# Patient Record
Sex: FEMALE | Race: WHITE | NOT HISPANIC OR LATINO | Employment: OTHER | URBAN - METROPOLITAN AREA
[De-identification: names, ages, dates, MRNs, and addresses within clinical notes are randomized per-mention and may not be internally consistent; named-entity substitution may affect disease eponyms.]

---

## 2017-06-16 ENCOUNTER — GENERIC CONVERSION - ENCOUNTER (OUTPATIENT)
Dept: OTHER | Facility: OTHER | Age: 57
End: 2017-06-16

## 2017-09-28 DIAGNOSIS — Z12.31 ENCOUNTER FOR SCREENING MAMMOGRAM FOR MALIGNANT NEOPLASM OF BREAST: ICD-10-CM

## 2017-10-19 ENCOUNTER — TRANSCRIBE ORDERS (OUTPATIENT)
Dept: ADMINISTRATIVE | Facility: HOSPITAL | Age: 57
End: 2017-10-19

## 2017-10-19 DIAGNOSIS — Z12.39 SCREENING BREAST EXAMINATION: Primary | ICD-10-CM

## 2017-10-25 ENCOUNTER — DOCTOR'S OFFICE (OUTPATIENT)
Dept: URBAN - METROPOLITAN AREA CLINIC 136 | Facility: CLINIC | Age: 57
Setting detail: OPHTHALMOLOGY
End: 2017-10-25
Payer: COMMERCIAL

## 2017-10-25 DIAGNOSIS — H40.003: ICD-10-CM

## 2017-10-25 DIAGNOSIS — H43.813: ICD-10-CM

## 2017-10-25 DIAGNOSIS — H53.2: ICD-10-CM

## 2017-10-25 DIAGNOSIS — H25.13: ICD-10-CM

## 2017-10-25 DIAGNOSIS — H53.40: ICD-10-CM

## 2017-10-25 PROCEDURE — 92004 COMPRE OPH EXAM NEW PT 1/>: CPT | Performed by: OPHTHALMOLOGY

## 2017-10-25 ASSESSMENT — REFRACTION_CURRENTRX
OD_OVR_VA: 20/
OD_OVR_VA: 20/
OS_OVR_VA: 20/
OS_OVR_VA: 20/
OD_OVR_VA: 20/
OS_OVR_VA: 20/

## 2017-10-25 ASSESSMENT — REFRACTION_MANIFEST
OD_VA1: 20/
OS_VA3: 20/
OS_VA3: 20/
OS_VA1: 20/
OD_VA3: 20/
OD_VA2: 20/
OU_VA: 20/
OD_VA2: 20/
OD_VA3: 20/
OS_VA2: 20/
OU_VA: 20/
OD_VA1: 20/
OD_VA3: 20/
OD_VA2: 20/
OS_VA2: 20/
OS_VA3: 20/
OS_VA2: 20/
OU_VA: 20/
OS_VA1: 20/
OS_VA1: 20/
OD_VA1: 20/

## 2017-10-25 ASSESSMENT — VISUAL ACUITY
OS_BCVA: 20/30
OD_BCVA: 20/60

## 2017-10-25 ASSESSMENT — CONFRONTATIONAL VISUAL FIELD TEST (CVF): OS_FINDINGS: FULL

## 2017-11-01 ENCOUNTER — DOCTOR'S OFFICE (OUTPATIENT)
Dept: URBAN - METROPOLITAN AREA CLINIC 136 | Facility: CLINIC | Age: 57
Setting detail: OPHTHALMOLOGY
End: 2017-11-01
Payer: COMMERCIAL

## 2017-11-01 ENCOUNTER — RX ONLY (RX ONLY)
Age: 57
End: 2017-11-01

## 2017-11-01 DIAGNOSIS — H53.40: ICD-10-CM

## 2017-11-01 DIAGNOSIS — H10.13: ICD-10-CM

## 2017-11-01 DIAGNOSIS — H01.002: ICD-10-CM

## 2017-11-01 DIAGNOSIS — H01.004: ICD-10-CM

## 2017-11-01 DIAGNOSIS — H01.005: ICD-10-CM

## 2017-11-01 DIAGNOSIS — H01.001: ICD-10-CM

## 2017-11-01 PROCEDURE — 99211 OFF/OP EST MAY X REQ PHY/QHP: CPT | Performed by: OPHTHALMOLOGY

## 2017-11-01 ASSESSMENT — LID EXAM ASSESSMENTS
OS_BLEPHARITIS: 1+
OD_COMMENTS: MGD
OD_BLEPHARITIS: 1+
OS_COMMENTS: MGD

## 2017-11-01 ASSESSMENT — VISUAL ACUITY
OS_BCVA: 20/30-1
OD_BCVA: 20/60

## 2017-11-01 ASSESSMENT — REFRACTION_MANIFEST
OD_VA1: 20/
OS_VA3: 20/
OS_VA2: 20/
OD_VA2: 20/
OD_VA2: 20/
OS_VA3: 20/
OU_VA: 20/
OD_VA2: 20/
OU_VA: 20/
OD_VA3: 20/
OD_VA1: 20/
OS_VA3: 20/
OD_VA3: 20/
OD_VA3: 20/
OS_VA1: 20/
OD_VA1: 20/
OS_VA2: 20/
OS_VA2: 20/
OS_VA1: 20/
OS_VA1: 20/
OU_VA: 20/

## 2017-11-01 ASSESSMENT — REFRACTION_CURRENTRX
OD_OVR_VA: 20/
OS_OVR_VA: 20/
OS_OVR_VA: 20/
OD_OVR_VA: 20/
OD_OVR_VA: 20/
OS_OVR_VA: 20/

## 2017-11-01 ASSESSMENT — CONFRONTATIONAL VISUAL FIELD TEST (CVF): OS_FINDINGS: FULL

## 2017-11-13 ENCOUNTER — TRANSCRIBE ORDERS (OUTPATIENT)
Dept: ADMINISTRATIVE | Facility: HOSPITAL | Age: 57
End: 2017-11-13

## 2017-11-13 DIAGNOSIS — Z13.820 SCREENING FOR OSTEOPOROSIS: Primary | ICD-10-CM

## 2017-11-17 ENCOUNTER — HOSPITAL ENCOUNTER (OUTPATIENT)
Dept: BONE DENSITY | Facility: MEDICAL CENTER | Age: 57
Discharge: HOME | End: 2017-11-17
Payer: COMMERCIAL

## 2017-11-17 ENCOUNTER — HOSPITAL ENCOUNTER (OUTPATIENT)
Dept: MAMMOGRAPHY | Facility: MEDICAL CENTER | Age: 57
Discharge: HOME | End: 2017-11-17
Payer: COMMERCIAL

## 2017-11-17 DIAGNOSIS — Z12.31 ENCOUNTER FOR SCREENING MAMMOGRAM FOR MALIGNANT NEOPLASM OF BREAST: ICD-10-CM

## 2017-11-17 DIAGNOSIS — Z13.820 SCREENING FOR OSTEOPOROSIS: ICD-10-CM

## 2017-11-17 LAB — HCV AB SER-ACNC: NEGATIVE

## 2017-11-17 PROCEDURE — 77080 DXA BONE DENSITY AXIAL: CPT

## 2017-11-17 PROCEDURE — 77063 BREAST TOMOSYNTHESIS BI: CPT

## 2017-11-17 PROCEDURE — G0202 SCR MAMMO BI INCL CAD: HCPCS

## 2017-12-04 ENCOUNTER — HOSPITAL ENCOUNTER (OUTPATIENT)
Dept: ULTRASOUND IMAGING | Facility: CLINIC | Age: 57
Discharge: HOME | End: 2017-12-04
Payer: COMMERCIAL

## 2017-12-04 ENCOUNTER — GENERIC CONVERSION - ENCOUNTER (OUTPATIENT)
Dept: OTHER | Facility: OTHER | Age: 57
End: 2017-12-04

## 2017-12-04 DIAGNOSIS — R92.8 ABNORMAL MAMMOGRAM: ICD-10-CM

## 2017-12-04 PROCEDURE — 76642 ULTRASOUND BREAST LIMITED: CPT

## 2017-12-04 RX ORDER — BACLOFEN 20 MG/1
20 TABLET ORAL 2 TIMES DAILY
COMMUNITY
End: 2022-05-10

## 2017-12-04 RX ORDER — CELECOXIB 200 MG/1
200 CAPSULE ORAL 2 TIMES DAILY
COMMUNITY
End: 2018-10-30 | Stop reason: SDUPTHER

## 2017-12-04 RX ORDER — LEVOTHYROXINE SODIUM 0.15 MG/1
175 TABLET ORAL DAILY
COMMUNITY
End: 2018-04-05 | Stop reason: DRUGHIGH

## 2017-12-04 NOTE — PROGRESS NOTES
Met with patient and Dr Emiliano Zamora  regarding recommendation for;      _____ RIGHT ___X___LEFT      __X___Ultrasound guided  ______Stereotactic  Breast biopsy  ___X__Verbalized understanding        Blood thinners:  _____yes __X___no    Date stopped: ___N/A________    Biopsy teaching sheet given:  ___X____yes ______no

## 2017-12-13 ENCOUNTER — HOSPITAL ENCOUNTER (OUTPATIENT)
Dept: ULTRASOUND IMAGING | Facility: CLINIC | Age: 57
Discharge: HOME | End: 2017-12-13
Payer: COMMERCIAL

## 2017-12-13 ENCOUNTER — HOSPITAL ENCOUNTER (OUTPATIENT)
Dept: MAMMOGRAPHY | Facility: CLINIC | Age: 57
Discharge: HOME | End: 2017-12-13

## 2017-12-13 ENCOUNTER — GENERIC CONVERSION - ENCOUNTER (OUTPATIENT)
Dept: OBGYN CLINIC | Facility: CLINIC | Age: 57
End: 2017-12-13

## 2017-12-13 ENCOUNTER — GENERIC CONVERSION - ENCOUNTER (OUTPATIENT)
Dept: OTHER | Facility: OTHER | Age: 57
End: 2017-12-13

## 2017-12-13 ENCOUNTER — HOSPITAL ENCOUNTER (OUTPATIENT)
Dept: ULTRASOUND IMAGING | Facility: CLINIC | Age: 57
Discharge: HOME | End: 2017-12-13
Admitting: RADIOLOGY
Payer: COMMERCIAL

## 2017-12-13 VITALS — SYSTOLIC BLOOD PRESSURE: 120 MMHG | HEART RATE: 80 BPM | DIASTOLIC BLOOD PRESSURE: 76 MMHG

## 2017-12-13 DIAGNOSIS — R92.8 ABNORMAL FINDINGS ON DIAGNOSTIC IMAGING OF BREAST: ICD-10-CM

## 2017-12-13 DIAGNOSIS — R92.8 ABNORMAL ULTRASOUND OF BREAST: ICD-10-CM

## 2017-12-13 DIAGNOSIS — R92.8 ABNORMAL MAMMOGRAM: ICD-10-CM

## 2017-12-13 PROCEDURE — 19083 BX BREAST 1ST LESION US IMAG: CPT

## 2017-12-13 PROCEDURE — 88305 TISSUE EXAM BY PATHOLOGIST: CPT | Performed by: OBSTETRICS & GYNECOLOGY

## 2017-12-13 RX ORDER — LIDOCAINE HYDROCHLORIDE 10 MG/ML
4 INJECTION, SOLUTION INFILTRATION; PERINEURAL ONCE
Status: COMPLETED | OUTPATIENT
Start: 2017-12-13 | End: 2017-12-13

## 2017-12-13 RX ORDER — SODIUM BICARBONATE 42 MG/ML
1 INJECTION, SOLUTION INTRAVENOUS ONCE
Status: COMPLETED | OUTPATIENT
Start: 2017-12-13 | End: 2017-12-13

## 2017-12-13 RX ADMIN — SODIUM BICARBONATE 0.5 MEQ: 42 SOLUTION INTRAVENOUS at 12:03

## 2017-12-13 RX ADMIN — LIDOCAINE HYDROCHLORIDE 4 ML: 10 INJECTION, SOLUTION INFILTRATION; PERINEURAL at 12:03

## 2017-12-13 NOTE — DISCHARGE INSTR - OTHER ORDERS
POST LARGE CORE BREAST BIOPSY PATIENT INFORMATION      1  Place an ice pack inside your bra over the top of the dressing every hour for 20 minutes (20 minutes on, 60 minutes off) Do this until bedtime  2  Do not shower or bathe until the following morning       3  You may bathe your breast carefully with the steri-strips in place  Be careful    Not to loosen them  The steri-strips will fall off in 3-5 days  4  You may have mild discomfort, and you may have some bruising where the   Needle entered the skin  This should clear within 5-7 days  5  If you need medicine for discomfort, take acetaminophen products such as   Tylenol  You may also take Advil or Motrin products  6  Do not participate in strenuous activities such as-tennis, aerobics, skiing,  Weight lifting, etc  for 24 hours  Refrain from swimming for 72 hours  7  Wearing a bra for sleeping may be more comfortable for the first 24-48 hours  8  Watch for continued bleeding, pain or fever over 101     For procedures done at the [address] [place] [address] call:  Cordell Dent RN at [phone] or  Senia Patel RN at [phone]  After 4 PM call the Interventional Radiology Department at [phone] and ask to speak with the nurse on call  For procedures performed at [address] [place] [address] call: The Radiology Nurse at [phone]    After 4 PM call your physician, or go to the Emergency Department  9          The final results of your biopsy are usually available within one week
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
## 2017-12-13 NOTE — PROGRESS NOTES
Procedure type:    __X___ultrasound guided _____stereotactic    Breast:    __X___Left _____Right    Location: 7:00 4CM FROM NIPPLE    Needle: 12G SHELBY    # of passes: 3    Clip: ULTRACLIP/WING    Performed by: Dr Crystal Li held for 5 minutes by:  John Chavez    Steri Strips:    __X___yes _____no    Band aid:    __X___yes_____no    Tape and guaze:    _____yes __X___no    Tolerated procedure:    __X___yes _____no

## 2017-12-14 NOTE — PROGRESS NOTES
Post procedure call completed    Bleeding: _____yes ___x__no    Pain: _____yes ____x__no    Redness/Swelling: ______yes ___x___no    Band aid removed: __x___yes _____no    Steri-Strips intact: ____x__yes _____no

## 2017-12-26 ENCOUNTER — HOSPITAL ENCOUNTER (EMERGENCY)
Facility: HOSPITAL | Age: 57
Discharge: HOME | End: 2017-12-26
Admitting: EMERGENCY MEDICINE
Payer: COMMERCIAL

## 2017-12-26 VITALS
TEMPERATURE: 98.6 F | HEART RATE: 99 BPM | OXYGEN SATURATION: 98 % | SYSTOLIC BLOOD PRESSURE: 175 MMHG | RESPIRATION RATE: 20 BRPM | DIASTOLIC BLOOD PRESSURE: 98 MMHG

## 2017-12-26 DIAGNOSIS — K08.89 PAIN, DENTAL: Primary | ICD-10-CM

## 2017-12-26 PROCEDURE — 99282 EMERGENCY DEPT VISIT SF MDM: CPT

## 2017-12-26 RX ORDER — MORPHINE SULFATE 15 MG/1
30 TABLET ORAL EVERY 4 HOURS PRN
Status: DISCONTINUED | OUTPATIENT
Start: 2017-12-26 | End: 2017-12-26 | Stop reason: HOSPADM

## 2017-12-26 RX ORDER — CLINDAMYCIN HYDROCHLORIDE 300 MG/1
300 CAPSULE ORAL 3 TIMES DAILY
Qty: 21 CAPSULE | Refills: 0 | Status: SHIPPED | OUTPATIENT
Start: 2017-12-26 | End: 2018-01-02

## 2017-12-26 RX ORDER — ONDANSETRON 4 MG/1
TABLET, FILM COATED ORAL
Qty: 10 TABLET | Refills: 0 | Status: SHIPPED | OUTPATIENT
Start: 2017-12-26 | End: 2018-04-05 | Stop reason: SDUPTHER

## 2017-12-26 RX ORDER — MORPHINE SULFATE 30 MG/1
TABLET ORAL
Qty: 5 TABLET | Refills: 0 | Status: SHIPPED | OUTPATIENT
Start: 2017-12-26 | End: 2018-09-13

## 2017-12-26 RX ORDER — CLINDAMYCIN HYDROCHLORIDE 150 MG/1
300 CAPSULE ORAL ONCE
Status: COMPLETED | OUTPATIENT
Start: 2017-12-26 | End: 2017-12-26

## 2017-12-26 RX ORDER — ONDANSETRON 4 MG/1
8 TABLET, ORALLY DISINTEGRATING ORAL ONCE
Status: COMPLETED | OUTPATIENT
Start: 2017-12-26 | End: 2017-12-26

## 2017-12-26 RX ADMIN — CLINDAMYCIN HYDROCHLORIDE 300 MG: 150 CAPSULE ORAL at 13:35

## 2017-12-26 RX ADMIN — ONDANSETRON 8 MG: 4 TABLET, ORALLY DISINTEGRATING ORAL at 12:24

## 2017-12-26 RX ADMIN — MORPHINE SULFATE 30 MG: 15 TABLET ORAL at 13:35

## 2017-12-26 NOTE — ED PROVIDER NOTES
History  Chief Complaint   Patient presents with    Dental Swelling     Pt presents to the ED for evaluation of right facial swelling, reports tooth on that side "needs to come out" has dentist apt for next week but swelling worsened within the last 12 hours  Patient is a 57-year-old female who reports starting with right lower molar pain in the middle of the night that quickly worsened to include right facial swelling  She denies fevers, chills, trouble opening her mouth, or trouble swallowing  This morning she has had some nausea and vomiting  She further denies chest pain, shortness of breath, abdominal pain  She has a history of issues with same tooth and has an upcoming appointment with dentist in a few weeks  Prior to Admission Medications   Prescriptions Last Dose Informant Patient Reported? Taking?   b complex vitamins tablet   Yes Yes   Sig: Take 1 tablet by mouth daily   baclofen 10 mg tablet   Yes Yes   Sig: Take 10 mg by mouth 3 (three) times a day   celecoxib (CeleBREX) 200 mg capsule   Yes Yes   Sig: Take 200 mg by mouth 2 (two) times a day   levothyroxine 150 mcg tablet   Yes Yes   Sig: Take 150 mcg by mouth daily   magnesium oxide (MAG-OX) 400 mg   Yes Yes   Sig: Take 400 mg by mouth 2 (two) times a day      Facility-Administered Medications: None       History reviewed  No pertinent past medical history  History reviewed  No pertinent surgical history  History reviewed  No pertinent family history  I have reviewed and agree with the history as documented  Social History   Substance Use Topics    Smoking status: Never Smoker    Smokeless tobacco: Not on file    Alcohol use No        Review of Systems   Constitutional: Negative for chills and fever  HENT: Positive for dental problem and facial swelling  Negative for congestion, drooling, rhinorrhea, sinus pain, sore throat and trouble swallowing      Respiratory: Negative for cough, shortness of breath and wheezing  Cardiovascular: Negative for chest pain  Gastrointestinal: Negative for abdominal pain, diarrhea, nausea and vomiting  Neurological: Negative for dizziness and headaches  All other systems reviewed and are negative  Physical Exam  ED Triage Vitals [12/26/17 1123]   Temperature Pulse Respirations Blood Pressure SpO2   98 6 °F (37 °C) 99 20 (!) 175/98 98 %      Temp Source Heart Rate Source Patient Position - Orthostatic VS BP Location FiO2 (%)   Oral Monitor Sitting Left arm --      Pain Score       Worst Possible Pain           Orthostatic Vital Signs  Vitals:    12/26/17 1123   BP: (!) 175/98   Pulse: 99   Patient Position - Orthostatic VS: Sitting       Physical Exam   Constitutional: She is oriented to person, place, and time  She appears well-developed and well-nourished  No distress  HENT:   Head: Normocephalic and atraumatic  Right Ear: Hearing, tympanic membrane, external ear and ear canal normal    Left Ear: Hearing, tympanic membrane, external ear and ear canal normal    Nose: Nose normal  No mucosal edema or rhinorrhea  Right sinus exhibits no maxillary sinus tenderness  Left sinus exhibits no maxillary sinus tenderness  Mouth/Throat: Uvula is midline, oropharynx is clear and moist and mucous membranes are normal  No trismus in the jaw  No uvula swelling  No oropharyngeal exudate  Eyes: Conjunctivae, EOM and lids are normal  Pupils are equal, round, and reactive to light  Neck: Normal range of motion  Neck supple  No neck rigidity  Edema (mild edema and TTP ~angle of mandible/R AC LN's) present  No erythema and normal range of motion present  No cervical mid-line TTP, no paraspinal muscle TTP   Cardiovascular: Normal rate, regular rhythm and normal heart sounds  Pulmonary/Chest: Effort normal and breath sounds normal    Abdominal: Soft  Normal appearance and bowel sounds are normal  There is no tenderness  Musculoskeletal:   Normal gait and station  Non-focal with FROM upper, lower extremities, neck, chest and back   Lymphadenopathy:     She has no cervical adenopathy  Neurological: She is alert and oriented to person, place, and time  Appropriate speech and behavior  Non-focal         ED Medications  Medications   morphine (MSIR) IR tablet 30 mg (30 mg Oral Given 12/26/17 1335)   ondansetron (ZOFRAN-ODT) dispersible tablet 8 mg (8 mg Oral Given 12/26/17 1224)   clindamycin (CLEOCIN) capsule 300 mg (300 mg Oral Given 12/26/17 1335)       Diagnostic Studies  Results Reviewed     None                 No orders to display              Procedures  Procedures       Phone Contacts  ED Phone Contact    ED Course  ED Course as of Dec 26 1441   Tue Dec 26, 2017   1332  Patient's nausea improved after Zofran 8 mg ODT  She is tolerating p o  Liquids at this time  Requesting something for pain  MSIR 30 mg ordered as well as 1 dose of Cleocin 300 mg     1431   Upon reassessment patient was able to tolerate both the pain medication and antibiotic without nausea or vomiting  She is stable for discharge  She states that her insurance told her to obtain and ambulatory referral to seek further treatment at [address] Floor surgery  [facility]  [facility] Time    Disposition  Final diagnoses:   Pain, dental     Time reflects when diagnosis was documented in both MDM as applicable and the Disposition within this note     Time User Action Codes Description Comment    12/26/2017  2:33 PM Elsie Whitten Add [K08 89] Pain, dental       ED Disposition     ED Disposition Condition Comment    Discharge  [address] discharge to home/self care      Condition at discharge: Good        Follow-up Information     Follow up With Specialties Details Why [address] [facility]"" for Oral and Maxillofacial Surgery [place]  Call make appt asap for further evaluation [address] [facility] [address]    [address] [place] for Oral and [address]    [address], DO    [address]  [address] [place] [address],  [address] [place] [address] [phone]          Patient's Medications   Discharge Prescriptions    CLINDAMYCIN (CLEOCIN) 300 MG CAPSULE    Take 1 capsule by mouth 3 (three) times a day for 7 days       Start Date: 12/26/2017End Date: 1/2/2018       Order Dose: 300 mg       Quantity: 21 capsule    Refills: 0    MORPHINE (MSIR) 30 MG TABLET    Take 1 tablet every 4-6 hours as needed for breakthrough pain  Start Date: 12/26/2017End Date: --       Order Dose: --       Quantity: 5 tablet    Refills: 0    ONDANSETRON (ZOFRAN) 4 MG TABLET    Take 1-2 tabs every 8 hours as needed for nausea/vomiting       Start Date: 12/26/2017End Date: --       Order Dose: --       Quantity: 10 tablet    Refills: 0       Outpatient Discharge Orders  Ambulatory Referral   Standing Status: Future  Standing Exp   Date: 06/26/18         ED Provider  Electronically Signed by           Deepak Moeller PA-C  12/26/17 3069

## 2017-12-26 NOTE — DISCHARGE INSTRUCTIONS
Toothache   WHAT YOU NEED TO KNOW:   A toothache is pain that is caused by irritation of the nerves in the center of your tooth  The irritation may be caused by several problems, such as a cavity, an infection, a cracked tooth, or gum disease  It is very important to follow up with your dentist so the cause of your toothache can be diagnosed and treated  This can help prevent more serious problems  DISCHARGE INSTRUCTIONS:   Medicines: You may  need any of the following:  · NSAIDs  decrease swelling and pain  This medicine can be bought with or without a doctor's order  This medicine can cause stomach bleeding or kidney problems in certain people  If you take blood thinner medicine, always ask your healthcare provider if NSAIDs are safe for you  Always read the medicine label and follow the directions on it before using this medicine  · Acetaminophen  decreases pain  It is available without a doctor's order  Ask how much to take and how often to take it  Follow directions  Acetaminophen can cause liver damage if not taken correctly  · Pain medicine  may be given as a pill or as medicine that you put directly on your tooth or gums  Do not wait until the pain is severe before you take this medicine  · Antibiotics  help fight or prevent an infection caused by bacteria  Take them as directed  · Take your medicine as directed  Contact your healthcare provider if you think your medicine is not helping or if you have side effects  Tell him of her if you are allergic to any medicine  Keep a list of the medicines, vitamins, and herbs you take  Include the amounts, and when and why you take them  Bring the list or the pill bottles to follow-up visits  Carry your medicine list with you in case of an emergency  Follow up with your dentist as directed: You may be referred to a dental surgeon  Write down your questions so you remember to ask them during your visits     Self-care:   · Rinse your mouth with warm salt water 4 times a day or as directed  · You may need to eat soft foods to help relieve pain caused by chewing  Contact your dentist if:   · You have questions or concerns about your condition or care  Return to the emergency department if:   · You have trouble breathing  · You have swelling in your face or neck  · You have a fever and chills  · You have trouble speaking or swallowing  · You have trouble opening or closing your mouth  © 2017 [facility] Information is for End User's use only and may not be sold, redistributed or otherwise used for commercial purposes  All illustrations and images included in CareNotes® are the copyrighted property of A D A M , Inc  or Andrea Mendes  The above information is an  only  It is not intended as medical advice for individual conditions or treatments  Talk to your doctor, nurse or pharmacist before following any medical regimen to see if it is safe and effective for you
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
## 2018-01-16 LAB — OCCULT BLD, FECAL IMMUNOLOGICAL (HISTORICAL): NEGATIVE

## 2018-01-17 NOTE — MISCELLANEOUS
Message  Return to work or school:   Yadira Martinez is under my professional care   She was seen in my office on 01/18/2016   She is able to return to work on  01/22/2016       Dakota Kessler DO/am       Signatures   Electronically signed by : Candie Hernandez, ; Jan 18 2016  2:36PM EST                       (Author)    Electronically signed by : Candie Hernandez, ; Jan 18 2016  2:44PM EST                       (Author)

## 2018-02-21 ENCOUNTER — OFFICE VISIT (OUTPATIENT)
Dept: PHYSICAL THERAPY | Age: 58
End: 2018-02-21
Payer: COMMERCIAL

## 2018-02-21 PROCEDURE — 97750 PHYSICAL PERFORMANCE TEST: CPT | Performed by: PHYSICAL THERAPIST

## 2018-03-08 ENCOUNTER — TELEPHONE (OUTPATIENT)
Dept: FAMILY MEDICINE CLINIC | Facility: CLINIC | Age: 58
End: 2018-03-08

## 2018-03-08 ENCOUNTER — HOSPITAL ENCOUNTER (OUTPATIENT)
Dept: RADIOLOGY | Facility: HOSPITAL | Age: 58
Discharge: HOME | End: 2018-03-08
Payer: COMMERCIAL

## 2018-03-08 ENCOUNTER — OFFICE VISIT (OUTPATIENT)
Dept: FAMILY MEDICINE CLINIC | Facility: CLINIC | Age: 58
End: 2018-03-08
Payer: COMMERCIAL

## 2018-03-08 VITALS
WEIGHT: 215.4 LBS | SYSTOLIC BLOOD PRESSURE: 124 MMHG | DIASTOLIC BLOOD PRESSURE: 80 MMHG | HEIGHT: 67 IN | BODY MASS INDEX: 33.81 KG/M2

## 2018-03-08 DIAGNOSIS — S06.9X9S MILD NEUROCOGNITIVE DISORDER DUE TO TRAUMATIC BRAIN INJURY, SEQUELA (HCC): ICD-10-CM

## 2018-03-08 DIAGNOSIS — G89.29 CHRONIC PAIN OF RIGHT ANKLE: ICD-10-CM

## 2018-03-08 DIAGNOSIS — M79.10 MYALGIA: ICD-10-CM

## 2018-03-08 DIAGNOSIS — M17.12 PRIMARY OSTEOARTHRITIS OF LEFT KNEE: ICD-10-CM

## 2018-03-08 DIAGNOSIS — I67.9 SMALL VESSEL DISEASE, CEREBROVASCULAR: ICD-10-CM

## 2018-03-08 DIAGNOSIS — R26.2 AMBULATORY DYSFUNCTION: ICD-10-CM

## 2018-03-08 DIAGNOSIS — E03.9 ACQUIRED HYPOTHYROIDISM: ICD-10-CM

## 2018-03-08 DIAGNOSIS — R11.0 NAUSEA: Primary | ICD-10-CM

## 2018-03-08 DIAGNOSIS — M25.571 CHRONIC PAIN OF RIGHT ANKLE: ICD-10-CM

## 2018-03-08 DIAGNOSIS — G70.9 NEUROMUSCULAR DISORDER (HCC): ICD-10-CM

## 2018-03-08 DIAGNOSIS — Q04.8 CEREBELLAR TONSILLAR ECTOPIA (HCC): ICD-10-CM

## 2018-03-08 DIAGNOSIS — G89.29 CHRONIC FOOT PAIN, RIGHT: ICD-10-CM

## 2018-03-08 DIAGNOSIS — R73.03 PREDIABETES: ICD-10-CM

## 2018-03-08 DIAGNOSIS — M62.838 MUSCLE SPASM: ICD-10-CM

## 2018-03-08 DIAGNOSIS — G31.84 MILD NEUROCOGNITIVE DISORDER DUE TO TRAUMATIC BRAIN INJURY, SEQUELA (HCC): ICD-10-CM

## 2018-03-08 DIAGNOSIS — R29.6 RECURRENT FALLS: ICD-10-CM

## 2018-03-08 DIAGNOSIS — M79.671 CHRONIC FOOT PAIN, RIGHT: ICD-10-CM

## 2018-03-08 DIAGNOSIS — R79.82 ELEVATED C-REACTIVE PROTEIN (CRP): ICD-10-CM

## 2018-03-08 PROCEDURE — 73610 X-RAY EXAM OF ANKLE: CPT

## 2018-03-08 PROCEDURE — 99215 OFFICE O/P EST HI 40 MIN: CPT | Performed by: FAMILY MEDICINE

## 2018-03-08 PROCEDURE — 3725F SCREEN DEPRESSION PERFORMED: CPT | Performed by: FAMILY MEDICINE

## 2018-03-08 PROCEDURE — 73630 X-RAY EXAM OF FOOT: CPT

## 2018-03-08 RX ORDER — ONDANSETRON 4 MG/1
4 TABLET, FILM COATED ORAL EVERY 8 HOURS PRN
Qty: 30 TABLET | Refills: 3 | Status: SHIPPED | OUTPATIENT
Start: 2018-03-08 | End: 2018-06-12

## 2018-03-08 NOTE — TELEPHONE ENCOUNTER
Tell her we refer to [facility] where they will do an intake with the therapist and then eventually get her into Psychiatry as warranted  Give her the # to call

## 2018-03-09 DIAGNOSIS — M77.30 CALCANEAL SPUR, UNSPECIFIED LATERALITY: Primary | ICD-10-CM

## 2018-03-09 NOTE — ASSESSMENT & PLAN NOTE
I reviewed her extensive records from [facility] and neuro psych  Pt states [facility] declined eval her  She will c/w [facility] re "undiag" disorder

## 2018-03-09 NOTE — PROGRESS NOTES
Assessment/Plan:    Acquired hypothyroidism  Will update labs 6 mo  CPM      Nausea  Refill Zofran    Prediabetes  RFM and hesham A1c 6 m    Chronic foot pain, right  CK XR R foot and ankle    Ambulatory dysfunction  Continue to use wheeled walker  Myalgia  I reviewed her extensive records from [facility] and neuro psych  Pt states [facility] declined eval her  She will c/w [facility] re "undiag" disorder  Diagnoses and all orders for this visit:    Nausea  -     ondansetron (ZOFRAN) 4 mg tablet; Take 1 tablet (4 mg total) by mouth every 8 (eight) hours as needed for nausea or vomiting    Prediabetes  -     Comprehensive metabolic panel; Future  -     Lipid panel; Future  -     HEMOGLOBIN A1C W/ EAG ESTIMATION; Future    Muscle spasm  -     Creatine Kinase, Total; Future  -     C-reactive protein; Future    Acquired hypothyroidism  -     TSH, 3rd generation; Future  -     T4, free; Future    Chronic foot pain, right  -     XR foot 3+ vw right; Future    Chronic pain of right ankle  -     XR ankle 3+ vw right; Future    Myalgia    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HCC)    Ambulatory dysfunction    Small vessel disease, cerebrovascular    Elevated C-reactive protein (CRP)    Primary osteoarthritis of left knee    Neuromuscular disorder (HCC)    Recurrent falls    Mild neurocognitive disorder due to traumatic brain injury, sequela ([facility] )    Other orders  -     Discontinue: aspirin 81 MG tablet; Take by mouth        There are no Patient Instructions on file for this visit  Subjective:        Patient ID: Sabine Almanza is a 59 y o  female  Chief Complaint   Patient presents with   [address]    Multiple Falls       NO pt here to reestab  Has had extensive w/u thru [facility] and listed as "undiag d/o" Has serious issues w recurrent falls, muscle spasms, amb dis(f)  Brought records from [facility] and neuropsych tarah  Has applied for disability           The following portions of the patient's history were reviewed and updated as appropriate: past medical history, past surgical history and problem list       Review of Systems   Constitutional: Positive for activity change and fatigue  Negative for diaphoresis and unexpected weight change  HENT: Negative  Eyes: Negative  Cardiovascular: Negative  Gastrointestinal: Negative  Endocrine: Negative for cold intolerance and heat intolerance  Genitourinary: Negative for difficulty urinating  Musculoskeletal: Positive for gait problem, myalgias and neck stiffness  Skin: Negative  Neurological: Negative for dizziness, tremors and headaches  Psychiatric/Behavioral: The patient is not nervous/anxious  Objective:  /80 (BP Location: Right arm, Patient Position: Sitting, Cuff Size: Standard)   Ht 5' 6 5" (1 689 m)   Wt 97 7 kg (215 lb 6 4 oz)   BMI 34 25 kg/m²        Physical Exam   Constitutional: She is oriented to person, place, and time  She appears well-nourished  HENT:   Head: Normocephalic and atraumatic  Right Ear: External ear normal    Left Ear: External ear normal    Nose: Nose normal    Mouth/Throat: Oropharynx is clear and moist    Eyes: Conjunctivae are normal  Pupils are equal, round, and reactive to light  Neck: Neck supple  No thyromegaly present  Cardiovascular: Normal rate, regular rhythm and normal heart sounds  No murmur heard  Pulmonary/Chest: Effort normal and breath sounds normal    Abdominal: Soft  Musculoskeletal: She exhibits no edema  Lymphadenopathy:     She has no cervical adenopathy  Neurological: She is alert and oriented to person, place, and time  Skin: Skin is warm  She is not diaphoretic  Psychiatric: She has a normal mood and affect   Her behavior is normal  Judgment and thought content normal

## 2018-03-13 ENCOUNTER — TRANSCRIBE ORDERS (OUTPATIENT)
Dept: FAMILY MEDICINE CLINIC | Facility: CLINIC | Age: 58
End: 2018-03-13

## 2018-03-13 ENCOUNTER — TELEPHONE (OUTPATIENT)
Dept: FAMILY MEDICINE CLINIC | Facility: CLINIC | Age: 58
End: 2018-03-13

## 2018-03-13 NOTE — TELEPHONE ENCOUNTER
[address] is no longer accepting new patients with her [employer] insurance  She did call her insurance and can see [facility] at [address] [facility], Dr Riccardo Grant, Dr Sheree Sullivan, and Will in [facility]  She is looking for your recommendation out of these options    Thanks

## 2018-04-05 ENCOUNTER — OFFICE VISIT (OUTPATIENT)
Dept: FAMILY MEDICINE CLINIC | Facility: CLINIC | Age: 58
End: 2018-04-05
Payer: COMMERCIAL

## 2018-04-05 VITALS
HEIGHT: 67 IN | BODY MASS INDEX: 33.18 KG/M2 | DIASTOLIC BLOOD PRESSURE: 98 MMHG | WEIGHT: 211.4 LBS | SYSTOLIC BLOOD PRESSURE: 144 MMHG

## 2018-04-05 DIAGNOSIS — M62.830 MUSCLE SPASM OF BACK: ICD-10-CM

## 2018-04-05 DIAGNOSIS — S49.92XA INJURY OF LEFT SHOULDER, INITIAL ENCOUNTER: ICD-10-CM

## 2018-04-05 DIAGNOSIS — Z74.09 IMPAIRED FUNCTIONAL MOBILITY, BALANCE, AND ENDURANCE: ICD-10-CM

## 2018-04-05 DIAGNOSIS — S89.92XA INJURY OF LEFT KNEE, INITIAL ENCOUNTER: ICD-10-CM
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
## 2018-04-05 DIAGNOSIS — M54.2 CERVICAL SPINE PAIN: ICD-10-CM

## 2018-04-05 DIAGNOSIS — Z12.11 ENCOUNTER FOR SCREENING COLONOSCOPY: ICD-10-CM

## 2018-04-05 PROBLEM — R29.6 FREQUENT FALLS: Status: ACTIVE | Noted: 2017-09-15

## 2018-04-05 PROBLEM — R03.0 ELEVATED BLOOD PRESSURE READING: Status: ACTIVE | Noted: 2017-09-15

## 2018-04-05 PROBLEM — M54.81 BILATERAL OCCIPITAL NEURALGIA: Status: ACTIVE | Noted: 2017-03-13

## 2018-04-05 PROBLEM — R20.0 BILATERAL HAND NUMBNESS: Status: ACTIVE | Noted: 2017-03-13

## 2018-04-05 PROBLEM — E55.9 VITAMIN D DEFICIENCY: Status: ACTIVE | Noted: 2017-01-12

## 2018-04-05 PROBLEM — F41.8 DEPRESSION WITH ANXIETY: Status: ACTIVE | Noted: 2017-08-16

## 2018-04-05 PROCEDURE — 99214 OFFICE O/P EST MOD 30 MIN: CPT | Performed by: NURSE PRACTITIONER

## 2018-04-05 NOTE — PROGRESS NOTES
Assessment/Plan:    Multiple joint pains secondary to fall:   Other than tenderness and some decreased ROM no alarming signs on exam  Patient was instructed she can wait another day or two to see if symptoms resolve, if not we will x ray left shoulder, left knee, and cervical spine to ensure to acute injury  She was instructed she can increase baclofen to four times a day to help with muscle spasms and to continue with rest and ibuprofen, ice and heat as it helps  If no acute injury, she will see physical therapy  Impaired mobility and balance with multiple falls:  Referral to PT given to patient to help with her balance and mobility to hopefully decrease amount of falls she is having  She uses four wheel seated walker  She has underlying undiagnosed neuromuscular disorder and is being followed by neurology  She was instructed to continue to follow with them  PT locations provided  Francis Dunaway is also due for colonoscopy, she was given referral for GI today  She is to call if any worsening symptoms or no improvement  We will follow up with x ray results  Patient verbalizes understand and agrees with treatment plan  Diagnoses and all orders for this visit:    Fall (on) (from) other stairs and steps, initial encounter    Impaired functional mobility, balance, and endurance  -     Ambulatory referral to Physical Therapy; Future    Injury of left shoulder, initial encounter  -     XR shoulder 2+ vw left; Future    Injury of left knee, initial encounter  -     XR knee 4+ vw left injury; Future    Cervical spine pain  -     XR spine cervical 2 or 3 vw injury; Future    Muscle spasm of back    Encounter for screening colonoscopy  -     Ambulatory referral to Gastroenterology; Future          Subjective:        Patient ID: Isabella Bundy is a 59 y o  female  Chief Complaint   Patient presents with    Fall     neck, back pain after a fall on Monday  Everything feels like it is in full spasms   She has had 11 falls since May 1, 2017  Patient fell Monday while walking down the stairs, she was one stair above landing  She put down right foot and felt stable and switched feet but fell forward  She put out left foot to try and stop herself but this turned her around so she went down backwards  She tried to grab with her right arm  Patient denies losing consciousness and hitting head  New pain: upper neck, left shoulder  Back of left leg (hamstring) is tight and pulling  Right leg sensitive to even touch  Let knee hurts  Ambulates using 4 wheel seated walker  Gets relief from laying on left side and leaning back with padding on back with legs above  Follows with neurology every 6 months  Frequent falls, had 11 Falls since last May 2017  Has had thorough work up for neuromuscular disorders and was never given and exact diagnosis  Fall   The accident occurred 3 to 5 days ago  Fall occurred: stairs  She landed on hard floor  There was no blood loss  The point of impact was the left knee and buttocks  The pain is present in the neck, back, buttocks, left shoulder, right shoulder, left knee and right upper leg  The symptoms are aggravated by ambulation and sitting  Associated symptoms include nausea (chronic issues), numbness (top of hand, had this before) and tingling (top of hands, had this before)  Pertinent negatives include no abdominal pain, fever, headaches, hearing loss, hematuria, loss of consciousness, visual change (has chronic blurry vision at times, had work up with) or vomiting  She has tried NSAID, elevation, heat, ice and rest for the symptoms  The treatment provided mild relief  The following portions of the patient's history were reviewed and updated as appropriate: allergies, current medications, past family history, past social history and problem list     Review of Systems   Constitutional: Negative for chills and fever  HENT: Negative for congestion      Eyes: Negative for visual disturbance  Respiratory: Negative for chest tightness and shortness of breath  Cardiovascular: Negative for chest pain  Gastrointestinal: Positive for nausea (chronic issues)  Negative for abdominal pain and vomiting  Genitourinary: Negative for difficulty urinating, dysuria and hematuria  Musculoskeletal: Positive for arthralgias, back pain, gait problem (uses 4 wheel seated walker), myalgias and neck pain  Negative for joint swelling  Muscle spasms  Chronic areas of pain  New pain left shoulder and knee, worsened neck pain   Skin: Negative for rash and wound  Small bruise right forearm from fall   Neurological: Positive for tingling (top of hands, had this before) and numbness (top of hand, had this before)  Negative for dizziness, loss of consciousness and headaches  Psychiatric/Behavioral: Negative for agitation and decreased concentration  The patient is not nervous/anxious  Objective:  /98   Ht 5' 6 5" (1 689 m)   Wt 95 9 kg (211 lb 6 4 oz)   BMI 33 61 kg/m²      Physical Exam   Constitutional: She is oriented to person, place, and time  She appears well-developed  No distress  obese   HENT:   Head: Normocephalic and atraumatic  Right Ear: External ear normal    Left Ear: External ear normal    Nose: Nose normal    Mouth/Throat: Oropharynx is clear and moist  No oropharyngeal exudate  Eyes: Conjunctivae and lids are normal  Pupils are equal, round, and reactive to light  Right eye exhibits no discharge  Left eye exhibits no discharge  Neck: Neck supple  No tracheal deviation present  Cardiovascular: Normal rate and regular rhythm  No murmur heard  Pulmonary/Chest: Effort normal and breath sounds normal  No respiratory distress  She has no wheezes  Abdominal: Soft  Bowel sounds are normal  She exhibits no distension  There is no tenderness  There is no guarding  Musculoskeletal: She exhibits no edema or deformity          Right shoulder: She exhibits decreased range of motion  She exhibits no tenderness, no swelling, no effusion, no crepitus and no pain  Left shoulder: She exhibits decreased range of motion, tenderness and pain  She exhibits no swelling, no effusion, no crepitus, no spasm and normal strength  Right knee: She exhibits normal range of motion, no swelling, no effusion, no ecchymosis, no deformity and no erythema  No tenderness found  Left knee: She exhibits normal range of motion, no swelling, no effusion, no ecchymosis, no deformity and no erythema  Tenderness found  Cervical back: She exhibits tenderness and pain  She exhibits no swelling, no edema, no deformity, no laceration and no spasm  Thoracic back: She exhibits tenderness and pain  She exhibits no bony tenderness, no swelling, no edema, no laceration and no spasm  Bilateral Upper Extremity +5 strength  Left lower extremity +5 strength   Right lower extremity +4 strength  Per patient her right leg is always weaker than left  Right shoulder decreased ROM chronic  Lymphadenopathy:     She has no cervical adenopathy  Neurological: She is alert and oriented to person, place, and time  No cranial nerve deficit  Coordination abnormal    Skin: Skin is warm and dry  No rash noted  She is not diaphoretic  No erythema  Psychiatric: She has a normal mood and affect  Her speech is normal and behavior is normal  Judgment and thought content normal  Cognition and memory are normal    Nursing note and vitals reviewed

## 2018-04-05 NOTE — PATIENT INSTRUCTIONS
Continue with current medications  Can increase Baclofen to four times a day  Complete X rays  If these are normal, you can proceed with Physical Therapy which will help with balance and coordination  Continue to follow up with neurology  Call if no improvement or any worsening symptoms

## 2018-04-06 ENCOUNTER — HOSPITAL ENCOUNTER (OUTPATIENT)
Dept: RADIOLOGY | Facility: HOSPITAL | Age: 58
Discharge: HOME | End: 2018-04-06
Payer: COMMERCIAL

## 2018-04-06 DIAGNOSIS — M54.2 CERVICAL SPINE PAIN: ICD-10-CM

## 2018-04-06 DIAGNOSIS — S89.92XA INJURY OF LEFT KNEE, INITIAL ENCOUNTER: ICD-10-CM

## 2018-04-06 DIAGNOSIS — S49.92XA INJURY OF LEFT SHOULDER, INITIAL ENCOUNTER: ICD-10-CM

## 2018-04-06 PROCEDURE — 73564 X-RAY EXAM KNEE 4 OR MORE: CPT

## 2018-04-06 PROCEDURE — 72040 X-RAY EXAM NECK SPINE 2-3 VW: CPT

## 2018-04-06 PROCEDURE — 73030 X-RAY EXAM OF SHOULDER: CPT

## 2018-04-12 ENCOUNTER — TELEPHONE (OUTPATIENT)
Dept: FAMILY MEDICINE CLINIC | Facility: CLINIC | Age: 58
End: 2018-04-12

## 2018-04-12 NOTE — TELEPHONE ENCOUNTER
Dr Zoey Alonso asked if you could please call him in regards to this patient  He was aware that you were in a meeting this morning and will return his call afterwards      Cb# [phone]

## 2018-04-16 ENCOUNTER — EVALUATION (OUTPATIENT)
Dept: PHYSICAL THERAPY | Facility: REHABILITATION | Age: 58
End: 2018-04-16
Payer: COMMERCIAL

## 2018-04-16 DIAGNOSIS — Z74.09 IMPAIRED MOBILITY: Primary | ICD-10-CM

## 2018-04-16 DIAGNOSIS — R26.2 AMBULATORY DYSFUNCTION: ICD-10-CM

## 2018-04-16 PROCEDURE — G8979 MOBILITY GOAL STATUS: HCPCS

## 2018-04-16 PROCEDURE — 97163 PT EVAL HIGH COMPLEX 45 MIN: CPT

## 2018-04-16 PROCEDURE — G8978 MOBILITY CURRENT STATUS: HCPCS

## 2018-04-16 NOTE — PROGRESS NOTES
PT Evaluation     Today's date: 2018  Patient name: Alireza Salinas  : 1960  MRN: [id number]  Referring provider: Neli Cornejo DO  Dx: No diagnosis found  Assessment  Impairments: abnormal coordination, abnormal gait, abnormal muscle tone, abnormal movement, activity intolerance, impaired balance, impaired physical strength, pain with function and safety issue    Assessment details: Presents with significant problem list including generalized pain with movement, weakness, deconditioning, multiple gait deviation and high fall risk on standardized balance testing limiting community function  Recommend skilled PT to address goals and improve function  Recommend having multiple rolling walkers at home (on each level) for use- patient reports she is looking for a single floor dwelling  Recommend f/u with neurology/ rheumatology for medical follow up to cause of overall symptoms  Barriers to therapy: Pain  Understanding of Dx/Px/POC: excellent   Prognosis: good    Goals  STG 1  Independent w/ HEP           2  Tolerate 5 minutes activity w/o increase in pain    LTG  1  Strength >= 4/5 t/o to improve ADL's           2  Tolerate 10 minutes continuous activity without increase in pain to improve community function           3  > [address] to reduce fall risk           4  Gait speed >  5 m/second to facilitate improved community function and reduce fall risk           5   Pain <= 5/10 w/ ADL's    Plan  Planned modality interventions: thermotherapy: hydrocollator packs  Planned therapy interventions: activity modification, balance, functional ROM exercises, gait training, therapeutic exercise, stretching, strengthening, postural training, patient education, neuromuscular re-education, muscle pump exercises, motor coordination training and manual therapy  Frequency: 2x week  Duration in weeks: 8        Subjective Evaluation    History of Present Illness  Mechanism of injury: Patient presents with several year history of multiple falls  She sustained a recent fall several weeks ago while she was coming down steps (4) in which her right knee possibly buckled  She was able to return to standing on own  She recently underwent multiple x-rays due to pain in right shoulder and knee which are unremarkable  She reports some numbness/ tingling in both hands  She reports intermittent LE weakness R>L  She uses a rolling walker with mobility  Recurrent probem    Quality of life: good    Pain  Current pain ratin  At best pain ratin  At worst pain rating: 10  Location: right ankle, right gastroc and lateral thigh, left knee and shoulder  Quality: knife-like and sharp    Social Support  Steps to enter house: yes  Stairs in house: yes   Lives in: multiple-level home  Lives with: alone    Hand dominance: right          Objective   Vision- Full [employer], saccadic smooth pursuit  Motor- No clonus, no drift, increased resistance to movement RLE- unable to asses true tone due to muscle guarding  Note increased tenderness and tone B/L paraspinals, upper traps, increased TTP right anterior tib, right ITB  B/L SLR 0-30 degrees  Transfers- Independent supine to sit to stand  Strength- UE 4/5 except right shoulder flex 3+/5, R hip 4-/5, knee 4-/5, ankle 4/5, LLE 4/5 t/o  Note right knee -15 degrees knee extension  Coordination- Intact finger to nose, difficulty with Wei B/L UE, difficulty w/ heel to shin testing " just cant do it",   Intact kinesthesia B/L great toe- normal ROM great toe  Balance- Unable to perform Feet together eyes open without loss of balance  Smith Balance Scale- 24/56  Vestibular- (-) Head Impulse  Gait- Ambulates w/ rolling walker, slow/ guarded gait  Note poor pelvic disassociation, reduced knee flexion B/L during swing phase, foot flat w/ initial contact with slight foot slap, slight right ankle internal rotation during terminal swing phase     Gait speed=  34 m/second w/ rolling walker                Precautions: Fall Risk    Daily Treatment Diary     Manual              Manual tx prn to low back/ shoulders/ Le's                                                                     Exercise Diary              LTR             UTR             Bridging             1/4 squats             Heel raises             LE Bike             Static FAEC             FAEOHT             Sidestep w UE                                                                                                                                                                Modalities              prn

## 2018-04-24 ENCOUNTER — OFFICE VISIT (OUTPATIENT)
Dept: PHYSICAL THERAPY | Facility: REHABILITATION | Age: 58
End: 2018-04-24
Payer: COMMERCIAL

## 2018-04-24 DIAGNOSIS — Z74.09 IMPAIRED MOBILITY: Primary | ICD-10-CM

## 2018-04-24 DIAGNOSIS — R26.2 AMBULATORY DYSFUNCTION: ICD-10-CM

## 2018-04-24 PROCEDURE — 97110 THERAPEUTIC EXERCISES: CPT | Performed by: PHYSICAL THERAPIST

## 2018-04-24 NOTE — PROGRESS NOTES
Today's date: 2018  Patient name: Ling Arora  : 1960  MRN: [id number]  Referring provider: Jose M Almeida DO  Dx: Impaired mobility    SUBJECTIVE  Patient reports she gets a lot of right and left calf and hamstring region tightness  She uses elliptical 10 minutes at home, previously used it 30 minutes  OBJECTIVE  Precautions: Fall Risk    Daily Treatment Diary     Manual  18      Manual tx prn to low back/ shoulders/ Le's Not Today                                       Exercise Diary  18      LTR 20-30 sec x 2 each      UTR 20 - 30 sec x 2 each      Bridging       1/4 squats 10      Heel raises 10   10 each      LE Bike Back and forth, 3 min  Forwards 2-3 min no resistance      Step ups 2 railings, 10 on L x 2 sets 4" step 10 x 2 sets on R      Static FTEC 1 min      FTEOHT 1 min      Sidestep w UE 30 sec x 2      stretches Standing calf stretch 30 sec x 2-3 throughout  Seated hamstring stretch 30 sec x 2 each  hooklying hip ER stretch 30 sec each                                                                                ASSESSMENT & PLAN:   Patient instructed in initial written HEP, good return demonstration  Progress gradually

## 2018-04-25 DIAGNOSIS — E03.9 HYPOTHYROIDISM, UNSPECIFIED TYPE: Primary | ICD-10-CM

## 2018-04-25 RX ORDER — LEVOTHYROXINE SODIUM 175 UG/1
175 TABLET ORAL DAILY
Qty: 90 TABLET | Refills: 3 | Status: SHIPPED | OUTPATIENT
Start: 2018-04-25 | End: 2019-04-14 | Stop reason: SDUPTHER

## 2018-04-26 ENCOUNTER — OFFICE VISIT (OUTPATIENT)
Dept: PHYSICAL THERAPY | Facility: REHABILITATION | Age: 58
End: 2018-04-26
Payer: COMMERCIAL

## 2018-04-26 DIAGNOSIS — Z74.09 IMPAIRED MOBILITY: ICD-10-CM

## 2018-04-26 DIAGNOSIS — R26.2 AMBULATORY DYSFUNCTION: Primary | ICD-10-CM

## 2018-04-26 PROCEDURE — 97110 THERAPEUTIC EXERCISES: CPT | Performed by: PHYSICAL THERAPIST

## 2018-04-26 NOTE — PROGRESS NOTES
Today's date: 18  Patient name: Dejan Francis  : 1960  MRN: [id number]  Referring provider: Deepthi Singh DO  Dx: Impaired mobility    SUBJECTIVE  Patient had increased R anterior ankle pain and stiffness following last treatment, possibly from heel raises  She also had L lower thoracic region pain and continued shoulder pain  OBJECTIVE  Precautions: Fall Risk    Daily Treatment Diary     Manual  18     Manual tx prn to low back/ shoulders/ Le's Not Today  Supine R ankle AP mobs, 5 min with stretching                                     Exercise Diary  18     LTR 20-30 sec x 2 each 30 sec x 2 each     UTR 20 - 30 sec x 2 each 30 sec x 2 each     Bridging  10-15     1/4 squats 10 12     Heel raises 10   10 each --not today--     LE Bike Back and forth, 3 min  Forwards 2-3 min no resistance ---     Step ups 2 railings, 10 on L x 2 sets 4" step 10 x 2 sets on R 2 railings, 10 on L x 2 sets 4" step 10 x 2 sets on R     Static FTEC 1 min 1 min     FTEOHT 1 min 1 min     Sidestep w UE 30 sec x 2 1 min     stretches Standing calf stretch 30 sec x 2-3 throughout  Seated hamstring stretch 30 sec x 2 each  hooklying hip ER stretch 30 sec each Standing calf stretch, 30 sec x 2 each  Knee flexion stretch, on stairs, 30 sec x 2 each  Seated R knee flexion stretch, 2 min    hooklying hip ER stretch 30 sec each  Hamstring stretch, standing R 1 min       Shoulder exercises  Scapular row, pink band, 15 x 2 sets                                                                        ASSESSMENT & PLAN:   Patient responded well to R ankle AP mobilizations, with abatement of anterior ankle pain with ambulation  This reflects likely joint pathology, which seems to be somewhat systemic, continue manual therapy as needed for this issue

## 2018-04-30 ENCOUNTER — OFFICE VISIT (OUTPATIENT)
Dept: PHYSICAL THERAPY | Facility: REHABILITATION | Age: 58
End: 2018-04-30
Payer: COMMERCIAL

## 2018-04-30 DIAGNOSIS — R26.2 AMBULATORY DYSFUNCTION: Primary | ICD-10-CM

## 2018-04-30 PROCEDURE — 97110 THERAPEUTIC EXERCISES: CPT | Performed by: PHYSICAL THERAPIST

## 2018-04-30 PROCEDURE — 97112 NEUROMUSCULAR REEDUCATION: CPT | Performed by: PHYSICAL THERAPIST

## 2018-04-30 NOTE — PROGRESS NOTES
Today's date: 18  Patient name: Sandra Francois  : 1960  MRN: [id number]  Referring provider: Ellen Yeh DO  Dx: Impaired mobility    SUBJECTIVE  Patient reports significant relief from R ankle pain following manual therapy last week  She got up and had general LE pain and fell backwards to the bed, denies injury        OBJECTIVE  Precautions: Fall Risk    Daily Treatment Diary     Manual  18    Manual tx prn to low back/ shoulders/ Le's Not Today  Supine R ankle AP mobs, 5 min with stretching Supine R ankle AP mobs, grades 3-4, 5 min with stretching                                    Exercise Diary  18    walking   2 min    LTR 20-30 sec x 2 each 30 sec x 2 each 30 sec x 2 each    UTR 20 - 30 sec x 2 each 30 sec x 2 each 30 sec x 2 each    Bridging  10-15     1/4 squats 10 12 10 x 2 sets towards 21" target    Heel raises 10   10 each --not today-- ----    LE Bike Back and forth, 3 min  Forwards 2-3 min no resistance --- ---    Step ups 2 railings, 10 on L x 2 sets 4" step 10 x 2 sets on R 2 railings, 10 on L x 2 sets 4" step 10 x 2 sets on R 2 railings, 12 x 2 sets on L, 6" step    2 railings, 8 x 2     Static FAEC 1 min 1 min 1 min    FTEOHT 1 min 1 min 1 min    Sidestep w UE 30 sec x 2 1 min     stretches Standing calf stretch 30 sec x 2-3 throughout  Seated hamstring stretch 30 sec x 2 each  hooklying hip ER stretch 30 sec each Standing calf stretch, 30 sec x 2 each  Knee flexion stretch, on stairs, 30 sec x 2 each  Seated R knee flexion stretch, 2 min    hooklying hip ER stretch 30 sec each  Hamstring stretch, standing R 1 min   Standing calf stretch 30 sec x 2 each    Seated R knee flexion stretch, 2 min    hooklying hip ER stretch 30 sec each  Hamstring stretch, standing R 1 min    Shoulder exercises  Scapular row, pink band, 15 x 2 sets Scapular row, pink band, 15 x 2 sets    Shoulder extension, pink band, 15    Shoulder  ER, light TB, 15 each ASSESSMENT & PLAN:   Continue manual therapy for the right ankle  Progress LE strength and endurance as tolerated

## 2018-05-02 ENCOUNTER — OFFICE VISIT (OUTPATIENT)
Dept: PHYSICAL THERAPY | Facility: REHABILITATION | Age: 58
End: 2018-05-02
Payer: COMMERCIAL

## 2018-05-02 DIAGNOSIS — R26.2 AMBULATORY DYSFUNCTION: Primary | ICD-10-CM

## 2018-05-02 DIAGNOSIS — Z74.09 IMPAIRED MOBILITY: ICD-10-CM

## 2018-05-02 PROCEDURE — 97112 NEUROMUSCULAR REEDUCATION: CPT

## 2018-05-02 PROCEDURE — 97140 MANUAL THERAPY 1/> REGIONS: CPT

## 2018-05-02 NOTE — PROGRESS NOTES
Daily Note     Today's date: 2018  Patient name: Perry Bliss  : 1960  MRN: [id number]  Referring provider: Bennie Fuentes DO  Dx:   Encounter Diagnosis     ICD-10-CM    1  Ambulatory dysfunction R26 2    2  Impaired mobility Z74 09        Subjective: Patient reports 8/10 pain throughout her body upon arrival  Especially notes pain in right ankle      Objective: See treatment diary below    Precautions: Fall Risk    Daily Treatment Diary     Manual  18 5/2   Manual tx prn to low back/ shoulders/ Le's Not Today  Supine R ankle AP mobs, 5 min with stretching Supine R ankle AP mobs, grades 3-4, 5 min with stretching Supine R ankle AP mobs, grades 3-4, 10 min with stretching, per by JARET MATHIS DPT                                   Exercise Diary  18 5/2   walking   2 min 2 min   LTR 20-30 sec x 2 each 30 sec x 2 each 30 sec x 2 each    UTR 20 - 30 sec x 2 each 30 sec x 2 each 30 sec x 2 each 30''x2   Bridging  10-15     1/4 squats 10 12 10 x 2 sets towards 21" target 2x10   Heel raises 10   10 each --not today-- ---- Unable, pain   LE Bike Back and forth, 3 min  Forwards 2-3 min no resistance --- --- Patient deferred today   Step ups 2 railings, 10 on L x 2 sets 4" step 10 x 2 sets on R 2 railings, 10 on L x 2 sets 4" step 10 x 2 sets on R 2 railings, 12 x 2 sets on L, 6" step    2 railings, 8 x 2  6'', R-10x, L-2x10, 1 UE support   Static FAEC 1 min 1 min 1 min 1 min   FTEOHT 1 min 1 min 1 min HT/HN, 1 min ea   Sidestep w UE 30 sec x 2 1 min     stretches Standing calf stretch 30 sec x 2-3 throughout  Seated hamstring stretch 30 sec x 2 each  hooklying hip ER stretch 30 sec each Standing calf stretch, 30 sec x 2 each  Knee flexion stretch, on stairs, 30 sec x 2 each  Seated R knee flexion stretch, 2 min    hooklying hip ER stretch 30 sec each  Hamstring stretch, standing R 1 min   Standing calf stretch 30 sec x 2 each    Seated R knee flexion stretch, 2 min    hooklying hip ER stretch 30 sec each  Hamstring stretch, standing R 1 min Seated calf stretch, 30''x2    Seated R knee flexion stretch, 2 min    Hamstring stretch, standing R 1 min   Shoulder exercises  Scapular row, pink band, 15 x 2 sets Scapular row, pink band, 15 x 2 sets    Shoulder extension, pink band, 15    Shoulder  ER, light TB, 15 each Scapular row, pink band, 15 x 2 sets    Shoulder extension, pink band, 15x2 sets    Shoulder  IR, orange TB, 15x 2 sets each      Tandem stance    30''x2                                                               Assessment: Patient demonstrates significantly impaired balance with EC activities  Only able to maintain balance for a few seconds  Still challenged with EO and NBOS  Limited tolerance with weight bearing exercises due to complaints of pain, especially in right foot  Plan: Progress treatment as tolerated

## 2018-05-07 ENCOUNTER — OFFICE VISIT (OUTPATIENT)
Dept: PHYSICAL THERAPY | Facility: REHABILITATION | Age: 58
End: 2018-05-07
Payer: COMMERCIAL

## 2018-05-07 DIAGNOSIS — R26.2 AMBULATORY DYSFUNCTION: Primary | ICD-10-CM

## 2018-05-07 PROCEDURE — 97110 THERAPEUTIC EXERCISES: CPT | Performed by: PHYSICAL THERAPIST

## 2018-05-07 PROCEDURE — 97140 MANUAL THERAPY 1/> REGIONS: CPT | Performed by: PHYSICAL THERAPIST

## 2018-05-07 NOTE — PROGRESS NOTES
Daily Note     Today's date: 2018  Patient name: Adina Negrete  : 1960  MRN: [id number]  Referring provider: Arjun Hamilton DO  Dx:   Encounter Diagnosis     ICD-10-CM    1  Ambulatory dysfunction R26 2        Subjective: Patient reports continued but decreased ankle pain  She reports difficulty getting groceries up stairs, as she does a larger grocery shop once per month        Objective: See treatment diary below    Precautions: Fall Risk    Daily Treatment Diary     Manual  18   Manual tx prn to low back/ shoulders/ Le's Supine R ankle AP mobs, grades 3-4, 5 min with stretching Supine R ankle AP mobs, grades 3-4, 10 min with stretching, per by JARET MATHIS DPT longsitting R ankle AP mobs, grade 4, 5 min, traction, dorsiflexion stretching 5 min                               Exercise Diary  18   walking 2 min 2 min 2 min x 2   LTR 30 sec x 2 each     UTR 30 sec x 2 each 30''x2    Bridging      1/4 squats 10 x 2 sets towards 21" target 2x10 10 x 2 sets towards 21" target   Heel raises ---- Unable, pain    LE Bike --- Patient deferred today    Step ups 2 railings, 12 x 2 sets on L, 6" step    2 railings, 8 x 2  6'', R-10x, L-2x10, 1 UE support 1 UE support, 6" step, 10 x 2 sets   Static FAEC 1 min 1 min AP weight shift, 1 min x 2   FTEOHT 1 min HT/HN, 1 min ea    Sidestep w UE      stretches Standing calf stretch 30 sec x 2 each    Seated R knee flexion stretch, 2 min    hooklying hip ER stretch 30 sec each  Hamstring stretch, standing R 1 min Seated calf stretch, 30''x2    Seated R knee flexion stretch, 2 min    Hamstring stretch, standing R 1 min Seated calf stretch, 30''x2    Seated R knee flexion stretch, 2 min    Hamstring stretch, standing R 1 min   Shoulder exercises Scapular row, pink band, 15 x 2 sets    Shoulder extension, pink band, 15    Shoulder  ER, light TB, 15 each Scapular row, pink band, 15 x 2 sets    Shoulder extension, pink band, 15x2 sets    Shoulder IR, orange TB, 15x 2 sets each    Scapular row, green band, 15 x 2 sets    Shoulder extension, pink band, 15x2 sets    Shoulder  ER, orange TB, 15x 2 sets each   Tandem stance  30''x2                                                        Assessment: Continued impairment in static balance, continue to practice  Continue to increase volume of walking  Plan: Progress treatment as tolerated

## 2018-05-09 ENCOUNTER — OFFICE VISIT (OUTPATIENT)
Dept: PHYSICAL THERAPY | Facility: REHABILITATION | Age: 58
End: 2018-05-09
Payer: COMMERCIAL

## 2018-05-09 DIAGNOSIS — R26.2 AMBULATORY DYSFUNCTION: Primary | ICD-10-CM

## 2018-05-09 DIAGNOSIS — Z74.09 IMPAIRED MOBILITY: ICD-10-CM

## 2018-05-09 PROCEDURE — 97110 THERAPEUTIC EXERCISES: CPT

## 2018-05-09 NOTE — PROGRESS NOTES
Daily Note     Today's date: 2018  Patient name: Nasreen Drake  : 1960  MRN: [id number]  Referring provider: Elizabeth Murray DO  Dx:   Encounter Diagnosis     ICD-10-CM    1  Ambulatory dysfunction R26 2    2  Impaired mobility Z74 09        Subjective: Patient reports she is having a lot of pain in the upper right leg today and pointed to the ITB  Objective: See treatment diary below    Precautions: Fall Risk    Daily Treatment Diary     Palpation: severe TTP to ITB group home up the right leg       (-) R Alicia's test    Manual  18   Manual tx prn to low back/ shoulders/ Le's Supine R ankle AP mobs, grades 3-4, 10 min with stretching, per by JAERT MATHIS , DPT longsitting R ankle AP mobs, grade 4, 5 min, traction, dorsiflexion stretching 5 min longsitting R ankle AP mobs, grade 4, 5 min, traction, dorsiflexion stretching 5 min                               Exercise Diary  18   walking 2 min 2 min x 2 2 min x 2   LTR      UTR 30''x2     Bridging      1/4 squats 2x10 10 x 2 sets towards 21" target    Heel raises Unable, pain     LE Bike Patient deferred today     Step ups 6'', R-10x, L-2x10, 1 UE support 1 UE support, 6" step, 10 x 2 sets 1 UE support 6" step 15x B   Static FAEC 1 min AP weight shift, 1 min x 2 30 sec x 2 with two fingers on rail   FTEOHT HT/HN, 1 min ea  1 min    Sidestep w UE      stretches Seated calf stretch, 30''x2    Seated R knee flexion stretch, 2 min    Hamstring stretch, standing R 1 min Seated calf stretch, 30''x2    Seated R knee flexion stretch, 2 min    Hamstring stretch, standing R 1 min calf stretch, 1 min B    R ITB stretch 1 min     Hamstring stretch, Supine B 1 min    SKTC 1 min B    Standing quad stretch 30 sec B   Shoulder exercises Scapular row, pink band, 15 x 2 sets    Shoulder extension, pink band, 15x2 sets    Shoulder  IR, orange TB, 15x 2 sets each    Scapular row, green band, 15 x 2 sets    Shoulder extension, pink band, 15x2 sets    Shoulder  ER, orange TB, 15x 2 sets each Scapular row, green band, 15 x 2 sets    Shoulder extension, green band, 15x2 sets    Shoulder  ER, orange TB, 15x 2 sets each    Doorway stretch    Tandem stance 30''x2                                                         Assessment: Patient reports cramping in R calf with ambulation, no relieved with stretching  Patient fatigued with today's session  Plan: Progress treatment as tolerated

## 2018-05-14 ENCOUNTER — OFFICE VISIT (OUTPATIENT)
Dept: PHYSICAL THERAPY | Facility: REHABILITATION | Age: 58
End: 2018-05-14
Payer: COMMERCIAL

## 2018-05-14 DIAGNOSIS — Z74.09 IMPAIRED MOBILITY: Primary | ICD-10-CM

## 2018-05-14 PROCEDURE — 97110 THERAPEUTIC EXERCISES: CPT | Performed by: PHYSICAL THERAPIST

## 2018-05-14 PROCEDURE — 97112 NEUROMUSCULAR REEDUCATION: CPT | Performed by: PHYSICAL THERAPIST

## 2018-05-14 NOTE — PROGRESS NOTES
Re-evaluation / Daily Note     Today's date: 2018  Patient name: Madai Lorenz  : 1960  MRN: [id number]  Referring provider: Sharona Falcon DO  Dx:   Encounter Diagnosis     ICD-10-CM    1  Impaired mobility Z74 09        Subjective: Patient reports swelling about the right dorsal foot after attending wedding over the weekend, on her feet and walking more frequently than usually    Patient up to 15 minutes on elliptical       Objective: See treatment diary below    Precautions: Fall Risk    Daily Treatment Diary     Palpation: severe TTP to ITB jail up the right leg  18  (-) R Alicia's test 18    5 Times Sit To Stand: 29 2 seconds from 19" surface  23 2 sec 10 meter with rollator walker (0 43 meters/second)  2 minute walk test (60 foot course): 170 feet, patient reports fatigued about the upper brachium    Manual  18   Manual tx prn to low back/ shoulders/ Le's longsitting R ankle AP mobs, grade 4, 5 min, traction, dorsiflexion stretching 5 min longsitting R ankle AP mobs, grade 4, 5 min, traction, dorsiflexion stretching 5 min longsitting R ankle grade 4 AP mobilization, 5 min  Traction and dorsiflexion stretching                               Exercise Diary  18   walking 2 min x 2 2 min x 2 2 5 min, 3 min   LTR      UTR      Bridging      1/4 squats 10 x 2 sets towards 21" target  20" 10 x 2 sets   Heel raises      LE Bike      Step ups 1 UE support, 6" step, 10 x 2 sets 1 UE support 6" step 15x B 1 UE support 6" step, 12 each x 2 sets   Static FAEC AP weight shift, 1 min x 2 30 sec x 2 with two fingers on rail 30 sec x 2 sets   FTEOHT  1 min     Sidestep w UE      stretches Seated calf stretch, 30''x2    Seated R knee flexion stretch, 2 min    Hamstring stretch, standing R 1 min calf stretch, 1 min B    R ITB stretch 1 min     Hamstring stretch, Supine B 1 min    SKTC 1 min B    Standing quad stretch 30 sec B Seated knee flexion stretch 40 sec x 2 each    Standing ITB stretch, 30 sec x 3 each   Shoulder exercises Scapular row, green band, 15 x 2 sets    Shoulder extension, pink band, 15x2 sets    Shoulder  ER, orange TB, 15x 2 sets each Scapular row, green band, 15 x 2 sets    Shoulder extension, green band, 15x2 sets    Shoulder  ER, orange TB, 15x 2 sets each    Doorway stretch     Tandem stance                                                      ASSESSMENT  Patient is moving better, as shown by ascending from lower surfaces and marked improvement in walking speed  Anticipate further improvement with physical therapy  Progression limited by generalized joint pain, we are addressing joints that affect mobility/walking and encouraging gradual increase in physical activity at home  Goals  STG 1  Independent w/ HEP  Met with some progressions  2  Tolerate 5 minutes activity w/o increase in pain  Progressing, not met, continues  3  Patient stands from 17" chair without use of hands in 1 month  4  Patient stands from floor using chair in 1 month  LTG  1  Strength >= 4/5 t/o to improve ADL's           2  Tolerate 10 minutes continuous activity without increase in pain to improve community function           3  > 36 [address] to reduce fall risk           4  Gait speed >  5 m/second to facilitate improved community function and reduce fall risk  Progressing             5  Pain <= 5/10 w/ ADL's    Plan  Planned modality interventions: thermotherapy: hydrocollator packs as needed  Planned therapy interventions: activity modification, balance, functional ROM exercises, gait training, therapeutic exercise, stretching, strengthening, postural training, patient education, neuromuscular re-education, muscle pump exercises, motor coordination training and manual therapy  Frequency: 2x week  Duration in weeks: 4-6

## 2018-05-17 ENCOUNTER — OFFICE VISIT (OUTPATIENT)
Dept: PHYSICAL THERAPY | Facility: REHABILITATION | Age: 58
End: 2018-05-17
Payer: COMMERCIAL

## 2018-05-17 DIAGNOSIS — Z74.09 IMPAIRED MOBILITY: Primary | ICD-10-CM

## 2018-05-17 PROCEDURE — 97112 NEUROMUSCULAR REEDUCATION: CPT | Performed by: PHYSICAL THERAPIST

## 2018-05-17 PROCEDURE — 97110 THERAPEUTIC EXERCISES: CPT | Performed by: PHYSICAL THERAPIST

## 2018-05-17 NOTE — PROGRESS NOTES
Daily Note     Today's date: 2018  Patient name: Mellisa Butcher  : 1960  MRN: [id number]  Referring provider: Savanna Osullivan DO  Dx:   Encounter Diagnosis     ICD-10-CM    1  Impaired mobility Z74 09        Subjective: Patient reports increased stiffness in the knees today  Objective: See treatment diary below    Precautions: Fall Risk    Daily Treatment Diary   2 minute walk test (60 foot course): 120 feet 18    Manual  18   Manual tx prn to low back/ shoulders/ Le's longsitting R ankle AP mobs, grade 4, 5 min, traction, dorsiflexion stretching 5 min longsitting R ankle grade 4 AP mobilization, 5 min  Traction and dorsiflexion stretching Longsitting R ankle grade 4 AP mobilization  Seated R knee flexion stretch  Supine AP proximal tibial mobilization on fixed femur                               Exercise Diary  18   walking 2 min x 2 2 5 min, 3 min 2 min   LTR      UTR      Bridging       squats  20" 10 x 2 sets 21" surface, 10   Heel raises      LE Bike      Step ups 1 UE support 6" step 15x B 1 UE support 6" step, 12 each x 2 sets 1 UE support, 4" step, 12 each   Static FAEC 30 sec x 2 with two fingers on rail 30 sec x 2 sets    FTEOHT 1 min      Sidestep w UE      stretches calf stretch, 1 min B    R ITB stretch 1 min     Hamstring stretch, Supine B 1 min    SKTC 1 min B    Standing quad stretch 30 sec B Seated knee flexion stretch 40 sec x 2 each    Standing ITB stretch, 30 sec x 3 each Standing knee flexion stretch on stairs 1 5 min B    Standing ITB stretch 1 min B       Shoulder exercises Scapular row, green band, 15 x 2 sets    Shoulder extension, green band, 15x2 sets    Shoulder  ER, orange TB, 15x 2 sets each    Doorway stretch      Dynamic balance   Forward and back stepping, 2 min  High step walking, railing, 2 min   ASSESSMENT  Increased stiffness about the right knee, but improves with active movement and passive stretching  Continue to encourage increased physical activity at home        Plan  Planned modality interventions: thermotherapy: hydrocollator packs as needed  Planned therapy interventions: activity modification, balance, functional ROM exercises, gait training, therapeutic exercise, stretching, strengthening, postural training, patient education, neuromuscular re-education, muscle pump exercises, motor coordination training and manual therapy  Frequency: 2x week  Duration in weeks: 4-6

## 2018-05-21 ENCOUNTER — OFFICE VISIT (OUTPATIENT)
Dept: PHYSICAL THERAPY | Facility: REHABILITATION | Age: 58
End: 2018-05-21
Payer: COMMERCIAL

## 2018-05-21 DIAGNOSIS — R26.2 AMBULATORY DYSFUNCTION: Primary | ICD-10-CM

## 2018-05-21 DIAGNOSIS — Z74.09 IMPAIRED MOBILITY: ICD-10-CM

## 2018-05-21 PROCEDURE — 97112 NEUROMUSCULAR REEDUCATION: CPT

## 2018-05-21 PROCEDURE — 97110 THERAPEUTIC EXERCISES: CPT

## 2018-05-21 NOTE — PROGRESS NOTES
Daily Note     Today's date: 2018  Patient name: Trena Ho  : 1960  MRN: [id number]  Referring provider: Austen Hightower DO  Dx:   No diagnosis found  Subjective: Patient reports increased stiffness t/o shoulders, knees, and ankles  Current pain 9/10    Objective: See treatment diary below    Precautions: Fall Risk    Daily Treatment Diary   2 minute walk test (60 foot course): 120 feet 18    Manual  18   Manual tx prn to low back/ shoulders/ Le's longsitting R ankle AP mobs, grade 4, 5 min, traction, dorsiflexion stretching 5 min longsitting R ankle grade 4 AP mobilization, 5 min  Traction and dorsiflexion stretching Longsitting R ankle grade 4 AP mobilization  Seated R knee flexion stretch  Supine AP proximal tibial mobilization on fixed femur Supine ankle/ knee mobilization 20" x 5 distraction B/L                                   Exercise Diary  18   walking 2 min x 2 2 5 min, 3 min 2 min w/ rolling walker 4 5 minutes   LTR    10x   UTR    10x  Pelvic tilts 10x   Bridging       1/4 squats  20" 10 x 2 sets 21" surface, 10 15x 2 UE   Heel raises    15x 2 UE   LE Bike       Step ups 1 UE support 6" step 15x B 1 UE support 6" step, 12 each x 2 sets 1 UE support, 4" step, 12 each    Static FAEC 30 sec x 2 with two fingers on rail 30 sec x 2 sets     FTEOHT 1 min       Sidestep w UE       stretches calf stretch, 1 min B    R ITB stretch 1 min     Hamstring stretch, Supine B 1 min    SKTC 1 min B    Standing quad stretch 30 sec B Seated knee flexion stretch 40 sec x 2 each    Standing ITB stretch, 30 sec x 3 each Standing knee flexion stretch on stairs 1 5 min B    Standing ITB stretch 1 min B     90 sec x 2 B/:   Shoulder exercises Scapular row, green band, 15 x 2 sets    Shoulder extension, green band, 15x2 sets    Shoulder  ER, orange TB, 15x 2 sets each    Doorway stretch    Blue Rowing 15x, Shoulder ext   15x    Chest press 15x pink   Dynamic balance   Forward and back stepping, 2 min  High step walking, railing, 2 min    ASSESSMENT  Increased stiffness about the right knee, but improves with active movement and passive stretching  Right knee stiffness limited progression w/ PT today  Pain 8/10 after session        Plan  Continue PT per plan of care  Frequency: 2x week  Duration in weeks: 4-6

## 2018-05-24 ENCOUNTER — OFFICE VISIT (OUTPATIENT)
Dept: PHYSICAL THERAPY | Facility: REHABILITATION | Age: 58
End: 2018-05-24
Payer: COMMERCIAL

## 2018-05-24 ENCOUNTER — APPOINTMENT (OUTPATIENT)
Dept: PHYSICAL THERAPY | Facility: REHABILITATION | Age: 58
End: 2018-05-24
Payer: COMMERCIAL

## 2018-05-24 DIAGNOSIS — Z74.09 IMPAIRED MOBILITY: ICD-10-CM

## 2018-05-24 DIAGNOSIS — R26.2 AMBULATORY DYSFUNCTION: Primary | ICD-10-CM

## 2018-05-24 DIAGNOSIS — R52 PAIN: Primary | ICD-10-CM

## 2018-05-24 PROCEDURE — 97110 THERAPEUTIC EXERCISES: CPT

## 2018-05-24 PROCEDURE — 97112 NEUROMUSCULAR REEDUCATION: CPT

## 2018-05-24 NOTE — PROGRESS NOTES
Daily Note     Today's date: 2018  Patient name: Cheli Thorne  : 1960  MRN: [id number]  Referring provider: Sergio Gloria DO  Dx:   Encounter Diagnosis     ICD-10-CM    1  Ambulatory dysfunction R26 2    2  Impaired mobility Z74 09        Subjective: Patient reports "my whole lower body hurts as well as my shoulder      Objective: See treatment diary below    Precautions: Fall Risk    Daily Treatment Diary   2 minute walk test (60 foot course): 120 feet 18    Manual  18   Manual tx prn to low back/ shoulders/ Le's longsitting R ankle AP mobs, grade 4, 5 min, traction, dorsiflexion stretching 5 min longsitting R ankle grade 4 AP mobilization, 5 min  Traction and dorsiflexion stretching Longsitting R ankle grade 4 AP mobilization  Seated R knee flexion stretch  Supine AP proximal tibial mobilization on fixed femur Supine ankle/ knee mobilization 20" x 5 distraction B/L     sec                                    Exercise Diary  18   walking 2 min x 2 2 5 min, 3 min 2 min w/ rolling walker 4 5 minutes 1   LTR    10x 10x   UTR    10x  Pelvic tilts 10x 10x pelvic tilts 10x   Bridging    10 x 5" 10x 5"   1/4 squats  20" 10 x 2 sets 21" surface, 10 15x 2 UE 15 x 5' x 2 UE   Heel raises    15x 2 UE 15 x 1UE   LE Bike        Step ups 1 UE support 6" step 15x B 1 UE support 6" step, 12 each x 2 sets 1 UE support, 4" step, 12 each  1 UE support 4" step x 20'   Static FAEC 30 sec x 2 with two fingers on rail 30 sec x 2 sets      FTEOHT 1 min        Sidestep w UE        stretches calf stretch, 1 min B    R ITB stretch 1 min     Hamstring stretch, Supine B 1 min    SKTC 1 min B    Standing quad stretch 30 sec B Seated knee flexion stretch 40 sec x 2 each    Standing ITB stretch, 30 sec x 3 each Standing knee flexion stretch on stairs 1 5 min B    Standing ITB stretch 1 min B     90 sec x 2 B/: 90 sec x 2 B   Shoulder exercises Scapular row, green band, 15 x 2 sets    Shoulder extension, green band, 15x2 sets    Shoulder  ER, orange TB, 15x 2 sets each    Doorway stretch    Blue Rowing 15x, Shoulder ext  15x    Chest press 15x pink Blue Rowing 15 x  Shoulder ext 15x    Chest press 15 x pink   Dynamic balance   Forward and back stepping, 2 min  High step walking, railing, 2 min     ASSESSMENT  Client reports incrased stiffness and discomfort today  Finds relief with PT intervention        Plan  Continue PT per plan of care  Frequency: 2x week  Duration in weeks: 4-6

## 2018-05-24 NOTE — TELEPHONE ENCOUNTER
Pt called in stating that she is doing physical therapy and after she is done she is in pain, she would like to know if you can refill her ibuprofen 600 mg to [facility]   CB# [phone]

## 2018-05-25 RX ORDER — IBUPROFEN 600 MG/1
600 TABLET ORAL EVERY 6 HOURS PRN
Qty: 30 TABLET | Refills: 0 | Status: SHIPPED | OUTPATIENT
Start: 2018-05-25 | End: 2019-04-16

## 2018-05-29 ENCOUNTER — OFFICE VISIT (OUTPATIENT)
Dept: PHYSICAL THERAPY | Facility: REHABILITATION | Age: 58
End: 2018-05-29
Payer: COMMERCIAL

## 2018-05-29 DIAGNOSIS — R26.2 AMBULATORY DYSFUNCTION: Primary | ICD-10-CM

## 2018-05-29 PROCEDURE — 97140 MANUAL THERAPY 1/> REGIONS: CPT | Performed by: PHYSICAL THERAPIST

## 2018-05-29 PROCEDURE — 97112 NEUROMUSCULAR REEDUCATION: CPT | Performed by: PHYSICAL THERAPIST

## 2018-05-29 PROCEDURE — 97110 THERAPEUTIC EXERCISES: CPT | Performed by: PHYSICAL THERAPIST

## 2018-05-29 NOTE — PROGRESS NOTES
Daily Note     Today's date: 2018  Patient name: Louanne Kussmaul  : 1960  MRN: [id number]  Referring provider: Leatha Adrian DO  Dx:   Encounter Diagnosis     ICD-10-CM    1  Ambulatory dysfunction R26 2        Subjective: Patient reports pain about the right dorsal foot with pressure of a sneaker, did some walking at home without shoes and it felt better, loosens her shoe as much as possible  Patient did grocery shopping Friday, reports LE fatigue  Objective: See treatment diary below    Precautions: Fall Risk    Daily Treatment Diary   2 minute walk test (60 foot course): 120 feet 18  2 minute walk test (60 foot course) 160 feet 18    Manual  18   Manual tx prn to low back/ shoulders/ Le's Longsitting R ankle grade 4 AP mobilization  Seated R knee flexion stretch  Supine AP proximal tibial mobilization on fixed femur Supine ankle/ knee mobilization 20" x 5 distraction B/L  Longsitting R ankle grade 4 AP mobilization    Supine L knee extension stretch and mobilization    Patellar mobilization to improve extension                                   Exercise Diary  18   walking 2 min w/ rolling walker 4 5 minutes 1 2 min walk test 160 feet   LTR  10x 10x    UTR  10x  Pelvic tilts 10x 10x pelvic tilts 10x    Bridging  10 x 5" 10x 5" 15 each increased one sided weight bearing   1/4 squats 21" surface, 10 15x 2 UE 15 x 5' x 2 UE Sit to stand 21" surface 15 total   Heel raises  15x 2 UE 15 x 1UE 15 x 2 UE   Step ups 1 UE support, 4" step, 12 each  1 UE support 4" step x 20' 1 UE support 4" step x 20   stretches Standing knee flexion stretch on stairs 1 5 min B    Standing ITB stretch 1 min B     90 sec x 2 B/: 90 sec x 2 B hooklying L knee flexion stretching knee to chest stretch with weight about ankle 1 min x 2    Seated knee flexion stretch, R 1 min x 2     Shoulder exercises  Blue Rowing 15x, Shoulder ext   15x    Chest press 15x pink Blue Rowing 15 x  Shoulder ext 15x    Chest press 15 x pink    Dynamic balance Forward and back stepping, 2 min  High step walking, railing, 2 min   Forward and back stepping, 2 min  High step walking, railing, 2 min  AP weight shift, 1 min x 2         Standing R knee flexion 15    ASSESSMENT  Patient improves with R knee ROM with stretching and able to carry over into walking after stretching, but still with very limited endurance, although slightly improving from 2 weeks ago          Plan  Continue PT per plan of care  Frequency: 2x week  Duration in weeks: 4-6

## 2018-05-31 ENCOUNTER — APPOINTMENT (OUTPATIENT)
Dept: PHYSICAL THERAPY | Facility: REHABILITATION | Age: 58
End: 2018-05-31
Payer: COMMERCIAL

## 2018-05-31 ENCOUNTER — EVALUATION (OUTPATIENT)
Dept: PHYSICAL THERAPY | Facility: REHABILITATION | Age: 58
End: 2018-05-31
Payer: COMMERCIAL

## 2018-05-31 DIAGNOSIS — Z74.09 IMPAIRED MOBILITY: Primary | ICD-10-CM

## 2018-05-31 PROCEDURE — 97110 THERAPEUTIC EXERCISES: CPT | Performed by: PHYSICAL THERAPIST

## 2018-05-31 PROCEDURE — 97112 NEUROMUSCULAR REEDUCATION: CPT | Performed by: PHYSICAL THERAPIST

## 2018-05-31 NOTE — LETTER
2018    Gregorio Wagner MD  [place] [id number] [address]    Patient: Venice Chan   YOB: 1960   Date of Visit: 2018     Encounter Diagnosis     ICD-10-CM    1  Impaired mobility Z74 09        Dear Dr Efraín Mcnair:    Please review the attached Plan of Care from [address] recent visit  Please verify that you agree therapy should continue by signing the attached document and sending it back to our office  If you have any questions or concerns, please don't hesitate to call  Sincerely,    Geovanna Johnson PT      Referring Provider:      I certify that I have read the below Plan of Care and certify the need for these services furnished under this plan of treatment while under my care  Gregorio Wagner MD  [address] [address]  VIA Facsimile: [phone]          Daily Note     Today's date: 2018  Patient name: Venice Chan  : 1960  MRN: [id number]  Referring provider: Mike Jordan DO  Dx:   Encounter Diagnosis     ICD-10-CM    1  Impaired mobility Z74 09        Subjective:   18  Patient reports pain about the right dorsal foot with pressure of a sneaker, did some walking at home without shoes and it felt better, loosens her shoe as much as possible  Patient did grocery shopping Friday, reports LE fatigue  Objective: See treatment diary below    2 minute walk test (60 foot course): 120 feet 18  2 minute walk test (60 foot course) 160 feet 18  2 minute walk test (60 foot course)  140 feet 18    36 7 sec 10 meter walk 0 27 m/sec    72 degrees R knee passive flexion, seated, limited by pain  Patient notes right knee frequently tightens up      Smith Balance Scale 32/56 total (items in order out of 4 each---3-2-4-3-3-1-4-6-2-4-2-4-0-1)    Precautions: Fall Risk    Daily Treatment Diary Long-axis traction    Manual  5/24//18 5/29/18 5/31/18   Manual tx prn to low back/ shoulders/ Le's  Longsitting R ankle grade 4 AP mobilization    Supine L knee extension stretch and mobilization    Patellar mobilization to improve extension                                Exercise Diary  5/24/18 5/29/18 5/31/18   walking 1 2 min walk test 160 feet 2 min walk test   LTR 10x     UTR 10x pelvic tilts 10x     Bridging 10x 5" 15 each increased one sided weight bearing    1/4 squats 15 x 5' x 2 UE Sit to stand 21" surface 15 total Sit to stand 19 5" surface 10 x 2 sets   Heel raises 15 x 1UE 15 x 2 UE 10-15 each single leg   Step ups 1 UE support 4" step x 20' 1 UE support 4" step x 20    stretches 90 sec x 2 B hooklying L knee flexion stretching knee to chest stretch with weight about ankle 1 min x 2    Seated knee flexion stretch, R 1 min x 2   Standing R knee flexion stretch 1 min x 2    Seated R knee flexion stretch 1 min x 2   Shoulder exercises Blue Rowing 15 x  Shoulder ext 15x    Chest press 15 x pink     Dynamic balance  Forward and back stepping, 2 min  High step walking, railing, 2 min  AP weight shift, 1 min x 2   Forward and back stepping, 2 min  High step walking, railing, 2 min  AP weight shift, 1 min x 2     Standing R knee flexion 15  Standing R knee flexion 15   Assessment  Impairments: abnormal coordination, abnormal gait, abnormal muscle tone, abnormal movement, activity intolerance, impaired balance, impaired physical strength, pain with function and safety issue    Patient continues with impairment in mobility  She frequently is limited by joint pain which tends to occur mainly about the ankles and knees  Marked limitation in knee flexion range of motion limits sit to stand  Patient would likely benefit from evaluation of generalized joint pain, possibly from rheumatology  Continue physical therapy, focus on management of symptoms and encourage gradual increase in physical activity  Goals  STG 1  Independent w/ HEP  Met  2  Tolerate 5 minutes activity w/o increase in pain  Not met  LTG  1  Strength >= 4/5 t/o to improve ADL's  Not met  2  Tolerate 10 minutes continuous activity without increase in pain to improve community function  Not met            3  > 36 Smith to reduce fall risk  Progressed, continued  4  Gait speed >  5 m/second to facilitate improved community function and reduce fall risk  Not met  5  Pain <= 5/10 w/ ADL's   Not met        Plan  Planned modality interventions: thermotherapy  Planned therapy interventions: activity modification, balance, functional ROM exercises, gait training, therapeutic exercise, stretching, strengthening, postural training, patient education, neuromuscular re-education, muscle pump exercises, motor coordination training and manual therapy  Frequency: 2x week  Duration in weeks: 6

## 2018-05-31 NOTE — PROGRESS NOTES
Daily Note     Today's date: 2018  Patient name: Delia Little  : 1960  MRN: [id number]  Referring provider: Jensen Javed DO  Dx:   Encounter Diagnosis     ICD-10-CM    1  Impaired mobility Z74 09        Subjective:   18  Patient reports pain about the right dorsal foot with pressure of a sneaker, did some walking at home without shoes and it felt better, loosens her shoe as much as possible  Patient did grocery shopping Friday, reports LE fatigue  Objective: See treatment diary below    2 minute walk test (60 foot course): 120 feet 18  2 minute walk test (60 foot course) 160 feet 18  2 minute walk test (60 foot course)  140 feet 18    36 7 sec 10 meter walk 0 27 m/sec    72 degrees R knee passive flexion, seated, limited by pain  Patient notes right knee frequently tightens up      Smith Balance Scale 32/56 total (items in order out of 4 each---3-2-4-3-3-1-6-7-6-4-2-4-0-1)    Precautions: Fall Risk    Daily Treatment Diary     Long-axis traction    Manual  18   Manual tx prn to low back/ shoulders/ Le's  Longsitting R ankle grade 4 AP mobilization    Supine L knee extension stretch and mobilization    Patellar mobilization to improve extension                                Exercise Diary  18   walking 1 2 min walk test 160 feet 2 min walk test   LTR 10x     UTR 10x pelvic tilts 10x     Bridging 10x 5" 15 each increased one sided weight bearing    1/4 squats 15 x 5' x 2 UE Sit to stand 21" surface 15 total Sit to stand 19 5" surface 10 x 2 sets   Heel raises 15 x 1UE 15 x 2 UE 10-15 each single leg   Step ups 1 UE support 4" step x 20' 1 UE support 4" step x 20    stretches 90 sec x 2 B hooklying L knee flexion stretching knee to chest stretch with weight about ankle 1 min x 2    Seated knee flexion stretch, R 1 min x 2   Standing R knee flexion stretch 1 min x 2    Seated R knee flexion stretch 1 min x 2   Shoulder exercises Blue Rowing 15 x  Shoulder ext 15x    Chest press 15 x pink     Dynamic balance  Forward and back stepping, 2 min  High step walking, railing, 2 min  AP weight shift, 1 min x 2   Forward and back stepping, 2 min  High step walking, railing, 2 min  AP weight shift, 1 min x 2     Standing R knee flexion 15  Standing R knee flexion 15   Assessment  Impairments: abnormal coordination, abnormal gait, abnormal muscle tone, abnormal movement, activity intolerance, impaired balance, impaired physical strength, pain with function and safety issue    Patient continues with impairment in mobility  She frequently is limited by joint pain which tends to occur mainly about the ankles and knees  Marked limitation in knee flexion range of motion limits sit to stand  Patient would likely benefit from evaluation of generalized joint pain, possibly from rheumatology  Continue physical therapy, focus on management of symptoms and encourage gradual increase in physical activity  Goals  STG 1  Independent w/ HEP  Met  2  Tolerate 5 minutes activity w/o increase in pain  Not met  LTG  1  Strength >= 4/5 t/o to improve ADL's  Not met  2  Tolerate 10 minutes continuous activity without increase in pain to improve community function  Not met            3  > 36 Smith to reduce fall risk  Progressed, continued  4  Gait speed >  5 m/second to facilitate improved community function and reduce fall risk  Not met  5  Pain <= 5/10 w/ ADL's   Not met        Plan  Planned modality interventions: thermotherapy  Planned therapy interventions: activity modification, balance, functional ROM exercises, gait training, therapeutic exercise, stretching, strengthening, postural training, patient education, neuromuscular re-education, muscle pump exercises, motor coordination training and manual therapy  Frequency: 2x week  Duration in weeks: 6

## 2018-06-05 ENCOUNTER — OFFICE VISIT (OUTPATIENT)
Dept: PHYSICAL THERAPY | Facility: REHABILITATION | Age: 58
End: 2018-06-05
Payer: COMMERCIAL

## 2018-06-05 DIAGNOSIS — R26.2 AMBULATORY DYSFUNCTION: ICD-10-CM

## 2018-06-05 DIAGNOSIS — Z74.09 IMPAIRED MOBILITY: Primary | ICD-10-CM

## 2018-06-05 PROCEDURE — 97110 THERAPEUTIC EXERCISES: CPT | Performed by: PHYSICAL THERAPIST

## 2018-06-05 PROCEDURE — 97112 NEUROMUSCULAR REEDUCATION: CPT | Performed by: PHYSICAL THERAPIST

## 2018-06-07 ENCOUNTER — OFFICE VISIT (OUTPATIENT)
Dept: PHYSICAL THERAPY | Facility: REHABILITATION | Age: 58
End: 2018-06-07
Payer: COMMERCIAL

## 2018-06-07 DIAGNOSIS — Z74.09 IMPAIRED MOBILITY: Primary | ICD-10-CM

## 2018-06-07 DIAGNOSIS — R26.2 AMBULATORY DYSFUNCTION: ICD-10-CM

## 2018-06-07 PROCEDURE — 97110 THERAPEUTIC EXERCISES: CPT

## 2018-06-07 PROCEDURE — 97140 MANUAL THERAPY 1/> REGIONS: CPT

## 2018-06-07 PROCEDURE — 97112 NEUROMUSCULAR REEDUCATION: CPT

## 2018-06-07 NOTE — PROGRESS NOTES
Daily Note     Today's date: 2018  Patient name: Charis Maravilla  : 1960  MRN: [id number]  Referring provider: Lizette Corley DO  Dx:   Encounter Diagnosis     ICD-10-CM    1  Impaired mobility Z74 09    2  Ambulatory dysfunction R26 2        Start Time: 1030  Stop Time: 1125  Total time in clinic (min): 55 minutes    Subjective: Reports baclofen was increased  to 20 mg yesterday  Objective: See treatment diary below    Precautions: Fall Risk     Daily Treatment Diary      Long-axis traction     Manual  18   Manual tx prn to low back/ shoulders/ Le's   Longsitting R ankle grade 4 AP mobilization     Supine L knee extension stretch and mobilization     Patellar mobilization to improve extension   Supine L ankle traction, hip traction, knee extension stretching     Medial patellar glides Supine L ankle/hip traction, hamstring  stretching    IASTM         Quads, ITB, Hamstring good results for decreased tone and desenzitation                                                Exercise Diary  18    walking 1 2 min walk test 160 feet 2 min walk test      LTR 10x       10  X 10sec   UTR 10x pelvic tilts 10x          Bridging 10x 5" 15 each increased one sided weight bearing   15 each increased one-sided weight bearing 15 x ea    1/4 squats 15 x 5' x 2 UE Sit to stand 21" surface 15 total Sit to stand 19 5" surface 10 x 2 sets Sit to stand 19 5" surface 10 x 2 sets     Heel raises 15 x 1UE 15 x 2 UE 10-15 each single leg 10-15 each single leg     Step ups 1 UE support 4" step x 20' 1 UE support 4" step x 20   6" step 10-15 each  Lateral 4" step 20 each  at stairs  6" step  10 each   One step , then step to    stretches 90 sec x 2 B hooklying L knee flexion stretching knee to chest stretch with weight about ankle 1 min x 2     Seated knee flexion stretch, R 1 min x 2    Standing R knee flexion stretch 1 min x 2     Seated R knee flexion stretch 1 min x 2 Standing R knee flexion stretch 1 min x 2     Seated R knee flexion stretch 1 min x 2  Standing Knee stretch 30 sec x 3 each    SKC  20 sets x 2    Shoulder exercises Blue Rowing 15 x  Shoulder ext 15x     Chest press 15 x pink       Reviewed HEP using orange and green theraband  For improving posture   Dynamic balance   Forward and back stepping, 2 min  High step walking, railing, 2 min  AP weight shift, 1 min x 2    Forward and back stepping, 2 min  High step walking, railing, 2 min  AP weight shift, 1 min x 2 Forward and back stepping, 2 min     AP weight shift, 1 min x 2 Forwards and back stepping, high marching  5 min    Weight shifting laterally        Standing R knee flexion 15  Standing R knee flexion 15 Standing R knee flexion 15 Standing R knee flex on step    SK     Right for Lumbar muscle elongation    Hamstring stretch     30" x 2         Assessment: Tolerated treatment well  Patient demonstrated fatigue post treatment and would benefit from continued PT      Plan: Progress treatment as tolerated

## 2018-06-12 ENCOUNTER — OFFICE VISIT (OUTPATIENT)
Dept: FAMILY MEDICINE CLINIC | Facility: CLINIC | Age: 58
End: 2018-06-12
Payer: COMMERCIAL

## 2018-06-12 ENCOUNTER — HOSPITAL ENCOUNTER (OUTPATIENT)
Dept: RADIOLOGY | Facility: HOSPITAL | Age: 58
Discharge: HOME | End: 2018-06-12
Payer: COMMERCIAL

## 2018-06-12 ENCOUNTER — TELEPHONE (OUTPATIENT)
Dept: FAMILY MEDICINE CLINIC | Facility: CLINIC | Age: 58
End: 2018-06-12

## 2018-06-12 ENCOUNTER — OFFICE VISIT (OUTPATIENT)
Dept: PHYSICAL THERAPY | Facility: REHABILITATION | Age: 58
End: 2018-06-12
Payer: COMMERCIAL

## 2018-06-12 VITALS — DIASTOLIC BLOOD PRESSURE: 76 MMHG | SYSTOLIC BLOOD PRESSURE: 130 MMHG

## 2018-06-12 DIAGNOSIS — J30.89 SEASONAL ALLERGIC RHINITIS DUE TO OTHER ALLERGIC TRIGGER: Primary | ICD-10-CM

## 2018-06-12 DIAGNOSIS — S19.9XXA NECK INJURY, INITIAL ENCOUNTER: ICD-10-CM

## 2018-06-12 DIAGNOSIS — R26.2 AMBULATORY DYSFUNCTION: Primary | ICD-10-CM

## 2018-06-12 DIAGNOSIS — S29.9XXA THORACIC INJURY, INITIAL ENCOUNTER: ICD-10-CM

## 2018-06-12 DIAGNOSIS — M54.2 NECK PAIN: Primary | ICD-10-CM

## 2018-06-12 DIAGNOSIS — S39.92XD LOWER BACK INJURY, SUBSEQUENT ENCOUNTER: ICD-10-CM

## 2018-06-12 DIAGNOSIS — R11.0 NAUSEA: ICD-10-CM

## 2018-06-12 DIAGNOSIS — S29.9XXD THORACIC INJURY, SUBSEQUENT ENCOUNTER: ICD-10-CM
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
## 2018-06-12 DIAGNOSIS — S34.21XA: ICD-10-CM

## 2018-06-12 PROBLEM — D68.9 BLOOD COAGULATION DISORDER (HCC): Status: ACTIVE | Noted: 2018-06-12

## 2018-06-12 PROBLEM — D69.6 THROMBOCYTOPENIC DISORDER (HCC): Status: ACTIVE | Noted: 2018-06-12

## 2018-06-12 PROBLEM — D35.2 PITUITARY MICROADENOMA (HCC): Status: ACTIVE | Noted: 2017-02-21

## 2018-06-12 PROBLEM — F32.1 MODERATE SINGLE CURRENT EPISODE OF MAJOR DEPRESSIVE DISORDER (HCC): Status: ACTIVE | Noted: 2017-02-28

## 2018-06-12 PROBLEM — Z86.711 HISTORY OF PULMONARY EMBOLISM: Status: ACTIVE | Noted: 2018-06-12

## 2018-06-12 PROBLEM — I82.409 DEEP VENOUS THROMBOSIS OF LOWER EXTREMITY (HCC): Status: ACTIVE | Noted: 2018-06-12

## 2018-06-12 PROBLEM — N63.20 MASS OF LEFT BREAST: Status: ACTIVE | Noted: 2017-12-18

## 2018-06-12 PROBLEM — M62.9 DISORDER OF MUSCLE: Status: ACTIVE | Noted: 2017-11-08

## 2018-06-12 PROCEDURE — 72100 X-RAY EXAM L-S SPINE 2/3 VWS: CPT

## 2018-06-12 PROCEDURE — 97110 THERAPEUTIC EXERCISES: CPT | Performed by: PHYSICAL THERAPIST

## 2018-06-12 PROCEDURE — 72072 X-RAY EXAM THORAC SPINE 3VWS: CPT

## 2018-06-12 PROCEDURE — 97010 HOT OR COLD PACKS THERAPY: CPT | Performed by: PHYSICAL THERAPIST

## 2018-06-12 PROCEDURE — 99213 OFFICE O/P EST LOW 20 MIN: CPT | Performed by: FAMILY MEDICINE

## 2018-06-12 PROCEDURE — 72040 X-RAY EXAM NECK SPINE 2-3 VW: CPT

## 2018-06-12 RX ORDER — ONDANSETRON 4 MG/1
4 TABLET, ORALLY DISINTEGRATING ORAL EVERY 6 HOURS PRN
Qty: 20 TABLET | Refills: 5 | Status: SHIPPED | OUTPATIENT
Start: 2018-06-12 | End: 2019-10-23

## 2018-06-12 RX ORDER — CETIRIZINE HYDROCHLORIDE 10 MG/1
10 TABLET ORAL DAILY
Qty: 30 TABLET | Refills: 5 | Status: SHIPPED | OUTPATIENT
Start: 2018-06-12 | End: 2019-03-19 | Stop reason: SDUPTHER

## 2018-06-12 NOTE — TELEPHONE ENCOUNTER
She was seen for an appt but her medication was not called into her pharm   States it was for a nausea med and generic zyrtec

## 2018-06-12 NOTE — PROGRESS NOTES
Daily Note     Today's date: 2018  Patient name: Laney Cobian  : 1960  MRN: [id number]  Referring provider: Brianna Roberts DO  Dx:   Encounter Diagnosis     ICD-10-CM    1  Ambulatory dysfunction R26 2      Subjective: Patient had fall about 3 days ago, was sneezing repeatedly and fell backwards  She reports she fell onto her back and might have struck a toy with her back  She reports lumbar region pain, intermittent left leg pain when upright, and some left leg weakness        Objective: See treatment diary below    Point tenderness midline lumbar spine about L3     4+/5 R hip flexion, 4-/5 L  4/5 R knee extension, 4-/5 L  5-/5 knee flexion B/L  4+/5 dorsiflexion B/L  At least 3/5 plantarflexion B/L    Precautions: Fall Risk     Daily Treatment Diary       Manual  18   Manual tx prn to low back/ shoulders/ Le's   Longsitting R ankle grade 4 AP mobilization     Supine L knee extension stretch and mobilization     Patellar mobilization to improve extension   Supine L ankle traction, hip traction, knee extension stretching     Medial patellar glides Supine L ankle/hip traction, hamstring  stretching    IASTM         Quads, ITB, Hamstring good results for decreased tone and desenzitation                                                Exercise Diary  18    walking 1 2 min walk test 160 feet 2 min walk test      LTR 10x       10  X 10sec   UTR 10x pelvic tilts 10x          Bridging 10x 5" 15 each increased one sided weight bearing   15 each increased one-sided weight bearing 15 x ea    1/4 squats 15 x 5' x 2 UE Sit to stand 21" surface 15 total Sit to stand 19 5" surface 10 x 2 sets Sit to stand 19 5" surface 10 x 2 sets     Heel raises 15 x 1UE 15 x 2 UE 10-15 each single leg 10-15 each single leg     Step ups 1 UE support 4" step x 20' 1 UE support 4" step x 20   6" step 10-15 each  Lateral 4" step 20 each  at stairs  6" step 10 each  One step , then step to    stretches 90 sec x 2 B hooklying L knee flexion stretching knee to chest stretch with weight about ankle 1 min x 2     Seated knee flexion stretch, R 1 min x 2    Standing R knee flexion stretch 1 min x 2     Seated R knee flexion stretch 1 min x 2 Standing R knee flexion stretch 1 min x 2     Seated R knee flexion stretch 1 min x 2  Standing Knee stretch 30 sec x 3 each    SKC  20 sets x 2    Shoulder exercises Blue Rowing 15 x  Shoulder ext 15x     Chest press 15 x pink       Reviewed HEP using orange and green theraband  For improving posture   Dynamic balance   Forward and back stepping, 2 min  High step walking, railing, 2 min  AP weight shift, 1 min x 2    Forward and back stepping, 2 min  High step walking, railing, 2 min  AP weight shift, 1 min x 2 Forward and back stepping, 2 min     AP weight shift, 1 min x 2 Forwards and back stepping, high marching  5 min    Weight shifting laterally        Standing R knee flexion 15  Standing R knee flexion 15 Standing R knee flexion 15 Standing R knee flex on step    SKC     Right for Lumbar muscle elongation    Hamstring stretch     30" x 2     HP seated to lumbar spine    Assessment: Patient with point tenderness to mid lumbar region following fall onto this area  She had associated new focal left lower extremity weakness  Therapist contacted PCPs office, who offered appointment today, patient unable to attend until tomorrow due to transportation  Patient reports she can get to an x-ray today and will call PCPs office to let them know  No active treatment rendered, hold on home exercise program   Once cleared by physician, re-evaluated and continue per plan of care  Plan: Progress treatment as tolerated

## 2018-06-12 NOTE — PROGRESS NOTES
Assessment/Plan:    Although I think there will not be a fracture will order x-rays of the neck thoracic and lumbar spine to rule out and kind of compression fracture  Assuming they would be negative then patient can slowly resume physical therapy  Await results  Diagnoses and all orders for this visit:    Neck injury, initial encounter  -     XR spine cervical 2 or 3 vw injury; Future    Injury to lumbar nerve root, initial encounter  -     XR spine lumbar 2 or 3 views injury; Future    Thoracic injury, initial encounter  -     XR spine thoracic 3 vw; Future    Fall, initial encounter        There are no Patient Instructions on file for this visit  Subjective:        Patient ID: Ebonie Ovalles is a 59 y o  female  Chief Complaint   Patient presents with    Back Pain     pt fell tenderness in the lumbar spine- pt fell sunday, pt claims that when pushed on L3 there is pain  Pt claims that her L leg is going weak   discuss medication     discuss Zofran, and scrpit for crc       Patient states she fell yesterday in her home after sneezing  States she fell back and landed flat on her back but did not hit her head  Having pains from the base of her neck down to her lumbar spine  Patient did not lose consciousness  Patient had been to her therapist today and he recommended no therapy and have me evaluate her  The following portions of the patient's history were reviewed and updated as appropriate: past medical history, past surgical history and problem list       Review of Systems   Respiratory: Negative for shortness of breath  Cardiovascular: Negative for chest pain  Musculoskeletal: Positive for back pain, gait problem and neck pain  Neurological: Negative for dizziness and headaches  Objective:  /76 (BP Location: Left arm, Patient Position: Sitting, Cuff Size: Standard)        Physical Exam   Constitutional: She is oriented to person, place, and time   She appears well-nourished  HENT:   Head: Normocephalic and atraumatic  Eyes: EOM are normal  Pupils are equal, round, and reactive to light  Neck: Neck supple  Range of motion in the neck to the left is approximately 20-25°  To the right it is about 50°  Cardiovascular: Normal heart sounds  Pulmonary/Chest: Breath sounds normal    Musculoskeletal:   Patient has tender spots approximately around C7, mid thoracic and lower lumbar area  No ecchymosis  Neurological: She is alert and oriented to person, place, and time  Psychiatric: She has a normal mood and affect

## 2018-06-12 NOTE — TELEPHONE ENCOUNTER
Tell patient I sent into the pharmacy she has got a given a little bit of time to get her all the prescriptions we go through today

## 2018-06-13 ENCOUNTER — TELEPHONE (OUTPATIENT)
Dept: FAMILY MEDICINE CLINIC | Facility: CLINIC | Age: 58
End: 2018-06-13

## 2018-06-13 NOTE — TELEPHONE ENCOUNTER
Patient aware however she has a therapy appointment tomorrow morning at 10:00am  Wants to know if she should skip it until you have the finalized results

## 2018-06-13 NOTE — TELEPHONE ENCOUNTER
Patient called and stated she had her xrays done yesterday and wanted to know if you think she should resume physical therapy

## 2018-06-14 ENCOUNTER — OFFICE VISIT (OUTPATIENT)
Dept: PHYSICAL THERAPY | Facility: REHABILITATION | Age: 58
End: 2018-06-14
Payer: COMMERCIAL

## 2018-06-14 DIAGNOSIS — R26.2 AMBULATORY DYSFUNCTION: Primary | ICD-10-CM

## 2018-06-14 PROCEDURE — 97110 THERAPEUTIC EXERCISES: CPT | Performed by: PHYSICAL THERAPIST

## 2018-06-14 PROCEDURE — 97140 MANUAL THERAPY 1/> REGIONS: CPT | Performed by: PHYSICAL THERAPIST

## 2018-06-14 NOTE — PROGRESS NOTES
Daily Note     Today's date: 2018  Patient name: Ananya Isbell  : 1960  MRN: [id number]  Referring provider: Ronan Marcano DO  Dx:   Encounter Diagnosis     ICD-10-CM    1  Ambulatory dysfunction R26 2      Subjective: Patient seen by her primary care physician and had radiographs of cervical, thoracic and lumbar spines  Images available, results finalized after this treatment showing no acute fractures  Patient denies further falls  Objective: See treatment diary below    Precautions: Fall Risk     Daily Treatment Diary       Manual  18    Manual tx prn to low back/ shoulders/ Le's Supine L ankle traction, hip traction, knee extension stretching     Medial patellar glides Supine L ankle/hip traction, hamstring  stretching Supine L knee traction, AP L knee mobilization    Followed by L quad sets x 10 and L SLR      IASTM   Quads, ITB, Hamstring good results for decreased tone and desenzitation                                      Exercise Diary  18    walking    3 min with rolling walker    LTR   10  X 10sec     UTR        Bridging 15 each increased one-sided weight bearing 15 x ea     1/4 squats Sit to stand 19 5" surface 10 x 2 sets      Heel raises 10-15 each single leg  10 each    Step ups 6" step 10-15 each  Lateral 4" step 20 each  at stairs  6" step  10 each   One step , then step to     stretches Standing R knee flexion stretch 1 min x 2     Seated R knee flexion stretch 1 min x 2  Standing Knee stretch 30 sec x 3 each    SKC  20 sets x 2     Shoulder exercises   Reviewed HEP using orange and green theraband  For improving posture     Dynamic balance Forward and back stepping, 2 min     AP weight shift, 1 min x 2 Forwards and back stepping, high marching  5 min    Weight shifting laterally On biodex, lateral weight shifting to targets 1 min x 4    Sidestepping near railing    Standing AP weight shifts    Near assistive device, reaching and turning      Standing R knee flexion 15 Standing R knee flex on step     SKC  Right for Lumbar muscle elongation     Hamstring stretch  30" x 2      HP seated to lumbar spine    Assessment & Plan: Patient tolerated gradual return towards prior exercises  Patient had about 15 degrees loss of left passive knee extension, but improved to at least -5 degrees following manual therapy and gentle quadriceps activation exercises

## 2018-06-15 ENCOUNTER — TELEPHONE (OUTPATIENT)
Dept: FAMILY MEDICINE CLINIC | Facility: CLINIC | Age: 58
End: 2018-06-15

## 2018-06-15 NOTE — TELEPHONE ENCOUNTER
No fractures or acute issues were found  She has some minimal arthritis at 1 level in her neck   can continue physical therapy

## 2018-06-18 ENCOUNTER — OFFICE VISIT (OUTPATIENT)
Dept: PHYSICAL THERAPY | Facility: REHABILITATION | Age: 58
End: 2018-06-18
Payer: COMMERCIAL

## 2018-06-18 DIAGNOSIS — R26.2 AMBULATORY DYSFUNCTION: Primary | ICD-10-CM

## 2018-06-18 DIAGNOSIS — Z74.09 IMPAIRED MOBILITY: ICD-10-CM

## 2018-06-18 PROCEDURE — 97110 THERAPEUTIC EXERCISES: CPT

## 2018-06-18 PROCEDURE — 97112 NEUROMUSCULAR REEDUCATION: CPT

## 2018-06-18 NOTE — PROGRESS NOTES
Daily Note     Today's date: 2018  Patient name: Cindy Sharpe  : 1960  MRN: [id number]  Referring provider: Tristian Quintana DO  Dx:   Encounter Diagnosis     ICD-10-CM    1  Ambulatory dysfunction R26 2    2  Impaired mobility Z74 09                   Subjective: Reports new medial right foot pain and left lateral calf pain  Objective: See treatment diary below        Manual  18   Manual tx prn to low back/ shoulders/ Le's Supine L ankle traction, hip traction, knee extension stretching     Medial patellar glides Supine L ankle/hip traction, hamstring  stretching Supine L knee traction, AP L knee mobilization     Followed by L quad sets x 10 and L SLR   Supine Left knee traction  Ap Left knee mobilization    Followed by left quad sets x 10 and L SLR AA for hamstring elongation          IASTM   Quads, ITB, Hamstring good results for decreased tone and desenzitation                                                 Exercise Diary  18   walking     3 min with rolling walker  3 min walk with rolling walker   LTR   10  X 10sec       UTR           Bridging 15 each increased one-sided weight bearing 15 x ea       1/4 squats Sit to stand 19 5" surface 10 x 2 sets         Heel raises 10-15 each single leg   10 each  15 x each   Step ups 6" step 10-15 each  Lateral 4" step 20 each  at stairs  6" step  10 each   One step , then step to    6" steps 14 each,   stretches Standing R knee flexion stretch 1 min x 2     Seated R knee flexion stretch 1 min x 2  Standing Knee stretch 30 sec x 3 each     SKC  20 sets x 2       Shoulder exercises   Reviewed HEP using orange and green theraband  For improving posture       Dynamic balance Forward and back stepping, 2 min     AP weight shift, 1 min x 2 Forwards and back stepping, high marching  5 min     Weight shifting laterally On biodex, lateral weight shifting to targets 1 min x 4     Sidestepping near railing     Standing AP weight shifts     Near assistive device, reaching and turning  Biodex    Lateral weight shift 1 x 4 min    Limits of stability  X 3    Side stepping 4 laps  High marching  4 laps    Backward steps x 4     Standing R knee flexion 15 Standing R knee flex on step         SKC   Right for Lumbar muscle elongation       Hamstring stretch   30" x 2            Assessment: Tolerated treatment well  Patient exhibited good technique with therapeutic exercises  She will continue to benefit from skilled PT intervention  Plan: Potential discharge next visit

## 2018-06-20 ENCOUNTER — OFFICE VISIT (OUTPATIENT)
Dept: PHYSICAL THERAPY | Facility: REHABILITATION | Age: 58
End: 2018-06-20
Payer: COMMERCIAL

## 2018-06-20 DIAGNOSIS — R26.2 AMBULATORY DYSFUNCTION: Primary | ICD-10-CM

## 2018-06-20 PROCEDURE — 97112 NEUROMUSCULAR REEDUCATION: CPT | Performed by: PHYSICAL THERAPIST

## 2018-06-20 PROCEDURE — 97140 MANUAL THERAPY 1/> REGIONS: CPT | Performed by: PHYSICAL THERAPIST

## 2018-06-20 PROCEDURE — 97110 THERAPEUTIC EXERCISES: CPT | Performed by: PHYSICAL THERAPIST

## 2018-06-20 NOTE — PROGRESS NOTES
Daily Note     Today's date: 2018  Patient name: Charissa Junior  : 1960  MRN: [id number]  Referring provider: Natalia Chawla DO  Dx:   Encounter Diagnosis     ICD-10-CM    1  Ambulatory dysfunction R26 2      Subjective: Patient reports the right leg continues to tighten up  Objective: See treatment diary below        Manual  18   Manual tx prn to low back/ shoulders/ Le's Supine L ankle traction, hip traction, knee extension stretching     Medial patellar glides Supine L ankle/hip traction, hamstring  stretching Supine L knee traction, AP L knee mobilization     Followed by L quad sets x 10 and L SLR   Supine Left knee traction  Ap Left knee mobilization    Followed by left quad sets x 10 and L SLR AA for hamstring elongation       Supine Left knee traction  Ap Left knee mobilization    Followed by left quad sets x 10 and L SLR AA for hamstring elongation    IASTM   Quads, ITB, Hamstring good results for decreased tone and desenzitation                                                     Exercise Diary  18   walking     3 min with rolling walker  3 min walk with rolling walker 6 minute walk test   LTR   10  X 10sec        UTR            Bridging 15 each increased one-sided weight bearing 15 x ea        1/4 squats Sit to stand 19 5" surface 10 x 2 sets          Heel raises 10-15 each single leg   10 each  15 x each 15 each   Step ups 6" step 10-15 each  Lateral 4" step 20 each  at stairs  6" step  10 each   One step , then step to    6" steps 14 each, 6" step, 10 each   stretches Standing R knee flexion stretch 1 min x 2     Seated R knee flexion stretch 1 min x 2  Standing Knee stretch 30 sec x 3 each     SKC  20 sets x 2     Supine R knee flexion stretch, with strap proximal to ankle to avoid ankle pain, 2 min    Strain-counterstrain to R ankle evertors, followed by stretching, 4 min   Shoulder exercises   Reviewed HEP using orange and green theraband  For improving posture        Dynamic balance Forward and back stepping, 2 min     AP weight shift, 1 min x 2 Forwards and back stepping, high marching  5 min     Weight shifting laterally On biodex, lateral weight shifting to targets 1 min x 4     Sidestepping near railing     Standing AP weight shifts     Near assistive device, reaching and turning  Biodex    Lateral weight shift 1 x 4 min    Limits of stability  X 3    Side stepping 4 laps  High marching  4 laps    Backward steps x 4 High step walking, 2 min    Sidestepping 2 min     Standing R knee flexion 15 Standing R knee flex on step          SKC   Right for Lumbar muscle elongation        Hamstring stretch   30" x 2             Assessment: Patient with improved range of motion following stretching, but continues to stiffen up with rest   Patient should continue to stretch regularly at home  Patient should walk regularly to tolerance at home         Plan  Planned modality interventions: thermotherapy  Planned therapy interventions: activity modification, balance, functional ROM exercises, gait training, therapeutic exercise, stretching, strengthening, postural training, patient education, neuromuscular re-education, muscle pump exercises, motor coordination training and manual therapy  Frequency: 2x week

## 2018-06-25 ENCOUNTER — OFFICE VISIT (OUTPATIENT)
Dept: PHYSICAL THERAPY | Facility: REHABILITATION | Age: 58
End: 2018-06-25
Payer: COMMERCIAL

## 2018-06-25 DIAGNOSIS — R26.2 AMBULATORY DYSFUNCTION: Primary | ICD-10-CM

## 2018-06-25 PROCEDURE — 97112 NEUROMUSCULAR REEDUCATION: CPT | Performed by: PHYSICAL THERAPIST

## 2018-06-25 PROCEDURE — 97110 THERAPEUTIC EXERCISES: CPT | Performed by: PHYSICAL THERAPIST

## 2018-06-25 PROCEDURE — 97010 HOT OR COLD PACKS THERAPY: CPT

## 2018-06-25 PROCEDURE — 97140 MANUAL THERAPY 1/> REGIONS: CPT | Performed by: PHYSICAL THERAPIST

## 2018-06-25 NOTE — PROGRESS NOTES
Daily Note     Today's date: 2018  Patient name: Reggie Patel  : 1960  MRN: [id number]  Referring provider: Bill Roldan DO  Dx:   Encounter Diagnosis     ICD-10-CM    1  Ambulatory dysfunction R26 2      Subjective: Patient reports right leg tightness and right swollen ankle prior to treatment today  Patient reports no falls and has "no idea" why her ankle is swollen  Objective: See treatment diary below        Manual  18   Manual tx prn to low back/ shoulders/ Le's Supine L ankle/hip traction, hamstring  stretching Supine L knee traction, AP L knee mobilization     Followed by L quad sets x 10 and L SLR   Supine Left knee traction  Ap Left knee mobilization    Followed by left quad sets x 10 and L SLR AA for hamstring elongation       Supine Left knee traction  Ap Left knee mobilization    Followed by left quad sets x 10 and L SLR AA for hamstring elongation Supine Left knee traction AP Left knee mobilization     Followed by left quad sets x 10 and L SLR AA for hamstring elongation     IASTM Quads, ITB, Hamstring good results for decreased tone and desenzitation                                                   Exercise Diary  18   walking   3 min with rolling walker  3 min walk with rolling walker 6 minute walk test 6 minute walk test   LTR 10  X 10sec         UTR           Bridging 15 x ea         1/4 squats           Heel raises   10 each  15 x each 15 each 15 each   Step ups  at stairs  6" step  10 each   One step , then step to    6" steps 14 each, 6" step, 10 each 6" step, 10 each     Lateral step ups 6" step, 10 each   stretches  Standing Knee stretch 30 sec x 3 each     SKC  20 sets x 2     Supine R knee flexion stretch, with strap proximal to ankle to avoid ankle pain, 2 min    Strain-counterstrain to R ankle evertors, followed by stretching, 4 min    Shoulder exercises Reviewed HEP using orange and green olga  For improving posture         Dynamic balance Forwards and back stepping, high marching  5 min     Weight shifting laterally On biodex, lateral weight shifting to targets 1 min x 4     Sidestepping near railing     Standing AP weight shifts     Near assistive device, reaching and turning  Biodex    Lateral weight shift 1 x 4 min    Limits of stability  X 3    Side stepping 4 laps  High marching  4 laps    Backward steps x 4 High step walking, 2 min    Sidestepping 2 min High Step Walking, 2 min    Sidestepping, 2 min     Standing R knee flex on step           SKC Right for Lumbar muscle elongation         Hamstring stretch 30" x 2              Assessment: Patient showed improved range of motion following stretching, but continues to stiffen up with rest and when they become fatigue  Patient has been performing stretching and aerobic exercise at home supplementing her treatment sessions in therapy  Plan: Next session, introduce stepping over obstacles and performing an obstacle course as interventions to challenge dynamic balance  Monitor R ankle to ensure no increase in edema occurs during therapy session

## 2018-06-27 ENCOUNTER — TELEPHONE (OUTPATIENT)
Dept: FAMILY MEDICINE CLINIC | Facility: CLINIC | Age: 58
End: 2018-06-27

## 2018-06-27 NOTE — TELEPHONE ENCOUNTER
Patient called and stated she was at PT today and she had swelling on the side of her leg above her ankle  States her therapist made note of it in her OV note   States you can read the note through [facility]

## 2018-06-28 ENCOUNTER — OFFICE VISIT (OUTPATIENT)
Dept: PHYSICAL THERAPY | Facility: REHABILITATION | Age: 58
End: 2018-06-28
Payer: COMMERCIAL

## 2018-06-28 DIAGNOSIS — R26.2 AMBULATORY DYSFUNCTION: Primary | ICD-10-CM

## 2018-06-28 PROCEDURE — 97140 MANUAL THERAPY 1/> REGIONS: CPT | Performed by: PHYSICAL THERAPIST

## 2018-06-28 PROCEDURE — 97112 NEUROMUSCULAR REEDUCATION: CPT | Performed by: PHYSICAL THERAPIST

## 2018-06-28 PROCEDURE — 97110 THERAPEUTIC EXERCISES: CPT | Performed by: PHYSICAL THERAPIST

## 2018-06-28 NOTE — PROGRESS NOTES
Daily Note     Today's date: 2018  Patient name: Mellisa Butcher  : 1960  MRN: [id number]  Referring provider: Savanna Osullivan DO  Dx:   Encounter Diagnosis     ICD-10-CM    1  Ambulatory dysfunction R26 2      Subjective: Patient reports right leg tightness and right swollen ankle prior to treatment today  Patient reports no falls and has "no idea" why her ankle is swollen  Objective: See treatment diary below        Manual  18   Manual tx prn to low back/ shoulders/ Le's Supine L ankle/hip traction, hamstring  stretching Supine L knee traction, AP L knee mobilization     Followed by L quad sets x 10 and L SLR   Supine Left knee traction  Ap Left knee mobilization    Followed by left quad sets x 10 and L SLR AA for hamstring elongation       Supine Left knee traction  Ap Left knee mobilization    Followed by left quad sets x 10 and L SLR AA for hamstring elongation Supine Left knee traction AP Left knee mobilization     Followed by left quad sets x 10 and L SLR AA for hamstring elongation  Supine Left knee traction AP Left knee mobilization     Followed by left quad sets x 10    IASTM Quads, ITB, Hamstring good results for decreased tone and desenzitation                                                       Exercise Diary  18   walking   3 min with rolling walker  3 min walk with rolling walker 6 minute walk test 6 minute walk test 6 minute walk test 460 feet   LTR 10  X 10sec          UTR            Bridging 15 x ea          1/4 squats            Heel raises   10 each  15 x each 15 each 15 each 15 each   Step ups  at stairs  6" step  10 each   One step , then step to    6" steps 14 each, 6" step, 10 each 6" step, 10 each     Lateral step ups 6" step, 10 each 6" step, 10 each     Lateral step ups 6" step, 10 each   stretches  Standing Knee stretch 30 sec x 3 each     SKC  20 sets x 2     Supine R knee flexion stretch, with strap proximal to ankle to avoid ankle pain, 2 min    Strain-counterstrain to R ankle evertors, followed by stretching, 4 min  Long-sitting gastroc stretch 1 min  Attempted standing gastroc stretch, pain about R dorsal foot   Shoulder exercises Reviewed HEP using orange and green theraband  For improving posture          Dynamic balance Forwards and back stepping, high marching  5 min     Weight shifting laterally On biodex, lateral weight shifting to targets 1 min x 4     Sidestepping near railing     Standing AP weight shifts     Near assistive device, reaching and turning  Biodex    Lateral weight shift 1 x 4 min    Limits of stability  X 3    Side stepping 4 laps  High marching  4 laps    Backward steps x 4 High step walking, 2 min    Sidestepping 2 min High Step Walking, 2 min    Sidestepping, 2 min High step walking 1 min    Sidestepping 1 min    Sit to stand 19" surface, 10    Biodex lateral weight shift 1 min x 3     Standing R knee flex on step         Ankle dorsiflexion and eversion, light band, 20 each on R only   [facility] Right for Lumbar muscle elongation          Hamstring stretch 30" x 2             Assessment & Plan: Good continued gradual improvement in walking endurance, increasing distance with 6 minute walk test   We would have liked to do an obstacle course but deferred due to right ankle pain  Complete re-evaluation next session

## 2018-07-02 ENCOUNTER — OFFICE VISIT (OUTPATIENT)
Dept: PHYSICAL THERAPY | Facility: REHABILITATION | Age: 58
End: 2018-07-02
Payer: COMMERCIAL

## 2018-07-02 DIAGNOSIS — R26.2 AMBULATORY DYSFUNCTION: Primary | ICD-10-CM

## 2018-07-02 PROCEDURE — 97112 NEUROMUSCULAR REEDUCATION: CPT | Performed by: PHYSICAL THERAPIST

## 2018-07-02 PROCEDURE — 97110 THERAPEUTIC EXERCISES: CPT | Performed by: PHYSICAL THERAPIST

## 2018-07-02 PROCEDURE — G8979 MOBILITY GOAL STATUS: HCPCS | Performed by: PHYSICAL THERAPIST

## 2018-07-02 PROCEDURE — G8978 MOBILITY CURRENT STATUS: HCPCS | Performed by: PHYSICAL THERAPIST

## 2018-07-02 NOTE — LETTER
2018    Haydee Coats MD  [facility] [address]    Patient: Trena Ho   YOB: 1960   Date of Visit: 2018     Encounter Diagnosis     ICD-10-CM    1  Ambulatory dysfunction R26 2 PT plan of care cert/re-cert       Dear Dr Guillermo Chavez:    Please review the attached Plan of Care from [facility] recent visit  Please verify that you agree therapy should continue by signing the attached document and sending it back to our office  If you have any questions or concerns, please don't hesitate to call  Sincerely,    Cheikh French, PT      Referring Provider:      I certify that I have read the below Plan of Care and certify the need for these services furnished under this plan of treatment while under my care  Haydee Coats MD  [address]: [phone]          Re-evaluation / Daily Note     Today's date: 2018  Patient name: Trena Ho  : 1960  MRN: [id number]  Referring provider: Austen Hightower DO  Dx:   Encounter Diagnosis     ICD-10-CM    1  Ambulatory dysfunction R26 2      Subjective: Please see scanned neurologist report from Dr Guillermo Chavez [facility])  Patient wearing ankle brace that provides compression and helps with mobility  Objective: See treatment diary below  2 minute walk test (60 foot course): 120 feet 18  2 minute walk test (60 foot course) 160 feet 18  2 minute walk test (60 foot course)  140 feet 18  2 minute walk test (60 foot course)  190 feet 18  6 minute walk test (60 foot course)  460 feet 18    22 2 sec 10 meter walk 0 45 m/sec    80 degrees R knee passive flexion, seated, limited by pain  Patient notes right knee frequently tightens up    The left knee frequently has about a -10 degree lack of extension, improves to neutral with manual therapy, stretching and active assisted range of motion        Smith Balance Scale 32/56 total (items in order out of 4 each---3-2-7-3-3-2-3 (23)-----3-4-3-2-1-0-4 (17)=  40/56    Precautions: Fall Risk    Daily Treatment Diary       Exercise Diary  6/20/18 6/25/18 6/28/18 7/02/18   Manual tx prn to low back/ shoulders/ Le's Supine Left knee traction  Ap Left knee mobilization    Followed by left quad sets x 10 and L SLR AA for hamstring elongation Supine Left knee traction AP Left knee mobilization     Followed by left quad sets x 10 and L SLR AA for hamstring elongation  Supine Left knee traction AP Left knee mobilization     Followed by left quad sets x 10 Supine Left knee traction AP Left knee mobilization     Followed by left quad sets x 10                        walking 6 minute walk test 6 minute walk test 6 minute walk test 460 feet 2 minute walk test 190 feet   LTR       UTR       Bridging       1/4 squats    Sit to stand 19" surface, 6-8 x 2 sets   Heel raises 15 each 15 each 15 each    Step ups 6" step, 10 each 6" step, 10 each     Lateral step ups 6" step, 10 each 6" step, 10 each     Lateral step ups 6" step, 10 each 6" step, 1 railing, 15 each   stretches Supine R knee flexion stretch, with strap proximal to ankle to avoid ankle pain, 2 min    Strain-counterstrain to R ankle evertors, followed by stretching, 4 min  Long-sitting gastroc stretch 1 min  Attempted standing gastroc stretch, pain about R dorsal foot    Shoulder exercises       Dynamic balance High step walking, 2 min    Sidestepping 2 min High Step Walking, 2 min    Sidestepping, 2 min High step walking 1 min    Sidestepping 1 min    Sit to stand 19" surface, 10    Biodex lateral weight shift 1 min x 3 Biodex lateral weight shift 1 min x 3       Ankle dorsiflexion and eversion, light band, 20 each on R only Ankle dorsiflexion and eversion, light band, 20 each on R only   [facility]       Hamstring stretch         Assessment  Patient with continued improvement in walking endurance, walking speed, static and dynamic balance as measured with Smith  She continues with generalized muscle tightness, mostly about the distal legs, with physical activity, but has been able to increase her volume of physical activity over the past 1 5 months  Continue physical therapy  Goals  STG 1  Independent w/ HEP  Met  2  Tolerate 5 minutes activity w/o increase in pain  Met for activity, continues for pain with activity  LTG  1  Strength >= 4/5 t/o to improve ADL's  Progressed  2  Tolerate 10 minutes continuous activity without increase in pain to improve community function  Progressed  3  > 36 Smith to reduce fall risk  Met            4  Gait speed >  5 m/second to facilitate improved community function and reduce fall risk   Progressed  5  Pain <= 5/10 w/ ADL's   Not met        Plan  Planned therapy interventions: activity modification, balance, functional ROM exercises, gait training, therapeutic exercise, stretching, strengthening, postural training, patient education, neuromuscular re-education, muscle pump exercises, motor coordination training and manual therapy  Frequency: 2x week  Duration in weeks: 4-6

## 2018-07-02 NOTE — PROGRESS NOTES
Re-evaluation / Daily Note     Today's date: 2018  Patient name: Jordan Gibson  : 1960  MRN: [id number]  Referring provider: Rey Sharif DO  Dx:   Encounter Diagnosis     ICD-10-CM    1  Ambulatory dysfunction R26 2      Subjective: Please see scanned neurologist report from Dr [facility])  Patient wearing ankle brace that provides compression and helps with mobility  Objective: See treatment diary below  2 minute walk test (60 foot course): 120 feet 18  2 minute walk test (60 foot course) 160 feet 18  2 minute walk test (60 foot course)  140 feet 18  2 minute walk test (60 foot course)  190 feet 18  6 minute walk test (60 foot course)  460 feet 18    22 2 sec 10 meter walk 0 45 m/sec    80 degrees R knee passive flexion, seated, limited by pain  Patient notes right knee frequently tightens up  The left knee frequently has about a -10 degree lack of extension, improves to neutral with manual therapy, stretching and active assisted range of motion        Smith Balance Scale 32/56 total (items in order out of 4 each---7-2-5-3-3-2-3 (23)-----3-4-3-2-1-0-4 (17)=  40/56    Precautions: Fall Risk    Daily Treatment Diary       Exercise Diary  18   Manual tx prn to low back/ shoulders/ Le's Supine Left knee traction  Ap Left knee mobilization    Followed by left quad sets x 10 and L SLR AA for hamstring elongation Supine Left knee traction AP Left knee mobilization     Followed by left quad sets x 10 and L SLR AA for hamstring elongation  Supine Left knee traction AP Left knee mobilization     Followed by left quad sets x 10 Supine Left knee traction AP Left knee mobilization     Followed by left quad sets x 10                        walking 6 minute walk test 6 minute walk test 6 minute walk test 460 feet 2 minute walk test 190 feet   LTR       UTR       Bridging       1/4 squats    Sit to stand 19" surface, 6-8 x 2 sets   Heel raises 15 each 15 each 15 each    Step ups 6" step, 10 each 6" step, 10 each     Lateral step ups 6" step, 10 each 6" step, 10 each     Lateral step ups 6" step, 10 each 6" step, 1 railing, 15 each   stretches Supine R knee flexion stretch, with strap proximal to ankle to avoid ankle pain, 2 min    Strain-counterstrain to R ankle evertors, followed by stretching, 4 min  Long-sitting gastroc stretch 1 min  Attempted standing gastroc stretch, pain about R dorsal foot    Shoulder exercises       Dynamic balance High step walking, 2 min    Sidestepping 2 min High Step Walking, 2 min    Sidestepping, 2 min High step walking 1 min    Sidestepping 1 min    Sit to stand 19" surface, 10    Biodex lateral weight shift 1 min x 3 Biodex lateral weight shift 1 min x 3       Ankle dorsiflexion and eversion, light band, 20 each on R only Ankle dorsiflexion and eversion, light band, 20 each on R only   SKC       Hamstring stretch         Assessment  Patient with continued improvement in walking endurance, walking speed, static and dynamic balance as measured with Eloina Fitzpatrick  She continues with generalized muscle tightness, mostly about the distal legs, with physical activity, but has been able to increase her volume of physical activity over the past 1 5 months  Continue physical therapy  Goals  STG 1  Independent w/ HEP  Met  2  Tolerate 5 minutes activity w/o increase in pain  Met for activity, continues for pain with activity  LTG  1  Strength >= 4/5 t/o to improve ADL's  Progressed  2  Tolerate 10 minutes continuous activity without increase in pain to improve community function  Progressed  3  > 36 Smith to reduce fall risk  Met            4  Gait speed >  5 m/second to facilitate improved community function and reduce fall risk   Progressed  5  Pain <= 5/10 w/ ADL's   Not met        Plan  Planned therapy interventions: activity modification, balance, functional ROM exercises, gait training, therapeutic exercise, stretching, strengthening, postural training, patient education, neuromuscular re-education, muscle pump exercises, motor coordination training and manual therapy  Frequency: 2x week  Duration in weeks: 4-6

## 2018-07-05 ENCOUNTER — OFFICE VISIT (OUTPATIENT)
Dept: PHYSICAL THERAPY | Facility: REHABILITATION | Age: 58
End: 2018-07-05
Payer: COMMERCIAL

## 2018-07-05 DIAGNOSIS — R26.2 AMBULATORY DYSFUNCTION: Primary | ICD-10-CM

## 2018-07-05 DIAGNOSIS — Z74.09 IMPAIRED MOBILITY: ICD-10-CM

## 2018-07-05 PROCEDURE — 97140 MANUAL THERAPY 1/> REGIONS: CPT

## 2018-07-05 PROCEDURE — 97110 THERAPEUTIC EXERCISES: CPT

## 2018-07-05 NOTE — PROGRESS NOTES
Daily Note     Today's date: 2018  Patient name: Mellisa Butcher  : 1960  MRN: [id number]  Referring provider: Savanna Osullivan DO  Dx:   Encounter Diagnosis     ICD-10-CM    1  Ambulatory dysfunction R26 2    2  Impaired mobility Z74 [id number]      Subjective: Please reports she has been taking medication for nausea as she has been getting sick secondary to increases in pain especially along the R aspect of her cervical spine  Objective: See treatment diary below  2 minute walk test (60 foot course): 120 feet 18  2 minute walk test (60 foot course) 160 feet 18  2 minute walk test (60 foot course)  140 feet 18  2 minute walk test (60 foot course)  190 feet 18  6 minute walk test (60 foot course)  460 feet 18    22 2 sec 10 meter walk 0 45 m/sec 18    80 degrees R knee passive flexion, seated, limited by pain  Patient notes right knee frequently tightens up  The left knee frequently has about a -10 degree lack of extension, improves to neutral with manual therapy, stretching and active assisted range of motion    18    Smith Balance Scale 32/56 total (items in order out of 4 each---2-6-5-3-3-2-3 ()-----3-4-3-2-1-0-4 (17)=  40/56 18    Precautions: Fall Risk    Daily Treatment Diary       Exercise Diary  18   Manual tx prn to low back/ shoulders/ Le's Supine Left knee traction AP Left knee mobilization     Followed by left quad sets x 10 and L SLR AA for hamstring elongation  Supine Left knee traction AP Left knee mobilization     Followed by left quad sets x 10 Supine Left knee traction AP Left knee mobilization     Followed by left quad sets x 10 Supine Left knee traction AP Left knee mobilization   Medial glides of L knee    Followed by left quad sets x 10    IASTM R TIb ant                        walking 6 minute walk test 6 minute walk test 460 feet 2 minute walk test 190 feet 4 min 45 sec    LTR       UTR       Bridging        squats   Sit to stand 19" surface, 6-8 x 2 sets Sit to stand 19" surface, 6-8 x 2 sets   Heel raises 15 each 15 each     Step ups 6" step, 10 each     Lateral step ups 6" step, 10 each 6" step, 10 each     Lateral step ups 6" step, 10 each 6" step, 1 railing, 15 each 6" step, 1 railing, 15 each    Lateral step ups 6" step, 10 each   stretches  Long-sitting gastroc stretch 1 min  Attempted standing gastroc stretch, pain about R dorsal foot  Seated HS stretch 1 min B    Long-sitting gastroc stretch 1 min   Shoulder exercises       Dynamic balance High Step Walking, 2 min    Sidestepping, 2 min High step walking 1 min    Sidestepping 1 min    Sit to stand 19" surface, 10    Biodex lateral weight shift 1 min x 3 Biodex lateral weight shift 1 min x 3       Ankle dorsiflexion and eversion, light band, 20 each on R only Ankle dorsiflexion and eversion, light band, 20 each on R only Ankle dorsiflexion and eversion, light band, 20 each on R only   SKC       Hamstring stretch         Assessment  Patient with continued improvement in walking endurance as she is tolerating increased times of ambulation before requiring rest breaks  Patient reported increases in muscle tightness at end of session  Plan  Continue with plan of care

## 2018-07-10 ENCOUNTER — OFFICE VISIT (OUTPATIENT)
Dept: PHYSICAL THERAPY | Facility: REHABILITATION | Age: 58
End: 2018-07-10
Payer: COMMERCIAL

## 2018-07-10 DIAGNOSIS — R26.2 AMBULATORY DYSFUNCTION: Primary | ICD-10-CM

## 2018-07-10 PROCEDURE — 97140 MANUAL THERAPY 1/> REGIONS: CPT | Performed by: PHYSICAL THERAPIST

## 2018-07-10 PROCEDURE — 97112 NEUROMUSCULAR REEDUCATION: CPT | Performed by: PHYSICAL THERAPIST

## 2018-07-10 PROCEDURE — 97110 THERAPEUTIC EXERCISES: CPT | Performed by: PHYSICAL THERAPIST

## 2018-07-10 NOTE — PROGRESS NOTES
Daily Note     Today's date: 7/10/2018  Patient name: Mellisa Butcher  : 1960  MRN: [id number]  Referring provider: Savanna Osullivan DO  Dx:   Encounter Diagnosis     ICD-10-CM    1  Ambulatory dysfunction R26 2      Subjective: Continued pain about the dorsal foot, and swelling  Objective: See treatment diary below  2 minute walk test (60 foot course): 120 feet 18  2 minute walk test (60 foot course) 160 feet 18  2 minute walk test (60 foot course)  140 feet 18  2 minute walk test (60 foot course)  190 feet 18  6 minute walk test (60 foot course)  460 feet 18    22 2 sec 10 meter walk 0 45 m/sec 18    80 degrees R knee passive flexion, seated, limited by pain  Patient notes right knee frequently tightens up  The left knee frequently has about a -10 degree lack of extension, improves to neutral with manual therapy, stretching and active assisted range of motion    18    Smith Balance Scale 32/56 total (items in order out of 4 each---1-9-4-3-3-2-3 (23)-----3-4-3-2-1-0-4 (17)=  40/56 18    Precautions: Fall Risk    Daily Treatment Diary       Exercise Diary  6/25/18 6/28/18 7/02/18 07/05/18 7/10/18   Manual tx prn to low back/ shoulders/ Le's Supine Left knee traction AP Left knee mobilization     Followed by left quad sets x 10 and L SLR AA for hamstring elongation  Supine Left knee traction AP Left knee mobilization     Followed by left quad sets x 10 Supine Left knee traction AP Left knee mobilization     Followed by left quad sets x 10 Supine Left knee traction AP Left knee mobilization   Medial glides of L knee    Followed by left quad sets x 10    IASTM R TIb ant Supine L knee traction, AP L knee mobilization    L quad sets x 10    R ankle AP glides   walking 6 minute walk test 6 minute walk test 460 feet 2 minute walk test 190 feet 4 min 45 sec  4 min   1/4 squats   Sit to stand 19" surface, 6-8 x 2 sets Sit to stand 19" surface, 6-8 x 2 sets Sit to stand 19" surface 6-8 x 2 sets   Heel raises 15 each 15 each   15 each   Step ups 6" step, 10 each     Lateral step ups 6" step, 10 each 6" step, 10 each     Lateral step ups 6" step, 10 each 6" step, 1 railing, 15 each 6" step, 1 railing, 15 each    Lateral step ups 6" step, 10 each 6" step, 1 railing, 15 each    Lateral step ups 6" step, 10 each   stretches  Long-sitting gastroc stretch 1 min  Attempted standing gastroc stretch, pain about R dorsal foot  Seated HS stretch 1 min B    Long-sitting gastroc stretch 1 min Standing gastroc stretch 1 min B   Dynamic balance High Step Walking, 2 min    Sidestepping, 2 min High step walking 1 min    Sidestepping 1 min    Sit to stand 19" surface, 10    Biodex lateral weight shift 1 min x 3 Biodex lateral weight shift 1 min x 3  [place] 1  Tree Pose      Ankle dorsiflexion and eversion, light band, 20 each on R only Ankle dorsiflexion and eversion, light band, 20 each on R only Ankle dorsiflexion and eversion, light band, 20 each on R only Ankle dorsiflexion and eversion, light band, 20 each on R only     Assessment  Patient with continued tightness and stiffness with activity, relieved by stretching  Plan  Continue with plan of care

## 2018-07-12 ENCOUNTER — OFFICE VISIT (OUTPATIENT)
Dept: PHYSICAL THERAPY | Facility: REHABILITATION | Age: 58
End: 2018-07-12
Payer: COMMERCIAL

## 2018-07-12 DIAGNOSIS — R26.2 AMBULATORY DYSFUNCTION: Primary | ICD-10-CM

## 2018-07-12 DIAGNOSIS — Z74.09 IMPAIRED MOBILITY: ICD-10-CM

## 2018-07-12 PROCEDURE — 97110 THERAPEUTIC EXERCISES: CPT | Performed by: PHYSICAL THERAPIST

## 2018-07-12 PROCEDURE — 97140 MANUAL THERAPY 1/> REGIONS: CPT | Performed by: PHYSICAL THERAPIST

## 2018-07-17 ENCOUNTER — OFFICE VISIT (OUTPATIENT)
Dept: PHYSICAL THERAPY | Facility: REHABILITATION | Age: 58
End: 2018-07-17
Payer: COMMERCIAL

## 2018-07-17 DIAGNOSIS — R26.2 AMBULATORY DYSFUNCTION: Primary | ICD-10-CM

## 2018-07-17 DIAGNOSIS — Z74.09 IMPAIRED MOBILITY: ICD-10-CM

## 2018-07-17 PROCEDURE — 97110 THERAPEUTIC EXERCISES: CPT

## 2018-07-17 PROCEDURE — 97140 MANUAL THERAPY 1/> REGIONS: CPT

## 2018-07-17 PROCEDURE — 97112 NEUROMUSCULAR REEDUCATION: CPT

## 2018-07-17 NOTE — PROGRESS NOTES
Daily Note     Today's date: 2018  Patient name: Reggie Patel  : 1960  MRN: [id number]  Referring provider: Bill Roldan DO  Dx: No diagnosis found  Subjective:   Complains of soreness and stiffness in her low back, her legs and ankles  Pt verbally expresses how frustrated she is that no one has an answer as to why she feels the way she does with pain, tightness, issues with balance and walking  Continues to use rolling walker         Objective: See treatment diary below    Daily Treatment Diary       Exercise Diary  18   Manual tx prn to low back/ shoulders/ Le's Supine Left knee traction AP Left knee mobilization      Followed by left quad sets x 10 and L SLR AA for hamstring elongation  Supine Left knee traction AP Left knee mobilization      Followed by left quad sets x 10 Supine Left knee traction AP Left knee mobilization      Followed by left quad sets x 10 Supine Left knee traction AP Left knee mobilization   Medial glides of L knee     Followed by left quad sets x 10     IASTM R TIb ant Supine Left knee traction AP Left knee mobilization   Medial glides of L knee     Followed by left quad sets x 10     IASTM B TIb ant np                     np       IASTM R tib ant, ITB foam roll                                  p ball roll outs         3 x30 30   walking 6 minute walk test 6 minute walk test 460 feet 2 minute walk test 190 feet 4 min 45 sec  2 min 3 min   LTR              UTR              Bridging              1/4 squats     Sit to stand 19" surface, 6-8 x 2 sets Sit to stand 19" surface, 6-8 x 2 sets Sit to stand 19" surface, 6-8 x 2 sets Sit to stand 19" surface, 6-8 x 2 sets   Heel raises 15 each 15 each          Step ups 6" step, 10 each      Lateral step ups 6" step, 10 each 6" step, 10 each      Lateral step ups 6" step, 10 each 6" step, 1 railing, 15 each 6" step, 1 railing, 15 each     Lateral step ups 6" step, 10 each 6" step, 1 railing, 15 each     Lateral step ups 6" step, 10 each 6'' step, 1 railing, 15 each    Lateral step ups 6'' step 10 each    stretches   Long-sitting gastroc stretch 1 min  Attempted standing gastroc stretch, pain about R dorsal foot   Seated HS stretch 1 min B     Long-sitting gastroc stretch 1 min Seated HS stretch 1 min B     Long-sitting gastroc stretch 1 min Seated HS stretch 1 min B     Long-sitting gastroc stretch 1 min   Shoulder exercises              Dynamic balance High Step Walking, 2 min     Sidestepping, 2 min High step walking 1 min     Sidestepping 1 min     Sit to stand 19" surface, 10     Biodex lateral weight shift 1 min x 3 Biodex lateral weight shift 1 min x 3            Ankle dorsiflexion and eversion, light band, 20 each on R only Ankle dorsiflexion and eversion, light band, 20 each on R only Ankle dorsiflexion and eversion, light band, 20 each on R only Ankle dorsiflexion and eversion, light band, 20 each on R only Ankle dorsiflexion and eversion, light band, 20 each on R only   SKC              Hamstring stretch                       Assessment: Tolerated treatment well but needed cueing to stay on task  Resume mobs nv  Patient would benefit from continued PT for improved strength, flexibility and unresolved pain  Plan: Continue per plan of care

## 2018-07-19 ENCOUNTER — OFFICE VISIT (OUTPATIENT)
Dept: PHYSICAL THERAPY | Facility: REHABILITATION | Age: 58
End: 2018-07-19
Payer: COMMERCIAL

## 2018-07-19 ENCOUNTER — TELEPHONE (OUTPATIENT)
Dept: FAMILY MEDICINE CLINIC | Facility: CLINIC | Age: 58
End: 2018-07-19

## 2018-07-19 ENCOUNTER — TELEPHONE (OUTPATIENT)
Dept: GASTROENTEROLOGY | Facility: AMBULARY SURGERY CENTER | Age: 58
End: 2018-07-19

## 2018-07-19 DIAGNOSIS — Z74.09 IMPAIRED MOBILITY: ICD-10-CM

## 2018-07-19 DIAGNOSIS — R26.2 AMBULATORY DYSFUNCTION: Primary | ICD-10-CM

## 2018-07-19 PROCEDURE — 97110 THERAPEUTIC EXERCISES: CPT

## 2018-07-19 PROCEDURE — 97112 NEUROMUSCULAR REEDUCATION: CPT

## 2018-07-19 NOTE — TELEPHONE ENCOUNTER
Was seen at [facility] rheumatology without any diagnosis or treatment plan, her good sahu neurologist is recommending she seek another opinion with a [facility]'s rheumatologist, who do you recommend?

## 2018-07-19 NOTE — PROGRESS NOTES
Daily Note     Today's date: 2018  Patient name: Zion Tamayo  : 1960  MRN: [id number]  Referring provider: Jennifer Johnson DO  Dx:   Encounter Diagnosis     ICD-10-CM    1  Ambulatory dysfunction R26 2    2  Impaired mobility Z74 09                   Subjective: Pt complains of R sided low back pain today and stiffness  Continues to have R ankle pain, and noted B ankle swelling  Also has complaints of cervical pain  Notes that she tripped over her cat and hit her head on the edge of the wall  States it's just sore (pointing to her temple) but is agreeable to PT  Denies serious injury         Objective: See treatment diary below    Daily Treatment Diary       Exercise Diary  18   Manual tx prn to low back/ shoulders/ Le's Supine Left knee traction AP Left knee mobilization      Followed by left quad sets x 10 and L SLR AA for hamstring elongation  Supine Left knee traction AP Left knee mobilization      Followed by left quad sets x 10 Supine Left knee traction AP Left knee mobilization      Followed by left quad sets x 10 Supine Left knee traction AP Left knee mobilization   Medial glides of L knee     Followed by left quad sets x 10     IASTM R TIb ant Supine Left knee traction AP Left knee mobilization   Medial glides of L knee     Followed by left quad sets x 10     IASTM B TIb ant np                     np       IASTM R tib ant, ITB foam roll np                    np      IASTM R tib ant                                       p ball roll outs         3 x30 30 30   walking 6 minute walk test 6 minute walk test 460 feet 2 minute walk test 190 feet 4 min 45 sec  2 min 3 min 3 min   LTR                UTR                Bridging                1/4 squats     Sit to stand 19" surface, 6-8 x 2 sets Sit to stand 19" surface, 6-8 x 2 sets Sit to stand 19" surface, 6-8 x 2 sets Sit to stand 19" surface, 6-8 x 2 sets Sit to stand 19" surface, 6-8 x 2 sets Heel raises 15 each 15 each            Step ups 6" step, 10 each      Lateral step ups 6" step, 10 each 6" step, 10 each      Lateral step ups 6" step, 10 each 6" step, 1 railing, 15 each 6" step, 1 railing, 15 each     Lateral step ups 6" step, 10 each 6" step, 1 railing, 15 each     Lateral step ups 6" step, 10 each 6'' step, 1 railing, 15 each    Lateral step ups 6'' step 10 each  6'' step, 1 railing, 15 each    Lateral step ups 6'' step 10 each    stretches   Long-sitting gastroc stretch 1 min  Attempted standing gastroc stretch, pain about R dorsal foot   Seated HS stretch 1 min B     Long-sitting gastroc stretch 1 min Seated HS stretch 1 min B     Long-sitting gastroc stretch 1 min Seated HS stretch 1 min B     Long-sitting gastroc stretch 1 min Seated HS stretch 1 min B     Long-sitting gastroc stretch 1 min   Shoulder exercises                Dynamic balance High Step Walking, 2 min     Sidestepping, 2 min High step walking 1 min     Sidestepping 1 min     Sit to stand 19" surface, 10     Biodex lateral weight shift 1 min x 3 Biodex lateral weight shift 1 min x 3              Ankle dorsiflexion and eversion, light band, 20 each on R only Ankle dorsiflexion and eversion, light band, 20 each on R only Ankle dorsiflexion and eversion, light band, 20 each on R only Ankle dorsiflexion and eversion, light band, 20 each on R only Ankle dorsiflexion and eversion, light band, 20 each on R only Ankle dorsiflexion and eversion, light band, 20 each on R only   SKC                Hamstring stretch                             Assessment: Tolerated treatment well  Pt tends to get distracted and needs cueing to stay on task  Encouraged safety awareness using her RW at home to avoid falls  Patient would benefit from continued PT for improved strength, flexibility, gait training and overall function  Resume full program nv  Plan: Continue per plan of care

## 2018-07-20 NOTE — TELEPHONE ENCOUNTER
I do not see any notes from [facility] regarding [place] [facility]    Can you find out when she was seen and by home

## 2018-07-23 ENCOUNTER — OFFICE VISIT (OUTPATIENT)
Dept: PHYSICAL THERAPY | Facility: REHABILITATION | Age: 58
End: 2018-07-23
Payer: COMMERCIAL

## 2018-07-23 DIAGNOSIS — R26.2 AMBULATORY DYSFUNCTION: Primary | ICD-10-CM

## 2018-07-23 PROCEDURE — 97112 NEUROMUSCULAR REEDUCATION: CPT | Performed by: PHYSICAL THERAPIST

## 2018-07-23 PROCEDURE — 97110 THERAPEUTIC EXERCISES: CPT | Performed by: PHYSICAL THERAPIST

## 2018-07-23 NOTE — PROGRESS NOTES
Daily Note     Today's date: 2018  Patient name: Yee Mckeon  : 1960  MRN: [id number]  Referring provider: Gracy Harmon DO  Dx:   Encounter Diagnosis     ICD-10-CM    1  Ambulatory dysfunction R26 2        Subjective:  Patient reports headache about the right front side of the head, about the bump to the front of the head  No new falls  Patient reports she always can only see for certain period of time before needing to lay down and rest   She reports she needs rest breaks for this  Objective: See treatment diary below  Undershooting with horizontal saccades, one each movement  R eye exophoria with convergence  Reports diplopia at 7 cm, 8 cm, 9 cm       Daily Treatment Diary       Exercise Diary  18    Manual tx prn to low back/ shoulders/ Le's Supine Left knee traction AP Left knee mobilization   Medial glides of L knee     Followed by left quad sets x 10     IASTM R TIb ant Supine Left knee traction AP Left knee mobilization   Medial glides of L knee     Followed by left quad sets x 10     IASTM B TIb ant np                     np       IASTM R tib ant, ITB foam roll np                    np      IASTM R tib ant  Quad set L    L anterior glide of proximal tibia on femur     p ball roll outs   3 x30 30 30     walking 4 min 45 sec  2 min 3 min 3 min     1/4 squats Sit to stand 19" surface, 6-8 x 2 sets Sit to stand 19" surface, 6-8 x 2 sets Sit to stand 19" surface, 6-8 x 2 sets Sit to stand 19" surface, 6-8 x 2 sets        Step ups 6" step, 1 railing, 15 each     Lateral step ups 6" step, 10 each 6" step, 1 railing, 15 each     Lateral step ups 6" step, 10 each 6'' step, 1 railing, 15 each    Lateral step ups 6'' step 10 each  6'' step, 1 railing, 15 each    Lateral step ups 6'' step 10 each  6" step 1 railing, 12 each x 2 sets on R    8" step 12 on L    stretches Seated HS stretch 1 min B     Long-sitting gastroc stretch 1 min Seated HS stretch 1 min B     Long-sitting gastroc stretch 1 min Seated HS stretch 1 min B     Long-sitting gastroc stretch 1 min Seated HS stretch 1 min B     Long-sitting gastroc stretch 1 min       Ankle dorsiflexion and eversion, light band, 20 each on R only Ankle dorsiflexion and eversion, light band, 20 each on R only Ankle dorsiflexion and eversion, light band, 20 each on R only Ankle dorsiflexion and eversion, light band, 20 each on R only Heel raises, 15     Standing hip abduction, light band, 12-15 each    Sit to stand 19" surface, 12    SLS tap to targets 1 min each       Assessment & Plan: Patient with continued frequent tightness in the lower extremities  Continue lower extremity strengthening and endurance exercises  Continue to monitor for reading tolerance and convergence following bumping her head

## 2018-07-25 ENCOUNTER — OFFICE VISIT (OUTPATIENT)
Dept: FAMILY MEDICINE CLINIC | Facility: CLINIC | Age: 58
End: 2018-07-25
Payer: COMMERCIAL

## 2018-07-25 VITALS
SYSTOLIC BLOOD PRESSURE: 118 MMHG | BODY MASS INDEX: 33.9 KG/M2 | HEIGHT: 67 IN | DIASTOLIC BLOOD PRESSURE: 76 MMHG | WEIGHT: 216 LBS

## 2018-07-25 DIAGNOSIS — Z23 NEED FOR SHINGLES VACCINE: ICD-10-CM

## 2018-07-25 DIAGNOSIS — R05.9 COUGH: ICD-10-CM

## 2018-07-25 DIAGNOSIS — J98.01 BRONCHOSPASM: ICD-10-CM

## 2018-07-25 DIAGNOSIS — S09.90XA INJURY OF HEAD, INITIAL ENCOUNTER: Primary | ICD-10-CM

## 2018-07-25 DIAGNOSIS — R76.8 POSITIVE ANA (ANTINUCLEAR ANTIBODY): ICD-10-CM

## 2018-07-25 DIAGNOSIS — F41.8 DEPRESSION WITH ANXIETY: ICD-10-CM

## 2018-07-25 PROCEDURE — 99214 OFFICE O/P EST MOD 30 MIN: CPT | Performed by: NURSE PRACTITIONER

## 2018-07-25 RX ORDER — BENZONATATE 100 MG/1
100 CAPSULE ORAL 3 TIMES DAILY PRN
Qty: 20 CAPSULE | Refills: 0 | Status: SHIPPED | OUTPATIENT
Start: 2018-07-25 | End: 2018-08-03 | Stop reason: SDUPTHER

## 2018-07-25 RX ORDER — ALBUTEROL SULFATE 90 UG/1
2 AEROSOL, METERED RESPIRATORY (INHALATION) EVERY 6 HOURS PRN
Qty: 1 INHALER | Refills: 0 | Status: SHIPPED | OUTPATIENT
Start: 2018-07-25 | End: 2018-08-03 | Stop reason: SDUPTHER

## 2018-07-25 NOTE — PROGRESS NOTES
Assessment/Plan:    Head Injury  Patient states that she feels fine but PT suggested her eyes weren't tracking as they should  Neuro exam WNL  Will get CT scan to ensure no brain trauma  Patient overall doing well and isn't having much difference in symptoms  She did not fall or lose consciousness when she hit her head  She is unable to go for CT today but states she will go tomorrow  Positive MT  Saw neuro recently who said they couldn't do anything for her  Suggested seeing rheumatology  Referred to rheumatology, she will make appointment  Cough/Bronchospasm  Will try albuterol every 6 hours PRN and tessalon every 8 hours PRN  Sounds more post nasal drip related or allergy related  Will monitor symptoms  Call if any worsening symptoms or no improvement  Depression with anxiety  Is seeing counseling  Not interested in medications but wishes to see psychologist or psychiatrist  Keke Bruner given for her to call and schedule  Denies SI  She is interested in CBT  Need for Shingles Vaccine  Script given for singrex to get at pharmacy  Diagnoses and all orders for this visit:    Injury of head, initial encounter  -     CT head wo contrast; Future    Cough  -     benzonatate (TESSALON PERLES) 100 mg capsule; Take 1 capsule (100 mg total) by mouth 3 (three) times a day as needed for cough    Bronchospasm  -     albuterol (VENTOLIN HFA) 90 mcg/act inhaler; Inhale 2 puffs every 6 (six) hours as needed for wheezing or shortness of breath    Positive MT (antinuclear antibody)  -     Ambulatory referral to Rheumatology; Future    Depression with anxiety    Need for shingles vaccine  -     Zoster Vac Recomb Adjuvanted ([address]) 50 MCG SUSR; Inject 0 5 mL into a muscle once for 1 dose    Other orders  -     Cancel: Zoster Vaccine Recombinant IM      Patient Instructions   Complete CT scan of head as soon as possible  We will follow up with results     You may take prescription for Shingrix vaccine to pharmacy to have it completed  Use Ventolin inhaler as needed for bronchospasm every 6 hours as needed  Use tessalon perles every 8 hours as needed for cough  Make appointment for therapists or psychologist        Patient verbalizes understand and agrees with treatment plan  Subjective:        Patient ID: Arnaldo Pinto is a 59 y o  female  Chief Complaint   Patient presents with   Danny Mckeon     last week, pt stepped over her cat and lost her balance and hit her head on the wall  her forehead is in a little bit of pain  pt went to Physical Therapy and they stated her eyes were not "tracking "        Patient states last week she lost her balance while stepping over her cat and her head hit the wall  She didn't fall or lose consciousness  Did have right side forehead pain where she hit  This was last week  She also had cough start last week that is cry and annyong and goes into coughing fits  Has been trying allergy medications  She is doing physical therapy and they recommended her to follow up with family provider because her eye exam on neuro check was abnormal  Patient states overall she feels fine and no real big changes  Her biggest complaint is cough  The following portions of the patient's history were reviewed and updated as appropriate: allergies, current medications, past family history, past social history and problem list     Review of Systems   Constitutional: Negative for chills and fever  HENT: Negative for congestion  Eyes: Negative for pain, discharge, redness, itching and visual disturbance  Respiratory: Negative for chest tightness and shortness of breath  Cardiovascular: Negative for chest pain  Gastrointestinal: Negative for abdominal pain, diarrhea and vomiting  Genitourinary: Negative for difficulty urinating and dysuria  Musculoskeletal: Positive for arthralgias and myalgias  Skin: Negative for rash  Neurological: Positive for weakness   Negative for dizziness, syncope, speech difficulty, light-headedness and headaches  Hematological: Negative for adenopathy  Psychiatric/Behavioral: Negative for agitation  The patient is nervous/anxious  Objective:  /76 (BP Location: Left arm, Patient Position: Sitting, Cuff Size: Large)   Ht 5' 6 5" (1 689 m)   Wt 98 kg (216 lb)   BMI 34 34 kg/m²      Physical Exam   Constitutional: She is oriented to person, place, and time  She appears well-developed  No distress  Obese     HENT:   Head: Normocephalic and atraumatic  Right Ear: External ear normal    Left Ear: External ear normal    Nose: Nose normal    Mouth/Throat: Oropharynx is clear and moist  No oropharyngeal exudate  Eyes: Conjunctivae, EOM and lids are normal  Pupils are equal, round, and reactive to light  Right eye exhibits no discharge  Left eye exhibits no discharge  Neck: Normal range of motion  Neck supple  No tracheal deviation present  Cardiovascular: Normal rate and regular rhythm  No murmur heard  Pulmonary/Chest: Effort normal and breath sounds normal  No respiratory distress  She has no wheezes  Abdominal: Soft  Bowel sounds are normal  She exhibits no distension  There is no tenderness  There is no guarding  Musculoskeletal: She exhibits tenderness  She exhibits no edema or deformity  Lymphadenopathy:     She has no cervical adenopathy  Neurological: She is alert and oriented to person, place, and time  No cranial nerve deficit  Coordination (uses seated 4 wheel walker for ambulation, chronic) abnormal    Skin: Skin is warm and dry  No rash noted  She is not diaphoretic  No erythema  Psychiatric: She has a normal mood and affect  Her speech is normal and behavior is normal  Judgment and thought content normal  Cognition and memory are normal    Nursing note and vitals reviewed

## 2018-07-25 NOTE — ASSESSMENT & PLAN NOTE
Is seeing counseling  Not interested in medications but wishes to see psychologist or psychiatrist  Shaan Cleveland given for her to call and schedule  Denies SI  She is interested in CBT

## 2018-07-25 NOTE — PATIENT INSTRUCTIONS
Complete CT scan of head as soon as possible  We will follow up with results  You may take prescription for Shingrix vaccine to pharmacy to have it completed  Use Ventolin inhaler as needed for bronchospasm every 6 hours as needed  Use tessalon perles every 8 hours as needed for cough     Make appointment for therapists or psychologist

## 2018-07-26 ENCOUNTER — OFFICE VISIT (OUTPATIENT)
Dept: PHYSICAL THERAPY | Facility: REHABILITATION | Age: 58
End: 2018-07-26
Payer: COMMERCIAL

## 2018-07-26 DIAGNOSIS — Z74.09 IMPAIRED MOBILITY: ICD-10-CM

## 2018-07-26 DIAGNOSIS — R26.2 AMBULATORY DYSFUNCTION: Primary | ICD-10-CM

## 2018-07-26 PROCEDURE — 97110 THERAPEUTIC EXERCISES: CPT

## 2018-07-26 NOTE — PROGRESS NOTES
Daily Note     Today's date: 2018  Patient name: Dejan Francis  : 1960  MRN: [id number]  Referring provider: Deepthi Singh DO  Dx:   Encounter Diagnosis     ICD-10-CM    1  Ambulatory dysfunction R26 2    2  Impaired mobility Z74 09        Subjective:  Pt reports she developed a cough after hitting her head last week  She visited her Dr  Pamela Adams, concerning the cough and the Dr  ordered a CT scan for next week secondary to problems with tracking with the pt's eyes      Objective: See treatment diary below     Daily Treatment Diary       Exercise Diary  18   Manual tx prn to low back/ shoulders/ Le's Supine Left knee traction AP Left knee mobilization   Medial glides of L knee     Followed by left quad sets x 10     IASTM R TIb ant Supine Left knee traction AP Left knee mobilization   Medial glides of L knee     Followed by left quad sets x 10     IASTM B TIb ant np                     np       IASTM R tib ant, ITB foam roll np                    np      IASTM R tib ant  Quad set L    L anterior glide of proximal tibia on femur     p ball roll outs   3 x30 30 30     walking 4 min 45 sec  2 min 3 min 3 min  4 min   1/4 squats Sit to stand 19" surface, 6-8 x 2 sets Sit to stand 19" surface, 6-8 x 2 sets Sit to stand 19" surface, 6-8 x 2 sets Sit to stand 19" surface, 6-8 x 2 sets     Sit to stand 19" surface, 8 x 2 sets   Step ups 6" step, 1 railing, 15 each     Lateral step ups 6" step, 10 each 6" step, 1 railing, 15 each     Lateral step ups 6" step, 10 each 6'' step, 1 railing, 15 each    Lateral step ups 6'' step 10 each  6'' step, 1 railing, 15 each    Lateral step ups 6'' step 10 each  6" step 1 railing, 12 each x 2 sets on R    8" step 12 on L 6" step 1 railing, 12x2 BLE    Lateral step ups 6", 12x2   stretches Seated HS stretch 1 min B     Long-sitting gastroc stretch 1 min Seated HS stretch 1 min B     Long-sitting gastroc stretch 1 min Seated HS stretch 1 min B     Long-sitting gastroc stretch 1 min Seated HS stretch 1 min B     Long-sitting gastroc stretch 1 min  Seated HS stretch 2 min B     Long-sitting gastroc stretch 2 ea      Ankle dorsiflexion and eversion, light band, 20 each on R only Ankle dorsiflexion and eversion, light band, 20 each on R only Ankle dorsiflexion and eversion, light band, 20 each on R only Ankle dorsiflexion and eversion, light band, 20 each on R only Heel raises, 15     Standing hip abduction, light band, 12-15 each    Sit to stand 19" surface, 12    SLS tap to targets 1 min each Heel raises, 2x10    Standing hip abduction, light band, 12-15 each    R Hip ER/IR w/ orange Tband 2x10    SLS tap to targets 2 min      Assessment & Plan: Patient continues with limitations, however pt able to increase ambulation time this visit  Pt is pleasant and willing to participate with treatment throughout session  Pt requires seated rests throughout session  Continue to progress as able

## 2018-07-30 ENCOUNTER — OFFICE VISIT (OUTPATIENT)
Dept: PHYSICAL THERAPY | Facility: REHABILITATION | Age: 58
End: 2018-07-30
Payer: COMMERCIAL

## 2018-07-30 DIAGNOSIS — R26.2 AMBULATORY DYSFUNCTION: Primary | ICD-10-CM

## 2018-07-30 PROCEDURE — 97112 NEUROMUSCULAR REEDUCATION: CPT | Performed by: PHYSICAL THERAPIST

## 2018-07-30 PROCEDURE — 97110 THERAPEUTIC EXERCISES: CPT | Performed by: PHYSICAL THERAPIST

## 2018-07-31 NOTE — PROGRESS NOTES
Daily Note     Today's date: 2018  Patient name: Charis Maravilla  : 1960  MRN: [id number]  Referring provider: Lizette Corley DO  Dx:   Encounter Diagnosis     ICD-10-CM    1  Ambulatory dysfunction R26 2        Subjective:  Patient notes continued cough associated with neck tightness  She reports using elliptical for shorter duration than 10 minutes, continues with sensitivity to touch about the right toes       Objective: See treatment diary below     Daily Treatment Diary       Exercise Diary  18   Manual tx prn to low back/ shoulders/ Le's Supine Left knee traction AP Left knee mobilization   Medial glides of L knee     Followed by left quad sets x 10     IASTM R TIb ant Supine Left knee traction AP Left knee mobilization   Medial glides of L knee     Followed by left quad sets x 10     IASTM B TIb ant np                     np       IASTM R tib ant, ITB foam roll np                    np      IASTM R tib ant  Quad set L    L anterior glide of proximal tibia on femur  L knee AP glide, grade 4    L quad sets, 10    R ankle AP glide, grade 4    Chin tucks, supine, 5 sec, 10    p ball roll outs   3 x30 30 30      walking 4 min 45 sec  2 min 3 min 3 min  4 min 6 min with rolling walker 330 feet   1/4 squats Sit to stand 19" surface, 6-8 x 2 sets Sit to stand 19" surface, 6-8 x 2 sets Sit to stand 19" surface, 6-8 x 2 sets Sit to stand 19" surface, 6-8 x 2 sets     Sit to stand 19" surface, 8 x 2 sets Sit to stand 19" surface, 10 x 2 sets    R knee flexion stretch, seated, 3 min   Step ups 6" step, 1 railing, 15 each     Lateral step ups 6" step, 10 each 6" step, 1 railing, 15 each     Lateral step ups 6" step, 10 each 6'' step, 1 railing, 15 each    Lateral step ups 6'' step 10 each  6'' step, 1 railing, 15 each    Lateral step ups 6'' step 10 each  6" step 1 railing, 12 each x 2 sets on R    8" step 12 on L 6" step 1 railing, 12x2 BLE    Lateral step ups 6", 12x2 6" step ipsilateral railing 15 each   stretches Seated HS stretch 1 min B     Long-sitting gastroc stretch 1 min Seated HS stretch 1 min B     Long-sitting gastroc stretch 1 min Seated HS stretch 1 min B     Long-sitting gastroc stretch 1 min Seated HS stretch 1 min B     Long-sitting gastroc stretch 1 min  Seated HS stretch 2 min B     Long-sitting gastroc stretch 2 ea       Ankle dorsiflexion and eversion, light band, 20 each on R only Ankle dorsiflexion and eversion, light band, 20 each on R only Ankle dorsiflexion and eversion, light band, 20 each on R only Ankle dorsiflexion and eversion, light band, 20 each on R only Heel raises, 15     Standing hip abduction, light band, 12-15 each    Sit to stand 19" surface, 12    SLS tap to targets 1 min each Heel raises, 2x10    Standing hip abduction, light band, 12-15 each    R Hip ER/IR w/ orange Tband 2x10    SLS tap to targets 2 min Heel raise, 15 x 2 sets    Single leg stance tap to target 1 min x 2 each      Assessment & Plan: Decrease in neck pain with supine chin tucks, patient to complete at home  Patient should continue regular aerobic exercise as tolerated  Good improvement in knee flexion ROM with passive seated stretching, allowing easier sit to stand transfer

## 2018-08-01 ENCOUNTER — HOSPITAL ENCOUNTER (OUTPATIENT)
Dept: CT IMAGING | Facility: HOSPITAL | Age: 58
Discharge: HOME | End: 2018-08-01
Payer: COMMERCIAL

## 2018-08-01 DIAGNOSIS — S09.90XA INJURY OF HEAD, INITIAL ENCOUNTER: ICD-10-CM

## 2018-08-01 PROCEDURE — 70450 CT HEAD/BRAIN W/O DYE: CPT

## 2018-08-02 ENCOUNTER — OFFICE VISIT (OUTPATIENT)
Dept: PHYSICAL THERAPY | Facility: REHABILITATION | Age: 58
End: 2018-08-02
Payer: COMMERCIAL

## 2018-08-02 DIAGNOSIS — Z74.09 IMPAIRED MOBILITY: ICD-10-CM

## 2018-08-02 DIAGNOSIS — R26.2 AMBULATORY DYSFUNCTION: Primary | ICD-10-CM

## 2018-08-02 PROCEDURE — 97140 MANUAL THERAPY 1/> REGIONS: CPT

## 2018-08-02 PROCEDURE — 97110 THERAPEUTIC EXERCISES: CPT

## 2018-08-02 NOTE — PROGRESS NOTES
Daily Note     Today's date: 2018  Patient name: Mellisa Butcher  : 1960  MRN: [id number]  Referring provider: Savanna Osullivan DO  Dx:   Encounter Diagnosis     ICD-10-CM    1  Ambulatory dysfunction R26 2    2  Impaired mobility Z74 09        Subjective:  Patient she had her CT scan last night she reports she was at the hospital for a long time and became extra stiff secondary to sitting for an extended period of time   She reports that she also had blood work this morning  Objective: See treatment diary below     Daily Treatment Diary       Exercise Diary  18   Manual tx prn to low back/ shoulders/ Le's np                    np      IASTM R tib ant  Quad set L    L anterior glide of proximal tibia on femur  L knee AP glide, grade 4    L quad sets, 10    R ankle AP glide, grade 4    Chin tucks, supine, 5 sec, 10 L knee AP glide, grade 4    R ankle AP glide, grade 4    p ball roll outs 30       walking 3 min  4 min 6 min with rolling walker 330 feet 6 min with  ft    1/4 squats Sit to stand 19" surface, 6-8 x 2 sets     Sit to stand 19" surface, 8 x 2 sets Sit to stand 19" surface, 10 x 2 sets    R knee flexion stretch, seated, 3 min Sit to stand 19" surface, 10 x 2 sets   Step ups 6'' step, 1 railing, 15 each    Lateral step ups 6'' step 10 each  6" step 1 railing, 12 each x 2 sets on R    8" step 12 on L 6" step 1 railing, 12x2 BLE    Lateral step ups 6", 12x2 6" step ipsilateral railing 15 each 6" step ipsilateral railing 15 each   stretches Seated HS stretch 1 min B     Long-sitting gastroc stretch 1 min  Seated HS stretch 2 min B     Long-sitting gastroc stretch 2 ea   Seated HS stretch 2 min B    Long-sitting gastroc stretch 2 min ea     Ankle dorsiflexion and eversion, light band, 20 each on R only Heel raises, 15     Standing hip abduction, light band, 12-15 each    Sit to stand 19" surface, 12    SLS tap to targets 1 min each Heel raises, 2x10    Standing hip abduction, light band, 12-15 each    R Hip ER/IR w/ orange Tband 2x10    SLS tap to targets 2 min Heel raise, 15 x 2 sets    Single leg stance tap to target 1 min x 2 each 6" step ipsilateral railing 15 eac      Assessment & Plan: Patient performed well today as she was able to complete 6 minutes of walking before she required a rest break, however, gait speed was slow  Continue to challenge patient with further walking distances  Continue with plan of care

## 2018-08-03 DIAGNOSIS — J98.01 BRONCHOSPASM: ICD-10-CM

## 2018-08-03 DIAGNOSIS — R05.9 COUGH: ICD-10-CM

## 2018-08-03 DIAGNOSIS — R05.3 CHRONIC COUGH: Primary | ICD-10-CM

## 2018-08-03 RX ORDER — BENZONATATE 100 MG/1
100 CAPSULE ORAL 3 TIMES DAILY PRN
Qty: 42 CAPSULE | Refills: 0 | Status: SHIPPED | OUTPATIENT
Start: 2018-08-03 | End: 2018-08-17

## 2018-08-03 RX ORDER — ALBUTEROL SULFATE 90 UG/1
2 AEROSOL, METERED RESPIRATORY (INHALATION) EVERY 6 HOURS PRN
Qty: 1 INHALER | Refills: 1 | Status: SHIPPED | OUTPATIENT
Start: 2018-08-03 | End: 2019-04-16

## 2018-08-06 ENCOUNTER — OFFICE VISIT (OUTPATIENT)
Dept: PHYSICAL THERAPY | Facility: REHABILITATION | Age: 58
End: 2018-08-06
Payer: COMMERCIAL

## 2018-08-06 DIAGNOSIS — Z74.09 IMPAIRED MOBILITY: ICD-10-CM

## 2018-08-06 DIAGNOSIS — R26.2 AMBULATORY DYSFUNCTION: Primary | ICD-10-CM

## 2018-08-06 PROCEDURE — 97110 THERAPEUTIC EXERCISES: CPT

## 2018-08-06 NOTE — PROGRESS NOTES
Daily Note     Today's date: 2018  Patient name: Perry Bliss  : 1960  MRN: [id number]  Referring provider: Bennie Fuentes DO  Dx:   Encounter Diagnosis     ICD-10-CM    1  Ambulatory dysfunction R26 2    2  Impaired mobility Z74 09        Subjective: Pt reports she continues to feel pain in forehead over area where she hit her head about "2 to 3 weeks ago"  She reported this fall to her doctor and had received a CT scan, but she continues to feel pressure pushing downward on her eye  The pain is localized over the area in which she hit her head, and this pain is reported as a 7/10  Objective: See treatment diary below  Due to pt subjective report of pain, recommended pt report this to her physician if she has concerns      Daily Treatment Diary       Exercise Diary  18   Manual tx prn to low back/ shoulders/ Le's np                    np      IASTM R tib ant  Quad set L    L anterior glide of proximal tibia on femur  L knee AP glide, grade 4    L quad sets, 10    R ankle AP glide, grade 4    Chin tucks, supine, 5 sec, 10 L knee AP glide, grade 4    R ankle AP glide, grade 4     p ball roll outs 30        walking 3 min  4 min 6 min with rolling walker 330 feet 6 min with  ft     1/4 squats Sit to stand 19" surface, 6-8 x 2 sets     Sit to stand 19" surface, 8 x 2 sets Sit to stand 19" surface, 10 x 2 sets    R knee flexion stretch, seated, 3 min Sit to stand 19" surface, 10 x 2 sets 1/4 squats x10  1/4 lunge x10 ea  Standing hip ext x10 ea  STS x10 19" chair w/ blue foam   Step ups 6'' step, 1 railing, 15 each    Lateral step ups 6'' step 10 each  6" step 1 railing, 12 each x 2 sets on R    8" step 12 on L 6" step 1 railing, 12x2 BLE    Lateral step ups 6", 12x2 6" step ipsilateral railing 15 each 6" step ipsilateral railing 15 each Fwd x10 ea LE leading  Lateral x10 ea LE leading   stretches Seated HS stretch 1 min B     Long-sitting gastroc stretch 1 min Seated HS stretch 2 min B     Long-sitting gastroc stretch 2 ea   Seated HS stretch 2 min B    Long-sitting gastroc stretch 2 min ea Seated HS stretch 2 min B    Long-sitting gastroc stretch 2 min ea    Manual gastroc stretch x1 min ea     Ankle dorsiflexion and eversion, light band, 20 each on R only Heel raises, 15     Standing hip abduction, light band, 12-15 each    Sit to stand 19" surface, 12    SLS tap to targets 1 min each Heel raises, 2x10    Standing hip abduction, light band, 12-15 each    R Hip ER/IR w/ orange Tband 2x10    SLS tap to targets 2 min Heel raise, 15 x 2 sets    Single leg stance tap to target 1 min x 2 each 6" step ipsilateral railing 15 eac    Mat TE      SLR supine and sidelyine x10 ea  Quad set 2 min ea      Assessment & Plan: Pt tolerated treatment well  Patient performance limited during STS due to decreased dorsiflexion of RLE  Noted posterior LOB into chair during STS  Pt would benefit from continued skilled PT, continue POC

## 2018-08-09 ENCOUNTER — APPOINTMENT (OUTPATIENT)
Dept: PHYSICAL THERAPY | Facility: REHABILITATION | Age: 58
End: 2018-08-09
Payer: COMMERCIAL

## 2018-08-09 ENCOUNTER — HOSPITAL ENCOUNTER (OUTPATIENT)
Dept: RADIOLOGY | Facility: HOSPITAL | Age: 58
Discharge: HOME | End: 2018-08-09
Payer: COMMERCIAL

## 2018-08-09 DIAGNOSIS — R05.3 CHRONIC COUGH: ICD-10-CM

## 2018-08-09 PROCEDURE — 71046 X-RAY EXAM CHEST 2 VIEWS: CPT

## 2018-08-13 ENCOUNTER — OFFICE VISIT (OUTPATIENT)
Dept: PHYSICAL THERAPY | Facility: REHABILITATION | Age: 58
End: 2018-08-13
Payer: COMMERCIAL

## 2018-08-13 ENCOUNTER — TELEPHONE (OUTPATIENT)
Dept: FAMILY MEDICINE CLINIC | Facility: CLINIC | Age: 58
End: 2018-08-13

## 2018-08-13 DIAGNOSIS — R26.2 AMBULATORY DYSFUNCTION: Primary | ICD-10-CM

## 2018-08-13 DIAGNOSIS — Z74.09 IMPAIRED MOBILITY: ICD-10-CM

## 2018-08-13 DIAGNOSIS — J45.20 MILD INTERMITTENT ASTHMA WITHOUT COMPLICATION: Primary | ICD-10-CM

## 2018-08-13 PROCEDURE — 97110 THERAPEUTIC EXERCISES: CPT

## 2018-08-13 NOTE — TELEPHONE ENCOUNTER
Please let her know the chest x ray was normal     Would she like to try a daily maintenance inhaler for the cough?

## 2018-08-13 NOTE — TELEPHONE ENCOUNTER
I spoke to the patient, she states she is using the Ventolin as needed which is pretty much all the time  She would be willing to try a maintenance inhaler, actually willing to trying anything  [facility] [address]   She was unable to stop coughing the entire time we were speaking on the phone, states she is unable to sleep at night due to the cough  Also states her neck is swollen in the front

## 2018-08-13 NOTE — TELEPHONE ENCOUNTER
Patient called for her xray results  She also is still coughing a lot and has nausea      XE#[phone]

## 2018-08-14 ENCOUNTER — TELEPHONE (OUTPATIENT)
Dept: FAMILY MEDICINE CLINIC | Facility: CLINIC | Age: 58
End: 2018-08-14

## 2018-08-14 DIAGNOSIS — M79.7 FIBROMYOSITIS: Primary | ICD-10-CM

## 2018-08-14 DIAGNOSIS — R26.2 AMBULATORY DYSFUNCTION: ICD-10-CM

## 2018-08-14 RX ORDER — FLUTICASONE PROPIONATE 44 UG/1
2 AEROSOL, METERED RESPIRATORY (INHALATION) 2 TIMES DAILY
Qty: 1 INHALER | Refills: 1 | Status: SHIPPED | OUTPATIENT
Start: 2018-08-14 | End: 2019-03-10

## 2018-08-14 NOTE — TELEPHONE ENCOUNTER
[facility] called in today stating that you referred Ailyn Kid to see Dr Kirsty Doherty , they do not accept her insurance

## 2018-08-15 ENCOUNTER — TELEPHONE (OUTPATIENT)
Dept: FAMILY MEDICINE CLINIC | Facility: CLINIC | Age: 58
End: 2018-08-15

## 2018-08-15 NOTE — TELEPHONE ENCOUNTER
Patient states that the rheumatologist that she was referred to cannot see her for a year  She called her insurance and Dr Mike Dumont and Dr Nadine Griggs are covered at [facility]    Please advise    #[phone]

## 2018-08-16 ENCOUNTER — OFFICE VISIT (OUTPATIENT)
Dept: PHYSICAL THERAPY | Facility: REHABILITATION | Age: 58
End: 2018-08-16
Payer: COMMERCIAL

## 2018-08-16 DIAGNOSIS — Z74.09 IMPAIRED MOBILITY: ICD-10-CM

## 2018-08-16 DIAGNOSIS — R26.2 AMBULATORY DYSFUNCTION: Primary | ICD-10-CM

## 2018-08-16 PROCEDURE — 97110 THERAPEUTIC EXERCISES: CPT

## 2018-08-16 PROCEDURE — 97150 GROUP THERAPEUTIC PROCEDURES: CPT

## 2018-08-16 NOTE — TELEPHONE ENCOUNTER
Pt states she saw   Dr Laura Horta "awhile back" and he said it is from muscle cramping  Pt started Flovent the day you prescribed it  She is getting no sleep, muscle cramping is so bad  Pt is very overwhelmed  She is not going to see a rheumo anytime soon because there is no openings  She was told she could see a CRNP at [facility] but does not want to see a CRNP

## 2018-08-16 NOTE — TELEPHONE ENCOUNTER
I would strongly advise she see the CRNP when she can because they are at least able to start a treatment plan for her and then she can see the doctor at the next visit  It is important she sees them asap

## 2018-08-16 NOTE — TELEPHONE ENCOUNTER
Patient aware  States her cough is not getting any better, only getting about an hours sleep at a time  States she it seems to be "situational" as when she moves her neck she gets the cough

## 2018-08-16 NOTE — TELEPHONE ENCOUNTER
Did she start the flovent yet?  I recently just sent it over (like a few days) If so give it a little more time on flovent and then if no improvement can refer to pulm

## 2018-08-16 NOTE — TELEPHONE ENCOUNTER
Please let her know the referral I already put in can be used for any rheumatologist  So she can just call their office to make appointment and use that referral

## 2018-08-16 NOTE — PROGRESS NOTES
Daily Note     Today's date: 2018  Patient name: Norm Lee  : 1960  MRN: [id number]  Referring provider: Roma Sim DO  Dx:   Encounter Diagnosis     ICD-10-CM    1  Ambulatory dysfunction R26 2    2  Impaired mobility Z74 09      5 minutes non-direct no bill  [phone] Group      Subjective: Pt reports she is not doing well today, the top of her foot hurts and with her cough she is unable to sleep more than an hour each night  Objective: See treatment diary below  Started session late due to changes in today's schedule  Pt unable to stay beyond 0945 due to transportation      Daily Treatment Diary       Exercise Diary  18   Manual tx prn to low back/ shoulders/ Le's  L knee AP glide, grade 4    L quad sets, 10    R ankle AP glide, grade 4    Chin tucks, supine, 5 sec, 10 L knee AP glide, grade 4    R ankle AP glide, grade 4       p ball roll outs         walking 4 min 6 min with rolling walker 330 feet 6 min with  ft   6 min with  ft     1/4 squats Sit to stand 19" surface, 8 x 2 sets Sit to stand 19" surface, 10 x 2 sets    R knee flexion stretch, seated, 3 min Sit to stand 19" surface, 10 x 2 sets 1/4 squats x10  1/4 lunge x10 ea  Standing hip ext x10 ea  STS x10 19" chair w/ blue foam 1/4 lunge x10 ea  Standing hip ext x15 ea  STS x10 19" chair w/ blue foam  Wall squats x10    Step ups 6" step 1 railing, 12x2 BLE    Lateral step ups 6", 12x2 6" step ipsilateral railing 15 each 6" step ipsilateral railing 15 each Fwd x10 ea LE leading  Lateral x10 ea LE leading Bilateral x15 ea    stretches Seated HS stretch 2 min B     Long-sitting gastroc stretch 2 ea   Seated HS stretch 2 min B    Long-sitting gastroc stretch 2 min ea Seated HS stretch 2 min B    Long-sitting gastroc stretch 2 min ea    Manual gastroc stretch x1 min ea Seated HS stretch 2 min B    Long-sitting gastroc stretch 2 min ea     Seated quad stretch x2 min    R knee flexion on 6" step 30 sec x4 (49 degree R knee flexion)   Seated HS stretch 2 min B    Long-sitting gastroc stretch 2 min ea        Heel raises, 2x10    Standing hip abduction, light band, 12-15 each    R Hip ER/IR w/ orange Tband 2x10    SLS tap to targets 2 min Heel raise, 15 x 2 sets    Single leg stance tap to target 1 min x 2 each 6" step ipsilateral railing 15 eac  SLR in extension x10    Heel raise 15x2    Mat TE    SLR supine and sidelyine x10 ea  Quad set 2 min ea Wedge w/ 3 pillows:   SLR supine and sidelyine x10 ea  Quad set 2 min ea    Seated LE strengthening      Seated: HR, marches, adduction w/ ball, abd w/ orange tband 10x3 ea      Assessment & Plan: Pt tolerated session well today, was able to self initiate exercises with minimal verbal cues to continue to the next exercise  Next session continue to progress LE exercises and ambulation  Continue with POC

## 2018-08-17 ENCOUNTER — TELEPHONE (OUTPATIENT)
Dept: FAMILY MEDICINE CLINIC | Facility: CLINIC | Age: 58
End: 2018-08-17

## 2018-08-17 ENCOUNTER — OFFICE VISIT (OUTPATIENT)
Dept: FAMILY MEDICINE CLINIC | Facility: CLINIC | Age: 58
End: 2018-08-17
Payer: COMMERCIAL

## 2018-08-17 VITALS
TEMPERATURE: 98.1 F | WEIGHT: 215 LBS | OXYGEN SATURATION: 95 % | DIASTOLIC BLOOD PRESSURE: 80 MMHG | HEIGHT: 67 IN | BODY MASS INDEX: 33.74 KG/M2 | SYSTOLIC BLOOD PRESSURE: 138 MMHG

## 2018-08-17 DIAGNOSIS — M79.10 MYALGIA: ICD-10-CM

## 2018-08-17 DIAGNOSIS — J98.01 BRONCHOSPASM: ICD-10-CM

## 2018-08-17 DIAGNOSIS — R05.3 CHRONIC COUGH: Primary | ICD-10-CM

## 2018-08-17 DIAGNOSIS — F32.0 CURRENT MILD EPISODE OF MAJOR DEPRESSIVE DISORDER WITHOUT PRIOR EPISODE (HCC): ICD-10-CM

## 2018-08-17 PROCEDURE — 3008F BODY MASS INDEX DOCD: CPT | Performed by: NURSE PRACTITIONER

## 2018-08-17 PROCEDURE — 99214 OFFICE O/P EST MOD 30 MIN: CPT | Performed by: NURSE PRACTITIONER

## 2018-08-17 RX ORDER — BENZONATATE 200 MG/1
200 CAPSULE ORAL 3 TIMES DAILY PRN
Qty: 42 CAPSULE | Refills: 0 | Status: SHIPPED | OUTPATIENT
Start: 2018-08-17 | End: 2019-03-10

## 2018-08-17 RX ORDER — ALBUTEROL SULFATE 2.5 MG/3ML
2.5 SOLUTION RESPIRATORY (INHALATION) ONCE
Status: COMPLETED | OUTPATIENT
Start: 2018-08-17 | End: 2018-08-17

## 2018-08-17 RX ORDER — AZITHROMYCIN 250 MG/1
TABLET, FILM COATED ORAL
Qty: 6 TABLET | Refills: 0 | Status: SHIPPED | OUTPATIENT
Start: 2018-08-17 | End: 2018-08-21

## 2018-08-17 RX ORDER — PREDNISONE 10 MG/1
TABLET ORAL
Qty: 21 TABLET | Refills: 0 | Status: SHIPPED | OUTPATIENT
Start: 2018-08-17 | End: 2018-11-06 | Stop reason: ALTCHOICE

## 2018-08-17 RX ORDER — ALBUTEROL SULFATE 2.5 MG/3ML
2.5 SOLUTION RESPIRATORY (INHALATION) EVERY 6 HOURS PRN
Qty: 50 VIAL | Refills: 0 | Status: SHIPPED | OUTPATIENT
Start: 2018-08-17

## 2018-08-17 RX ADMIN — ALBUTEROL SULFATE 2.5 MG: 2.5 SOLUTION RESPIRATORY (INHALATION) at 08:30

## 2018-08-17 NOTE — TELEPHONE ENCOUNTER
Next available is in June 2019, they will be calling patient on Monday  Unless you would like for her to go elsewhere?

## 2018-08-17 NOTE — ASSESSMENT & PLAN NOTE
Sees psychology but wishes to see psychiatrist  Will refer patient and try to get her in as soon as possible  Depression secondary to chronic conditions and illnesses

## 2018-08-17 NOTE — TELEPHONE ENCOUNTER
Monday we will research other psychiatrists  May need her to call back of her insurance card to see where they recommend

## 2018-08-17 NOTE — TELEPHONE ENCOUNTER
----- Message from [address] sent at 8/17/2018  1:15 PM EDT -----  Please call [facility] to see when they could get her in  Referral placed  Doesn't matter who she sees       Thanks

## 2018-08-17 NOTE — PROGRESS NOTES
Assessment/Plan:    Chronic Cough  Lung CTA  Albuterol nebulizer treatment given in office which did help patient's cough and symptoms  Will send albuterol solution and nebulizer and supplies to pharmacy  Antibiotic was not yet been tried  X ray WNL  Will start azithromycin as well as prednisone taper  Patient advised to drink lots of fluids, use humidified air  If no improvement will refer to pulmonary  Call us if you experience any worsening symptoms or no improvement  Myalgia  Will make appointment with rheumatology  Current mild episode of major depressive disorder without prior episode [facility])  Sees psychology but wishes to see psychiatrist  Will refer patient and try to get her in as soon as possible  Depression secondary to chronic conditions and illnesses  Diagnoses and all orders for this visit:    Chronic cough  -     albuterol inhalation solution 2 5 mg; Take 3 mL (2 5 mg total) by nebulization once   -     predniSONE 10 mg tablet; Day 1: 6 tabs  Day 2: 5 tabs Day 3: 4 tabs  Day 4: 3 tabs  Day 5: 2 tabs  Day 6: 1 tab  -     azithromycin (ZITHROMAX) 250 mg tablet; Take 2 tablets today then 1 tablet daily x 4 days  -     Nebulizer  -     Nebulizer Supplies  -     albuterol (2 5 mg/3 mL) 0 083 % nebulizer solution; Take 1 vial (2 5 mg total) by nebulization every 6 (six) hours as needed for wheezing or shortness of breath  -     benzonatate (TESSALON) 200 MG capsule; Take 1 capsule (200 mg total) by mouth 3 (three) times a day as needed for cough    Bronchospasm  -     predniSONE 10 mg tablet; Day 1: 6 tabs  Day 2: 5 tabs Day 3: 4 tabs  Day 4: 3 tabs  Day 5: 2 tabs  Day 6: 1 tab  -     azithromycin (ZITHROMAX) 250 mg tablet; Take 2 tablets today then 1 tablet daily x 4 days  -     Nebulizer  -     Nebulizer Supplies  -     albuterol (2 5 mg/3 mL) 0 083 % nebulizer solution;  Take 1 vial (2 5 mg total) by nebulization every 6 (six) hours as needed for wheezing or shortness of breath  - benzonatate (TESSALON) 200 MG capsule; Take 1 capsule (200 mg total) by mouth 3 (three) times a day as needed for cough    Myalgia    Current mild episode of major depressive disorder without prior episode [facility])  -     Ambulatory referral to Psychiatry; Future      Patient verbalizes understand and agrees with treatment plan  Subjective:        Patient ID: Ling Arora is a 59 y o  female  Chief Complaint   Patient presents with    Follow-up    Cough     getting worse  Not able to sleep and causing incontinence  Has a loss of appetite and and nauseated at time  Without sleep is having a hard time dealing with things  Pain and cramping of the legs is getting worse  Very little help at all with inhalers  Feels it comes right back out with a cough  Patient presents to office today for chronic cough  Patient  Was 1st seen for this at the end of January  She was initially tried on albuterol inhaler as needed with [facility] [place] as needed  She states that she has had no improvement  In the meantime x-ray was completed and patient started on Flovent inhaler for maintenance  Patient states that she is having hard time due inhaler because she does cough the whole time  Patient states that her cough is keeping her awake  X-ray was within normal limits  Cough is nonproductive  Patient denies shortness of breath or wheezing  Patient states that it feels more like an irritation in her throat  Patient states it is keeping her up at night  The following portions of the patient's history were reviewed and updated as appropriate: allergies, current medications, past family history, past social history and problem list     Review of Systems   Constitutional: Negative for chills and fever  HENT: Negative for congestion  Eyes: Negative for pain and visual disturbance  Respiratory: Positive for cough  Negative for shortness of breath      Cardiovascular: Negative for chest pain, palpitations and leg swelling  Gastrointestinal: Negative for abdominal pain, diarrhea, nausea and vomiting  Genitourinary: Negative for difficulty urinating and dysuria  Musculoskeletal: Positive for myalgias (chronic for patient)  Negative for arthralgias  Skin: Negative for color change and rash  Neurological: Negative for dizziness, syncope, numbness and headaches  Hematological: Negative for adenopathy  Psychiatric/Behavioral: Negative for agitation and behavioral problems  The patient is not nervous/anxious  Anxious/ mild depression from comorbid conditions         Objective:  /80   Temp 98 1 °F (36 7 °C)   Ht 5' 6 5" (1 689 m)   Wt 97 5 kg (215 lb)   SpO2 95%   BMI 34 18 kg/m²      Physical Exam   Constitutional: She is oriented to person, place, and time  She appears well-developed  No distress  HENT:   Head: Normocephalic and atraumatic  Right Ear: External ear normal    Left Ear: External ear normal    Nose: Nose normal    Mouth/Throat: Oropharynx is clear and moist  No oropharyngeal exudate  Eyes: Conjunctivae and lids are normal  Right eye exhibits no discharge  Left eye exhibits no discharge  Neck: Neck supple  No tracheal deviation present  Cardiovascular: Normal rate and regular rhythm  No murmur heard  Pulmonary/Chest: Effort normal and breath sounds normal  No respiratory distress  She has no wheezes  Frequent dry cough   Abdominal: Soft  Bowel sounds are normal  She exhibits no distension  There is no tenderness  There is no guarding  Musculoskeletal: She exhibits no edema, tenderness or deformity  Lymphadenopathy:     She has no cervical adenopathy  Neurological: She is alert and oriented to person, place, and time  Coordination (chronic) abnormal    Skin: Skin is warm and dry  No rash noted  She is not diaphoretic  No erythema  Psychiatric: She has a normal mood and affect   Her speech is normal and behavior is normal  Judgment and thought content normal  Cognition and memory are normal    Nursing note and vitals reviewed

## 2018-08-17 NOTE — PATIENT INSTRUCTIONS
Start prednisone, this is the steroid  It will be 6 pills today and decrease by 1 pill each day for the following 6 days  So it will be 6-5-4-3-2-1  Take this with food as it may upset your stomach  It's best to take it earlier in the day otherwise it may keep you up at night  Start azithromycin, this is the antibiotic, This is 2 pills on day one and then 1 pill for the following 4 days  Use tessalon perles every 8 hours as needed for cough  Use nebulizer solution every 6 hours as needed for cough/wheezing  This is the same medication as the ventolin  Do not take these together  They must be alternated if you are taking both  Make appointment with rheumatology  We will call to try to get you in with [facility]  Call us if you experience any worsening symptoms or no improvement

## 2018-08-20 ENCOUNTER — OFFICE VISIT (OUTPATIENT)
Dept: PHYSICAL THERAPY | Facility: REHABILITATION | Age: 58
End: 2018-08-20
Payer: COMMERCIAL

## 2018-08-20 ENCOUNTER — TELEPHONE (OUTPATIENT)
Dept: PSYCHIATRY | Facility: CLINIC | Age: 58
End: 2018-08-20

## 2018-08-20 DIAGNOSIS — R26.2 AMBULATORY DYSFUNCTION: Primary | ICD-10-CM

## 2018-08-20 PROCEDURE — 97110 THERAPEUTIC EXERCISES: CPT | Performed by: PHYSICAL THERAPIST

## 2018-08-20 NOTE — PROGRESS NOTES
Daily Note     Today's date: 2018  Patient name: Trena Ho  : 1960  MRN: [id number]  Referring provider: Austen Hightower DO  Dx:   Encounter Diagnosis     ICD-10-CM    1  Ambulatory dysfunction R26 2      Subjective: Patient will see rheumatology next week  She previously saw rheumatology at [facility] and reports was told she doesn't have lupus or 3 other rheumatic conditions so could not be treated  Patient reports pain about the knees, lateral knees, and continues with cough treated by inhaler, medication, limits use of elliptical at home as she coughs with exertion  Objective: See treatment diary below  Started session late due to changes in today's schedule  Pt unable to stay beyond 0945 due to transportation      Daily Treatment Diary       Exercise Diary  18   walking  6 min with  ft   2 min with RW    High step (focus on flexed knee) walking with railing, 3 min   1/4 squats 1/4 squats x10  1/4 lunge x10 ea  Standing hip ext x10 ea  STS x10 19" chair w/ blue foam 1/4 lunge x10 ea  Standing hip ext x15 ea  STS x10 19" chair w/ blue foam  Wall squats x10  Sit to stand, about 20", 10 x 2 sets     Step ups Fwd x10 ea LE leading  Lateral x10 ea LE leading Bilateral x15 ea  4" step, 15 each x 2 sets   stretches Seated HS stretch 2 min B    Long-sitting gastroc stretch 2 min ea    Manual gastroc stretch x1 min ea Seated HS stretch 2 min B    Long-sitting gastroc stretch 2 min ea     Seated quad stretch x2 min    R knee flexion on 6" step 30 sec x4 (49 degree R knee flexion)   Seated HS stretch 2 min B    Long-sitting gastroc stretch 2 min ea    Seated R knee flexion stretch, 3 min    Supine R knee extension stretch 3 min      SLR in extension x10    Heel raise 15x2  Heel raise, 15 x 2 sets    Scapular rows, blue theraband, 15 x 2 sets    Seated trunk flexion stretch with theraball, 3 min   Mat TE SLR supine and sidelyine x10 ea  Quad set 2 min ea Wedge w/ 3 pillows:   SLR supine and sidelyine x10 ea  Quad set 2 min ea  SLR, R, 10 x 2 sets    Bridge, R WB, 10 x 2 sets   Seated LE strengthening   Seated: HR, marches, adduction w/ ball, abd w/ orange tband 10x3 ea       Assessment & Plan:  Patient frequently has pain in multiple joints, patient to discuss with rheumatology  Continue to encourage regular physical activity to tolerance

## 2018-08-20 NOTE — TELEPHONE ENCOUNTER
Behavorial Health Outpatient Intake Questions    [facility]    Check with provider before scheduling    Are there any developmental disabilities? No    Does the patient have hearing impairment? No    Does the patient have ICM or CTT? No    Taking injectable psychiatric medications? NoIf yes, patient can not be seen here  Has the patient ever seen or currently see a psychiatrist? No If yes who/when? Has the patient ever seen or currently see a therapist? Yes If yes who/when? Cyndi Durham [address]    How many visits did the pt have for previous psychiatric treatment?  History    Has the patient served in the [place] [address]? No    If yes, have you had combat services? No    Was the patient activated into federal active duty as a member of the national guard or reserve? No    Minor Child    Who has custody of the child? Is there a custody agreement? If there is a custody agreement remind parent that they must bring a copy to the first appt or they will not be seen  BehavGeneral acute hospital Health Outpatient Intake History     Presenting Problem (in patient's words) DEPRESSION, HAVING TROUBLE CONCENTRATING, FINANCIAL ISSUES, TROUBLE FALLING, HAS HAD 24 FALLS IN THE LAST YR  Substance Abuse:No concerns of substance abuse are reported  Has the patient been seen here previously, either inpatient or outpatient? NO outpatient    If seen as outpatient, what provider(s) did the patient see? A member of the patient's family has been in therapy here with     ACCEPTED as a patient Yes Appointment Date: DR Lola Roberts 6/21/19 @ 10:00    Referred Elsewhere?  No    Primary Care Physician: Fletcher Gayle DO    PCP telephone number: [phone] [facility]  ----------------------------------------------------------------------------------------------------------------------    Insurance subscriber:   JONATAN JOSÉ LUIS;    Address:                                                        Phone:                                   SSN:    Employer:                                                      Address:  ----------------------------------------------------------------------------------------------------------------------    Primary Insurance:   [place]                                                              Phone:    ID number:  [id number]                                                 Group number:  ----------------------------------------------------------------------------------------------------------------------    Secondary Insurance:                                                               Phone:    ID number:                                                   Group number:  ----------------------------------------------------------------------------------------------------------------------    Other insurance information:             _______________________________________

## 2018-08-21 DIAGNOSIS — G89.4 CHRONIC PAIN SYNDROME: Primary | ICD-10-CM

## 2018-08-21 RX ORDER — OXYCODONE HYDROCHLORIDE AND ACETAMINOPHEN 5; 325 MG/1; MG/1
1 TABLET ORAL EVERY 8 HOURS PRN
Qty: 15 TABLET | Refills: 0 | Status: SHIPPED | OUTPATIENT
Start: 2018-08-21 | End: 2019-08-14 | Stop reason: SDUPTHER

## 2018-08-21 NOTE — TELEPHONE ENCOUNTER
Patient called and states that she has 3 Oxycodone left    Can a new script be called to [address]

## 2018-08-21 NOTE — TELEPHONE ENCOUNTER
Refill sent to [facility]  Use sparingly    [place] prescription drug monitoring program was checked and verified for refill

## 2018-08-22 ENCOUNTER — TELEPHONE (OUTPATIENT)
Dept: FAMILY MEDICINE CLINIC | Facility: CLINIC | Age: 58
End: 2018-08-22

## 2018-08-22 NOTE — TELEPHONE ENCOUNTER
Patient called to let you know that she had her disability hearing yesterday and they approved her from October 1,2016  She should be eligible for Medicare next year

## 2018-08-23 ENCOUNTER — OFFICE VISIT (OUTPATIENT)
Dept: PHYSICAL THERAPY | Facility: REHABILITATION | Age: 58
End: 2018-08-23
Payer: COMMERCIAL

## 2018-08-23 DIAGNOSIS — Z74.09 IMPAIRED MOBILITY: ICD-10-CM

## 2018-08-23 DIAGNOSIS — R26.2 AMBULATORY DYSFUNCTION: Primary | ICD-10-CM

## 2018-08-23 PROCEDURE — G8979 MOBILITY GOAL STATUS: HCPCS

## 2018-08-23 PROCEDURE — G8978 MOBILITY CURRENT STATUS: HCPCS

## 2018-08-23 PROCEDURE — 97110 THERAPEUTIC EXERCISES: CPT

## 2018-08-23 NOTE — PROGRESS NOTES
PT Re-evaluation / Daily Note    Today's date: 2018  Patient name: Charissa Junior  : 1960  MRN: [id number]  Referring provider: Natalia Chawla DO  Dx:   Encounter Diagnosis     ICD-10-CM    1  Ambulatory dysfunction R26 2    2  Impaired mobility Z74 09      Subjective: Patient will see rheumatology next week  She previously saw rheumatology at [facility] and reports was told she doesn't have lupus or 3 other rheumatic conditions so could not be treated  Patient reports pain about the knees, lateral knees, and continues with cough treated by inhaler, medication, limits use of elliptical at home as she coughs with exertion  Objective: See treatment diary below  Started session late due to changes in today's schedule  Pt unable to stay beyond 0945 due to transportation  Daily Treatment Diary       Exercise Diary  18    walking  6 min with  ft   2 min with RW    High step (focus on flexed knee) walking with railing, 3 min 12 min with rolling walker    Cues as for hip/knee flex,  Ankle at neutral    1/4 squats 1/4 squats x10  1/4 lunge x10 ea  Standing hip ext x10 ea  STS x10 19" chair w/ blue foam 1/4 lunge x10 ea  Standing hip ext x15 ea  STS x10 19" chair w/ blue foam  Wall squats x10  Sit to stand, about 20", 10 x 2 sets   Sit to stand 10 x 1    Step ups Fwd x10 ea LE leading  Lateral x10 ea LE leading Bilateral x15 ea  4" step, 15 each x 2 sets 4" x 15 each x 2    stretches Seated HS stretch 2 min B    Long-sitting gastroc stretch 2 min ea    Manual gastroc stretch x1 min ea Seated HS stretch 2 min B    Long-sitting gastroc stretch 2 min ea     Seated quad stretch x2 min    R knee flexion on 6" step 30 sec x4 (49 degree R knee flexion)   Seated HS stretch 2 min B    Long-sitting gastroc stretch 2 min ea    Seated R knee flexion stretch, 3 min    Supine R knee extension stretch 3 min Tensor  Facia: trial 30 sec   Client defered       SLR in extension x10    Heel raise 15x2  Heel raise, 15 x 2 sets    Scapular rows, blue theraband, 15 x 2 sets    Seated trunk flexion stretch with theraball, 3 min     Mat TE SLR supine and sidelyine x10 ea  Quad set 2 min ea Wedge w/ 3 pillows:   SLR supine and sidelyine x10 ea  Quad set 2 min ea  SLR, R, 10 x 2 sets    Bridge, R WB, 10 x 2 sets     Seated LE strengthening   Seated: HR, marches, adduction w/ ball, abd w/ orange tband 10x3 ea         Outcome Measures    Abbreviated ABC 17% confidence   2 Minute Walk Test (ft): 180 feet with rollator   10MWT 18 79 sec with rollator 0 53 m/s   5x Sit To Stand (s):  NT   TU sec with Rollator  30 sec without AD     Assessment  Since starting physical therapy, the patient has shown improvements in walking endurance, walking speed, and balance  The patient continues to have a slow gait speed compared to her age matched norms  She demonstrates a continued decrease in dynamic balance indicated by her time to complete the TUG without and without an AD  She also has reports of ongoing tightness in the legs especially with mobility  Patient will continue to benefit from skilled outpatient physical therapy in order to address the above impairments to improve or safety and independence with all mobility  Goals  STG 1  Independent w/ HEP  Met  2  Tolerate 5 minutes activity w/o increase in pain  Met for activity, continues for pain with activity  LTG  1  Strength >= 4/5 t/o to improve ADL's  Progressed  2  Tolerate 10 minutes continuous activity without increase in pain to improve community function  Progressed  3  > 36 Smith to reduce fall risk  Met            4  Gait speed >  5 m/second to facilitate improved community function and reduce fall risk   Progressed  5  Pain <= 5/10 w/ ADL's   Not met        Plan  Planned therapy interventions: activity modification, balance, functional ROM exercises, gait training, therapeutic exercise, stretching, strengthening, postural training, patient education, neuromuscular re-education, muscle pump exercises, motor coordination training and manual therapy  Frequency: 2x week  Duration in weeks: 4-6

## 2018-08-29 ENCOUNTER — OFFICE VISIT (OUTPATIENT)
Dept: PHYSICAL THERAPY | Facility: REHABILITATION | Age: 58
End: 2018-08-29
Payer: COMMERCIAL

## 2018-08-29 DIAGNOSIS — Z74.09 IMPAIRED MOBILITY: ICD-10-CM

## 2018-08-29 DIAGNOSIS — R26.2 AMBULATORY DYSFUNCTION: Primary | ICD-10-CM

## 2018-08-29 PROCEDURE — 97110 THERAPEUTIC EXERCISES: CPT

## 2018-08-29 NOTE — PROGRESS NOTES
Daily Note     Today's date: 2018  Patient name: Ananya Isbell  : 1960  MRN: [id number]  Referring provider: Ronan Marcano DO  Dx:   Encounter Diagnosis     ICD-10-CM    1  Ambulatory dysfunction R26 2    2  Impaired mobility Z74 09        Start Time: 918  Stop Time: 956  Total time in clinic (min): 38 minutes    Subjective: Pt reports she is still "the same as everyday"  Objective: See treatment diary below    Exercise Diary  18   walking  6 min with  ft   2 min with RW    High step (focus on flexed knee) walking with railing, 3 min 12 min with rolling walker    Cues as for hip/knee flex,  Ankle at neutral 9 min with rolling walker    Cues as for hip/knee flex,  Ankle at neutral   1/4 squats 1/4 squats x10  1/4 lunge x10 ea  Standing hip ext x10 ea  STS x10 19" chair w/ blue foam 1/4 lunge x10 ea  Standing hip ext x15 ea  STS x10 19" chair w/ blue foam  Wall squats x10  Sit to stand, about 20", 10 x 2 sets   Sit to stand 10 x 1 STS from 19" surface 10x2   Step ups Fwd x10 ea LE leading  Lateral x10 ea LE leading Bilateral x15 ea  4" step, 15 each x 2 sets 4" x 15 each x 2 4" 15x2 BLE   stretches Seated HS stretch 2 min B    Long-sitting gastroc stretch 2 min ea    Manual gastroc stretch x1 min ea Seated HS stretch 2 min B    Long-sitting gastroc stretch 2 min ea     Seated quad stretch x2 min    R knee flexion on 6" step 30 sec x4 (49 degree R knee flexion)   Seated HS stretch 2 min B    Long-sitting gastroc stretch 2 min ea    Seated R knee flexion stretch, 3 min    Supine R knee extension stretch 3 min Tensor  Facia: trial 30 sec   Client defered       SLR in extension x10    Heel raise 15x2  Heel raise, 15 x 2 sets    Scapular rows, blue theraband, 15 x 2 sets    Seated trunk flexion stretch with theraball, 3 min  Heel raise 20x2   Mat TE SLR supine and sidelyine x10 ea  Quad set 2 min ea Wedge w/ 3 pillows:   SLR supine and sidelyine x10 ea  Quad set 2 min ea  SLR, R, 10 x 2 sets    Bridge, R WB, 10 x 2 sets     Seated LE strengthening   Seated: HR, marches, adduction w/ ball, abd w/ orange tband 10x3 ea   3way SLR w/ orange band 10x2 each         Assessment: Tolerated treatment fair  After 9 minutes of ambulation pt ankle pain too great to continue walking, requiring seated rest  Pt is able to decrease R ankle pain w/ rest and active ROM in plantar and dorsiflexion  Patient exhibited good technique with therapeutic exercises      Plan: Continue per plan of care

## 2018-08-30 ENCOUNTER — APPOINTMENT (OUTPATIENT)
Dept: LAB | Facility: HOSPITAL | Age: 58
End: 2018-08-30
Payer: COMMERCIAL

## 2018-08-30 ENCOUNTER — TRANSCRIBE ORDERS (OUTPATIENT)
Dept: ADMINISTRATIVE | Facility: HOSPITAL | Age: 58
End: 2018-08-30

## 2018-08-30 DIAGNOSIS — R76.8 FALSE POSITIVE SEROLOGICAL TEST FOR SYPHILIS: Primary | ICD-10-CM

## 2018-08-30 DIAGNOSIS — M79.10 MYALGIA: ICD-10-CM

## 2018-08-30 DIAGNOSIS — R76.8 FALSE POSITIVE SEROLOGICAL TEST FOR SYPHILIS: ICD-10-CM

## 2018-08-30 LAB
ALBUMIN SERPL BCP-MCNC: 3.5 G/DL (ref 3.5–5)
ALP SERPL-CCNC: 105 U/L (ref 46–116)
ALT SERPL W P-5'-P-CCNC: 37 U/L (ref 12–78)
ANION GAP SERPL CALCULATED.3IONS-SCNC: 7 MMOL/L (ref 4–13)
AST SERPL W P-5'-P-CCNC: 23 U/L (ref 5–45)
BACTERIA UR QL AUTO: ABNORMAL /HPF
BASOPHILS # BLD AUTO: 0.03 THOUSANDS/ΜL (ref 0–0.1)
BASOPHILS NFR BLD AUTO: 0 % (ref 0–1)
BILIRUB SERPL-MCNC: 0.35 MG/DL (ref 0.2–1)
BILIRUB UR QL STRIP: NEGATIVE
BUN SERPL-MCNC: 24 MG/DL (ref 5–25)
C3 SERPL-MCNC: 157 MG/DL (ref 90–180)
C4 SERPL-MCNC: 34 MG/DL (ref 10–40)
CALCIUM SERPL-MCNC: 8.8 MG/DL (ref 8.3–10.1)
CHLORIDE SERPL-SCNC: 105 MMOL/L (ref 100–108)
CK SERPL-CCNC: 70 U/L (ref 26–192)
CLARITY UR: CLEAR
CO2 SERPL-SCNC: 28 MMOL/L (ref 21–32)
COLOR UR: YELLOW
CREAT SERPL-MCNC: 0.83 MG/DL (ref 0.6–1.3)
CRP SERPL QL: 5.6 MG/L
EOSINOPHIL # BLD AUTO: 0.17 THOUSAND/ΜL (ref 0–0.61)
EOSINOPHIL NFR BLD AUTO: 2 % (ref 0–6)
ERYTHROCYTE [DISTWIDTH] IN BLOOD BY AUTOMATED COUNT: 12.4 % (ref 11.6–15.1)
ERYTHROCYTE [SEDIMENTATION RATE] IN BLOOD: 20 MM/HOUR (ref 0–20)
GFR SERPL CREATININE-BSD FRML MDRD: 77 ML/MIN/1.73SQ M
GLUCOSE P FAST SERPL-MCNC: 112 MG/DL (ref 65–99)
GLUCOSE UR STRIP-MCNC: NEGATIVE MG/DL
HCT VFR BLD AUTO: 44.7 % (ref 34.8–46.1)
HGB BLD-MCNC: 14.4 G/DL (ref 11.5–15.4)
HGB UR QL STRIP.AUTO: ABNORMAL
IMM GRANULOCYTES # BLD AUTO: 0.03 THOUSAND/UL (ref 0–0.2)
IMM GRANULOCYTES NFR BLD AUTO: 0 % (ref 0–2)
KETONES UR STRIP-MCNC: NEGATIVE MG/DL
LEUKOCYTE ESTERASE UR QL STRIP: NEGATIVE
LYMPHOCYTES # BLD AUTO: 2.1 THOUSANDS/ΜL (ref 0.6–4.47)
LYMPHOCYTES NFR BLD AUTO: 28 % (ref 14–44)
MCH RBC QN AUTO: 29.8 PG (ref 26.8–34.3)
MCHC RBC AUTO-ENTMCNC: 32.2 G/DL (ref 31.4–37.4)
MCV RBC AUTO: 93 FL (ref 82–98)
MONOCYTES # BLD AUTO: 0.5 THOUSAND/ΜL (ref 0.17–1.22)
MONOCYTES NFR BLD AUTO: 7 % (ref 4–12)
MUCOUS THREADS UR QL AUTO: ABNORMAL
NEUTROPHILS # BLD AUTO: 4.61 THOUSANDS/ΜL (ref 1.85–7.62)
NEUTS SEG NFR BLD AUTO: 63 % (ref 43–75)
NITRITE UR QL STRIP: NEGATIVE
NON-SQ EPI CELLS URNS QL MICRO: ABNORMAL /HPF
NRBC BLD AUTO-RTO: 0 /100 WBCS
PH UR STRIP.AUTO: 5 [PH] (ref 4.5–8)
PLATELET # BLD AUTO: 243 THOUSANDS/UL (ref 149–390)
PMV BLD AUTO: 9.3 FL (ref 8.9–12.7)
POTASSIUM SERPL-SCNC: 3.8 MMOL/L (ref 3.5–5.3)
PROT SERPL-MCNC: 7.7 G/DL (ref 6.4–8.2)
PROT UR STRIP-MCNC: NEGATIVE MG/DL
RBC # BLD AUTO: 4.83 MILLION/UL (ref 3.81–5.12)
RBC #/AREA URNS AUTO: ABNORMAL /HPF
SODIUM SERPL-SCNC: 140 MMOL/L (ref 136–145)
SP GR UR STRIP.AUTO: >=1.03 (ref 1–1.03)
T4 FREE SERPL-MCNC: 1.32 NG/DL (ref 0.76–1.46)
UROBILINOGEN UR QL STRIP.AUTO: 0.2 E.U./DL
WBC # BLD AUTO: 7.44 THOUSAND/UL (ref 4.31–10.16)
WBC #/AREA URNS AUTO: ABNORMAL /HPF

## 2018-08-30 PROCEDURE — 86146 BETA-2 GLYCOPROTEIN ANTIBODY: CPT

## 2018-08-30 PROCEDURE — 86140 C-REACTIVE PROTEIN: CPT

## 2018-08-30 PROCEDURE — 86147 CARDIOLIPIN ANTIBODY EA IG: CPT

## 2018-08-30 PROCEDURE — 86800 THYROGLOBULIN ANTIBODY: CPT

## 2018-08-30 PROCEDURE — 85652 RBC SED RATE AUTOMATED: CPT

## 2018-08-30 PROCEDURE — 36415 COLL VENOUS BLD VENIPUNCTURE: CPT

## 2018-08-30 PROCEDURE — 85670 THROMBIN TIME PLASMA: CPT

## 2018-08-30 PROCEDURE — 80053 COMPREHEN METABOLIC PANEL: CPT

## 2018-08-30 PROCEDURE — 86376 MICROSOMAL ANTIBODY EACH: CPT

## 2018-08-30 PROCEDURE — 86160 COMPLEMENT ANTIGEN: CPT

## 2018-08-30 PROCEDURE — 85732 THROMBOPLASTIN TIME PARTIAL: CPT

## 2018-08-30 PROCEDURE — 81001 URINALYSIS AUTO W/SCOPE: CPT | Performed by: INTERNAL MEDICINE

## 2018-08-30 PROCEDURE — 84439 ASSAY OF FREE THYROXINE: CPT

## 2018-08-30 PROCEDURE — 85705 THROMBOPLASTIN INHIBITION: CPT

## 2018-08-30 PROCEDURE — 85025 COMPLETE CBC W/AUTO DIFF WBC: CPT

## 2018-08-30 PROCEDURE — 82085 ASSAY OF ALDOLASE: CPT

## 2018-08-30 PROCEDURE — 85613 RUSSELL VIPER VENOM DILUTED: CPT

## 2018-08-30 PROCEDURE — 82550 ASSAY OF CK (CPK): CPT

## 2018-08-30 PROCEDURE — 86235 NUCLEAR ANTIGEN ANTIBODY: CPT

## 2018-08-31 LAB
ALDOLASE SERPL-CCNC: 17.4 U/L (ref 3.3–10.3)
CARDIOLIPIN IGA SER IA-ACNC: <9 APL U/ML (ref 0–11)
CARDIOLIPIN IGG SER IA-ACNC: <9 GPL U/ML (ref 0–14)
CARDIOLIPIN IGM SER IA-ACNC: 10 MPL U/ML (ref 0–12)
THYROGLOB AB SERPL-ACNC: 79.7 IU/ML (ref 0–0.9)
THYROPEROXIDASE AB SERPL-ACNC: 119 IU/ML (ref 0–34)

## 2018-09-01 LAB
APTT SCREEN TO CONFIRM RATIO: 0.98 RATIO (ref 0–1.4)
B2 GLYCOPROT1 IGA SER-ACNC: <9 GPI IGA UNITS (ref 0–25)
B2 GLYCOPROT1 IGG SER-ACNC: <9 GPI IGG UNITS (ref 0–20)
B2 GLYCOPROT1 IGM SER-ACNC: <9 GPI IGM UNITS (ref 0–32)
CONFIRM APTT/NORMAL: 46.5 SEC (ref 0–55)
LA PPP-IMP: NORMAL
SCREEN APTT: 31.8 SEC (ref 0–51.9)
SCREEN DRVVT: 34.5 SEC (ref 0–47)
THROMBIN TIME: 16.4 SEC (ref 0–23)

## 2018-09-05 ENCOUNTER — APPOINTMENT (OUTPATIENT)
Dept: PHYSICAL THERAPY | Facility: REHABILITATION | Age: 58
End: 2018-09-05
Payer: COMMERCIAL

## 2018-09-06 LAB
EJ: NEGATIVE
JO-1 (WB): NEGATIVE
KU: NEGATIVE
MI-2 ANTIBODIES: NEGATIVE
OJ: NEGATIVE
PL-12: NEGATIVE
PL-7: NEGATIVE
SRP AB SERPL QL: NEGATIVE

## 2018-09-11 ENCOUNTER — OFFICE VISIT (OUTPATIENT)
Dept: PHYSICAL THERAPY | Facility: REHABILITATION | Age: 58
End: 2018-09-11
Payer: COMMERCIAL

## 2018-09-11 DIAGNOSIS — R26.2 AMBULATORY DYSFUNCTION: Primary | ICD-10-CM

## 2018-09-11 DIAGNOSIS — Z74.09 IMPAIRED MOBILITY: ICD-10-CM

## 2018-09-11 PROCEDURE — 97110 THERAPEUTIC EXERCISES: CPT

## 2018-09-11 NOTE — PROGRESS NOTES
Daily Note     Today's date: 2018  Patient name: Perry Bliss  : 1960  MRN: [id number]  Referring provider: Bennie Fuentes DO  Dx:   Encounter Diagnosis     ICD-10-CM    1  Ambulatory dysfunction R26 2    2  Impaired mobility Z74 09        Start Time: 927  Stop Time:   Total time in clinic (min): 40 minutes    Subjective: Pt reports her LE joints are in pain due to the rain, additionally she reports her she is feeling some tingling in her R hand and some swelling  Objective: See treatment diary below    Exercise Diary  18   walking 2 min with RW    High step (focus on flexed knee) walking with railing, 3 min 12 min with rolling walker    Cues as for hip/knee flex,  Ankle at neutral 9 min with rolling walker    Cues as for hip/knee flex,  Ankle at neutral 10 min with rolling walker    Cues as for hip/knee flex,  Ankle at neutral    Lateral walking w/ rail x 2 min  Backward walking w/ w/ bilateral rail x2 min   1/4 squats Sit to stand, about 20", 10 x 2 sets   Sit to stand 10 x 1 STS from 19" surface 10x2 STS from 19" surface 10x2 and x5   Step ups 4" step, 15 each x 2 sets 4" x 15 each x 2 4" 15x2 BLE 6" 15x2 BLE Forward and lateral   stretches Seated R knee flexion stretch, 3 min    Supine R knee extension stretch 3 min Tensor  Facia: trial 30 sec  Client defered  Standing bilateral gastroc and HS stretch at stairs x1 min each    Seated HS and gastroc stretch x1 min     Heel raise, 15 x 2 sets    Scapular rows, blue theraband, 15 x 2 sets    Seated trunk flexion stretch with theraball, 3 min  Heel raise 20x2    Mat TE SLR, R, 10 x 2 sets    Bridge, R WB, 10 x 2 sets      Seated LE strengthening   3way SLR w/ orange band 10x2 each          Assessment: Tolerated treatment well  Pt increased repetitions with STS this visit and completed with lateral and backward walking with minimal breaks secondary to L gastroc and hamstring tightness   Patient exhibited good technique with therapeutic exercises      Plan: Continue per plan of care

## 2018-09-13 ENCOUNTER — OFFICE VISIT (OUTPATIENT)
Dept: PHYSICAL THERAPY | Facility: REHABILITATION | Age: 58
End: 2018-09-13
Payer: COMMERCIAL

## 2018-09-13 ENCOUNTER — OFFICE VISIT (OUTPATIENT)
Dept: FAMILY MEDICINE CLINIC | Facility: CLINIC | Age: 58
End: 2018-09-13
Payer: COMMERCIAL

## 2018-09-13 VITALS
SYSTOLIC BLOOD PRESSURE: 116 MMHG | HEIGHT: 67 IN | BODY MASS INDEX: 33.65 KG/M2 | WEIGHT: 214.4 LBS | DIASTOLIC BLOOD PRESSURE: 80 MMHG

## 2018-09-13 DIAGNOSIS — F41.8 DEPRESSION WITH ANXIETY: ICD-10-CM

## 2018-09-13 DIAGNOSIS — E55.9 VITAMIN D DEFICIENCY: ICD-10-CM

## 2018-09-13 DIAGNOSIS — E03.8 HYPOTHYROIDISM DUE TO HASHIMOTO'S THYROIDITIS: Primary | ICD-10-CM

## 2018-09-13 DIAGNOSIS — R26.2 AMBULATORY DYSFUNCTION: ICD-10-CM

## 2018-09-13 DIAGNOSIS — R73.03 PREDIABETES: ICD-10-CM

## 2018-09-13 DIAGNOSIS — J45.20 MILD INTERMITTENT ASTHMA WITHOUT COMPLICATION: ICD-10-CM

## 2018-09-13 DIAGNOSIS — Z74.09 IMPAIRED MOBILITY: ICD-10-CM

## 2018-09-13 DIAGNOSIS — R26.2 AMBULATORY DYSFUNCTION: Primary | ICD-10-CM

## 2018-09-13 DIAGNOSIS — E06.3 HYPOTHYROIDISM DUE TO HASHIMOTO'S THYROIDITIS: Primary | ICD-10-CM

## 2018-09-13 DIAGNOSIS — Z23 NEED FOR INFLUENZA VACCINATION: ICD-10-CM

## 2018-09-13 PROBLEM — R03.0 ELEVATED BLOOD PRESSURE READING: Status: RESOLVED | Noted: 2017-09-15 | Resolved: 2018-09-13

## 2018-09-13 PROBLEM — R25.2 SPASM: Status: ACTIVE | Noted: 2018-03-08

## 2018-09-13 PROCEDURE — 90682 RIV4 VACC RECOMBINANT DNA IM: CPT

## 2018-09-13 PROCEDURE — 97110 THERAPEUTIC EXERCISES: CPT

## 2018-09-13 PROCEDURE — 99214 OFFICE O/P EST MOD 30 MIN: CPT | Performed by: FAMILY MEDICINE

## 2018-09-13 NOTE — PROGRESS NOTES
Daily Note     Today's date: 2018  Patient name: Regige Patel  : 1960  MRN: [id number]  Referring provider: Bill Roldan DO  Dx:   Encounter Diagnosis     ICD-10-CM    1  Ambulatory dysfunction R26 2    2  Impaired mobility Z74 09        Start Time: 1430  Stop Time: 1510  Total time in clinic (min): 40 minutes    Subjective: Pt reports she is doing well  She reports she has an appointment with her rheumatologist tomorrow  Objective: See treatment diary below    Exercise Diary  18   walking 12 min with rolling walker    Cues as for hip/knee flex,  Ankle at neutral 9 min with rolling walker    Cues as for hip/knee flex,  Ankle at neutral 10 min with rolling walker    Cues as for hip/knee flex,  Ankle at neutral    Lateral walking w/ rail x 2 min  Backward walking w/ w/ bilateral rail x2 min 10 min with rolling walker    Cues as for hip/knee flex,  Ankle at neutral    Lateral stepping 20ft x4    Standing posterior SLR 10x2   1/4 squats Sit to stand 10 x 1 STS from 19" surface 10x2 STS from 19" surface 10x2 and x5 STS from 19" surface 10x3   Step ups 4" x 15 each x 2 4" 15x2 BLE 6" 15x2 BLE Forward and lateral 6" 15x2 BLE Forward and lateral   stretches Tensor  Facia: trial 30 sec  Client defered  Standing bilateral gastroc and HS stretch at stairs x1 min each    Seated HS and gastroc stretch x1 min Standing bilateral gastroc and HS stretch at stairs x1 min each      Heel raise 20x2     Mat TE       Seated LE strengthening  3way SLR w/ orange band 10x2 each           Assessment: Tolerated treatment well  Pt performance limited by pain in RLE, requiring frequent breaks during exercises to stretch LEs  Patient exhibited good technique with therapeutic exercises      Plan: Continue per plan of care

## 2018-09-13 NOTE — PROGRESS NOTES
Assessment/Plan:    Patient's thyroid is stable  Antibodies were positive recently  Likely Hashimoto's  Recommend repeat TSH and T4 along with A1c and lipid panel in 6 months  Patient overdue for vitamin-D level  Patient's asthma is stable at this point  Continue present medications  Patient still with undiagnosed neuromuscular disease  Recent lab work including myositis panel was negative  She continues to follow-up with neuromuscular disorders at [facility] as well as Rheumatology with [facility]  Flu shot given today  Narcotic contract signed for intermittent use of her Percocet  Diagnoses and all orders for this visit:    Hypothyroidism due to Hashimoto's thyroiditis  -     TSH, 3rd generation; Future  -     T4, free; Future    Prediabetes  -     Hemoglobin A1C; Future  -     Lipid panel; Future    Mild intermittent asthma without complication  -     Comprehensive metabolic panel; Future    Vitamin D deficiency  -     Vitamin D 25 hydroxy; Future    Depression with anxiety    Ambulatory dysfunction    Need for influenza vaccination  -     influenza vaccine, 4020-3460, quadrivalent, recombinant, PF, 0 5 mL, for patients 18 yr+ (FLUBLOK)        There are no Patient Instructions on file for this visit  Subjective:        Patient ID: Madai Lorenz is a 59 y o  female  Chief Complaint   Patient presents with    Follow-up     6 months, Sx a month ago starte coughing has been very good, it is acting up today, discuss Flu vacc       Patient here for 6 month follow-up regarding multiple medical issues  Last saw [facility] around June  Is due to see her rheumatologist tomorrow  She did obtain full disability  Need for flu shot today  The following portions of the patient's history were reviewed and updated as appropriate: past medical history, past surgical history and problem list       Review of Systems   Constitutional: Positive for fatigue   Negative for appetite change, fever and unexpected weight change  HENT: Negative for congestion, ear pain, postnasal drip, rhinorrhea, sinus pain, sinus pressure and sore throat  Eyes: Negative for redness and visual disturbance  Respiratory: Positive for cough  Negative for chest tightness and shortness of breath  Cardiovascular: Negative for chest pain, palpitations and leg swelling  Gastrointestinal: Negative for abdominal distention, abdominal pain, diarrhea and nausea  Endocrine: Negative for cold intolerance and heat intolerance  Genitourinary: Negative for dysuria and hematuria  Musculoskeletal: Positive for gait problem and myalgias  Negative for arthralgias  Skin: Negative for pallor and rash  Neurological: Positive for weakness  Negative for dizziness and headaches  Psychiatric/Behavioral: Negative for behavioral problems  The patient is nervous/anxious  Objective:  /80 (BP Location: Left arm, Patient Position: Sitting, Cuff Size: Standard)   Ht 5' 7" (1 702 m)   Wt 97 3 kg (214 lb 6 4 oz)   BMI 33 58 kg/m²        Physical Exam   Constitutional: She is oriented to person, place, and time  No distress  Obese   HENT:   Head: Normocephalic and atraumatic  Mouth/Throat: Oropharynx is clear and moist    Eyes: Conjunctivae and EOM are normal  Pupils are equal, round, and reactive to light  No scleral icterus  Neck: Neck supple  No thyromegaly present  Cardiovascular: Normal rate, regular rhythm and intact distal pulses  No murmur heard  Pulmonary/Chest: Effort normal and breath sounds normal  She has no wheezes  Abdominal: Soft  She exhibits no distension  There is no tenderness  Musculoskeletal: She exhibits no edema  Needs walker   Lymphadenopathy:     She has no cervical adenopathy  Neurological: She is alert and oriented to person, place, and time  Coordination abnormal    Skin: Skin is warm  No pallor  Psychiatric: She has a normal mood and affect   Her behavior is normal  Thought content normal    Vitals reviewed

## 2018-09-17 ENCOUNTER — APPOINTMENT (OUTPATIENT)
Dept: PHYSICAL THERAPY | Facility: REHABILITATION | Age: 58
End: 2018-09-17
Payer: COMMERCIAL

## 2018-09-19 ENCOUNTER — EVALUATION (OUTPATIENT)
Dept: PHYSICAL THERAPY | Facility: REHABILITATION | Age: 58
End: 2018-09-19
Payer: COMMERCIAL

## 2018-09-19 DIAGNOSIS — R26.2 AMBULATORY DYSFUNCTION: Primary | ICD-10-CM

## 2018-09-19 PROCEDURE — G8979 MOBILITY GOAL STATUS: HCPCS | Performed by: PHYSICAL THERAPIST

## 2018-09-19 PROCEDURE — G8978 MOBILITY CURRENT STATUS: HCPCS | Performed by: PHYSICAL THERAPIST

## 2018-09-19 PROCEDURE — 97110 THERAPEUTIC EXERCISES: CPT | Performed by: PHYSICAL THERAPIST

## 2018-09-19 NOTE — PROGRESS NOTES
RE-EVALUATION / Daily Note     Today's date: 2018  Patient name: Venice Chan  : 1960  MRN: [id number]  Referring provider: Mike Jordan DO  Dx:   Encounter Diagnosis     ICD-10-CM    1  Ambulatory dysfunction R26 2         Subjective: Patient reports new onset L lower thoracic region pain, about the transverse process and like a percussion stabbing forwards to the anterior lower rib  Patient had been seen by rheumatologist, reports amylase is high  They are in discussion with neurology about treatment  Objective: See treatment diary below  Unable- 5 Times Sit to Stand (17 inch chair, arms across chest), 30 seconds from 19" surface  22 9 seconds 3 Meter Timed Up and Go (using rollator walker)  8 4 seconds 5 Meter Walk Test (using rollator walker) 0 60 meters/second    R knee seated passive flexion to 75 degrees  L knee passive extension to -5 degrees    Exercise Diary  18   walking 12 min with rolling walker    Cues as for hip/knee flex,  Ankle at neutral 9 min with rolling walker    Cues as for hip/knee flex,  Ankle at neutral 10 min with rolling walker    Cues as for hip/knee flex,  Ankle at neutral    Lateral walking w/ rail x 2 min  Backward walking w/ w/ bilateral rail x2 min 10 min with rolling walker    Cues as for hip/knee flex,  Ankle at neutral    Lateral stepping 20ft x4    Standing posterior SLR 10x2 10 min with rolling walker   1/4 squats Sit to stand 10 x 1 STS from 19" surface 10x2 STS from 19" surface 10x2 and x5 STS from 19" surface 10x3 STS from 19" surface 10x3   Step ups 4" x 15 each x 2 4" 15x2 BLE 6" 15x2 BLE Forward and lateral 6" 15x2 BLE Forward and lateral 6" 15x2 BLE Forward and lateral   stretches Tensor  Facia: trial 30 sec   Client defered  Standing bilateral gastroc and HS stretch at stairs x1 min each    Seated HS and gastroc stretch x1 min Standing bilateral gastroc and HS stretch at stairs x1 min each       Heel raise 20x2      Mat TE        Seated LE strengthening  3way SLR w/ orange band 10x2 each          Assessment  Patient seen about once per week over the past month  She was diagnosed with rheumatic condition, which squares with report of multi-joint pain limiting physical activity  She improved slightly in speed of walking but no significant difference in other areas  Suggest continued physical therapy with transition to home exercise program with goal of managing multi-joint pain  Goals  STG 1  Independent w/ HEP  Met  2  Tolerate 5 minutes activity w/o increase in pain  Met for activity, continues for pain with activity  3  Patient reports walking 10 minutes consecutively with community ambulation in 1 month  4  Patient is independent with managing lumbar and thoracic region pain with stretching and self-mobilization  LTG  1  Strength >= 4/5 t/o to improve ADL's  Progressed  2  Tolerate 10 minutes continuous activity without increase in pain to improve community function  Met for activity, continued for pain               3  > 36 Smith to reduce fall risk  Met            4  Gait speed >  5 m/second to facilitate improved community function and reduce fall risk   Met              5  Pain <= 5/10 w/ ADL's   Not met        Plan  Planned therapy interventions: activity modification, balance, functional ROM exercises, gait training, therapeutic exercise, stretching, strengthening, postural training, patient education, neuromuscular re-education, muscle pump exercises, motor coordination training and manual therapy  Frequency: 2x week  Duration in weeks: 1 month

## 2018-09-21 ENCOUNTER — OFFICE VISIT (OUTPATIENT)
Dept: PHYSICAL THERAPY | Facility: REHABILITATION | Age: 58
End: 2018-09-21
Payer: COMMERCIAL

## 2018-09-21 DIAGNOSIS — Z74.09 IMPAIRED MOBILITY: ICD-10-CM

## 2018-09-21 DIAGNOSIS — R26.2 AMBULATORY DYSFUNCTION: Primary | ICD-10-CM

## 2018-09-21 PROCEDURE — 97110 THERAPEUTIC EXERCISES: CPT

## 2018-09-21 PROCEDURE — 97140 MANUAL THERAPY 1/> REGIONS: CPT

## 2018-09-21 NOTE — PROGRESS NOTES
Daily Note     Today's date: 2018  Patient name: Nasreen Drake  : 1960  MRN: [id number]  Referring provider: Elizabeth Murray DO  Dx:   Encounter Diagnosis     ICD-10-CM    1  Ambulatory dysfunction R26 2    2  Impaired mobility Z74 09        Start Time: 818  Stop Time: 0900  Total time in clinic (min): 42 minutes    Subjective: Pt reports her back spasms are continuing  Worse along the side of the L of the lumbar spine    Objective: See treatment diary below    Exercise Diary  18   walking 9 min with rolling walker    Cues as for hip/knee flex,  Ankle at neutral 10 min with rolling walker    Cues as for hip/knee flex,  Ankle at neutral    Lateral walking w/ rail x 2 min  Backward walking w/ w/ bilateral rail x2 min 10 min with rolling walker    Cues as for hip/knee flex,  Ankle at neutral    Lateral stepping 20ft x4    Standing posterior SLR 10x2 10 min with RW     Cues as for hip/knee flex,  Ankle at neutral    Lateral stepping 20ft x4 with 3 lb cuff weight    1/4 squats STS from 19" surface 10x2 STS from 19" surface 10x2 and x5 STS from 19" surface 10x3 STS from 19" surface 10x3   Step ups 4" 15x2 BLE 6" 15x2 BLE Forward and lateral 6" 15x2 BLE Forward and lateral    stretches  Standing bilateral gastroc and HS stretch at stairs x1 min each    Seated HS and gastroc stretch x1 min Standing bilateral gastroc and HS stretch at stairs x1 min each Standing bilateral gastroc and HS stretch at stairs x1 min each    MT    10 min AP mobs to lumbar spine, 5 min AP mobs to R ankle   Mat TE       Seated LE strengthening 3way SLR w/ orange band 10x2 each            Assessment: Tolerated treatment well  Added 3 lb cuff weights as patient reported otherwise exercise was too easy  Patient exhibited good technique with therapeutic exercises      Plan: Continue per plan of care

## 2018-09-26 ENCOUNTER — OFFICE VISIT (OUTPATIENT)
Dept: PHYSICAL THERAPY | Facility: REHABILITATION | Age: 58
End: 2018-09-26
Payer: COMMERCIAL

## 2018-09-26 DIAGNOSIS — Z74.09 IMPAIRED MOBILITY: ICD-10-CM

## 2018-09-26 DIAGNOSIS — R26.2 AMBULATORY DYSFUNCTION: Primary | ICD-10-CM

## 2018-09-26 PROCEDURE — 97110 THERAPEUTIC EXERCISES: CPT

## 2018-09-26 NOTE — PROGRESS NOTES
Daily Note     Today's date: 2018  Patient name: Alireza Salinas  : 1960  MRN: [id number]  Referring provider: Neli Cornejo DO  Dx:   Encounter Diagnosis     ICD-10-CM    1  Ambulatory dysfunction R26 2    2  Impaired mobility Z74 09        Start Time: 911  Stop Time: 952  Total time in clinic (min): 41 minutes    Subjective: Pt reports she is doing well today but was bothered by the smell of cigarette smoke on the  in the [employer] bus this morning  She reports the front of her left leg began to feel tight and painful last night    Objective: See treatment diary below    Exercise Diary  18   walking 10 min with rolling walker    Cues as for hip/knee flex,  Ankle at neutral    Lateral stepping 20ft x4    Standing posterior SLR 10x2 10 min with RW     Cues as for hip/knee flex,  Ankle at neutral    Lateral stepping 20ft x4 with 3 lb cuff weight  10 min with RW     Cues as for hip/knee flex,  Ankle at neutral    Lateral stepping 20ft x4 with 3 lb cuff weight     Standing SLR x10 BLEs   1/4 squats STS from 19" surface 10x3 STS from 19" surface 10x3 STS from 19" surface 10x3   Step ups 6" 15x2 BLE Forward and lateral  At rail: Forwardstep ups 4" 15x2 each  Lateral step ups x10 each  Bilateral toe taps x15     stretches Standing bilateral gastroc and HS stretch at stairs x1 min each Standing bilateral gastroc and HS stretch at stairs x1 min each Standing quadriceps and gastroc stretch between exercises       MT  10 min AP mobs to lumbar spine, 5 min AP mobs to R ankle    Mat TE      Seated LE strengthening            Assessment: Tolerated treatment well  Pt self initiates quadriceps and gastroc stretches between exercise sets  Pt required longer time than usual to complete step ups today, frequently requiring a seated rest and stretching LEs secondary to LLE pain and tightness  Patient exhibited good technique with therapeutic exercises      Plan: Continue per plan of care   Next visit alternate between sitting, standing, and supine TE to allow active rest of muscles for improved tolerance to exercise

## 2018-09-28 ENCOUNTER — OFFICE VISIT (OUTPATIENT)
Dept: PHYSICAL THERAPY | Facility: REHABILITATION | Age: 58
End: 2018-09-28
Payer: COMMERCIAL

## 2018-09-28 DIAGNOSIS — R26.2 AMBULATORY DYSFUNCTION: Primary | ICD-10-CM

## 2018-09-28 DIAGNOSIS — Z74.09 IMPAIRED MOBILITY: ICD-10-CM

## 2018-09-28 PROCEDURE — 97140 MANUAL THERAPY 1/> REGIONS: CPT

## 2018-09-28 PROCEDURE — 97110 THERAPEUTIC EXERCISES: CPT

## 2018-09-28 PROCEDURE — 97112 NEUROMUSCULAR REEDUCATION: CPT

## 2018-09-28 NOTE — PROGRESS NOTES
Daily Note     Today's date: 2018  Patient name: Adina Negrete  : 1960  MRN: [id number]  Referring provider: Arjun Hamilton DO  Dx:   Encounter Diagnosis     ICD-10-CM    1  Ambulatory dysfunction R26 2    2  Impaired mobility Z74 09        Start Time: 905  Stop Time: 945  Total time in clinic (min): 40 minutes    Subjective: Pt reports her back pain along the R side continues  Objective: See treatment diary below    Exercise Diary  18   walking 10 min with RW     Cues as for hip/knee flex,  Ankle at neutral    Lateral stepping 20ft x4 with 3 lb cuff weight  10 min with RW     Cues as for hip/knee flex,  Ankle at neutral    Lateral stepping 20ft x4 with 3 lb cuff weight     Standing SLR x10 BLEs 10 min with RW     Cues as for hip/knee flex,  Ankle at neutral     1/4 squats STS from 19" surface 10x3 STS from 19" surface 10x3    Step ups  At rail: Forwardstep ups 4" 15x2 each  Lateral step ups x10 each  Bilateral toe taps x15      stretches Standing bilateral gastroc and HS stretch at stairs x1 min each Standing quadriceps and gastroc stretch between exercises        MT 10 min AP mobs to lumbar spine, 5 min AP mobs to R ankle  10 min STM to R lumbar paraspinasl and quadratus, 5 min AP mobs to L ankle   Static Balance    Standing on foam, narrow ERICK 10 min  Seated rest break required after 5 min    Seated LE strengthening            Assessment: Tolerated treatment well  Pt with good balance on foam pad with wide ERICK, however, had multiple LOB with narrow ERICK  Required break after standing on foam secondary to cramping in her ankles and calves  Patient exhibited good technique with therapeutic exercises      Plan: Continue per plan of care

## 2018-10-01 ENCOUNTER — OFFICE VISIT (OUTPATIENT)
Dept: PHYSICAL THERAPY | Facility: REHABILITATION | Age: 58
End: 2018-10-01
Payer: COMMERCIAL

## 2018-10-01 DIAGNOSIS — R26.2 AMBULATORY DYSFUNCTION: Primary | ICD-10-CM

## 2018-10-01 PROCEDURE — 97110 THERAPEUTIC EXERCISES: CPT | Performed by: PHYSICAL THERAPIST

## 2018-10-01 NOTE — PROGRESS NOTES
Daily Note     Today's date: 10/1/2018  Patient name: Bree Narayan  : 1960  MRN: [id number]  Referring provider: Aurelia Rosas DO  Dx:   Encounter Diagnosis     ICD-10-CM    1  Ambulatory dysfunction R26 2      Subjective: Patient reports pain about both SI joints with attempting to use elliptical   Pain continues today  Objective: See treatment diary below    Exercise Diary  9/26/18 9/28/18 10/01/18   walking 10 min with RW     Cues as for hip/knee flex,  Ankle at neutral    Lateral stepping 20ft x4 with 3 lb cuff weight     Standing SLR x10 BLEs 10 min with RW     Cues as for hip/knee flex,  Ankle at neutral   2 min with RW  3 min with RW   1/4 squats STS from 19" surface 10x3     Step ups At rail: Forwardstep ups 4" 15x2 each  Lateral step ups x10 each  Bilateral toe taps x15       stretches Standing quadriceps and gastroc stretch between exercises  Standing heelcord and hamstring stretches, 3 min total    MT  10 min STM to R lumbar paraspinasl and quadratus, 5 min AP mobs to L ankle 4 min long axis L hip traction     Static Balance   Standing on foam, narrow ERICK 10 min  Seated rest break required after 5 min  Standing on foam, feet apart:     Horizontal head movements, 1 min  AP weight shift, 1 5 min  Lateral weight shift, 1 5 min  Hip strategy, 1 5 min  EC, 1 5 min     Trunk/LE strengthening   hooklying bridging, 12 x 2 sets  hooklying B/L bent knee raise, 10 x 2 sets  Standing hip abduction, orange band, 12 each x 2 sets    Squat to reach 10" from floor, than 8" from floor, focus on bending knees, 12 x 2 sets    Lateral step ups 6" step, 10 each x 2 sets    Sit to stand, 19" surface, 10    Heel raise, 10 each             Assessment: Continue posterior chain strengthening and lumbar/SI stabilization, as mobility continues to be limited by SI region pain  Patient to continue with overground walking in place of elliptical as needed, in order to maintain mobility        Plan: Continue per plan of care

## 2018-10-03 ENCOUNTER — OFFICE VISIT (OUTPATIENT)
Dept: PHYSICAL THERAPY | Facility: REHABILITATION | Age: 58
End: 2018-10-03
Payer: COMMERCIAL

## 2018-10-03 DIAGNOSIS — Z74.09 IMPAIRED MOBILITY: ICD-10-CM

## 2018-10-03 DIAGNOSIS — R26.2 AMBULATORY DYSFUNCTION: Primary | ICD-10-CM

## 2018-10-03 PROCEDURE — 97110 THERAPEUTIC EXERCISES: CPT

## 2018-10-03 PROCEDURE — 97140 MANUAL THERAPY 1/> REGIONS: CPT

## 2018-10-03 NOTE — PROGRESS NOTES
Daily Note     Today's date: 10/3/2018  Patient name: Petty Hawkins  : 1960  MRN: [id number]  Referring provider: Rosemary Poole DO  Dx:   Encounter Diagnosis     ICD-10-CM    1  Ambulatory dysfunction R26 2    2  Impaired mobility Z74 09         Subjective: Patient reports she feels the "same as usual," she also presents w/ c/o neck pain today  Objective: See treatment diary below    Exercise Diary  9/28/18 10/01/18 10/3/18   walking 10 min with RW     Cues as for hip/knee flex,  Ankle at neutral   2 min with RW  3 min with RW 5 min w/ RW   1/4 squats      Step ups      stretches  Standing heelcord and hamstring stretches, 3 min total Hamstring stretch x2 min each on stairs  Seated quadriceps stretch x2 min    MT 10 min STM to R lumbar paraspinasl and quadratus, 5 min AP mobs to L ankle 4 min long axis L hip traction   Suboccipital release x1 min  STM to upper and lower cervical area x5 min  4 min long axis L hip traction  4 min long axis R hip traction   Static Balance  Standing on foam, narrow ERICK 10 min   Seated rest break required after 5 min  Standing on foam, feet apart:     Horizontal head movements, 1 min  AP weight shift, 1 5 min  Lateral weight shift, 1 5 min  Hip strategy, 1 5 min  EC, 1 5 min      Trunk/LE strengthening  hooklying bridging, 12 x 2 sets  hooklying B/L bent knee raise, 10 x 2 sets  Standing hip abduction, orange band, 12 each x 2 sets    Squat to reach 10" from floor, than 8" from floor, focus on bending knees, 12 x 2 sets    Lateral step ups 6" step, 10 each x 2 sets    Sit to stand, 19" surface, 10    Heel raise, 10 each         Standing hip abduction, orange band, 12 each x 2 sets    Squat to reach 10" from floor, w/ focus on bending knees, 12 x 2 sets    Forward and lateral step ups 6" step, 10 each x 2 sets    Sit to stand, 19" surface, x10, x5    Heel raise, 10 each       Assessment: Pt completed ambulation today w/out need to stop and stretch, however continues to require standing and sitting breaks to stretch hamstrings and quadriceps  LE traction and cervical STM effective in reducing pt pain  Plan: Continue per plan of care

## 2018-10-09 ENCOUNTER — OFFICE VISIT (OUTPATIENT)
Dept: PHYSICAL THERAPY | Facility: REHABILITATION | Age: 58
End: 2018-10-09
Payer: COMMERCIAL

## 2018-10-09 DIAGNOSIS — R26.2 AMBULATORY DYSFUNCTION: Primary | ICD-10-CM

## 2018-10-09 DIAGNOSIS — Z74.09 IMPAIRED MOBILITY: ICD-10-CM

## 2018-10-09 PROCEDURE — 97112 NEUROMUSCULAR REEDUCATION: CPT

## 2018-10-09 PROCEDURE — 97110 THERAPEUTIC EXERCISES: CPT

## 2018-10-09 NOTE — PROGRESS NOTES
Daily Note     Today's date: 10/9/2018  Patient name: Cherelle Kilpatrick  : 1960  MRN: [id number]  Referring provider: Pam Dia DO  Dx:   Encounter Diagnosis     ICD-10-CM    1  Ambulatory dysfunction R26 2    2  Impaired mobility Z74 09         Subjective: Patient reports she feels pain in the anterior aspect of her R shoulder, 9/10 pain      Objective: See treatment diary below    Exercise Diary  10/01/18 10/3/18 10/9/18   walking 2 min with RW  3 min with RW 5 min w/ RW 5 min w/ RW   1/4 squats      Step ups      stretches Standing heelcord and hamstring stretches, 3 min total Hamstring stretch x2 min each on stairs  Seated quadriceps stretch x2 min     MT 4 min long axis L hip traction   Suboccipital release x1 min  STM to upper and lower cervical area x5 min  4 min long axis L hip traction  4 min long axis R hip traction STM to anterior and superior aspect of R shoulder x5 min   Static Balance  Standing on foam, feet apart:     Horizontal head movements, 1 min  AP weight shift, 1 5 min  Lateral weight shift, 1 5 min  Hip strategy, 1 5 min  EC, 1 5 min    Foam:  FA 30 sec x3  AP weight shift 30sec x3 (w/ light touch on rail)    Firm:   FT vertical HTs 30 sec x3  Semitandem 30 sec 3 B   Trunk/LE strengthening hooklying bridging, 12 x 2 sets  hooklying B/L bent knee raise, 10 x 2 sets  Standing hip abduction, orange band, 12 each x 2 sets    Squat to reach 10" from floor, than 8" from floor, focus on bending knees, 12 x 2 sets    Lateral step ups 6" step, 10 each x 2 sets    Sit to stand, 19" surface, 10    Heel raise, 10 each         Standing hip abduction, orange band, 12 each x 2 sets    Squat to reach 10" from floor, w/ focus on bending knees, 12 x 2 sets    Forward and lateral step ups 6" step, 10 each x 2 sets    Sit to stand, 19" surface, x10, x5    Heel raise, 10 each   Standing hip abduction, orange band, 12 each x 2 sets    Forward and lateral step ups 6" step, 10 each x 2 sets    Sit to stand, 19" surface, 10x2    Heel raise, 20 each     Assessment: Pt tolerated treatment well  STM ineffective this visit in reducing R shoulder pain, pt deferred CP to R shoulder in clinic but affirmed she would use it at home  During balance exercises pt experiences moderate instances of posterior LOB, unable to tolerate EC as she continuously experiences LOB  Plan: Continue per plan of care  Progress LE strengthening, incorporating functional activities as able  Follow up w/ pt concerning R shoulder pain

## 2018-10-11 ENCOUNTER — OFFICE VISIT (OUTPATIENT)
Dept: PHYSICAL THERAPY | Facility: REHABILITATION | Age: 58
End: 2018-10-11
Payer: COMMERCIAL

## 2018-10-11 DIAGNOSIS — R26.2 AMBULATORY DYSFUNCTION: Primary | ICD-10-CM

## 2018-10-11 DIAGNOSIS — Z74.09 IMPAIRED MOBILITY: ICD-10-CM

## 2018-10-11 PROCEDURE — 97110 THERAPEUTIC EXERCISES: CPT

## 2018-10-11 PROCEDURE — 97112 NEUROMUSCULAR REEDUCATION: CPT

## 2018-10-11 PROCEDURE — 97140 MANUAL THERAPY 1/> REGIONS: CPT

## 2018-10-11 NOTE — PROGRESS NOTES
Daily Note     Today's date: 10/11/2018  Patient name: Sabine Almanza  : 1960  MRN: [id number]  Referring provider: Brooke Marcial DO  Dx:   Encounter Diagnosis     ICD-10-CM    1  Ambulatory dysfunction R26 2    2  Impaired mobility Z74 09         Subjective: Patient reports the pain in her back as returned  She says she is now able to tolerate her shoe laces being tied on her R foot  Objective: See treatment diary below    Exercise Diary  10/3/18 10/9/18 10/11/18   walking 5 min w/ RW 5 min w/ RW 10 min w/ RW   1/4 squats      Step ups      stretches Hamstring stretch x2 min each on stairs  Seated quadriceps stretch x2 min  2 min HS stretch B, seated     MT Suboccipital release x1 min  STM to upper and lower cervical area x5 min  4 min long axis L hip traction  4 min long axis R hip traction STM to anterior and superior aspect of R shoulder x5 min LA distraction to R ankle, A/P grade 3 mobs R ankle, STM to L lumbar paraspinals and quadratus     15 min total   Static Balance   Foam:  FA 30 sec x3  AP weight shift 30sec x3 (w/ light touch on rail)    Firm:   FT vertical HTs 30 sec x3  Semitandem 30 sec 3 B Foam:   Batting balloon: 3 min  FTEO: 1 min    Firm:  FTEC: 1 min  A/P shifts: 1 min   Trunk/LE strengthening Standing hip abduction, orange band, 12 each x 2 sets    Squat to reach 10" from floor, w/ focus on bending knees, 12 x 2 sets    Forward and lateral step ups 6" step, 10 each x 2 sets    Sit to stand, 19" surface, x10, x5    Heel raise, 10 each   Standing hip abduction, orange band, 12 each x 2 sets    Forward and lateral step ups 6" step, 10 each x 2 sets    Sit to stand, 19" surface, 10x2    Heel raise, 20 each      Assessment: Pt tolerated treatment well  No longer experiencing shoulder pain, therefore MT last session was helpful  Patient will continue to benefit from skilled outpatient physical therapy  Plan: Continue per plan of care  Re-evaluation next session

## 2018-10-15 ENCOUNTER — EVALUATION (OUTPATIENT)
Dept: PHYSICAL THERAPY | Facility: REHABILITATION | Age: 58
End: 2018-10-15
Payer: COMMERCIAL

## 2018-10-15 DIAGNOSIS — R26.2 AMBULATORY DYSFUNCTION: Primary | ICD-10-CM

## 2018-10-15 PROCEDURE — 97112 NEUROMUSCULAR REEDUCATION: CPT

## 2018-10-15 PROCEDURE — 97110 THERAPEUTIC EXERCISES: CPT

## 2018-10-15 PROCEDURE — G8978 MOBILITY CURRENT STATUS: HCPCS

## 2018-10-15 PROCEDURE — G8979 MOBILITY GOAL STATUS: HCPCS

## 2018-10-15 NOTE — PROGRESS NOTES
Re-Evaluation    Today's date: 10/15/2018  Patient name: Gabbi Marshall  : 1960  MRN: [id number]  Referring provider: Court Smart DO  Dx:   Encounter Diagnosis     ICD-10-CM    1  Ambulatory dysfunction R26 2         Subjective: Patient seen in PT  Reports pain B/L ankle/ tibial area 9/10 pain scale  Current mid thoracic back pain 4-8/ 10 (left vs right side)  No falls reported  Objective: See treatment diary below  Gait speed=  51 m/second  Smith= 43/56  Strength= >=4/5 t/o  Exercise Diary  10/3/18 10/9/18 10/11/18 10/15/18   walking 5 min w/ RW 5 min w/ RW 10 min w/ RW 10 min w/ RW   1/4 squats       Step ups       stretches Hamstring stretch x2 min each on stairs  Seated quadriceps stretch x2 min  2 min HS stretch B, seated  2x30 B/L sitting    MT Suboccipital release x1 min  STM to upper and lower cervical area x5 min  4 min long axis L hip traction  4 min long axis R hip traction STM to anterior and superior aspect of R shoulder x5 min LA distraction to R ankle, A/P grade 3 mobs R ankle, STM to L lumbar paraspinals and quadratus     15 min total    Static Balance   Foam:  FA 30 sec x3  AP weight shift 30sec x3 (w/ light touch on rail)    Firm:   FT vertical HTs 30 sec x3  Semitandem 30 sec 3 B Foam:   Batting balloon: 3 min  FTEO: 1 min    Firm:  FTEC: 1 min  A/P shifts: 1 min Foam FA HT 10x horz/ vert   Trunk/LE strengthening Standing hip abduction, orange band, 12 each x 2 sets    Squat to reach 10" from floor, w/ focus on bending knees, 12 x 2 sets    Forward and lateral step ups 6" step, 10 each x 2 sets    Sit to stand, 19" surface, x10, x5    Heel raise, 10 each   Standing hip abduction, orange band, 12 each x 2 sets    Forward and lateral step ups 6" step, 10 each x 2 sets    Sit to stand, 19" surface, 10x2    Heel raise, 20 each  Gait w/ rollator walker x 10 min   No change in pain after session  Goals  STG 1  Independent w/ HEP- met           2   Tolerate 5 minutes activity w/o increase in pain- met    LTG  1  Strength >= 4/5 t/o to improve ADL's- met           2  Tolerate 10 minutes continuous activity without increase in pain to improve- met community function           3  > 36 Smith to reduce fall risk- met           4  Gait speed >  5 m/second to facilitate improved community function and reduce fall risk- met           5  Pain <= 5/10 w/ ADL's- not met           6  New Goal- able to reach to shelf across her body with pain reduced by 25% without loss of balance to improve ADL's  Assessment: Pt tolerated treatment well  Reasessed- met all goals of PT except for pain  Significant improvement in balance and mobility since starting PT  She expressed difficulty with reaching across her body with ADL's for both pain and stability- will extend PT for 2 weeks to address new goal  Also over next 2 weeks will transition patient to home program and/ or aquatic PT  I recommend aquatic PT to patient for pain relief and overall properties of water would significantly reduced symptoms  The patient reports she had bad experience with aquatics in past  She is agreeable to transition to home program or possibly aquatics at another location  She is agreeable with current plan  She expressed understanding of current home program in "circuit" fashion  Plan: Continue per plan of care  Continue PT x 2 weeks, address new LTG   Limit manual therapy and attempt to identify aquatic therapy in area patient can transition to and/ or transition to home exercise program

## 2018-10-18 ENCOUNTER — OFFICE VISIT (OUTPATIENT)
Dept: PHYSICAL THERAPY | Facility: REHABILITATION | Age: 58
End: 2018-10-18
Payer: COMMERCIAL

## 2018-10-18 DIAGNOSIS — R26.2 AMBULATORY DYSFUNCTION: Primary | ICD-10-CM

## 2018-10-18 DIAGNOSIS — Z74.09 IMPAIRED MOBILITY: ICD-10-CM

## 2018-10-18 PROCEDURE — 97112 NEUROMUSCULAR REEDUCATION: CPT | Performed by: PHYSICAL THERAPY ASSISTANT

## 2018-10-18 PROCEDURE — 97110 THERAPEUTIC EXERCISES: CPT | Performed by: PHYSICAL THERAPY ASSISTANT

## 2018-10-18 NOTE — PROGRESS NOTES
Daily Note     Today's date: 10/18/2018  Patient name: Lawyer Gaucher  : 1960  MRN: [id number]  Referring provider: Richard Lance DO  Dx:   Encounter Diagnosis     ICD-10-CM    1  Ambulatory dysfunction R26 2    2  Impaired mobility Z74 09                   Subjective: Pt reports she is not feeling too well this morning  States  She has a HA and was a little dizzy this morning when she woke up  Pt  Reports dizziness has nw resolved      Objective: See treatment diary below  Exercise Diary  10/9/18 10/11/18 10/15/18 10/18/18   walking 5 min w/ RW 10 min w/ RW 10 min w/ RW 10" with RW   1/4 squats       Step ups       stretches  2 min HS stretch B, seated  2x30 B/L sitting 3 x 30" b/l sitting    MT STM to anterior and superior aspect of R shoulder x5 min LA distraction to R ankle, A/P grade 3 mobs R ankle, STM to L lumbar paraspinals and quadratus     15 min total     Static Balance  Foam:  FA 30 sec x3  AP weight shift 30sec x3 (w/ light touch on rail)    Firm:   FT vertical HTs 30 sec x3  Semitandem 30 sec 3 B Foam:   Batting balloon: 3 min  FTEO: 1 min    Firm:  FTEC: 1 min  A/P shifts: 1 min Foam FA HT 10x horz/ vert    Trunk/LE strengthening Standing hip abduction, orange band, 12 each x 2 sets    Forward and lateral step ups 6" step, 10 each x 2 sets    Sit to stand, 19" surface, 10x2    Heel raise, 20 each  Gait w/ rollator walker x 10 min Standing ABD no resistance 2 x 10 each  Heel raises 3 x 10  Mini squats 2 x 10  Step ups fwd/lat 6" 2 x 10 each  sit to stands x 10       Assessment: Tolerated treatment well  Patient demonstrated fatigue post treatment, exhibited good technique with therapeutic exercises and would benefit from continued PT      Plan: Continue per plan of care  Progress treatment as tolerated

## 2018-10-22 ENCOUNTER — OFFICE VISIT (OUTPATIENT)
Dept: PHYSICAL THERAPY | Facility: REHABILITATION | Age: 58
End: 2018-10-22
Payer: COMMERCIAL

## 2018-10-22 DIAGNOSIS — R26.2 AMBULATORY DYSFUNCTION: Primary | ICD-10-CM

## 2018-10-22 PROCEDURE — 97110 THERAPEUTIC EXERCISES: CPT | Performed by: PHYSICAL THERAPIST

## 2018-10-22 PROCEDURE — 97112 NEUROMUSCULAR REEDUCATION: CPT | Performed by: PHYSICAL THERAPIST

## 2018-10-22 NOTE — PROGRESS NOTES
Daily Note     Today's date: 10/22/2018  Patient name: Eula Phillips  : 1960  MRN: [id number]  Referring provider: John Parikh DO  Dx:   Encounter Diagnosis     ICD-10-CM    1  Ambulatory dysfunction R26 2           Subjective: Patient reports no dizziness, says she has been using the elliptical for about 10 minute bouts, usually twice per day, has to stop with onset of thoracic pain  To relieve the thoracic pain, she says that it helps "to collapse to that side "     Objective: See treatment diary below  Exercise Diary  10/15/18 10/18/18 10/22/18   walking 10 min w/ RW 10" with RW 10 minutes with rolling walker   1/4 squats      Step ups      stretches 2x30 B/L sitting 3 x 30" b/l sitting Standing gastroc stretch, 30 sec each  Seated trunk rotation stretches, 20 sec x 2 each      MT      Static Balance  Foam FA HT 10x horz/ vert  On foam:  EC 30 sec x 2  Horizontal head movements 30 sec   Vertical head movements, 30 sec     Trunk/LE strengthening Gait w/ rollator walker x 10 min Standing ABD no resistance 2 x 10 each  Heel raises 3 x 10  Mini squats 2 x 10  Step ups fwd/lat 6" 2 x 10 each  sit to stands x 10 Sit to stand, 19" surface, 10 x 2 sets    Single leg heel raise, 10 x 2 setseach  Step ups 6" step 12 each  Walking with high knees, 1 min         Assessment: Intermittent stretching for hamstrings, into knee flexion, or for gastrocs throughout treatment  Discussed aquatic exercise opportunities, patient would like more individual or solo than small group participation  It seems the most local pools are from [address] [place] [address] or [facility]  Patient should continue regular aerobic exercise at home  Plan: Continue per plan of care  Progress treatment as tolerated

## 2018-10-23 ENCOUNTER — OFFICE VISIT (OUTPATIENT)
Dept: GASTROENTEROLOGY | Facility: MEDICAL CENTER | Age: 58
End: 2018-10-23
Payer: COMMERCIAL

## 2018-10-23 VITALS
HEART RATE: 80 BPM | BODY MASS INDEX: 34.21 KG/M2 | WEIGHT: 218 LBS | TEMPERATURE: 98.7 F | SYSTOLIC BLOOD PRESSURE: 138 MMHG | HEIGHT: 67 IN | DIASTOLIC BLOOD PRESSURE: 80 MMHG

## 2018-10-23 DIAGNOSIS — K59.03 DRUG-INDUCED CONSTIPATION: ICD-10-CM

## 2018-10-23 DIAGNOSIS — K31.84 GASTROPARESIS: ICD-10-CM

## 2018-10-23 DIAGNOSIS — Z12.11 SCREENING FOR COLON CANCER: Primary | ICD-10-CM

## 2018-10-23 PROCEDURE — 99244 OFF/OP CNSLTJ NEW/EST MOD 40: CPT | Performed by: INTERNAL MEDICINE

## 2018-10-23 NOTE — LETTER
October 23, 1253     Angel Nance DO  [address]    Patient: Brennen Lema   YOB: 1960   Date of Visit: 10/23/2018       Dear Dr Jeannie Hope: Thank you for referring Alis Morelos to me for evaluation  Below are my notes for this consultation  If you have questions, please do not hesitate to call me  I look forward to following your patient along with you           Sincerely,        Shawna Hammond MD        CC: No Recipients
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
## 2018-10-25 ENCOUNTER — OFFICE VISIT (OUTPATIENT)
Dept: PHYSICAL THERAPY | Facility: REHABILITATION | Age: 58
End: 2018-10-25
Payer: COMMERCIAL

## 2018-10-25 DIAGNOSIS — Z74.09 IMPAIRED MOBILITY: ICD-10-CM

## 2018-10-25 DIAGNOSIS — R26.2 AMBULATORY DYSFUNCTION: Primary | ICD-10-CM

## 2018-10-25 DIAGNOSIS — F41.9 ANXIETY: Primary | ICD-10-CM

## 2018-10-25 PROCEDURE — 97110 THERAPEUTIC EXERCISES: CPT | Performed by: PHYSICAL THERAPY ASSISTANT

## 2018-10-25 PROCEDURE — 97112 NEUROMUSCULAR REEDUCATION: CPT | Performed by: PHYSICAL THERAPY ASSISTANT

## 2018-10-25 NOTE — PROGRESS NOTES
Daily Note     Today's date: 10/25/2018  Patient name: Ivana Pitts  : 1960  MRN: [id number]  Referring provider: Dale Goodman DO  Dx:   Encounter Diagnosis     ICD-10-CM    1  Ambulatory dysfunction R26 2    2  Impaired mobility Z74 09           Subjective: Pt reporting some lateral left knee pain and general BLE stiffness at the start of therapy today  Pt states she is usually more stiff with colder weather  Objective: See treatment diary below  Exercise Diary  10/15/18 10/18/18 10/22/18 10/25/18   walking 10 min w/ RW 10" with RW 10 minutes with rolling walker 13 min walking with RW   1/4 squats       Step ups       stretches 2x30 B/L sitting 3 x 30" b/l sitting Standing gastroc stretch, 30 sec each  Seated trunk rotation stretches, 20 sec x 2 each   Standing gastroc stretch   30" x 3  Seated trunk rotation stretch  20" x 2    MT       Static Balance  Foam FA HT 10x horz/ vert  On foam:  EC 30 sec x 2  Horizontal head movements 30 sec   Vertical head movements, 30 sec   On foam  EC 30" x 2 head turns x 2    EC 30" x 2  Head nods x 2   Trunk/LE strengthening Gait w/ rollator walker x 10 min Standing ABD no resistance 2 x 10 each  Heel raises 3 x 10  Mini squats 2 x 10  Step ups fwd/lat 6" 2 x 10 each  sit to stands x 10 Sit to stand, 19" surface, 10 x 2 sets    Single leg heel raise, 10 x 2 setseach  Step ups 6" step 12 each  Walking with high knees, 1 min     Sit to stand 19" surface 10 x 2    Single leg HR 10 x 2 each    6" step ups lat x 15 each    Walking high knees 20' x 4 laps    Hip ABD 2 x 10 b/l         Assessment:Pt requires intermittent LE self stretching t/o treatment session for c/o LE tightness with increased activity  Good tolerance to TE as noted  Continue to progress balance, gait and strengthening activities  Plan: Continue per plan of care  Progress treatment as tolerated

## 2018-10-25 NOTE — TELEPHONE ENCOUNTER
Patient no longer sees the doctor that has prescribed her Celebrex and Cymbalta  She is asking if you will take them over and prescribe them for her?     [facility]#[phone] please call patient if Dr Shellie Collet agrees

## 2018-10-29 ENCOUNTER — OFFICE VISIT (OUTPATIENT)
Dept: PHYSICAL THERAPY | Facility: REHABILITATION | Age: 58
End: 2018-10-29
Payer: COMMERCIAL

## 2018-10-29 DIAGNOSIS — Z74.09 IMPAIRED MOBILITY: ICD-10-CM

## 2018-10-29 DIAGNOSIS — R26.2 AMBULATORY DYSFUNCTION: Primary | ICD-10-CM

## 2018-10-29 PROCEDURE — 97112 NEUROMUSCULAR REEDUCATION: CPT

## 2018-10-29 PROCEDURE — 97110 THERAPEUTIC EXERCISES: CPT

## 2018-10-29 NOTE — PROGRESS NOTES
Daily Note     Today's date: 10/29/2018  Patient name: Sabine Almanza  : 1960  MRN: [id number]  Referring provider: Brooke Marcial DO  Dx:   Encounter Diagnosis     ICD-10-CM    1  Ambulatory dysfunction R26 2    2  Impaired mobility Z74 09        Start Time: 1000   Subjective: Pt reports she has sharp pain in her SI joint when she is laying on that side  It is not sharp when she is walking, but she reports 6/10 pain currently  Objective: See treatment diary below  Exercise Diary  10/22/18 10/25/18 10/29/18   walking 10 minutes with rolling walker 13 min walking with RW 10 min w/ RW   1/4 squats      Step ups      stretches Standing gastroc stretch, 30 sec each  Seated trunk rotation stretches, 20 sec x 2 each   Standing gastroc stretch   30" x 3  Seated trunk rotation stretch  20" x 2 Standing HS stretch 30' x3  Standing gastroc stretch 30 sec x3  Standing quad stretch 30" x2    MT      Static Balance  On foam:  EC 30 sec x 2  Horizontal head movements 30 sec   Vertical head movements, 30 sec   On foam  EC 30" x 2 head turns x 2    EC 30" x 2  Head nods x 2 Cross body reach w/ beanbag toss x 6 min  Firm:  FTEO 30 sec x3  FTEC 30 sec x3  Foam:  Narrow stance EC 30 sec x3   Trunk/LE strengthening Sit to stand, 19" surface, 10 x 2 sets    Single leg heel raise, 10 x 2 setseach  Step ups 6" step 12 each  Walking with high knees, 1 min     Sit to stand 19" surface 10 x 2    Single leg HR 10 x 2 each    6" step ups lat x 15 each    Walking high knees 20' x 4 laps    Hip ABD 2 x 10 b/l     Sit to stand 19" surface 10 x 2    Hip ABD 2 x 10 b/l w/ OTB    Single leg HR 10 x 2 each    6" step ups lat x 15 each     Assessment:Pt continues to require intermittent LE self stretching t/o treatment session for c/o LE tightness  Pt experiences repeated posterior LOB with static balance activities  Plan: Continue per plan of care  Progress treatment as tolerated    Continue w/ balance exercises to address pt tendency for posterior LOB

## 2018-10-29 NOTE — TELEPHONE ENCOUNTER
Can you check with the patient regarding taking his Celebrex twice daily  The standard doses typically once daily  Did 1 of her specialists have her increase that to twice daily?

## 2018-10-30 DIAGNOSIS — M50.30 DEGENERATION OF INTERVERTEBRAL DISC OF CERVICAL REGION: ICD-10-CM

## 2018-10-30 DIAGNOSIS — G70.9 NEUROMUSCULAR DISORDER (HCC): Primary | ICD-10-CM

## 2018-10-30 DIAGNOSIS — M79.18 MYOFASCIAL PAIN: ICD-10-CM

## 2018-10-30 RX ORDER — CELECOXIB 200 MG/1
200 CAPSULE ORAL DAILY
Qty: 30 CAPSULE | Refills: 5 | Status: SHIPPED | OUTPATIENT
Start: 2018-10-30 | End: 2019-01-28

## 2018-10-30 RX ORDER — DULOXETIN HYDROCHLORIDE 60 MG/1
60 CAPSULE, DELAYED RELEASE ORAL DAILY
Qty: 30 CAPSULE | Refills: 5 | Status: SHIPPED | OUTPATIENT
Start: 2018-10-30 | End: 2019-06-10 | Stop reason: SDUPTHER

## 2018-10-30 RX ORDER — CELECOXIB 200 MG/1
200 CAPSULE ORAL 2 TIMES DAILY
Qty: 60 CAPSULE | Refills: 3 | OUTPATIENT
Start: 2018-10-30

## 2018-10-30 RX ORDER — DULOXETIN HYDROCHLORIDE 60 MG/1
60 CAPSULE, DELAYED RELEASE ORAL DAILY
Qty: 30 CAPSULE | Refills: 3 | OUTPATIENT
Start: 2018-10-30

## 2018-10-31 ENCOUNTER — OFFICE VISIT (OUTPATIENT)
Dept: PHYSICAL THERAPY | Facility: REHABILITATION | Age: 58
End: 2018-10-31
Payer: COMMERCIAL

## 2018-10-31 DIAGNOSIS — Z74.09 IMPAIRED MOBILITY: ICD-10-CM

## 2018-10-31 DIAGNOSIS — R26.2 AMBULATORY DYSFUNCTION: Primary | ICD-10-CM

## 2018-10-31 PROCEDURE — 97112 NEUROMUSCULAR REEDUCATION: CPT

## 2018-10-31 PROCEDURE — 97110 THERAPEUTIC EXERCISES: CPT

## 2018-10-31 PROCEDURE — 97140 MANUAL THERAPY 1/> REGIONS: CPT

## 2018-10-31 PROCEDURE — G8980 MOBILITY D/C STATUS: HCPCS

## 2018-10-31 PROCEDURE — G8979 MOBILITY GOAL STATUS: HCPCS

## 2018-10-31 NOTE — PROGRESS NOTES
Daily Note / Discharge Summary    Today's date: 10/31/2018  Patient name: Zaira Vega  : 1960  MRN: [id number]  Referring provider: Anastasiia Lozano DO  Dx:   Encounter Diagnosis     ICD-10-CM    1  Ambulatory dysfunction R26 2    2  Impaired mobility Z74 09            Subjective: Pt reports pain in SI joint pain continues  Objective: See treatment diary below  Exercise Diary  10/25/18 10/29/18 10/31/18   walking 13 min walking with RW 10 min w/ RW 12 min w/ RW   1/4 squats      Step ups      stretches Standing gastroc stretch   30" x 3  Seated trunk rotation stretch  20" x 2 Standing HS stretch 30' x3  Standing gastroc stretch 30 sec x3  Standing quad stretch 30" x2 Standing HS stretch 30' x3  Standing gastroc stretch 30 sec x3  Standing quad stretch 30" x2    MT      Static Balance  On foam  EC 30" x 2 head turns x 2    EC 30" x 2  Head nods x 2 Cross body reach w/ beanbag toss x 6 min  Firm:  FTEO 30 sec x3  FTEC 30 sec x3  Foam:  Narrow stance EC 30 sec x3 Cross body reach w/ beanbag toss x 6 min    Biodex:  Lateral shifts, static: 1 min  Lateral shifts, 12: 1 min B UE  AP shifts, static: 1 min  AP shifts, 12: 1 min B UE       Trunk/LE strengthening Sit to stand 19" surface 10 x 2    Single leg HR 10 x 2 each    6" step ups lat x 15 each    Walking high knees 20' x 4 laps    Hip ABD 2 x 10 b/l     Sit to stand 19" surface 10 x 2    Hip ABD 2 x 10 b/l w/ OTB    Single leg HR 10 x 2 each    6" step ups lat x 15 each    MT   AP mobs of R ankle 15 min     Assessment:Pt reports she would like to go on a hiatus from therapy secondary to changes in insurance  Goals not met  See Re-evaluation 10/15/18 for review of goals  Plan: D/C from outpatient physical therapy   Patient to return when insurance is updated in March

## 2018-11-06 ENCOUNTER — OFFICE VISIT (OUTPATIENT)
Dept: FAMILY MEDICINE CLINIC | Facility: CLINIC | Age: 58
End: 2018-11-06
Payer: COMMERCIAL

## 2018-11-06 VITALS
SYSTOLIC BLOOD PRESSURE: 152 MMHG | DIASTOLIC BLOOD PRESSURE: 82 MMHG | BODY MASS INDEX: 34.21 KG/M2 | WEIGHT: 218 LBS | HEIGHT: 67 IN

## 2018-11-06 DIAGNOSIS — H57.12 PAIN OF LEFT EYE: Primary | ICD-10-CM

## 2018-11-06 PROCEDURE — 3008F BODY MASS INDEX DOCD: CPT | Performed by: FAMILY MEDICINE

## 2018-11-06 PROCEDURE — 99213 OFFICE O/P EST LOW 20 MIN: CPT | Performed by: FAMILY MEDICINE

## 2018-11-06 RX ORDER — TOBRAMYCIN 3 MG/ML
1 SOLUTION/ DROPS OPHTHALMIC
Qty: 1 BOTTLE | Refills: 0 | Status: SHIPPED | OUTPATIENT
Start: 2018-11-06 | End: 2020-01-23

## 2018-11-07 NOTE — PROGRESS NOTES
Assessment/Plan:  Treat this as possible corneal abrasion  Tobrex sent to pharmacy  If no better does need to be referred to Ophthalmology  She declines an evaluation today  She will update us in a week as to how she is doing  Otherwise resume normal recheck  Diagnoses and all orders for this visit:    Pain of left eye  -     tobramycin (TOBREX) 0 3 % SOLN; Administer 1 drop into the left eye every 4 (four) hours while awake        1  Pain of left eye  tobramycin (TOBREX) 0 3 % SOLN       Subjective:        Patient ID: Ebony Soni is a 59 y o  female  Chief Complaint   Patient presents with    Fall     pt states on saturday, she lost her balance, she grabbed her bookcase and it fell down with her  she has c/o OS pain, "watery" and sensative to light, not sure if this is from the fall or not  also states aftrer the fall her abdomen was "swollen" but that has subsided    Spasms     L side of neck; started before the fall       Patient states that the case in her house fell on top of her over the weekend  She is mentioning some left eye pain  No blurred vision  Is not sure if  she got hit in the eye  or not  The following portions of the patient's history were reviewed and updated as appropriate: past medical history, past surgical history and problem list       Review of Systems   Eyes: Positive for pain  Negative for discharge, itching and visual disturbance  Respiratory: Negative for shortness of breath  Cardiovascular: Negative for chest pain  Neurological: Negative for dizziness and headaches  Objective:  /82 (BP Location: Left arm, Patient Position: Sitting, Cuff Size: Large)   Ht 5' 7" (1 702 m)   Wt 98 9 kg (218 lb)   BMI 34 14 kg/m²        Physical Exam   Constitutional: She is oriented to person, place, and time  No distress  HENT:   Head: Normocephalic and atraumatic     Nose: Nose normal    Mouth/Throat: Oropharynx is clear and moist    Eyes: Pupils are equal, round, and reactive to light  Conjunctivae and EOM are normal    Neck: Normal range of motion  Cardiovascular: Normal rate and regular rhythm  Pulmonary/Chest: Breath sounds normal    Neurological: She is oriented to person, place, and time  Psychiatric: She has a normal mood and affect  Nursing note and vitals reviewed

## 2018-11-08 ENCOUNTER — RX ONLY (RX ONLY)
Age: 58
End: 2018-11-08

## 2018-11-08 ENCOUNTER — DOCTOR'S OFFICE (OUTPATIENT)
Dept: URBAN - METROPOLITAN AREA CLINIC 136 | Facility: CLINIC | Age: 58
Setting detail: OPHTHALMOLOGY
End: 2018-11-08
Payer: COMMERCIAL

## 2018-11-08 DIAGNOSIS — H16.002: ICD-10-CM

## 2018-11-08 PROBLEM — H01.005 BLEPHARITIS; RIGHT UPPER LID, RIGHT LOWER LID, LEFT UPPER LID, LEFT LOWER LID: Status: ACTIVE | Noted: 2017-11-01

## 2018-11-08 PROBLEM — H43.813 POSTERIOR VITREOUS DETACHMENT; BOTH EYES: Status: ACTIVE | Noted: 2017-10-25

## 2018-11-08 PROBLEM — H53.40 VISUAL FIELD DEFECT: Status: ACTIVE | Noted: 2017-10-25

## 2018-11-08 PROBLEM — H53.2 DIPLOPIA ; LEFT EYE: Status: ACTIVE | Noted: 2017-10-25

## 2018-11-08 PROBLEM — H01.002 BLEPHARITIS; RIGHT UPPER LID, RIGHT LOWER LID, LEFT UPPER LID, LEFT LOWER LID: Status: ACTIVE | Noted: 2017-11-01

## 2018-11-08 PROBLEM — H40.003 GLAUCOMA SUSPECT; BOTH EYES: Status: ACTIVE | Noted: 2017-10-25

## 2018-11-08 PROBLEM — H10.13 ALLERGIC CONJUNCTIVITIS; BOTH EYES: Status: ACTIVE | Noted: 2017-11-01

## 2018-11-08 PROBLEM — H25.13 CATARACT NUCLEAR SCLEROSIS, EARLY; BOTH EYES: Status: ACTIVE | Noted: 2017-10-25

## 2018-11-08 PROBLEM — H01.004 BLEPHARITIS; RIGHT UPPER LID, RIGHT LOWER LID, LEFT UPPER LID, LEFT LOWER LID: Status: ACTIVE | Noted: 2017-11-01

## 2018-11-08 PROBLEM — H01.001 BLEPHARITIS; RIGHT UPPER LID, RIGHT LOWER LID, LEFT UPPER LID, LEFT LOWER LID: Status: ACTIVE | Noted: 2017-11-01

## 2018-11-08 PROCEDURE — 99212 OFFICE O/P EST SF 10 MIN: CPT | Performed by: OPTOMETRIST

## 2018-11-08 ASSESSMENT — REFRACTION_CURRENTRX
OS_OVR_VA: 20/
OD_OVR_VA: 20/
OD_OVR_VA: 20/
OS_OVR_VA: 20/
OS_OVR_VA: 20/
OD_OVR_VA: 20/

## 2018-11-08 ASSESSMENT — REFRACTION_MANIFEST
OU_VA: 20/
OS_VA3: 20/
OS_VA3: 20/
OS_VA1: 20/
OS_VA2: 20/
OD_VA2: 20/
OS_VA1: 20/
OS_VA2: 20/
OD_VA3: 20/
OD_VA1: 20/
OD_VA3: 20/
OD_VA2: 20/
OD_VA1: 20/
OU_VA: 20/

## 2018-11-08 ASSESSMENT — CONFRONTATIONAL VISUAL FIELD TEST (CVF)
OD_FINDINGS: FULL
OS_FINDINGS: FULL

## 2018-11-08 ASSESSMENT — VISUAL ACUITY
OD_BCVA: 20/200
OS_BCVA: 20/30-1

## 2018-11-15 PROCEDURE — G0145 SCR C/V CYTO,THINLAYER,RESCR: HCPCS | Performed by: OBSTETRICS & GYNECOLOGY

## 2018-11-15 PROCEDURE — 87624 HPV HI-RISK TYP POOLED RSLT: CPT | Performed by: OBSTETRICS & GYNECOLOGY

## 2018-11-16 ENCOUNTER — LAB REQUISITION (OUTPATIENT)
Dept: LAB | Facility: HOSPITAL | Age: 58
End: 2018-11-16
Payer: COMMERCIAL

## 2018-11-16 DIAGNOSIS — Z01.419 ENCOUNTER FOR GYNECOLOGICAL EXAMINATION WITHOUT ABNORMAL FINDING: ICD-10-CM

## 2018-11-21 LAB
HPV HR 12 DNA CVX QL NAA+PROBE: NEGATIVE
HPV16 DNA CVX QL NAA+PROBE: NEGATIVE
HPV18 DNA CVX QL NAA+PROBE: NEGATIVE

## 2018-11-26 LAB
LAB AP GYN PRIMARY INTERPRETATION: NORMAL
Lab: NORMAL

## 2019-01-09 ENCOUNTER — TELEPHONE (OUTPATIENT)
Dept: GASTROENTEROLOGY | Facility: CLINIC | Age: 59
End: 2019-01-09

## 2019-01-09 ENCOUNTER — TRANSCRIBE ORDERS (OUTPATIENT)
Dept: ADMINISTRATIVE | Facility: HOSPITAL | Age: 59
End: 2019-01-09

## 2019-01-09 DIAGNOSIS — Z12.31 VISIT FOR SCREENING MAMMOGRAM: Primary | ICD-10-CM

## 2019-01-09 NOTE — TELEPHONE ENCOUNTER
Ananya patient      She would like a call back to schedule her colon---also wants to know if she can take Dedra Worthy    [phone]

## 2019-01-09 NOTE — TELEPHONE ENCOUNTER
Pt would like to wait until her medicare is active and she knows which supplement plan she will be on    Pt will call us back when she is ready to schedule

## 2019-01-10 ENCOUNTER — HOSPITAL ENCOUNTER (OUTPATIENT)
Dept: MAMMOGRAPHY | Facility: MEDICAL CENTER | Age: 59
Discharge: HOME | End: 2019-01-10
Payer: COMMERCIAL

## 2019-01-10 VITALS — HEIGHT: 66 IN | BODY MASS INDEX: 35.03 KG/M2 | WEIGHT: 218 LBS

## 2019-01-10 DIAGNOSIS — Z12.31 VISIT FOR SCREENING MAMMOGRAM: ICD-10-CM

## 2019-01-10 PROCEDURE — 77067 SCR MAMMO BI INCL CAD: CPT

## 2019-01-10 PROCEDURE — 77063 BREAST TOMOSYNTHESIS BI: CPT

## 2019-01-25 ENCOUNTER — TELEPHONE (OUTPATIENT)
Dept: FAMILY MEDICINE CLINIC | Facility: CLINIC | Age: 59
End: 2019-01-25

## 2019-01-25 NOTE — TELEPHONE ENCOUNTER
Patient called and stated that the [employer] is denying the generic Celebrex  The alternatives that they will cover are Naproxen or Diclofenac    She states that if one of these two will work the same as generic celebrex, then can a script be sent to [facility] on [address]   If not, she will need a prior auth through [id number] [phone]

## 2019-01-28 DIAGNOSIS — M79.7 FIBROMYALGIA: Primary | ICD-10-CM

## 2019-01-28 RX ORDER — NAPROXEN 500 MG/1
250 TABLET ORAL 2 TIMES DAILY WITH MEALS
Qty: 60 TABLET | Refills: 0 | Status: SHIPPED | OUTPATIENT
Start: 2019-01-28 | End: 2019-04-16 | Stop reason: SDUPTHER

## 2019-01-28 RX ORDER — NAPROXEN 500 MG/1
250 TABLET ORAL 2 TIMES DAILY WITH MEALS
Qty: 60 TABLET | Refills: 2 | Status: CANCELLED | OUTPATIENT
Start: 2019-01-28

## 2019-01-28 NOTE — TELEPHONE ENCOUNTER
Please pend generic Naprosyn 500 mg twice daily as needed for pain number 60 with 2 refills  Patellar if this does not work as well then the next step would be switching to the Celebrex

## 2019-01-29 NOTE — TELEPHONE ENCOUNTER
Naproxen sent in instead of Celebrex    Patient should update us in 1 month if it is working if it is not then we can try to do a prior authorization on Celebrex

## 2019-03-07 ENCOUNTER — OFFICE VISIT (OUTPATIENT)
Dept: FAMILY MEDICINE CLINIC | Facility: CLINIC | Age: 59
End: 2019-03-07
Payer: MEDICARE

## 2019-03-07 VITALS
BODY MASS INDEX: 35.68 KG/M2 | SYSTOLIC BLOOD PRESSURE: 130 MMHG | WEIGHT: 222 LBS | HEIGHT: 66 IN | DIASTOLIC BLOOD PRESSURE: 90 MMHG | TEMPERATURE: 98 F

## 2019-03-07 DIAGNOSIS — J31.0 RHINITIS, UNSPECIFIED TYPE: Primary | ICD-10-CM

## 2019-03-07 DIAGNOSIS — L60.8 NAIL DEFORMITY: ICD-10-CM

## 2019-03-07 PROCEDURE — 99213 OFFICE O/P EST LOW 20 MIN: CPT | Performed by: FAMILY MEDICINE

## 2019-03-07 RX ORDER — METHYLPREDNISOLONE 4 MG/1
TABLET ORAL
Qty: 21 EACH | Refills: 0 | Status: SHIPPED | OUTPATIENT
Start: 2019-03-07 | End: 2019-04-25

## 2019-03-10 NOTE — PROGRESS NOTES
Assessment/Plan:    Treat rhinitis with Medrol Dosepak  Refer to Podiatry regarding nail deformity  Continue routine evaluations as scheduled  Can use antihistamine as needed  Diagnoses and all orders for this visit:    Rhinitis, unspecified type  -     methylPREDNISolone 4 MG tablet therapy pack; Use as directed on package    Nail deformity  -     Ambulatory referral to Podiatry; Future        1  Rhinitis, unspecified type  methylPREDNISolone 4 MG tablet therapy pack   2  Nail deformity  Ambulatory referral to Podiatry       Subjective:        Patient ID: Oni Mcdonough is a 59 y o  female  Chief Complaint   Patient presents with    Headache    Cold Like Symptoms     poor apetite, eyeys get watery     Cough    Shoulder Pain     R shoulder hurts    Nail Problem     R toe nail hurts, pt wants you to take a look at it   Diarrhea     of and on          Patient with rhinitis type symptoms over the last number of days  Also with  Toenail deformity  The following portions of the patient's history were reviewed and updated as appropriate: past medical history, past surgical history and problem list       Review of Systems   Constitutional: Negative for fever  HENT: Positive for congestion, postnasal drip and rhinorrhea  Negative for ear pain and sore throat  Respiratory: Negative for cough, shortness of breath and wheezing  Cardiovascular: Negative for chest pain  Objective:  /90 (BP Location: Left arm, Patient Position: Sitting, Cuff Size: Standard)   Temp 98 °F (36 7 °C)   Ht 5' 6" (1 676 m)   Wt 101 kg (222 lb)   BMI 35 83 kg/m²        Physical Exam   HENT:   Head: Normocephalic  Right Ear: External ear normal    Left Ear: External ear normal    Nose: Rhinorrhea present  Mouth/Throat: Oropharynx is clear and moist    Eyes: Conjunctivae are normal    Cardiovascular: Normal heart sounds     Pulmonary/Chest: Breath sounds normal    Lymphadenopathy:     She has no cervical adenopathy  Neurological: She is alert  Nursing note and vitals reviewed  BMI Counseling: Body mass index is 35 83 kg/m²  Discussed the patient's BMI with her  The BMI is above average  BMI counseling and education was provided to the patient  Nutrition recommendations include decreasing overall calorie intake  Exercise recommendations include exercising 3-5 times per week

## 2019-03-10 NOTE — PATIENT INSTRUCTIONS
Low Fat Diet   AMBULATORY CARE:   A low-fat diet  is an eating plan that is low in total fat, unhealthy fat, and cholesterol  You may need to follow a low-fat diet if you have trouble digesting or absorbing fat  You may also need to follow this diet if you have high cholesterol  You can also lower your cholesterol by increasing the amount of fiber in your diet  Soluble fiber is a type of fiber that helps to decrease cholesterol levels  Different types of fat in food:   · Limit unhealthy fats  A diet that is high in cholesterol, saturated fat, and trans fat may cause unhealthy cholesterol levels  Unhealthy cholesterol levels increase your risk of heart disease  ¨ Cholesterol:  Limit intake of cholesterol to less than 200 mg per day  Cholesterol is found in meat, eggs, and dairy  ¨ Saturated fat:  Limit saturated fat to less than 7% of your total daily calories  Ask your dietitian how many calories you need each day  Saturated fat is found in butter, cheese, ice cream, whole milk, and palm oil  Saturated fat is also found in meat, such as beef, pork, chicken skin, and processed meats  Processed meats include sausage, hot dogs, and bologna  ¨ Trans fat:  Avoid trans fat as much as possible  Trans fat is used in fried and baked foods  Foods that say trans fat free on the label may still have up to 0 5 grams of trans fat per serving  · Include healthy fats  Replace foods that are high in saturated and trans fat with foods high in healthy fats  This may help to decrease high cholesterol levels  ¨ Monounsaturated fats: These are found in avocados, nuts, and vegetable oils, such as olive, canola, and sunflower oil  ¨ Polyunsaturated fats: These can be found in vegetable oils, such as soybean or corn oil  Omega-3 fats can help to decrease the risk of heart disease  Omega-3 fats are found in fish, such as salmon, herring, trout, and tuna   Omega-3 fats can also be found in plant foods, such as walnuts, flaxseed, soybeans, and canola oil    Foods to limit or avoid:   · Grains:      ¨ Snacks that are made with partially hydrogenated oils, such as chips, regular crackers, and butter-flavored popcorn    ¨ High-fat baked goods, such as biscuits, croissants, doughnuts, pies, cookies, and pastries    · Dairy:      ¨ Whole milk, 2% milk, and yogurt and ice cream made with whole milk    ¨ Half and half creamer, heavy cream, and whipping cream    ¨ Cheese, cream cheese, and sour cream    · Meats and proteins:      ¨ High-fat cuts of meat (T-bone steak, regular hamburger, and ribs)    ¨ Fried meat, poultry (turkey and chicken), and fish    ¨ Poultry (chicken and turkey) with skin    ¨ Cold cuts (salami or bologna), hot dogs, yepez, and sausage    ¨ Whole eggs and egg yolks    · Vegetables and fruits with added fat:      ¨ Fried vegetables or vegetables in butter or high-fat sauces, such as cream or cheese sauces    ¨ Fried fruit or fruit served with butter or cream    · Fats:      ¨ Butter, stick margarine, and shortening    ¨ Coconut, palm oil, and palm kernel oil  Foods to include:   · Grains:      ¨ Whole-grain breads, cereals, pasta, and brown rice    ¨ Low-fat crackers and pretzels    · Vegetables and fruits:      ¨ Fresh, frozen, or canned vegetables (no salt or low-sodium)    ¨ Fresh, frozen, dried, or canned fruit (canned in light syrup or fruit juice)    ¨ Avocado    · Low-fat dairy products:      ¨ Nonfat (skim) or 1% milk    ¨ Nonfat or low-fat cheese, yogurt, and cottage cheese    · Meats and proteins:      ¨ Chicken or turkey with no skin    ¨ Baked or broiled fish    ¨ Lean beef and pork (loin, round, extra lean hamburger)    ¨ Beans and peas, unsalted nuts, soy products    ¨ Egg whites and substitutes    ¨ Seeds and nuts    · Fats:      ¨ Unsaturated oil, such as canola, olive, peanut, soybean, or sunflower oil    ¨ Soft or liquid margarine and vegetable oil spread    ¨ Low-fat salad dressing  Other ways to decrease fat:   · Read food labels before you buy foods  Choose foods that have less than 30% of calories from fat  Choose low-fat or fat-free dairy products  Remember that fat free does not mean calorie free  These foods still contain calories, and too many calories can lead to weight gain  · Trim fat from meat and avoid fried food  Trim all visible fat from meat before you cook it  Remove the skin from poultry  Do not anne meat, fish, or poultry  Bake, roast, boil, or broil these foods instead  Avoid fried foods  Eat a baked potato instead of [employer] fries  Steam vegetables instead of sautéing them in butter  · Add less fat to foods  Use imitation yepez bits on salads and baked potatoes instead of regular yepez bits  Use fat-free or low-fat salad dressings instead of regular dressings  Use low-fat or nonfat butter-flavored topping instead of regular butter or margarine on popcorn and other foods  Ways to decrease fat in recipes:  Replace high-fat ingredients with low-fat or nonfat ones  This may cause baked goods to be drier than usual  You may need to use nonfat cooking spray on pans to prevent food from sticking  You also may need to change the amount of other ingredients, such as water, in the recipe  Try the following:  · Use low-fat or light margarine instead of regular margarine or shortening  · Use lean ground turkey breast or chicken, or lean ground beef (less than 5% fat) instead of hamburger  · Add 1 teaspoon of canola oil to 8 ounces of skim milk instead of using cream or half and half  · Use grated zucchini, carrots, or apples in breads instead of coconut  · Use blenderized, low-fat cottage cheese, plain tofu, or low-fat ricotta cheese instead of cream cheese  · Use 1 egg white and 1 teaspoon of canola oil, or use ¼ cup (2 ounces) of fat-free egg substitute instead of a whole egg       · Replace half of the oil that is called for in a recipe with applesauce when you bake  Use 3 tablespoons of cocoa powder and 1 tablespoon of canola oil instead of a square of baking chocolate  How to increase fiber:  Eat enough high-fiber foods to get 20 to 30 grams of fiber every day  Slowly increase your fiber intake to avoid stomach cramps, gas, and other problems  · Eat 3 ounces of whole-grain foods each day  An ounce is about 1 slice of bread  Eat whole-grain breads, such as whole-wheat bread  Whole wheat, whole-wheat flour, or other whole grains should be listed as the first ingredient on the food label  Replace white flour with whole-grain flour or use half of each in recipes  Whole-grain flour is heavier than white flour, so you may have to add more yeast or baking powder  · Eat a high-fiber cereal for breakfast   Oatmeal is a good source of soluble fiber  Look for cereals that have bran or fiber in the name  Choose whole-grain products, such as brown rice, barley, and whole-wheat pasta  · Eat more beans, peas, and lentils  For example, add beans to soups or salads  Eat at least 5 cups of fruits and vegetables each day  Eat fruits and vegetables with the peel because the peel is high in fiber  © 2017 [employer] [employer] Information is for End User's use only and may not be sold, redistributed or otherwise used for commercial purposes  All illustrations and images included in CareNotes® are the copyrighted property of [employer]  or Andrea Mendes  The above information is an  only  It is not intended as medical advice for individual conditions or treatments  Talk to your doctor, nurse or pharmacist before following any medical regimen to see if it is safe and effective for you  Heart Healthy Diet   AMBULATORY CARE:   A heart healthy diet  is an eating plan low in total fat, unhealthy fats, and sodium (salt)  A heart healthy diet helps decrease your risk for heart disease and stroke   Limit the amount of fat you eat to 25% to 35% of your total daily calories  Limit sodium to less than 2,300 mg each day  Healthy fats:  Healthy fats can help improve cholesterol levels  The risk for heart disease is decreased when cholesterol levels are normal  Choose healthy fats, such as the following:  · Unsaturated fat  is found in foods such as soybean, canola, olive, corn, and safflower oils  It is also found in soft tub margarine that is made with liquid vegetable oil  · Omega-3 fat  is found in certain fish, such as salmon, tuna, and trout, and in walnuts and flaxseed  Unhealthy fats:  Unhealthy fats can cause unhealthy cholesterol levels in your blood and increase your risk of heart disease  Limit unhealthy fats, such as the following:  · Cholesterol  is found in animal foods, such as eggs and lobster, and in dairy products made from whole milk  Limit cholesterol to less than 300 milligrams (mg) each day  You may need to limit cholesterol to 200 mg each day if you have heart disease  · Saturated fat  is found in meats, such as yepez and hamburger  It is also found in chicken or turkey skin, whole milk, and butter  Limit saturated fat to less than 7% of your total daily calories  Limit saturated fat to less than 6% if you have heart disease or are at increased risk for it  · Trans fat  is found in packaged foods, such as potato chips and cookies  It is also in hard margarine, some fried foods, and shortening  Avoid trans fats as much as possible    Heart healthy foods and drinks to include:  Ask your dietitian or healthcare provider how many servings to have from each of the following food groups:  · Grains:      ¨ Whole-wheat breads, cereals, and pastas, and brown rice    ¨ Low-fat, low-sodium crackers and chips    · Vegetables:      ¨ Broccoli, green beans, green peas, and spinach    ¨ Collards, kale, and lima beans    ¨ Carrots, sweet potatoes, tomatoes, and peppers    ¨ Canned vegetables with no salt added    · Fruits:      ¨ Bananas, peaches, pears, and pineapple    ¨ Grapes, raisins, and dates    ¨ Oranges, tangerines, grapefruit, orange juice, and grapefruit juice    ¨ Apricots, mangoes, melons, and papaya    ¨ Raspberries and strawberries    ¨ Canned fruit with no added sugar    · Low-fat dairy products:      ¨ Nonfat (skim) milk, 1% milk, and low-fat almond, cashew, or soy milks fortified with calcium    ¨ Low-fat cheese, regular or frozen yogurt, and cottage cheese    · Meats and proteins , such as lean cuts of beef and pork (loin, leg, round), skinless chicken and turkey, legumes, soy products, egg whites, and nuts  Foods and drinks to limit or avoid:  Ask your dietitian or healthcare provider about these and other foods that are high in unhealthy fat, sodium, and sugar:  · Snack or packaged foods , such as frozen dinners, cookies, macaroni and cheese, and cereals with more than 300 mg of sodium per serving    · Canned or dry mixes  for cakes, soups, sauces, or gravies    · Vegetables with added sodium , such as instant potatoes, vegetables with added sauces, or regular canned vegetables    · Other foods high in sodium , such as ketchup, barbecue sauce, salad dressing, pickles, olives, soy sauce, and miso    · High-fat dairy foods  such as whole or 2% milk, cream cheese, or sour cream, and cheeses     · High-fat protein foods  such as high-fat cuts of beef (T-bone steaks, ribs), chicken or turkey with skin, and organ meats, such as liver    · Cured or smoked meats , such as hot dogs, yepez, and sausage    · Unhealthy fats and oils , such as butter, stick margarine, shortening, and cooking oils such as coconut or palm oil    · Food and drinks high in sugar , such as soft drinks (soda), sports drinks, sweetened tea, candy, cake, cookies, pies, and doughnuts  Other diet guidelines to follow:   · Eat more foods containing omega-3 fats  Eat fish high in omega-3 fats at least 2 times a week  · Limit alcohol    Too much alcohol can damage your heart and raise your blood pressure  Women should limit alcohol to 1 drink a day  Men should limit alcohol to 2 drinks a day  A drink of alcohol is 12 ounces of beer, 5 ounces of wine, or 1½ ounces of liquor  · Choose low-sodium foods  High-sodium foods can lead to high blood pressure  Add little or no salt to food you prepare  Use herbs and spices in place of salt  · Eat more fiber  to help lower cholesterol levels  Eat at least 5 servings of fruits and vegetables each day  Eat 3 ounces of whole-grain foods each day  Legumes (beans) are also a good source of fiber  Lifestyle guidelines:   · Do not smoke  Nicotine and other chemicals in cigarettes and cigars can cause lung and heart damage  Ask your healthcare provider for information if you currently smoke and need help to quit  E-cigarettes or smokeless tobacco still contain nicotine  Talk to your healthcare provider before you use these products  · Exercise regularly  to help you maintain a healthy weight and improve your blood pressure and cholesterol levels  Ask your healthcare provider about the best exercise plan for you  Do not start an exercise program without asking your healthcare provider  Follow up with your healthcare provider as directed:  Write down your questions so you remember to ask them during your visits  © 2017 [address] [facility] Information is for End User's use only and may not be sold, redistributed or otherwise used for commercial purposes  All illustrations and images included in CareNotes® are the copyrighted property of [employer]  or Andrea Mendes  The above information is an  only  It is not intended as medical advice for individual conditions or treatments  Talk to your doctor, nurse or pharmacist before following any medical regimen to see if it is safe and effective for you  Calorie Counting Diet   WHAT YOU NEED TO KNOW:   What is a calorie counting diet?   It is a meal plan based on counting calories each day to reach a healthy body weight  You will need to eat fewer calories if you are trying to lose weight  Weight loss may decrease your risk for certain health problems or improve your health if you have health problems  Some of these health problems include heart disease, high blood pressure, and diabetes  What foods should I avoid? Your dietitian will tell you if you need to avoid certain foods based on your body weight and health condition  You may need to avoid high-fat foods if you are at risk for or have heart disease  You may need to eat fewer foods from the breads and starches food group if you have diabetes  How many calories are in foods? The following is a list of foods and drinks with the approximate number of calories in each  Check the food label to find the exact number of calories  A dietitian can tell you how many calories you should have from each food group each day    · Carbohydrate:      ¨ ½ of a 3-inch bagel, 1 slice of bread, or ½ of a hamburger bun or hot dog bun (80)    ¨ 1 (8-inch) flour tortilla or ½ cup of cooked rice (100)    ¨ 1 (6-inch) corn tortilla (80)    ¨ 1 (6-inch) pancake or 1 cup of bran flakes cereal (110)    ¨ ½ cup of cooked cereal (80)    ¨ ½ cup of cooked pasta (85)    ¨ 1 ounce of pretzels (100)    ¨ 3 cups of air-popped popcorn without butter or oil (80)    · Dairy:      ¨ 1 cup of skim or 1% milk (90)    ¨ 1 cup of 2% milk (120)    ¨ 1 cup of whole milk (160)    ¨ 1 cup of 2% chocolate milk (220)    ¨ 1 ounce of low-fat cheese with 3 grams of fat per ounce (70)    ¨ 1 ounce of cheddar cheese (114)    ¨ ½ cup of 1% fat cottage cheese (80)    ¨ 1 cup of plain or sugar-free, fat-free yogurt (90)    · Protein foods:      ¨ 3 ounces of fish (not breaded or fried) (95)    ¨ 3 ounces of breaded, fried fish (195)    ¨ ¾ cup of tuna canned in water (105)    ¨ 3 ounces of chicken breast without skin (105)    ¨ 1 fried chicken breast with skin (350)    ¨ ¼ cup of fat free egg substitute (40)    ¨ 1 large egg (75)    ¨ 3 ounces of lean beef or pork (165)    ¨ 3 ounces of fried pork chop or ham (185)    ¨ ½ cup of cooked dried beans, such as kidney, stewart, lentils, or navy (115)    ¨ 3 ounces of bologna or lunch meat (225)    ¨ 2 links of breakfast sausage (140)    · Vegetables:      ¨ ½ cup of sliced mushrooms (10)    ¨ 1 cup of salad greens, such as lettuce, spinach, or hardy (15)    ¨ ½ cup of steamed asparagus (20)    ¨ ½ cup of cooked summer squash, zucchini squash, or green or wax beans (25)    ¨ 1 cup of broccoli or cauliflower florets, or 1 medium tomato (25)    ¨ 1 large raw carrot or ½ cup of cooked carrots (40)    ¨ ? of a medium cucumber or 1 stalk of celery (5)    ¨ 1 small baked potato (160)    ¨ 1 cup of breaded, fried vegetables (230)    · Fruit:      ¨ 1 (6-inch) banana (55)     ¨ ½ of a 4-inch grapefruit (55)    ¨ 15 grapes (60)    ¨ 1 medium orange or apple (70)    ¨ 1 large peach (65)    ¨ 1 cup of fresh pineapple chunks (75)    ¨ 1 cup of melon cubes (50)    ¨ 1¼ cups of whole strawberries (45)    ¨ ½ cup of fruit canned in juice (55)    ¨ ½ cup of fruit canned in heavy syrup (110)    ¨ ?  cup of raisins (130)    ¨ ½ cup of unsweetened fruit juice (60)    ¨ ½ cup of grape, cranberry, or prune juice (90)    · Fat:      ¨ 10 peanuts or 2 teaspoons of peanut butter (55)    ¨ 2 tablespoons of avocado or 1 tablespoon of regular salad dressing (45)    ¨ 2 slices of yepez (90)    ¨ 1 teaspoon of oil, such as safflower, canola, corn, or olive oil (45)    ¨ 2 teaspoons of low-fat margarine, or 1 tablespoon of low-fat mayonnaise (50)    ¨ 1 teaspoon of regular margarine (40)    ¨ 1 tablespoon of regular mayonnaise (135)    ¨ 1 tablespoon of cream cheese or 2 tablespoons of low-fat cream cheese (45)    ¨ 2 tablespoons of vegetable shortening (215)    · Dessert and sweets:      ¨ 8 animal crackers or 5 vanilla wafers (80)    ¨ 1 frozen fruit juice bar (80)    ¨ ½ cup of ice milk or low-fat frozen yogurt (90)    ¨ ½ cup of sherbet or sorbet (125)    ¨ ½ cup of sugar-free pudding or custard (60)    ¨ ½ cup of ice cream (140)    ¨ ½ cup of pudding or custard (175)    ¨ 1 (2-inch) square chocolate brownie (185)    · Combination foods:      ¨ Bean burrito made with an 8-inch tortilla, without cheese (275)    ¨ Chicken breast sandwich with lettuce and tomato (325)    ¨ 1 cup of chicken noodle soup (60)    ¨ 1 beef taco (175)    ¨ Regular hamburger with lettuce and tomato (310)    ¨ Regular cheeseburger with lettuce and tomato (410)     ¨ ¼ of a 12-inch cheese pizza (280)    ¨ Fried fish sandwich with lettuce and tomato (425)    ¨ Hot dog and bun (275)    ¨ 1½ cups of macaroni and cheese (310)    ¨ Taco salad with a fried tortilla shell (870)    · Low-calorie foods:      ¨ 1 tablespoon of ketchup or 1 tablespoon of fat free sour cream (15)    ¨ 1 teaspoon of mustard (5)    ¨ ¼ cup of salsa (20)    ¨ 1 large dill pickle (15)    ¨ 1 tablespoon of fat free salad dressing (10)    ¨ 2 teaspoons of low-sugar, light jam or jelly, or 1 tablespoon of sugar-free syrup (15)    ¨ 1 sugar-free popsicle (15)    ¨ 1 cup of club soda, seltzer water, or diet soda (0)  CARE AGREEMENT:   You have the right to help plan your care  Discuss treatment options with your caregivers to decide what care you want to receive  You always have the right to refuse treatment  The above information is an  only  It is not intended as medical advice for individual conditions or treatments  Talk to your doctor, nurse or pharmacist before following any medical regimen to see if it is safe and effective for you  © 2017 [address] [employer] Information is for End User's use only and may not be sold, redistributed or otherwise used for commercial purposes   All illustrations and images included in CareNotes® are the copyrighted property of [employer]  or [facility] [employer]

## 2019-03-19 DIAGNOSIS — J30.89 SEASONAL ALLERGIC RHINITIS DUE TO OTHER ALLERGIC TRIGGER: ICD-10-CM

## 2019-03-19 RX ORDER — CETIRIZINE HYDROCHLORIDE 10 MG/1
10 TABLET ORAL DAILY
Qty: 30 TABLET | Refills: 0 | Status: SHIPPED | OUTPATIENT
Start: 2019-03-19 | End: 2019-04-16 | Stop reason: SDUPTHER

## 2019-04-14 DIAGNOSIS — E03.9 HYPOTHYROIDISM, UNSPECIFIED TYPE: ICD-10-CM

## 2019-04-14 RX ORDER — LEVOTHYROXINE SODIUM 175 UG/1
175 TABLET ORAL DAILY
Qty: 90 TABLET | Refills: 3 | Status: SHIPPED | OUTPATIENT
Start: 2019-04-14 | End: 2019-07-18

## 2019-04-16 DIAGNOSIS — J30.89 SEASONAL ALLERGIC RHINITIS DUE TO OTHER ALLERGIC TRIGGER: ICD-10-CM

## 2019-04-16 DIAGNOSIS — M79.7 FIBROMYALGIA: ICD-10-CM

## 2019-04-16 RX ORDER — CETIRIZINE HYDROCHLORIDE 10 MG/1
10 TABLET ORAL DAILY
Qty: 30 TABLET | Refills: 3 | Status: SHIPPED | OUTPATIENT
Start: 2019-04-16 | End: 2019-05-10 | Stop reason: SDUPTHER

## 2019-04-16 RX ORDER — NAPROXEN 500 MG/1
TABLET ORAL
Qty: 60 TABLET | Refills: 3 | Status: SHIPPED | OUTPATIENT
Start: 2019-04-16 | End: 2019-07-18

## 2019-04-23 ENCOUNTER — OFFICE VISIT (OUTPATIENT)
Dept: PSYCHIATRY | Facility: CLINIC | Age: 59
End: 2019-04-23
Payer: MEDICARE

## 2019-04-23 DIAGNOSIS — G47.19 EXCESSIVE DAYTIME SLEEPINESS: ICD-10-CM

## 2019-04-23 DIAGNOSIS — F43.23 ADJUSTMENT DISORDER WITH MIXED ANXIETY AND DEPRESSED MOOD: ICD-10-CM

## 2019-04-23 DIAGNOSIS — R06.83 SNORING: Primary | ICD-10-CM

## 2019-04-23 PROCEDURE — 90792 PSYCH DIAG EVAL W/MED SRVCS: CPT | Performed by: PSYCHIATRY & NEUROLOGY

## 2019-04-23 RX ORDER — DULOXETIN HYDROCHLORIDE 30 MG/1
30 CAPSULE, DELAYED RELEASE ORAL DAILY
Qty: 30 CAPSULE | Refills: 2 | Status: SHIPPED | OUTPATIENT
Start: 2019-04-23 | End: 2019-08-03 | Stop reason: SDUPTHER

## 2019-04-25 ENCOUNTER — TELEPHONE (OUTPATIENT)
Dept: NEUROLOGY | Facility: CLINIC | Age: 59
End: 2019-04-25

## 2019-04-25 ENCOUNTER — OFFICE VISIT (OUTPATIENT)
Dept: FAMILY MEDICINE CLINIC | Facility: CLINIC | Age: 59
End: 2019-04-25
Payer: MEDICARE

## 2019-04-25 VITALS
DIASTOLIC BLOOD PRESSURE: 72 MMHG | BODY MASS INDEX: 35.16 KG/M2 | SYSTOLIC BLOOD PRESSURE: 140 MMHG | HEIGHT: 67 IN | WEIGHT: 224 LBS

## 2019-04-25 DIAGNOSIS — E03.8 HYPOTHYROIDISM DUE TO HASHIMOTO'S THYROIDITIS: Primary | ICD-10-CM

## 2019-04-25 DIAGNOSIS — G25.9 MOVEMENT DISORDER: ICD-10-CM

## 2019-04-25 DIAGNOSIS — E06.3 HYPOTHYROIDISM DUE TO HASHIMOTO'S THYROIDITIS: Primary | ICD-10-CM

## 2019-04-25 DIAGNOSIS — G70.9 NEUROMUSCULAR DISORDER (HCC): ICD-10-CM

## 2019-04-25 DIAGNOSIS — F32.0 CURRENT MILD EPISODE OF MAJOR DEPRESSIVE DISORDER WITHOUT PRIOR EPISODE (HCC): ICD-10-CM

## 2019-04-25 DIAGNOSIS — G54.0 THORACIC OUTLET SYNDROME: ICD-10-CM

## 2019-04-25 DIAGNOSIS — G47.19 EXCESSIVE DAYTIME SLEEPINESS: ICD-10-CM

## 2019-04-25 PROBLEM — Z86.718 HISTORY OF DVT (DEEP VEIN THROMBOSIS): Status: ACTIVE | Noted: 2018-06-12

## 2019-04-25 PROCEDURE — 99214 OFFICE O/P EST MOD 30 MIN: CPT | Performed by: FAMILY MEDICINE

## 2019-04-26 ENCOUNTER — TELEPHONE (OUTPATIENT)
Dept: NEUROLOGY | Facility: CLINIC | Age: 59
End: 2019-04-26

## 2019-05-02 ENCOUNTER — CONSULT (OUTPATIENT)
Dept: NEUROLOGY | Facility: CLINIC | Age: 59
End: 2019-05-02
Payer: MEDICARE

## 2019-05-02 VITALS
WEIGHT: 222.1 LBS | DIASTOLIC BLOOD PRESSURE: 80 MMHG | HEIGHT: 67 IN | HEART RATE: 95 BPM | BODY MASS INDEX: 34.86 KG/M2 | SYSTOLIC BLOOD PRESSURE: 120 MMHG

## 2019-05-02 DIAGNOSIS — G70.9 NEUROMUSCULAR DISORDER (HCC): Primary | ICD-10-CM

## 2019-05-02 DIAGNOSIS — G25.9 MOVEMENT DISORDER: ICD-10-CM

## 2019-05-02 DIAGNOSIS — M79.10 MYALGIA: ICD-10-CM

## 2019-05-02 DIAGNOSIS — G89.29 CHRONIC FOOT PAIN, RIGHT: ICD-10-CM

## 2019-05-02 DIAGNOSIS — M79.671 CHRONIC FOOT PAIN, RIGHT: ICD-10-CM

## 2019-05-02 PROCEDURE — 99205 OFFICE O/P NEW HI 60 MIN: CPT | Performed by: PSYCHIATRY & NEUROLOGY

## 2019-05-06 ENCOUNTER — TRANSCRIBE ORDERS (OUTPATIENT)
Dept: ADMINISTRATIVE | Facility: HOSPITAL | Age: 59
End: 2019-05-06

## 2019-05-06 DIAGNOSIS — G89.29 CHRONIC RIGHT SHOULDER PAIN: Primary | ICD-10-CM

## 2019-05-06 DIAGNOSIS — M25.511 CHRONIC RIGHT SHOULDER PAIN: Primary | ICD-10-CM

## 2019-05-07 ENCOUNTER — HOSPITAL ENCOUNTER (OUTPATIENT)
Dept: MRI IMAGING | Facility: HOSPITAL | Age: 59
Discharge: HOME | End: 2019-05-07
Payer: MEDICARE

## 2019-05-07 DIAGNOSIS — M25.511 CHRONIC RIGHT SHOULDER PAIN: ICD-10-CM

## 2019-05-07 DIAGNOSIS — G89.29 CHRONIC RIGHT SHOULDER PAIN: ICD-10-CM

## 2019-05-07 PROCEDURE — 73221 MRI JOINT UPR EXTREM W/O DYE: CPT

## 2019-05-10 ENCOUNTER — TELEPHONE (OUTPATIENT)
Dept: FAMILY MEDICINE CLINIC | Facility: CLINIC | Age: 59
End: 2019-05-10

## 2019-05-10 ENCOUNTER — APPOINTMENT (OUTPATIENT)
Dept: LAB | Facility: CLINIC | Age: 59
End: 2019-05-10
Payer: MEDICARE

## 2019-05-10 DIAGNOSIS — J45.20 MILD INTERMITTENT ASTHMA WITHOUT COMPLICATION: ICD-10-CM

## 2019-05-10 DIAGNOSIS — M79.10 MYALGIA: ICD-10-CM

## 2019-05-10 DIAGNOSIS — N18.9 CHRONIC KIDNEY DISEASE, UNSPECIFIED CKD STAGE: ICD-10-CM

## 2019-05-10 DIAGNOSIS — R73.03 PREDIABETES: ICD-10-CM

## 2019-05-10 DIAGNOSIS — E55.9 VITAMIN D DEFICIENCY: ICD-10-CM

## 2019-05-10 DIAGNOSIS — R29.898 DEFICIENCIES OF LIMBS: ICD-10-CM

## 2019-05-10 DIAGNOSIS — J30.89 SEASONAL ALLERGIC RHINITIS DUE TO OTHER ALLERGIC TRIGGER: ICD-10-CM

## 2019-05-10 DIAGNOSIS — R53.1 ASTHENIA: ICD-10-CM

## 2019-05-10 DIAGNOSIS — E06.3 HYPOTHYROIDISM DUE TO HASHIMOTO'S THYROIDITIS: ICD-10-CM

## 2019-05-10 DIAGNOSIS — E03.8 HYPOTHYROIDISM DUE TO HASHIMOTO'S THYROIDITIS: ICD-10-CM

## 2019-05-10 DIAGNOSIS — D64.9 ANEMIA, UNSPECIFIED TYPE: Primary | ICD-10-CM

## 2019-05-10 DIAGNOSIS — J30.89 SEASONAL ALLERGIC RHINITIS DUE TO OTHER ALLERGIC TRIGGER: Primary | ICD-10-CM

## 2019-05-10 LAB
25(OH)D3 SERPL-MCNC: 18.3 NG/ML (ref 30–100)
ALBUMIN SERPL BCP-MCNC: 4 G/DL (ref 3.5–5)
ALP SERPL-CCNC: 122 U/L (ref 46–116)
ALT SERPL W P-5'-P-CCNC: 38 U/L (ref 12–78)
ANION GAP SERPL CALCULATED.3IONS-SCNC: 6 MMOL/L (ref 4–13)
AST SERPL W P-5'-P-CCNC: 26 U/L (ref 5–45)
BILIRUB SERPL-MCNC: 0.51 MG/DL (ref 0.2–1)
BUN SERPL-MCNC: 20 MG/DL (ref 5–25)
CALCIUM SERPL-MCNC: 8.4 MG/DL (ref 8.3–10.1)
CHLORIDE SERPL-SCNC: 108 MMOL/L (ref 100–108)
CHOLEST SERPL-MCNC: 226 MG/DL (ref 50–200)
CK SERPL-CCNC: 111 U/L (ref 26–192)
CO2 SERPL-SCNC: 27 MMOL/L (ref 21–32)
CREAT SERPL-MCNC: 0.84 MG/DL (ref 0.6–1.3)
CRP SERPL QL: 8.3 MG/L
ERYTHROCYTE [DISTWIDTH] IN BLOOD BY AUTOMATED COUNT: 12.8 % (ref 11.6–15.1)
ERYTHROCYTE [SEDIMENTATION RATE] IN BLOOD: 21 MM/HOUR (ref 0–20)
EST. AVERAGE GLUCOSE BLD GHB EST-MCNC: 140 MG/DL
GFR SERPL CREATININE-BSD FRML MDRD: 76 ML/MIN/1.73SQ M
GLUCOSE P FAST SERPL-MCNC: 117 MG/DL (ref 65–99)
HBA1C MFR BLD: 6.5 % (ref 4.2–6.3)
HCT VFR BLD AUTO: 43.6 % (ref 34.8–46.1)
HDLC SERPL-MCNC: 51 MG/DL (ref 40–60)
HGB BLD-MCNC: 14.1 G/DL (ref 11.5–15.4)
LDLC SERPL CALC-MCNC: 147 MG/DL (ref 0–100)
MCH RBC QN AUTO: 29.9 PG (ref 26.8–34.3)
MCHC RBC AUTO-ENTMCNC: 32.3 G/DL (ref 31.4–37.4)
MCV RBC AUTO: 92 FL (ref 82–98)
NONHDLC SERPL-MCNC: 175 MG/DL
PLATELET # BLD AUTO: 261 THOUSANDS/UL (ref 149–390)
PMV BLD AUTO: 10 FL (ref 8.9–12.7)
POTASSIUM SERPL-SCNC: 3.9 MMOL/L (ref 3.5–5.3)
PROLACTIN SERPL-MCNC: 6.9 NG/ML
PROT SERPL-MCNC: 7.7 G/DL (ref 6.4–8.2)
RBC # BLD AUTO: 4.72 MILLION/UL (ref 3.81–5.12)
SODIUM SERPL-SCNC: 141 MMOL/L (ref 136–145)
T4 FREE SERPL-MCNC: 1.12 NG/DL (ref 0.76–1.46)
TRIGL SERPL-MCNC: 139 MG/DL
TSH SERPL DL<=0.05 MIU/L-ACNC: 10.2 UIU/ML (ref 0.36–3.74)
WBC # BLD AUTO: 7.3 THOUSAND/UL (ref 4.31–10.16)

## 2019-05-10 PROCEDURE — 85027 COMPLETE CBC AUTOMATED: CPT

## 2019-05-10 PROCEDURE — 85652 RBC SED RATE AUTOMATED: CPT

## 2019-05-10 PROCEDURE — 36415 COLL VENOUS BLD VENIPUNCTURE: CPT

## 2019-05-10 PROCEDURE — 86140 C-REACTIVE PROTEIN: CPT

## 2019-05-10 PROCEDURE — 83036 HEMOGLOBIN GLYCOSYLATED A1C: CPT

## 2019-05-10 PROCEDURE — 82306 VITAMIN D 25 HYDROXY: CPT

## 2019-05-10 PROCEDURE — 84146 ASSAY OF PROLACTIN: CPT

## 2019-05-10 PROCEDURE — 80053 COMPREHEN METABOLIC PANEL: CPT

## 2019-05-10 PROCEDURE — 82550 ASSAY OF CK (CPK): CPT

## 2019-05-10 PROCEDURE — 80061 LIPID PANEL: CPT

## 2019-05-10 PROCEDURE — 84439 ASSAY OF FREE THYROXINE: CPT

## 2019-05-10 PROCEDURE — 84443 ASSAY THYROID STIM HORMONE: CPT

## 2019-05-10 RX ORDER — CETIRIZINE HYDROCHLORIDE 10 MG/1
10 TABLET ORAL DAILY
Qty: 30 TABLET | Refills: 3 | Status: SHIPPED | OUTPATIENT
Start: 2019-05-10 | End: 2019-07-18

## 2019-05-10 RX ORDER — MONTELUKAST SODIUM 10 MG/1
10 TABLET ORAL
Qty: 30 TABLET | Refills: 5 | Status: SHIPPED | OUTPATIENT
Start: 2019-05-10 | End: 2019-11-03 | Stop reason: SDUPTHER

## 2019-05-13 DIAGNOSIS — Z12.11 SCREENING FOR COLON CANCER: Primary | ICD-10-CM

## 2019-06-06 ENCOUNTER — DOCUMENTATION (OUTPATIENT)
Dept: NEUROLOGY | Facility: CLINIC | Age: 59
End: 2019-06-06

## 2019-06-06 ENCOUNTER — PROCEDURE VISIT (OUTPATIENT)
Dept: NEUROLOGY | Facility: CLINIC | Age: 59
End: 2019-06-06
Payer: MEDICARE

## 2019-06-06 VITALS — SYSTOLIC BLOOD PRESSURE: 155 MMHG | DIASTOLIC BLOOD PRESSURE: 82 MMHG | HEART RATE: 110 BPM

## 2019-06-06 DIAGNOSIS — R20.2 PARESTHESIAS: Primary | ICD-10-CM

## 2019-06-06 PROCEDURE — 11104 PUNCH BX SKIN SINGLE LESION: CPT | Performed by: PSYCHIATRY & NEUROLOGY

## 2019-06-06 PROCEDURE — 11105 PUNCH BX SKIN EA SEP/ADDL: CPT | Performed by: PSYCHIATRY & NEUROLOGY

## 2019-06-10 DIAGNOSIS — G70.9 NEUROMUSCULAR DISORDER (HCC): ICD-10-CM

## 2019-06-10 DIAGNOSIS — M79.18 MYOFASCIAL PAIN: ICD-10-CM

## 2019-06-10 RX ORDER — DULOXETIN HYDROCHLORIDE 60 MG/1
60 CAPSULE, DELAYED RELEASE ORAL DAILY
Qty: 30 CAPSULE | Refills: 4 | Status: SHIPPED | OUTPATIENT
Start: 2019-06-10 | End: 2019-07-18

## 2019-06-25 ENCOUNTER — OFFICE VISIT (OUTPATIENT)
Dept: PSYCHIATRY | Facility: CLINIC | Age: 59
End: 2019-06-25
Payer: MEDICARE

## 2019-06-25 ENCOUNTER — TELEPHONE (OUTPATIENT)
Dept: NEUROLOGY | Facility: CLINIC | Age: 59
End: 2019-06-25

## 2019-06-25 DIAGNOSIS — F43.23 ADJUSTMENT DISORDER WITH MIXED ANXIETY AND DEPRESSED MOOD: ICD-10-CM

## 2019-06-25 DIAGNOSIS — G47.00 INSOMNIA, UNSPECIFIED TYPE: Primary | ICD-10-CM

## 2019-06-25 PROCEDURE — 99213 OFFICE O/P EST LOW 20 MIN: CPT | Performed by: PSYCHIATRY & NEUROLOGY

## 2019-07-18 ENCOUNTER — OFFICE VISIT (OUTPATIENT)
Dept: FAMILY MEDICINE CLINIC | Facility: CLINIC | Age: 59
End: 2019-07-18
Payer: MEDICARE

## 2019-07-18 VITALS
OXYGEN SATURATION: 96 % | DIASTOLIC BLOOD PRESSURE: 80 MMHG | TEMPERATURE: 98.4 F | BODY MASS INDEX: 36.81 KG/M2 | SYSTOLIC BLOOD PRESSURE: 148 MMHG | HEART RATE: 100 BPM | WEIGHT: 234.5 LBS | HEIGHT: 67 IN

## 2019-07-18 DIAGNOSIS — D35.2 BENIGN NEOPLASM OF PITUITARY GLAND (HCC): ICD-10-CM

## 2019-07-18 DIAGNOSIS — E06.3 HYPOTHYROIDISM DUE TO HASHIMOTO'S THYROIDITIS: ICD-10-CM

## 2019-07-18 DIAGNOSIS — G31.84 MILD NEUROCOGNITIVE DISORDER DUE TO TRAUMATIC BRAIN INJURY, SEQUELA (HCC): ICD-10-CM

## 2019-07-18 DIAGNOSIS — E78.00 PURE HYPERCHOLESTEROLEMIA: ICD-10-CM

## 2019-07-18 DIAGNOSIS — E55.9 VITAMIN D DEFICIENCY: ICD-10-CM

## 2019-07-18 DIAGNOSIS — Q04.8 CEREBELLAR TONSILLAR ECTOPIA (HCC): ICD-10-CM

## 2019-07-18 DIAGNOSIS — E11.9 TYPE 2 DIABETES MELLITUS WITHOUT COMPLICATION, WITHOUT LONG-TERM CURRENT USE OF INSULIN (HCC): Primary | ICD-10-CM

## 2019-07-18 DIAGNOSIS — S06.9X9S MILD NEUROCOGNITIVE DISORDER DUE TO TRAUMATIC BRAIN INJURY, SEQUELA (HCC): ICD-10-CM

## 2019-07-18 DIAGNOSIS — E03.8 HYPOTHYROIDISM DUE TO HASHIMOTO'S THYROIDITIS: ICD-10-CM

## 2019-07-18 PROBLEM — R20.9 SKIN SENSATION DISTURBANCE: Status: ACTIVE | Noted: 2019-07-18

## 2019-07-18 PROBLEM — S06.9XAS MILD NEUROCOGNITIVE DISORDER DUE TO TRAUMATIC BRAIN INJURY: Status: ACTIVE | Noted: 2019-07-18

## 2019-07-18 PROBLEM — F06.70 MILD NEUROCOGNITIVE DISORDER DUE TO TRAUMATIC BRAIN INJURY: Status: ACTIVE | Noted: 2019-07-18

## 2019-07-18 PROCEDURE — 99214 OFFICE O/P EST MOD 30 MIN: CPT | Performed by: FAMILY MEDICINE

## 2019-07-18 RX ORDER — LEVOTHYROXINE SODIUM 0.15 MG/1
150 TABLET ORAL
COMMUNITY
End: 2019-07-18

## 2019-07-18 RX ORDER — ERGOCALCIFEROL 1.25 MG/1
50000 CAPSULE ORAL WEEKLY
Qty: 4 CAPSULE | Refills: 10 | Status: SHIPPED | OUTPATIENT
Start: 2019-07-18 | End: 2020-05-05

## 2019-07-18 RX ORDER — METHOCARBAMOL 500 MG/1
500 TABLET, FILM COATED ORAL DAILY
COMMUNITY
End: 2019-10-23

## 2019-07-18 RX ORDER — AMITRIPTYLINE HYDROCHLORIDE 25 MG/1
TABLET, FILM COATED ORAL
COMMUNITY
End: 2019-07-18

## 2019-07-18 RX ORDER — LEVOTHYROXINE SODIUM 0.2 MG/1
200 TABLET ORAL
Qty: 30 TABLET | Refills: 5 | Status: SHIPPED | OUTPATIENT
Start: 2019-07-18 | End: 2019-12-04 | Stop reason: SDUPTHER

## 2019-07-22 ENCOUNTER — OFFICE VISIT (OUTPATIENT)
Dept: SLEEP CENTER | Facility: CLINIC | Age: 59
End: 2019-07-22
Payer: MEDICARE

## 2019-07-22 VITALS
WEIGHT: 221.25 LBS | BODY MASS INDEX: 35.56 KG/M2 | DIASTOLIC BLOOD PRESSURE: 70 MMHG | SYSTOLIC BLOOD PRESSURE: 118 MMHG | HEIGHT: 66 IN

## 2019-07-22 DIAGNOSIS — F43.23 ADJUSTMENT DISORDER WITH MIXED ANXIETY AND DEPRESSED MOOD: ICD-10-CM

## 2019-07-22 DIAGNOSIS — G47.14 HYPERSOMNIA DUE TO ANOTHER MEDICAL CONDITION: Primary | ICD-10-CM

## 2019-07-22 DIAGNOSIS — G70.9 NEUROMUSCULAR DISORDER (HCC): ICD-10-CM

## 2019-07-22 DIAGNOSIS — R06.83 SNORING: ICD-10-CM

## 2019-07-22 DIAGNOSIS — E03.8 HYPOTHYROIDISM DUE TO HASHIMOTO'S THYROIDITIS: ICD-10-CM

## 2019-07-22 DIAGNOSIS — E06.3 HYPOTHYROIDISM DUE TO HASHIMOTO'S THYROIDITIS: ICD-10-CM

## 2019-07-22 PROCEDURE — 99214 OFFICE O/P EST MOD 30 MIN: CPT | Performed by: PSYCHIATRY & NEUROLOGY
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
## 2019-07-22 NOTE — PROGRESS NOTES
Review of Systems      Genitourinary none   Cardiology ankle/leg swelling   Gastrointestinal none   Neurology awaken with headache, muscle weakness, numbness/tingling of an extremity, forgetfulness, poor concentration or confusion, , difficulty with memory and balance problems   Constitutional fatigue   Integumentary rash or dry skin and itching   Psychiatry anxiety   Musculoskeletal muscle aches, back pain and leg cramps   Pulmonary frequent cough and snoring   ENT ringing in ears   Endocrine none   Hematological none
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
## 2019-07-22 NOTE — PATIENT INSTRUCTIONS
1  Attempt to schedule all night polysomnogram  2  Follow all night polysomnography with multiple sleep latency testing  3  Hold testing until thyroid function is normal  4  Determine level of nighttime sleep dysfunction  5  Determine level of daytime sleepiness  6  Return to the Sleep [facility] after testing for review of test results  7   Establish treatment plan based on those findings

## 2019-08-03 DIAGNOSIS — F43.23 ADJUSTMENT DISORDER WITH MIXED ANXIETY AND DEPRESSED MOOD: ICD-10-CM

## 2019-08-03 NOTE — PROGRESS NOTES
Assessment/Plan:    Patient's last A1c was 6 5  Total is 226 with an LDL of 145  Secondary to her unusual musculoskeletal to diagnosis and hesitant to start statin as it may worsen her condition  TSH is in range  [facility]  Will need to eventually get her into Neurology through [facility] as she may now be able to continue to go down to [facility] further  Information given regarding local dietitian  Recheck 6 months with repeat labs  Recommend yearly diabetic eye and foot examinations     Diagnoses and all orders for this visit:    Type 2 diabetes mellitus without complication, without long-term current use of insulin ([facility] [address] )  -     Comprehensive metabolic panel; Future  -     Microalbumin / creatinine urine ratio  -     Hemoglobin A1C; Future    Pure hypercholesterolemia  -     Lipid panel; Future    Hypothyroidism due to Hashimoto's thyroiditis  -     levothyroxine 200 mcg tablet; Take 1 tablet (200 mcg total) by mouth daily in the early morning  -     TSH, 3rd generation; Future  -     TSH, 3rd generation; Future  -     T4, free; Future    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HCC)    Benign neoplasm of pituitary gland (HCC)    Mild neurocognitive disorder due to traumatic brain injury, sequela (HCC)    Vitamin D deficiency  -     ergocalciferol (VITAMIN D2) 50,000 units; Take 1 capsule (50,000 Units total) by mouth once a week        1  Type 2 diabetes mellitus without complication, without long-term current use of insulin (HCC)  Comprehensive metabolic panel    Microalbumin / creatinine urine ratio    Hemoglobin A1C   2  Pure hypercholesterolemia  Lipid panel   3  Hypothyroidism due to Hashimoto's thyroiditis  levothyroxine 200 mcg tablet    TSH, 3rd generation    TSH, 3rd generation    T4, free   4  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[address] )     5  Benign neoplasm of pituitary gland (HCC)     6   Mild neurocognitive disorder due to traumatic brain injury, sequela ([facility] )     7  Vitamin D deficiency  ergocalciferol (VITAMIN D2) 50,000 units       Subjective:        Patient ID: Louanne Kussmaul is a 62 y o  female  Chief Complaint   Patient presents with    Follow-up       Patient here for follow-up  Had labs done about 2 months ago  Still struggling with walking and using walker at this time      The following portions of the patient's history were reviewed and updated as appropriate: past medical history, past surgical history and problem list       Review of Systems   Constitutional: Negative for appetite change, fatigue, fever and unexpected weight change  HENT: Negative for congestion, ear pain, postnasal drip, rhinorrhea, sinus pressure, sinus pain and sore throat  Eyes: Negative for redness and visual disturbance  Respiratory: Negative for chest tightness and shortness of breath  Cardiovascular: Negative for chest pain, palpitations and leg swelling  Gastrointestinal: Negative for abdominal distention, abdominal pain, diarrhea and nausea  Endocrine: Negative for cold intolerance and heat intolerance  Genitourinary: Negative for dysuria and hematuria  Musculoskeletal: Positive for gait problem and myalgias  Skin: Negative for pallor and rash  Neurological: Positive for weakness  Negative for dizziness and headaches  Psychiatric/Behavioral: Negative for behavioral problems  The patient is not nervous/anxious  Objective:  /80   Pulse 100   Temp 98 4 °F (36 9 °C)   Ht 5' 7" (1 702 m)   Wt 106 kg (234 lb 8 oz)   SpO2 96%   BMI 36 73 kg/m²        Physical Exam   Constitutional: She is oriented to person, place, and time  She appears well-nourished  No distress  HENT:   Head: Normocephalic and atraumatic  Right Ear: Tympanic membrane normal    Left Ear: Tympanic membrane normal    Nose: Nose normal    Mouth/Throat: Oropharynx is clear and moist    Eyes: Pupils are equal, round, and reactive to light  Conjunctivae and EOM are normal  No scleral icterus  Neck: Normal range of motion  Neck supple  No thyromegaly present  Cardiovascular: Normal rate, regular rhythm and intact distal pulses  No murmur heard  Pulses:       Carotid pulses are 0 on the right side, and 0 on the left side  Pulmonary/Chest: Effort normal and breath sounds normal  She has no wheezes  Abdominal: Soft  She exhibits no distension and no mass  Musculoskeletal: She exhibits no edema  Using walker   Lymphadenopathy:     She has no cervical adenopathy  Neurological: She is alert and oriented to person, place, and time  She has normal reflexes  No cranial nerve deficit  Coordination normal    Skin: Skin is warm  No pallor  Psychiatric: She has a normal mood and affect  Her behavior is normal  Thought content normal    Nursing note and vitals reviewed

## 2019-08-06 RX ORDER — DULOXETIN HYDROCHLORIDE 30 MG/1
90 CAPSULE, DELAYED RELEASE ORAL DAILY
Qty: 90 CAPSULE | Refills: 2 | Status: SHIPPED | OUTPATIENT
Start: 2019-08-06 | End: 2019-10-01 | Stop reason: SDUPTHER

## 2019-08-09 ENCOUNTER — TELEPHONE (OUTPATIENT)
Dept: FAMILY MEDICINE CLINIC | Facility: CLINIC | Age: 59
End: 2019-08-09

## 2019-08-09 NOTE — TELEPHONE ENCOUNTER
Joao Andino called the office she has an appointment on September 19 th to [facility]  They are requesting records pertaining to pt's rash  She requested the records please get sent to [phone]  Dr Gaitan did you refer pt to derm and did  we see her for a rash? I do not see any records  Please advise

## 2019-08-13 ENCOUNTER — TELEPHONE (OUTPATIENT)
Dept: FAMILY MEDICINE CLINIC | Facility: CLINIC | Age: 59
End: 2019-08-13

## 2019-08-13 NOTE — TELEPHONE ENCOUNTER
Pt is aware and states that she feels better than what she did just feels tired  She is getting BW done next week but will give us a call if she is getting any worse

## 2019-08-13 NOTE — TELEPHONE ENCOUNTER
Tell her the adrenal glands are located in the abdomen above the kidney and the thyroid gland is in the neck  They are not close together    I do not think the adrenal issue would cause the symptoms that she had yesterday

## 2019-08-13 NOTE — TELEPHONE ENCOUNTER
Patient called and stated yesterday she was leaving [address] [place] [address] and was walking to her car when she had a sharp pain in her knee and then around her waist  She started to get nauseated and luckily made it to her car in time  She sat in her car for a little bit and then started vomitting  She found a nausea pill in her purse and tried to take it but she threw that up as well  She finally could make it to [facility] to get a few things and she tried to take another nausea med with a soda  She was able to keep it down then  States before this episode she was having increased pain in her knee for several day  She was able to bend it but then had a hard time straighening it  Since this episode her knee pain is not as bad  She went to sleep at 9:00pm last night and just woke up at 1:00pm today  She was watching a thyroid show on TV and it was stating that the adrenal gland is very close to the thyroid  She wanted to know if her adrenal gland could make her thyroid worse? Asking if you have any suggestions for her regarding this episode or do you want to see her in the office

## 2019-08-14 ENCOUNTER — TELEPHONE (OUTPATIENT)
Dept: FAMILY MEDICINE CLINIC | Facility: CLINIC | Age: 59
End: 2019-08-14

## 2019-08-14 DIAGNOSIS — G89.4 CHRONIC PAIN SYNDROME: ICD-10-CM

## 2019-08-14 RX ORDER — OXYCODONE HYDROCHLORIDE AND ACETAMINOPHEN 5; 325 MG/1; MG/1
1 TABLET ORAL EVERY 8 HOURS PRN
Qty: 15 TABLET | Refills: 0 | Status: SHIPPED | OUTPATIENT
Start: 2019-08-14 | End: 2019-10-25

## 2019-08-14 NOTE — TELEPHONE ENCOUNTER
Dominique Joe called the office she had an episode this afternoon she went to the grocery store to get 3 things and she was only out   for 20 minutes  Pt went to get a few things pt had another episode but she did not vomit  Her head got hot ans sweaty and the rest of her body was co cold her heart rate was low pt checked her blood pressure and it was 110/70  Pt was asking is there anything she can do to break this cycle and so this doesn't happen again until gets labs done  Pt stated she knows it is form the pain she was sitting and resting when she called she stated she can feel the tightness in her back and legs  Pt stated she will take ibuprofen and then wait 4 hours and is absolutely necessary she will take an oxycodone  Pt's number is [phone]    Pt is requesting if you could send a limited amount of the Oxycodone 5-325 mg to the [facility] on [address] she has 2 pills left but just in case that this gets worse she would like to make  sure she has them  Pt only takes this unless it is absolutely necessary

## 2019-08-14 NOTE — TELEPHONE ENCOUNTER
A limited amount of oxycodone was sent to her pharmacy  Hard to say what to do about having these events happen as it is related to her pain possibly

## 2019-08-19 ENCOUNTER — APPOINTMENT (OUTPATIENT)
Dept: LAB | Facility: CLINIC | Age: 59
End: 2019-08-19
Payer: MEDICARE

## 2019-08-19 DIAGNOSIS — E06.3 HYPOTHYROIDISM DUE TO HASHIMOTO'S THYROIDITIS: ICD-10-CM

## 2019-08-19 DIAGNOSIS — E03.8 HYPOTHYROIDISM DUE TO HASHIMOTO'S THYROIDITIS: ICD-10-CM

## 2019-08-19 LAB — TSH SERPL DL<=0.05 MIU/L-ACNC: 22.2 UIU/ML (ref 0.36–3.74)

## 2019-08-19 PROCEDURE — 84443 ASSAY THYROID STIM HORMONE: CPT

## 2019-08-19 PROCEDURE — 36415 COLL VENOUS BLD VENIPUNCTURE: CPT

## 2019-08-20 ENCOUNTER — TELEPHONE (OUTPATIENT)
Dept: FAMILY MEDICINE CLINIC | Facility: CLINIC | Age: 59
End: 2019-08-20

## 2019-08-20 DIAGNOSIS — E03.8 HYPOTHYROIDISM DUE TO HASHIMOTO'S THYROIDITIS: Primary | ICD-10-CM

## 2019-08-20 DIAGNOSIS — E06.3 HYPOTHYROIDISM DUE TO HASHIMOTO'S THYROIDITIS: Primary | ICD-10-CM

## 2019-08-20 RX ORDER — LEVOTHYROXINE SODIUM 0.03 MG/1
25 TABLET ORAL DAILY
Qty: 30 TABLET | Refills: 5 | Status: SHIPPED | OUTPATIENT
Start: 2019-08-20 | End: 2019-12-04 | Stop reason: SDUPTHER

## 2019-08-20 NOTE — TELEPHONE ENCOUNTER
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

## 2019-08-20 NOTE — TELEPHONE ENCOUNTER
Patient called for her thyroid blood work results from yesterday    Please call patient after 5    Cb#[phone]

## 2019-08-28 ENCOUNTER — OFFICE VISIT (OUTPATIENT)
Dept: FAMILY MEDICINE CLINIC | Facility: CLINIC | Age: 59
End: 2019-08-28
Payer: MEDICARE

## 2019-08-28 VITALS
TEMPERATURE: 98.3 F | SYSTOLIC BLOOD PRESSURE: 130 MMHG | BODY MASS INDEX: 34.84 KG/M2 | WEIGHT: 222 LBS | OXYGEN SATURATION: 97 % | HEART RATE: 89 BPM | HEIGHT: 67 IN | DIASTOLIC BLOOD PRESSURE: 62 MMHG

## 2019-08-28 DIAGNOSIS — R11.0 NAUSEA: ICD-10-CM

## 2019-08-28 DIAGNOSIS — E03.8 HYPOTHYROIDISM DUE TO HASHIMOTO'S THYROIDITIS: ICD-10-CM

## 2019-08-28 DIAGNOSIS — E06.3 HYPOTHYROIDISM DUE TO HASHIMOTO'S THYROIDITIS: ICD-10-CM

## 2019-08-28 DIAGNOSIS — R22.41 MASS OF RIGHT FOOT: ICD-10-CM

## 2019-08-28 DIAGNOSIS — K21.9 GASTROESOPHAGEAL REFLUX DISEASE, ESOPHAGITIS PRESENCE NOT SPECIFIED: Primary | ICD-10-CM

## 2019-08-28 DIAGNOSIS — R79.89 ABNORMAL TSH: ICD-10-CM

## 2019-08-28 PROCEDURE — 99214 OFFICE O/P EST MOD 30 MIN: CPT | Performed by: FAMILY MEDICINE

## 2019-08-28 RX ORDER — PANTOPRAZOLE SODIUM 40 MG/1
40 TABLET, DELAYED RELEASE ORAL
Qty: 30 TABLET | Refills: 5 | Status: SHIPPED | OUTPATIENT
Start: 2019-08-28 | End: 2019-10-10

## 2019-08-28 RX ORDER — IBUPROFEN 800 MG/1
TABLET ORAL EVERY 6 HOURS PRN
COMMUNITY
End: 2020-01-23 | Stop reason: SDUPTHER

## 2019-08-28 NOTE — PROGRESS NOTES
Assessment/Plan:    Patient is worsening of nausea like issues  Sometimes cannot get Zofran in fast enough  Please note last TSH was 22 which could increase her risk for GI issues  Start pantoprazole for possible reflux issues  Has follow-up in October for repeat labs to include thyroid and lipids  If nausea does not improve with the use of the medication and handout provided then we may need GI for possible EGD  Would wait to see TSH normalized before doing anything aggressive  Patient has a cyst-like growth on the top of her foot  It is subcutaneous  She states it does flare up and down depending on her sneakers  Refer her to Podiatry for further evaluation  Patient got 2 flu shot 2 weeks ago therefore hold pneumonia shot until next office visit  Diagnoses and all orders for this visit:    Gastroesophageal reflux disease, esophagitis presence not specified  -     pantoprazole (PROTONIX) 40 mg tablet; Take 1 tablet (40 mg total) by mouth daily before breakfast    Nausea    Hypothyroidism due to Hashimoto's thyroiditis    Abnormal TSH    Mass of right foot    Other orders  -     ibuprofen (MOTRIN) 800 mg tablet; Take by mouth every 6 (six) hours as needed for mild pain  -     PNEUMOCOCCAL POLYSACCHARIDE VACCINE 23-VALENT =>1YO SQ IM        1  Gastroesophageal reflux disease, esophagitis presence not specified  pantoprazole (PROTONIX) 40 mg tablet   2  Nausea     3  Hypothyroidism due to Hashimoto's thyroiditis     4  Abnormal TSH     5  Mass of right foot         Subjective:        Patient ID: Reggie Patel is a 62 y o  female    Chief Complaint   Patient presents with    Nausea     ongoing nausea; pt states that has been going on since last visit    Cough     pt states that she has an off and on coughing     Mass     bump on top of R foot that has been there about a week, this comes and goes and pt states that it sometimes hurt that she can't put a shoe on        Patient here for worsening nausea  Has Zofran  Her recent TSH has been out of normal fairly high  Her pain is lot worse  Also has some sort of growth on the top of her right foot which comes in go she says  The following portions of the patient's history were reviewed and updated as appropriate: past medical history, past surgical history and problem list       Review of Systems   Constitutional: Positive for fatigue  Negative for appetite change, fever and unexpected weight change  HENT: Negative for congestion, ear pain, postnasal drip, rhinorrhea, sinus pressure, sinus pain and sore throat  Eyes: Negative for redness and visual disturbance  Respiratory: Negative for chest tightness and shortness of breath  Cardiovascular: Negative for chest pain, palpitations and leg swelling  Gastrointestinal: Positive for nausea  Negative for abdominal distention, abdominal pain and diarrhea  Endocrine: Negative for cold intolerance and heat intolerance  Genitourinary: Negative for dysuria and hematuria  Musculoskeletal: Positive for gait problem and myalgias  Negative for arthralgias  Skin: Negative for pallor and rash  Neurological: Negative for dizziness and headaches  Psychiatric/Behavioral: Negative for behavioral problems  The patient is not nervous/anxious  Objective:  /62 (BP Location: Left arm, Patient Position: Sitting, Cuff Size: Standard)   Pulse 89   Temp 98 3 °F (36 8 °C)   Ht 5' 7" (1 702 m)   Wt 101 kg (222 lb)   SpO2 97%   BMI 34 77 kg/m²        Physical Exam   Constitutional: She appears well-nourished  No distress  HENT:   Head: Normocephalic  Eyes: Pupils are equal, round, and reactive to light  Neck: No thyromegaly present  Cardiovascular: Normal heart sounds  Pulmonary/Chest: Breath sounds normal    Abdominal: Soft  She exhibits no distension  Musculoskeletal: She exhibits no edema  Needs walker for ambulation    Top of right foot has small subcutaneous cyst like lump    Lymphadenopathy:     She has no cervical adenopathy  Skin: No rash noted  Psychiatric: She has a normal mood and affect  Her behavior is normal    Nursing note and vitals reviewed

## 2019-09-03 ENCOUNTER — TELEPHONE (OUTPATIENT)
Dept: FAMILY MEDICINE CLINIC | Facility: CLINIC | Age: 59
End: 2019-09-03

## 2019-09-03 DIAGNOSIS — E06.3 HYPOTHYROIDISM DUE TO HASHIMOTO'S THYROIDITIS: Primary | ICD-10-CM

## 2019-09-03 DIAGNOSIS — E03.8 HYPOTHYROIDISM DUE TO HASHIMOTO'S THYROIDITIS: Primary | ICD-10-CM

## 2019-09-03 NOTE — TELEPHONE ENCOUNTER
I need more of a specific reason as to why to refer to endocrinology  Not feeling well is not enough to get her in    See if we get more detail

## 2019-09-03 NOTE — TELEPHONE ENCOUNTER
Patient asking if she should be referred to endocrinology  She is still not feeling any better  She is aware you are not in the office tomorrow but wanted me to only send this message to you

## 2019-09-04 NOTE — TELEPHONE ENCOUNTER
I spoke to the patient, states her thyroid is off and you are adjusting medication and she is not feeling well due to this  Stomach issue does not appear to be related to GERD as the medication had no effect on her  States she tends to have the stomach issue when she has a lot of pain so she thinks it is pain related

## 2019-09-09 DIAGNOSIS — F43.23 ADJUSTMENT DISORDER WITH MIXED ANXIETY AND DEPRESSED MOOD: ICD-10-CM

## 2019-09-10 RX ORDER — DULOXETIN HYDROCHLORIDE 30 MG/1
CAPSULE, DELAYED RELEASE ORAL
Qty: 30 CAPSULE | Refills: 2 | OUTPATIENT
Start: 2019-09-10

## 2019-10-01 ENCOUNTER — TELEPHONE (OUTPATIENT)
Dept: PSYCHIATRY | Facility: CLINIC | Age: 59
End: 2019-10-01

## 2019-10-01 ENCOUNTER — OFFICE VISIT (OUTPATIENT)
Dept: PSYCHIATRY | Facility: CLINIC | Age: 59
End: 2019-10-01
Payer: MEDICARE

## 2019-10-01 ENCOUNTER — TELEPHONE (OUTPATIENT)
Dept: FAMILY MEDICINE CLINIC | Facility: CLINIC | Age: 59
End: 2019-10-01

## 2019-10-01 DIAGNOSIS — E06.3 HYPOTHYROIDISM DUE TO HASHIMOTO'S THYROIDITIS: Primary | ICD-10-CM

## 2019-10-01 DIAGNOSIS — F43.23 ADJUSTMENT DISORDER WITH MIXED ANXIETY AND DEPRESSED MOOD: ICD-10-CM

## 2019-10-01 DIAGNOSIS — E03.8 HYPOTHYROIDISM DUE TO HASHIMOTO'S THYROIDITIS: Primary | ICD-10-CM

## 2019-10-01 DIAGNOSIS — R79.89 ABNORMAL TSH: ICD-10-CM

## 2019-10-01 PROCEDURE — 99214 OFFICE O/P EST MOD 30 MIN: CPT | Performed by: PSYCHIATRY & NEUROLOGY

## 2019-10-01 RX ORDER — DULOXETIN HYDROCHLORIDE 30 MG/1
90 CAPSULE, DELAYED RELEASE ORAL DAILY
Qty: 90 CAPSULE | Refills: 3 | Status: SHIPPED | OUTPATIENT
Start: 2019-10-01 | End: 2019-10-01 | Stop reason: SDUPTHER

## 2019-10-01 RX ORDER — DULOXETIN HYDROCHLORIDE 30 MG/1
30 CAPSULE, DELAYED RELEASE ORAL DAILY
Qty: 90 CAPSULE | Refills: 3 | Status: SHIPPED | OUTPATIENT
Start: 2019-10-01 | End: 2020-03-24 | Stop reason: SDUPTHER

## 2019-10-01 NOTE — TELEPHONE ENCOUNTER
Please put an order in for TSH and have her go in the next couple of days as her last thyroid was out of range

## 2019-10-01 NOTE — BH TREATMENT PLAN
TREATMENT PLAN (Medication Management Only)        [facility]    Name and Date of Birth:  Ravi Alejandro 62 y o  1960  Date of Treatment Plan: October 1, 2019  Diagnosis/Diagnoses:    1  Hypothyroidism due to Hashimoto's thyroiditis    2  Adjustment disorder with mixed anxiety and depressed mood      Strengths/Personal Resources for Self-Care: supportive family, supportive friends, taking medications as prescribed, ability to communicate well  Area/Areas of need (in own words): anxiety symptoms  1  Long Term Goal: "Dealing better with people  getting back to work without severe pain"  Target Date: 2 months - 12/1/2019  Person/Persons responsible for completion of goal: Dr Sharri Arambula  2  Short Term Objective (s) - How will we reach this goal?:   A  Provider new recommended medication/dosage changes and/or continue medication(s): Medication changes: I am having Mary Hall maintain her b complex vitamins, baclofen, magnesium oxide, cholecalciferol, ondansetron, albuterol, tobramycin, montelukast, methocarbamol, levothyroxine, ergocalciferol, oxyCODONE-acetaminophen, levothyroxine, ibuprofen, pantoprazole, and DULoxetine     B  N/A   C  N/A  Target Date: 3 months - 1/1/2020  Person/Persons Responsible for Completion of Goal: Dr Sharri Arambula  Progress Towards Goals: continuing treatment, no progress  Treatment Modality: medication management every 3 months  Review due 90 to 120 days from date of this plan: 3 months - 1/1/2020  Expected length of service: ongoing treatment  My Physician/PA/NP and I have developed this plan together and I agree to work on the goals and objectives  I understand the treatment goals that were developed for my treatment

## 2019-10-01 NOTE — TELEPHONE ENCOUNTER
Pharmacist from [address] [place] [address] called requesting you resend Duloxetine prescription as Duloxetine 30 mg 1x daily and Duloxetine 60 mg 1x daily  He stated patient's insurance will not pay for prescription as written

## 2019-10-01 NOTE — TELEPHONE ENCOUNTER
Noreen Delgadillo called would like to speak with you regarding the medication Cymbalta        Contact# [phone]

## 2019-10-01 NOTE — TELEPHONE ENCOUNTER
Ramon Beltran called the office regarding she feels she is not feeling right and it is something to do with her thyroid  Pt is asking if she can just have her thyroid checked ? Pt uses [facility] labs in our building across the [address]  Pt has headaches and muscle cramps  Pt stated that she has forgotten she has left cabinet doors open and will bump her head and pt is having headaches in the areas where she has bumped her head no skin has been broken and these spots are very sensitive  Pt stated the ringing in her ear has increased and Dr Stark the [facility] stated that could be form the thyroid  Also pt saw derm they did the scratch test and her skin was very sensitive to it so that could be from her thyroid  Pt has the sensation that something is running down the side of her head that is wet and sticky and nothing there  Pt would like to know if you have any recommendations if you want her to come in for an appointment or just test thyroid  Pt stated that she made Dr Stark office aware that she is having trobule with her Duloxetine and getting it  [facility]   Pt's number is [phone]

## 2019-10-01 NOTE — TELEPHONE ENCOUNTER
Matteo Torres was informed of the information that I placed an order for her to have thyroid blwk done within the next couple of days  Pt will go to [place] lab  Pt will go tomorrow

## 2019-10-01 NOTE — PATIENT INSTRUCTIONS
Adjustment disorder with mixed anxiety and depressed mood  -     DULoxetine (CYMBALTA) 30 mg delayed release capsule;  Take 3 capsules (90 mg total) by mouth daily          Follow up in 3 months

## 2019-10-01 NOTE — PSYCH
Subjective:     Patient ID: Aristides Lanier is a 62 y o  female with adjustment disorder with mixed depression and anxiety presenting for a follow up visit  Last visit, her cymbalta was continued at 90mg  HPI ROS Appetite Changes and Sleep: the patient stated that her thyroid levels are still elevated and she is working with her doctors  She has the PSG/MSLT postponed until that level is back to normal  She has not been taking 90mg of Cymbalta due to the pharmacy not having anymore refills  She has been taking 60mg for the last week and found that she has been very irritability  Sleep has been impaired due to pain  She does not go to sleep until she is tired but she is not waking up at the same times, whenever she wants  She is dozing off when watching the TV and idle  If she is active, she is able to fight that off  Energy level is not improved, she is tired easily, it is harder to exercise  Denied anxiety  She denied SI/HI  Pain level goes up and this makes her unable to concentrate and is finding that she is misplacing things  She has hit her head on the cupboard and now has a sensation of "something running down the side of my face "    Review Of Systems:     Mood Depression and irritability   Behavior Normal    Thought Content Normal   General Emotional Problems, Sleep Disturbances and Decreased Functioning   Personality Normal   Other Psych Symptoms Normal   Constitutional As Noted in HPI   ENT Negative   Cardiovascular Negative   Respiratory Cough   Gastrointestinal Abdominal Pain   Genitourinary Negative   Musculoskeletal Joint Stiffness and Limb Pain   Integumentary Skin Rash   Neurological Headache and Numbness   Endocrine Normal    Other Symptoms Normal              Laboratory Results: No results found for this or any previous visit      Substance Abuse History:  Social History     Substance and Sexual Activity   Drug Use No       Family Psychiatric History:   Family History   Problem Relation Age of Onset    Breast cancer Mother 28    Aneurysm Father         CEREBRAL ARTERY ANEURYSM     Alcohol abuse Maternal Aunt        The following portions of the patient's history were reviewed and updated as appropriate: allergies, current medications, past family history, past medical history, past social history, past surgical history and problem list     Social History     Socioeconomic History    Marital status: Single     Spouse name: Not on file    Number of children: 0    Years of education: 12    Highest education level: Not on file   Occupational History    Occupation: disabled   Social Needs    Financial resource strain: Hard    Food insecurity:     Worry: Never true     Inability: Never true    Transportation needs:     Medical: No     Non-medical: No   Tobacco Use    Smoking status: Never Smoker    Smokeless tobacco: Never Used   Substance and Sexual Activity    Alcohol use: Yes     Frequency: Monthly or less     Drinks per session: 1 or 2     Binge frequency: Never     Comment: 1-2 TIMES PER YEAR PER [employer]     Drug use: No    Sexual activity: Not Currently   Lifestyle    Physical activity:     Days per week: Not on file     Minutes per session: Not on file    Stress: Rather much   Relationships    Social connections:     Talks on phone: Once a week     Gets together: Once a week     Attends Gnosticism service: 1 to 4 times per year     Active member of club or organization: No     Attends meetings of clubs or organizations: Never     Relationship status: Never     Intimate partner violence:     Fear of current or ex partner: No     Emotionally abused: No     Physically abused: No     Forced sexual activity: No   Other Topics Concern    Not on file   Social History Narrative    Not on file     Social History     Social History Narrative    Not on file       Objective:       Mental status:  Appearance calm and cooperative , adequate hygiene and grooming and good eye contact Mood irritable   Affect affect was constricted   Speech a normal rate   Thought Processes circumstantial   Hallucinations no hallucinations present    Thought Content no delusions   Abnormal Thoughts no suicidal thoughts  and no homicidal thoughts    Orientation  oriented to person and place and time   Remote Memory short term memory intact and long term memory intact   Attention Span concentration impaired   Intellect Appears to be of Average Intelligence   Insight Limited insight   Judgement judgment was limited   Muscle Strength Abnormal gait and Decreased muscle strength   Language no difficulty naming common objects, no difficulty repeating a phrase  and no difficulty writing a sentence    Fund of Knowledge displays adequate knowledge of current events, adequate fund of knowledge regarding past history and adequate fund of knowledge regarding vocabulary    Pain none   Pain Scale 10 in the stomach and back       Assessment/Plan:       Diagnoses and all orders for this visit:    Hypothyroidism due to Hashimoto's thyroiditis    Adjustment disorder with mixed anxiety and depressed mood  -     DULoxetine (CYMBALTA) 30 mg delayed release capsule; Take 3 capsules (90 mg total) by mouth daily          Follow up in 3 months  Treatment Recommendations- Risks Benefits      Immediate Medical/Psychiatric/Psychotherapy Treatments and Any Precautions: will increase back to 90mg due to irritability  Risks, Benefits And Possible Side Effects Of Medications:  PSYCH RISK, BENEFITS AND POSSIBLE SIDE EFFECTS discussed    Controlled Medication Discussion: Discussed with patient Black Box warning on concurrent use of benzodiazepines and opioid medications including sedation, respiratory depression, coma and death  Patient understands the risk of treatment with benzodiazepines in addition to opioids and wants to continue taking those medications   , Discussed with patient the risks of sedation, respiratory depression, impairment of ability to drive and potential for abuse and addiction related to treatment with benzodiazepine medications  The patient understands risk of treatment with benzodiazepine medications, agrees to not drive if feels impaired and agrees to take medications as prescribed   and The patient has been filling controlled prescriptions on time as prescribed to [facility]

## 2019-10-02 ENCOUNTER — APPOINTMENT (OUTPATIENT)
Dept: LAB | Facility: CLINIC | Age: 59
End: 2019-10-02
Payer: MEDICARE

## 2019-10-02 DIAGNOSIS — R79.89 ABNORMAL TSH: ICD-10-CM

## 2019-10-02 DIAGNOSIS — E03.8 HYPOTHYROIDISM DUE TO HASHIMOTO'S THYROIDITIS: ICD-10-CM

## 2019-10-02 DIAGNOSIS — E06.3 HYPOTHYROIDISM DUE TO HASHIMOTO'S THYROIDITIS: ICD-10-CM

## 2019-10-02 LAB — TSH SERPL DL<=0.05 MIU/L-ACNC: 7.64 UIU/ML (ref 0.36–3.74)

## 2019-10-02 PROCEDURE — 84443 ASSAY THYROID STIM HORMONE: CPT

## 2019-10-02 PROCEDURE — 36415 COLL VENOUS BLD VENIPUNCTURE: CPT

## 2019-10-02 NOTE — TELEPHONE ENCOUNTER
I sent in the duloxetine 30mg yesterday and she said she already had 60mg that she picked up  Does she need another 60mg script?

## 2019-10-03 ENCOUNTER — TELEPHONE (OUTPATIENT)
Dept: FAMILY MEDICINE CLINIC | Facility: CLINIC | Age: 59
End: 2019-10-03

## 2019-10-03 DIAGNOSIS — E03.8 HYPOTHYROIDISM DUE TO HASHIMOTO'S THYROIDITIS: Primary | ICD-10-CM

## 2019-10-03 DIAGNOSIS — E06.3 HYPOTHYROIDISM DUE TO HASHIMOTO'S THYROIDITIS: Primary | ICD-10-CM

## 2019-10-03 NOTE — TELEPHONE ENCOUNTER
Tell the patient to TSH 1 from 20/2 down to 7  Although this is slightly still elevated it is moving in the right direction  Continue the same dose and recheck another TSH in 2-3 weeks    Please send slip

## 2019-10-10 ENCOUNTER — TELEPHONE (OUTPATIENT)
Dept: FAMILY MEDICINE CLINIC | Facility: CLINIC | Age: 59
End: 2019-10-10

## 2019-10-10 NOTE — TELEPHONE ENCOUNTER
Having extreme pain in her entire left leg which started 2 days ago, not sleeping, not getting anything done at home, has some swelling right below the knee itself which she usually has fluid from bursitis but this is not where the pain is at, denies redness, some warmth to the touch  Tried Biofreeze without relief, tried Ibuprofen which didn't help, Oxycodone helps a bit and allows her to get a little sleep  Has tried ice  Looking for recommendations/suggestions

## 2019-10-10 NOTE — TELEPHONE ENCOUNTER
Hard to say without seeing  Not sure she can somehow make it in over the next week  Use the oxycodone limitedly until seen

## 2019-10-14 ENCOUNTER — APPOINTMENT (OUTPATIENT)
Dept: LAB | Facility: CLINIC | Age: 59
End: 2019-10-14
Payer: MEDICARE

## 2019-10-14 ENCOUNTER — TELEPHONE (OUTPATIENT)
Dept: OTHER | Facility: OTHER | Age: 59
End: 2019-10-14

## 2019-10-14 ENCOUNTER — TELEPHONE (OUTPATIENT)
Dept: FAMILY MEDICINE CLINIC | Facility: CLINIC | Age: 59
End: 2019-10-14

## 2019-10-14 ENCOUNTER — OFFICE VISIT (OUTPATIENT)
Dept: FAMILY MEDICINE CLINIC | Facility: CLINIC | Age: 59
End: 2019-10-14
Payer: MEDICARE

## 2019-10-14 ENCOUNTER — TRANSCRIBE ORDERS (OUTPATIENT)
Dept: LAB | Facility: CLINIC | Age: 59
End: 2019-10-14

## 2019-10-14 ENCOUNTER — HOSPITAL ENCOUNTER (OUTPATIENT)
Dept: NON INVASIVE DIAGNOSTICS | Facility: HOSPITAL | Age: 59
Discharge: HOME | End: 2019-10-14
Payer: MEDICARE

## 2019-10-14 ENCOUNTER — TELEPHONE (OUTPATIENT)
Dept: PSYCHIATRY | Facility: CLINIC | Age: 59
End: 2019-10-14

## 2019-10-14 VITALS
SYSTOLIC BLOOD PRESSURE: 148 MMHG | WEIGHT: 221 LBS | HEIGHT: 67 IN | OXYGEN SATURATION: 98 % | DIASTOLIC BLOOD PRESSURE: 62 MMHG | BODY MASS INDEX: 34.69 KG/M2 | TEMPERATURE: 98.1 F | HEART RATE: 90 BPM

## 2019-10-14 DIAGNOSIS — R79.89 ABNORMAL TSH: ICD-10-CM

## 2019-10-14 DIAGNOSIS — M25.562 LEFT KNEE PAIN, UNSPECIFIED CHRONICITY: Primary | ICD-10-CM

## 2019-10-14 DIAGNOSIS — E06.3 HYPOTHYROIDISM DUE TO HASHIMOTO'S THYROIDITIS: Primary | ICD-10-CM

## 2019-10-14 DIAGNOSIS — E03.8 HYPOTHYROIDISM DUE TO HASHIMOTO'S THYROIDITIS: ICD-10-CM

## 2019-10-14 DIAGNOSIS — M54.32 LEFT SIDED SCIATICA: ICD-10-CM

## 2019-10-14 DIAGNOSIS — R53.1 WEAKNESS: ICD-10-CM

## 2019-10-14 DIAGNOSIS — M79.662 PAIN OF LEFT CALF: ICD-10-CM

## 2019-10-14 DIAGNOSIS — M25.562 LEFT KNEE PAIN, UNSPECIFIED CHRONICITY: ICD-10-CM

## 2019-10-14 DIAGNOSIS — R53.1 WEAKNESS: Primary | ICD-10-CM

## 2019-10-14 DIAGNOSIS — E06.3 HYPOTHYROIDISM DUE TO HASHIMOTO'S THYROIDITIS: ICD-10-CM

## 2019-10-14 DIAGNOSIS — E03.8 HYPOTHYROIDISM DUE TO HASHIMOTO'S THYROIDITIS: Primary | ICD-10-CM

## 2019-10-14 DIAGNOSIS — E66.9 OBESITY (BMI 30-39.9): ICD-10-CM

## 2019-10-14 DIAGNOSIS — R03.0 ELEVATED BP WITHOUT DIAGNOSIS OF HYPERTENSION: ICD-10-CM

## 2019-10-14 LAB
CK SERPL-CCNC: 78 U/L (ref 26–192)
TSH SERPL DL<=0.05 MIU/L-ACNC: 5.23 UIU/ML (ref 0.36–3.74)

## 2019-10-14 PROCEDURE — 99214 OFFICE O/P EST MOD 30 MIN: CPT | Performed by: FAMILY MEDICINE

## 2019-10-14 PROCEDURE — 84443 ASSAY THYROID STIM HORMONE: CPT

## 2019-10-14 PROCEDURE — 93971 EXTREMITY STUDY: CPT

## 2019-10-14 PROCEDURE — 36415 COLL VENOUS BLD VENIPUNCTURE: CPT

## 2019-10-14 PROCEDURE — 82085 ASSAY OF ALDOLASE: CPT

## 2019-10-14 PROCEDURE — 93971 EXTREMITY STUDY: CPT | Performed by: SURGERY

## 2019-10-14 PROCEDURE — 82550 ASSAY OF CK (CPK): CPT

## 2019-10-14 RX ORDER — PREDNISONE 10 MG/1
TABLET ORAL
Qty: 21 TABLET | Refills: 0 | Status: SHIPPED | OUTPATIENT
Start: 2019-10-14 | End: 2019-10-23

## 2019-10-14 NOTE — PATIENT INSTRUCTIONS
Here for left leg pain and l;eft knee pain and left calf pain and left sciatica it is better but not resolved  Hx of DVT in RLE as per pt  Rec checking venous duplex and call if worse or OAA consult if venous duplex wnl and not better  Obesity, lose weight as directed to help left leg pain  BP elevated due to pain and this should be monitored

## 2019-10-14 NOTE — PROGRESS NOTES
Assessment/Plan:  Chief Complaint   Patient presents with    Leg Pain     pt c/o left leg pain that started tuesday night  Patient Instructions   Here for left leg pain and l;eft knee pain and left calf pain and left sciatica it is better but not resolved  Hx of DVT in RLE as per pt  Rec checking venous duplex and call if worse or OAA consult if venous duplex wnl and not better  Obesity, lose weight as directed to help left leg pain  BP elevated due to pain and this should be monitored  No problem-specific Assessment & Plan notes found for this encounter  Diagnoses and all orders for this visit:    Left knee pain, unspecified chronicity  -     predniSONE 10 mg tablet; Take 60 mg po day#1, 50 mg po day#2, 40 mg po day#3, 30 mg po day#4, 20 mg po day#5m and 10 mg po day#6    Left sided sciatica  -     predniSONE 10 mg tablet; Take 60 mg po day#1, 50 mg po day#2, 40 mg po day#3, 30 mg po day#4, 20 mg po day#5m and 10 mg po day#6    Obesity (BMI 30-39  9)    Pain of left calf  -     predniSONE 10 mg tablet; Take 60 mg po day#1, 50 mg po day#2, 40 mg po day#3, 30 mg po day#4, 20 mg po day#5m and 10 mg po day#6    Elevated BP without diagnosis of hypertension          Subjective:      Patient ID: Ravi Alejandro is a 62 y o  female  Leg Pain (pt c/o left leg pain that started tuesday night ) It is better today and also has left sciatica and also left knee pain  Tried pain med ibuprofen did not help and took oxycodone which did not help and got nausea  No cp or sob  Hx of DVT in past  This was in her right leg in past as per patient  The following portions of the patient's history were reviewed and updated as appropriate: allergies, current medications, past family history, past medical history, past social history, past surgical history and problem list     Review of Systems   Constitutional:        Obesity   HENT: Negative  Eyes: Negative  Respiratory: Negative      Cardiovascular: Negative  Gastrointestinal: Negative  Endocrine: Negative  Genitourinary: Negative  Musculoskeletal:        Left knee pain and left sciatica pain   Skin: Negative  Allergic/Immunologic: Negative  Neurological: Negative  Hematological: Negative  Psychiatric/Behavioral: Negative  Objective:      /62 (BP Location: Left arm, Patient Position: Sitting, Cuff Size: Adult)   Pulse 90   Temp 98 1 °F (36 7 °C) (Temporal)   Ht 5' 7" (1 702 m)   Wt 100 kg (221 lb)   SpO2 98%   BMI 34 61 kg/m²          Physical Exam   Constitutional: She is oriented to person, place, and time  She appears well-developed and well-nourished  obesity   HENT:   Head: Normocephalic and atraumatic  Right Ear: External ear normal    Left Ear: External ear normal    Nose: Nose normal    Mouth/Throat: Oropharynx is clear and moist    Eyes: Pupils are equal, round, and reactive to light  Conjunctivae and EOM are normal    Neck: Normal range of motion  Neck supple  Cardiovascular: Normal rate, regular rhythm, normal heart sounds and intact distal pulses  Pulmonary/Chest: Effort normal and breath sounds normal    Musculoskeletal: Normal range of motion  Left knee pain and left sciatica pain and tenderness, left calf pain and tenderness   Neurological: She is alert and oriented to person, place, and time  She has normal reflexes  Skin: Skin is warm and dry  Psychiatric: She has a normal mood and affect   Her behavior is normal

## 2019-10-15 LAB — ALDOLASE SERPL-CCNC: 6.1 U/L (ref 3.3–10.3)

## 2019-10-17 ENCOUNTER — TELEPHONE (OUTPATIENT)
Dept: FAMILY MEDICINE CLINIC | Facility: CLINIC | Age: 59
End: 2019-10-17

## 2019-10-17 DIAGNOSIS — E06.3 HYPOTHYROIDISM DUE TO HASHIMOTO'S THYROIDITIS: Primary | ICD-10-CM

## 2019-10-17 DIAGNOSIS — E03.8 HYPOTHYROIDISM DUE TO HASHIMOTO'S THYROIDITIS: Primary | ICD-10-CM

## 2019-10-17 NOTE — TELEPHONE ENCOUNTER
Tell her TSH is now almost back in range  I would have her continue the same dose and recheck another TSH in 3 weeks    Please send slip

## 2019-10-17 NOTE — TELEPHONE ENCOUNTER
Pt aware  She wanted to make you aware that she saw podiatry and the dr is prescribing a medication for her nerve pain   She does have an appt on 10/25 and stating that if you had any questions you guys could discuss it then

## 2019-10-23 ENCOUNTER — CONSULT (OUTPATIENT)
Dept: ENDOCRINOLOGY | Facility: CLINIC | Age: 59
End: 2019-10-23
Payer: MEDICARE

## 2019-10-23 VITALS
BODY MASS INDEX: 35 KG/M2 | HEIGHT: 67 IN | HEART RATE: 86 BPM | SYSTOLIC BLOOD PRESSURE: 140 MMHG | DIASTOLIC BLOOD PRESSURE: 88 MMHG | WEIGHT: 223 LBS

## 2019-10-23 DIAGNOSIS — D35.2 PITUITARY MICROADENOMA (HCC): ICD-10-CM

## 2019-10-23 DIAGNOSIS — E06.3 HASHIMOTO'S THYROIDITIS: ICD-10-CM

## 2019-10-23 DIAGNOSIS — R73.03 PREDIABETES: ICD-10-CM

## 2019-10-23 DIAGNOSIS — R11.0 NAUSEA: ICD-10-CM

## 2019-10-23 DIAGNOSIS — E03.8 HYPOTHYROIDISM DUE TO HASHIMOTO'S THYROIDITIS: Primary | ICD-10-CM

## 2019-10-23 DIAGNOSIS — E06.3 HYPOTHYROIDISM DUE TO HASHIMOTO'S THYROIDITIS: Primary | ICD-10-CM

## 2019-10-23 DIAGNOSIS — E55.9 VITAMIN D DEFICIENCY: ICD-10-CM

## 2019-10-23 DIAGNOSIS — G70.9 NEUROMUSCULAR DISORDER (HCC): ICD-10-CM

## 2019-10-23 PROCEDURE — 99204 OFFICE O/P NEW MOD 45 MIN: CPT | Performed by: INTERNAL MEDICINE

## 2019-10-23 RX ORDER — GABAPENTIN 300 MG/1
300 CAPSULE ORAL DAILY
COMMUNITY
End: 2020-03-17 | Stop reason: SDUPTHER
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
## 2019-10-24 NOTE — ASSESSMENT & PLAN NOTE
Ongoing for many years with no definitive diagnosis    Will check a phosphorus level, continue follow-up with neurology

## 2019-10-24 NOTE — ASSESSMENT & PLAN NOTE
TSH high for the past year and is improving-for now continue levothyroxine at current dose and repeat thyroid function test in the next couple of months

## 2019-10-25 ENCOUNTER — OFFICE VISIT (OUTPATIENT)
Dept: FAMILY MEDICINE CLINIC | Facility: CLINIC | Age: 59
End: 2019-10-25
Payer: MEDICARE

## 2019-10-25 VITALS
HEIGHT: 67 IN | SYSTOLIC BLOOD PRESSURE: 148 MMHG | DIASTOLIC BLOOD PRESSURE: 78 MMHG | WEIGHT: 222 LBS | RESPIRATION RATE: 16 BRPM | BODY MASS INDEX: 34.84 KG/M2 | OXYGEN SATURATION: 99 % | HEART RATE: 85 BPM | TEMPERATURE: 97.7 F

## 2019-10-25 DIAGNOSIS — E06.3 HYPOTHYROIDISM DUE TO HASHIMOTO'S THYROIDITIS: ICD-10-CM

## 2019-10-25 DIAGNOSIS — Z23 ENCOUNTER FOR IMMUNIZATION: ICD-10-CM

## 2019-10-25 DIAGNOSIS — E11.9 TYPE 2 DIABETES MELLITUS WITHOUT COMPLICATION, WITHOUT LONG-TERM CURRENT USE OF INSULIN (HCC): ICD-10-CM

## 2019-10-25 DIAGNOSIS — R26.2 AMBULATORY DYSFUNCTION: ICD-10-CM

## 2019-10-25 DIAGNOSIS — G70.9 NEUROMUSCULAR DISORDER (HCC): ICD-10-CM

## 2019-10-25 DIAGNOSIS — E03.8 HYPOTHYROIDISM DUE TO HASHIMOTO'S THYROIDITIS: ICD-10-CM

## 2019-10-25 DIAGNOSIS — J45.20 MILD INTERMITTENT ASTHMA WITHOUT COMPLICATION: Primary | ICD-10-CM

## 2019-10-25 PROCEDURE — 90732 PPSV23 VACC 2 YRS+ SUBQ/IM: CPT | Performed by: FAMILY MEDICINE

## 2019-10-25 PROCEDURE — G0009 ADMIN PNEUMOCOCCAL VACCINE: HCPCS | Performed by: FAMILY MEDICINE

## 2019-10-25 PROCEDURE — 99214 OFFICE O/P EST MOD 30 MIN: CPT | Performed by: FAMILY MEDICINE

## 2019-10-25 RX ORDER — GABAPENTIN 100 MG/1
100 CAPSULE ORAL 2 TIMES DAILY
Refills: 0 | COMMUNITY
Start: 2019-10-21

## 2019-11-02 PROBLEM — E11.9 TYPE 2 DIABETES MELLITUS WITHOUT COMPLICATION, WITHOUT LONG-TERM CURRENT USE OF INSULIN (HCC): Status: ACTIVE | Noted: 2018-03-08

## 2019-11-02 NOTE — PROGRESS NOTES
Assessment/Plan:    Asthma is fairly stable at this time  Pneumovax 23 given  TSH is now coming into better range  TSH has come down from 22 down to 5 2  Patient does have endocrinology follow-up very soon  Of note patient has been listed as prediabetes but her May 2019 A1c came back at 6 5 that is making her type 2 diabetic  She will need at least q 6 months A1cs  We will discuss this further with her on her next visit  Will need yearly diabetic eye and foot examination  Sure Endocrinology will also review this  Patient continues to follow up with [facility] with regards to her not fully diagnosed neuromuscular disorder  She will eventually need to switch to [facility] to the [facility]  Otherwise recheck 6 months     Diagnoses and all orders for this visit:    Mild intermittent asthma without complication  -     PNEUMOCOCCAL POLYSACCHARIDE VACCINE 23-VALENT =>1YO SQ IM    Hypothyroidism due to Hashimoto's thyroiditis    Type 2 diabetes mellitus without complication, without long-term current use of insulin (HCC)    Neuromuscular disorder (HCC)    Ambulatory dysfunction    Encounter for immunization   -     PNEUMOCOCCAL POLYSACCHARIDE VACCINE 23-VALENT =>1YO SQ IM    Other orders  -     gabapentin (NEURONTIN) 100 mg capsule; Take 100 mg by mouth 3 (three) times a day        1  Mild intermittent asthma without complication  PNEUMOCOCCAL POLYSACCHARIDE VACCINE 23-VALENT =>1YO SQ IM   2  Hypothyroidism due to Hashimoto's thyroiditis     3  Type 2 diabetes mellitus without complication, without long-term current use of insulin ([address] )     4  Neuromuscular disorder ([address] )     5  Ambulatory dysfunction     6  Encounter for immunization   PNEUMOCOCCAL POLYSACCHARIDE VACCINE 23-VALENT =>1YO SQ IM       Subjective:        Patient ID: Agustin Elise is a 62 y o  female    Chief Complaint   Patient presents with    Follow-up     6 month follow up and pneumonia shot HPI    The following portions of the patient's history were reviewed and updated as appropriate: past medical history, past surgical history and problem list       Review of Systems   Constitutional: Negative for appetite change, fatigue, fever and unexpected weight change  HENT: Negative for congestion, ear pain, postnasal drip, rhinorrhea, sinus pressure, sinus pain and sore throat  Eyes: Negative for redness and visual disturbance  Respiratory: Negative for chest tightness and shortness of breath  Cardiovascular: Negative for chest pain, palpitations and leg swelling  Gastrointestinal: Negative for abdominal distention, abdominal pain, diarrhea and nausea  Endocrine: Negative for cold intolerance and heat intolerance  Genitourinary: Negative for dysuria and hematuria  Musculoskeletal: Positive for gait problem and myalgias  Negative for arthralgias  Skin: Negative for pallor and rash  Neurological: Negative for dizziness and headaches  Psychiatric/Behavioral: Negative for behavioral problems  The patient is not nervous/anxious  Objective:  /78   Pulse 85   Temp 97 7 °F (36 5 °C) (Temporal)   Resp 16   Ht 5' 7" (1 702 m)   Wt 101 kg (222 lb)   SpO2 99%   BMI 34 77 kg/m²        Physical Exam   Constitutional: She is oriented to person, place, and time  She appears well-nourished  No distress  Obese   HENT:   Head: Normocephalic and atraumatic  Right Ear: Tympanic membrane normal    Left Ear: Tympanic membrane normal    Mouth/Throat: Oropharynx is clear and moist    Eyes: Pupils are equal, round, and reactive to light  Conjunctivae and EOM are normal  No scleral icterus  Neck: Normal range of motion  Neck supple  No thyromegaly present  Cardiovascular: Normal rate, regular rhythm and normal heart sounds  No murmur heard  Pulses:       Carotid pulses are 0 on the right side, and 0 on the left side    Pulmonary/Chest: Effort normal and breath sounds normal  No respiratory distress  She has no wheezes  Abdominal: Soft  She exhibits no distension  Musculoskeletal: She exhibits no edema  Uses walker for ambulation   Lymphadenopathy:     She has no cervical adenopathy  Neurological: She is alert and oriented to person, place, and time  She has normal reflexes  No cranial nerve deficit  Skin: Skin is warm  No pallor  Psychiatric: She has a normal mood and affect  Her behavior is normal  Judgment and thought content normal    Nursing note and vitals reviewed

## 2019-11-03 DIAGNOSIS — M79.18 MYOFASCIAL PAIN: ICD-10-CM

## 2019-11-03 DIAGNOSIS — G70.9 NEUROMUSCULAR DISORDER (HCC): ICD-10-CM

## 2019-11-03 DIAGNOSIS — J30.89 SEASONAL ALLERGIC RHINITIS DUE TO OTHER ALLERGIC TRIGGER: ICD-10-CM

## 2019-11-04 RX ORDER — MONTELUKAST SODIUM 10 MG/1
TABLET ORAL
Qty: 30 TABLET | Refills: 5 | Status: SHIPPED | OUTPATIENT
Start: 2019-11-04 | End: 2020-04-10

## 2019-11-04 RX ORDER — DULOXETIN HYDROCHLORIDE 60 MG/1
CAPSULE, DELAYED RELEASE ORAL
Qty: 30 CAPSULE | Refills: 4 | Status: SHIPPED | OUTPATIENT
Start: 2019-11-04 | End: 2020-03-09

## 2019-11-11 ENCOUNTER — APPOINTMENT (OUTPATIENT)
Dept: LAB | Facility: CLINIC | Age: 59
End: 2019-11-11
Payer: MEDICARE

## 2019-11-11 DIAGNOSIS — D35.2 PITUITARY MICROADENOMA (HCC): ICD-10-CM

## 2019-11-11 DIAGNOSIS — E78.00 PURE HYPERCHOLESTEROLEMIA: ICD-10-CM

## 2019-11-11 DIAGNOSIS — E06.3 HYPOTHYROIDISM DUE TO HASHIMOTO'S THYROIDITIS: ICD-10-CM

## 2019-11-11 DIAGNOSIS — E03.8 HYPOTHYROIDISM DUE TO HASHIMOTO'S THYROIDITIS: ICD-10-CM

## 2019-11-11 DIAGNOSIS — E55.9 VITAMIN D DEFICIENCY: ICD-10-CM

## 2019-11-11 DIAGNOSIS — E11.9 TYPE 2 DIABETES MELLITUS WITHOUT COMPLICATION, WITHOUT LONG-TERM CURRENT USE OF INSULIN (HCC): ICD-10-CM

## 2019-11-11 DIAGNOSIS — R73.03 PREDIABETES: ICD-10-CM

## 2019-11-11 LAB
25(OH)D3 SERPL-MCNC: 37.1 NG/ML (ref 30–100)
ALBUMIN SERPL BCP-MCNC: 3.9 G/DL (ref 3.5–5)
ALP SERPL-CCNC: 117 U/L (ref 46–116)
ALT SERPL W P-5'-P-CCNC: 41 U/L (ref 12–78)
ANION GAP SERPL CALCULATED.3IONS-SCNC: 10 MMOL/L (ref 4–13)
AST SERPL W P-5'-P-CCNC: 28 U/L (ref 5–45)
BILIRUB SERPL-MCNC: 0.38 MG/DL (ref 0.2–1)
BUN SERPL-MCNC: 23 MG/DL (ref 5–25)
CALCIUM SERPL-MCNC: 8.7 MG/DL (ref 8.3–10.1)
CHLORIDE SERPL-SCNC: 109 MMOL/L (ref 100–108)
CHOLEST SERPL-MCNC: 220 MG/DL (ref 50–200)
CO2 SERPL-SCNC: 23 MMOL/L (ref 21–32)
CREAT SERPL-MCNC: 0.88 MG/DL (ref 0.6–1.3)
EST. AVERAGE GLUCOSE BLD GHB EST-MCNC: 151 MG/DL
FSH SERPL-ACNC: 46.5 MIU/ML
GFR SERPL CREATININE-BSD FRML MDRD: 71 ML/MIN/1.73SQ M
GLUCOSE P FAST SERPL-MCNC: 121 MG/DL (ref 65–99)
HBA1C MFR BLD: 6.9 % (ref 4.2–6.3)
HDLC SERPL-MCNC: 50 MG/DL
LDLC SERPL CALC-MCNC: 132 MG/DL (ref 0–100)
LH SERPL-ACNC: 18.5 MIU/ML
NONHDLC SERPL-MCNC: 170 MG/DL
PHOSPHATE SERPL-MCNC: 3.5 MG/DL (ref 2.3–4.1)
POTASSIUM SERPL-SCNC: 3.7 MMOL/L (ref 3.5–5.3)
PROT SERPL-MCNC: 7.8 G/DL (ref 6.4–8.2)
PTH-INTACT SERPL-MCNC: 49.3 PG/ML (ref 18.4–80.1)
SODIUM SERPL-SCNC: 142 MMOL/L (ref 136–145)
T4 FREE SERPL-MCNC: 1.32 NG/DL (ref 0.76–1.46)
TRIGL SERPL-MCNC: 189 MG/DL
TSH SERPL DL<=0.05 MIU/L-ACNC: 0.4 UIU/ML (ref 0.36–3.74)

## 2019-11-11 PROCEDURE — 83001 ASSAY OF GONADOTROPIN (FSH): CPT

## 2019-11-11 PROCEDURE — 84439 ASSAY OF FREE THYROXINE: CPT

## 2019-11-11 PROCEDURE — 80053 COMPREHEN METABOLIC PANEL: CPT

## 2019-11-11 PROCEDURE — 83970 ASSAY OF PARATHORMONE: CPT

## 2019-11-11 PROCEDURE — 36415 COLL VENOUS BLD VENIPUNCTURE: CPT

## 2019-11-11 PROCEDURE — 80061 LIPID PANEL: CPT

## 2019-11-11 PROCEDURE — 84443 ASSAY THYROID STIM HORMONE: CPT

## 2019-11-11 PROCEDURE — 82024 ASSAY OF ACTH: CPT

## 2019-11-11 PROCEDURE — 86255 FLUORESCENT ANTIBODY SCREEN: CPT

## 2019-11-11 PROCEDURE — 83002 ASSAY OF GONADOTROPIN (LH): CPT

## 2019-11-11 PROCEDURE — 82306 VITAMIN D 25 HYDROXY: CPT

## 2019-11-11 PROCEDURE — 84100 ASSAY OF PHOSPHORUS: CPT

## 2019-11-11 PROCEDURE — 83036 HEMOGLOBIN GLYCOSYLATED A1C: CPT

## 2019-11-11 PROCEDURE — 84305 ASSAY OF SOMATOMEDIN: CPT

## 2019-11-11 PROCEDURE — 83516 IMMUNOASSAY NONANTIBODY: CPT

## 2019-11-11 PROCEDURE — 82784 ASSAY IGA/IGD/IGG/IGM EACH: CPT

## 2019-11-12 LAB
ACTH PLAS-MCNC: 53.4 PG/ML (ref 7.2–63.3)
ENDOMYSIUM IGA SER QL: NEGATIVE
GLIADIN PEPTIDE IGA SER-ACNC: 9 UNITS (ref 0–19)
GLIADIN PEPTIDE IGG SER-ACNC: 3 UNITS (ref 0–19)
IGA SERPL-MCNC: 308 MG/DL (ref 87–352)
TTG IGA SER-ACNC: <2 U/ML (ref 0–3)
TTG IGG SER-ACNC: <2 U/ML (ref 0–5)

## 2019-11-13 LAB — IGF-I SERPL-MCNC: 127 NG/ML (ref 42–169)

## 2019-11-18 ENCOUNTER — APPOINTMENT (OUTPATIENT)
Dept: LAB | Facility: CLINIC | Age: 59
End: 2019-11-18
Payer: MEDICARE

## 2019-11-18 LAB — CORTIS AM PEAK SERPL-MCNC: 16.3 UG/DL (ref 4.2–22.4)

## 2019-11-18 PROCEDURE — 36415 COLL VENOUS BLD VENIPUNCTURE: CPT

## 2019-11-18 PROCEDURE — 82533 TOTAL CORTISOL: CPT

## 2019-11-29 NOTE — PROGRESS NOTES
Chart updated per office request  Discrete reportable data documented  Updated FOBT DOS 1-16-18 and Hep C Screening DOS 11-17-17

## 2019-12-04 ENCOUNTER — OFFICE VISIT (OUTPATIENT)
Dept: ENDOCRINOLOGY | Facility: CLINIC | Age: 59
End: 2019-12-04
Payer: MEDICARE

## 2019-12-04 VITALS
HEIGHT: 67 IN | SYSTOLIC BLOOD PRESSURE: 138 MMHG | HEART RATE: 91 BPM | DIASTOLIC BLOOD PRESSURE: 80 MMHG | WEIGHT: 228.6 LBS | BODY MASS INDEX: 35.88 KG/M2

## 2019-12-04 DIAGNOSIS — D35.2 PITUITARY MICROADENOMA (HCC): ICD-10-CM

## 2019-12-04 DIAGNOSIS — E06.3 HASHIMOTO'S THYROIDITIS: ICD-10-CM

## 2019-12-04 DIAGNOSIS — E11.9 TYPE 2 DIABETES MELLITUS WITHOUT COMPLICATION, WITHOUT LONG-TERM CURRENT USE OF INSULIN (HCC): Primary | ICD-10-CM

## 2019-12-04 DIAGNOSIS — E06.3 HYPOTHYROIDISM DUE TO HASHIMOTO'S THYROIDITIS: ICD-10-CM

## 2019-12-04 DIAGNOSIS — E03.8 HYPOTHYROIDISM DUE TO HASHIMOTO'S THYROIDITIS: ICD-10-CM

## 2019-12-04 DIAGNOSIS — E55.9 VITAMIN D DEFICIENCY: ICD-10-CM

## 2019-12-04 DIAGNOSIS — D35.2 MICROPROLACTINOMA (HCC): ICD-10-CM

## 2019-12-04 PROCEDURE — 99214 OFFICE O/P EST MOD 30 MIN: CPT | Performed by: PHYSICIAN ASSISTANT

## 2019-12-04 RX ORDER — CLINDAMYCIN PHOSPHATE 10 UG/ML
LOTION TOPICAL
Refills: 0 | COMMUNITY
Start: 2019-11-20 | End: 2022-05-10

## 2019-12-04 RX ORDER — OXYCODONE HYDROCHLORIDE AND ACETAMINOPHEN 5; 325 MG/1; MG/1
1 TABLET ORAL EVERY 4 HOURS PRN
COMMUNITY
End: 2020-10-01

## 2019-12-04 RX ORDER — LEVOTHYROXINE SODIUM 0.2 MG/1
200 TABLET ORAL
Qty: 30 TABLET | Refills: 5 | Status: SHIPPED | OUTPATIENT
Start: 2019-12-04 | End: 2020-05-06 | Stop reason: SDUPTHER

## 2019-12-04 RX ORDER — LEVOTHYROXINE SODIUM 0.03 MG/1
25 TABLET ORAL DAILY
Qty: 30 TABLET | Refills: 5 | Status: SHIPPED | OUTPATIENT
Start: 2019-12-04 | End: 2020-06-12 | Stop reason: SDUPTHER

## 2019-12-04 RX ORDER — BLOOD-GLUCOSE METER
EACH MISCELLANEOUS
Qty: 1 KIT | Refills: 1 | Status: SHIPPED | OUTPATIENT
Start: 2019-12-04
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
## 2019-12-04 NOTE — ASSESSMENT & PLAN NOTE
New Dx Type 2 Diabetes based on Last A1C of 6 5 and 6 9  Discussed importance of lifestyle modifications/weight loss for Goal A1C <7, or even better less than 6 5 to reduce risk of Complications from Diabetes  Discussed potential diabetes complicatiosn- she is UTD on eye/Foot Exam but looking for new eye doctor  Referral placed  Prescribed meter/strips and refer to diabetes education and medical nutrition therapy  Exercise limited due to other medical problems  She says coke is the only thing that will help her nausea- advised her to use sparingly in as small of a portion as possible and try to 'water down' with club soda

## 2019-12-05 ENCOUNTER — TELEPHONE (OUTPATIENT)
Dept: ENDOCRINOLOGY | Facility: CLINIC | Age: 59
End: 2019-12-05

## 2019-12-05 NOTE — TELEPHONE ENCOUNTER
Completed [employer] Medicare Part B forms for patient's meter, lancets, test strips and faxed, [phone]

## 2019-12-06 ENCOUNTER — OFFICE VISIT (OUTPATIENT)
Dept: DIABETES SERVICES | Facility: CLINIC | Age: 59
End: 2019-12-06
Payer: MEDICARE

## 2019-12-06 VITALS — HEIGHT: 67 IN | BODY MASS INDEX: 35.16 KG/M2 | WEIGHT: 224 LBS

## 2019-12-06 DIAGNOSIS — E11.9 TYPE 2 DIABETES MELLITUS WITHOUT COMPLICATION, WITHOUT LONG-TERM CURRENT USE OF INSULIN (HCC): Primary | ICD-10-CM

## 2019-12-06 PROCEDURE — 97802 MEDICAL NUTRITION INDIV IN: CPT | Performed by: DIETITIAN, REGISTERED

## 2019-12-06 NOTE — PROGRESS NOTES
Medical Nutrition Therapy        Assessment    Visit Type: Initial visit    Chief complaint Jamal Necessary came in for her appointment recounting dwain medical conditions, explaining how her muscles cramp up to the point of violent pain and nausea and she vomits  Nothing helps her nausea and unsettled stomach but regular Coke  She then started complaining about the cost of Coke from the [address] versus [facility] and the sugar source  Reports intolerance to non-nutritive sweeteners  She cannot exercise because of the neuromuscular disorder  She cooks a lot and feels she must sample everything and otherwise gets busy with cooking and the household and forgets to eat meals    HPI: [place] diet history reveals limited vegetables and fruits, "multigrain" choices but unclear if this includes whole grain, some dairy, processed and high sodium foods  Currently meals range from 0 to possible 100 grams of carbohydrate  Explained basic pathophysiology of diabetes and impact of diet on blood glucose levels  Together we discussed what foods contain CHO, reading a food label, and serving sizes  Explained importance of consistent carbs and quantifying portions of sweetened beverages and including in meal or snack carb count  Recommended she set the timer after taking her thyroid medication for an hour as a reminder to eat breakfast  Used the portion booklet to teach Jamal Necessary more about food groups and basic carbohydrate counting  Created an individualized meal plan for Jamal Necessary with 3 meals and 2-3 snacks providing 45 g carb per meal and 15 g carb per snack  This plan may help promote weight loss  Much depends on how much Coke she drinks from day to day  Reiterated Leisa's suggestion to dilute soda with club soda  Also looked up sources of other anti-nausea, anti-emetic solutions which contain cola syrup or phosphoric acid which is the ingredient that settles the stomach  Put together sample meals for Yuni's reference   Jamal Necessary demonstrated fair understandingCurtis Kitchen will call with questions or for more education  Ht Readings from Last 1 Encounters:   12/04/19 5' 7" (1 702 m)     Wt Readings from Last 2 Encounters:   12/04/19 104 kg (228 lb 9 6 oz)   10/25/19 101 kg (222 lb)     Weight Change: Yes variable    Medical Diagnosis/ICD10: E11 9 (ICD-10-CM) - Type 2 diabetes mellitus without complication, without long-term current use of insulin ([facility] )    Barriers to Learning: no barriers    Do you follow any special diet presently?: No, does have some fresh fruit around, can't always afford it  Who shops: patient  Who cooks: patient    Very limited money from disability only    Food Log: Completed via the method of food recall    Breakfast: pumpkin English muffin OR lite multigrain English muffin OR eggs or omelet and fruit or OJ or 1% milk OR multigrain Cheerios w//milk OR if late morning, has egg, yepez, but not usually bread  Morning Snack:none  Lunch: 1 can of soup, adds only 1/2 to 1/4 can of milk or water & grilled cheese sandwich - made in toaster     Afternoon Snack: not always  Dinner:lightly breaded chicken, turkey, or pork with corn and potatoes, occasional hamburger on multigrain bread  Evening Snack:multigrain pretzels, occasional ice cream, or lime ice pops, may have pie or cookies - chocolate animal crackers if not homemade  Beverages: herbal teas usually without sugar, hot cocoa from mix w/ milk, OJ, milk, low sugar limeade, vanilla Wes, Coke  Eating out/Take out:may grab something at [employer] or [employer]  Exercise does some seated exercises but muscles can cramp up really easily    Calorie needs 1435 kcals/day Carbs: 45 g/meal, 15 g/snack         Nutrition Diagnosis:  Food and nutrition related knowledge deficit  related to Lack of prior exposure to accurate nutrition related information as evidenced by New medical diagnosis or change in existing diagnosis or condition    Intervention: label reading, carbohydrate counting and monitoring portion control     Treatment Goals: Patient understands education and recommendations, Patient will consume 3 meals a day, Patient will monitor portion control and Patient will count carbohydrates    Monitoring and evaluation:    Term code indicator  FH 1 3 2 Food Intake Criteria: Eat 3 meals daily, set timers or alarms to remind about mealtimes  Term code indicator  FH 1 3 1 Fluid/Beverage Intake Criteria: Measure out portions of cola and other sweetened beverages  Term code indicator  FH 1 6 3 Carbohydrate Intake Criteria: Keep to meal plan limits for carbs at meals and snacks    Patients Response to Instruction:  Saul Rolon  Expected Compliancefair    Thank you for coming to the [facility] for education today  Please feel free to call with any questions or concerns      Obie Quezada  [phone] [facility]  [address] [facility] [id number]

## 2019-12-06 NOTE — PATIENT INSTRUCTIONS
1  Follow meal plan with 45 g carb per meal  2  Do seated exercise as often as you can  3  Choose more non-starchy vegetables  4   If you have to have Coke, keep track of quantities

## 2019-12-10 ENCOUNTER — OFFICE VISIT (OUTPATIENT)
Dept: DIABETES SERVICES | Facility: CLINIC | Age: 59
End: 2019-12-10
Payer: MEDICARE

## 2019-12-10 DIAGNOSIS — E11.9 TYPE 2 DIABETES MELLITUS WITHOUT COMPLICATION, WITHOUT LONG-TERM CURRENT USE OF INSULIN (HCC): Primary | ICD-10-CM

## 2019-12-10 PROCEDURE — G0108 DIAB MANAGE TRN  PER INDIV: HCPCS | Performed by: DIETITIAN, REGISTERED

## 2019-12-10 NOTE — PROGRESS NOTES
Type 2 Diabetes Class Assessment    HPI: Met with Esther Swanson for DSME Initial visit  Pal Chahal has Type 2 Diabetes  Pal Chahal is newly diagnosed T2 diabetic  She has a meter, but did not bring the meter with her to the visit  Patient plans on testing once a week per Felicita Vieira PA-C request   Pal Chahal complains a lot about the pain she experiences  She states, "When I am in pain I get very nauseous and I can't eat  The only thing that helps is drinking Coke  I sipped on 4 cans of Coke and ate a couple slices of bread the other day because I was so nauseous"  She plans on taking Living Well with diabetes classes in January at [facility]  Educator will remain available for further dietary questions/concerns  Diabetes Assessment  Visit Type: Initial visit  Present at Session: patient   Medical Diagnosis/ICD 10: E11 9   Special Learning Needs: No  Barriers to Learning: cognitive (memory issues with pain)    How do you learn best? Reads on own and uses You-Tube  What are you most interested in learning about regards to your diabetes? Nothing in particular  How do you feel about making lifestyle changes at this time? Willing to try, "but body may not cooperate" (pain)  How would you rate your current knowledge of diabetes? good  How confident are you that will be able to take better control of your diabetes?: very good    How long have you had diabetes?  New diagnosis  Have you had diabetes education in the past?: No  Do you have any family members with diabetes?: No  Do you monitor your blood sugar? yes  Type of blood sugar monitor: One Touch meter  How old is your meter?: new  How often do you test your blood sugars?:has not started testing BG yet  Do you keep a written record of your blood sugars? n/a   Blood sugar log with patient today and reviewed by educator?: n/a  Blood Sugar ranges:    Fasting: hasn't started testing yet   Before meals: hasn't started testing yet   2 hours after meals: hasn't started testing yet  Any financial concerns pertaining to your diabetes supplies, medication or care?: No  Have you ever experienced hypoglycemia?:  No  Have you ever been hospitalized or gone to the ER due to your blood sugars?: No  How do you treat low blood sugars?: eat an orange or drink soda  How do you treat high blood sugars? Exercise and watch diet  Do you wear a Diabetes I D ?: no  Where do you dispose of your sharps (needles,lancetes)?: hasn't started testing yet, but does not know where to dispose of sharps  Patient was instructed on proper disposal at the visit  Ht Readings from Last 1 Encounters:   12/06/19 5' 7" (1 702 m)     Wt Readings from Last 1 Encounters:   12/06/19 102 kg (224 lb)     Weight Change: No    Diet Assessment    Do you follow any special diet presently?: No  Who cooks at home?:  patient  Who does the grocery shopping?: patient   How frequently do you eat out?: rarely    Activity Assessment    Exercise: putting up Marco A decorations and taking laundry up and down the stairs as well as dance exercises (belly dancing) a little bit every day; does stretching as well  Lifestyle/Social Assessment    Racial/ethnic group:                                       Primary Language: English  Marital Status: Single  Education Level:  Bachelor's Degree   Work status: Disabled  Type of job and hours: n/a  Who lives in your household?: none  Who is you primary support person(s): sibling(s)   Describe your quality of life currently?: fair  Any concerns for your safety?: No  Any Voodoo or cultural practices that may affect your diabetes care: No  Do you have a decrease or loss of hearing?: Yes - gets ringing in her ears sometimes  Do you have a decrease or loss of vision?: Yes - only from pain  When was the last time you had an ophthalmology exam?: will have eye exam in the spring at the endo office--not scheduled yet  When was the last time you had dental exam?: hasn't gone in awhile   Do you check your feet for cracks, sores, debris?: Yes  When was the last time you had podiatry or foot exam?: needs diabetic foot exam; had recently been to the podiatrist   Last flu shot?: fall 2019  Pneumonia shot?: Yes      Lab Results   Component Value Date    HGBA1C 6 9 (H) 11/11/2019     Lab Results   Component Value Date    HDL 50 11/11/2019    LDLCALC 132 (H) 11/11/2019    TRIG 189 (H) 11/11/2019     No results found for: Jerry Stock      The patient's history was reviewed and updated as appropriate: allergies, current medications  Intervention    Diabetes Overview :   Javi Sykes was instructed on basic concepts of diabetes, including identifying role of diabetes self management, basic pathophysiology and types of diabetes, A1c and blood sugar targets  Javi Sykes has good understanding of material covered  Taking Medications: Javi Sykes is not taking medication for her diabetes at this time  Monitoring Blood Sugars  Instructions for Meter Teaching- Patient verbally instructed in the following:  Site selection and skin preparation, Loading strips and lancet device, meter activation, obtaining blood sample, test strip and lancet disposal and recording log book entries  Patient has good understanding of material covered  Comments: Javi Sykes did not bring her meter with her to the visit  Testing frequency: Patient reports being instructed by PAHoracioC to test her BG once a week  Goal Blood Sugars:   Premeal , even better <110  2hr after a meal <180, even better <140  A1C <7%, even better <6 5%  Hypoglycemia: Instructed patient on definition/risk of hypoglycemia, treatment, causes/symptoms, when to notify provider of lows, prevention of hypoglycemia and exercise precautions  Comments: Javi Sykes verbalizes understanding of hypoglycemia concepts      Physical Activity: Discussed benefits of physical activity to optimize blood glucose control, encouraged activity at patient is physically able  Always consult a physician prior to starting an exercise program   Comments: Danyel Sandhu understanding of hypoglycemia concepts        Diabetes Education Record  Dione Whitehead received the following handouts: Class assessment packet  Patient response to instruction    Comprehensiongood  Motivationgood  Expected Compliancegood  Response to Teachback: 100%, demonstrated understanding    Start- Stop: 9:30-10:27  Total Minutes: 49 Minutes  Group or Individual Instruction: DSMT-I  Other: Keaton Tao PA-C    Thank you for referring your patient to [facility], it was a pleasure working with them today  Please feel free to call with any questions or concerns      Lauren Temple  [address] [place] [phone]

## 2019-12-10 NOTE — PATIENT INSTRUCTIONS
Class Assessment AVS    You are scheduled to attend Living Well with Diabetes Classes starting: January at [facility]  Please bring a copy of your blood sugar log and pen with you to class  Testing frequency: Once a week per MD request     Goal Blood Sugars:   Premeal , even better <110  2hr after a meal <180, even better <140  A1C <7%, even better <6 5%  Thank you for coming to the [facility] for education today  Please feel free to call with any questions or concerns      Demarco Oleary  [address] [place] [phone]

## 2020-01-03 ENCOUNTER — OFFICE VISIT (OUTPATIENT)
Dept: FAMILY MEDICINE CLINIC | Facility: CLINIC | Age: 60
End: 2020-01-03
Payer: MEDICARE

## 2020-01-03 VITALS
OXYGEN SATURATION: 98 % | RESPIRATION RATE: 17 BRPM | DIASTOLIC BLOOD PRESSURE: 76 MMHG | WEIGHT: 223.6 LBS | BODY MASS INDEX: 35.09 KG/M2 | HEIGHT: 67 IN | TEMPERATURE: 98.4 F | SYSTOLIC BLOOD PRESSURE: 130 MMHG | HEART RATE: 92 BPM

## 2020-01-03 DIAGNOSIS — R09.81 NASAL CONGESTION: ICD-10-CM

## 2020-01-03 DIAGNOSIS — J32.9 SINUSITIS, UNSPECIFIED CHRONICITY, UNSPECIFIED LOCATION: ICD-10-CM

## 2020-01-03 DIAGNOSIS — E11.9 TYPE 2 DIABETES MELLITUS WITHOUT COMPLICATION, WITHOUT LONG-TERM CURRENT USE OF INSULIN (HCC): Primary | ICD-10-CM

## 2020-01-03 DIAGNOSIS — J02.9 SORE THROAT: ICD-10-CM

## 2020-01-03 DIAGNOSIS — R05.9 COUGH: ICD-10-CM

## 2020-01-03 PROCEDURE — 99214 OFFICE O/P EST MOD 30 MIN: CPT | Performed by: FAMILY MEDICINE

## 2020-01-03 RX ORDER — AZITHROMYCIN 250 MG/1
TABLET, FILM COATED ORAL
Qty: 6 TABLET | Refills: 0 | Status: SHIPPED | OUTPATIENT
Start: 2020-01-03 | End: 2020-01-08

## 2020-01-03 NOTE — PATIENT INSTRUCTIONS
Rest and fluids and start abx and dimetapp DM cold and cough  Low sugar diet and lose weight to help

## 2020-01-03 NOTE — PROGRESS NOTES
Assessment/Plan:  Chief Complaint   Patient presents with    Cold Like Symptoms     Pt c/o nasal congestion with ST and cough x7 days  Pt also has billateral facial pressure but mostly on the R/side  Patient Instructions   Rest and fluids and start abx and dimetapp DM cold and cough  Low sugar diet and lose weight to help  No problem-specific Assessment & Plan notes found for this encounter  Diagnoses and all orders for this visit:    Type 2 diabetes mellitus without complication, without long-term current use of insulin (HCC)    Sinusitis, unspecified chronicity, unspecified location  -     azithromycin (ZITHROMAX) 250 mg tablet; Take 2 tablets today then 1 tablet daily x 4 days    Cough  -     azithromycin (ZITHROMAX) 250 mg tablet; Take 2 tablets today then 1 tablet daily x 4 days    Nasal congestion    Sore throat  -     azithromycin (ZITHROMAX) 250 mg tablet; Take 2 tablets today then 1 tablet daily x 4 days          Subjective:      Patient ID: Virgilio Abad is a 62 y o  female  Cold Like Symptoms (Pt c/o nasal congestion with ST and cough x7 days  Pt also has billateral facial pressure but mostly on the R/side  ) No other complaints  No CP or sob, or ha  Roof of mouth itches  The following portions of the patient's history were reviewed and updated as appropriate: allergies, current medications, past family history, past medical history, past social history, past surgical history and problem list     Review of Systems   Constitutional: Negative  HENT: Positive for congestion, sinus pressure (right worse than left ) and sore throat  Eyes: Negative  Respiratory: Positive for cough  Cardiovascular: Negative  Gastrointestinal: Negative  Endocrine: Negative  Genitourinary: Negative  Musculoskeletal: Negative  Skin: Negative  Allergic/Immunologic: Negative  Neurological: Negative  Hematological: Negative  Psychiatric/Behavioral: Negative  Objective:      /76 (BP Location: Left arm, Patient Position: Sitting, Cuff Size: Large)   Pulse 92   Temp 98 4 °F (36 9 °C) (Temporal)   Resp 17   Ht 5' 7" (1 702 m)   Wt 101 kg (223 lb 9 6 oz)   SpO2 98%   BMI 35 02 kg/m²          Physical Exam   Constitutional: She is oriented to person, place, and time  She appears well-developed and well-nourished  HENT:   Head: Normocephalic and atraumatic  Right Ear: External ear normal    Left Ear: External ear normal    Nasal congestion and sinusitis and sinus tenderness and pnd   Eyes: Pupils are equal, round, and reactive to light  Conjunctivae and EOM are normal    Neck: Normal range of motion  Neck supple  Cardiovascular: Normal rate, regular rhythm, normal heart sounds and intact distal pulses  Pulmonary/Chest: Effort normal and breath sounds normal    cough   Musculoskeletal: Normal range of motion  Neurological: She is alert and oriented to person, place, and time  She has normal reflexes  Skin: Skin is warm and dry  Psychiatric: She has a normal mood and affect   Her behavior is normal

## 2020-01-14 ENCOUNTER — TELEPHONE (OUTPATIENT)
Dept: GASTROENTEROLOGY | Facility: CLINIC | Age: 60
End: 2020-01-14

## 2020-01-14 NOTE — TELEPHONE ENCOUNTER
Patients GI provider:  Dr Maribeth Mclain    Number to return call: [phone]    Reason for call: Pt calling to sched colon procedure    Scheduled procedure/appointment date if applicable: NA

## 2020-01-15 ENCOUNTER — OFFICE VISIT (OUTPATIENT)
Dept: DIABETES SERVICES | Facility: CLINIC | Age: 60
End: 2020-01-15
Payer: MEDICARE

## 2020-01-15 DIAGNOSIS — E11.9 TYPE 2 DIABETES MELLITUS WITHOUT COMPLICATION, WITHOUT LONG-TERM CURRENT USE OF INSULIN (HCC): Primary | ICD-10-CM

## 2020-01-15 PROCEDURE — G0109 DIAB MANAGE TRN IND/GROUP: HCPCS | Performed by: DIETITIAN, REGISTERED

## 2020-01-16 ENCOUNTER — PREP FOR PROCEDURE (OUTPATIENT)
Dept: GASTROENTEROLOGY | Facility: MEDICAL CENTER | Age: 60
End: 2020-01-16

## 2020-01-16 DIAGNOSIS — Z12.11 SCREENING FOR COLON CANCER: Primary | ICD-10-CM

## 2020-01-16 NOTE — TELEPHONE ENCOUNTER
Pt is scheduled at [address] [place] [address] with dr Kathryn Quintana on 3/17/20, I went over golytely with pt and mailed out instructions to pt home  Patient is aware she will need a  to and from   She will get a call the day before with an exact time for arrival   I requested an 11am procedure for this pt for transportation purposes

## 2020-01-16 NOTE — PROGRESS NOTES
Living Well with Diabetes Group Class #2    Yolanda Barron attended the Living Well with Diabetes Group Class #2  During class, Romy Valle was instructed on the following topics: Macronutrients, Carbohydrate sources, What one serving of carbohydrate equals, effects of diet on blood glucose levels, effect of carbohydrates on blood glucose levels, basics of meal planning: balance, portions, meal times, measurements, reading food labels to determine carbohydrates  Romy Valle participated in group activities of reading labels together and completing the meal planning activity  Romy Valle will follow up with class #3  Will call with any questions or concerns prior to next session  The patient's history was reviewed and updated as appropriate: allergies, current medications  Lab Results   Component Value Date    HGBA1C 6 9 (H) 11/11/2019       Diabetes Education Record  Romy Valle was provided Living Well with Diabetes Class #2 book, portions booklet, fiber handout      Patient response to instruction    Comprehensiongood  Motivationgood  Expected Compliancegood    Start- Stop: 1:00-3:00  Total Minutes: 120 Minutes  Group or Individual Instruction: DSMT-G2  Other: Heidy Mendez PA-C    Thank you for referring your patient to [facility], it was a pleasure working with them today  Please feel free to call with any questions or concerns      Glenn [address] [phone]

## 2020-01-22 ENCOUNTER — OFFICE VISIT (OUTPATIENT)
Dept: DIABETES SERVICES | Facility: CLINIC | Age: 60
End: 2020-01-22
Payer: MEDICARE

## 2020-01-22 DIAGNOSIS — E11.9 TYPE 2 DIABETES MELLITUS WITHOUT COMPLICATION, WITHOUT LONG-TERM CURRENT USE OF INSULIN (HCC): Primary | ICD-10-CM

## 2020-01-22 PROCEDURE — G0109 DIAB MANAGE TRN IND/GROUP: HCPCS | Performed by: DIETITIAN, REGISTERED

## 2020-01-22 NOTE — PROGRESS NOTES
Living Well with Diabetes Group Class #3    Airam Silva attended the Living Well with Diabetes Group Class #3  During class, Alyssa Maqrues was instructed on the following topics: Oral and injectable medications, short term complications of diabetes, long term complications of diabetes, prevention of complications, foot care, sick day management, stress management, and traveling with diabetes  Alyssa Marques participated in group activities  Alyssa Marques will follow up with class #4  Will call with any questions or concerns prior to next session  The patient's history was reviewed and updated as appropriate: allergies, current medications  Lab Results   Component Value Date    HGBA1C 6 9 (H) 11/11/2019       Diabetes Education Record  Alyssa Marques was provided Living Well with Diabetes Class #3 book      Patient response to instruction    Comprehensionvery good  Motivationvery good  Expected Compliancevery good    Begin Time: 1pm  End Time: 3pm  Referring Provider: Amanuel Hernandez    Thank you for referring your patient to [facility], it was a pleasure working with them today  Please feel free to call with any questions or concerns      Zakia Monreal  [id number]  [address] [facility] [phone] no previous reaction

## 2020-01-23 ENCOUNTER — TELEPHONE (OUTPATIENT)
Dept: FAMILY MEDICINE CLINIC | Facility: CLINIC | Age: 60
End: 2020-01-23

## 2020-01-23 ENCOUNTER — OFFICE VISIT (OUTPATIENT)
Dept: FAMILY MEDICINE CLINIC | Facility: CLINIC | Age: 60
End: 2020-01-23
Payer: MEDICARE

## 2020-01-23 VITALS
HEIGHT: 67 IN | HEART RATE: 81 BPM | SYSTOLIC BLOOD PRESSURE: 140 MMHG | TEMPERATURE: 97.7 F | DIASTOLIC BLOOD PRESSURE: 90 MMHG | BODY MASS INDEX: 34.31 KG/M2 | WEIGHT: 218.6 LBS | OXYGEN SATURATION: 99 %

## 2020-01-23 DIAGNOSIS — D35.2 MICROPROLACTINOMA (HCC): ICD-10-CM

## 2020-01-23 DIAGNOSIS — E11.9 TYPE 2 DIABETES MELLITUS WITHOUT COMPLICATION, WITHOUT LONG-TERM CURRENT USE OF INSULIN (HCC): ICD-10-CM

## 2020-01-23 DIAGNOSIS — F32.0 CURRENT MILD EPISODE OF MAJOR DEPRESSIVE DISORDER WITHOUT PRIOR EPISODE (HCC): ICD-10-CM

## 2020-01-23 DIAGNOSIS — E03.8 HYPOTHYROIDISM DUE TO HASHIMOTO'S THYROIDITIS: ICD-10-CM

## 2020-01-23 DIAGNOSIS — Z00.00 HEALTHCARE MAINTENANCE: Primary | ICD-10-CM

## 2020-01-23 DIAGNOSIS — G70.9 NEUROMUSCULAR DISORDER (HCC): ICD-10-CM

## 2020-01-23 DIAGNOSIS — G31.84 MILD NEUROCOGNITIVE DISORDER DUE TO TRAUMATIC BRAIN INJURY, SEQUELA (HCC): ICD-10-CM

## 2020-01-23 DIAGNOSIS — Q04.8 CEREBELLAR TONSILLAR ECTOPIA (HCC): ICD-10-CM

## 2020-01-23 DIAGNOSIS — S06.9X9S MILD NEUROCOGNITIVE DISORDER DUE TO TRAUMATIC BRAIN INJURY, SEQUELA (HCC): ICD-10-CM

## 2020-01-23 DIAGNOSIS — M79.18 MYOFASCIAL PAIN: Primary | ICD-10-CM

## 2020-01-23 DIAGNOSIS — D69.6 THROMBOCYTOPENIC DISORDER (HCC): ICD-10-CM

## 2020-01-23 DIAGNOSIS — E06.3 HYPOTHYROIDISM DUE TO HASHIMOTO'S THYROIDITIS: ICD-10-CM

## 2020-01-23 DIAGNOSIS — E78.00 PURE HYPERCHOLESTEROLEMIA: ICD-10-CM

## 2020-01-23 PROBLEM — B35.1 ONYCHOMYCOSIS: Status: ACTIVE | Noted: 2019-04-04

## 2020-01-23 PROBLEM — M65.9 SYNOVITIS: Status: ACTIVE | Noted: 2019-09-29

## 2020-01-23 PROBLEM — M19.079 OSTEOARTHRITIS OF FOOT JOINT: Status: ACTIVE | Noted: 2019-09-29

## 2020-01-23 PROBLEM — M65.90 SYNOVITIS: Status: ACTIVE | Noted: 2019-09-29

## 2020-01-23 PROBLEM — L60.1 ONYCHOLYSIS: Status: ACTIVE | Noted: 2019-04-04

## 2020-01-23 PROCEDURE — 99214 OFFICE O/P EST MOD 30 MIN: CPT | Performed by: FAMILY MEDICINE

## 2020-01-23 PROCEDURE — G0402 INITIAL PREVENTIVE EXAM: HCPCS | Performed by: FAMILY MEDICINE

## 2020-01-23 RX ORDER — ROSUVASTATIN CALCIUM 5 MG/1
5 TABLET, COATED ORAL DAILY
Qty: 30 TABLET | Refills: 5 | Status: SHIPPED | OUTPATIENT
Start: 2020-01-23 | End: 2020-01-24 | Stop reason: SDUPTHER
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
## 2020-01-23 NOTE — TELEPHONE ENCOUNTER
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        Pt's number is [phone]

## 2020-01-23 NOTE — PATIENT INSTRUCTIONS
Medicare Preventive Visit Patient Instructions  Thank you for completing your Welcome to Medicare Visit or Medicare Annual Wellness Visit today  Your next wellness visit will be due in one year (1/23/2021)  The screening/preventive services that you may require over the next 5-10 years are detailed below  Some tests may not apply to you based off risk factors and/or age  Screening tests ordered at today's visit but not completed yet may show as past due  Also, please note that scanned in results may not display below  Preventive Screenings:  Service Recommendations Previous Testing/Comments   Colorectal Cancer Screening  * Colonoscopy    * Fecal Occult Blood Test (FOBT)/Fecal Immunochemical Test (FIT)  * Fecal DNA/Cologuard Test  * Flexible Sigmoidoscopy Age: 54-65 years old   Colonoscopy: every 10 years (may be performed more frequently if at higher risk)  OR  FOBT/FIT: every 1 year  OR  Cologuard: every 3 years  OR  Sigmoidoscopy: every 5 years  Screening may be recommended earlier than age 48 if at higher risk for colorectal cancer  Also, an individualized decision between you and your healthcare provider will decide whether screening between the ages of 74-80 would be appropriate  Colonoscopy: Not on file  FOBT/FIT: 01/16/2018  Cologuard: Not on file  Sigmoidoscopy: Not on file         Breast Cancer Screening Age: 36 years old  Frequency: every 1-2 years  Not required if history of left and right mastectomy Mammogram: 01/10/2019    Screening Current   Cervical Cancer Screening Between the ages of 21-29, pap smear recommended once every 3 years  Between the ages of 33-67, can perform pap smear with HPV co-testing every 5 years     Recommendations may differ for women with a history of total hysterectomy, cervical cancer, or abnormal pap smears in past  Pap Smear: 11/15/2018    Screening Current   Hepatitis C Screening Once for adults born between 1945 and 1965  More frequently in patients at high risk for Hepatitis C Hep C Antibody: 11/17/2017    Screening Current   Diabetes Screening 1-2 times per year if you're at risk for diabetes or have pre-diabetes Fasting glucose: 121 mg/dL   A1C: 6 9 %    Screening Not Indicated  History Diabetes   Cholesterol Screening Once every 5 years if you don't have a lipid disorder  May order more often based on risk factors  Lipid panel: 11/11/2019    Screening Not Indicated  History Lipid Disorder     Other Preventive Screenings Covered by Medicare:  1  Abdominal Aortic Aneurysm (AAA) Screening: covered once if your at risk  You're considered to be at risk if you have a family history of AAA  2  Lung Cancer Screening: covers low dose CT scan once per year if you meet all of the following conditions: (1) Age 50-69; (2) No signs or symptoms of lung cancer; (3) Current smoker or have quit smoking within the last 15 years; (4) You have a tobacco smoking history of at least 30 pack years (packs per day multiplied by number of years you smoked); (5) You get a written order from a healthcare provider  3  Glaucoma Screening: covered annually if you're considered high risk: (1) You have diabetes OR (2) Family history of glaucoma OR (3)  aged 48 and older OR (3)  American aged 72 and older  3  Osteoporosis Screening: covered every 2 years if you meet one of the following conditions: (1) You're estrogen deficient and at risk for osteoporosis based off medical history and other findings; (2) Have a vertebral abnormality; (3) On glucocorticoid therapy for more than 3 months; (4) Have primary hyperparathyroidism; (5) On osteoporosis medications and need to assess response to drug therapy  · Last bone density test (DXA Scan): 11/17/2017  5  HIV Screening: covered annually if you're between the age of 12-76  Also covered annually if you are younger than 13 and older than 72 with risk factors for HIV infection   For pregnant patients, it is covered up to 3 times per pregnancy  Immunizations:  Immunization Recommendations   Influenza Vaccine Annual influenza vaccination during flu season is recommended for all persons aged >= 6 months who do not have contraindications   Pneumococcal Vaccine (Prevnar and Pneumovax)  * Prevnar = PCV13  * Pneumovax = PPSV23   Adults 25-60 years old: 1-3 doses may be recommended based on certain risk factors  Adults 72 years old: Prevnar (PCV13) vaccine recommended followed by Pneumovax (PPSV23) vaccine  If already received PPSV23 since turning 65, then PCV13 recommended at least one year after PPSV23 dose  Hepatitis B Vaccine 3 dose series if at intermediate or high risk (ex: diabetes, end stage renal disease, liver disease)   Tetanus (Td) Vaccine - COST NOT COVERED BY MEDICARE PART B Following completion of primary series, a booster dose should be given every 10 years to maintain immunity against tetanus  Td may also be given as tetanus wound prophylaxis  Tdap Vaccine - COST NOT COVERED BY MEDICARE PART B Recommended at least once for all adults  For pregnant patients, recommended with each pregnancy  Shingles Vaccine (Shingrix) - COST NOT COVERED BY MEDICARE PART B  2 shot series recommended in those aged 48 and above     Health Maintenance Due:      Topic Date Due    CRC Screening: FOBTx3/FIT  01/16/2019    MAMMOGRAM  01/10/2020    Cervical Cancer Screening  11/15/2023    Hepatitis C Screening  Completed     Immunizations Due:      Topic Date Due    Hepatitis B Vaccine (1 of 3 - Risk 3-dose series) 05/[birth year]/1977     Advance Directives   What are advance directives? Advance directives are legal documents that state your wishes and plans for medical care  These plans are made ahead of time in case you lose your ability to make decisions for yourself  Advance directives can apply to any medical decision, such as the treatments you want, and if you want to donate organs  What are the types of advance directives?   There are many types of advance directives, and each state has rules about how to use them  You may choose a combination of any of the following:  · Living will: This is a written record of the treatment you want  You can also choose which treatments you do not want, which to limit, and which to stop at a certain time  This includes surgery, medicine, IV fluid, and tube feedings  · Durable power of  for healthcare [place] [facility]): This is a written record that states who you want to make healthcare choices for you when you are unable to make them for yourself  This person, called a proxy, is usually a family member or a friend  You may choose more than 1 proxy  · Do not resuscitate (DNR) order:  A DNR order is used in case your heart stops beating or you stop breathing  It is a request not to have certain forms of treatment, such as CPR  A DNR order may be included in other types of advance directives  · Medical directive: This covers the care that you want if you are in a coma, near death, or unable to make decisions for yourself  You can list the treatments you want for each condition  Treatment may include pain medicine, surgery, blood transfusions, dialysis, IV or tube feedings, and a ventilator (breathing machine)  · Values history: This document has questions about your views, beliefs, and how you feel and think about life  This information can help others choose the care that you would choose  Why are advance directives important? An advance directive helps you control your care  Although spoken wishes may be used, it is better to have your wishes written down  Spoken wishes can be misunderstood, or not followed  Treatments may be given even if you do not want them  An advance directive may make it easier for your family to make difficult choices about your care     Weight Management   Why it is important to manage your weight:  Being overweight increases your risk of health conditions such as heart disease, high blood pressure, type 2 diabetes, and certain types of cancer  It can also increase your risk for osteoarthritis, sleep apnea, and other respiratory problems  Aim for a slow, steady weight loss  Even a small amount of weight loss can lower your risk of health problems  How to lose weight safely:  A safe and healthy way to lose weight is to eat fewer calories and get regular exercise  You can lose up about 1 pound a week by decreasing the number of calories you eat by 500 calories each day  Healthy meal plan for weight management:  A healthy meal plan includes a variety of foods, contains fewer calories, and helps you stay healthy  A healthy meal plan includes the following:  · Eat whole-grain foods more often  A healthy meal plan should contain fiber  Fiber is the part of grains, fruits, and vegetables that is not broken down by your body  Whole-grain foods are healthy and provide extra fiber in your diet  Some examples of whole-grain foods are whole-wheat breads and pastas, oatmeal, brown rice, and bulgur  · Eat a variety of vegetables every day  Include dark, leafy greens such as spinach, kale, mera greens, and mustard greens  Eat yellow and orange vegetables such as carrots, sweet potatoes, and winter squash  · Eat a variety of fruits every day  Choose fresh or canned fruit (canned in its own juice or light syrup) instead of juice  Fruit juice has very little or no fiber  · Eat low-fat dairy foods  Drink fat-free (skim) milk or 1% milk  Eat fat-free yogurt and low-fat cottage cheese  Try low-fat cheeses such as mozzarella and other reduced-fat cheeses  · Choose meat and other protein foods that are low in fat  Choose beans or other legumes such as split peas or lentils  Choose fish, skinless poultry (chicken or turkey), or lean cuts of red meat (beef or pork)  Before you cook meat or poultry, cut off any visible fat  · Use less fat and oil  Try baking foods instead of frying them   Add less fat, such as margarine, sour cream, regular salad dressing and mayonnaise to foods  Eat fewer high-fat foods  Some examples of high-fat foods include french fries, doughnuts, ice cream, and cakes  · Eat fewer sweets  Limit foods and drinks that are high in sugar  This includes candy, cookies, regular soda, and sweetened drinks  Exercise:  Exercise at least 30 minutes per day on most days of the week  Some examples of exercise include walking, biking, dancing, and swimming  You can also fit in more physical activity by taking the stairs instead of the elevator or parking farther away from stores  Ask your healthcare provider about the best exercise plan for you  © Copyright [employer] 2018 Information is for End User's use only and may not be sold, redistributed or otherwise used for commercial purposes   All illustrations and images included in CareNotes® are the copyrighted property of [employer]  or [address] [place] [address]

## 2020-01-24 DIAGNOSIS — E78.00 PURE HYPERCHOLESTEROLEMIA: ICD-10-CM

## 2020-01-24 RX ORDER — ROSUVASTATIN CALCIUM 5 MG/1
5 TABLET, COATED ORAL DAILY
Qty: 90 TABLET | Refills: 3 | Status: SHIPPED | OUTPATIENT
Start: 2020-01-24 | End: 2021-02-16

## 2020-01-24 RX ORDER — IBUPROFEN 800 MG/1
800 TABLET ORAL EVERY 6 HOURS PRN
Qty: 30 TABLET | Refills: 0 | Status: SHIPPED | OUTPATIENT
Start: 2020-01-24 | End: 2021-07-13 | Stop reason: SDUPTHER

## 2020-01-29 ENCOUNTER — OFFICE VISIT (OUTPATIENT)
Dept: DIABETES SERVICES | Facility: CLINIC | Age: 60
End: 2020-01-29
Payer: MEDICARE

## 2020-01-29 DIAGNOSIS — E11.9 TYPE 2 DIABETES MELLITUS WITHOUT COMPLICATION, WITHOUT LONG-TERM CURRENT USE OF INSULIN (HCC): Primary | ICD-10-CM

## 2020-01-29 PROCEDURE — G0109 DIAB MANAGE TRN IND/GROUP: HCPCS | Performed by: DIETITIAN, REGISTERED

## 2020-01-30 NOTE — PROGRESS NOTES
Living Well with Diabetes Group Class #4    Miguel Lomeli attended the Living Well with Diabetes Group Class #4  During class, Brianna Bahena was instructed on the following topics: Types of cholesterol, dietary sources of cholesterol, types of fat, types of fiber, reading food labels- in depth, healthy choices when dining out  Brianna Bahena participated in group activities of reading labels together and completed the dining out activity  Brianna Bahena will follow up with class #5 or additional DSMT/MNT as desired  Will call with any questions or concerns prior to next session  The patient's history was reviewed and updated as appropriate: allergies, current medications  Lab Results   Component Value Date    HGBA1C 6 9 (H) 11/11/2019       Diabetes Education Record  Brianna Bahena was provided Living Well with Diabetes Class #4 book      Patient response to instruction    Comprehensiongood  Motivationgood  Expected Compliancegood    Start- Stop: 1:00-3:00  Total Minutes: 120 Minutes  Group or Individual Instruction: DSMT-G4  Other: Jenn Holt PA-C    Thank you for referring your patient to [facility], it was a pleasure working with them today  Please feel free to call with any questions or concerns      Lennox Gloss  [address] [phone]

## 2020-01-31 ENCOUNTER — OFFICE VISIT (OUTPATIENT)
Dept: DIABETES SERVICES | Facility: CLINIC | Age: 60
End: 2020-01-31
Payer: MEDICARE

## 2020-01-31 VITALS — HEIGHT: 67 IN | WEIGHT: 217 LBS | BODY MASS INDEX: 34.06 KG/M2

## 2020-01-31 DIAGNOSIS — E11.9 TYPE 2 DIABETES MELLITUS WITHOUT COMPLICATION, WITHOUT LONG-TERM CURRENT USE OF INSULIN (HCC): Primary | ICD-10-CM

## 2020-01-31 PROCEDURE — 97803 MED NUTRITION INDIV SUBSEQ: CPT | Performed by: DIETITIAN, REGISTERED

## 2020-01-31 NOTE — PATIENT INSTRUCTIONS
1  Continue to follow meal plan with 45 g carb per meal  2  Do video or ROKU exercise at least 3-4 times per week  3   Use measuring cups

## 2020-01-31 NOTE — PROGRESS NOTES
Medical Nutrition Therapy      Assessment    Chief complaint My goal right now is to eat 3 meals per day  Brought in colonoscopy prep paperwork to discuss what she can eat during the 5 days before the procedure  Visit Type: Follow-up visit    HPI: Aliza Agee returned for follow-up today  [place] food recall reveals that she is eating 3 meals daily, is not measuring, has limited fruits and vegetables but does choose whole wheat bread and mostly lean protein foods  She is on a limited budget so fresh fruits and vegetables are too expensive  She uses lots of frozen vegetables and complained about many being in sauce  Encouraged her to look at more brands or try a different store as there are many without sauces  Also discussed frozen fruits, recommended using measuring cups to portion out servings so she can determine more precisely what the carb value of the meal is    Overall, [place] meals provide about 45 g carbohydrate and her rare snacks seem to be just 1 piece of bread at about 15 g carb  grams carbohydrate  Based on meal plan review and discussion provided education about measuring, physical activity, food martinez, and grocery store choices  Overall, she seems to be drinking less Coke than previously and sips a small can over 6 hours or so when she needs it  Ht Readings from Last 1 Encounters:   01/23/20 5' 7" (1 702 m)     Wt Readings from Last 2 Encounters:   01/23/20 99 2 kg (218 lb 9 6 oz)   01/03/20 101 kg (223 lb 9 6 oz)     Weight Change: Yes -5#    Medical Diagnosis/reason for visit E11 9    Food Log: Completed via the method of food recall      Breakfast: 10-11, a little bit of oatmeal cooked with water, added more than 1/2 cup 1% milk and a pinch of brown sugar, about 1/2 tsp   Yesterday had 2 ww toast w/ strawberry jam  Morning Snack:occasional bread  Lunch:3-4pm, chicken breast thinly cut on 2 whole wheat bread, small handful of chips, tea or water  Afternoon Snack: occasional piece of bread  PPLLUZ:6-1IC, slice pork roast breaded w/ ww crumbs, mixed veggies (including starchy vegetables) and cinnamon applesauce  Evening Snack:may have a slice of ww bread    Beverages: herbal tea, water, occasional fruit juice, Coke if feeling bad, having less than before  Eating out/Take out:rare    Exercise maintaining house, has videos for dance and TV channels w/ exercise  Just focused on getting house cleaned up now  Calorie needs 1435 kcals/day Carbs: 45 g/meal, 15 g/snack    Nutrition Diagnosis:  Limited adherence to nutrition related recommendations  related to Lack of value for behavior change or competing values as evidenced by Uncertainty as to how to consistently apply food/nutrition information    Intervention: increased plant based foods, meal timing, monitoring portion control and exercise guidelines     Treatment Goals: Patient understands education and recommendations, Patient will monitor portion control and Patient will exercise    Monitoring and evaluation:    Term code indicator  FH 1 6 3 Carbohydrate Intake Criteria: Measure carb foods and determine carb counts of these foods  Term code indicator  CH 1 1 Personal Data Criteria: Increase physical activity by doing seated exercises or participating in a video or TV class    Patients Response to Instruction:  Comprehensionvery good  Motivationgood  Expected Compliancegood    Thank you for coming to the [facility] for education today  Please feel free to call with any questions or concerns      Zakia oMnreal  [id number]  [address] [facility] [phone]

## 2020-02-18 ENCOUNTER — LAB REQUISITION (OUTPATIENT)
Dept: LAB | Facility: HOSPITAL | Age: 60
End: 2020-02-18
Payer: MEDICARE

## 2020-02-18 DIAGNOSIS — N84.1 POLYP OF CERVIX UTERI: ICD-10-CM

## 2020-02-18 DIAGNOSIS — R93.89 ABNORMAL FINDINGS ON DIAGNOSTIC IMAGING OF OTHER SPECIFIED BODY STRUCTURES: ICD-10-CM

## 2020-02-18 PROCEDURE — 88305 TISSUE EXAM BY PATHOLOGIST: CPT | Performed by: PATHOLOGY

## 2020-02-27 ENCOUNTER — TELEPHONE (OUTPATIENT)
Dept: DIABETES SERVICES | Facility: CLINIC | Age: 60
End: 2020-02-27

## 2020-03-03 ENCOUNTER — OFFICE VISIT (OUTPATIENT)
Dept: FAMILY MEDICINE CLINIC | Facility: CLINIC | Age: 60
End: 2020-03-03
Payer: MEDICARE

## 2020-03-03 VITALS
DIASTOLIC BLOOD PRESSURE: 90 MMHG | SYSTOLIC BLOOD PRESSURE: 140 MMHG | TEMPERATURE: 98 F | HEIGHT: 67 IN | OXYGEN SATURATION: 97 % | WEIGHT: 216.2 LBS | BODY MASS INDEX: 33.93 KG/M2 | HEART RATE: 82 BPM

## 2020-03-03 DIAGNOSIS — S00.83XA FACIAL CONTUSION, INITIAL ENCOUNTER: Primary | ICD-10-CM

## 2020-03-03 DIAGNOSIS — H10.13 ALLERGIC CONJUNCTIVITIS OF BOTH EYES: ICD-10-CM

## 2020-03-03 PROCEDURE — 99213 OFFICE O/P EST LOW 20 MIN: CPT | Performed by: FAMILY MEDICINE

## 2020-03-03 PROCEDURE — 1036F TOBACCO NON-USER: CPT | Performed by: FAMILY MEDICINE

## 2020-03-03 RX ORDER — OLOPATADINE HYDROCHLORIDE 1 MG/ML
1 SOLUTION/ DROPS OPHTHALMIC 2 TIMES DAILY
Qty: 5 ML | Refills: 3 | Status: SHIPPED | OUTPATIENT
Start: 2020-03-03 | End: 2020-03-06

## 2020-03-03 RX ORDER — SULFACETAMIDE SODIUM 100 MG/ML
1 LOTION TOPICAL 2 TIMES DAILY
COMMUNITY
Start: 2020-02-21 | End: 2022-05-10

## 2020-03-04 ENCOUNTER — TELEPHONE (OUTPATIENT)
Dept: FAMILY MEDICINE CLINIC | Facility: CLINIC | Age: 60
End: 2020-03-04

## 2020-03-04 NOTE — TELEPHONE ENCOUNTER
Patient called and stated the patanol eye drops are not covered by her insurance  States if that happened she was suppose to call with the eye drops that she used last year   States its called tobramycin- dexamethasone    [facility]

## 2020-03-05 NOTE — PROGRESS NOTES
Assessment/Plan:    Patient contusion seems minor  Recommend nothing more than soap and water and Tylenol as needed  No injury to noted  Not seem like a corneal abrasion  Refill Patanol for allergy issues the season that is coming will likely be quite  Otherwise maintain normal routine visits  Diagnoses and all orders for this visit:    Facial contusion, initial encounter    Allergic conjunctivitis of both eyes  -     olopatadine (PATANOL) 0 1 % ophthalmic solution; Administer 1 drop to both eyes 2 (two) times a day    Other orders  -     Sulfacetamide Sodium, Acne, 10 % LOTN        1  Facial contusion, initial encounter     2  Allergic conjunctivitis of both eyes  olopatadine (PATANOL) 0 1 % ophthalmic solution       Subjective:        Patient ID: Marco Antonio Gore is a 62 y o  female  Chief Complaint   Patient presents with    Eye Injury     Thursday night pt has a pitcure frame fall on her L eye, pt state sthat he eye is more watery, top of the eye happens, pt states that her eye feels more tired, no blurred vision       Patient here for evaluation  Was sleeping when a friend came off the wall and hit just below her eye  Having some tenderness  No visual issues      The following portions of the patient's history were reviewed and updated as appropriate: past medical history, past surgical history and problem list       Review of Systems   Constitutional: Negative for fatigue  Respiratory: Negative for cough and shortness of breath  Cardiovascular: Negative for chest pain, palpitations and leg swelling  Gastrointestinal: Negative for abdominal pain  Neurological: Negative for dizziness and headaches  Psychiatric/Behavioral: The patient is not nervous/anxious            Objective:  /90 (BP Location: Left arm, Patient Position: Sitting, Cuff Size: Standard)   Pulse 82   Temp 98 °F (36 7 °C)   Ht 5' 7" (1 702 m)   Wt 98 1 kg (216 lb 3 2 oz)   SpO2 97%   BMI 33 86 kg/m² Physical Exam   Constitutional: She is oriented to person, place, and time  She appears well-nourished  No distress  HENT:   Head: Normocephalic  Right Ear: Tympanic membrane and external ear normal    Left Ear: Tympanic membrane and external ear normal    Nose: Nose normal    Mouth/Throat: Oropharynx is clear and moist  No oropharyngeal exudate  Eyes: Pupils are equal, round, and reactive to light  Conjunctivae and EOM are normal    Cardiovascular: Normal heart sounds  No murmur heard  Pulmonary/Chest: Breath sounds normal    Musculoskeletal: She exhibits no edema  Lymphadenopathy:     She has no cervical adenopathy  Neurological: She is alert and oriented to person, place, and time  Skin:   Small area under the left orbit with healing ecchymosis  Psychiatric: She has a normal mood and affect  Her behavior is normal  Thought content normal    Nursing note and vitals reviewed

## 2020-03-06 DIAGNOSIS — H10.13 ALLERGIC CONJUNCTIVITIS OF BOTH EYES: Primary | ICD-10-CM

## 2020-03-06 RX ORDER — TOBRAMYCIN AND DEXAMETHASONE 3; 1 MG/ML; MG/ML
1 SUSPENSION/ DROPS OPHTHALMIC
Qty: 5 ML | Refills: 0 | Status: SHIPPED | OUTPATIENT
Start: 2020-03-06 | End: 2020-10-23 | Stop reason: SDUPTHER

## 2020-03-09 DIAGNOSIS — M79.18 MYOFASCIAL PAIN: ICD-10-CM

## 2020-03-09 DIAGNOSIS — G70.9 NEUROMUSCULAR DISORDER (HCC): ICD-10-CM

## 2020-03-09 RX ORDER — DULOXETIN HYDROCHLORIDE 60 MG/1
CAPSULE, DELAYED RELEASE ORAL
Qty: 30 CAPSULE | Refills: 4 | Status: SHIPPED | OUTPATIENT
Start: 2020-03-09 | End: 2020-03-24 | Stop reason: SDUPTHER

## 2020-03-16 ENCOUNTER — ANESTHESIA EVENT (OUTPATIENT)
Dept: GASTROENTEROLOGY | Facility: HOSPITAL | Age: 60
End: 2020-03-16

## 2020-03-16 NOTE — ANESTHESIA PREPROCEDURE EVALUATION
Review of Systems/Medical History  Patient summary reviewed  Chart reviewed  No history of anesthetic complications     Cardiovascular  EKG reviewed, Exercise tolerance (METS): <4,  Hyperlipidemia, DVT  PE (IVC filter in place),  Pulmonary  Asthma , well controlled/ stable Last rescue: < 1 month ago , No shortness of breath, No recent URI , No sleep apnea ,        GI/Hepatic    Bowel prep  Comment: IBS hx and gastroparesis   No aspirtation hx     Negative  ROS        Endo/Other  Diabetes (HbA1c 6 9) poorly controlled type 2 Oral agent, History of thyroid disease (Hashimotos's) , hypothyroidism,   Obesity    GYN  , Prior pregnancy/OB history : 0 Parity: 0,     Comment: postmenopausal     Hematology    Thrombocytopenia,    Musculoskeletal    Comment: Hand parasthesias Arthritis     Neurology    No neuromuscular disease ,    Psychology   Anxiety, Depression , being treated for depression,   Chronic pain,            Physical Exam    Airway    Mallampati score: II         Dental       Cardiovascular  Cardiovascular exam normal    Pulmonary      Other Findings        Anesthesia Plan  ASA Score- 3     Anesthesia Type- IV sedation with anesthesia with ASA Monitors  Additional Monitors:   Airway Plan:         Plan Factors-Patient not instructed to abstain from smoking on day of procedure  Patient did not smoke on day of surgery  Induction- intravenous  Postoperative Plan-     Informed Consent- Anesthetic plan and risks discussed with patient  I personally reviewed this patient with the CRNA  Discussed and agreed on the Anesthesia Plan with the CRNA  Leif Mahoney

## 2020-03-17 ENCOUNTER — HOSPITAL ENCOUNTER (OUTPATIENT)
Dept: GASTROENTEROLOGY | Facility: HOSPITAL | Age: 60
Setting detail: OUTPATIENT SURGERY
Discharge: HOME | End: 2020-03-17
Attending: INTERNAL MEDICINE | Admitting: INTERNAL MEDICINE
Payer: MEDICARE

## 2020-03-17 ENCOUNTER — ANESTHESIA (OUTPATIENT)
Dept: GASTROENTEROLOGY | Facility: HOSPITAL | Age: 60
End: 2020-03-17

## 2020-03-17 VITALS
OXYGEN SATURATION: 96 % | BODY MASS INDEX: 33.9 KG/M2 | HEART RATE: 75 BPM | WEIGHT: 216 LBS | HEIGHT: 67 IN | TEMPERATURE: 98.4 F | SYSTOLIC BLOOD PRESSURE: 132 MMHG | RESPIRATION RATE: 18 BRPM | DIASTOLIC BLOOD PRESSURE: 73 MMHG

## 2020-03-17 DIAGNOSIS — Z12.11 SCREENING FOR COLON CANCER: ICD-10-CM

## 2020-03-17 LAB — GLUCOSE SERPL-MCNC: 110 MG/DL (ref 65–140)

## 2020-03-17 PROCEDURE — 82948 REAGENT STRIP/BLOOD GLUCOSE: CPT

## 2020-03-17 PROCEDURE — G0121 COLON CA SCRN NOT HI RSK IND: HCPCS | Performed by: INTERNAL MEDICINE

## 2020-03-17 RX ORDER — PROPOFOL 10 MG/ML
INJECTION, EMULSION INTRAVENOUS AS NEEDED
Status: DISCONTINUED | OUTPATIENT
Start: 2020-03-17 | End: 2020-03-17 | Stop reason: SURG

## 2020-03-17 RX ORDER — LIDOCAINE HYDROCHLORIDE 10 MG/ML
INJECTION, SOLUTION EPIDURAL; INFILTRATION; INTRACAUDAL; PERINEURAL AS NEEDED
Status: DISCONTINUED | OUTPATIENT
Start: 2020-03-17 | End: 2020-03-17 | Stop reason: SURG

## 2020-03-17 RX ORDER — SODIUM CHLORIDE 9 MG/ML
125 INJECTION, SOLUTION INTRAVENOUS CONTINUOUS
Status: DISCONTINUED | OUTPATIENT
Start: 2020-03-17 | End: 2020-03-21 | Stop reason: HOSPADM

## 2020-03-17 RX ADMIN — PROPOFOL 50 MG: 10 INJECTION, EMULSION INTRAVENOUS at 10:13

## 2020-03-17 RX ADMIN — PROPOFOL 50 MG: 10 INJECTION, EMULSION INTRAVENOUS at 10:21

## 2020-03-17 RX ADMIN — SODIUM CHLORIDE 125 ML/HR: 0.9 INJECTION, SOLUTION INTRAVENOUS at 09:00

## 2020-03-17 RX ADMIN — PROPOFOL 50 MG: 10 INJECTION, EMULSION INTRAVENOUS at 10:15

## 2020-03-17 RX ADMIN — PROPOFOL 100 MG: 10 INJECTION, EMULSION INTRAVENOUS at 10:09

## 2020-03-17 RX ADMIN — PROPOFOL 50 MG: 10 INJECTION, EMULSION INTRAVENOUS at 10:11

## 2020-03-17 RX ADMIN — SODIUM CHLORIDE: 0.9 INJECTION, SOLUTION INTRAVENOUS at 08:53

## 2020-03-17 RX ADMIN — PROPOFOL 50 MG: 10 INJECTION, EMULSION INTRAVENOUS at 10:17

## 2020-03-17 RX ADMIN — LIDOCAINE HYDROCHLORIDE 50 MG: 10 INJECTION, SOLUTION EPIDURAL; INFILTRATION; INTRACAUDAL; PERINEURAL at 10:09

## 2020-03-17 RX ADMIN — PROPOFOL 50 MG: 10 INJECTION, EMULSION INTRAVENOUS at 10:19
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
## 2020-03-20 ENCOUNTER — TELEPHONE (OUTPATIENT)
Dept: FAMILY MEDICINE CLINIC | Facility: CLINIC | Age: 60
End: 2020-03-20

## 2020-03-20 NOTE — TELEPHONE ENCOUNTER
Patient called and stated that she had soreness on the root of mouth and she thinks that it could be from the prep from her colonoscopy  She has no fever or cough, just a scratchy sore throat  Please advise the patient [phone]

## 2020-03-23 ENCOUNTER — APPOINTMENT (OUTPATIENT)
Dept: LAB | Facility: CLINIC | Age: 60
End: 2020-03-23
Payer: MEDICARE

## 2020-03-23 DIAGNOSIS — E11.9 TYPE 2 DIABETES MELLITUS WITHOUT COMPLICATION, WITHOUT LONG-TERM CURRENT USE OF INSULIN (HCC): ICD-10-CM

## 2020-03-23 DIAGNOSIS — E03.8 HYPOTHYROIDISM DUE TO HASHIMOTO'S THYROIDITIS: ICD-10-CM

## 2020-03-23 DIAGNOSIS — E06.3 HYPOTHYROIDISM DUE TO HASHIMOTO'S THYROIDITIS: ICD-10-CM

## 2020-03-23 LAB
ALBUMIN SERPL BCP-MCNC: 3.6 G/DL (ref 3.5–5)
ALP SERPL-CCNC: 107 U/L (ref 46–116)
ALT SERPL W P-5'-P-CCNC: 37 U/L (ref 12–78)
ANION GAP SERPL CALCULATED.3IONS-SCNC: 4 MMOL/L (ref 4–13)
AST SERPL W P-5'-P-CCNC: 19 U/L (ref 5–45)
BILIRUB SERPL-MCNC: 0.3 MG/DL (ref 0.2–1)
BUN SERPL-MCNC: 17 MG/DL (ref 5–25)
CALCIUM SERPL-MCNC: 8.8 MG/DL (ref 8.3–10.1)
CHLORIDE SERPL-SCNC: 107 MMOL/L (ref 100–108)
CHOLEST SERPL-MCNC: 178 MG/DL (ref 50–200)
CO2 SERPL-SCNC: 28 MMOL/L (ref 21–32)
CREAT SERPL-MCNC: 0.82 MG/DL (ref 0.6–1.3)
CREAT UR-MCNC: 200 MG/DL
EST. AVERAGE GLUCOSE BLD GHB EST-MCNC: 137 MG/DL
GFR SERPL CREATININE-BSD FRML MDRD: 77 ML/MIN/1.73SQ M
GLUCOSE P FAST SERPL-MCNC: 100 MG/DL (ref 65–99)
HBA1C MFR BLD: 6.4 %
HDLC SERPL-MCNC: 55 MG/DL
LDLC SERPL CALC-MCNC: 90 MG/DL (ref 0–100)
MICROALBUMIN UR-MCNC: 22.5 MG/L (ref 0–20)
MICROALBUMIN/CREAT 24H UR: 11 MG/G CREATININE (ref 0–30)
POTASSIUM SERPL-SCNC: 3.6 MMOL/L (ref 3.5–5.3)
PROT SERPL-MCNC: 7.6 G/DL (ref 6.4–8.2)
SODIUM SERPL-SCNC: 139 MMOL/L (ref 136–145)
T4 FREE SERPL-MCNC: 1.01 NG/DL (ref 0.76–1.46)
TRIGL SERPL-MCNC: 165 MG/DL
TSH SERPL DL<=0.05 MIU/L-ACNC: 0.72 UIU/ML (ref 0.36–3.74)

## 2020-03-23 PROCEDURE — 80053 COMPREHEN METABOLIC PANEL: CPT

## 2020-03-23 PROCEDURE — 80061 LIPID PANEL: CPT

## 2020-03-23 PROCEDURE — 84443 ASSAY THYROID STIM HORMONE: CPT

## 2020-03-23 PROCEDURE — 83036 HEMOGLOBIN GLYCOSYLATED A1C: CPT

## 2020-03-23 PROCEDURE — 84439 ASSAY OF FREE THYROXINE: CPT

## 2020-03-23 PROCEDURE — 36415 COLL VENOUS BLD VENIPUNCTURE: CPT

## 2020-03-23 PROCEDURE — 82043 UR ALBUMIN QUANTITATIVE: CPT | Performed by: FAMILY MEDICINE

## 2020-03-23 PROCEDURE — 82570 ASSAY OF URINE CREATININE: CPT | Performed by: FAMILY MEDICINE

## 2020-03-24 ENCOUNTER — OFFICE VISIT (OUTPATIENT)
Dept: PSYCHIATRY | Facility: CLINIC | Age: 60
End: 2020-03-24
Payer: MEDICARE

## 2020-03-24 DIAGNOSIS — F43.23 ADJUSTMENT DISORDER WITH MIXED ANXIETY AND DEPRESSED MOOD: Primary | ICD-10-CM

## 2020-03-24 DIAGNOSIS — G70.9 NEUROMUSCULAR DISORDER (HCC): ICD-10-CM

## 2020-03-24 DIAGNOSIS — M79.18 MYOFASCIAL PAIN: ICD-10-CM

## 2020-03-24 DIAGNOSIS — F43.21 GRIEF: ICD-10-CM

## 2020-03-24 PROCEDURE — 3044F HG A1C LEVEL LT 7.0%: CPT | Performed by: PSYCHIATRY & NEUROLOGY

## 2020-03-24 PROCEDURE — 90833 PSYTX W PT W E/M 30 MIN: CPT | Performed by: PSYCHIATRY & NEUROLOGY

## 2020-03-24 PROCEDURE — 3060F POS MICROALBUMINURIA REV: CPT | Performed by: PSYCHIATRY & NEUROLOGY

## 2020-03-24 PROCEDURE — 1036F TOBACCO NON-USER: CPT | Performed by: PSYCHIATRY & NEUROLOGY

## 2020-03-24 PROCEDURE — 99213 OFFICE O/P EST LOW 20 MIN: CPT | Performed by: PSYCHIATRY & NEUROLOGY

## 2020-03-24 RX ORDER — DULOXETIN HYDROCHLORIDE 30 MG/1
30 CAPSULE, DELAYED RELEASE ORAL DAILY
Qty: 90 CAPSULE | Refills: 2 | Status: SHIPPED | OUTPATIENT
Start: 2020-03-24 | End: 2020-08-27

## 2020-03-24 RX ORDER — DULOXETIN HYDROCHLORIDE 60 MG/1
60 CAPSULE, DELAYED RELEASE ORAL DAILY
Qty: 90 CAPSULE | Refills: 2 | Status: SHIPPED | OUTPATIENT
Start: 2020-03-24 | End: 2020-12-21

## 2020-03-24 NOTE — PATIENT INSTRUCTIONS
Adjustment disorder with mixed anxiety and depressed mood  -     DULoxetine (CYMBALTA) 30 mg delayed release capsule; Take 1 capsule (30 mg total) by mouth daily To be taken with 60mg for a total of 90mg daily  Grief    Neuromuscular disorder ([address] )  -     DULoxetine (CYMBALTA) 60 mg delayed release capsule; Take 1 capsule (60 mg total) by mouth daily    Myofascial pain  -     DULoxetine (CYMBALTA) 60 mg delayed release capsule;  Take 1 capsule (60 mg total) by mouth daily          Follow up in 3 months

## 2020-03-24 NOTE — BH TREATMENT PLAN
TREATMENT PLAN (Medication Management Only)        [facility]    Name and Date of Birth:  Rosana Logan 62 y o  1960  Date of Treatment Plan: March 24, 2020  Diagnosis/Diagnoses:    1  Adjustment disorder with mixed anxiety and depressed mood    2  Grief    3  Neuromuscular disorder ([address] )    4  Myofascial pain      Strengths/Personal Resources for Self-Care: "very crafty, creative in both writing and crafts, planning on learn to play the guitar ", ability to negotiate basic needs, being resoureceful, willingness to work on problems  Area/Areas of need (in own words): I want to do more exercise, crafts, dealing with grief and stress, and meditation  1  Long Term Goal: " for them to find out whats wrong with me and fix it so i can get my life back "  Target Date: 2 months - 5/24/2020  Person/Persons responsible for completion of goal: Dr Hang Roland  2  Short Term Objective (s) - How will we reach this goal?:   A  Provider new recommended medication/dosage changes and/or continue medication(s): Medication changes: I have changed Yuni MANCERA  Rafael's DULoxetine  I am also having her maintain her baclofen, magnesium oxide, albuterol, ergocalciferol, gabapentin, montelukast, clindamycin, oxyCODONE-acetaminophen, OneTouch Verio IQ System, OneTouch Delica Lancets Fine, glucose blood, levothyroxine, levothyroxine, ibuprofen, rosuvastatin, Sulfacetamide Sodium (Acne), tobramycin-dexamethasone, and DULoxetine     B  N/A   C  N/A    Target Date: 1 year - 3/24/2021  Person/Persons Responsible for Completion of Goal: Dr Hang Roland  Progress Towards Goals: continuing treatment  Treatment Modality: medication management every 3 months  Review due 90 to 120 days from date of this plan: 3 months - 6/24/2020  Expected length of service: ongoing treatment  My Physician/PA/NP and I have developed this plan together and I agree to work on the goals and objectives  I understand the treatment goals that were developed for my treatment        Treatment Plan done but not signed at time of office visit due to:  Plan reviewed by phone or in person  and verbal consent given due to Bonita social rhoda

## 2020-03-24 NOTE — PSYCH
Subjective:     Patient ID: Elin Romero is a 62 y o  female with adjustment disorder with mixed depression and anxiety presenting for a follow up visit  Last visit, her cymbalta was continued at 90mg  HPI ROS Appetite Changes and Sleep: the patient stated that in the last few months she has been dealing with a lot of health issues  She had a ovarian, cervical, and colonic cancer scares  They were all negative  Her oldest cat of 22 years   She relied on that cat for a lot and she is very upset about this  She has 2 remaining cats, one is 20 and the other is about 13years old  She has a persistent occasional cough and itchy throat due to the colon prep having something in it that she was allergic to  She endorsed that a neighbor has been very loud at 3am with turning on his stereo for 2 hours  This wakes her up and then she cant get back to sleep right away  She has complained about it  She is sleeping about 5-6 hours  She is not tired in the morning, but gets to be tired about 6pm  Appetite has been variable, not as much as it used to be, but she is trying to lose weight  She has lost a few lbs  She has also started to exercise in the morning  Her pain is variable as well  Not terrible right now  Mood has been okay, given everything that is going on  She does seem to be upset easier, but believes this is stress related  She denied SI/HI  She is keeping in touch with family via social media, plays social games, and on the phone       Review Of Systems:     Mood Emotional Lability   Behavior Normal    Thought Content Normal   General Emotional Problems, Sleep Disturbances and Decreased Functioning   Personality Normal   Other Psych Symptoms Normal   Constitutional As Noted in HPI   ENT itchy throat   Cardiovascular Negative   Respiratory Cough   Gastrointestinal Negative   Genitourinary Negative   Musculoskeletal Joint Stiffness and Limb Pain   Integumentary Skin Rash   Neurological Numbness and neck pain   Endocrine Normal    Other Symptoms Normal              Laboratory Results: Results for Piyush Daniels (MRN [id number]) as of 3/24/2020 09:28   Ref  Range 3/23/2020 09:36   Sodium Latest Ref Range: 136 - 145 mmol/L 139   Potassium Latest Ref Range: 3 5 - 5 3 mmol/L 3 6   Chloride Latest Ref Range: 100 - 108 mmol/L 107   CO2 Latest Ref Range: 21 - 32 mmol/L 28   Anion Gap Latest Ref Range: 4 - 13 mmol/L 4   BUN Latest Ref Range: 5 - 25 mg/dL 17   Creatinine Latest Ref Range: 0 60 - 1 30 mg/dL 0 82   GLUCOSE FASTING Latest Ref Range: 65 - 99 mg/dL 100 (H)   Calcium Latest Ref Range: 8 3 - 10 1 mg/dL 8 8   AST Latest Ref Range: 5 - 45 U/L 19   ALT Latest Ref Range: 12 - 78 U/L 37   Alkaline Phosphatase Latest Ref Range: 46 - 116 U/L 107   Total Protein Latest Ref Range: 6 4 - 8 2 g/dL 7 6   Albumin Latest Ref Range: 3 5 - 5 0 g/dL 3 6   TOTAL BILIRUBIN Latest Ref Range: 0 20 - 1 00 mg/dL 0 30   eGFR Latest Units: ml/min/1 73sq m 77   Cholesterol Latest Ref Range: 50 - 200 mg/dL 178   Triglycerides Latest Ref Range: <=150 mg/dL 165 (H)   HDL Latest Ref Range: >=40 mg/dL 55   LDL Direct Latest Ref Range: 0 - 100 mg/dL 90   Hemoglobin A1C Latest Ref Range: Normal 3 8-5 6%; PreDiabetic 5 7-6 4%;  Diabetic >=6 5%; Glycemic control for adults with diabetes <7 0% % 6 4 (H)   eAG, EST AVG Glucose Latest Units: mg/dl 137   TSH 3RD GENERATON Latest Ref Range: 0 358 - 3 740 uIU/mL 0 725   Free T4 Latest Ref Range: 0 76 - 1 46 ng/dL 1 01   EXT Creatinine Urine Latest Units: mg/dL 200 0   MICROALBUMIN/CREATININE RATIO Latest Ref Range: 0 - 30 mg/g creatinine 11   MICROALBUM ,U,RANDOM Latest Ref Range: 0 0 - 20 0 mg/L 22 5 (H)     Substance Abuse History:  Social History     Substance and Sexual Activity   Drug Use No       Family Psychiatric History:   Family History   Problem Relation Age of Onset    Breast cancer Mother 28    Aneurysm Father         CEREBRAL ARTERY ANEURYSM     Alcohol abuse Maternal Aunt     Parkinsonism Family        The following portions of the patient's history were reviewed and updated as appropriate: allergies, current medications, past family history, past medical history, past social history, past surgical history and problem list     Social History     Socioeconomic History    Marital status: Single     Spouse name: Not on file    Number of children: 0    Years of education: 12    Highest education level: Not on file   Occupational History    Occupation: disabled   Social Needs    Financial resource strain: Hard    Food insecurity:     Worry: Never true     Inability: Never true    Transportation needs:     Medical: No     Non-medical: No   Tobacco Use    Smoking status: Never Smoker    Smokeless tobacco: Never Used   Substance and Sexual Activity    Alcohol use: Yes     Frequency: Monthly or less     Drinks per session: 1 or 2     Binge frequency: Never     Comment: 1-2 TIMES PER YEAR PER [employer]     Drug use: No    Sexual activity: Not Currently   Lifestyle    Physical activity:     Days per week: Not on file     Minutes per session: Not on file    Stress: Rather much   Relationships    Social connections:     Talks on phone: Once a week     Gets together: Once a week     Attends Confucianism service: 1 to 4 times per year     Active member of club or organization: No     Attends meetings of clubs or organizations: Never     Relationship status: Never     Intimate partner violence:     Fear of current or ex partner: No     Emotionally abused: No     Physically abused: No     Forced sexual activity: No   Other Topics Concern    Not on file   Social History Narrative    Not on file     Social History     Social History Narrative    Not on file       Objective:       Mental status:  Appearance calm and cooperative , adequate hygiene and grooming and good eye contact    Mood dysphoric   Affect affect was constricted   Speech a normal rate   Thought Processes circumstantial   Hallucinations no hallucinations present    Thought Content no delusions   Abnormal Thoughts no suicidal thoughts  and no homicidal thoughts    Orientation  oriented to person and place and time   Remote Memory short term memory intact and long term memory intact   Attention Span concentration impaired   Intellect Appears to be of Average Intelligence   Insight Limited insight   Judgement judgment was limited   Muscle Strength Abnormal gait and Decreased muscle strength   Language no difficulty naming common objects, no difficulty repeating a phrase  and no difficulty writing a sentence    Fund of Knowledge displays adequate knowledge of current events, adequate fund of knowledge regarding past history and adequate fund of knowledge regarding vocabulary    Pain none   Pain Scale 10 in the stomach and back       Assessment/Plan:       Diagnoses and all orders for this visit:    Adjustment disorder with mixed anxiety and depressed mood  -     DULoxetine (CYMBALTA) 30 mg delayed release capsule; Take 1 capsule (30 mg total) by mouth daily To be taken with 60mg for a total of 90mg daily  Grief    Neuromuscular disorder ([facility] [address] )  -     DULoxetine (CYMBALTA) 60 mg delayed release capsule; Take 1 capsule (60 mg total) by mouth daily    Myofascial pain  -     DULoxetine (CYMBALTA) 60 mg delayed release capsule; Take 1 capsule (60 mg total) by mouth daily          Follow up in 3 months  Treatment Recommendations- Risks Benefits      Immediate Medical/Psychiatric/Psychotherapy Treatments and Any Precautions: will continue cymbalta at the same does  She is willing to try and work through the grief and this difficult time  She does not want to increase the dose      Risks, Benefits And Possible Side Effects Of Medications:  PSYCH RISK, BENEFITS AND POSSIBLE SIDE EFFECTS discussed    Controlled Medication Discussion: Discussed with patient Black Box warning on concurrent use of benzodiazepines and opioid medications including sedation, respiratory depression, coma and death  Patient understands the risk of treatment with benzodiazepines in addition to opioids and wants to continue taking those medications  , Discussed with patient the risks of sedation, respiratory depression, impairment of ability to drive and potential for abuse and addiction related to treatment with benzodiazepine medications  The patient understands risk of treatment with benzodiazepine medications, agrees to not drive if feels impaired and agrees to take medications as prescribed   and The patient has been filling controlled prescriptions on time as prescribed to [facility]       Therapy provided: grief counseling about death of cat and isolation  Time: 20 mins

## 2020-03-27 ENCOUNTER — TELEMEDICINE (OUTPATIENT)
Dept: ENDOCRINOLOGY | Facility: CLINIC | Age: 60
End: 2020-03-27
Payer: MEDICARE

## 2020-03-27 DIAGNOSIS — E11.9 TYPE 2 DIABETES MELLITUS WITHOUT COMPLICATION, WITHOUT LONG-TERM CURRENT USE OF INSULIN (HCC): ICD-10-CM

## 2020-03-27 DIAGNOSIS — E06.3 HASHIMOTO'S THYROIDITIS: ICD-10-CM

## 2020-03-27 DIAGNOSIS — E03.8 HYPOTHYROIDISM DUE TO HASHIMOTO'S THYROIDITIS: Primary | ICD-10-CM

## 2020-03-27 DIAGNOSIS — E06.3 HYPOTHYROIDISM DUE TO HASHIMOTO'S THYROIDITIS: Primary | ICD-10-CM

## 2020-03-27 DIAGNOSIS — D35.2 PITUITARY MICROADENOMA (HCC): ICD-10-CM

## 2020-03-27 PROCEDURE — 99442 PR PHYS/QHP TELEPHONE EVALUATION 11-20 MIN: CPT | Performed by: INTERNAL MEDICINE

## 2020-03-27 NOTE — ASSESSMENT & PLAN NOTE
Lab Results   Component Value Date    HGBA1C 6 4 (H) 03/23/2020   Diet controlled continue to focus on dietary and lifestyle modifications and weight loss    Check A1c prior to next visit
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
## 2020-04-09 ENCOUNTER — TELEMEDICINE (OUTPATIENT)
Dept: FAMILY MEDICINE CLINIC | Facility: CLINIC | Age: 60
End: 2020-04-09
Payer: MEDICARE

## 2020-04-09 DIAGNOSIS — J01.90 ACUTE NON-RECURRENT SINUSITIS, UNSPECIFIED LOCATION: Primary | ICD-10-CM

## 2020-04-09 DIAGNOSIS — K59.1 FUNCTIONAL DIARRHEA: ICD-10-CM

## 2020-04-09 DIAGNOSIS — J30.89 SEASONAL ALLERGIC RHINITIS DUE TO OTHER ALLERGIC TRIGGER: ICD-10-CM

## 2020-04-09 PROCEDURE — 99213 OFFICE O/P EST LOW 20 MIN: CPT | Performed by: FAMILY MEDICINE

## 2020-04-09 RX ORDER — AZITHROMYCIN 250 MG/1
TABLET, FILM COATED ORAL
Qty: 6 TABLET | Refills: 0 | Status: SHIPPED | OUTPATIENT
Start: 2020-04-09 | End: 2020-04-14

## 2020-04-09 RX ORDER — CHOLESTYRAMINE LIGHT 4 G/5.7G
POWDER, FOR SUSPENSION ORAL
Qty: 30 TABLET | Refills: 5 | Status: SHIPPED | OUTPATIENT
Start: 2020-04-09 | End: 2021-03-14

## 2020-04-10 RX ORDER — MONTELUKAST SODIUM 10 MG/1
TABLET ORAL
Qty: 30 TABLET | Refills: 5 | Status: SHIPPED | OUTPATIENT
Start: 2020-04-10 | End: 2020-05-04

## 2020-04-17 ENCOUNTER — TELEPHONE (OUTPATIENT)
Dept: FAMILY MEDICINE CLINIC | Facility: CLINIC | Age: 60
End: 2020-04-17

## 2020-04-19 ENCOUNTER — HOSPITAL ENCOUNTER (EMERGENCY)
Facility: HOSPITAL | Age: 60
Discharge: HOME | End: 2020-04-19
Attending: EMERGENCY MEDICINE | Admitting: EMERGENCY MEDICINE
Payer: MEDICARE

## 2020-04-19 ENCOUNTER — APPOINTMENT (EMERGENCY)
Dept: RADIOLOGY | Facility: HOSPITAL | Age: 60
End: 2020-04-19
Payer: MEDICARE

## 2020-04-19 VITALS
BODY MASS INDEX: 34.32 KG/M2 | HEART RATE: 96 BPM | DIASTOLIC BLOOD PRESSURE: 74 MMHG | OXYGEN SATURATION: 97 % | RESPIRATION RATE: 16 BRPM | WEIGHT: 219.14 LBS | SYSTOLIC BLOOD PRESSURE: 174 MMHG | TEMPERATURE: 98.4 F

## 2020-04-19 DIAGNOSIS — S60.459A FOREIGN BODY IN SKIN OF FINGER, INITIAL ENCOUNTER: Primary | ICD-10-CM

## 2020-04-19 PROCEDURE — 96372 THER/PROPH/DIAG INJ SC/IM: CPT

## 2020-04-19 PROCEDURE — 99284 EMERGENCY DEPT VISIT MOD MDM: CPT | Performed by: PHYSICIAN ASSISTANT

## 2020-04-19 PROCEDURE — 73140 X-RAY EXAM OF FINGER(S): CPT

## 2020-04-19 PROCEDURE — 99283 EMERGENCY DEPT VISIT LOW MDM: CPT

## 2020-04-19 RX ORDER — BACITRACIN, NEOMYCIN, POLYMYXIN B 400; 3.5; 5 [USP'U]/G; MG/G; [USP'U]/G
1 OINTMENT TOPICAL ONCE
Status: COMPLETED | OUTPATIENT
Start: 2020-04-19 | End: 2020-04-19

## 2020-04-19 RX ORDER — KETOROLAC TROMETHAMINE 30 MG/ML
15 INJECTION, SOLUTION INTRAMUSCULAR; INTRAVENOUS ONCE
Status: COMPLETED | OUTPATIENT
Start: 2020-04-19 | End: 2020-04-19

## 2020-04-19 RX ADMIN — BACITRACIN, NEOMYCIN, POLYMYXIN B 1 SMALL APPLICATION: 400; 3.5; 5 OINTMENT TOPICAL at 14:38

## 2020-04-19 RX ADMIN — KETOROLAC TROMETHAMINE 15 MG: 30 INJECTION, SOLUTION INTRAMUSCULAR at 14:18

## 2020-04-20 ENCOUNTER — TELEPHONE (OUTPATIENT)
Dept: FAMILY MEDICINE CLINIC | Facility: CLINIC | Age: 60
End: 2020-04-20

## 2020-04-21 ENCOUNTER — PROCEDURE VISIT (OUTPATIENT)
Dept: PLASTIC SURGERY | Facility: CLINIC | Age: 60
End: 2020-04-21
Payer: MEDICARE

## 2020-04-21 VITALS — HEIGHT: 67 IN | WEIGHT: 215 LBS | BODY MASS INDEX: 33.74 KG/M2

## 2020-04-21 DIAGNOSIS — S60.459A FOREIGN BODY OF FINGER: Primary | ICD-10-CM

## 2020-04-21 PROCEDURE — 3044F HG A1C LEVEL LT 7.0%: CPT | Performed by: PLASTIC SURGERY

## 2020-04-21 PROCEDURE — 99202 OFFICE O/P NEW SF 15 MIN: CPT | Performed by: PLASTIC SURGERY

## 2020-05-04 DIAGNOSIS — J30.89 SEASONAL ALLERGIC RHINITIS DUE TO OTHER ALLERGIC TRIGGER: ICD-10-CM

## 2020-05-04 RX ORDER — MONTELUKAST SODIUM 10 MG/1
TABLET ORAL
Qty: 30 TABLET | Refills: 5 | Status: SHIPPED | OUTPATIENT
Start: 2020-05-04 | End: 2021-05-10

## 2020-05-05 DIAGNOSIS — E55.9 VITAMIN D DEFICIENCY: ICD-10-CM

## 2020-05-05 RX ORDER — ERGOCALCIFEROL 1.25 MG/1
CAPSULE ORAL
Qty: 4 CAPSULE | Refills: 10 | Status: SHIPPED | OUTPATIENT
Start: 2020-05-05 | End: 2020-05-28

## 2020-05-06 DIAGNOSIS — E06.3 HYPOTHYROIDISM DUE TO HASHIMOTO'S THYROIDITIS: ICD-10-CM

## 2020-05-06 DIAGNOSIS — E03.8 HYPOTHYROIDISM DUE TO HASHIMOTO'S THYROIDITIS: ICD-10-CM

## 2020-05-06 RX ORDER — LEVOTHYROXINE SODIUM 0.2 MG/1
200 TABLET ORAL
Qty: 30 TABLET | Refills: 5 | Status: SHIPPED | OUTPATIENT
Start: 2020-05-06 | End: 2020-11-17

## 2020-05-26 ENCOUNTER — TELEPHONE (OUTPATIENT)
Dept: FAMILY MEDICINE CLINIC | Facility: CLINIC | Age: 60
End: 2020-05-26

## 2020-05-26 DIAGNOSIS — J30.89 SEASONAL ALLERGIC RHINITIS DUE TO OTHER ALLERGIC TRIGGER: Primary | ICD-10-CM

## 2020-05-26 RX ORDER — MOMETASONE FUROATE 50 UG/1
2 SPRAY, METERED NASAL DAILY
Qty: 17 G | Refills: 1 | Status: SHIPPED | OUTPATIENT
Start: 2020-05-26 | End: 2020-05-27

## 2020-05-27 ENCOUNTER — TELEPHONE (OUTPATIENT)
Dept: FAMILY MEDICINE CLINIC | Facility: CLINIC | Age: 60
End: 2020-05-27

## 2020-05-27 DIAGNOSIS — J30.89 SEASONAL ALLERGIC RHINITIS DUE TO OTHER ALLERGIC TRIGGER: Primary | ICD-10-CM

## 2020-05-27 RX ORDER — FLUTICASONE PROPIONATE 50 MCG
2 SPRAY, SUSPENSION (ML) NASAL DAILY
Qty: 1 BOTTLE | Refills: 1 | Status: SHIPPED | OUTPATIENT
Start: 2020-05-27

## 2020-05-28 DIAGNOSIS — E55.9 VITAMIN D DEFICIENCY: ICD-10-CM

## 2020-05-28 RX ORDER — ERGOCALCIFEROL 1.25 MG/1
CAPSULE ORAL
Qty: 4 CAPSULE | Refills: 10 | Status: SHIPPED | OUTPATIENT
Start: 2020-05-28 | End: 2021-06-17

## 2020-06-01 ENCOUNTER — TELEPHONE (OUTPATIENT)
Dept: FAMILY MEDICINE CLINIC | Facility: CLINIC | Age: 60
End: 2020-06-01

## 2020-06-01 DIAGNOSIS — J30.89 SEASONAL ALLERGIC RHINITIS DUE TO OTHER ALLERGIC TRIGGER: Primary | ICD-10-CM

## 2020-06-01 RX ORDER — FLUTICASONE PROPIONATE 50 MCG
1 SPRAY, SUSPENSION (ML) NASAL DAILY
Qty: 1 BOTTLE | Refills: 2 | Status: SHIPPED | OUTPATIENT
Start: 2020-06-01 | End: 2020-07-28

## 2020-06-11 ENCOUNTER — LAB (OUTPATIENT)
Dept: LAB | Facility: CLINIC | Age: 60
End: 2020-06-11
Payer: MEDICARE

## 2020-06-11 DIAGNOSIS — E03.8 HYPOTHYROIDISM DUE TO HASHIMOTO'S THYROIDITIS: ICD-10-CM

## 2020-06-11 DIAGNOSIS — E11.9 TYPE 2 DIABETES MELLITUS WITHOUT COMPLICATION, WITHOUT LONG-TERM CURRENT USE OF INSULIN (HCC): ICD-10-CM

## 2020-06-11 DIAGNOSIS — E06.3 HYPOTHYROIDISM DUE TO HASHIMOTO'S THYROIDITIS: ICD-10-CM

## 2020-06-11 LAB
EST. AVERAGE GLUCOSE BLD GHB EST-MCNC: 137 MG/DL
HBA1C MFR BLD: 6.4 %
T4 FREE SERPL-MCNC: 1.33 NG/DL (ref 0.76–1.46)
TSH SERPL DL<=0.05 MIU/L-ACNC: 0.13 UIU/ML (ref 0.36–3.74)

## 2020-06-11 PROCEDURE — 83036 HEMOGLOBIN GLYCOSYLATED A1C: CPT

## 2020-06-11 PROCEDURE — 84439 ASSAY OF FREE THYROXINE: CPT

## 2020-06-11 PROCEDURE — 84443 ASSAY THYROID STIM HORMONE: CPT

## 2020-06-11 PROCEDURE — 36415 COLL VENOUS BLD VENIPUNCTURE: CPT

## 2020-06-12 ENCOUNTER — TELEPHONE (OUTPATIENT)
Dept: ENDOCRINOLOGY | Facility: CLINIC | Age: 60
End: 2020-06-12

## 2020-06-12 DIAGNOSIS — E03.8 HYPOTHYROIDISM DUE TO HASHIMOTO'S THYROIDITIS: ICD-10-CM

## 2020-06-12 DIAGNOSIS — E06.3 HYPOTHYROIDISM DUE TO HASHIMOTO'S THYROIDITIS: ICD-10-CM

## 2020-06-12 RX ORDER — LEVOTHYROXINE SODIUM 0.03 MG/1
TABLET ORAL
Qty: 30 TABLET | Refills: 5
Start: 2020-06-12 | End: 2020-07-24 | Stop reason: DRUGHIGH

## 2020-06-15 ENCOUNTER — TELEPHONE (OUTPATIENT)
Dept: ENDOCRINOLOGY | Facility: CLINIC | Age: 60
End: 2020-06-15

## 2020-06-15 DIAGNOSIS — E03.8 OTHER SPECIFIED HYPOTHYROIDISM: Primary | ICD-10-CM

## 2020-06-24 ENCOUNTER — TELEPHONE (OUTPATIENT)
Dept: FAMILY MEDICINE CLINIC | Facility: CLINIC | Age: 60
End: 2020-06-24

## 2020-06-30 ENCOUNTER — TELEMEDICINE (OUTPATIENT)
Dept: PSYCHIATRY | Facility: CLINIC | Age: 60
End: 2020-06-30
Payer: MEDICARE

## 2020-06-30 ENCOUNTER — TRANSCRIBE ORDERS (OUTPATIENT)
Dept: ADMINISTRATIVE | Facility: HOSPITAL | Age: 60
End: 2020-06-30

## 2020-06-30 DIAGNOSIS — F43.23 ADJUSTMENT DISORDER WITH MIXED ANXIETY AND DEPRESSED MOOD: Primary | ICD-10-CM

## 2020-06-30 DIAGNOSIS — Z12.31 SCREENING MAMMOGRAM FOR HIGH-RISK PATIENT: Primary | ICD-10-CM

## 2020-06-30 DIAGNOSIS — G47.00 INSOMNIA, UNSPECIFIED TYPE: ICD-10-CM

## 2020-06-30 PROCEDURE — 99442 PR PHYS/QHP TELEPHONE EVALUATION 11-20 MIN: CPT | Performed by: PSYCHIATRY & NEUROLOGY

## 2020-06-30 RX ORDER — DOXYCYCLINE HYCLATE 100 MG/1
CAPSULE ORAL
COMMUNITY
Start: 2020-06-24 | End: 2020-07-28

## 2020-06-30 RX ORDER — TRAZODONE HYDROCHLORIDE 50 MG/1
50 TABLET ORAL
Qty: 30 TABLET | Refills: 2 | Status: SHIPPED | OUTPATIENT
Start: 2020-06-30 | End: 2020-10-01

## 2020-07-01 ENCOUNTER — HOSPITAL ENCOUNTER (OUTPATIENT)
Dept: MAMMOGRAPHY | Facility: MEDICAL CENTER | Age: 60
Discharge: HOME | End: 2020-07-01
Payer: MEDICARE

## 2020-07-01 ENCOUNTER — APPOINTMENT (OUTPATIENT)
Dept: LAB | Facility: HOSPITAL | Age: 60
End: 2020-07-01
Payer: MEDICARE

## 2020-07-01 ENCOUNTER — TRANSCRIBE ORDERS (OUTPATIENT)
Dept: ADMINISTRATIVE | Facility: HOSPITAL | Age: 60
End: 2020-07-01

## 2020-07-01 VITALS — BODY MASS INDEX: 33.74 KG/M2 | HEIGHT: 67 IN | WEIGHT: 215 LBS

## 2020-07-01 DIAGNOSIS — E53.8 VITAMIN B12 DEFICIENCY: ICD-10-CM

## 2020-07-01 DIAGNOSIS — R53.1 WEAKNESS: ICD-10-CM

## 2020-07-01 DIAGNOSIS — D55.8 ANEMIA DUE TO ENZYME DEFICIENCY (HCC): ICD-10-CM

## 2020-07-01 DIAGNOSIS — Z12.31 SCREENING MAMMOGRAM FOR HIGH-RISK PATIENT: ICD-10-CM

## 2020-07-01 DIAGNOSIS — E71.41 PRIMARY CARNITINE DEFICIENCY (HCC): ICD-10-CM

## 2020-07-01 DIAGNOSIS — H49.819 OCULOCRANIOSOMATIC SYNDROME (HCC): ICD-10-CM

## 2020-07-01 DIAGNOSIS — E53.1 VITAMIN B6 DEFICIENCY: ICD-10-CM

## 2020-07-01 DIAGNOSIS — R29.898 DEFICIENCIES OF LIMBS: ICD-10-CM

## 2020-07-01 DIAGNOSIS — E53.1 VITAMIN B6 DEFICIENCY: Primary | ICD-10-CM

## 2020-07-01 LAB
CK SERPL-CCNC: 103 U/L (ref 26–192)
ERYTHROCYTE [SEDIMENTATION RATE] IN BLOOD: 40 MM/HOUR (ref 1–20)
FOLATE SERPL-MCNC: 15.6 NG/ML (ref 3.1–17.5)
LACTATE SERPL-SCNC: 1.2 MMOL/L (ref 0.5–2)
PTH-INTACT SERPL-MCNC: 55.1 PG/ML (ref 18.4–80.1)
VIT B12 SERPL-MCNC: 357 PG/ML (ref 100–900)

## 2020-07-01 PROCEDURE — 36415 COLL VENOUS BLD VENIPUNCTURE: CPT

## 2020-07-01 PROCEDURE — 83516 IMMUNOASSAY NONANTIBODY: CPT

## 2020-07-01 PROCEDURE — 83970 ASSAY OF PARATHORMONE: CPT

## 2020-07-01 PROCEDURE — 77063 BREAST TOMOSYNTHESIS BI: CPT

## 2020-07-01 PROCEDURE — 77067 SCR MAMMO BI INCL CAD: CPT

## 2020-07-01 PROCEDURE — 82542 COL CHROMOTOGRAPHY QUAL/QUAN: CPT

## 2020-07-01 PROCEDURE — 83605 ASSAY OF LACTIC ACID: CPT

## 2020-07-01 PROCEDURE — 86225 DNA ANTIBODY NATIVE: CPT

## 2020-07-01 PROCEDURE — 82550 ASSAY OF CK (CPK): CPT

## 2020-07-01 PROCEDURE — 84165 PROTEIN E-PHORESIS SERUM: CPT

## 2020-07-01 PROCEDURE — 86255 FLUORESCENT ANTIBODY SCREEN: CPT

## 2020-07-01 PROCEDURE — 84210 ASSAY OF PYRUVATE: CPT

## 2020-07-01 PROCEDURE — 82746 ASSAY OF FOLIC ACID SERUM: CPT

## 2020-07-01 PROCEDURE — 82607 VITAMIN B-12: CPT

## 2020-07-01 PROCEDURE — 86038 ANTINUCLEAR ANTIBODIES: CPT

## 2020-07-01 PROCEDURE — 84165 PROTEIN E-PHORESIS SERUM: CPT | Performed by: PATHOLOGY

## 2020-07-01 PROCEDURE — 86226 DNA ANTIBODY SINGLE STRAND: CPT

## 2020-07-01 PROCEDURE — 86039 ANTINUCLEAR ANTIBODIES (ANA): CPT

## 2020-07-01 PROCEDURE — 85652 RBC SED RATE AUTOMATED: CPT

## 2020-07-02 LAB
DSDNA AB SER-ACNC: 1 IU/ML (ref 0–9)
PYRUVATE BLD-MCNC: NORMAL MG/DL
UBIQUINONE10 SERPL-MCNC: NORMAL UG/ML

## 2020-07-03 LAB — TTG IGA SER-ACNC: <2 U/ML (ref 0–3)

## 2020-07-06 LAB
C-ANCA TITR SER IF: NORMAL TITER
MYELOPEROXIDASE AB SER IA-ACNC: <9 U/ML (ref 0–9)
P-ANCA ATYPICAL TITR SER IF: NORMAL TITER
P-ANCA TITR SER IF: NORMAL TITER
PROTEINASE3 AB SER IA-ACNC: <3.5 U/ML (ref 0–3.5)
SSDNA IGG SER-ACNC: <20 EU (ref 0–19)

## 2020-07-07 LAB
ALBUMIN SERPL ELPH-MCNC: 4.28 G/DL (ref 3.5–5)
ALBUMIN SERPL ELPH-MCNC: 56.3 % (ref 52–65)
ALPHA1 GLOB SERPL ELPH-MCNC: 0.3 G/DL (ref 0.1–0.4)
ALPHA1 GLOB SERPL ELPH-MCNC: 3.9 % (ref 2.5–5)
ALPHA2 GLOB SERPL ELPH-MCNC: 0.9 G/DL (ref 0.4–1.2)
ALPHA2 GLOB SERPL ELPH-MCNC: 11.8 % (ref 7–13)
ANA HOMOGEN SER QL IF: NORMAL
ANA HOMOGEN TITR SER: NORMAL {TITER}
BETA GLOB ABNORMAL SERPL ELPH-MCNC: 0.52 G/DL (ref 0.4–0.8)
BETA1 GLOB SERPL ELPH-MCNC: 6.8 % (ref 5–13)
BETA2 GLOB SERPL ELPH-MCNC: 6.4 % (ref 2–8)
BETA2+GAMMA GLOB SERPL ELPH-MCNC: 0.49 G/DL (ref 0.2–0.5)
CARN ESTERS/C0 SERPL-SRTO: 0.2 RATIO (ref 0–0.9)
CARNITINE FREE SERPL-SCNC: 46 UMOL/L (ref 20–55)
CARNITINE SERPL-SCNC: 56 UMOL/L (ref 27–73)
GAMMA GLOB ABNORMAL SERPL ELPH-MCNC: 1.12 G/DL (ref 0.5–1.6)
GAMMA GLOB SERPL ELPH-MCNC: 14.8 % (ref 12–22)
IGG/ALB SER: 1.29 {RATIO} (ref 1.1–1.8)
PROT PATTERN SERPL ELPH-IMP: NORMAL
PROT SERPL-MCNC: 7.6 G/DL (ref 6.4–8.2)
RYE IGE QN: POSITIVE

## 2020-07-22 ENCOUNTER — LAB (OUTPATIENT)
Dept: LAB | Facility: CLINIC | Age: 60
End: 2020-07-22
Payer: MEDICARE

## 2020-07-22 DIAGNOSIS — E03.8 OTHER SPECIFIED HYPOTHYROIDISM: ICD-10-CM

## 2020-07-22 LAB
T4 FREE SERPL-MCNC: 1.23 NG/DL (ref 0.76–1.46)
TSH SERPL DL<=0.05 MIU/L-ACNC: 0.18 UIU/ML (ref 0.36–3.74)

## 2020-07-22 PROCEDURE — 84443 ASSAY THYROID STIM HORMONE: CPT

## 2020-07-22 PROCEDURE — 36415 COLL VENOUS BLD VENIPUNCTURE: CPT

## 2020-07-22 PROCEDURE — 84439 ASSAY OF FREE THYROXINE: CPT

## 2020-07-23 NOTE — RESULT ENCOUNTER NOTE
Please call the patient regarding labs - tsh slightly low - please confirm current dose of levothyroxine

## 2020-07-24 ENCOUNTER — TELEPHONE (OUTPATIENT)
Dept: ENDOCRINOLOGY | Facility: CLINIC | Age: 60
End: 2020-07-24

## 2020-07-24 DIAGNOSIS — E06.3 HASHIMOTO'S THYROIDITIS: Primary | ICD-10-CM

## 2020-07-24 DIAGNOSIS — E03.8 HYPOTHYROIDISM DUE TO HASHIMOTO'S THYROIDITIS: ICD-10-CM

## 2020-07-24 DIAGNOSIS — E06.3 HYPOTHYROIDISM DUE TO HASHIMOTO'S THYROIDITIS: ICD-10-CM

## 2020-07-24 NOTE — TELEPHONE ENCOUNTER
----- Message from Olga Winter MD sent at 7/22/2020 10:11 PM EDT -----  Please call the patient regarding labs - tsh slightly low - please confirm current dose of levothyroxine

## 2020-07-24 NOTE — TELEPHONE ENCOUNTER
Patient called back and she is taking levthyroxine 200 mcg  Daily Mon-Thurs and Fri-Sun 225 mcg daily

## 2020-07-28 ENCOUNTER — OFFICE VISIT (OUTPATIENT)
Dept: FAMILY MEDICINE CLINIC | Facility: CLINIC | Age: 60
End: 2020-07-28
Payer: MEDICARE

## 2020-07-28 VITALS
WEIGHT: 224 LBS | TEMPERATURE: 98.9 F | HEART RATE: 67 BPM | OXYGEN SATURATION: 98 % | BODY MASS INDEX: 35.16 KG/M2 | SYSTOLIC BLOOD PRESSURE: 138 MMHG | DIASTOLIC BLOOD PRESSURE: 78 MMHG | RESPIRATION RATE: 18 BRPM | HEIGHT: 67 IN

## 2020-07-28 DIAGNOSIS — F43.23 ADJUSTMENT DISORDER WITH MIXED ANXIETY AND DEPRESSED MOOD: ICD-10-CM

## 2020-07-28 DIAGNOSIS — E78.00 PURE HYPERCHOLESTEROLEMIA: ICD-10-CM

## 2020-07-28 DIAGNOSIS — M79.10 MYALGIA: ICD-10-CM

## 2020-07-28 DIAGNOSIS — E03.9 ACQUIRED HYPOTHYROIDISM: ICD-10-CM

## 2020-07-28 DIAGNOSIS — E11.9 TYPE 2 DIABETES MELLITUS WITHOUT COMPLICATION, WITHOUT LONG-TERM CURRENT USE OF INSULIN (HCC): Primary | ICD-10-CM

## 2020-07-28 PROCEDURE — 3060F POS MICROALBUMINURIA REV: CPT | Performed by: FAMILY MEDICINE

## 2020-07-28 PROCEDURE — 1036F TOBACCO NON-USER: CPT | Performed by: FAMILY MEDICINE

## 2020-07-28 PROCEDURE — 3008F BODY MASS INDEX DOCD: CPT | Performed by: FAMILY MEDICINE

## 2020-07-28 PROCEDURE — 3044F HG A1C LEVEL LT 7.0%: CPT | Performed by: FAMILY MEDICINE

## 2020-07-28 PROCEDURE — 99214 OFFICE O/P EST MOD 30 MIN: CPT | Performed by: FAMILY MEDICINE

## 2020-07-29 NOTE — PROGRESS NOTES
Assessment/Plan:  A1c is stable at 6 4  Recommend yearly diabetic eye and foot examination  Colonoscopy up-to-date  Patient continues with Didi Coats regards to chronic myalgias which could be either mitochondrial disease versus autoimmune  Patient has had autoimmune workup with Rheumatology in the past and nothing has come of it  She continues to see Psychiatry as well as endocrinology  Mood is stable  Recommend flu shot in the fall  Recommend recheck in 3 months with labs to be done at that time  Diagnoses and all orders for this visit:    Type 2 diabetes mellitus without complication, without long-term current use of insulin (HCC)  -     Hemoglobin A1C; Future  -     Comprehensive metabolic panel; Future    Pure hypercholesterolemia  -     Lipid panel; Future    Acquired hypothyroidism    Adjustment disorder with mixed anxiety and depressed mood    Myalgia        1  Type 2 diabetes mellitus without complication, without long-term current use of insulin (HCC)  Hemoglobin A1C    Comprehensive metabolic panel   2  Pure hypercholesterolemia  Lipid panel   3  Acquired hypothyroidism     4  Adjustment disorder with mixed anxiety and depressed mood     5  Myalgia         Subjective:        Patient ID: Connie Palm is a 56 y o  female  Chief Complaint   Patient presents with    Follow-up     6 months- labs done 7/22/20       75-year-old white female with multiple medical issues here for routine follow-up  Up-to-date with endocrinology and neurology  Also see Psychiatry  The following portions of the patient's history were reviewed and updated as appropriate: past medical history, past surgical history and problem list       Review of Systems   Constitutional: Positive for fatigue  Negative for appetite change, fever and unexpected weight change  HENT: Negative for congestion, ear pain, postnasal drip, rhinorrhea, sinus pressure, sinus pain and sore throat      Eyes: Negative for redness and visual disturbance  Respiratory: Negative for chest tightness and shortness of breath  Cardiovascular: Positive for leg swelling  Negative for chest pain and palpitations  Gastrointestinal: Negative for abdominal distention, abdominal pain, diarrhea and nausea  Endocrine: Negative for cold intolerance and heat intolerance  Genitourinary: Negative for dysuria and hematuria  Musculoskeletal: Positive for gait problem and myalgias  Negative for arthralgias  Skin: Negative for pallor and rash  Neurological: Negative for dizziness and headaches  Psychiatric/Behavioral: Negative for behavioral problems  The patient is not nervous/anxious  Objective:  /78 (BP Location: Left arm, Patient Position: Sitting, Cuff Size: Large)   Pulse 67   Temp 98 9 °F (37 2 °C) (Temporal)   Resp 18   Ht 5' 7" (1 702 m)   Wt 102 kg (224 lb)   SpO2 98%   BMI 35 08 kg/m²        Physical Exam   Constitutional: She is oriented to person, place, and time  She appears well-nourished  No distress  Obese   HENT:   Head: Normocephalic and atraumatic  Right Ear: Tympanic membrane normal    Left Ear: Tympanic membrane normal    Eyes: Pupils are equal, round, and reactive to light  Conjunctivae and EOM are normal  No scleral icterus  Neck: Normal range of motion  Neck supple  No thyromegaly present  Cardiovascular: Normal rate, regular rhythm and normal heart sounds  No murmur heard  Pulses:       Carotid pulses are 0 on the right side, and 0 on the left side  Pulmonary/Chest: Effort normal and breath sounds normal  No respiratory distress  She has no wheezes  Abdominal: Soft  She exhibits no distension  Musculoskeletal: She exhibits no edema  Lymphadenopathy:     She has no cervical adenopathy  Neurological: She is alert and oriented to person, place, and time  She has normal reflexes  No cranial nerve deficit  Skin: Skin is warm  No pallor     Psychiatric: She has a normal mood and affect  Her behavior is normal  Judgment and thought content normal    Nursing note and vitals reviewed

## 2020-08-24 ENCOUNTER — TELEPHONE (OUTPATIENT)
Dept: FAMILY MEDICINE CLINIC | Facility: CLINIC | Age: 60
End: 2020-08-24

## 2020-08-24 NOTE — TELEPHONE ENCOUNTER
Patient is still having issues with her right ankle  She is asking if she can have a script for PT for her ankle and for muscle tightness  She wants to go to the facility on [address] [place] [address]    She has seen Jacquelyn Santos there in the past

## 2020-08-25 DIAGNOSIS — R26.2 AMBULATORY DYSFUNCTION: ICD-10-CM

## 2020-08-25 DIAGNOSIS — M25.371 ANKLE INSTABILITY, RIGHT: ICD-10-CM

## 2020-08-25 DIAGNOSIS — M79.604 RIGHT LEG PAIN: Primary | ICD-10-CM

## 2020-08-25 NOTE — TELEPHONE ENCOUNTER
When she drives her right foot turns inward and she can't figure out how to correct which makes the inside of her calf from her ankle to the knee painful, she states her right foot is never flat but always turns upwards    This is the reason for requesting the physical therapy referral

## 2020-08-27 DIAGNOSIS — F43.23 ADJUSTMENT DISORDER WITH MIXED ANXIETY AND DEPRESSED MOOD: ICD-10-CM

## 2020-08-27 RX ORDER — DULOXETIN HYDROCHLORIDE 30 MG/1
CAPSULE, DELAYED RELEASE ORAL
Qty: 90 CAPSULE | Refills: 2 | Status: SHIPPED | OUTPATIENT
Start: 2020-08-27 | End: 2021-06-01

## 2020-09-02 ENCOUNTER — TRANSCRIBE ORDERS (OUTPATIENT)
Dept: ADMINISTRATIVE | Facility: HOSPITAL | Age: 60
End: 2020-09-02

## 2020-09-02 ENCOUNTER — LAB (OUTPATIENT)
Dept: LAB | Facility: MEDICAL CENTER | Age: 60
End: 2020-09-02
Payer: MEDICARE

## 2020-09-02 DIAGNOSIS — L25.9 INDUSTRIAL DERMATITIS: ICD-10-CM

## 2020-09-02 DIAGNOSIS — E06.3 HASHIMOTO'S THYROIDITIS: ICD-10-CM

## 2020-09-02 DIAGNOSIS — L50.0 ALLERGIC URTICARIA: ICD-10-CM

## 2020-09-02 DIAGNOSIS — L50.0 ALLERGIC URTICARIA: Primary | ICD-10-CM

## 2020-09-02 LAB
T4 FREE SERPL-MCNC: 1.18 NG/DL (ref 0.76–1.46)
TSH SERPL DL<=0.05 MIU/L-ACNC: 0.27 UIU/ML (ref 0.36–3.74)

## 2020-09-02 PROCEDURE — 84439 ASSAY OF FREE THYROXINE: CPT

## 2020-09-02 PROCEDURE — 86003 ALLG SPEC IGE CRUDE XTRC EA: CPT

## 2020-09-02 PROCEDURE — 36415 COLL VENOUS BLD VENIPUNCTURE: CPT

## 2020-09-02 PROCEDURE — 84443 ASSAY THYROID STIM HORMONE: CPT

## 2020-09-03 LAB
ALLERGEN COMMENT: NORMAL
CLAM IGE QN: <0.1 KUA/L
CODFISH IGE QN: <0.1 KUA/I
CRAB IGE QN: <0.1 KUA/L
LOBSTER IGE QN: <0.1 KUA/L
SALMON IGE QN: <0.1 KUA/I
SHRIMP IGE QN: <0.1 KUA/L
TUNA IGE QN: <0.1 KUA/I

## 2020-09-04 LAB — FLOUNDER IGE QN: <0.1 KU/L

## 2020-09-06 NOTE — RESULT ENCOUNTER NOTE
Please call the patient regarding labs - tsh slightly low - she needs reduction in her dose , please confirm current dose of levothyroxine

## 2020-09-08 ENCOUNTER — TELEPHONE (OUTPATIENT)
Dept: ENDOCRINOLOGY | Facility: CLINIC | Age: 60
End: 2020-09-08

## 2020-09-08 DIAGNOSIS — E06.3 HYPOTHYROIDISM DUE TO HASHIMOTO'S THYROIDITIS: Primary | ICD-10-CM

## 2020-09-08 DIAGNOSIS — E03.8 HYPOTHYROIDISM DUE TO HASHIMOTO'S THYROIDITIS: Primary | ICD-10-CM

## 2020-09-08 NOTE — TELEPHONE ENCOUNTER
----- Message from Cheryl Clark MD sent at 9/6/2020 12:09 AM EDT -----  Please call the patient regarding labs - tsh slightly low - she needs reduction in her dose , please confirm current dose of levothyroxine

## 2020-09-08 NOTE — TELEPHONE ENCOUNTER
Patient informed and she is currently taking levothyroxine 200 mcg daily and also wanted to let you know she is seeing an allergist and has given her nasal medication flonase, azelastine and tobradex, can that effect her thyroid levels?

## 2020-09-08 NOTE — TELEPHONE ENCOUNTER
I do not think that medications could be interfering-for now decrease levothyroxine to 1 tablet Monday to Saturday and on Sunday    Repeat TSH and free T4 in 6 weeks

## 2020-09-09 ENCOUNTER — EVALUATION (OUTPATIENT)
Dept: PHYSICAL THERAPY | Facility: REHABILITATION | Age: 60
End: 2020-09-09
Payer: MEDICARE

## 2020-09-09 DIAGNOSIS — M79.604 RIGHT LEG PAIN: Primary | ICD-10-CM

## 2020-09-09 DIAGNOSIS — M25.371 ANKLE INSTABILITY, RIGHT: ICD-10-CM

## 2020-09-09 DIAGNOSIS — R26.2 AMBULATORY DYSFUNCTION: ICD-10-CM

## 2020-09-09 PROCEDURE — 97110 THERAPEUTIC EXERCISES: CPT | Performed by: PHYSICAL THERAPIST

## 2020-09-09 PROCEDURE — 97162 PT EVAL MOD COMPLEX 30 MIN: CPT | Performed by: PHYSICAL THERAPIST

## 2020-09-09 NOTE — PROGRESS NOTES
PT Evaluation     Name: Mar Callahan  Date: 20  : 1960  Referring Provider: Ana Root DO  AUTHORIZATION:   Insurance: Payor: Bri Neri / Plan: MEDICARE A AND B / Product Type: Medicare A & B Fee for Service /     SUBJECTIVE:  HPI: Mar Callahan is a 56 y o  female referred to outpatient physical therapy for the following diagnosis   Encounter Diagnoses   Name Primary?  Right leg pain Yes    Ankle instability, right     Ambulatory dysfunction         Patient reports her right foot likes to roll, describes inversion sprain  She's working on her balance  She's getting over a severe allergy, was getting pain up to her forehead region, her allergist reports it is due to sinus issues, gets headaches when bending forwards  Has Hashimoto's Disease, automimmune disease affecting thyroid function  Sees Dr Néstor Ennis, endocrinologist      Has exercise routine, not able to do this every day  Notes retention and release of fluid  Weight varies 10-20 lbs  Uses balance board for exercise, sometimes 5-30 minutes, also does community walking for exercise a couple times per week  No history of ankle braces for the right ankle  Has used soft wraps, feel they get tight  History of right ankle reconstruction, with multiple torn ligaments, about 30-40 years ago        Home Environment    Lives with:  lives alone   Home set-up: Walk-up (no stairs to enter, lives on one floor); stairs to laundry, uses bag/basket      Fall Screen    Do you have any impairment in vision?  yes; eyes get tired and blurred at end of the day, this improving as this is due to allergies to trees and grass   Do you have a history of neuropathy or decreased sensation?  no; tested normal for peripheral sensation   Have there been any medication changes that may explain your symptoms?  no      Patient goals: exercise so the right foot doesn't roll out, get suggestions for exercise program         Past Medical History: Diagnosis Date    Abnormal blood sugar     RESOLVED [id number]    Anxiety     Asthma     Chronic pain disorder     right foot    Depression     situtational    Diabetes mellitus (HCC)     Disease of thyroid gland     hypo  Hashimoto's    Endometriosis     Gastroparesis     Hyperlipidemia     Insomnia     LAST ASSESSED [id number]    Irritable bowel syndrome     diarrhea at times    Muscle disorder     unknown     Neck sprain     LAST ASSESSED [id number]    Pulmonary embolism (HCC)     ONSET 2007    Seasonal allergies     Thrombocytopenia (HCC)     Venous embolism and thrombosis of deep vessels of distal lower extremity ([facility] [address] )     ONSET 2007    Vitamin D deficiency        Current Outpatient Medications:     albuterol (2 5 mg/3 mL) 0 083 % nebulizer solution, Take 1 vial (2 5 mg total) by nebulization every 6 (six) hours as needed for wheezing or shortness of breath, Disp: 50 vial, Rfl: 0    baclofen 20 mg tablet, Take 20 mg by mouth 3 (three) times a day  , Disp: , Rfl:     Blood Glucose Monitoring Suppl ([employer] SYSTEM) w/Device KIT, Check BG daily dx E11 9, Disp: 1 kit, Rfl: 1    cholestyramine sugar free (QUESTRAN LIGHT) 4 g packet, Take 1 packet daily as directed, Disp: 30 tablet, Rfl: 5    clindamycin (CLEOCIN T) 1 % lotion, Once a day to the face, Disp: , Rfl: 0    DULoxetine (CYMBALTA) 30 mg delayed release capsule, TAKE 1 CAPSULE BY MOUTH DAILY, TO BE TAKEN WITH 60 MG FOR A TOTAL OF 90 MG DAILY  , Disp: 90 capsule, Rfl: 2    DULoxetine (CYMBALTA) 60 mg delayed release capsule, Take 1 capsule (60 mg total) by mouth daily, Disp: 90 capsule, Rfl: 2    ergocalciferol (VITAMIN D2) 50,000 units, take 1 capsule by mouth every week, Disp: 4 capsule, Rfl: 10    fluticasone (FLONASE) 50 mcg/act nasal spray, 2 sprays into each nostril daily (Patient taking differently: 2 sprays into each nostril daily As needed), Disp: 1 Bottle, Rfl: 1    gabapentin (NEURONTIN) 100 mg capsule, Take 100 mg by mouth 3 (three) times a day Taken twice a day, Disp: , Rfl: 0    glucose blood (ONETOUCH VERIO) test strip, Use check BG Daily dx E11 9, Disp: 100 each, Rfl: 1    ibuprofen (MOTRIN) 800 mg tablet, Take 1 tablet (800 mg total) by mouth every 6 (six) hours as needed for mild pain, Disp: 30 tablet, Rfl: 0    levothyroxine 200 mcg tablet, Take 1 tablet (200 mcg total) by mouth daily in the early morning, Disp: 30 tablet, Rfl: 5    magnesium oxide (MAG-OX) 400 mg, Take 400 mg by mouth daily , Disp: , Rfl:     montelukast (SINGULAIR) 10 mg tablet, take 1 tablet by mouth at bedtime, Disp: 30 tablet, Rfl: 5    ONETOUCH DELICA LANCETS FINE MISC, Check BG 1x daily DX E11 9, Disp: 100 each, Rfl: 1    oxyCODONE-acetaminophen (PERCOCET) 5-325 mg per tablet, Take 1 tablet by mouth every 4 (four) hours as needed for moderate pain, Disp: , Rfl:     rosuvastatin (CRESTOR) 5 mg tablet, Take 1 tablet (5 mg total) by mouth daily, Disp: 90 tablet, Rfl: 3    Sulfacetamide Sodium, Acne, 10 % LOTN, , Disp: , Rfl:     tobramycin-dexamethasone (TOBRADEX) ophthalmic suspension, Administer 1 drop to both eyes every 4 (four) hours while awake As needed for allergy symptoms, Disp: 5 mL, Rfl: 0    traZODone (DESYREL) 50 mg tablet, Take 1 tablet (50 mg total) by mouth daily at bedtime (Patient taking differently: Take 50 mg by mouth daily at bedtime Taken half a tablet at [facility] as needed), Disp: 30 tablet, Rfl: 2        OBJECTIVE:  /84, heart rate 94 bpm    Static Balance    Romberg Normal     Mobility Measures 9/09/2020   Assistive device used? Cane   5 Time Sit to Stand  (17" chair, arms across chest) Unable   3 Meter Timed  Up & Go 12 0 seconds   Walking speed 0 73 meters/second   Functional Gait Assessment (see below) 21/30   6 Minute Walk Test (100 foot circular course) Not completed   Patient-Reported Outcome Measure:  Activities-Specific Balance Confidence Scale (ABC Scale) Not completed     Walks with decreased knee flexion during loading, increased double leg stance time  Functional Gait Assessment  2/3 Gait level surface  3/3 Change in gait speed  3/3 Gait with horizontal head turns  2/3 Gait with vertical head turns  3/3 Gait and pivot turn  2/3 Step over obstacle (inferred)  2/3 Gait with narrow base of support  2/3 Gait with eyes closed  1/3 Ambulating backwards  1/3 Steps  21/30 Total score (less than 22/30 indicates increased risk of fall)        Reports tightness with bilateral arms reaching overhead, pain about the upper trapezius region bilaterally    5/5 hip flexion, knee extension, dorsiflexion, ankle eversion and inversion  Unable to appreciate change in muscle tone  5 degrees active right dorsiflexion, 8 degrees left 20-25 degrees closed chain dorsiflexion in weight bearing    Walks with decreased knee flexion upon loading, decreased third rocker  ASSESSMENT:  Zeferino Lira is a 56year old female with concerns about her mobility and positioning of her right foot  She will intermittently position the right foot into ankle inversion  There doesn't seem to be any specific weakness or range of motion issue that would contribute  Upon further questioning her concern is mainly with her resting foot position, rather than concern about ankle inversion when walking, as she has a normal heel strike  She is completing exercises to improve her mobility, recommend physical therapy to progress/modify these exercises to maximize mobility  Further referral needed: No    SHORT-TERM GOALS: 1 month(s)  1  Patient reports ability to walk for exercise at least 4x/week, at least 15 minutes at a time  2  Patient reports decreased ankle inversion in resting position  3  Patient demonstrates exercise program independently  LONG-TERM GOALS: 2 month(s)  1  Patient reports at least 30 minutes of exercise daily between balance board and overground walking    2  Patient independently manages home exercise program  Precautions - Hashimoto's, autoimmune disease    Specialty Treatment Diary  9/09/2020     Home exercise program Ankle eversion, green band, 15  Standing on right foot, step over object with left, 1 min  Lateral step down from 4" step, 15  Standing right gastroc stretch 30 sec     Walking/endurance      Static and dynamic balance      Strengthening      Stretching              PLAN OF CARE:  Patient will benefit from physical therapy 1-2 times per week for 2 months  Neuromuscular re-education, therapeutic exercises, and therapeutic activities as outlined in grids      Dann Libman, PT  9/9/2020

## 2020-09-15 ENCOUNTER — OFFICE VISIT (OUTPATIENT)
Dept: PSYCHIATRY | Facility: CLINIC | Age: 60
End: 2020-09-15
Payer: MEDICARE

## 2020-09-15 DIAGNOSIS — F43.10 PTSD (POST-TRAUMATIC STRESS DISORDER): ICD-10-CM

## 2020-09-15 DIAGNOSIS — F43.23 ADJUSTMENT DISORDER WITH MIXED ANXIETY AND DEPRESSED MOOD: Primary | ICD-10-CM

## 2020-09-15 PROCEDURE — 90833 PSYTX W PT W E/M 30 MIN: CPT | Performed by: PSYCHIATRY & NEUROLOGY

## 2020-09-15 PROCEDURE — 99213 OFFICE O/P EST LOW 20 MIN: CPT | Performed by: PSYCHIATRY & NEUROLOGY

## 2020-09-15 NOTE — PATIENT INSTRUCTIONS
Adjustment disorder with mixed anxiety and depressed mood    PTSD (post-traumatic stress disorder)      continue cymbalta 90mg daily  Trazodone on hold for now due to adequate sleep with antihistamines for allergies        Follow up in 3 months

## 2020-09-15 NOTE — BH TREATMENT PLAN
TREATMENT PLAN (Medication Management Only)        [employer]    Name and Date of Birth:  Bree Narayan 56 y o  1960  Date of Treatment Plan: September 15, 2020  Diagnosis/Diagnoses:    1  Adjustment disorder with mixed anxiety and depressed mood    2  PTSD (post-traumatic stress disorder)      Strengths/Personal Resources for Self-Care: "Im giving, I share, I am creative, arts and crafts", taking medications as prescribed, ability to communicate needs, being resoureceful, willingness to work on problems  Area/Areas of need (in own words): "flashbacks", anxiety symptoms  1  Long Term Goal: Not to give up on herself to figure out what her underlying medial condition is     Target Date: 2 months - 11/15/2020  Person/Persons responsible for completion of goal: Dr Alyssa Butts  2  Short Term Objective (s) - How will we reach this goal?:   A  Provider new recommended medication/dosage changes and/or continue medication(s): continue all other medications  B  N/A   C  N/A  Target Date: 12 months - 9/15/2021  Person/Persons Responsible for Completion of Goal: Dr Alyssa uBtts  Progress Towards Goals: continuing treatment  Treatment Modality: medication management every 3 months  Review due 90 to 120 days from date of this plan: 6 months  Expected length of service: ongoing treatment  My Physician/PA/NP and I have developed this plan together and I agree to work on the goals and objectives  I understand the treatment goals that were developed for my treatment

## 2020-09-16 ENCOUNTER — OFFICE VISIT (OUTPATIENT)
Dept: PHYSICAL THERAPY | Facility: REHABILITATION | Age: 60
End: 2020-09-16
Payer: MEDICARE

## 2020-09-16 DIAGNOSIS — M25.371 ANKLE INSTABILITY, RIGHT: ICD-10-CM

## 2020-09-16 DIAGNOSIS — M79.604 RIGHT LEG PAIN: Primary | ICD-10-CM

## 2020-09-16 DIAGNOSIS — R26.2 AMBULATORY DYSFUNCTION: ICD-10-CM

## 2020-09-16 PROCEDURE — 97110 THERAPEUTIC EXERCISES: CPT | Performed by: PHYSICAL THERAPIST

## 2020-09-16 NOTE — PROGRESS NOTES
PT Evaluation     Name: Sanchez Anderson  Date: 20  : 1960  Referring Provider: Lee Foster DO  AUTHORIZATION:   Insurance: Payor: Abelardo Olguin / Plan: MEDICARE A AND B / Product Type: Medicare A & B Fee for Service /     SUBJECTIVE:  HPI: Sanchez Anderson is a 56 y o  female referred to outpatient physical therapy for the following diagnosis   Encounter Diagnoses   Name Primary?  Right leg pain Yes    Ankle instability, right     Ambulatory dysfunction        [facility] reports she has back pain following lifting and moving furniture  Patient goals: exercise so the right foot doesn't roll out, get suggestions for exercise program     OBJECTIVE:    Precautions - Hashimoto's, autoimmune disease    Specialty Treatment Diary  2020    Home exercise program Ankle eversion, green band, 15  Standing on right foot, step over object with left, 1 min  Lateral step down from 4" step, 15  Standing right gastroc stretch 30 sec     Walking/endurance (therapeutic exercise)  SciFit level 1, 14 minutes, about 80 steps/minute    Static and dynamic balance (neuromuscular re-education)      Strengthening (therapeutic exercise)  Ankle eversion, green band, 20 x 2 sets    Standing on one foot, step over object with other foot: Forward and back 30 sec each x 2 sets  Side to side 30 sec each    Lateral step down from 4" step, 15 x 2 sets each  Heel raise, about 30 sec bilaterally    Standing right gastroc stretch 30 sec    Marching hooklying 1 min x 2 sets  hooklying trunk rotation stretch, 1 min x 2  Standing partial bird-dogs, 1 min    Stretching (therapeutic exercise)              ASSESSMENT:  Addressed lumbar pain with some lumbar stabilization exercises  Good return demonstration of initial home exercises  Further referral needed: No    SHORT-TERM GOALS: 1 month(s)  1  Patient reports ability to walk for exercise at least 4x/week, at least 15 minutes at a time      2  Patient reports decreased ankle inversion in resting position  3  Patient demonstrates exercise program independently  LONG-TERM GOALS: 2 month(s)  1  Patient reports at least 30 minutes of exercise daily between balance board and overground walking  2  Patient independently manages home exercise program      PLAN OF CARE:  Patient will benefit from physical therapy 1-2 times per week for 2 months  Neuromuscular re-education, therapeutic exercises, and therapeutic activities as outlined in grids      Edita Adams, PT  9/16/2020

## 2020-09-22 ENCOUNTER — OFFICE VISIT (OUTPATIENT)
Dept: PHYSICAL THERAPY | Facility: REHABILITATION | Age: 60
End: 2020-09-22
Payer: MEDICARE

## 2020-09-22 DIAGNOSIS — M25.371 ANKLE INSTABILITY, RIGHT: ICD-10-CM

## 2020-09-22 DIAGNOSIS — R26.2 AMBULATORY DYSFUNCTION: ICD-10-CM

## 2020-09-22 DIAGNOSIS — M79.604 RIGHT LEG PAIN: Primary | ICD-10-CM

## 2020-09-22 PROCEDURE — 97110 THERAPEUTIC EXERCISES: CPT | Performed by: PHYSICAL THERAPIST

## 2020-09-22 NOTE — PROGRESS NOTES
PT Evaluation     Name: Brennen Lema  Date: 20  : 1960  Referring Provider: Asha Haddad DO  AUTHORIZATION:   Insurance: Payor: Lorenzo Eduardo / Plan: MEDICARE A AND B / Product Type: Medicare A & B Fee for Service /     SUBJECTIVE:  HPI: Brennen Lema is a 56 y o  female referred to outpatient physical therapy for the following diagnosis   Encounter Diagnoses   Name Primary?  Right leg pain Yes    Ankle instability, right     Ambulatory dysfunction        Emiliano Jara reports back pain following helping her sister check in people with horse show  Current wrapping for the right ankle, soft Ace wrap  Sometimes use a soft brace or air cast   Needs to use the rolling walker to get her knees to bent while walking  Patient goals: exercise so the right foot doesn't roll out, get suggestions for exercise program     OBJECTIVE:    Precautions - Hashimoto's, autoimmune disease    Specialty Treatment Diary  2020   Home exercise program Ankle eversion, green band, 15  Standing on right foot, step over object with left, 1 min  Lateral step down from 4" step, 15  Standing right gastroc stretch 30 sec     Walking/endurance (therapeutic exercise)  SciFit level 1, 14 minutes, about 80 steps/minute SciFit level 1, 10 minutes, about 90 steps/min    Overground walking to focus on movement of the right ankle, using cane on left hand, 250 feet x 3 sets     Static and dynamic balance (neuromuscular re-education)      Strengthening (therapeutic exercise)  Ankle eversion, green band, 20 x 2 sets    Standing on one foot, step over object with other foot:   Forward and back 30 sec each x 2 sets  Side to side 30 sec each    Lateral step down from 4" step, 15 x 2 sets each  Heel raise, about 30 sec bilaterally    Standing right gastroc stretch 30 sec    Marching hooklying 1 min x 2 sets  hooklying trunk rotation stretch, 1 min x 2  Standing partial bird-dogs, 1 min Heel raises bilaterally, 1 min    Ankle eversion, green band, 20 x 2 sets    Standing on one foot, step over object with other foot: Forward and back 60 sec each x 2 sets    Lateral step down 4" step, right foot static, 1 min    Standing partial bird-dogs, 1 min   Stretching (therapeutic exercise)   Standing gastroc stretch, R, 1 min    Seated right hamstring stretches, 1 5 min               ASSESSMENT:  Addressed lumbar pain with some lumbar stabilization exercises  Improved step lengths with a bit of walking, along with better knee movement and ankle movement during swing and stance phase gait  She should do more regular walking throughout the day  Also consider transverse friction massage about the fibular head  Further referral needed: No    SHORT-TERM GOALS: 1 month(s)  1  Patient reports ability to walk for exercise at least 4x/week, at least 15 minutes at a time  2  Patient reports decreased ankle inversion in resting position  3  Patient demonstrates exercise program independently  LONG-TERM GOALS: 2 month(s)  1  Patient reports at least 30 minutes of exercise daily between balance board and overground walking  2  Patient independently manages home exercise program      PLAN OF CARE:  Patient will benefit from physical therapy 1-2 times per week for 2 months  Neuromuscular re-education, therapeutic exercises, and therapeutic activities as outlined in grids      Edilson Mejia, PT  9/22/2020

## 2020-09-22 NOTE — PROGRESS NOTES
Ice pack given:    __X___yes _____no    Discharge instructions signed by patient:    __X___yes _____no    Discharge instructions given to patient:    __X___yes _____no    Discharged via:    __X___amulatory    _____wheelchair    _____stretcher    Stable on discharge:    __X___yes ____no Detail Level: Detailed Post-Care Instructions: I reviewed with the patient in detail post-care instructions. Patient is to wear sunprotection, and avoid picking at any of the treated lesions. Pt may apply Vaseline to crusted or scabbing areas. Render Post-Care Instructions In Note?: no Consent: The patient's consent was obtained including but not limited to risks of crusting, scabbing, blistering, scarring, darker or lighter pigmentary change, recurrence, incomplete removal and infection. Duration Of Freeze Thaw-Cycle (Seconds): 0

## 2020-09-29 ENCOUNTER — OFFICE VISIT (OUTPATIENT)
Dept: PHYSICAL THERAPY | Facility: REHABILITATION | Age: 60
End: 2020-09-29
Payer: MEDICARE

## 2020-09-29 DIAGNOSIS — R26.2 AMBULATORY DYSFUNCTION: ICD-10-CM

## 2020-09-29 DIAGNOSIS — M25.371 ANKLE INSTABILITY, RIGHT: ICD-10-CM

## 2020-09-29 DIAGNOSIS — M79.604 RIGHT LEG PAIN: Primary | ICD-10-CM

## 2020-09-29 PROCEDURE — 97110 THERAPEUTIC EXERCISES: CPT | Performed by: PHYSICAL THERAPIST

## 2020-09-29 PROCEDURE — 97112 NEUROMUSCULAR REEDUCATION: CPT | Performed by: PHYSICAL THERAPIST

## 2020-09-29 NOTE — PROGRESS NOTES
PHYSICAL THERAPY TREATMENT    Name: Ebony Soni  Date: 20  : 1960  Referring Provider: Martha Gonzales DO  AUTHORIZATION:   Insurance: Payor: Milly Allison / Plan: MEDICARE A AND B / Product Type: Medicare A & B Fee for Service /     SUBJECTIVE:  HPI: Ebony Soni is a 56 y o  female referred to outpatient physical therapy for the following diagnosis   Encounter Diagnoses   Name Primary?  Right leg pain Yes    Ankle instability, right     Ambulatory dysfunction        Right shoulder started bothering her more a couple days ago, carried some light bags in from the car  Walking's going okay, still not a long time, but gets cramping after a while  Walks and stops for a total of about 30 minutes in grocery store  Back hasn't been too bad in limiting walking  Patient goals: exercise so the right foot doesn't roll out, get suggestions for exercise program     OBJECTIVE:    Precautions - Hashimoto's, autoimmune disease    Specialty Treatment Diary  2020   Home exercise program      Walking/endurance (therapeutic exercise) SciFit level 1, 14 minutes, about 80 steps/minute SciFit level 1, 10 minutes, about 90 steps/min    Overground walking to focus on movement of the right ankle, using cane on left hand, 250 feet x 3 sets   SciFit level 1, 12 minutes, about 90 steps/min    Overground walking to focus on movement of the right ankle, using cane on left hand, 600 feet   Static and dynamic balance (neuromuscular re-education)      Strengthening (therapeutic exercise) Ankle eversion, green band, 20 x 2 sets    Standing on one foot, step over object with other foot:   Forward and back 30 sec each x 2 sets  Side to side 30 sec each    Lateral step down from 4" step, 15 x 2 sets each  Heel raise, about 30 sec bilaterally    Standing right gastroc stretch 30 sec    Marching hooklying 1 min x 2 sets  hooklying trunk rotation stretch, 1 min x 2  Standing partial bird-dogs, 1 min Heel raises bilaterally, 1 min    Ankle eversion, green band, 20 x 2 sets    Standing on one foot, step over object with other foot: Forward and back 60 sec each x 2 sets    Lateral step down 4" step, right foot static, 1 min    Standing partial bird-dogs, 1 min Heel raises bilaterally, 1 min    Ankle eversion, green band, 20 x 2 sets    Standing on one foot, step over object with other foot: Forward and back 60 sec each     Lateral step down 4" step, right foot static, 1 min    Standing partial bird-dogs, 1 min    L shoulder ER, orange band, 1 min  Scapular row, green band, 1 min  pec minor stretch, 1 min     Stretching (therapeutic exercise)  Standing gastroc stretch, R, 1 min    Seated right hamstring stretches, 1 5 min     Standing gastroc stretch, R, 1 min    Seated right hamstring stretches, 1 5 min  Seated hip ER stretches, 1-2 min each side               ASSESSMENT:  Dolores Bush is moving better and faster than previously  She walks with better movement about the knees, rather than keeping the knees stiff throughout  We addressed shoulder pain as this affects her movements throughout the day  Further referral needed: No    SHORT-TERM GOALS: 1 month(s)  1  Patient reports ability to walk for exercise at least 4x/week, at least 15 minutes at a time  MET  2  Patient reports decreased ankle inversion in resting position  3  Patient demonstrates exercise program independently  LONG-TERM GOALS: 2 month(s)  1  Patient reports at least 30 minutes of exercise daily between balance board and overground walking  2  Patient independently manages home exercise program      PLAN OF CARE:  Patient will benefit from physical therapy 1-2 times per week for 2 months  Neuromuscular re-education, therapeutic exercises, and therapeutic activities as outlined in grids      Charmaine Groves, PT  9/29/2020

## 2020-10-01 ENCOUNTER — OFFICE VISIT (OUTPATIENT)
Dept: FAMILY MEDICINE CLINIC | Facility: CLINIC | Age: 60
End: 2020-10-01
Payer: MEDICARE

## 2020-10-01 VITALS
TEMPERATURE: 98.3 F | OXYGEN SATURATION: 97 % | HEIGHT: 67 IN | HEART RATE: 64 BPM | BODY MASS INDEX: 35.85 KG/M2 | RESPIRATION RATE: 18 BRPM | DIASTOLIC BLOOD PRESSURE: 84 MMHG | WEIGHT: 228.4 LBS | SYSTOLIC BLOOD PRESSURE: 132 MMHG

## 2020-10-01 DIAGNOSIS — J45.20 MILD INTERMITTENT ASTHMA WITHOUT COMPLICATION: Primary | ICD-10-CM

## 2020-10-01 DIAGNOSIS — E06.3 HYPOTHYROIDISM DUE TO HASHIMOTO'S THYROIDITIS: ICD-10-CM

## 2020-10-01 DIAGNOSIS — E03.8 HYPOTHYROIDISM DUE TO HASHIMOTO'S THYROIDITIS: ICD-10-CM

## 2020-10-01 PROCEDURE — 99213 OFFICE O/P EST LOW 20 MIN: CPT | Performed by: FAMILY MEDICINE

## 2020-10-01 RX ORDER — AZELASTINE HYDROCHLORIDE 137 UG/1
SPRAY, METERED NASAL
COMMUNITY
Start: 2020-08-24

## 2020-10-06 ENCOUNTER — OFFICE VISIT (OUTPATIENT)
Dept: PHYSICAL THERAPY | Facility: REHABILITATION | Age: 60
End: 2020-10-06
Payer: MEDICARE

## 2020-10-06 DIAGNOSIS — M79.604 RIGHT LEG PAIN: Primary | ICD-10-CM

## 2020-10-06 DIAGNOSIS — M25.371 ANKLE INSTABILITY, RIGHT: ICD-10-CM

## 2020-10-06 DIAGNOSIS — R26.2 AMBULATORY DYSFUNCTION: ICD-10-CM

## 2020-10-06 PROCEDURE — 97112 NEUROMUSCULAR REEDUCATION: CPT | Performed by: PHYSICAL THERAPIST

## 2020-10-06 PROCEDURE — 97110 THERAPEUTIC EXERCISES: CPT | Performed by: PHYSICAL THERAPIST

## 2020-10-08 ENCOUNTER — TELEPHONE (OUTPATIENT)
Dept: ADMINISTRATIVE | Facility: OTHER | Age: 60
End: 2020-10-08

## 2020-10-08 ENCOUNTER — OFFICE VISIT (OUTPATIENT)
Dept: ENDOCRINOLOGY | Facility: CLINIC | Age: 60
End: 2020-10-08
Payer: MEDICARE

## 2020-10-08 VITALS
HEART RATE: 69 BPM | HEIGHT: 66 IN | SYSTOLIC BLOOD PRESSURE: 122 MMHG | WEIGHT: 235.2 LBS | DIASTOLIC BLOOD PRESSURE: 70 MMHG | BODY MASS INDEX: 37.8 KG/M2 | TEMPERATURE: 98.2 F

## 2020-10-08 DIAGNOSIS — E06.3 HASHIMOTO'S THYROIDITIS: ICD-10-CM

## 2020-10-08 DIAGNOSIS — E55.9 VITAMIN D DEFICIENCY: ICD-10-CM

## 2020-10-08 DIAGNOSIS — E11.9 TYPE 2 DIABETES MELLITUS WITHOUT COMPLICATION, WITHOUT LONG-TERM CURRENT USE OF INSULIN (HCC): Primary | ICD-10-CM

## 2020-10-08 DIAGNOSIS — E03.8 HYPOTHYROIDISM DUE TO HASHIMOTO'S THYROIDITIS: ICD-10-CM

## 2020-10-08 DIAGNOSIS — E06.3 HYPOTHYROIDISM DUE TO HASHIMOTO'S THYROIDITIS: ICD-10-CM

## 2020-10-08 LAB
LEFT EYE DIABETIC RETINOPATHY: NORMAL
RIGHT EYE DIABETIC RETINOPATHY: NORMAL

## 2020-10-08 PROCEDURE — 99214 OFFICE O/P EST MOD 30 MIN: CPT | Performed by: PHYSICIAN ASSISTANT

## 2020-10-13 ENCOUNTER — OFFICE VISIT (OUTPATIENT)
Dept: PHYSICAL THERAPY | Facility: REHABILITATION | Age: 60
End: 2020-10-13
Payer: MEDICARE

## 2020-10-13 DIAGNOSIS — R26.2 AMBULATORY DYSFUNCTION: ICD-10-CM

## 2020-10-13 DIAGNOSIS — M79.604 RIGHT LEG PAIN: Primary | ICD-10-CM

## 2020-10-13 DIAGNOSIS — M25.371 ANKLE INSTABILITY, RIGHT: ICD-10-CM

## 2020-10-13 PROCEDURE — 97110 THERAPEUTIC EXERCISES: CPT | Performed by: PHYSICAL THERAPIST

## 2020-10-13 PROCEDURE — 97112 NEUROMUSCULAR REEDUCATION: CPT | Performed by: PHYSICAL THERAPIST

## 2020-10-20 ENCOUNTER — OFFICE VISIT (OUTPATIENT)
Dept: PHYSICAL THERAPY | Facility: REHABILITATION | Age: 60
End: 2020-10-20
Payer: MEDICARE

## 2020-10-20 DIAGNOSIS — M25.371 ANKLE INSTABILITY, RIGHT: ICD-10-CM

## 2020-10-20 DIAGNOSIS — R26.2 AMBULATORY DYSFUNCTION: ICD-10-CM

## 2020-10-20 DIAGNOSIS — M79.604 RIGHT LEG PAIN: Primary | ICD-10-CM

## 2020-10-20 PROCEDURE — 97112 NEUROMUSCULAR REEDUCATION: CPT | Performed by: PHYSICAL THERAPIST

## 2020-10-20 PROCEDURE — 97110 THERAPEUTIC EXERCISES: CPT | Performed by: PHYSICAL THERAPIST

## 2020-10-22 ENCOUNTER — TELEPHONE (OUTPATIENT)
Dept: FAMILY MEDICINE CLINIC | Facility: CLINIC | Age: 60
End: 2020-10-22

## 2020-10-23 ENCOUNTER — TELEPHONE (OUTPATIENT)
Dept: FAMILY MEDICINE CLINIC | Facility: CLINIC | Age: 60
End: 2020-10-23

## 2020-10-23 DIAGNOSIS — M79.604 RIGHT LEG PAIN: Primary | ICD-10-CM

## 2020-10-23 DIAGNOSIS — H10.13 ALLERGIC CONJUNCTIVITIS OF BOTH EYES: ICD-10-CM

## 2020-10-23 RX ORDER — TOBRAMYCIN AND DEXAMETHASONE 3; 1 MG/ML; MG/ML
1 SUSPENSION/ DROPS OPHTHALMIC
Qty: 5 ML | Refills: 0 | Status: SHIPPED | OUTPATIENT
Start: 2020-10-23 | End: 2020-12-08

## 2020-10-23 RX ORDER — OXYCODONE HYDROCHLORIDE AND ACETAMINOPHEN 5; 325 MG/1; MG/1
1 TABLET ORAL EVERY 8 HOURS PRN
Qty: 10 TABLET | Refills: 0 | Status: SHIPPED | OUTPATIENT
Start: 2020-10-23 | End: 2020-12-08

## 2020-10-28 ENCOUNTER — OFFICE VISIT (OUTPATIENT)
Dept: PHYSICAL THERAPY | Facility: REHABILITATION | Age: 60
End: 2020-10-28
Payer: MEDICARE

## 2020-10-28 DIAGNOSIS — R26.2 AMBULATORY DYSFUNCTION: ICD-10-CM

## 2020-10-28 DIAGNOSIS — M25.371 ANKLE INSTABILITY, RIGHT: ICD-10-CM

## 2020-10-28 DIAGNOSIS — M79.604 RIGHT LEG PAIN: Primary | ICD-10-CM

## 2020-10-28 PROCEDURE — 97110 THERAPEUTIC EXERCISES: CPT | Performed by: PHYSICAL THERAPIST

## 2020-10-28 PROCEDURE — 97112 NEUROMUSCULAR REEDUCATION: CPT | Performed by: PHYSICAL THERAPIST

## 2020-11-04 ENCOUNTER — OFFICE VISIT (OUTPATIENT)
Dept: PHYSICAL THERAPY | Facility: REHABILITATION | Age: 60
End: 2020-11-04
Payer: MEDICARE

## 2020-11-04 DIAGNOSIS — M79.604 RIGHT LEG PAIN: Primary | ICD-10-CM

## 2020-11-04 DIAGNOSIS — M25.371 ANKLE INSTABILITY, RIGHT: ICD-10-CM

## 2020-11-04 DIAGNOSIS — R26.2 AMBULATORY DYSFUNCTION: ICD-10-CM

## 2020-11-04 PROCEDURE — 97110 THERAPEUTIC EXERCISES: CPT | Performed by: PHYSICAL THERAPIST

## 2020-11-04 PROCEDURE — 97112 NEUROMUSCULAR REEDUCATION: CPT | Performed by: PHYSICAL THERAPIST

## 2020-11-11 ENCOUNTER — EVALUATION (OUTPATIENT)
Dept: PHYSICAL THERAPY | Facility: REHABILITATION | Age: 60
End: 2020-11-11
Payer: MEDICARE

## 2020-11-11 DIAGNOSIS — M79.604 RIGHT LEG PAIN: Primary | ICD-10-CM

## 2020-11-11 DIAGNOSIS — M25.371 ANKLE INSTABILITY, RIGHT: ICD-10-CM

## 2020-11-11 DIAGNOSIS — R26.2 AMBULATORY DYSFUNCTION: ICD-10-CM

## 2020-11-11 PROCEDURE — 97110 THERAPEUTIC EXERCISES: CPT | Performed by: PHYSICAL THERAPIST

## 2020-11-11 PROCEDURE — 97112 NEUROMUSCULAR REEDUCATION: CPT | Performed by: PHYSICAL THERAPIST

## 2020-11-17 DIAGNOSIS — E06.3 HYPOTHYROIDISM DUE TO HASHIMOTO'S THYROIDITIS: ICD-10-CM

## 2020-11-17 DIAGNOSIS — E03.8 HYPOTHYROIDISM DUE TO HASHIMOTO'S THYROIDITIS: ICD-10-CM

## 2020-11-17 RX ORDER — LEVOTHYROXINE SODIUM 0.2 MG/1
200 TABLET ORAL
Qty: 30 TABLET | Refills: 3 | Status: SHIPPED | OUTPATIENT
Start: 2020-11-17 | End: 2020-12-09 | Stop reason: SDUPTHER

## 2020-11-18 ENCOUNTER — OFFICE VISIT (OUTPATIENT)
Dept: PHYSICAL THERAPY | Facility: REHABILITATION | Age: 60
End: 2020-11-18
Payer: MEDICARE

## 2020-11-18 DIAGNOSIS — R26.2 AMBULATORY DYSFUNCTION: ICD-10-CM

## 2020-11-18 DIAGNOSIS — M79.604 RIGHT LEG PAIN: Primary | ICD-10-CM

## 2020-11-18 DIAGNOSIS — M25.371 ANKLE INSTABILITY, RIGHT: ICD-10-CM

## 2020-11-18 PROCEDURE — 97110 THERAPEUTIC EXERCISES: CPT | Performed by: PHYSICAL THERAPIST

## 2020-11-18 PROCEDURE — 97112 NEUROMUSCULAR REEDUCATION: CPT | Performed by: PHYSICAL THERAPIST

## 2020-11-25 ENCOUNTER — OFFICE VISIT (OUTPATIENT)
Dept: PHYSICAL THERAPY | Facility: REHABILITATION | Age: 60
End: 2020-11-25
Payer: MEDICARE

## 2020-11-25 DIAGNOSIS — M25.371 ANKLE INSTABILITY, RIGHT: ICD-10-CM

## 2020-11-25 DIAGNOSIS — M79.604 RIGHT LEG PAIN: ICD-10-CM

## 2020-11-25 DIAGNOSIS — R26.2 AMBULATORY DYSFUNCTION: Primary | ICD-10-CM

## 2020-11-25 PROCEDURE — 97110 THERAPEUTIC EXERCISES: CPT

## 2020-11-25 PROCEDURE — 97530 THERAPEUTIC ACTIVITIES: CPT

## 2020-11-25 PROCEDURE — 97112 NEUROMUSCULAR REEDUCATION: CPT

## 2020-12-02 ENCOUNTER — OFFICE VISIT (OUTPATIENT)
Dept: PHYSICAL THERAPY | Facility: REHABILITATION | Age: 60
End: 2020-12-02
Payer: MEDICARE

## 2020-12-02 DIAGNOSIS — R26.2 AMBULATORY DYSFUNCTION: Primary | ICD-10-CM

## 2020-12-02 DIAGNOSIS — M79.604 RIGHT LEG PAIN: ICD-10-CM

## 2020-12-02 DIAGNOSIS — M25.371 ANKLE INSTABILITY, RIGHT: ICD-10-CM

## 2020-12-02 PROCEDURE — 97110 THERAPEUTIC EXERCISES: CPT | Performed by: PHYSICAL THERAPIST

## 2020-12-02 PROCEDURE — 97112 NEUROMUSCULAR REEDUCATION: CPT | Performed by: PHYSICAL THERAPIST

## 2020-12-04 ENCOUNTER — LAB (OUTPATIENT)
Dept: LAB | Facility: CLINIC | Age: 60
End: 2020-12-04
Payer: MEDICARE

## 2020-12-04 DIAGNOSIS — E78.00 PURE HYPERCHOLESTEROLEMIA: ICD-10-CM

## 2020-12-04 DIAGNOSIS — E11.9 TYPE 2 DIABETES MELLITUS WITHOUT COMPLICATION, WITHOUT LONG-TERM CURRENT USE OF INSULIN (HCC): ICD-10-CM

## 2020-12-04 DIAGNOSIS — E06.3 HYPOTHYROIDISM DUE TO HASHIMOTO'S THYROIDITIS: ICD-10-CM

## 2020-12-04 DIAGNOSIS — E03.8 HYPOTHYROIDISM DUE TO HASHIMOTO'S THYROIDITIS: ICD-10-CM

## 2020-12-04 LAB
ALBUMIN SERPL BCP-MCNC: 3.8 G/DL (ref 3.5–5)
ALP SERPL-CCNC: 123 U/L (ref 46–116)
ALT SERPL W P-5'-P-CCNC: 62 U/L (ref 12–78)
ANION GAP SERPL CALCULATED.3IONS-SCNC: 5 MMOL/L (ref 4–13)
AST SERPL W P-5'-P-CCNC: 40 U/L (ref 5–45)
BILIRUB SERPL-MCNC: 0.33 MG/DL (ref 0.2–1)
BUN SERPL-MCNC: 20 MG/DL (ref 5–25)
CALCIUM SERPL-MCNC: 9.4 MG/DL (ref 8.3–10.1)
CHLORIDE SERPL-SCNC: 108 MMOL/L (ref 100–108)
CHOLEST SERPL-MCNC: 164 MG/DL (ref 50–200)
CO2 SERPL-SCNC: 27 MMOL/L (ref 21–32)
CREAT SERPL-MCNC: 0.91 MG/DL (ref 0.6–1.3)
EST. AVERAGE GLUCOSE BLD GHB EST-MCNC: 143 MG/DL
GFR SERPL CREATININE-BSD FRML MDRD: 68 ML/MIN/1.73SQ M
GLUCOSE P FAST SERPL-MCNC: 131 MG/DL (ref 65–99)
HBA1C MFR BLD: 6.6 %
HDLC SERPL-MCNC: 52 MG/DL
LDLC SERPL CALC-MCNC: 85 MG/DL (ref 0–100)
NONHDLC SERPL-MCNC: 112 MG/DL
POTASSIUM SERPL-SCNC: 4 MMOL/L (ref 3.5–5.3)
PROT SERPL-MCNC: 7.7 G/DL (ref 6.4–8.2)
SODIUM SERPL-SCNC: 140 MMOL/L (ref 136–145)
T4 FREE SERPL-MCNC: 1.01 NG/DL (ref 0.76–1.46)
TRIGL SERPL-MCNC: 136 MG/DL
TSH SERPL DL<=0.05 MIU/L-ACNC: 8.57 UIU/ML (ref 0.36–3.74)

## 2020-12-04 PROCEDURE — 83036 HEMOGLOBIN GLYCOSYLATED A1C: CPT

## 2020-12-04 PROCEDURE — 36415 COLL VENOUS BLD VENIPUNCTURE: CPT

## 2020-12-04 PROCEDURE — 84443 ASSAY THYROID STIM HORMONE: CPT

## 2020-12-04 PROCEDURE — 80061 LIPID PANEL: CPT

## 2020-12-04 PROCEDURE — 80053 COMPREHEN METABOLIC PANEL: CPT

## 2020-12-04 PROCEDURE — 84439 ASSAY OF FREE THYROXINE: CPT

## 2020-12-08 ENCOUNTER — TELEPHONE (OUTPATIENT)
Dept: FAMILY MEDICINE CLINIC | Facility: CLINIC | Age: 60
End: 2020-12-08

## 2020-12-08 ENCOUNTER — OFFICE VISIT (OUTPATIENT)
Dept: FAMILY MEDICINE CLINIC | Facility: CLINIC | Age: 60
End: 2020-12-08
Payer: MEDICARE

## 2020-12-08 ENCOUNTER — TELEPHONE (OUTPATIENT)
Dept: ENDOCRINOLOGY | Facility: CLINIC | Age: 60
End: 2020-12-08

## 2020-12-08 VITALS
TEMPERATURE: 96.9 F | HEART RATE: 91 BPM | SYSTOLIC BLOOD PRESSURE: 144 MMHG | HEIGHT: 66 IN | DIASTOLIC BLOOD PRESSURE: 88 MMHG | OXYGEN SATURATION: 98 % | BODY MASS INDEX: 37.96 KG/M2 | RESPIRATION RATE: 16 BRPM | WEIGHT: 236.2 LBS

## 2020-12-08 DIAGNOSIS — E78.00 PURE HYPERCHOLESTEROLEMIA: ICD-10-CM

## 2020-12-08 DIAGNOSIS — E11.9 TYPE 2 DIABETES MELLITUS WITHOUT COMPLICATION, WITHOUT LONG-TERM CURRENT USE OF INSULIN (HCC): ICD-10-CM

## 2020-12-08 DIAGNOSIS — E06.3 HYPOTHYROIDISM DUE TO HASHIMOTO'S THYROIDITIS: Primary | ICD-10-CM

## 2020-12-08 DIAGNOSIS — E03.8 HYPOTHYROIDISM DUE TO HASHIMOTO'S THYROIDITIS: Primary | ICD-10-CM

## 2020-12-08 PROCEDURE — 99214 OFFICE O/P EST MOD 30 MIN: CPT | Performed by: FAMILY MEDICINE

## 2020-12-09 ENCOUNTER — APPOINTMENT (OUTPATIENT)
Dept: PHYSICAL THERAPY | Facility: REHABILITATION | Age: 60
End: 2020-12-09
Payer: MEDICARE

## 2020-12-09 DIAGNOSIS — E03.8 HYPOTHYROIDISM DUE TO HASHIMOTO'S THYROIDITIS: ICD-10-CM

## 2020-12-09 DIAGNOSIS — E03.8 HYPOTHYROIDISM DUE TO HASHIMOTO'S THYROIDITIS: Primary | ICD-10-CM

## 2020-12-09 DIAGNOSIS — E06.3 HYPOTHYROIDISM DUE TO HASHIMOTO'S THYROIDITIS: ICD-10-CM

## 2020-12-09 DIAGNOSIS — E06.3 HYPOTHYROIDISM DUE TO HASHIMOTO'S THYROIDITIS: Primary | ICD-10-CM

## 2020-12-09 RX ORDER — LEVOTHYROXINE SODIUM 0.2 MG/1
200 TABLET ORAL DAILY
Qty: 30 TABLET | Refills: 3
Start: 2020-12-09 | End: 2021-03-19

## 2020-12-16 ENCOUNTER — APPOINTMENT (OUTPATIENT)
Dept: PHYSICAL THERAPY | Facility: REHABILITATION | Age: 60
End: 2020-12-16
Payer: MEDICARE

## 2020-12-21 DIAGNOSIS — G70.9 NEUROMUSCULAR DISORDER (HCC): ICD-10-CM

## 2020-12-21 DIAGNOSIS — M79.18 MYOFASCIAL PAIN: ICD-10-CM

## 2020-12-21 RX ORDER — DULOXETIN HYDROCHLORIDE 60 MG/1
CAPSULE, DELAYED RELEASE ORAL
Qty: 90 CAPSULE | Refills: 2 | Status: SHIPPED | OUTPATIENT
Start: 2020-12-21 | End: 2021-09-24

## 2021-01-06 ENCOUNTER — TELEMEDICINE (OUTPATIENT)
Dept: FAMILY MEDICINE CLINIC | Facility: CLINIC | Age: 61
End: 2021-01-06
Payer: MEDICARE

## 2021-01-06 DIAGNOSIS — J45.20 MILD INTERMITTENT ASTHMA WITHOUT COMPLICATION: Primary | ICD-10-CM

## 2021-01-06 DIAGNOSIS — R09.81 NASAL CONGESTION: ICD-10-CM

## 2021-01-06 PROCEDURE — 99213 OFFICE O/P EST LOW 20 MIN: CPT | Performed by: FAMILY MEDICINE

## 2021-01-06 RX ORDER — PREDNISONE 10 MG/1
TABLET ORAL
Qty: 21 TABLET | Refills: 0 | Status: SHIPPED | OUTPATIENT
Start: 2021-01-06 | End: 2021-02-20

## 2021-01-11 ENCOUNTER — TELEPHONE (OUTPATIENT)
Dept: FAMILY MEDICINE CLINIC | Facility: CLINIC | Age: 61
End: 2021-01-11

## 2021-01-11 NOTE — TELEPHONE ENCOUNTER
Mookie García called the office she has been having persistent cough and congestin since march  Pt is presently taking a steroid and still has days to go on that  Pt was asking do you think she should be tested for COVID -19 in the case she had confirmed exposure without her knowing and she could be fighting to things at once? Or do you feel that is not necessary        Pt's number is [phone]

## 2021-01-11 NOTE — TELEPHONE ENCOUNTER
I think it is reasonable for her to be sent for COVID testing  They are closed for the night  If she is willing to do so please send back to me and I will place an order in she can go after 8:00 a m  Tomorrow morning    If she is in [place] she can go to [place] [phone] or the [facility]

## 2021-01-12 ENCOUNTER — TELEMEDICINE (OUTPATIENT)
Dept: PSYCHIATRY | Facility: CLINIC | Age: 61
End: 2021-01-12
Payer: MEDICARE

## 2021-01-12 DIAGNOSIS — R09.81 HEAD CONGESTION: ICD-10-CM

## 2021-01-12 DIAGNOSIS — R05.9 COUGH: Primary | ICD-10-CM

## 2021-01-12 DIAGNOSIS — F43.10 PTSD (POST-TRAUMATIC STRESS DISORDER): ICD-10-CM

## 2021-01-12 DIAGNOSIS — R05.9 COUGH: ICD-10-CM

## 2021-01-12 DIAGNOSIS — F43.23 ADJUSTMENT DISORDER WITH MIXED ANXIETY AND DEPRESSED MOOD: Primary | ICD-10-CM

## 2021-01-12 PROCEDURE — U0003 INFECTIOUS AGENT DETECTION BY NUCLEIC ACID (DNA OR RNA); SEVERE ACUTE RESPIRATORY SYNDROME CORONAVIRUS 2 (SARS-COV-2) (CORONAVIRUS DISEASE [COVID-19]), AMPLIFIED PROBE TECHNIQUE, MAKING USE OF HIGH THROUGHPUT TECHNOLOGIES AS DESCRIBED BY CMS-2020-01-R: HCPCS

## 2021-01-12 PROCEDURE — U0005 INFEC AGEN DETEC AMPLI PROBE: HCPCS

## 2021-01-12 PROCEDURE — 99213 OFFICE O/P EST LOW 20 MIN: CPT | Performed by: PSYCHIATRY & NEUROLOGY

## 2021-01-12 PROCEDURE — 90833 PSYTX W PT W E/M 30 MIN: CPT | Performed by: PSYCHIATRY & NEUROLOGY

## 2021-01-12 RX ORDER — OXYCODONE HYDROCHLORIDE AND ACETAMINOPHEN 5; 325 MG/1; MG/1
1 TABLET ORAL EVERY 4 HOURS PRN
COMMUNITY
End: 2021-10-03
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
## 2021-01-12 NOTE — PATIENT INSTRUCTIONS
Adjustment disorder with mixed anxiety and depressed mood    PTSD (post-traumatic stress disorder)      continue cymbalta 90mg daily ( 30mg at bedtime and 60mg in the morning)    Trazodone on hold for now due to adequate sleep with antihistamines for allergies        Follow up in 3 months
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
## 2021-01-13 ENCOUNTER — TELEPHONE (OUTPATIENT)
Dept: FAMILY MEDICINE CLINIC | Facility: CLINIC | Age: 61
End: 2021-01-13

## 2021-01-13 LAB — SARS-COV-2 RNA RESP QL NAA+PROBE: NEGATIVE

## 2021-01-13 NOTE — TELEPHONE ENCOUNTER
Batool Martinez called the office she saw her COVID-19 test results via my chart she is negative  Pt expressed the issue is she has had stuffy  Runny nose and cough since march  It appears to not be in her lungs  Pt woke up with coughs attacks Pt has had a headache since march as well due to sinus pressure  Pt is on her last dose of steroids is today  Pt's friend recommended  It is called immune booster by biusch[employer] advanced [address] elderberry 3 way immune boost with zinc and vitamin c powerful antioxidants and helps fight radicals made is Eileen non gmo  Pt was asking should she give this a try? Also pt has chloraseptic spray she used all the time when she was a kid  Can pt use that numbing spray for her throat so it stops the cough?     Pt's number is [phone]

## 2021-01-14 ENCOUNTER — TELEPHONE (OUTPATIENT)
Dept: FAMILY MEDICINE CLINIC | Facility: CLINIC | Age: 61
End: 2021-01-14

## 2021-01-14 NOTE — TELEPHONE ENCOUNTER
----- Message from Ela Villa DO sent at 3/54/7697 11:15 AM EST -----  In response to yesterday's telephone message yes she can implement the over-the-counter vitamins and Chloraseptic spray

## 2021-01-29 ENCOUNTER — TELEPHONE (OUTPATIENT)
Dept: ENDOCRINOLOGY | Facility: CLINIC | Age: 61
End: 2021-01-29

## 2021-01-29 NOTE — TELEPHONE ENCOUNTER
Pt called to let you know that she received her first Covid-19 vaccine, she will received her 2nd vaccine on 02/26/2021Covid-19 vaccine

## 2021-02-10 ENCOUNTER — OFFICE VISIT (OUTPATIENT)
Dept: FAMILY MEDICINE CLINIC | Facility: CLINIC | Age: 61
End: 2021-02-10
Payer: MEDICARE

## 2021-02-10 VITALS
SYSTOLIC BLOOD PRESSURE: 122 MMHG | OXYGEN SATURATION: 98 % | RESPIRATION RATE: 20 BRPM | DIASTOLIC BLOOD PRESSURE: 78 MMHG | BODY MASS INDEX: 38.86 KG/M2 | TEMPERATURE: 97.7 F | HEART RATE: 92 BPM | HEIGHT: 66 IN | WEIGHT: 241.8 LBS

## 2021-02-10 DIAGNOSIS — S39.012A ACUTE MYOFASCIAL STRAIN OF LUMBAR REGION, INITIAL ENCOUNTER: Primary | ICD-10-CM

## 2021-02-10 DIAGNOSIS — J01.10 ACUTE NON-RECURRENT FRONTAL SINUSITIS: ICD-10-CM

## 2021-02-10 DIAGNOSIS — S06.9X9S MILD NEUROCOGNITIVE DISORDER DUE TO TRAUMATIC BRAIN INJURY, SEQUELA (HCC): ICD-10-CM

## 2021-02-10 DIAGNOSIS — G31.84 MILD NEUROCOGNITIVE DISORDER DUE TO TRAUMATIC BRAIN INJURY, SEQUELA (HCC): ICD-10-CM

## 2021-02-10 PROCEDURE — 99213 OFFICE O/P EST LOW 20 MIN: CPT | Performed by: FAMILY MEDICINE

## 2021-02-10 RX ORDER — AZITHROMYCIN 250 MG/1
TABLET, FILM COATED ORAL
Qty: 6 TABLET | Refills: 0 | Status: SHIPPED | OUTPATIENT
Start: 2021-02-10 | End: 2021-02-15

## 2021-02-16 DIAGNOSIS — E78.00 PURE HYPERCHOLESTEROLEMIA: ICD-10-CM

## 2021-02-16 RX ORDER — ROSUVASTATIN CALCIUM 5 MG/1
TABLET, COATED ORAL
Qty: 90 TABLET | Refills: 3 | Status: SHIPPED | OUTPATIENT
Start: 2021-02-16 | End: 2022-02-10

## 2021-02-20 PROBLEM — E11.9 DIABETES MELLITUS (HCC): Status: ACTIVE | Noted: 2021-01-27

## 2021-02-20 NOTE — PROGRESS NOTES
Assessment/Plan:    Treat as lumbar strain  Patient has pain medication home  Can add Motrin every 6-8 hours  Recommend heat range-of-motion  Five-day Z-Micky for possible sinusitis  Keep her other routine visits  If not improving consider physical therapy  Diagnoses and all orders for this visit:    Acute myofascial strain of lumbar region, initial encounter    Acute non-recurrent frontal sinusitis  -     azithromycin (ZITHROMAX) 250 mg tablet; Take 2 tabs Day 1 then 1 tab daily Days 2 thru 5    Mild neurocognitive disorder due to traumatic brain injury, sequela ([facility] [address] )        1  Acute myofascial strain of lumbar region, initial encounter     2  Acute non-recurrent frontal sinusitis  azithromycin (ZITHROMAX) 250 mg tablet   3  Mild neurocognitive disorder due to traumatic brain injury, sequela ([facility])         Subjective:        Patient ID: Nilsa Damico is a 56 y o  female  Chief Complaint   Patient presents with    Back Pain     mid low 5-10/10 pain scale    Cough     continues         Patient with low back pain after shoveling recently  No significant radicular symptoms  The following portions of the patient's history were reviewed and updated as appropriate: past medical history, past surgical history and problem list       Review of Systems   Constitutional: Negative for fatigue  Eyes: Negative for visual disturbance  Respiratory: Negative for cough and shortness of breath  Cardiovascular: Negative for chest pain, palpitations and leg swelling  Gastrointestinal: Negative for abdominal pain  Musculoskeletal: Positive for back pain  Neurological: Negative for dizziness and headaches  Psychiatric/Behavioral: The patient is not nervous/anxious            Objective:  /78 (BP Location: Left arm, Patient Position: Sitting, Cuff Size: Standard)   Pulse 92   Temp 97 7 °F (36 5 °C) (Temporal)   Resp 20   Ht 5' 6" (1 676 m)   Wt 110 kg (241 lb 12 8 oz)   SpO2 98%   BMI 39 03 kg/m²        Physical Exam  Vitals signs and nursing note reviewed  Constitutional:       General: She is not in acute distress  HENT:      Head: Normocephalic  Eyes:      General: No scleral icterus  Pupils: Pupils are equal, round, and reactive to light  Cardiovascular:      Heart sounds: Normal heart sounds  No murmur  Pulmonary:      Breath sounds: Normal breath sounds  Musculoskeletal:      Comments: Mild decreased lumbar range of motion  Negative straight leg raising   Lymphadenopathy:      Cervical: No cervical adenopathy  Skin:     Coloration: Skin is not pale  Neurological:      Mental Status: She is alert and oriented to person, place, and time  Psychiatric:         Behavior: Behavior normal          Thought Content:  Thought content normal

## 2021-03-01 ENCOUNTER — APPOINTMENT (OUTPATIENT)
Dept: LAB | Facility: CLINIC | Age: 61
End: 2021-03-01
Payer: MEDICARE

## 2021-03-01 DIAGNOSIS — E06.3 HYPOTHYROIDISM DUE TO HASHIMOTO'S THYROIDITIS: ICD-10-CM

## 2021-03-01 DIAGNOSIS — E03.8 HYPOTHYROIDISM DUE TO HASHIMOTO'S THYROIDITIS: ICD-10-CM

## 2021-03-01 LAB
T4 FREE SERPL-MCNC: 1.18 NG/DL (ref 0.76–1.46)
TSH SERPL DL<=0.05 MIU/L-ACNC: 4.18 UIU/ML (ref 0.36–3.74)

## 2021-03-01 PROCEDURE — 36415 COLL VENOUS BLD VENIPUNCTURE: CPT

## 2021-03-01 PROCEDURE — 84443 ASSAY THYROID STIM HORMONE: CPT

## 2021-03-01 PROCEDURE — 84439 ASSAY OF FREE THYROXINE: CPT

## 2021-03-03 ENCOUNTER — TELEPHONE (OUTPATIENT)
Dept: ENDOCRINOLOGY | Facility: CLINIC | Age: 61
End: 2021-03-03

## 2021-03-03 NOTE — TELEPHONE ENCOUNTER
Spoke to pt and she is aware of her lab results and that you will review them with her at her upcoming visit

## 2021-03-05 ENCOUNTER — TELEPHONE (OUTPATIENT)
Dept: FAMILY MEDICINE CLINIC | Facility: CLINIC | Age: 61
End: 2021-03-05

## 2021-03-05 NOTE — TELEPHONE ENCOUNTER
I sent patient has some information through "my chart "it looks like she does have availability to view  She could see anyone of the providers that might be available for follow-up  Concussion   WHAT YOU NEED TO KNOW:   A concussion is a mild brain injury  It is usually caused by a bump or blow to the head from a fall, a motor vehicle crash, or a sports injury  Sometimes being shaken forcefully may cause a concussion  DISCHARGE INSTRUCTIONS:   Have someone call 911 for any of the following:   · Someone tries to wake you and cannot do so  · You have a seizure, increasing confusion, or a change in personality  · Your speech becomes slurred, or you have new vision problems  Return to the emergency department if:   · You have sudden and new vision problems  · You have a severe headache that does not go away  · You have arm or leg weakness, numbness, or new problems with coordination  · You have blood or clear fluid coming out of the ears or nose  Contact your healthcare provider if:   · You have nausea or are vomiting  · You feel more sleepy than usual     · Your symptoms get worse  · Your symptoms last longer than 6 weeks after the injury  · You have questions or concerns about your condition or care  Medicines: You may need any of the following:  · Acetaminophen  decreases pain and fever  It is available without a doctor's order  Ask how much to take and how often to take it  Follow directions  Read the labels of all other medicines you are using to see if they also contain acetaminophen, or ask your doctor or pharmacist  Acetaminophen can cause liver damage if not taken correctly  Do not use more than 4 grams (4,000 milligrams) total of acetaminophen in one day  · NSAIDs  help decrease swelling and pain or fever  This medicine is available with or without a doctor's order  NSAIDs can cause stomach bleeding or kidney problems in certain people   If you take blood thinner medicine, always ask your healthcare provider if NSAIDs are safe for you  Always read the medicine label and follow directions  · Take your medicine as directed  Contact your healthcare provider if you think your medicine is not helping or if you have side effects  Tell him or her if you are allergic to any medicine  Keep a list of the medicines, vitamins, and herbs you take  Include the amounts, and when and why you take them  Bring the list or the pill bottles to follow-up visits  Carry your medicine list with you in case of an emergency  Self-care:  Concussion symptoms usually go away within about 10 days, but they may last longer  The following may be recommended to manage your symptoms:  · Rest from physical and mental activities as directed  Mental activities are those that require thinking, concentration, and attention  You will need to rest until your symptoms are gone  Rest will allow you to recover from your concussion  Ask your healthcare provider when you can return to work and other daily activities  · Have someone stay with you for the first 24 hours after your injury  Your healthcare provider should be contacted if your symptoms get worse, or you develop new symptoms  · Do not participate in sports and physical activities until your healthcare provider says it is okay  They could make your symptoms worse or lead to another concussion  Your healthcare provider will tell you when it is okay for you to return to sports or physical activities  Ask for more information about sports concussions

## 2021-03-05 NOTE — TELEPHONE ENCOUNTER
Hugo Holder called the office she fell at home on Wednesday 03/03/2021  Pt asked if today was Thursday or Friday  Pt aware today is Friday pt stated ok  She was caring for her ingrown toe nails pt lost her balance  and she fell back into her bath tub  Pt has a cut on the outside of her left leg  It is closed not bleeding anymore  But it was little and deep  Pt scratched back on right side low back  Bleeding stopped that day for her leg and back  Pt did bandage herself up  Last night before she went to bed she changedd her bandage and it had some bleeding  Pt hit upper back around bra line  Pt hit back of head which still hurts  Pt has no concussion symptoms except once in a while she feels off  Which is new since pt hit her head  It does not happen continuously  Pt still has pain in her back of her head and back  Pt did not lose consciousness  Pt has no vision issues  Pt has ringing in her ears  Pt does have TMJ  Pt is not really dizzy but feels unsteady  Does not happen that often happens once or twice a day  Pt thinks she fell due to a low thyroid or the muscle weakness she has which is normal per pt  Pt would like to see Tamera Maldonado for a follow up I advise pt Dr Gaitan has little to no availability  for next week  I would  have to speak with Tamera Maldonado when he comes in on Monday  regarding when we may bring her in  Pt expressed understanding  In the man time does she need to look out for anything?    Pt's number is   [phone]

## 2021-03-08 ENCOUNTER — OFFICE VISIT (OUTPATIENT)
Dept: FAMILY MEDICINE CLINIC | Facility: CLINIC | Age: 61
End: 2021-03-08
Payer: MEDICARE

## 2021-03-08 VITALS
TEMPERATURE: 97.6 F | OXYGEN SATURATION: 96 % | WEIGHT: 240.8 LBS | HEIGHT: 66 IN | DIASTOLIC BLOOD PRESSURE: 80 MMHG | HEART RATE: 90 BPM | BODY MASS INDEX: 38.7 KG/M2 | SYSTOLIC BLOOD PRESSURE: 124 MMHG
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
## 2021-03-08 DIAGNOSIS — R06.00 DYSPNEA ON EXERTION: ICD-10-CM

## 2021-03-08 DIAGNOSIS — S16.1XXA STRAIN OF NECK MUSCLE, INITIAL ENCOUNTER: ICD-10-CM

## 2021-03-08 DIAGNOSIS — S00.83XA CONTUSION OF OTHER PART OF HEAD, INITIAL ENCOUNTER: Primary | ICD-10-CM

## 2021-03-08 PROCEDURE — 99214 OFFICE O/P EST MOD 30 MIN: CPT | Performed by: FAMILY MEDICINE

## 2021-03-14 NOTE — PROGRESS NOTES
Assessment/Plan:     at this point I think the patient overall is very seven  No serious signs of concussion  Examination of the head does not show any swollen areas  She did not lose consciousness  She is prone to falls due to her neuromuscular disorder  At this point I would do simple observation and rest   If she develops any signs of concussion she should notify us  Recommend heat Tylenol only as needed for neck stiffness in general myalgia  Has baclofen already  Patient will call back if there is any new symptoms  Diagnoses and all orders for this visit:    Contusion of other part of head, initial encounter    Fall, initial encounter    Dyspnea on exertion    Strain of neck muscle, initial encounter        1  Contusion of other part of head, initial encounter     2  Fall, initial encounter     3  Dyspnea on exertion     4  Strain of neck muscle, initial encounter         Subjective:        Patient ID: Richi Hill is a 56 y o  female  Chief Complaint   Patient presents with    Fall     patient fell 03/03/21, patient has back and neck pain 8/10    Shortness of Breath     patient has shortness of breath since fall          Patient states that while home on March 3rd she was in her bathroom and while reaching for something lost her balance and ended up in the actual bathtub hitting the back of her head  No loss of consciousness  Denies nausea vomiting or any kind of increased fatigue or dizziness  Here to be checked  The following portions of the patient's history were reviewed and updated as appropriate: past medical history, past surgical history and problem list       Review of Systems   Constitutional: Negative for fatigue  Eyes: Negative for visual disturbance  Respiratory: Negative for cough and shortness of breath  Cardiovascular: Negative for chest pain, palpitations and leg swelling  Gastrointestinal: Negative for abdominal pain     Musculoskeletal: Positive for myalgias, neck pain and neck stiffness  Neurological: Negative for dizziness, syncope, light-headedness and headaches  Psychiatric/Behavioral: The patient is not nervous/anxious  Objective:  /80 (BP Location: Left arm, Patient Position: Sitting, Cuff Size: Adult)   Pulse 90   Temp 97 6 °F (36 4 °C) (Temporal)   Ht 5' 6" (1 676 m)   Wt 109 kg (240 lb 12 8 oz)   SpO2 96%   BMI 38 87 kg/m²        Physical Exam  Vitals signs and nursing note reviewed  Constitutional:       General: She is not in acute distress  Appearance: Normal appearance  She is obese  She is not ill-appearing  HENT:      Head: Normocephalic and atraumatic  Right Ear: Tympanic membrane normal       Left Ear: Tympanic membrane normal    Eyes:      General: No scleral icterus  Pupils: Pupils are equal, round, and reactive to light  Cardiovascular:      Rate and Rhythm: Normal rate  Heart sounds: Normal heart sounds  No murmur  Pulmonary:      Breath sounds: Normal breath sounds  Musculoskeletal:      Comments: Stable range of motion the cervical spine although slightly decreased due to subjective stiffness  Lymphadenopathy:      Cervical: No cervical adenopathy  Skin:     Coloration: Skin is not pale  Neurological:      General: No focal deficit present  Mental Status: She is alert and oriented to person, place, and time  Psychiatric:         Mood and Affect: Mood normal          Behavior: Behavior normal          Thought Content:  Thought content normal          Judgment: Judgment normal

## 2021-03-19 DIAGNOSIS — E06.3 HYPOTHYROIDISM DUE TO HASHIMOTO'S THYROIDITIS: ICD-10-CM

## 2021-03-19 DIAGNOSIS — E03.8 HYPOTHYROIDISM DUE TO HASHIMOTO'S THYROIDITIS: ICD-10-CM

## 2021-03-19 RX ORDER — LEVOTHYROXINE SODIUM 0.2 MG/1
TABLET ORAL
Qty: 30 TABLET | Refills: 3 | Status: SHIPPED | OUTPATIENT
Start: 2021-03-19 | End: 2021-07-25

## 2021-03-25 ENCOUNTER — TELEPHONE (OUTPATIENT)
Dept: FAMILY MEDICINE CLINIC | Facility: CLINIC | Age: 61
End: 2021-03-25

## 2021-03-25 NOTE — TELEPHONE ENCOUNTER
Momo Pal called the office in regards to she is still having back pain from the fall  It is form the wait up  The pain gets so bad pt has thrown up bile from the pain  Pt then has to take a pain pill  Pt also is having breathing issues since the fall  Pt is getting a stabbing pain and inch under her breast where the nipple would be  When the pain occurs it is like a 10 on a pain scale  of 1-10  Clint Simple Pt has this on both side it started a few days ago  Pt has the stabbing pain off and on  When it occurs pt's face instantly gets hot and starts sweating  It takes her breath away  No active chest pain  When pt over ddi her shoulder and had pain in the upper part of her right shoulder like under neck  This  was yesterday  Pt was hanging pictures  That was the arm pt used to hammer  No pain in chest radiating in the body  Other than the sharp stabbing pain under her breasts  Pt does not know if it is form her broken ribs  Pt has trouble  breathing while standing  While she was sitting and talking with me pt was breathing ok  Pt said  it is raining and the first time pt had trouble  breathing it was raining  Pt is looking for recommendations  Pt was asking would you want to see her or should she get a chest x ray  Pt would prefer to go tomorrow  Pt advised to please call if symptoms worsen      Pt's number is [phone]

## 2021-03-25 NOTE — TELEPHONE ENCOUNTER
If she needs to be seen is likely going to be with [facility] tomorrow  I do agree with getting x-rays    See if she would like to do the x-rays 1st before being seen or be seen 1st

## 2021-03-25 NOTE — TELEPHONE ENCOUNTER
I did call Mateo Loco, I left her a message asking if she would like an appt  or xrays first  I told her to please call the office back

## 2021-03-26 ENCOUNTER — TELEPHONE (OUTPATIENT)
Dept: FAMILY MEDICINE CLINIC | Facility: CLINIC | Age: 61
End: 2021-03-26

## 2021-03-26 ENCOUNTER — APPOINTMENT (OUTPATIENT)
Dept: RADIOLOGY | Facility: MEDICAL CENTER | Age: 61
End: 2021-03-26
Payer: MEDICARE
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
## 2021-03-26 DIAGNOSIS — R07.89 OTHER CHEST PAIN: ICD-10-CM

## 2021-03-26 PROCEDURE — 71046 X-RAY EXAM CHEST 2 VIEWS: CPT

## 2021-03-29 ENCOUNTER — TRANSCRIBE ORDERS (OUTPATIENT)
Dept: ADMINISTRATIVE | Facility: HOSPITAL | Age: 61
End: 2021-03-29

## 2021-03-29 ENCOUNTER — TELEPHONE (OUTPATIENT)
Dept: FAMILY MEDICINE CLINIC | Facility: CLINIC | Age: 61
End: 2021-03-29

## 2021-03-29 DIAGNOSIS — D35.2 PITUITARY ADENOMA (HCC): Primary | ICD-10-CM

## 2021-03-29 NOTE — TELEPHONE ENCOUNTER
Patient wants to know if she should make an appointment with you or go to physical therapy  Saw Dr Shyam Weaver today so you will get her notes soon  She ordered a MRI of her brain      Please advise patient at [phone]

## 2021-03-29 NOTE — TELEPHONE ENCOUNTER
If it is regarding her recent fall and having issues I would definitely do therapy  Did her doctor order the therapy or do I need to    If me find out specifically what areas were going to do therapy on so I can place that in the order

## 2021-03-29 NOTE — TELEPHONE ENCOUNTER
This is related to the fall, it is for strength, endurance, and balance  Please place the order, thanks  She would like to use  Physical therapy with Nicholas Caputo at [facility]
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
## 2021-03-30 DIAGNOSIS — R26.89 IMBALANCE: ICD-10-CM

## 2021-03-30 DIAGNOSIS — R29.898 LEG WEAKNESS, BILATERAL: ICD-10-CM

## 2021-03-30 NOTE — TELEPHONE ENCOUNTER
Order placed for physical therapy all she has to do is call [address] [place] [address] in [place]

## 2021-04-06 ENCOUNTER — HOSPITAL ENCOUNTER (OUTPATIENT)
Dept: MRI IMAGING | Facility: HOSPITAL | Age: 61
Discharge: HOME | End: 2021-04-06
Payer: MEDICARE

## 2021-04-06 DIAGNOSIS — D35.2 PITUITARY ADENOMA (HCC): ICD-10-CM

## 2021-04-06 PROCEDURE — A9585 GADOBUTROL INJECTION: HCPCS | Performed by: RADIOLOGY

## 2021-04-06 PROCEDURE — 70553 MRI BRAIN STEM W/O & W/DYE: CPT

## 2021-04-06 RX ADMIN — GADOBUTROL 11 ML: 604.72 INJECTION INTRAVENOUS at 12:44

## 2021-04-07 ENCOUNTER — EVALUATION (OUTPATIENT)
Dept: PHYSICAL THERAPY | Facility: REHABILITATION | Age: 61
End: 2021-04-07
Payer: MEDICARE

## 2021-04-07 DIAGNOSIS — R26.89 IMBALANCE: ICD-10-CM
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
## 2021-04-07 DIAGNOSIS — R29.898 LEG WEAKNESS, BILATERAL: ICD-10-CM

## 2021-04-07 PROCEDURE — 97110 THERAPEUTIC EXERCISES: CPT | Performed by: PHYSICAL THERAPIST

## 2021-04-07 NOTE — PROGRESS NOTES
PHYSICAL THERAPY EVALUATION     Name: Eran Gonsalez  Date: 21  : 1960  Referring Provider: Chang Power DO  AUTHORIZATION:   Insurance: Payor: MEDICARE / Plan: MEDICARE A AND B / Product Type: Medicare A & B Fee for Service /     SUBJECTIVE:  HPI: Eran Gonsalez is a 56 y o  female referred to outpatient physical therapy for the following diagnosis   Encounter Diagnoses   Name Primary?  Fall, subsequent encounter Yes    Imbalance     Leg weakness, bilateral        Notes fall 3/03/21, was placing one foot on toilet to cut toes  Struck foot on table, back of legs, back of head and ribs  Since that time has been getting headaches  Has MRI scheduled, not for this issue  Happen about the back of the head where she hit her head, and down the back of the head  Some neck pain too, pain about the lateral lumbar region and about mid thoracic area, pain about the thoracic area with stretching trunk to the side  Headache occurs when overtired, or when using eyes too much for stringing beads or making designs with webbing  Tried magnifier and still has trouble with this  Takes ibuprofen for headaches, back and legs, also takes oxycodone intermittently for trunk and leg pain  Now has trouble getting to sleep, new since the fall  Continues with chronic pain about the back of the lower legs, also the back of the right thigh  Has to give eyes a break with visual tasks, new since fall  Initially after the fall, balance was affected, now mostly better  Walking has been hurting her back, does cardio sitting on Roku, normally would do standing yoga and modify some poses  Continues with some dancing stretches as normal, had to stop due to pain with breathing originally for a couple weeks  Does well with mobility upon going to grocery store, then fatigues and has pain by the time she's done        Patient goals: walking at a normal pace without cane (not using prior to house), grocery shop without significant pain when completed  OBJECTIVE:    Precautions - Hashimoto's, autoimmune disease    Well healing scar to right anterior lateral distal leg, about 1 inch in length, no periwound redness  Mild swelling to distal legs, not feet or ankles, no pitting  Normal conjugate gaze and eye ROM in all planes, mildly saccadic horizontal smooth pursuit  Slowed horizontal saccades with catchup saccade, same veritcally  No symptoms  Normal convergence  /89, heart rate 86 bpm    Mobility Measures 9/09/2020 11/04/2020 4/07/21   Assistive device used? Cane cane cane   5 Time Sit to Stand  (17" chair, arms across chest) Unable Unable from 17" surface    17 8 seconds from 19" surface, hands out front    Unable from 19" surface without strong use of hands  3 Meter Timed  Up & Go 12 0 seconds 11 9 seconds 11 3 seconds   Walking speed 0 73 meters/second Not tested    Functional Gait Assessment (see below) 21/30 22/30 Notes    6 Minute Walk Test (100 foot circular course)   Not completed 750 feet    Walks with decreased excursion of the right knee   2 minute walk test 240 feet     Patient-Reported Outcome Measure: Activities-Specific Balance Confidence Scale (ABC Scale) Not completed Not completed Not completed     Walks with decreased knee flexion during loading, increased double leg stance time  Cane sequenced with the right leg  Adequate toe clearance  75 degrees right neck rotation, 60 degrees left  Normal sharp jerardo, normal transverse ligament, no symptoms with held end-range neck rotation  Moderately tender to mild palpation about mid thoracic spine  90 degrees passive right knee flexion, self-stretch from seated position  ASSESSMENT:  Vinicius Hamilton sustained a fall in early March that resulted in upper body, lower body, and trunk pain, limiting ability to return to prior level of physical activity  She additionally reports symptoms consistent with concussion    Recommend return to walking with cane as tolerated, will address strength, endurance and range of motion impairments that make difficult full return to prior level of physical activity and community mobility  Prior to that fall, Loretta Richey was managing upper and lower body joint pain through movement and medication, we would like to return to that level of function  SHORT-TERM GOALS: 1 month(s)  1  Loretta Richey walks at least 10 minutes consecutively  2  Loretta Richey increases passive right knee flexion to 100 degrees to allow reciprocal stair ambulation  3  Loretta Richey reports 50% improvement in ability to walk grocery store distances without severe fatigue  LONG-TERM GOALS: 2 month(s)  1  Patient reports at least 20 total minutes per day of overground walking for exercise  2  Patient independently manages home exercise program   3  Patient reports return to baseline with reading and small detail work  PLAN OF CARE:  Patient will benefit from physical therapy 1-2 times per week for 1 month  Neuromuscular re-education, therapeutic exercises, and therapeutic activities as outlined in grids      [facility], PT  4/7/2021

## 2021-04-08 PROCEDURE — 97162 PT EVAL MOD COMPLEX 30 MIN: CPT | Performed by: PHYSICAL THERAPIST

## 2021-04-09 ENCOUNTER — OFFICE VISIT (OUTPATIENT)
Dept: ENDOCRINOLOGY | Facility: CLINIC | Age: 61
End: 2021-04-09
Payer: MEDICARE

## 2021-04-09 VITALS
HEART RATE: 94 BPM | SYSTOLIC BLOOD PRESSURE: 140 MMHG | HEIGHT: 66 IN | BODY MASS INDEX: 40.02 KG/M2 | DIASTOLIC BLOOD PRESSURE: 80 MMHG | WEIGHT: 249 LBS

## 2021-04-09 DIAGNOSIS — D35.2 PITUITARY MICROADENOMA (HCC): ICD-10-CM

## 2021-04-09 DIAGNOSIS — E03.4 HYPOTHYROIDISM DUE TO ACQUIRED ATROPHY OF THYROID: ICD-10-CM

## 2021-04-09 DIAGNOSIS — E11.9 TYPE 2 DIABETES MELLITUS WITHOUT COMPLICATION, WITHOUT LONG-TERM CURRENT USE OF INSULIN (HCC): Primary | ICD-10-CM

## 2021-04-09 DIAGNOSIS — E55.9 VITAMIN D DEFICIENCY: ICD-10-CM

## 2021-04-09 DIAGNOSIS — D35.2 MICROPROLACTINOMA (HCC): ICD-10-CM

## 2021-04-09 DIAGNOSIS — D44.9: ICD-10-CM

## 2021-04-09 LAB — SL AMB POCT HEMOGLOBIN AIC: 8.7 (ref ?–6.5)

## 2021-04-09 PROCEDURE — 83036 HEMOGLOBIN GLYCOSYLATED A1C: CPT | Performed by: INTERNAL MEDICINE

## 2021-04-09 PROCEDURE — 99214 OFFICE O/P EST MOD 30 MIN: CPT | Performed by: INTERNAL MEDICINE
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
## 2021-04-12 ENCOUNTER — TELEPHONE (OUTPATIENT)
Dept: FAMILY MEDICINE CLINIC | Facility: CLINIC | Age: 61
End: 2021-04-12

## 2021-04-12 NOTE — TELEPHONE ENCOUNTER
Patient called and wanted you to take a look at her MRI of the brain and it did show something, she stated it is were she had hit her head

## 2021-04-12 NOTE — TELEPHONE ENCOUNTER
The MRI did show an area of abnormality in the right side of the head  If this is where she hit her head this could be showing that  They are recommending a possible bone scan  Since I am not the ordering Dr Sandy Fish the MRI I would have her check with the ordering doctor    If that Dr  Is not reachable then I may order bone scan

## 2021-04-12 NOTE — TELEPHONE ENCOUNTER
Patients neurologist called today and stated that she wanted to touch base with you about Dorethea Marine  Please reach out to her at [phone]  She is also going to fax over information to our office regarding her  Thank you

## 2021-04-13 ENCOUNTER — TELEPHONE (OUTPATIENT)
Dept: FAMILY MEDICINE CLINIC | Facility: CLINIC | Age: 61
End: 2021-04-13

## 2021-04-13 ENCOUNTER — OFFICE VISIT (OUTPATIENT)
Dept: PSYCHIATRY | Facility: CLINIC | Age: 61
End: 2021-04-13
Payer: MEDICARE

## 2021-04-13 DIAGNOSIS — G70.9 NEUROMUSCULAR DISORDER (HCC): ICD-10-CM

## 2021-04-13 DIAGNOSIS — F43.23 ADJUSTMENT DISORDER WITH MIXED ANXIETY AND DEPRESSED MOOD: Primary | ICD-10-CM

## 2021-04-13 DIAGNOSIS — F43.10 PTSD (POST-TRAUMATIC STRESS DISORDER): ICD-10-CM

## 2021-04-13 PROCEDURE — 99214 OFFICE O/P EST MOD 30 MIN: CPT | Performed by: PSYCHIATRY & NEUROLOGY

## 2021-04-13 PROCEDURE — 90833 PSYTX W PT W E/M 30 MIN: CPT | Performed by: PSYCHIATRY & NEUROLOGY

## 2021-04-13 NOTE — TELEPHONE ENCOUNTER
Please double check with the patient as I spoke to her specialist at [facility] who said she was going to order it

## 2021-04-13 NOTE — PATIENT INSTRUCTIONS
Adjustment disorder with mixed anxiety and depressed mood    PTSD (post-traumatic stress disorder)    Neuromuscular disorder (HCC)      continue cymbalta 90mg daily    Follow up in 6 months

## 2021-04-13 NOTE — PSYCH
Subjective:     Patient ID: Gabo Huang is a 56 y o  female with adjustment disorder with mixed depression and anxiety presenting for a follow up visit  She is on cymbalta and trazodone PRN for sleep  HPI ROS Appetite Changes and Sleep: She stated that on March 3rd she had a fall backwards into the tub  She hurt her back, head, and legs  She broke some ribs and had some cuts  She denied LOC, but believes she had a concussion  She had an MRI of the brain last week to assess her Pituitary adenoma and found to have a 17mm bony process change that was not there before and for which there are numerous differentials  She is going to do a follow up bone scan  After the fall she was unable to do anything for 2 weeks (for the ribs and concussion)  She was bored  She still has pain in her back and her head where she hit her head  Her mood has been okay until she had a fall, but otherwise not bad  She has a cat that keeps her company and she loves having around  Sleep has been impacted by pain  When she is able to fall asleep, she will sleep for 6 hours  Anxiety is higher due to isolation  It was better on [facility] when she was able to see her family  She denied SI/HI  Review Of Systems:     Mood Anxiety and Depression    Behavior Normal    Thought Content Disturbing Thoughts, Feelings   General Emotional Problems, Sleep Disturbances and Decreased Functioning   Personality Normal   Other Psych Symptoms no flashbacks   Constitutional As Noted in HPI   ENT Negative   Cardiovascular Negative   Respiratory Shortness of Breath   Gastrointestinal Negative   Genitourinary Negative   Musculoskeletal Joint Stiffness and Limb Pain   Integumentary Skin Rash   Neurological As Noted in HPI, Numbness and neck pain   Endocrine Normal    Other Symptoms Normal          Laboratory Results:    Results for Hossein Ramirez (MRN [id number]) as of 4/13/2021 11:27   Ref   Range 3/1/2021 14:34 3/26/2021 15:51 4/9/2021 11:04   Hemoglobin A1C Latest Ref Range: 6 5    8 7 (A)   TSH 3RD GENERATON Latest Ref Range: 0 358 - 3 740 uIU/mL 4 180 (H)     Free T4 Latest Ref Range: 0 76 - 1 46 ng/dL 1 18     XR CHEST PA & LATERAL Unknown  Rpt      Study Result    MRI BRAIN AND SELLA  WITH AND WITHOUT CONTRAST     INDICATION:  D35 2: Benign neoplasm of pituitary gland     COMPARISON: MR 10/14/2017     TECHNIQUE:  Brain: Axial diffusion-weighted imaging  Axial FLAIR and axial T2  Axial gradient  Axial T1 postcontrast Axial bravo postcontrast   Sella: Sagittal and coronal T1  Coronal T2  Sagittal and coronal T1 postcontrast with fat suppression  Coronal dynamic enhancement      Targeted images of the sella were performed requiring additional time at acquisition and interpretation of approximately 25%        IV Contrast:  11 mL of Gadobutrol injection (SINGLE-DOSE)      IMAGE QUALITY:  Diagnostic      FINDINGS:     BRAIN PARENCHYMA:  There is no discrete mass, mass effect or midline shift  No acute abnormal white matter signal identified  Minor degree of what likely represents chronic small vessel disease Brainstem and cerebellum demonstrate normal signal  There   is no intracranial hemorrhage  There is no evidence of acute infarction and diffusion imaging is unremarkable  Postcontrast imaging is normal      VENTRICLES:  Normal      SELLA AND PITUITARY GLAND:  The gland is homogeneous and normal in size  The pituitary stalk is midline  Normal parasellar and suprasellar structures  Discrete mass is not evident within the pituitary gland  Previously lesion was detected only with   the dynamic imaging  Even, on dynamic imaging today, a discrete lesion is not evident      ORBITS:  Normal      PARANASAL SINUSES:  Normal      VASCULATURE:  Evaluation of the major intracranial vasculature demonstrates appropriate flow voids      CALVARIUM AND SKULL BASE:  There is Dax Amaral infiltrative process within the right parietal bone, 401:18, 1401:151    This enhancing focus has violated the inner table  This was not evident on prior study  Etiologic considerations include possible   intraosseous meningioma, myeloma, metastatic disease, atypical hemangioma      EXTRACRANIAL SOFT TISSUES:  Normal      IMPRESSION:     No acute intracranial disease      No convincing evidence for pituitary pathology      New enhancing 17 mm right parietal bone process, differential is provided above  Metastatic disease is not excluded  Bone scan may be helpful  Substance Abuse History:  Social History     Substance and Sexual Activity   Drug Use No       Family Psychiatric History:   Family History   Problem Relation Age of Onset    Breast cancer Mother 28    Aneurysm Father         CEREBRAL ARTERY ANEURYSM     Alcohol abuse Maternal Aunt     Parkinsonism Family     BRCA1 Negative Sister     No Known Problems Maternal Grandmother     No Known Problems Maternal Grandfather     No Known Problems Paternal Grandmother     No Known Problems Paternal Grandfather        The following portions of the patient's history were reviewed and updated as appropriate: allergies, current medications, past family history, past medical history, past social history, past surgical history and problem list     Social History     Socioeconomic History    Marital status: Single     Spouse name: Not on file    Number of children: 0    Years of education: 12    Highest education level: Not on file   Occupational History    Occupation: disabled   Social Needs    Financial resource strain: Hard    Food insecurity     Worry: Never true     Inability: Never true    Transportation needs     Medical: No     Non-medical: No   Tobacco Use    Smoking status: Never Smoker    Smokeless tobacco: Never Used   Substance and Sexual Activity    Alcohol use: Yes     Frequency: Monthly or less     Drinks per session: 1 or 2     Binge frequency: Never     Comment: 1-2 TIMES PER YEAR PER ALLSCRIPTS     Drug use:  No  Sexual activity: Not Currently   Lifestyle    Physical activity     Days per week: Not on file     Minutes per session: Not on file    Stress: Rather much   Relationships    Social connections     Talks on phone: Once a week     Gets together: Once a week     Attends Restoration service: 1 to 4 times per year     Active member of club or organization: No     Attends meetings of clubs or organizations: Never     Relationship status: Never     Intimate partner violence     Fear of current or ex partner: No     Emotionally abused: No     Physically abused: No     Forced sexual activity: No   Other Topics Concern    Not on file   Social History Narrative    Not on file     Social History     Social History Narrative    Not on file       Objective:       Mental status:  Appearance calm and cooperative , adequate hygiene and grooming and good eye contact    Mood euthymic   Affect affect was constricted   Speech a normal rate and shaky voice   Thought Processes circumstantial   Hallucinations no hallucinations present    Thought Content no delusions   Abnormal Thoughts no suicidal thoughts  and no homicidal thoughts    Orientation  oriented to person and place and time   Remote Memory short term memory intact and long term memory intact   Attention Span concentration impaired   Intellect Appears to be of Average Intelligence   Insight Limited insight   Judgement judgment was limited   Muscle Strength Abnormal gait and Decreased muscle strength uses a cane   Language no difficulty naming common objects, no difficulty repeating a phrase  and no difficulty writing a sentence    Fund of Knowledge displays adequate knowledge of current events, adequate fund of knowledge regarding past history and adequate fund of knowledge regarding vocabulary    Pain moderate to severe   Pain Scale 6/10 in her back, head, right shoulder, legs       Assessment/Plan:       Diagnoses and all orders for this visit:    Adjustment disorder with mixed anxiety and depressed mood    PTSD (post-traumatic stress disorder)    Neuromuscular disorder (HCC)      continue cymbalta 90mg daily    Follow up in 6 months    Treatment Recommendations- Risks Benefits      Immediate Medical/Psychiatric/Psychotherapy Treatments and Any Precautions: patient has been doing well  Had a set back last month with a fall, but getting better  Has a new lesion in her brain that is a bony process that is being further evaluated  No medication changes  Risks, Benefits And Possible Side Effects Of Medications:  PSYCH RISK, BENEFITS AND POSSIBLE SIDE EFFECTS discussed    Controlled Medication Discussion: Discussed with patient Black Box warning on concurrent use of benzodiazepines and opioid medications including sedation, respiratory depression, coma and death  Patient understands the risk of treatment with benzodiazepines in addition to opioids and wants to continue taking those medications  , Discussed with patient the risks of sedation, respiratory depression, impairment of ability to drive and potential for abuse and addiction related to treatment with benzodiazepine medications  The patient understands risk of treatment with benzodiazepine medications, agrees to not drive if feels impaired and agrees to take medications as prescribed  and The patient has been filling controlled prescriptions on time as prescribed to [facility] program       Therapy provided: anxiety about her upcoming bone scan and an unknown diagnosis     Time: 20 mins    This note was not shared with the patient due to reasonable likelihood of causing patient harm

## 2021-04-13 NOTE — TELEPHONE ENCOUNTER
Patient called asking if you would order a Bone Scan for the patient due to it getting done faster  Please advise patient at [phone]

## 2021-04-13 NOTE — BH TREATMENT PLAN
TREATMENT PLAN (Medication Management Only)        [facility]    Name and Date of Birth:  Ricarda Roe 56 y o  1960  Date of Treatment Plan: April 13, 2021  Diagnosis/Diagnoses:    1  Adjustment disorder with mixed anxiety and depressed mood    2  PTSD (post-traumatic stress disorder)    3  Neuromuscular disorder [facility])      Strengths/Personal Resources for Self-Care: "I am creative  I can figure out puzzles and patterns easily  I am good with animals  I am a good problem solver ", taking medications as prescribed  Area/Areas of need (in own words): "physical strength and endurance  "  1  Long Term Goal: to get better in physical therapy  to get the bony process dx taken care of     Target Date:6 months - 10/13/2021  Person/Persons responsible for completion of goal: Tess Rogers, Dr Lizzeth Marroquin  2  Short Term Objective (s) - How will we reach this goal?:   A  Provider new recommended medication/dosage changes and/or continue medication(s): continue all other medications  B  N/A   C  N/A  Target Date:6 months - 10/13/2021  Person/Persons Responsible for Completion of Goal: Dr Lizzeth Delvalle  Progress Towards Goals: continuing treatment  Treatment Modality: medication management every 6 months  Review due 180 days from date of this plan: 6 months - 10/13/2021  Expected length of service: maintenance  My Physician/PA/NP and I have developed this plan together and I agree to work on the goals and objectives  I understand the treatment goals that were developed for my treatment      Treatment Plan done but not signed at time of office visit due to:  Plan reviewed by phone or in person  and verbal consent given due to Bonita social rhoda

## 2021-04-14 ENCOUNTER — OFFICE VISIT (OUTPATIENT)
Dept: PHYSICAL THERAPY | Facility: REHABILITATION | Age: 61
End: 2021-04-14
Payer: MEDICARE
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
## 2021-04-14 DIAGNOSIS — R26.89 IMBALANCE: ICD-10-CM

## 2021-04-14 DIAGNOSIS — R29.898 LEG WEAKNESS, BILATERAL: ICD-10-CM

## 2021-04-14 PROCEDURE — 97110 THERAPEUTIC EXERCISES: CPT | Performed by: PHYSICAL THERAPIST

## 2021-04-14 PROCEDURE — 97112 NEUROMUSCULAR REEDUCATION: CPT | Performed by: PHYSICAL THERAPIST

## 2021-04-14 NOTE — PROGRESS NOTES
Daily Note     Today's date: 2021  Patient name: Brennen Lema  : 1960  MRN: [id number]  Referring provider: Asha Haddad DO  Dx:   Encounter Diagnosis     ICD-10-CM    1  Fall, subsequent encounter  W19  XXXD    2  Imbalance  R26 89    3  Leg weakness, bilateral  R29 898                   Subjective: Patient reports she continues to have slight pain in the Right posterior portion of her head  She notes her legs are fairly tight today, especially on the right, behind the knee  Objective: See treatment diary below    Precautions - Hashimoto's, autoimmune disease       Specialty Treatment Diary  21     Home exercise program      Walking/endurance (therapeutic exercise) Overground 400ft w/ cane    Solostep 200ft w/o cane, fast walking/big steps    Side stepping, high steps, 25ft x4 laps    Backwards walking, 25ft x4 laps    Resisted walking, 25ft x6 laps    Solostep, level 1 5, 5 mins     Static and dynamic balance (neuromuscular re-education) Step ups, 4" step     Strengthening (therapeutic exercise) Sit to stands on raised table, 2 mins x2, no hand assist     Stretching (therapeutic exercise) Manual R knee flex/ext stretching in supine, 4 mins    Standing wall call stretch, 30 sec x3                  Assessment: Patient tolerated treatment well, and was able to perform majority of exercises in standing with minimal fatigue and limited R leg tightness  Performed manual stretching of patient's Right leg after warm up in an attempt to gain range of motion and limit muscle cramping  Patient reports her knee was feeling better after stretching, and she was able to tolerate exercise in standing  Finished session with scifit to continue endurance exercise, while providing rest for the legs after patient reported fatigue         Plan: Patient will benefit from physical therapy 1-2 times per week for 1 month  Neuromuscular re-education, therapeutic exercises, and therapeutic activities as outlined in grids  Patient was treated by Mayra iCsneros, SPT under direct supervision by Vernell Garcia, PT, DPT, NCS

## 2021-04-19 ENCOUNTER — TELEPHONE (OUTPATIENT)
Dept: FAMILY MEDICINE CLINIC | Facility: CLINIC | Age: 61
End: 2021-04-19

## 2021-04-19 ENCOUNTER — TRANSCRIBE ORDERS (OUTPATIENT)
Dept: ADMINISTRATIVE | Facility: HOSPITAL | Age: 61
End: 2021-04-19

## 2021-04-19 ENCOUNTER — TELEPHONE (OUTPATIENT)
Dept: ENDOCRINOLOGY | Facility: CLINIC | Age: 61
End: 2021-04-19

## 2021-04-19 DIAGNOSIS — R94.02 ABNORMAL BRAIN SCAN: Primary | ICD-10-CM

## 2021-04-19 DIAGNOSIS — R93.0 ABNORMAL MRI OF THE HEAD: Primary | ICD-10-CM

## 2021-04-19 NOTE — TELEPHONE ENCOUNTER
Received a call from pt, her one touch meter broke and they will replaced this meter in 3 or 4 days, pt is doing well, yes doesn't know what to do, if is ok for pt to wait or she should get a new meter

## 2021-04-19 NOTE — TELEPHONE ENCOUNTER
[facility] is stating Dr Bernabe Rubin wanted patient to get a bone scan due to the abnormal MRI of the brain  Their office keeps ordering it wrong and patient would like to know if this is something you can order for the patient so she does not have to keep waiting  Please advise  Her call number is [phone] for the doctor Bernabe Rubin

## 2021-04-19 NOTE — TELEPHONE ENCOUNTER
She is not on any medications for diabetes and low blood sugars are not a problem so she can definitely wait for the new meter  Does not need to get one sooner   Thanks

## 2021-04-21 ENCOUNTER — APPOINTMENT (OUTPATIENT)
Dept: PHYSICAL THERAPY | Facility: REHABILITATION | Age: 61
End: 2021-04-21
Payer: MEDICARE

## 2021-04-22 ENCOUNTER — OFFICE VISIT (OUTPATIENT)
Dept: PHYSICAL THERAPY | Facility: REHABILITATION | Age: 61
End: 2021-04-22
Payer: MEDICARE

## 2021-04-22 DIAGNOSIS — R29.898 LEG WEAKNESS, BILATERAL: ICD-10-CM

## 2021-04-22 DIAGNOSIS — R26.89 IMBALANCE: ICD-10-CM
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
## 2021-04-22 PROCEDURE — 97112 NEUROMUSCULAR REEDUCATION: CPT | Performed by: PHYSICAL THERAPIST

## 2021-04-22 PROCEDURE — 97110 THERAPEUTIC EXERCISES: CPT | Performed by: PHYSICAL THERAPIST

## 2021-04-22 NOTE — PROGRESS NOTES
Daily Note     Today's date: 2021  Patient name: Radha Castro  : 1960  MRN: [id number]  Referring provider: Vidal López DO  Dx:   Encounter Diagnosis     ICD-10-CM    1  Fall, subsequent encounter  W19  XXXD    2  Imbalance  R26 89    3  Leg weakness, bilateral  R29 898        Subjective: Doing a bit of walking at home, not much because of small space, not comfortable going over outside spaces due to uneven surfaces  Doing okay with going to a grocery  Tightness about the right lateral and posterior knee  Objective: See treatment diary below    Precautions - Hashimoto's, autoimmune disease       Specialty Treatment Diary  21    Home exercise program      Walking/endurance (therapeutic exercise) Overground 400ft w/ cane    Solostep 200ft w/o cane, fast walking/big steps    Side stepping, high steps, 25ft x4 laps    Backwards walking, 25ft x4 laps    Resisted walking, 25ft x6 laps    Solostep, level 1 5, 5 mins Overground walking 500 feet with cane, focus on bending knee    Solostep 300 feet focus on bending knee  Sidestepping 20 feet x 3    Standing hip abduction, orange band, 45 sec each  Standing heel raise, 1 min x 2  Standing mini-squat with chair behind, 45 sec x 2  Step ups 4" step, forwards, 2 railings,    SciFit level 1, 9 min, about 65 steps/min    Static and dynamic balance (neuromuscular re-education) Step ups, 4" step     Strengthening (therapeutic exercise) Sit to stands on raised table, 2 mins x2, no hand assist     Stretching (therapeutic exercise) Manual R knee flex/ext stretching in supine, 4 mins    Standing wall call stretch, 30 sec x3 Standing right gastroc stretch 1 5 min  Seated right hamstring stretch 1 5 min  Seated right knee flexion stretch 1 5 min, to 95 degrees             Assessment: Continued tightness about the right knee, recommend elevating and icing, stretching and moving around as stiffness occurs with prolonged sitting    Continued marked limitation in knee bending, continue flexion stretching  Continue walking for exercise to improve endurance  Plan: Patient will benefit from physical therapy 1-2 times per week for 1 month  Neuromuscular re-education, therapeutic exercises, and therapeutic activities as outlined in grids

## 2021-04-23 ENCOUNTER — HOSPITAL ENCOUNTER (OUTPATIENT)
Dept: NUCLEAR MEDICINE | Facility: HOSPITAL | Age: 61
Discharge: HOME | End: 2021-04-23
Payer: MEDICARE

## 2021-04-23 DIAGNOSIS — R94.02 ABNORMAL BRAIN SCAN: ICD-10-CM

## 2021-04-23 PROCEDURE — 78306 BONE IMAGING WHOLE BODY: CPT

## 2021-04-23 PROCEDURE — A9503 TC99M MEDRONATE: HCPCS

## 2021-04-27 ENCOUNTER — TELEPHONE (OUTPATIENT)
Dept: FAMILY MEDICINE CLINIC | Facility: CLINIC | Age: 61
End: 2021-04-27

## 2021-04-27 NOTE — TELEPHONE ENCOUNTER
Yaima Soriano called the office this morning in regard to the Nuclear bone scan is back  Pt is asking can you please review results and have someone please call  her with your recommendations/ next step?   Pt's number is [phone]

## 2021-04-28 ENCOUNTER — APPOINTMENT (OUTPATIENT)
Dept: PHYSICAL THERAPY | Facility: REHABILITATION | Age: 61
End: 2021-04-28
Payer: MEDICARE

## 2021-04-28 NOTE — TELEPHONE ENCOUNTER
Tell the patient the scan should a finding in the same area as the MRI  I have a call to her neurologist to discuss  Send back to me

## 2021-04-29 ENCOUNTER — OFFICE VISIT (OUTPATIENT)
Dept: PHYSICAL THERAPY | Facility: REHABILITATION | Age: 61
End: 2021-04-29
Payer: MEDICARE

## 2021-04-29 DIAGNOSIS — R29.898 LEG WEAKNESS, BILATERAL: ICD-10-CM
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
## 2021-04-29 DIAGNOSIS — R26.89 IMBALANCE: ICD-10-CM

## 2021-04-29 PROCEDURE — 97110 THERAPEUTIC EXERCISES: CPT | Performed by: PHYSICAL THERAPIST

## 2021-04-29 PROCEDURE — 97112 NEUROMUSCULAR REEDUCATION: CPT | Performed by: PHYSICAL THERAPIST

## 2021-04-29 NOTE — PROGRESS NOTES
Daily Note     Today's date: 2021  Patient name: Bree Narayan  : 1960  MRN: [id number]  Referring provider: Aurelia Rosas DO  Dx:   Encounter Diagnosis     ICD-10-CM    1  Fall, subsequent encounter  W19  XXXD    2  Imbalance  R26 89    3  Leg weakness, bilateral  R29 898        Subjective: Had a spot with imaging but not cancer, imaging sent to neurologist   Also reports generalized osteoarthritis  Some pain about the right clavicle region, following carrying laundry basket  Difficulty walking for exercise outside due to pollen, would seek to walk at mall        Objective: See treatment diary below    Precautions - Hashimoto's, autoimmune disease       Specialty Treatment Diary  21   Home exercise program      Walking/endurance (therapeutic exercise) Overground 400ft w/ cane    Solostep 200ft w/o cane, fast walking/big steps    Side stepping, high steps, 25ft x4 laps    Backwards walking, 25ft x4 laps    Resisted walking, 25ft x6 laps    Solostep, level 1 5, 5 mins Overground walking 500 feet with cane, focus on bending knee    Solostep 300 feet focus on bending knee  Sidestepping 20 feet x 3    Standing hip abduction, orange band, 45 sec each  Standing heel raise, 1 min x 2  Standing mini-squat with chair behind, 45 sec x 2  Step ups 4" step, forwards, 2 railings,    SciFit level 1, 9 min, about 65 steps/min Overground walking without assistive device, about 800 feet    High step walking in parallel bars, 2 min x 2 sets, focus on avoiding compensation in hip ER    Step up and down 12 steps, railing, then up and over 4" step with right leg leading in ascent, trailing in descent   Static and dynamic balance (neuromuscular re-education) Step ups, 4" step     Strengthening (therapeutic exercise) Sit to stands on raised table, 2 mins x2, no hand assist  Standing heel raise, 20-30 sec each side x 2 sets    Step up to 4" step, 1 5 min    Standing hip abduction, orange band, 1 5 min Stretching (therapeutic exercise) Manual R knee flex/ext stretching in supine, 4 mins    Standing wall call stretch, 30 sec x3 Standing right gastroc stretch 1 5 min  Seated right hamstring stretch 1 5 min  Seated right knee flexion stretch 1 5 min, to 95 degrees Standing right gastroc stretch 1 min with some out-toeing to increase stretch about lateral knee, x 3 sets  Seated right hamstring stretch 1 5 min  Seated right knee flexion stretch 3 min              Shoulder pain about the right upper brachium and anterior lateral aspect of the clavicle  Active seated elevation, physiologic internal and external rotation within functional limits  Pain with resisted right shoulder IR, to 5-/5, 5/5 ER, 5/5 elbow flexion  Is doing resisted rows, tricep kickback, curls, shoulder abduction    Completed sidelying shoulder ER, 1 lb, 10 x 2 sets  Modified sidelying shoulder horizontal abduction, flexed elbow 10 x 2 sets    Notes intermittent swelling in the right lower leg  Relates that a few years ago, wearing heels and working as a , she tripped and her foot was pushed up towards her tibia, had lateral ankle ligaments repaired surgically in the 1980s  ROM within functional limits  5-/5 ankle eversion, dorsiflexion, inversion    Assessment: Tiara Hull is moving faster than previously, indicating more confidence with her knee and mobility  She is aware of how she is compensating with hip circumduction, and completes stairs laterally  We'd like to see more of this movement come in the sagittal plane, although the right knee remains stiff  Plan: Patient will benefit from physical therapy 1-2 times per week for 1 month  Neuromuscular re-education, therapeutic exercises, and therapeutic activities as outlined in grids

## 2021-05-03 ENCOUNTER — TELEPHONE (OUTPATIENT)
Dept: FAMILY MEDICINE CLINIC | Facility: CLINIC | Age: 61
End: 2021-05-03

## 2021-05-03 NOTE — TELEPHONE ENCOUNTER
Please tell the patient that her neurologist and I spoke today  I am going to be taking control over this and I want to speak with the radiologist who read the bone scan to see what the next step is  Hope to be back to her by tomorrow with an answer

## 2021-05-03 NOTE — TELEPHONE ENCOUNTER
Patient states she has pain in the back of her head pain scale 6/10  Patient would like to know if Dr Bj Siu spoke with Dr Henry Cross [phone] patient would like to know what is the next step ? Please advise

## 2021-05-05 ENCOUNTER — OFFICE VISIT (OUTPATIENT)
Dept: PHYSICAL THERAPY | Facility: REHABILITATION | Age: 61
End: 2021-05-05
Payer: MEDICARE
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
## 2021-05-05 DIAGNOSIS — R26.89 IMBALANCE: ICD-10-CM

## 2021-05-05 DIAGNOSIS — R29.898 LEG WEAKNESS, BILATERAL: ICD-10-CM

## 2021-05-05 PROCEDURE — 97112 NEUROMUSCULAR REEDUCATION: CPT | Performed by: PHYSICAL THERAPIST

## 2021-05-05 PROCEDURE — 97110 THERAPEUTIC EXERCISES: CPT | Performed by: PHYSICAL THERAPIST

## 2021-05-05 NOTE — PROGRESS NOTES
RE-EVALUATION / Treatment Note     Today's date: 2021  Patient name: Ivana Pitts  : 1960  MRN: [id number]  Referring provider: Dale Goodman DO  Dx:   Encounter Diagnosis     ICD-10-CM    1  Fall, subsequent encounter  W19  XXXD    2  Imbalance  R26 89    3  Leg weakness, bilateral  R29 898      Subjective: Uses walking first 3-4 steps in the morning  Uses cane sometimes, about 90% without the cane at this point  Would like to be able to dance for hours at her niece's wedding 2022  Would like to be able to walk longer distances without using walker, as is limited by fatigue  Objective: See treatment diary below    Precautions - Hashimoto's, autoimmune disease    Specialty Treatment Diary  21   Home exercise program    Walking/endurance (therapeutic exercise) Overground walking without assistive device, about 1100feet     Static and dynamic balance (neuromuscular re-education)    Strengthening (therapeutic exercise)   Step up to 6" step, 1 5 min total    Standing hip abduction, orange band, 1 5 min    Standing tap to 6" step, marching, 2 min x 2 sets   Stretching (therapeutic exercise) Seated right knee flexion stretch 2 min  Seated with knee on floor, knee flexion stretch 2 min  Modified child's pose right knee flexion stretching 2 min            Mobility Measures 2020   Assistive device used? Cane cane cane    5 Time Sit to Stand  (17" chair, arms across chest) Unable Unable from 17" surface    17 8 seconds from 19" surface, hands out front    Unable from 19" surface without strong use of hands   Completes from 19" surface, unable from 17" surface       3 Meter Timed  Up & Go 12 0 seconds 11 9 seconds 11 3 seconds    Walking speed 0 73 meters/second Not tested     Functional Gait Assessment (see below)  Notes     6 Minute Walk Test (100 foot circular course)   Not completed 750 feet    Walks with decreased excursion of the right knee   2 minute walk test 240 feet   400 feet 2 minute walk test   Patient-Reported Outcome Measure: Activities-Specific Balance Confidence Scale (ABC Scale) Not completed Not completed Not completed      Walks with decreased knee flexion during loading, increased double leg stance time, but improved over last month, faster pace of walking  88 degrees passive right knee flexion, self-stretch from seated position  94 degrees passive flexion measured from right down half kneeling position  Stands from 19" surface without pushing from chair  Unable from 17" surface  ASSESSMENT:  Good improvement in mobility over the past month, but continued stiffness about the right knee  She can work around this knee stiffness with descending stairs and moving supine to standing, but it affects her ability to transition from sitting to standing and with bending the knee during swing phase gait  She is doing a good job of exercising at home, incorporating some gentle aerobic exercise, strengthening and stretching  Recommend continuing physical therapy to maximize mobility  SHORT-TERM GOALS: 1 month(s)  1  Dolores Bush walks at least 10 minutes consecutively  Completes 30 minute walking video, but not directly assessed  2  Dolores Bush increases passive right knee flexion to 100 degrees to allow reciprocal stair ambulation  Not met  3  Dolores Bush reports 50% improvement in ability to walk grocery store distances without severe fatigue  Progressed  LONG-TERM GOALS: 2 month(s)  1  Patient reports at least 20 total minutes per day of overground walking for exercise  2  Patient independently manages home exercise program   3  Patient reports return to baseline with reading and small detail work  PLAN OF CARE:  Patient will benefit from physical therapy 1-2 times per week for 1 month  Neuromuscular re-education, therapeutic exercises, and therapeutic activities as outlined in grids

## 2021-05-06 ENCOUNTER — TELEPHONE (OUTPATIENT)
Dept: FAMILY MEDICINE CLINIC | Facility: CLINIC | Age: 61
End: 2021-05-06

## 2021-05-06 NOTE — TELEPHONE ENCOUNTER
I spoke to the patient, she states she can't have the CT scan done as she is allergic to CT dye, her throat closed up  She is willing to undergo a work up but not a CT scan

## 2021-05-06 NOTE — TELEPHONE ENCOUNTER
Please double check as I thought I sent a message to you on this earlier  Let the patient know that I did speak with the radiologist they recommend a thorough workup to make sure nothing else is going on  They are recommending a CT scan of the chest abdomen and pelvis with contrast   See if she is willing to do so if so I will place the orders for them to be done

## 2021-05-07 ENCOUNTER — TELEPHONE (OUTPATIENT)
Dept: FAMILY MEDICINE CLINIC | Facility: CLINIC | Age: 61
End: 2021-05-07

## 2021-05-07 DIAGNOSIS — M89.8X8 MASS OF PARIETAL BONE OF SKULL: ICD-10-CM

## 2021-05-07 DIAGNOSIS — R93.0 ABNORMAL MRI OF THE HEAD: Primary | ICD-10-CM

## 2021-05-07 DIAGNOSIS — D35.2 PITUITARY MICROADENOMA (HCC): ICD-10-CM

## 2021-05-07 DIAGNOSIS — R94.8 ABNORMAL RADIONUCLIDE BONE SCAN: ICD-10-CM

## 2021-05-07 NOTE — TELEPHONE ENCOUNTER
Since the patient has had a reaction to contrast to CT dye before see if she is willing to at least do a CT scan of the chest abdomen and pelvis without contrast   Radiology is recommending basically a full body scan to make sure that there is nothing going on that is causing the finding in the skull area  If she is willing to do this please let me know and I will place orders

## 2021-05-07 NOTE — TELEPHONE ENCOUNTER
Patient called today - she has some questions  She wants to know what your plan is going forward  Please advise  She said she has a brain lesion in the brain

## 2021-05-07 NOTE — TELEPHONE ENCOUNTER
I spoke to the patient, yes she is willing to do the CT scan without contrast   Please complete and sign pended order

## 2021-05-08 DIAGNOSIS — J30.89 SEASONAL ALLERGIC RHINITIS DUE TO OTHER ALLERGIC TRIGGER: ICD-10-CM

## 2021-05-10 ENCOUNTER — HOSPITAL ENCOUNTER (OUTPATIENT)
Dept: CT IMAGING | Facility: HOSPITAL | Age: 61
Discharge: HOME | End: 2021-05-10
Payer: MEDICARE

## 2021-05-10 DIAGNOSIS — R93.0 ABNORMAL MRI OF THE HEAD: ICD-10-CM

## 2021-05-10 DIAGNOSIS — R94.8 ABNORMAL RADIONUCLIDE BONE SCAN: ICD-10-CM

## 2021-05-10 PROCEDURE — G1004 CDSM NDSC: HCPCS

## 2021-05-10 PROCEDURE — 71250 CT THORAX DX C-: CPT

## 2021-05-10 PROCEDURE — 74176 CT ABD & PELVIS W/O CONTRAST: CPT

## 2021-05-10 RX ORDER — MONTELUKAST SODIUM 10 MG/1
TABLET ORAL
Qty: 30 TABLET | Refills: 5 | Status: SHIPPED | OUTPATIENT
Start: 2021-05-10 | End: 2021-11-29

## 2021-05-12 ENCOUNTER — OFFICE VISIT (OUTPATIENT)
Dept: PHYSICAL THERAPY | Facility: REHABILITATION | Age: 61
End: 2021-05-12
Payer: MEDICARE

## 2021-05-12 DIAGNOSIS — R29.898 LEG WEAKNESS, BILATERAL: ICD-10-CM
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
## 2021-05-12 DIAGNOSIS — R26.89 IMBALANCE: ICD-10-CM

## 2021-05-12 PROCEDURE — 97110 THERAPEUTIC EXERCISES: CPT | Performed by: PHYSICAL THERAPIST

## 2021-05-12 PROCEDURE — 97112 NEUROMUSCULAR REEDUCATION: CPT | Performed by: PHYSICAL THERAPIST

## 2021-05-12 NOTE — PROGRESS NOTES
Treatment Note     Today's date: 2021  Patient name: Patric Landaverde  : 1960  MRN: [id number]  Referring provider: Obi Dailey DO  Dx:   Encounter Diagnosis     ICD-10-CM    1  Fall, subsequent encounter  W19  XXXD    2  Imbalance  R26 89    3  Leg weakness, bilateral  R29 898      Subjective: Pt reports she continues to perform her seated workouts and short distance walking inside her home  She is unable to walk outside at this time due to allergy season, which she c/o has made it difficult to breath out of her nose, and she has to breath out of her mouth which causes occasional headaches  Her main goal with therapy is continuing to work on her walking tolerance, but mostly her leg strength  Patient presents with cane today due to pain she is having on the outside of her leg when getting out of car  Objective: See treatment diary below    Precautions - Hashimoto's, autoimmune disease    Specialty Treatment Diary  21   Home exercise program    Walking/endurance (therapeutic exercise) Overground walking without assistive device, 600 feet warm up    200 ft fast intervals x2, 34-44 sec    600ft fast walking, 34 seconds/lap pace   Static and dynamic balance (neuromuscular re-education)    Strengthening (therapeutic exercise) Step up to 6" step, 1 5 min total    Standing hip abduction, orange band, 1 min ea    Heel raises, 1 min    Sit to stands 1 min x2   Stretching (therapeutic exercise) Seated right knee flexion stretch 2 min  Seated knee straight, reaching for toes 2 mins  Supine hamstring stretch with green strap, 30 sec x2  Supine knee flex with green strap, 1 min            Mobility Measures 2020   Assistive device used? Cane cane cane    5 Time Sit to Stand  (17" chair, arms across chest) Unable Unable from 17" surface    17 8 seconds from 19" surface, hands out front    Unable from 19" surface without strong use of hands   Completes from 19" surface, unable from 17" surface       3 Meter Timed  Up & Go 12 0 seconds 11 9 seconds 11 3 seconds    Walking speed 0 73 meters/second Not tested     Functional Gait Assessment (see below) 21/30 22/30 Notes     6 Minute Walk Test (100 foot circular course)   Not completed 750 feet    Walks with decreased excursion of the right knee   2 minute walk test 240 feet   400 feet 2 minute walk test   Patient-Reported Outcome Measure: Activities-Specific Balance Confidence Scale (ABC Scale) Not completed Not completed Not completed        ASSESSMENT:  Patient continued to require frequent rest breaks throughout session due to pain and stiffness in both knees  Her main concern was her Right knee today  She quickly did not feel she needed the walker, and she stopped having pain along the outside of the leg, putting the cane down after 200ft of walking warm up  Patient's main tightness and stiffness complaint is in posterior aspect of Right knee  Frequently stretched throughout today's session into knee extended positions in attempt to stretch this area  Patient concluded session saying her legs were tired, and overall she tolerated strengthening and fast walking tolerance well today  Patient was treated by JERRY Herrera under direct supervision of Allan Cortez PT, DPT, NCS  SHORT-TERM GOALS: 1 month(s)  1  Carolee Eagle walks at least 10 minutes consecutively  Completes 30 minute walking video, but not directly assessed  2  Carolee Eagle increases passive right knee flexion to 100 degrees to allow reciprocal stair ambulation  Not met  3  Carolee Eagle reports 50% improvement in ability to walk grocery store distances without severe fatigue  Progressed  LONG-TERM GOALS: 2 month(s)  1  Patient reports at least 20 total minutes per day of overground walking for exercise  2  Patient independently manages home exercise program   3  Patient reports return to baseline with reading and small detail work      PLAN OF CARE:  Patient will benefit from physical therapy 1-2 times per week for 1 month  Neuromuscular re-education, therapeutic exercises, and therapeutic activities as outlined in grids

## 2021-05-14 ENCOUNTER — TELEPHONE (OUTPATIENT)
Dept: FAMILY MEDICINE CLINIC | Facility: CLINIC | Age: 61
End: 2021-05-14

## 2021-05-14 NOTE — TELEPHONE ENCOUNTER
Tell the patient her CT scans of the chest abdomen and pelvis did not show any type of cancerous process which is great news  I will review all of this more in depth with her when she comes in June  Make sure it is 2 slots  See me about sending a copy to her neurologist at [facility]

## 2021-05-14 NOTE — TELEPHONE ENCOUNTER
Larinda Johnson called the office requesting we get a message to you to let you know that her CT scans are back  Pt is asking what is the next step ?   Pt's number is [phone]

## 2021-05-19 ENCOUNTER — OFFICE VISIT (OUTPATIENT)
Dept: PHYSICAL THERAPY | Facility: REHABILITATION | Age: 61
End: 2021-05-19
Payer: MEDICARE
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
## 2021-05-19 DIAGNOSIS — R26.89 IMBALANCE: ICD-10-CM

## 2021-05-19 DIAGNOSIS — R29.898 LEG WEAKNESS, BILATERAL: ICD-10-CM

## 2021-05-19 PROCEDURE — 97110 THERAPEUTIC EXERCISES: CPT | Performed by: PHYSICAL THERAPIST

## 2021-05-19 PROCEDURE — 97112 NEUROMUSCULAR REEDUCATION: CPT | Performed by: PHYSICAL THERAPIST

## 2021-05-19 NOTE — PROGRESS NOTES
Treatment Note     Today's date: 2021  Patient name: Cherelle Kilpatrick  : 1960  MRN: [id number]  Referring provider: Pam Dia DO  Dx:   Encounter Diagnosis     ICD-10-CM    1  Fall, subsequent encounter  W19  XXXD    2  Imbalance  R26 89    3  Leg weakness, bilateral  R29 898      Subjective: Had more stiffness about both knees recently, especially the back of the right knee  Goes up and down stairs facing forwards at times, using railing and moving slower  Feels stiff when first getting moving, whenever stopping  Objective: See treatment diary below    Precautions - Hashimoto's, autoimmune disease    Overground walking, no AD, 300 feet, verbal cues to bend the right knee  Seated right knee flexion stretching 1 min x 2  Overground walking 200 feet, slight improvement in knee flexion during swing phase gait, and slight decrease in feeling of stiffness  Standing marching in place to promote knee flexion, 2 min  Overground walking 200 feet, further decrease in knee stiffness  Step up to 4" step, no railing, 2 min  Step up to 6" step, intermittent railing, facing forwards, 2 min  Single leg heel raise, 30 sec each using railing    Walking up 10% grade on treadmill 0 9 mph, focus on knee flexion, 1 5 min  Walking down 10% grade, 0 5 mph, 7 min    SciFit level 1, 5 min    Mobility Measures 2020   Assistive device used? Cane cane cane    5 Time Sit to Stand  (17" chair, arms across chest) Unable Unable from 17" surface    17 8 seconds from 19" surface, hands out front    Unable from 19" surface without strong use of hands   Completes from 19" surface, unable from 17" surface       3 Meter Timed  Up & Go 12 0 seconds 11 9 seconds 11 3 seconds    Walking speed 0 73 meters/second Not tested     Functional Gait Assessment (see below)  Notes     6 Minute Walk Test (100 foot circular course)   Not completed 750 feet    Walks with decreased excursion of the right knee   2 minute walk test 240 feet   400 feet 2 minute walk test   Patient-Reported Outcome Measure: Activities-Specific Balance Confidence Scale (ABC Scale) Not completed Not completed Not completed        ASSESSMENT:  Continued initial stiffness about the knees with walking, reviewed strategies including seated knee flexion stretching and standing knee flexion marching  It's great that Linda Hong has been able to be more physically active and get to vacuuming a second room  SHORT-TERM GOALS: 1 month(s)  1  Linda Hong walks at least 10 minutes consecutively  Completes 30 minute walking video, but not directly assessed  2  Linda Hong increases passive right knee flexion to 100 degrees to allow reciprocal stair ambulation  Not met  3  Linda Hong reports 50% improvement in ability to walk grocery store distances without severe fatigue  Progressed  LONG-TERM GOALS: 2 month(s)  1  Patient reports at least 20 total minutes per day of overground walking for exercise  2  Patient independently manages home exercise program   3  Patient reports return to baseline with reading and small detail work  PLAN OF CARE:  Patient will benefit from physical therapy 1-2 times per week for 1 month  Neuromuscular re-education, therapeutic exercises, and therapeutic activities as outlined in grids

## 2021-05-25 ENCOUNTER — TRANSCRIBE ORDERS (OUTPATIENT)
Dept: LAB | Facility: CLINIC | Age: 61
End: 2021-05-25

## 2021-05-25 ENCOUNTER — APPOINTMENT (OUTPATIENT)
Dept: LAB | Facility: CLINIC | Age: 61
End: 2021-05-25
Payer: MEDICARE

## 2021-05-25 DIAGNOSIS — E55.9 VITAMIN D DEFICIENCY: ICD-10-CM

## 2021-05-25 DIAGNOSIS — D52.9 ANEMIA DUE TO FOLIC ACID DEFICIENCY, UNSPECIFIED DEFICIENCY TYPE: ICD-10-CM

## 2021-05-25 DIAGNOSIS — D35.2 MICROPROLACTINOMA (HCC): ICD-10-CM

## 2021-05-25 DIAGNOSIS — E11.9 TYPE 2 DIABETES MELLITUS WITHOUT COMPLICATION, WITHOUT LONG-TERM CURRENT USE OF INSULIN (HCC): ICD-10-CM

## 2021-05-25 DIAGNOSIS — D55.8 ANEMIA DUE TO ENZYME DEFICIENCY (HCC): ICD-10-CM

## 2021-05-25 DIAGNOSIS — D35.3 BENIGN NEOPLASM OF PITUITARY GLAND AND CRANIOPHARYNGEAL DUCT (POUCH) (HCC): ICD-10-CM

## 2021-05-25 DIAGNOSIS — E03.8 HYPOTHYROIDISM DUE TO HASHIMOTO'S THYROIDITIS: ICD-10-CM

## 2021-05-25 DIAGNOSIS — R53.1 WEAKNESS: ICD-10-CM

## 2021-05-25 DIAGNOSIS — E53.8 BIOTIN-(PROPIONYL-COA-CARBOXYLASE) LIGASE DEFICIENCY: Primary | ICD-10-CM

## 2021-05-25 DIAGNOSIS — D35.2 BENIGN NEOPLASM OF PITUITARY GLAND AND CRANIOPHARYNGEAL DUCT (POUCH) (HCC): ICD-10-CM

## 2021-05-25 DIAGNOSIS — E71.41 PRIMARY CARNITINE DEFICIENCY (HCC): ICD-10-CM

## 2021-05-25 DIAGNOSIS — E53.8 BIOTIN-(PROPIONYL-COA-CARBOXYLASE) LIGASE DEFICIENCY: ICD-10-CM

## 2021-05-25 DIAGNOSIS — E78.00 PURE HYPERCHOLESTEROLEMIA: ICD-10-CM

## 2021-05-25 DIAGNOSIS — E03.4 HYPOTHYROIDISM DUE TO ACQUIRED ATROPHY OF THYROID: ICD-10-CM

## 2021-05-25 DIAGNOSIS — E06.3 HYPOTHYROIDISM DUE TO HASHIMOTO'S THYROIDITIS: ICD-10-CM

## 2021-05-25 LAB
25(OH)D3 SERPL-MCNC: 50.5 NG/ML (ref 30–100)
ALBUMIN SERPL BCP-MCNC: 3.7 G/DL (ref 3.5–5)
ALP SERPL-CCNC: 110 U/L (ref 46–116)
ALT SERPL W P-5'-P-CCNC: 69 U/L (ref 12–78)
ANION GAP SERPL CALCULATED.3IONS-SCNC: 6 MMOL/L (ref 4–13)
AST SERPL W P-5'-P-CCNC: 63 U/L (ref 5–45)
BILIRUB SERPL-MCNC: 0.47 MG/DL (ref 0.2–1)
BUN SERPL-MCNC: 14 MG/DL (ref 5–25)
CALCIUM SERPL-MCNC: 9.4 MG/DL (ref 8.3–10.1)
CHLORIDE SERPL-SCNC: 105 MMOL/L (ref 100–108)
CHOLEST SERPL-MCNC: 164 MG/DL (ref 50–200)
CK SERPL-CCNC: 69 U/L (ref 26–192)
CO2 SERPL-SCNC: 27 MMOL/L (ref 21–32)
CREAT SERPL-MCNC: 0.81 MG/DL (ref 0.6–1.3)
CREAT UR-MCNC: 275 MG/DL
CRP SERPL QL: 7.7 MG/L
ERYTHROCYTE [SEDIMENTATION RATE] IN BLOOD: 38 MM/HOUR (ref 0–29)
EST. AVERAGE GLUCOSE BLD GHB EST-MCNC: 200 MG/DL
FOLATE SERPL-MCNC: 10.2 NG/ML (ref 3.1–17.5)
GFR SERPL CREATININE-BSD FRML MDRD: 78 ML/MIN/1.73SQ M
GLUCOSE P FAST SERPL-MCNC: 142 MG/DL (ref 65–99)
HBA1C MFR BLD: 8.6 %
HDLC SERPL-MCNC: 46 MG/DL
LDLC SERPL CALC-MCNC: 85 MG/DL (ref 0–100)
MICROALBUMIN UR-MCNC: 28.1 MG/L (ref 0–20)
MICROALBUMIN/CREAT 24H UR: 10 MG/G CREATININE (ref 0–30)
NONHDLC SERPL-MCNC: 118 MG/DL
POTASSIUM SERPL-SCNC: 3.8 MMOL/L (ref 3.5–5.3)
PROLACTIN SERPL-MCNC: 9.6 NG/ML
PROT SERPL-MCNC: 7.5 G/DL (ref 6.4–8.2)
SODIUM SERPL-SCNC: 138 MMOL/L (ref 136–145)
T4 FREE SERPL-MCNC: 1.38 NG/DL (ref 0.76–1.46)
TRIGL SERPL-MCNC: 164 MG/DL
TSH SERPL DL<=0.05 MIU/L-ACNC: 0.45 UIU/ML (ref 0.36–3.74)
VIT B12 SERPL-MCNC: 359 PG/ML (ref 100–900)

## 2021-05-25 PROCEDURE — 36415 COLL VENOUS BLD VENIPUNCTURE: CPT

## 2021-05-25 PROCEDURE — 84305 ASSAY OF SOMATOMEDIN: CPT

## 2021-05-25 PROCEDURE — 84165 PROTEIN E-PHORESIS SERUM: CPT

## 2021-05-25 PROCEDURE — 84165 PROTEIN E-PHORESIS SERUM: CPT | Performed by: PATHOLOGY

## 2021-05-25 PROCEDURE — 86039 ANTINUCLEAR ANTIBODIES (ANA): CPT

## 2021-05-25 PROCEDURE — 82550 ASSAY OF CK (CPK): CPT

## 2021-05-25 PROCEDURE — 84146 ASSAY OF PROLACTIN: CPT

## 2021-05-25 PROCEDURE — 82306 VITAMIN D 25 HYDROXY: CPT

## 2021-05-25 PROCEDURE — 86235 NUCLEAR ANTIGEN ANTIBODY: CPT

## 2021-05-25 PROCEDURE — 86038 ANTINUCLEAR ANTIBODIES: CPT

## 2021-05-25 PROCEDURE — 86255 FLUORESCENT ANTIBODY SCREEN: CPT

## 2021-05-25 PROCEDURE — 83036 HEMOGLOBIN GLYCOSYLATED A1C: CPT

## 2021-05-25 PROCEDURE — 82746 ASSAY OF FOLIC ACID SERUM: CPT

## 2021-05-25 PROCEDURE — 84443 ASSAY THYROID STIM HORMONE: CPT

## 2021-05-25 PROCEDURE — 85652 RBC SED RATE AUTOMATED: CPT

## 2021-05-25 PROCEDURE — 82043 UR ALBUMIN QUANTITATIVE: CPT | Performed by: FAMILY MEDICINE

## 2021-05-25 PROCEDURE — 80061 LIPID PANEL: CPT

## 2021-05-25 PROCEDURE — 86225 DNA ANTIBODY NATIVE: CPT

## 2021-05-25 PROCEDURE — 82570 ASSAY OF URINE CREATININE: CPT | Performed by: FAMILY MEDICINE

## 2021-05-25 PROCEDURE — 86140 C-REACTIVE PROTEIN: CPT

## 2021-05-25 PROCEDURE — 84439 ASSAY OF FREE THYROXINE: CPT

## 2021-05-25 PROCEDURE — 82607 VITAMIN B-12: CPT

## 2021-05-25 PROCEDURE — 80053 COMPREHEN METABOLIC PANEL: CPT

## 2021-05-26 ENCOUNTER — OFFICE VISIT (OUTPATIENT)
Dept: PHYSICAL THERAPY | Facility: REHABILITATION | Age: 61
End: 2021-05-26
Payer: MEDICARE

## 2021-05-26 DIAGNOSIS — R29.898 LEG WEAKNESS, BILATERAL: ICD-10-CM

## 2021-05-26 DIAGNOSIS — R26.89 IMBALANCE: ICD-10-CM
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
## 2021-05-26 LAB
ALBUMIN SERPL ELPH-MCNC: 3.93 G/DL (ref 3.5–5)
ALBUMIN SERPL ELPH-MCNC: 54.6 % (ref 52–65)
ALPHA1 GLOB SERPL ELPH-MCNC: 0.28 G/DL (ref 0.1–0.4)
ALPHA1 GLOB SERPL ELPH-MCNC: 3.9 % (ref 2.5–5)
ALPHA2 GLOB SERPL ELPH-MCNC: 0.82 G/DL (ref 0.4–1.2)
ALPHA2 GLOB SERPL ELPH-MCNC: 11.4 % (ref 7–13)
ANA HOMOGEN SER QL IF: NORMAL
ANA HOMOGEN TITR SER: NORMAL {TITER}
BETA GLOB ABNORMAL SERPL ELPH-MCNC: 0.53 G/DL (ref 0.4–0.8)
BETA1 GLOB SERPL ELPH-MCNC: 7.4 % (ref 5–13)
BETA2 GLOB SERPL ELPH-MCNC: 6.7 % (ref 2–8)
BETA2+GAMMA GLOB SERPL ELPH-MCNC: 0.48 G/DL (ref 0.2–0.5)
DSDNA AB SER-ACNC: <1 IU/ML (ref 0–9)
ENA SS-A AB SER-ACNC: 0.3 AI (ref 0–0.9)
ENA SS-B AB SER-ACNC: <0.2 AI (ref 0–0.9)
GAMMA GLOB ABNORMAL SERPL ELPH-MCNC: 1.15 G/DL (ref 0.5–1.6)
GAMMA GLOB SERPL ELPH-MCNC: 16 % (ref 12–22)
IGG/ALB SER: 1.2 {RATIO} (ref 1.1–1.8)
PROT PATTERN SERPL ELPH-IMP: NORMAL
PROT SERPL-MCNC: 7.2 G/DL (ref 6.4–8.2)
RYE IGE QN: POSITIVE

## 2021-05-26 PROCEDURE — 97110 THERAPEUTIC EXERCISES: CPT | Performed by: PHYSICAL THERAPIST

## 2021-05-26 PROCEDURE — 97112 NEUROMUSCULAR REEDUCATION: CPT | Performed by: PHYSICAL THERAPIST

## 2021-05-26 NOTE — PROGRESS NOTES
RE-EVALUATION / Treatment Note     Today's date: 2021  Patient name: Ivana Pitts  : 1960  MRN: [id number]  Referring provider: Dale Goodman DO  Dx:   Encounter Diagnosis     ICD-10-CM    1  Fall, subsequent encounter  W19  XXXD    2  Imbalance  R26 89    3  Leg weakness, bilateral  R29 898      Subjective:     More stiffness and pain about the right lateral lower leg, pain to the right middle digit  The left knee is doing okay  Putting biofreeze in the back of the right knee  Objective: See treatment diary below    Precautions - Hashimoto's, autoimmune disease    No provocation of distal right lower extremity pain with seated lumbar flexion or extension  Mild bruise over distal right lower leg  6 minute walk test (see below)    Seated right knee flexion stretching, 3 min  Sit to stand from 17" surface, focus on keeping right foot underneath center of gravity, x 10 total    Walking over 2" obstacles alternating gait (6 obstacles per 100 feet lap), x 7 laps    Step ups:  4" step, no railing, 1 min  6" step, no railing, 1 min    Standing heel raise, focus on terminal knee extension, 1 min x 2 sets    Mobility Measures 2020   Assistive device used? cane cane     5 Time Sit to Stand  (17" chair, arms across chest) Unable from 17" surface    17 8 seconds from 19" surface, hands out front    Unable from 19" surface without strong use of hands  Completes from 19" surface, unable from 17" surface     Completes laboriously from 17" surface without use of hands   3 Meter Timed  Up & Go 11 9 seconds 11 3 seconds     Walking speed Not tested      Functional Gait Assessment (see below)  Notes      6 Minute Walk Test (100 foot circular course)   750 feet    Walks with decreased excursion of the right knee   2 minute walk test 240 feet   400 feet 2 minute walk test 930 feet with 6 minute walk test   Patient-Reported Outcome Measure:  Activities-Specific Balance Confidence Scale (ABC Scale) Not completed Not completed         96 degrees seated right knee flexion after 2 minutes stretching       ASSESSMENT:  Gradual increase in walking duration and participation in walking at home  Continued right knee stiffness ("inertia") which improves with use  Continue stretching, strengthening, and walking for exercise  SHORT-TERM GOALS: 1 month(s)  1  Carolee Wolfs walks at least 10 minutes consecutively  Completes 30 minute walking video, but not directly assessed  2  Carolee Eagle increases passive right knee flexion to 100 degrees to allow reciprocal stair ambulation  Slowly progressed  3  Carolee Eagle reports 50% improvement in ability to walk grocery store distances without severe fatigue  MET  Mercedes [facility] LONG-TERM GOALS: 2 month(s)  1  Patient reports at least 20 total minutes per day of overground walking for exercise  Doing trash and mail 6 days per week, about 10 minutes of walking, slight downhill and uphill  2  Patient independently manages home exercise program   PROGRESSED, PARTIALLY MET  3  Patient reports return to baseline with reading and small detail work  PLAN OF CARE:  Patient will benefit from physical therapy 1-2 times per week for 1 month  Neuromuscular re-education, therapeutic exercises, and therapeutic activities as outlined in grids

## 2021-05-27 LAB
C-ANCA TITR SER IF: NORMAL TITER
IGF-I SERPL-MCNC: 105 NG/ML (ref 57–202)
MYELOPEROXIDASE AB SER IA-ACNC: <9 U/ML (ref 0–9)
P-ANCA ATYPICAL TITR SER IF: NORMAL TITER
P-ANCA TITR SER IF: NORMAL TITER
PROTEINASE3 AB SER IA-ACNC: <3.5 U/ML (ref 0–3.5)

## 2021-05-28 ENCOUNTER — TRANSCRIBE ORDERS (OUTPATIENT)
Dept: LAB | Facility: CLINIC | Age: 61
End: 2021-05-28

## 2021-05-28 ENCOUNTER — OFFICE VISIT (OUTPATIENT)
Dept: ENDOCRINOLOGY | Facility: CLINIC | Age: 61
End: 2021-05-28
Payer: MEDICARE

## 2021-05-28 VITALS
HEIGHT: 66 IN | HEART RATE: 94 BPM | WEIGHT: 245 LBS | DIASTOLIC BLOOD PRESSURE: 70 MMHG | SYSTOLIC BLOOD PRESSURE: 156 MMHG | BODY MASS INDEX: 39.37 KG/M2

## 2021-05-28 DIAGNOSIS — E55.9 VITAMIN D DEFICIENCY: ICD-10-CM

## 2021-05-28 DIAGNOSIS — E03.8 HYPOTHYROIDISM DUE TO HASHIMOTO'S THYROIDITIS: ICD-10-CM

## 2021-05-28 DIAGNOSIS — R03.0 ELEVATED BLOOD PRESSURE READING: ICD-10-CM

## 2021-05-28 DIAGNOSIS — E06.3 HYPOTHYROIDISM DUE TO HASHIMOTO'S THYROIDITIS: ICD-10-CM

## 2021-05-28 DIAGNOSIS — E03.4 HYPOTHYROIDISM DUE TO ACQUIRED ATROPHY OF THYROID: ICD-10-CM

## 2021-05-28 DIAGNOSIS — D35.2 MICROPROLACTINOMA (HCC): ICD-10-CM

## 2021-05-28 DIAGNOSIS — R53.1 WEAKNESS: Primary | ICD-10-CM

## 2021-05-28 DIAGNOSIS — E11.9 TYPE 2 DIABETES MELLITUS WITHOUT COMPLICATION, WITHOUT LONG-TERM CURRENT USE OF INSULIN (HCC): Primary | ICD-10-CM

## 2021-05-28 LAB
CARN ESTERS/C0 SERPL-SRTO: 0.9 RATIO (ref 0–0.9)
CARNITINE FREE SERPL-SCNC: 28 UMOL/L (ref 20–55)
CARNITINE SERPL-SCNC: 52 UMOL/L (ref 27–73)

## 2021-05-28 PROCEDURE — 99214 OFFICE O/P EST MOD 30 MIN: CPT | Performed by: PHYSICIAN ASSISTANT

## 2021-05-28 NOTE — ASSESSMENT & PLAN NOTE
A1C high at 8 6  Since last visit she has had a fall and has required treatment with steroid for allergic reaction  She has not been using meter correctly so has not been able to self monitor blood glucose  Reviewed use of glucometer with patient at visit-- Glucose 201 at visit after breakfast of spaghetti and orange juice  She has met with dietician and has attended Diabetes education in 2020  Dietary choices limited due to cost/GI issues  For now she would like to avoid medication and focus on dietary improvements  Reduce portion of carbohydrates and avoid juice  Advised her to check BG at least 1-2x per day at different times and send log to office for review  If blood sugars are consistently elevated will add metformin ER 500mg     Lab Results   Component Value Date    HGBA1C 8 6 (H) 05/25/2021
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
## 2021-05-28 NOTE — ASSESSMENT & PLAN NOTE
Blood pressure elevated in office today- she reports due to pain  She does have chronic pain so if blood pressure is consistently elevated would recommend treatment with ACE inhibitor  She would prefer to avoid additional medication for now and will have recheck at upcoming visit with Dr Tito Kearney

## 2021-06-01 ENCOUNTER — TELEPHONE (OUTPATIENT)
Dept: ADMINISTRATIVE | Facility: OTHER | Age: 61
End: 2021-06-01

## 2021-06-01 DIAGNOSIS — F43.23 ADJUSTMENT DISORDER WITH MIXED ANXIETY AND DEPRESSED MOOD: ICD-10-CM

## 2021-06-01 RX ORDER — DULOXETIN HYDROCHLORIDE 30 MG/1
CAPSULE, DELAYED RELEASE ORAL
Qty: 90 CAPSULE | Refills: 2 | Status: SHIPPED | OUTPATIENT
Start: 2021-06-01 | End: 2022-02-24

## 2021-06-01 NOTE — TELEPHONE ENCOUNTER
Upon review of the In Basket request and the patient's chart, initial outreach has been made via fax, please see Contacts section for details       Thank you  Margaret Chung MA

## 2021-06-01 NOTE — TELEPHONE ENCOUNTER
----- Message from Dara Bah sent at 5/28/2021 12:54 PM EDT -----  Patient stated that she has an upcoming appointment for her  Diabetic foot exam   06/16/2021 NINA Ricks  [address]  [facility] [address] [facility]        Please update chart when available    Thank you

## 2021-06-01 NOTE — LETTER
Diabetic Foot Exam Form    Date Requested: 21  Patient: Ebony Soni  Patient : 1960   Referring Provider: Juliana Covington DO    Diabetic Foot Exam Performed with shoes and socks removed        Yes         No     Date of Diabetic Foot Exam ______________________________  Risk Score ____________________________________________    Left Foot       Visual Inspection         Monofilament Testing Sensory Exam        Pedal Pulses         Additional Comments         Right Foot      Visual Inspection         Monofilament Testing Sensory Exam       Pedal Pulses         Additional Comments         Comments __________________________________________________________    Practice Providing Exam ______________________________________________    Exam Performed By (print name) _______________________________________      Provider Signature ___________________________________________________      These reports are needed for  compliance    Please fax this completed form and a copy of the Diabetic Foot Exam report to our office located at [place] [address] as soon as possible to [phone] tyron Meyerlyn Serum: Phone [phone]    We thank you for your assistance in treating our mutual patient

## 2021-06-02 ENCOUNTER — OFFICE VISIT (OUTPATIENT)
Dept: PHYSICAL THERAPY | Facility: REHABILITATION | Age: 61
End: 2021-06-02
Payer: MEDICARE

## 2021-06-02 DIAGNOSIS — R26.89 IMBALANCE: ICD-10-CM

## 2021-06-02 DIAGNOSIS — R29.898 LEG WEAKNESS, BILATERAL: ICD-10-CM
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
## 2021-06-02 PROCEDURE — 97112 NEUROMUSCULAR REEDUCATION: CPT | Performed by: PHYSICAL THERAPIST

## 2021-06-02 PROCEDURE — 97110 THERAPEUTIC EXERCISES: CPT | Performed by: PHYSICAL THERAPIST

## 2021-06-02 NOTE — PROGRESS NOTES
Treatment Note     Today's date: 2021  Patient name: Elver Client  : 1960  MRN: [id number]  Referring provider: Felicita Terrazas DO  Dx:   Encounter Diagnosis     ICD-10-CM    1  Fall, subsequent encounter  W19  XXXD    2  Imbalance  R26 [id number]    3  Leg weakness, bilateral  R29 [id number]      Subjective:     Reports new stabbing sensation over the dorsal lateral right ankle, no acute injury  This started a couple days ago  Her cat pushed a waterbottle with ice landing on this area  Hasn't done as much walking lately  Had trouble with chair aerobics  Tried to do active movement to get it working again  Even the sewing machine use was bothering it  Relates history of reconstruction of the right ankle following injury when wearing heels  Objective: See treatment diary below    Precautions - Hashimoto's, autoimmune disease    Pain with active ankle eversion  Range of motion within functional limits  Mild swelling about lateral ankle  Mildly tender about the posterior knee, somewhat sensitive to touch  Tried icing this        SciFit level 1, arms and legs 860 steps, 10 min, 1 rest break    Sit to stand from 20" surface, keeping feet under knees (allowing movement to terminal knee flexion), x 10  Walking focus on knee flexion in parallel bars, 3 min  Reports tightness in back of knee and lateral lower leg, stopped and stretched medial and lateral hamstrings 2 5 min  High step walking and stepping over 6" obstacles, focus on flexing the right knee and avoiding circumduction, 3 min    Step ups:  4" step, no railing, 1 5 min each side    Seated knee flexion stretching, to 95 degrees on right, 2 5 min  Standing right gastroc stretch, 1 5 min    Stair ambulation, focus on descending with toes pointed forwards, using railing, 10 steps x 2 sets  Ascending with right lower extremity leading, 10 steps x 2 sets    Bridge, knee flexion stretching, 10 x 2 sets    Mobility Measures 2020 5/26/21   Assistive device used? cane cane     5 Time Sit to Stand  (17" chair, arms across chest) Unable from 17" surface    17 8 seconds from 19" surface, hands out front    Unable from 19" surface without strong use of hands  Completes from 19" surface, unable from 17" surface     Completes laboriously from 17" surface without use of hands   3 Meter Timed  Up & Go 11 9 seconds 11 3 seconds     Walking speed Not tested      Functional Gait Assessment (see below) 22/30 Notes      6 Minute Walk Test (100 foot circular course)   750 feet    Walks with decreased excursion of the right knee   2 minute walk test 240 feet   400 feet 2 minute walk test 930 feet with 6 minute walk test   Patient-Reported Outcome Measure: Activities-Specific Balance Confidence Scale (ABC Scale) Not completed Not completed         96 degrees seated right knee flexion after 2 minutes stretching       ASSESSMENT:  Recommended against use of walking boot  We seem to do better with initiating some movement, benign as possible, such as use ef SciFit, then progressing into movements that require more knee and ankle movement  Recommend this sequence of some movement, stretching, more movement, stretching and then attempt to return to prior activity  SHORT-TERM GOALS: 1 month(s)  1  Shawn Meyer walks at least 10 minutes consecutively  Completes 30 minute walking video, but not directly assessed  2  Shawn Meyer increases passive right knee flexion to 100 degrees to allow reciprocal stair ambulation  Slowly progressed  3  Shawn Meyer reports 50% improvement in ability to walk grocery store distances without severe fatigue  MET  [facility] LONG-TERM GOALS: 2 month(s)  1  Patient reports at least 20 total minutes per day of overground walking for exercise  Doing trash and mail 6 days per week, about 10 minutes of walking, slight downhill and uphill  2  Patient independently manages home exercise program   PROGRESSED, PARTIALLY MET    3  Patient reports return to baseline with reading and small detail work  PLAN OF CARE:  Patient will benefit from physical therapy 1-2 times per week for 1 month  Neuromuscular re-education, therapeutic exercises, and therapeutic activities as outlined in grids

## 2021-06-04 ENCOUNTER — TELEPHONE (OUTPATIENT)
Dept: ENDOCRINOLOGY | Facility: CLINIC | Age: 61
End: 2021-06-04

## 2021-06-04 DIAGNOSIS — E11.9 TYPE 2 DIABETES MELLITUS WITHOUT COMPLICATION, WITHOUT LONG-TERM CURRENT USE OF INSULIN (HCC): Primary | ICD-10-CM

## 2021-06-04 NOTE — TELEPHONE ENCOUNTER
Upon review of the In Basket request we were able to locate, review, and update the patient chart as requested for Diabetic Foot Exam     Any additional questions or concerns should be emailed to the Practice Liaisons via [email or web address] email, please do not reply via In Basket      Thank you  Wes Keller MA

## 2021-06-04 NOTE — TELEPHONE ENCOUNTER
ASHLEIGH --    Pt called to report her blood sugars have been going down since she started exercising again  The only problem she is running into is sometimes when she check her sugars she can't get any blood out  She said this will happen every so often, so on the days this occurs she can't get a reading  I reviewed her technique and she seems to be doing it correctly  Her sugars have gone from 200 - 180 and this morning it was 151 after eating breakfast b/c she forgot to check before

## 2021-06-04 NOTE — TELEPHONE ENCOUNTER
OK, I am going to place an order for diabetes education for meter training if she needs more help  She can send a log in two weeks with readings if she can get results

## 2021-06-07 ENCOUNTER — OFFICE VISIT (OUTPATIENT)
Dept: FAMILY MEDICINE CLINIC | Facility: CLINIC | Age: 61
End: 2021-06-07
Payer: MEDICARE

## 2021-06-07 VITALS
HEART RATE: 93 BPM | TEMPERATURE: 96.9 F | OXYGEN SATURATION: 98 % | DIASTOLIC BLOOD PRESSURE: 104 MMHG | WEIGHT: 236.4 LBS | HEIGHT: 66 IN | BODY MASS INDEX: 37.99 KG/M2 | SYSTOLIC BLOOD PRESSURE: 162 MMHG

## 2021-06-07 DIAGNOSIS — D69.6 THROMBOCYTOPENIC DISORDER (HCC): ICD-10-CM

## 2021-06-07 DIAGNOSIS — G70.9 NEUROMUSCULAR DISORDER (HCC): ICD-10-CM

## 2021-06-07 DIAGNOSIS — Z00.00 HEALTHCARE MAINTENANCE: Primary | ICD-10-CM

## 2021-06-07 DIAGNOSIS — E66.9 OBESITY (BMI 30-39.9): ICD-10-CM

## 2021-06-07 DIAGNOSIS — I10 ESSENTIAL HYPERTENSION: ICD-10-CM

## 2021-06-07 DIAGNOSIS — E11.9 TYPE 2 DIABETES MELLITUS WITHOUT COMPLICATION, WITHOUT LONG-TERM CURRENT USE OF INSULIN (HCC): ICD-10-CM

## 2021-06-07 PROBLEM — E66.01 OBESITY, MORBID (HCC): Status: ACTIVE | Noted: 2021-06-07

## 2021-06-07 PROBLEM — E03.8 HYPOTHYROIDISM DUE TO HASHIMOTO'S THYROIDITIS: Status: ACTIVE | Noted: 2019-10-23

## 2021-06-07 PROBLEM — R03.0 ELEVATED BLOOD PRESSURE READING: Status: RESOLVED | Noted: 2017-09-15 | Resolved: 2021-06-07

## 2021-06-07 PROCEDURE — 99214 OFFICE O/P EST MOD 30 MIN: CPT | Performed by: FAMILY MEDICINE

## 2021-06-07 PROCEDURE — G0439 PPPS, SUBSEQ VISIT: HCPCS | Performed by: FAMILY MEDICINE

## 2021-06-07 RX ORDER — METFORMIN HYDROCHLORIDE 500 MG/1
500 TABLET, EXTENDED RELEASE ORAL
Qty: 30 TABLET | Refills: 5 | Status: SHIPPED | OUTPATIENT
Start: 2021-06-07 | End: 2021-07-01 | Stop reason: SDUPTHER

## 2021-06-07 RX ORDER — LOSARTAN POTASSIUM 50 MG/1
50 TABLET ORAL DAILY
Qty: 30 TABLET | Refills: 5 | Status: SHIPPED | OUTPATIENT
Start: 2021-06-07 | End: 2021-07-01 | Stop reason: SDUPTHER

## 2021-06-07 RX ORDER — MENTHOL 5 %
ADHESIVE PATCH, MEDICATED TOPICAL
COMMUNITY

## 2021-06-07 NOTE — PATIENT INSTRUCTIONS
Medicare Preventive Visit Patient Instructions  Thank you for completing your Welcome to Medicare Visit or Medicare Annual Wellness Visit today  Your next wellness visit will be due in one year (6/8/2022)  The screening/preventive services that you may require over the next 5-10 years are detailed below  Some tests may not apply to you based off risk factors and/or age  Screening tests ordered at today's visit but not completed yet may show as past due  Also, please note that scanned in results may not display below  Preventive Screenings:  Service Recommendations Previous Testing/Comments   Colorectal Cancer Screening  * Colonoscopy    * Fecal Occult Blood Test (FOBT)/Fecal Immunochemical Test (FIT)  * Fecal DNA/Cologuard Test  * Flexible Sigmoidoscopy Age: 54-65 years old   Colonoscopy: every 10 years (may be performed more frequently if at higher risk)  OR  FOBT/FIT: every 1 year  OR  Cologuard: every 3 years  OR  Sigmoidoscopy: every 5 years  Screening may be recommended earlier than age 48 if at higher risk for colorectal cancer  Also, an individualized decision between you and your healthcare provider will decide whether screening between the ages of 74-80 would be appropriate  Colonoscopy: 03/17/2020  FOBT/FIT: Not on file  Cologuard: Not on file  Sigmoidoscopy: Not on file    Screening Current     Breast Cancer Screening Age: 36 years old  Frequency: every 1-2 years  Not required if history of left and right mastectomy Mammogram: 07/01/2020    Screening Current   Cervical Cancer Screening Between the ages of 21-29, pap smear recommended once every 3 years  Between the ages of 33-67, can perform pap smear with HPV co-testing every 5 years     Recommendations may differ for women with a history of total hysterectomy, cervical cancer, or abnormal pap smears in past  Pap Smear: 11/15/2018    Screening Current   Hepatitis C Screening Once for adults born between 1945 and 1965  More frequently in patients at high risk for Hepatitis C Hep C Antibody: 11/17/2017    Screening Current   Diabetes Screening 1-2 times per year if you're at risk for diabetes or have pre-diabetes Fasting glucose: 142 mg/dL   A1C: 8 6 %    Screening Not Indicated  History Diabetes   Cholesterol Screening Once every 5 years if you don't have a lipid disorder  May order more often based on risk factors  Lipid panel: 05/25/2021    Screening Not Indicated  History Lipid Disorder     Other Preventive Screenings Covered by Medicare:  1  Abdominal Aortic Aneurysm (AAA) Screening: covered once if your at risk  You're considered to be at risk if you have a family history of AAA  2  Lung Cancer Screening: covers low dose CT scan once per year if you meet all of the following conditions: (1) Age 50-69; (2) No signs or symptoms of lung cancer; (3) Current smoker or have quit smoking within the last 15 years; (4) You have a tobacco smoking history of at least 30 pack years (packs per day multiplied by number of years you smoked); (5) You get a written order from a healthcare provider  3  Glaucoma Screening: covered annually if you're considered high risk: (1) You have diabetes OR (2) Family history of glaucoma OR (3)  aged 48 and older OR (3)  American aged 72 and older  3  Osteoporosis Screening: covered every 2 years if you meet one of the following conditions: (1) You're estrogen deficient and at risk for osteoporosis based off medical history and other findings; (2) Have a vertebral abnormality; (3) On glucocorticoid therapy for more than 3 months; (4) Have primary hyperparathyroidism; (5) On osteoporosis medications and need to assess response to drug therapy  · Last bone density test (DXA Scan): 11/17/2017  5  HIV Screening: covered annually if you're between the age of 12-76  Also covered annually if you are younger than 13 and older than 72 with risk factors for HIV infection   For pregnant patients, it is covered up to 3 times per pregnancy  Immunizations:  Immunization Recommendations   Influenza Vaccine Annual influenza vaccination during flu season is recommended for all persons aged >= 6 months who do not have contraindications   Pneumococcal Vaccine (Prevnar and Pneumovax)  * Prevnar = PCV13  * Pneumovax = PPSV23   Adults 25-60 years old: 1-3 doses may be recommended based on certain risk factors  Adults 72 years old: Prevnar (PCV13) vaccine recommended followed by Pneumovax (PPSV23) vaccine  If already received PPSV23 since turning 65, then PCV13 recommended at least one year after PPSV23 dose  Hepatitis B Vaccine 3 dose series if at intermediate or high risk (ex: diabetes, end stage renal disease, liver disease)   Tetanus (Td) Vaccine - COST NOT COVERED BY MEDICARE PART B Following completion of primary series, a booster dose should be given every 10 years to maintain immunity against tetanus  Td may also be given as tetanus wound prophylaxis  Tdap Vaccine - COST NOT COVERED BY MEDICARE PART B Recommended at least once for all adults  For pregnant patients, recommended with each pregnancy  Shingles Vaccine (Shingrix) - COST NOT COVERED BY MEDICARE PART B  2 shot series recommended in those aged 48 and above     Health Maintenance Due:      Topic Date Due    HIV Screening  Never done    MAMMOGRAM  07/01/2021    Cervical Cancer Screening  11/15/2023    Colorectal Cancer Screening  03/17/2030    Hepatitis C Screening  Completed     Immunizations Due:  There are no preventive care reminders to display for this patient  Advance Directives   What are advance directives? Advance directives are legal documents that state your wishes and plans for medical care  These plans are made ahead of time in case you lose your ability to make decisions for yourself  Advance directives can apply to any medical decision, such as the treatments you want, and if you want to donate organs  What are the types of advance directives? There are many types of advance directives, and each state has rules about how to use them  You may choose a combination of any of the following:  · Living will: This is a written record of the treatment you want  You can also choose which treatments you do not want, which to limit, and which to stop at a certain time  This includes surgery, medicine, IV fluid, and tube feedings  · Durable power of  for healthcare [facility]): This is a written record that states who you want to make healthcare choices for you when you are unable to make them for yourself  This person, called a proxy, is usually a family member or a friend  You may choose more than 1 proxy  · Do not resuscitate (DNR) order:  A DNR order is used in case your heart stops beating or you stop breathing  It is a request not to have certain forms of treatment, such as CPR  A DNR order may be included in other types of advance directives  · Medical directive: This covers the care that you want if you are in a coma, near death, or unable to make decisions for yourself  You can list the treatments you want for each condition  Treatment may include pain medicine, surgery, blood transfusions, dialysis, IV or tube feedings, and a ventilator (breathing machine)  · Values history: This document has questions about your views, beliefs, and how you feel and think about life  This information can help others choose the care that you would choose  Why are advance directives important? An advance directive helps you control your care  Although spoken wishes may be used, it is better to have your wishes written down  Spoken wishes can be misunderstood, or not followed  Treatments may be given even if you do not want them  An advance directive may make it easier for your family to make difficult choices about your care     Weight Management   Why it is important to manage your weight:  Being overweight increases your risk of health conditions such as heart disease, high blood pressure, type 2 diabetes, and certain types of cancer  It can also increase your risk for osteoarthritis, sleep apnea, and other respiratory problems  Aim for a slow, steady weight loss  Even a small amount of weight loss can lower your risk of health problems  How to lose weight safely:  A safe and healthy way to lose weight is to eat fewer calories and get regular exercise  You can lose up about 1 pound a week by decreasing the number of calories you eat by 500 calories each day  Healthy meal plan for weight management:  A healthy meal plan includes a variety of foods, contains fewer calories, and helps you stay healthy  A healthy meal plan includes the following:  · Eat whole-grain foods more often  A healthy meal plan should contain fiber  Fiber is the part of grains, fruits, and vegetables that is not broken down by your body  Whole-grain foods are healthy and provide extra fiber in your diet  Some examples of whole-grain foods are whole-wheat breads and pastas, oatmeal, brown rice, and bulgur  · Eat a variety of vegetables every day  Include dark, leafy greens such as spinach, kale, mera greens, and mustard greens  Eat yellow and orange vegetables such as carrots, sweet potatoes, and winter squash  · Eat a variety of fruits every day  Choose fresh or canned fruit (canned in its own juice or light syrup) instead of juice  Fruit juice has very little or no fiber  · Eat low-fat dairy foods  Drink fat-free (skim) milk or 1% milk  Eat fat-free yogurt and low-fat cottage cheese  Try low-fat cheeses such as mozzarella and other reduced-fat cheeses  · Choose meat and other protein foods that are low in fat  Choose beans or other legumes such as split peas or lentils  Choose fish, skinless poultry (chicken or turkey), or lean cuts of red meat (beef or pork)  Before you cook meat or poultry, cut off any visible fat  · Use less fat and oil    Try baking foods instead of frying them  Add less fat, such as margarine, sour cream, regular salad dressing and mayonnaise to foods  Eat fewer high-fat foods  Some examples of high-fat foods include french fries, doughnuts, ice cream, and cakes  · Eat fewer sweets  Limit foods and drinks that are high in sugar  This includes candy, cookies, regular soda, and sweetened drinks  Exercise:  Exercise at least 30 minutes per day on most days of the week  Some examples of exercise include walking, biking, dancing, and swimming  You can also fit in more physical activity by taking the stairs instead of the elevator or parking farther away from stores  Ask your healthcare provider about the best exercise plan for you  Narcotic (Opioid) Safety    Use narcotics safely:  · Take prescribed narcotics exactly as directed  · Do not give narcotics to others or take narcotics that belong to someone else  · Do not mix narcotics without medicines or alcohol  · Do not drive or operate heavy machinery after you take the narcotic  · Monitor for side effects and notify your healthcare provider if you experienced side effects such as nausea, sleepiness, itching, or trouble thinking clearly  Manage constipation:    Constipation is the most common side effect of narcotic medicine  Constipation is when you have hard, dry bowel movements, or you go longer than usual between bowel movements  Tell your healthcare provider about all changes in your bowel movements while you are taking narcotics  He or she may recommend laxative medicine to help you have a bowel movement  He or she may also change the kind of narcotic you are taking, or change when you take it  The following are more ways you can prevent or relieve constipation:    · Drink liquids as directed  You may need to drink extra liquids to help soften and move your bowels  Ask how much liquid to drink each day and which liquids are best for you  · Eat high-fiber foods    This may help decrease constipation by adding bulk to your bowel movements  High-fiber foods include fruits, vegetables, whole-grain breads and cereals, and beans  Your healthcare provider or dietitian can help you create a high-fiber meal plan  Your provider may also recommend a fiber supplement if you cannot get enough fiber from food  · Exercise regularly  Regular physical activity can help stimulate your intestines  Walking is a good exercise to prevent or relieve constipation  Ask which exercises are best for you  · Schedule a time each day to have a bowel movement  This may help train your body to have regular bowel movements  Bend forward while you are on the toilet to help move the bowel movement out  Sit on the toilet for at least 10 minutes, even if you do not have a bowel movement  Store narcotics safely:   · Store narcotics where others cannot easily get them  Keep them in a locked cabinet or secure area  Do not  keep them in a purse or other bag you carry with you  A person may be looking for something else and find the narcotics  · Make sure narcotics are stored out of the reach of children  A child can easily overdose on narcotics  Narcotics may look like candy to a small child  The best way to dispose of narcotics: The laws vary by country and area  In the [facility], the best way is to return the narcotics through a take-back program  This program is offered by the [facility])  The following are options for using the program:  · Take the narcotics to a [place] collection site  The site is often a law enforcement center  Call your local law enforcement center for scheduled take-back days in your area  You will be given information on where to go if the collection site is in a different location  · Take the narcotics to an approved pharmacy or hospital   A pharmacy or hospital may be set up as a collection site  You will need to ask if it is a [facility] collection site if you were not directed there   A pharmacy or doctor's office may not be able to take back narcotics unless it is a [place] site  · Use a mail-back system  This means you are given containers to put the narcotics into  You will then mail them in the containers  · Use a take-back drop box  This is a place to leave the narcotics at any time  People and animals will not be able to get into the box  Your local law enforcement agency can tell you where to find a drop box in your area  Other ways to manage pain:   · Ask your healthcare provider about non-narcotic medicines to control pain  Nonprescription medicines include NSAIDs (such as ibuprofen) and acetaminophen  Prescription medicines include muscle relaxers, antidepressants, and steroids  · Pain may be managed without any medicines  Some ways to relieve pain include massage, aromatherapy, or meditation  Physical or occupational therapy may also help  For more information:   · Drug Enforcement Administration  [address] [place] [address] [address]  Phone: 8- 513 - [phone]  Web Address: [facility]/drug_disposal/    · [address]  and Drug Administration  [facility] , [address] [place] [address]  Phone: 3- 524 - 670-1466  Web Address: http://[employer]/     © Copyright [employer] 2018 Information is for End User's use only and may not be sold, redistributed or otherwise used for commercial purposes   All illustrations and images included in CareNotes® are the copyrighted property of [employer]  or [address] [place] [address] [facility]
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
## 2021-06-09 ENCOUNTER — OFFICE VISIT (OUTPATIENT)
Dept: PHYSICAL THERAPY | Facility: REHABILITATION | Age: 61
End: 2021-06-09
Payer: MEDICARE

## 2021-06-09 DIAGNOSIS — R29.898 LEG WEAKNESS, BILATERAL: ICD-10-CM

## 2021-06-09 DIAGNOSIS — R26.89 IMBALANCE: ICD-10-CM
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
## 2021-06-09 PROCEDURE — 97110 THERAPEUTIC EXERCISES: CPT | Performed by: PHYSICAL THERAPIST

## 2021-06-09 PROCEDURE — 97112 NEUROMUSCULAR REEDUCATION: CPT | Performed by: PHYSICAL THERAPIST

## 2021-06-09 NOTE — PROGRESS NOTES
Treatment Note     Today's date: 2021  Patient name: Genaro Nichols  : 1960  MRN: [id number]  Referring provider: Yuri Mckeon DO  Dx:   Encounter Diagnosis     ICD-10-CM    1  Fall, subsequent encounter  W19  XXXD    2  Imbalance  R26 89    3  Leg weakness, bilateral  R29 898      Subjective:     Relates episode when walking in a store, felt the right lower leg, leg, shoulder cramp up and had a headache  Lasted about 15 minutes, needing to sit down  Sweats and feels dehydrated  Put on low dose blood pressure medication at primary care physician's office last week  Objective: See treatment diary below    Precautions - Hashimoto's, autoimmune disease    /73, 81 bpm    Overground walking 200 feet    Stopping and stretching the right knee into flexion and extension, 2 min    Overground walking without assistive device, 400 feet, improved knee flexion on the right during swing phase gait    Heel raises in incline, 1 min x 2 sets  gastroc stretch, standing, 1 min    Sit to stand from 19" surface, keeping feet under knees (allowing movement to terminal knee flexion), x 10    Step ups:  4" step, no railing, 1 25 min each side  Step ups 6" step, 1 min each    Step over 2" obstacles, 2 5 min  Same laterally, cues to flex knee, 2 min    SciFit level 1 3, arms and legs, 10 min      Mobility Measures 2020   Assistive device used? cane cane     5 Time Sit to Stand  (17" chair, arms across chest) Unable from 17" surface    17 8 seconds from 19" surface, hands out front    Unable from 19" surface without strong use of hands   Completes from 19" surface, unable from 17" surface     Completes laboriously from 17" surface without use of hands   3 Meter Timed  Up & Go 11 9 seconds 11 3 seconds     Walking speed Not tested      Functional Gait Assessment (see below)  Notes      6 Minute Walk Test (100 foot circular course)   750 feet    Walks with decreased excursion of the right knee   2 minute walk test 240 feet   400 feet 2 minute walk test 930 feet with 6 minute walk test   Patient-Reported Outcome Measure: Activities-Specific Balance Confidence Scale (ABC Scale) Not completed Not completed         96 degrees seated right knee flexion after 2 minutes stretching       ASSESSMENT:  Unsure what caused symptoms when shopping the other day  Laron Peres pretty consistently does better with moving her ankle, hip and knee when she's is able to stretch these to accompany physical activity  Recommend continuing walking for terri      SHORT-TERM GOALS: 1 month(s)  1  Laron Peres walks at least 10 minutes consecutively  Completes 30 minute walking video, but not directly assessed  2  Laron Peres increases passive right knee flexion to 100 degrees to allow reciprocal stair ambulation  Slowly progressed  3  Laron Peres reports 50% improvement in ability to walk grocery store distances without severe fatigue  MET  [facility] LONG-TERM GOALS: 2 month(s)  1  Patient reports at least 20 total minutes per day of overground walking for exercise  Doing trash and mail 6 days per week, about 10 minutes of walking, slight downhill and uphill  2  Patient independently manages home exercise program   PROGRESSED, PARTIALLY MET  3  Patient reports return to baseline with reading and small detail work  PLAN OF CARE:  Patient will benefit from physical therapy 1-2 times per week for 1 month  Neuromuscular re-education, therapeutic exercises, and therapeutic activities as outlined in grids

## 2021-06-11 PROBLEM — E66.01 OBESITY, MORBID (HCC): Status: RESOLVED | Noted: 2021-06-07 | Resolved: 2021-06-11

## 2021-06-11 PROBLEM — I10 ESSENTIAL HYPERTENSION: Status: ACTIVE | Noted: 2021-06-11

## 2021-06-11 NOTE — PROGRESS NOTES
Assessment/Plan:     Medicare wellness completed  Patient has living will and power-of-  Blood pressure quite elevated today  Patient states that is because she is in pain  She will start losartan 50 mg daily  Recommend check blood pressures twice daily and call back in 2 weeks  Needs follow-up office visit in 5-6 weeks with repeat BMP at that time  Patient will continue to follow with [facility] with regards to her undiagnosed neuromuscular disorder  A1c unfortunately is at the 0 6  Patient will start metformin once a day and then increase to twice daily  Needs repeat  A1c in 3 months with office visit  LDL cholesterol is 85 on no statin  Concerned about starting statin on someone who is has chronic on diagnosis neuromuscular issues  Recommend yearly diabetic eye and foot examination  Patient will continue with physical therapy  Diagnoses and all orders for this visit:    Healthcare maintenance    Type 2 diabetes mellitus without complication, without long-term current use of insulin (HCC)  -     metFORMIN (GLUCOPHAGE-XR) 500 mg 24 hr tablet; Take 1 tablet (500 mg total) by mouth daily with dinner    Neuromuscular disorder (HCC)    Thrombocytopenic disorder (HCC)    Essential hypertension  -     losartan (COZAAR) 50 mg tablet; Take 1 tablet (50 mg total) by mouth daily    Obesity (BMI 30-39  9)    Other orders  -     Cancel: HIV 1/2 Antigen/Antibody (4th Generation) w Reflex [facility]; Future  -     Cancel: Hemoglobin A1C ([facility]); Future  -     Menthol (Icy Hot Back) 5 % PTCH; Apply topically        1  Healthcare maintenance     2  Type 2 diabetes mellitus without complication, without long-term current use of insulin (HCC)  metFORMIN (GLUCOPHAGE-XR) 500 mg 24 hr tablet   3  Neuromuscular disorder ([address] )     4  Thrombocytopenic disorder ([address] )     5  Essential hypertension  losartan (COZAAR) 50 mg tablet   6  Obesity (BMI 30-39  9)         Subjective:        Patient ID: Cherelle Kilpatrick is a 59 y o  female  Chief Complaint   Patient presents with    Medicare Wellness Visit    Follow-up         66-year-old obese white female here for Medicare wellness and review of medical issues  Sees neurological specialist at [facility] for many years  Questionable mitochondrial disease versus autoimmune  Previous autoimmune evaluation through Rheumatology many years ago came back negative  Patient states she is in chronic pain  Mentions mostly in her right leg  Uses assistive device to walk  Jerilee Punches a number of weeks back  X-rays and bone scans were needed to be done  Radiology recommended CT scans to rule out occult malignancy which came back negative  The following portions of the patient's history were reviewed and updated as appropriate: past medical history, past surgical history and problem list       Review of Systems   Constitutional: Negative for appetite change, fatigue, fever and unexpected weight change  HENT: Negative for congestion, ear pain, postnasal drip, rhinorrhea, sinus pressure, sinus pain and sore throat  Eyes: Negative for redness and visual disturbance  Respiratory: Negative for chest tightness and shortness of breath  Cardiovascular: Negative for chest pain, palpitations and leg swelling  Gastrointestinal: Negative for abdominal distention, abdominal pain, diarrhea and nausea  Endocrine: Negative for cold intolerance and heat intolerance  Genitourinary: Negative for dysuria and hematuria  Musculoskeletal: Positive for gait problem and myalgias  Negative for arthralgias  Skin: Negative for pallor and rash  Neurological: Negative for dizziness and headaches  Psychiatric/Behavioral: Negative for behavioral problems  The patient is not nervous/anxious           Stable depression         Objective:  BP (!) 162/104   Pulse 93   Temp (!) 96 9 °F (36 1 °C) (Temporal)   Ht 5' 6" (1 676 m)   Wt 107 kg (236 lb 6 4 oz)   SpO2 98%   BMI 38 16 kg/m²        Physical Exam  Vitals signs and nursing note reviewed  Constitutional:       General: She is not in acute distress  Appearance: Normal appearance  She is obese  She is not diaphoretic  HENT:      Head: Normocephalic and atraumatic  Eyes:      General: No scleral icterus  Conjunctiva/sclera: Conjunctivae normal       Pupils: Pupils are equal, round, and reactive to light  Neck:      Musculoskeletal: Normal range of motion and neck supple  Thyroid: No thyromegaly  Cardiovascular:      Rate and Rhythm: Normal rate and regular rhythm  Pulses:           Carotid pulses are 0 on the right side and 0 on the left side  Heart sounds: Normal heart sounds  No murmur  Pulmonary:      Effort: Pulmonary effort is normal  No respiratory distress  Breath sounds: Normal breath sounds  No wheezing  Abdominal:      General: There is no distension  Palpations: Abdomen is soft  Musculoskeletal:      Comments: Gait abnormality   Lymphadenopathy:      Cervical: No cervical adenopathy  Skin:     General: Skin is warm  Coloration: Skin is not pale  Neurological:      Mental Status: She is alert and oriented to person, place, and time  Cranial Nerves: No cranial nerve deficit  Deep Tendon Reflexes: Reflexes are normal and symmetric  Psychiatric:         Behavior: Behavior normal          Thought Content:  Thought content normal          Judgment: Judgment normal

## 2021-06-16 ENCOUNTER — OFFICE VISIT (OUTPATIENT)
Dept: PHYSICAL THERAPY | Facility: REHABILITATION | Age: 61
End: 2021-06-16
Payer: MEDICARE

## 2021-06-16 DIAGNOSIS — R26.89 IMBALANCE: ICD-10-CM

## 2021-06-16 DIAGNOSIS — R29.898 LEG WEAKNESS, BILATERAL: ICD-10-CM
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
## 2021-06-16 PROCEDURE — 97112 NEUROMUSCULAR REEDUCATION: CPT | Performed by: PHYSICAL THERAPIST

## 2021-06-16 PROCEDURE — 97110 THERAPEUTIC EXERCISES: CPT | Performed by: PHYSICAL THERAPIST

## 2021-06-16 NOTE — PROGRESS NOTES
Treatment Note     Today's date: 2021  Patient name: Carmine Galeana  : 1960  MRN: [id number]  Referring provider: Roel Cotto DO  Dx:   Encounter Diagnosis     ICD-10-CM    1  Fall, subsequent encounter  W19  XXXD    2  Imbalance  R26 89    3  Leg weakness, bilateral  R29 898      Subjective:     Continued right ankle pain  Went to podiatrist, reports ingrown toenail is fine  Did a lot of walking this past weekend helping at sister's horse show  Was on her feet a lot, walking up and down the aisles, giving treats and such  Sweeping hurt her right shoulder  Objective: See treatment diary below    Precautions - Hashimoto's, autoimmune disease    No pitting edema distal foot or lower leg  Wearing ace wrap and soft brace about ankle  Walking with mild decrease in right knee flexion during swing phase gait, improved over past 2-3 weeks  Overground walking 400 feet  Step over 2" obstacles, 2 5 min  Tap heel to 4" obstacle 2 min        Stopping and stretching the right knee into flexion and extension, 2 min    Overground walking without assistive device, 400 feet, improved knee flexion on the right during swing phase gait    Heel raises in incline, 1 min x 2 sets  gastroc stretch, standing, 1 min    Sit to stand from 19" surface, keeping feet under knees (allowing movement to terminal knee flexion), x 10    Step ups:  4" step, no railing, 1 25 min each side  Step ups 6" step, 1 min each      SciFit level 1 3, arms and legs, 10 min      Mobility Measures 2020   Assistive device used? cane cane     5 Time Sit to Stand  (17" chair, arms across chest) Unable from 17" surface    17 8 seconds from 19" surface, hands out front    Unable from 19" surface without strong use of hands   Completes from 19" surface, unable from 17" surface     Completes laboriously from 17" surface without use of hands   3 Meter Timed  Up & Go 11 9 seconds 11 3 seconds     Walking speed Not tested      Functional Gait Assessment (see below) 22/30 Notes      6 Minute Walk Test (100 foot circular course)   750 feet    Walks with decreased excursion of the right knee   2 minute walk test 240 feet   400 feet 2 minute walk test 930 feet with 6 minute walk test   Patient-Reported Outcome Measure: Activities-Specific Balance Confidence Scale (ABC Scale) Not completed Not completed         96 degrees seated right knee flexion after 2 minutes stretching       ASSESSMENT:  Unsure what caused symptoms when shopping the other day  Yaima Soriano pretty consistently does better with moving her ankle, hip and knee when she's is able to stretch these to accompany physical activity  Recommend continuing walking for terri      SHORT-TERM GOALS: 1 month(s)  1  Yaima Soriano walks at least 10 minutes consecutively  Completes 30 minute walking video, but not directly assessed  2  Yaima Soriano increases passive right knee flexion to 100 degrees to allow reciprocal stair ambulation  Slowly progressed  3  Yaima Soriano reports 50% improvement in ability to walk grocery store distances without severe fatigue  MET  Dara Soulier LONG-TERM GOALS: 2 month(s)  1  Patient reports at least 20 total minutes per day of overground walking for exercise  Doing trash and mail 6 days per week, about 10 minutes of walking, slight downhill and uphill  2  Patient independently manages home exercise program   PROGRESSED, PARTIALLY MET  3  Patient reports return to baseline with reading and small detail work  PLAN OF CARE:  Patient will benefit from physical therapy 1-2 times per week for 1 month  Neuromuscular re-education, therapeutic exercises, and therapeutic activities as outlined in grids

## 2021-06-17 DIAGNOSIS — E55.9 VITAMIN D DEFICIENCY: ICD-10-CM

## 2021-06-17 RX ORDER — ERGOCALCIFEROL 1.25 MG/1
CAPSULE ORAL
Qty: 4 CAPSULE | Refills: 10 | Status: SHIPPED | OUTPATIENT
Start: 2021-06-17 | End: 2022-05-23

## 2021-06-21 ENCOUNTER — TELEPHONE (OUTPATIENT)
Dept: FAMILY MEDICINE CLINIC | Facility: CLINIC | Age: 61
End: 2021-06-21

## 2021-06-21 NOTE — TELEPHONE ENCOUNTER
Tell the patient unfortunately insurance will not pay for Lidoderm patches on less it's due to history of shingles

## 2021-06-21 NOTE — TELEPHONE ENCOUNTER
Pt called her bs numbers have been running 128 and 156 during the week  Her bp is 128-156/80  Her BP raises during pain  She has pain in her foot step  She is wondering if she needs to raise medication at all or try Lidocane patches instead of the ibprophen as much as she is taking them and the oxycodone at night  Please advise

## 2021-06-21 NOTE — TELEPHONE ENCOUNTER
I would ask the patient to try to control her pain better    Have her try the Lidoderm patches 1st and give us an update later in 2 week as half how her blood pressures are doing

## 2021-06-23 ENCOUNTER — OFFICE VISIT (OUTPATIENT)
Dept: PHYSICAL THERAPY | Facility: REHABILITATION | Age: 61
End: 2021-06-23
Payer: MEDICARE
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
## 2021-06-23 DIAGNOSIS — R26.89 IMBALANCE: ICD-10-CM

## 2021-06-23 DIAGNOSIS — R29.898 LEG WEAKNESS, BILATERAL: ICD-10-CM

## 2021-06-23 PROCEDURE — 97112 NEUROMUSCULAR REEDUCATION: CPT | Performed by: PHYSICAL THERAPIST

## 2021-06-23 PROCEDURE — 97110 THERAPEUTIC EXERCISES: CPT | Performed by: PHYSICAL THERAPIST

## 2021-06-23 NOTE — PROGRESS NOTES
RE-EVALUATION / Treatment Note     Today's date: 2021  Patient name: Eula Phillips  : 1960  MRN: [id number]  Referring provider: John Parikh DO  Dx:   Encounter Diagnosis     ICD-10-CM    1  Fall, subsequent encounter  W19  XXXD    2  Imbalance  R26 89    3  Leg weakness, bilateral  R29 898      Subjective:   Eddie Mann reports she did more walking than normal on Monday as she felt good, went to a dermatology appointment and 3 stores, felt her legs tighten up after this and have been stiff since that time  Tried heat, icing, stretching, will use lidocaine patch  Describes walking in stores for upwards of a half-hour per store  Patient goals: walking at a normal pace without cane (not using prior to house), grocery shop without significant pain when completed, dancing at niece's wedding in 2022  Objective: See treatment diary below    Precautions - Hashimoto's, autoimmune disease    Walks with mild decrease in right knee flexion during right swing phase gait, requiring mild left lateral trunk lean and very mild circumduction at times  Stiff about the knees with initial movement to stand  Mobility Measures 2020   Assistive device used? cane cane      5 Time Sit to Stand  (17" chair, arms across chest) Unable from 17" surface    17 8 seconds from 19" surface, hands out front    Unable from 19" surface without strong use of hands   Completes from 19" surface, unable from 17" surface     Completes laboriously from 17" surface without use of hands Completes from 19" surface in 16 seconds (5 times), unable from 17" surface without use of hands   3 Meter Timed  Up & Go 11 9 seconds 11 3 seconds      Walking speed Not tested       Functional Gait Assessment (see below)  Notes       6 Minute Walk Test (100 foot circular course)   750 feet    Walks with decreased excursion of the right knee   2 minute walk test 240 feet   400 feet 2 minute walk test 930 feet with 6 minute walk test 920 feet, pain about left posterior distal thigh, medial lower leg, and right lateral lower leg   Patient-Reported Outcome Measure: Activities-Specific Balance Confidence Scale (ABC Scale) Not completed Not completed          96 degrees seated right knee flexion after 2 minutes stretching 96 degrees after 1 minute of stretching     SciFit level 1, 6 minutes, arms and legs, about 70s steps/minute    sidelying ITB stretching, 1 5 min x 2 each side, held by therapist and then performed independently by patient    Seated right knee flexion stretching, 1 min    Overground walking 300 feet x 2 sets, focus on knee flexion and stepping over obstacles    Step ups 4" step 1 min each side      ASSESSMENT:  Continues with frequent generalized tightness about her lower extremity joints, on either side, and continued right knee stiffness in respect to knee flexion  She is better able to transition to standing keeping weight on the right, and although we see intermittent setbacks (such as on 6/21/21), she is trending towards more physical activity  As in treatment today, we do better with some warm-up movement and stretching what is tight, prior to aerobic activity or walking  We initiated a new type of stretch today but generally we are managing these setbacks more independently  Recommend continued physical therapy to maximize mobility  SHORT-TERM GOALS: 1 month(s)  1  Yaima Soriano walks at least 10 minutes consecutively  Completes 30 minute walking video, but not directly assessed  2  Yaima Soriano increases passive right knee flexion to 100 degrees to allow reciprocal stair ambulation  NOT MET  3  Yaima Soriano reports 50% improvement in ability to walk grocery store distances without severe fatigue  MET  LONG-TERM GOALS: 2 month(s)  1  Patient reports at least 20 total minutes per day of overground walking for exercise    Doing trash and mail 6 days per week, about 10 minutes of walking, slight downhill and uphill  2  Patient independently manages home exercise program   PROGRESSED, PARTIALLY MET  3  Patient reports return to baseline with reading and small detail work  PLAN OF CARE:  Patient will benefit from physical therapy 1-2 times per week for 1 month  Neuromuscular re-education, therapeutic exercises, and therapeutic activities as outlined in grids

## 2021-06-30 ENCOUNTER — OFFICE VISIT (OUTPATIENT)
Dept: PHYSICAL THERAPY | Facility: REHABILITATION | Age: 61
End: 2021-06-30
Payer: MEDICARE

## 2021-06-30 DIAGNOSIS — R26.89 IMBALANCE: ICD-10-CM

## 2021-06-30 DIAGNOSIS — R29.898 LEG WEAKNESS, BILATERAL: ICD-10-CM
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
## 2021-06-30 PROCEDURE — 97110 THERAPEUTIC EXERCISES: CPT | Performed by: PHYSICAL THERAPIST

## 2021-06-30 PROCEDURE — 97112 NEUROMUSCULAR REEDUCATION: CPT | Performed by: PHYSICAL THERAPIST

## 2021-06-30 NOTE — PROGRESS NOTES
Treatment Note     Today's date: 2021  Patient name: Anuradha Sorensen  : 1960  MRN: [id number]  Referring provider: Keon Locke DO  Dx:   Encounter Diagnosis     ICD-10-CM    1  Fall, subsequent encounter  W19  XXXD    2  Imbalance  R26 89    3  Leg weakness, bilateral  R29 898      Subjective:     Julian Ramirez reports putting a lidocaine patch onto the cut on the right lateral lower leg made her leg feel better  No really prolonged walking over the past week  Doing walking exercise videos  Patient goals: walking at a normal pace without cane (not using prior to house), grocery shop without significant pain when completed, dancing at niece's wedding in 2022  Patient-Specific Functional Scale   Task is scored 0 (unable to perform activity) to 10 (ability to perform activity independently)  Activity 21    1  Walk at normal pace without a cane 8/10    2  Grocery shop without significant pain 5/10    3  Dance 3/10      Objective: See treatment diary below    Precautions - Hashimoto's, autoimmune disease      Mobility Measures 2020    Assistive device used? cane cane       5 Time Sit to Stand  (17" chair, arms across chest) Unable from 17" surface    17 8 seconds from 19" surface, hands out front    Unable from 19" surface without strong use of hands   Completes from 19" surface, unable from 17" surface     Completes laboriously from 17" surface without use of hands Completes from 19" surface in 16 seconds (5 times), unable from 17" surface without use of hands    3 Meter Timed  Up & Go 11 9 seconds 11 3 seconds       Walking speed Not tested        Functional Gait Assessment (see below)  Notes        6 Minute Walk Test (100 foot circular course)   750 feet    Walks with decreased excursion of the right knee   2 minute walk test 240 feet   400 feet 2 minute walk test 930 feet with 6 minute walk test 920 feet, pain about left posterior distal thigh, medial lower leg, and right lateral lower leg    Patient-Reported Outcome Measure: Activities-Specific Balance Confidence Scale (ABC Scale) Not completed Not completed           96 degrees seated right knee flexion after 2 minutes stretching 96 degrees after 1 minute of stretching      Overground walking 600 feet  Walking on treadmill with incline 9%, 1 0 mph, 7 min  Backwards walking on treadmill, focus again on knee flexion, 0 4 mph, 3 min    Overground walking, 100 feet as fast as possible, best 30 seconds, x 3     Dance steps:  Square stepping, focus on bending knee, 1 min x 3 sets  Braiding walking, 1 5 min    Heel raise, single leg, 1 5 min total  Standing hip abduction, orange band, 1 5 min total  Step up 6" step, 1 min each side    SciFit level 1, 7 minutes, arms and legs, about 90s steps/minute      ASSESSMENT:  Good improvement in bending the knee while walking, reinforced with treadmill walking and dance steps  Tried focusing more on specific patient goals, try faster walking at home and continue to work on dance steps (braiding at 105 bpm, square stepping at 120 bpm)  SHORT-TERM GOALS: 1 month(s)  1  Mario Jimenez walks at least 10 minutes consecutively  Completes 30 minute walking video, but not directly assessed  2  Mario Jimenez increases passive right knee flexion to 100 degrees to allow reciprocal stair ambulation  NOT MET  3  Mario Jimenez reports 50% improvement in ability to walk grocery store distances without severe fatigue  MET  LONG-TERM GOALS: 2 month(s)  1  Patient reports at least 20 total minutes per day of overground walking for exercise  Doing trash and mail 6 days per week, about 10 minutes of walking, slight downhill and uphill  2  Patient independently manages home exercise program   PROGRESSED, PARTIALLY MET  3  Patient reports return to baseline with reading and small detail work      PLAN OF CARE:  Patient will benefit from physical therapy 1-2 times per week for 1 month  Neuromuscular re-education, therapeutic exercises, and therapeutic activities as outlined in grids

## 2021-07-01 DIAGNOSIS — E11.9 TYPE 2 DIABETES MELLITUS WITHOUT COMPLICATION, WITHOUT LONG-TERM CURRENT USE OF INSULIN (HCC): ICD-10-CM

## 2021-07-01 DIAGNOSIS — I10 ESSENTIAL HYPERTENSION: ICD-10-CM

## 2021-07-01 RX ORDER — METFORMIN HYDROCHLORIDE 500 MG/1
500 TABLET, EXTENDED RELEASE ORAL
Qty: 90 TABLET | Refills: 3 | Status: SHIPPED | OUTPATIENT
Start: 2021-07-01 | End: 2021-09-27 | Stop reason: SDUPTHER

## 2021-07-01 RX ORDER — LOSARTAN POTASSIUM 50 MG/1
50 TABLET ORAL DAILY
Qty: 90 TABLET | Refills: 3 | Status: SHIPPED | OUTPATIENT
Start: 2021-07-01 | End: 2022-06-23

## 2021-07-01 NOTE — TELEPHONE ENCOUNTER
Call from [facility] requesting refill for patient 90 day supply of Cozaar 50 mg and Metformin 500 mg sent to [facility]

## 2021-07-07 ENCOUNTER — OFFICE VISIT (OUTPATIENT)
Dept: PHYSICAL THERAPY | Facility: REHABILITATION | Age: 61
End: 2021-07-07
Payer: MEDICARE
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
## 2021-07-07 DIAGNOSIS — R26.89 IMBALANCE: ICD-10-CM

## 2021-07-07 DIAGNOSIS — R29.898 LEG WEAKNESS, BILATERAL: ICD-10-CM

## 2021-07-07 PROCEDURE — 97112 NEUROMUSCULAR REEDUCATION: CPT | Performed by: PHYSICAL THERAPIST

## 2021-07-07 PROCEDURE — 97110 THERAPEUTIC EXERCISES: CPT | Performed by: PHYSICAL THERAPIST

## 2021-07-07 NOTE — PROGRESS NOTES
Treatment Note     Today's date: 2021  Patient name: Ingrid Balderrama  : 1960  MRN: [id number]  Referring provider: Adrien Bailey DO  Dx:   Encounter Diagnosis     ICD-10-CM    1  Fall, subsequent encounter  W19  XXXD    2  Imbalance  R26 89    3  Leg weakness, bilateral  R29 898      Subjective:   Doing okay with mobility today, some tightness  Does worse with adverse weather  Does walking videos sometimes, gets repetitive  Patient goals: walking at a normal pace without cane (not using prior to house), grocery shop without significant pain when completed, dancing at niece's wedding in 2022  Patient-Specific Functional Scale   Task is scored 0 (unable to perform activity) to 10 (ability to perform activity independently)  Activity 21    1  Walk at normal pace without a cane 8/10    2  Grocery shop without significant pain 5/10    3  Dance 3/10      Objective: See treatment diary below    Precautions - Hashimoto's, autoimmune disease      Mobility Measures 2020    Assistive device used? cane cane       5 Time Sit to Stand  (17" chair, arms across chest) Unable from 17" surface    17 8 seconds from 19" surface, hands out front    Unable from 19" surface without strong use of hands   Completes from 19" surface, unable from 17" surface     Completes laboriously from 17" surface without use of hands Completes from 19" surface in 16 seconds (5 times), unable from 17" surface without use of hands    3 Meter Timed  Up & Go 11 9 seconds 11 3 seconds       Walking speed Not tested        Functional Gait Assessment (see below)  Notes        6 Minute Walk Test (100 foot circular course)   750 feet    Walks with decreased excursion of the right knee   2 minute walk test 240 feet   400 feet 2 minute walk test 930 feet with 6 minute walk test 920 feet, pain about left posterior distal thigh, medial lower leg, and right lateral lower leg Patient-Reported Outcome Measure: Activities-Specific Balance Confidence Scale (ABC Scale) Not completed Not completed           96 degrees seated right knee flexion after 2 minutes stretching 96 degrees after 1 minute of stretching      Overground walking 1000 feet, focus on knee flexion    Stepping over 2" targets with alternating steps, 3 min, cues to avoid circumduction    Braiding walking, 4 min  Braiding walking to 60 bpm tempo, 2 min  Then 1 min with 360 degree turn  Sidestepping to tempo 70 bpm 2 min      Heel raise, double leg, 1 min x 2 sets  Standing hip abduction, orange band, 0 5 min x 2 sets  Step up 6" step, 1 min each side, no arms    SciFit level 1-1 5, 12 minutes, arms and legs, about 90s steps/minute    ASSESSMENT:  Good improvement with overground walking speed  Practice sidestepping and braiding walking at home  Practice dance steps to tempo  SHORT-TERM GOALS: 1 month(s)  1  Annetta [place] Friends walks at least 10 minutes consecutively  Completes 30 minute walking video, but not directly assessed  2  Annetta [place] [employer] increases passive right knee flexion to 100 degrees to allow reciprocal stair ambulation  NOT MET  3  Raphael Muñoz reports 50% improvement in ability to walk grocery store distances without severe fatigue  MET  LONG-TERM GOALS: 2 month(s)  1  Patient reports at least 20 total minutes per day of overground walking for exercise  Doing trash and mail 6 days per week, about 10 minutes of walking, slight downhill and uphill  2  Patient independently manages home exercise program   PROGRESSED, PARTIALLY MET  3  Patient reports return to baseline with reading and small detail work  PLAN OF CARE:  Patient will benefit from physical therapy 1-2 times per week for 1 month  Neuromuscular re-education, therapeutic exercises, and therapeutic activities as outlined in grids

## 2021-07-13 ENCOUNTER — OFFICE VISIT (OUTPATIENT)
Dept: FAMILY MEDICINE CLINIC | Facility: CLINIC | Age: 61
End: 2021-07-13
Payer: MEDICARE

## 2021-07-13 ENCOUNTER — TELEPHONE (OUTPATIENT)
Dept: FAMILY MEDICINE CLINIC | Facility: CLINIC | Age: 61
End: 2021-07-13

## 2021-07-13 ENCOUNTER — APPOINTMENT (OUTPATIENT)
Dept: LAB | Facility: CLINIC | Age: 61
End: 2021-07-13
Payer: MEDICARE

## 2021-07-13 VITALS
WEIGHT: 239.2 LBS | HEIGHT: 66 IN | BODY MASS INDEX: 38.44 KG/M2 | RESPIRATION RATE: 17 BRPM | HEART RATE: 78 BPM | TEMPERATURE: 96.9 F | OXYGEN SATURATION: 97 % | SYSTOLIC BLOOD PRESSURE: 132 MMHG | DIASTOLIC BLOOD PRESSURE: 76 MMHG

## 2021-07-13 DIAGNOSIS — G70.9 NEUROMUSCULAR DISORDER (HCC): ICD-10-CM

## 2021-07-13 DIAGNOSIS — M79.18 MYOFASCIAL PAIN: ICD-10-CM

## 2021-07-13 DIAGNOSIS — I10 ESSENTIAL HYPERTENSION: ICD-10-CM

## 2021-07-13 DIAGNOSIS — E11.9 TYPE 2 DIABETES MELLITUS WITHOUT COMPLICATION, WITHOUT LONG-TERM CURRENT USE OF INSULIN (HCC): Primary | ICD-10-CM

## 2021-07-13 DIAGNOSIS — E11.9 TYPE 2 DIABETES MELLITUS WITHOUT COMPLICATION, WITHOUT LONG-TERM CURRENT USE OF INSULIN (HCC): ICD-10-CM

## 2021-07-13 DIAGNOSIS — S03.00XA TMJ (DISLOCATION OF TEMPOROMANDIBULAR JOINT), INITIAL ENCOUNTER: Primary | ICD-10-CM

## 2021-07-13 DIAGNOSIS — R93.0 ABNORMAL MRI OF THE HEAD: ICD-10-CM

## 2021-07-13 LAB
ANION GAP SERPL CALCULATED.3IONS-SCNC: 5 MMOL/L (ref 4–13)
BUN SERPL-MCNC: 13 MG/DL (ref 5–25)
CALCIUM SERPL-MCNC: 9.2 MG/DL (ref 8.3–10.1)
CHLORIDE SERPL-SCNC: 104 MMOL/L (ref 100–108)
CO2 SERPL-SCNC: 27 MMOL/L (ref 21–32)
CREAT SERPL-MCNC: 0.8 MG/DL (ref 0.6–1.3)
GFR SERPL CREATININE-BSD FRML MDRD: 79 ML/MIN/1.73SQ M
GLUCOSE P FAST SERPL-MCNC: 148 MG/DL (ref 65–99)
POTASSIUM SERPL-SCNC: 4.2 MMOL/L (ref 3.5–5.3)
SODIUM SERPL-SCNC: 136 MMOL/L (ref 136–145)

## 2021-07-13 PROCEDURE — 36415 COLL VENOUS BLD VENIPUNCTURE: CPT

## 2021-07-13 PROCEDURE — 99214 OFFICE O/P EST MOD 30 MIN: CPT | Performed by: NURSE PRACTITIONER

## 2021-07-13 PROCEDURE — 80048 BASIC METABOLIC PNL TOTAL CA: CPT

## 2021-07-13 RX ORDER — IBUPROFEN 800 MG/1
800 TABLET ORAL EVERY 6 HOURS PRN
Qty: 30 TABLET | Refills: 0 | Status: SHIPPED | OUTPATIENT
Start: 2021-07-13 | End: 2022-07-15 | Stop reason: SDUPTHER

## 2021-07-13 NOTE — TELEPHONE ENCOUNTER
Patient called today- she ate a piece of cheese this morning and it popped out her TMJ  She is requesting the referral for the ENT as discussed earlier  Please advise

## 2021-07-13 NOTE — PROGRESS NOTES
Assessment/Plan:   Diagnosis ICD-10-CM Associated Orders   1  Type 2 diabetes mellitus without complication, without long-term current use of insulin (HCC)  E11 9    2  Essential hypertension  I10    3  Myofascial pain  M79 18 ibuprofen (MOTRIN) 800 mg tablet   4  Neuromuscular disorder ([address] )  G70 9    5  Abnormal MRI of the head  R93 0        Type 2 diabetes mellitus without complication, without long-term current use of insulin (HCC)    Lab Results   Component Value Date    HGBA1C 8 6 (H) 05/25/2021   Tolerating metformin well  Blood sugar's at home in 120's range  Before starting metformin were in 140-150s range  Continue metformin 500 mg daily  Repeat hgba1c at 3 month susana in September  Continue to monitor blood sugars at home  Educated on proper exercise and well balanced diet  monitor sugars, call for any changes/ trending up  If this occurs then we will increase metformin  Recheck in office 1 month  Essential hypertension  Tolerating 25 mg daily of losartan well  BP readings at home still elevated  Increase losartan to 50 mg daily  Goal BP <130/80  Continue to monitor and record BP at home  Recheck BP in one month  Message sent to Dr Padmini Holman who has been managing the imaging of abnormal finding on brain of MRI  Asked him to reach out to patient regarding plan for follow up imaging and orders for the imaging  Recheck in 1 month  Advised to call the office for any worsening of symptoms or no symptom improvement  Patient verbalizes understand and agrees with treatment plan  Diagnoses and all orders for this visit:    Type 2 diabetes mellitus without complication, without long-term current use of insulin (HCC)    Essential hypertension    Myofascial pain  -     ibuprofen (MOTRIN) 800 mg tablet;  Take 1 tablet (800 mg total) by mouth every 6 (six) hours as needed for mild pain    Neuromuscular disorder (HCC)    Abnormal MRI of the head    Other orders  -     Cancel: Hemoglobin A1C ([facility]); Future  -     Cholecalciferol 25 MCG (1000 UT) capsule; Vitamin D3 25 mcg (1,000 unit) capsule   Take by oral route  (Patient not taking: Reported on 7/13/2021)  -     Cancel: Glucometer  -     Cancel: Lancets  -     Cancel: Glucometer test strips                Subjective:        Patient ID: Reggie Patel is a 59 y o  female  Chief Complaint   Patient presents with    Follow-up     5 week       Patient here for recheck from 6/7/21  She started losartan 25 mg daily and metformin 500 mg daily  She has not completed her CMP to recheck kidney function  Tolerating metformin and losartan well  Is monitoring her blood pressures at home with an electronic cuff and her blood sugars at home with glucometer  Blood pressures for the last week  7/7- 140/80  7/8 146/80  7/9 137/80  7/10 144/80  7/11 144/80  7/12 150/80  7/13 140/80    Sugars for last week in am   7/7 120  7/8 119  7/9 122  7/10 121  7/11 120  7/12 130  7/13 121    Prior to starting metformin blood sugars were in 140-150 range  Had recent MRI of brain and notified they found lesion on parietal bone  She is questioning follow up for this finding  The following portions of the patient's history were reviewed and updated as appropriate: allergies, current medications, past family history, past social history and problem list     Review of Systems   Constitutional: Negative for chills and fever  Eyes: Negative for discharge  Respiratory: Negative for shortness of breath  Cardiovascular: Negative for chest pain  Gastrointestinal: Negative for constipation and diarrhea  Genitourinary: Negative for difficulty urinating  Musculoskeletal: Negative for joint swelling  Skin: Negative for rash  Neurological: Negative for headaches  Hematological: Negative for adenopathy  Psychiatric/Behavioral: The patient is not nervous/anxious            Objective:  /76 (BP Location: Left arm, Patient Position: Sitting, Cuff Size: Large) Pulse 78   Temp (!) 96 9 °F (36 1 °C) (Temporal)   Resp 17   Ht 5' 6" (1 676 m)   Wt 109 kg (239 lb 3 2 oz)   SpO2 97%   BMI 38 61 kg/m²      Physical Exam  Vitals and nursing note reviewed  Constitutional:       General: She is not in acute distress  Appearance: She is well-developed  She is not diaphoretic  HENT:      Head: Normocephalic and atraumatic  Right Ear: External ear normal       Left Ear: External ear normal    Eyes:      General: Lids are normal          Right eye: No discharge  Left eye: No discharge  Conjunctiva/sclera: Conjunctivae normal    Cardiovascular:      Rate and Rhythm: Normal rate and regular rhythm  Heart sounds: No murmur heard  Pulmonary:      Effort: Pulmonary effort is normal  No respiratory distress  Breath sounds: Normal breath sounds  No wheezing  Musculoskeletal:         General: No deformity  Cervical back: Neck supple  Skin:     General: Skin is warm and dry  Neurological:      Mental Status: She is alert and oriented to person, place, and time  Psychiatric:         Speech: Speech normal          Behavior: Behavior normal          Thought Content:  Thought content normal          Judgment: Judgment normal                 Current Outpatient Medications:     albuterol (2 5 mg/3 mL) 0 083 % nebulizer solution, Take 1 vial (2 5 mg total) by nebulization every 6 (six) hours as needed for wheezing or shortness of breath, Disp: 50 vial, Rfl: 0    Azelastine HCl 137 MCG/SPRAY SOLN, '1 SPRAY PER NOSTRIL IN THE MORNING AS DIRECTED, Disp: , Rfl:     baclofen 20 mg tablet, Take 20 mg by mouth 3 (three) times a day  , Disp: , Rfl:     Blood Glucose Monitoring Suppl (ONETOUCH VERIO IQ SYSTEM) w/Device KIT, Check BG daily dx E11 9, Disp: 1 kit, Rfl: 1    clindamycin (CLEOCIN T) 1 % lotion, Once a day to the face, Disp: , Rfl: 0    DULoxetine (CYMBALTA) 30 mg delayed release capsule, TAKE 1 CAPSULE BY MOUTH DAILY, TO BE TAKEN WITH 60 MG FOR A TOTAL OF 90 MG DAILY  , Disp: 90 capsule, Rfl: 2    DULoxetine (CYMBALTA) 60 mg delayed release capsule, take 1 capsule by mouth once daily, Disp: 90 capsule, Rfl: 2    ergocalciferol (VITAMIN D2) 50,000 units, take 1 capsule by mouth every week, Disp: 4 capsule, Rfl: 10    fluticasone (FLONASE) 50 mcg/act nasal spray, 2 sprays into each nostril daily (Patient taking differently: 2 sprays into each nostril daily As needed), Disp: 1 Bottle, Rfl: 1    gabapentin (NEURONTIN) 100 mg capsule, Take 100 mg by mouth 2 (two) times a day , Disp: , Rfl: 0    ibuprofen (MOTRIN) 800 mg tablet, Take 1 tablet (800 mg total) by mouth every 6 (six) hours as needed for mild pain, Disp: 30 tablet, Rfl: 0    Levocetirizine Dihydrochloride (XYZAL PO), Take by mouth, Disp: , Rfl:     levothyroxine 200 mcg tablet, Take 1 tablet (200 mcg total) by mouth daily in the early morning Takes it Mon- Sat (Patient taking differently: Take 200 mcg by mouth daily ), Disp: 30 tablet, Rfl: 3    losartan (COZAAR) 50 mg tablet, Take 1 tablet (50 mg total) by mouth daily, Disp: 90 tablet, Rfl: 3    magnesium oxide (MAG-OX) 400 mg, Take 400 mg by mouth daily , Disp: , Rfl:     Menthol (Icy Hot Back) 5 % PTCH, Apply topically, Disp: , Rfl:     metFORMIN (GLUCOPHAGE-XR) 500 mg 24 hr tablet, Take 1 tablet (500 mg total) by mouth daily with dinner, Disp: 90 tablet, Rfl: 3    montelukast (SINGULAIR) 10 mg tablet, take 1 tablet by mouth at bedtime, Disp: 30 tablet, Rfl: 5    ONETOUCH DELICA LANCETS FINE MISC, Check BG 1x daily DX E11 9, Disp: 100 each, Rfl: 1    oxyCODONE-acetaminophen (PERCOCET) 5-325 mg per tablet, Take 1 tablet by mouth every 4 (four) hours as needed for severe pain, Disp: , Rfl:     rosuvastatin (CRESTOR) 5 mg tablet, take 1 tablet by mouth once daily, Disp: 90 tablet, Rfl: 3    Saline 0 65 % SOLN, into each nostril 2 (two) times a day, Disp: , Rfl:     Sulfacetamide Sodium, Acne, 10 % LOTN, Apply 1 application topically 2 (two) times a day , Disp: , Rfl:     Cholecalciferol 25 MCG (1000 UT) capsule, Vitamin D3 25 mcg (1,000 unit) capsule  Take by oral route   (Patient not taking: Reported on 7/13/2021), Disp: , Rfl:     glucose blood (ONETOUCH VERIO) test strip, Use check BG Daily dx E11 9, Disp: 100 each, Rfl: 1  Allergies   Allergen Reactions    Fish-Derived Products - Food Allergy Angioedema    Iodinated Diagnostic Agents Anaphylaxis    Shellfish-Derived Products - Food Allergy Anaphylaxis     SEAFOOD/SHELLFISH    Valium [Diazepam] Anaphylaxis and Swelling    Grass Extracts [Gramineae Pollens] Itching, Swelling, Allergic Rhinitis, Cough and Headache    Pollen Extract Itching, Swelling, Allergic Rhinitis, Cough and Headache    Tree Extract Itching, Swelling, Allergic Rhinitis, Cough and Headache    Coumadin [Warfarin]     Metrizamide     Sweetness Enhancer      Artificial sweetners    Medical Tape Rash     Steri-strips & paper tape ok to use per patient

## 2021-07-13 NOTE — ASSESSMENT & PLAN NOTE
Lab Results   Component Value Date    HGBA1C 8 6 (H) 05/25/2021   Tolerating metformin well  Blood sugar's at home in 120's range  Before starting metformin were in 140-150s range  Continue metformin 500 mg daily  Repeat hgba1c at 3 month susana in September  Continue to monitor blood sugars at home  Educated on proper exercise and well balanced diet  monitor sugars, call for any changes/ trending up  If this occurs then we will increase metformin  Recheck in office 1 month

## 2021-07-13 NOTE — ASSESSMENT & PLAN NOTE
Tolerating 25 mg daily of losartan well  BP readings at home still elevated  Increase losartan to 50 mg daily  Goal BP <130/80  Continue to monitor and record BP at home  Recheck BP in one month

## 2021-07-13 NOTE — PATIENT INSTRUCTIONS
Complete BMP lab only at this time  Increase Losartan to 50 mg (1 pill daily)  Goal blood pressure <130/80  Monitor blood sugars at home  Goal <140 fasting and <180 after meals  Continue with metformin 500 mg daily  If sugars are trending up - please call and we will increase metformin  Recheck in 1 month  Continue to follow up with neurology  Please call the office if you are experiencing any worsening of symptoms or no symptom improvement

## 2021-07-14 ENCOUNTER — OFFICE VISIT (OUTPATIENT)
Dept: PHYSICAL THERAPY | Facility: REHABILITATION | Age: 61
End: 2021-07-14
Payer: MEDICARE

## 2021-07-14 DIAGNOSIS — R29.898 LEG WEAKNESS, BILATERAL: ICD-10-CM

## 2021-07-14 DIAGNOSIS — R26.89 IMBALANCE: ICD-10-CM
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
## 2021-07-14 PROCEDURE — 97110 THERAPEUTIC EXERCISES: CPT | Performed by: PHYSICAL THERAPIST

## 2021-07-14 PROCEDURE — 97112 NEUROMUSCULAR REEDUCATION: CPT | Performed by: PHYSICAL THERAPIST

## 2021-07-14 NOTE — PROGRESS NOTES
Treatment Note     Today's date: 2021  Patient name: Cindy Sharpe  : 1960  MRN: [id number]  Referring provider: Tristian Quintana DO  Dx:   Encounter Diagnosis     ICD-10-CM    1  Fall, subsequent encounter  W19  XXXD    2  Imbalance  R26 89    3  Leg weakness, bilateral  R29 898      Subjective:   More tightness due to weather, feels tight all about the hips, knees and ankles  Patient goals: walking at a normal pace without cane (not using prior to house), grocery shop without significant pain when completed, dancing at [employer] in 2022  Patient-Specific Functional Scale   Task is scored 0 (unable to perform activity) to 10 (ability to perform activity independently)  Activity 21   1  Walk at normal pace without a cane 8/10    2  Grocery shop without significant pain 5/10    3  Dance 3/10      Objective: See treatment diary below    Precautions - Hashimoto's, autoimmune disease      Mobility Measures 2020   Assistive device used? cane cane       5 Time Sit to Stand  (17" chair, arms across chest) Unable from 17" surface    17 8 seconds from 19" surface, hands out front    Unable from 19" surface without strong use of hands   Completes from 19" surface, unable from 17" surface     Completes laboriously from 17" surface without use of hands Completes from 19" surface in 16 seconds (5 times), unable from 17" surface without use of hands    3 Meter Timed  Up & Go 11 9 seconds 11 3 seconds       Walking speed Not tested        Functional Gait Assessment (see below)  Notes        6 Minute Walk Test (100 foot circular course)   750 feet    Walks with decreased excursion of the right knee   2 minute walk test 240 feet   400 feet 2 minute walk test 930 feet with 6 minute walk test 920 feet, pain about left posterior distal thigh, medial lower leg, and right lateral lower leg    Patient-Reported Outcome Measure: Activities-Specific Balance Confidence Scale (ABC Scale) Not completed Not completed           96 degrees seated right knee flexion after 2 minutes stretching 96 degrees after 1 minute of stretching      SciFit level 1-1 4, 8 minutes, arms and legs, about 90s steps/minute    Overground walking 400 feet, focus on knee flexion    Supine hip flexion stretching 1 5-2 min each; same with hamstring stretches    Stepping over 2" targets with alternating steps, 2 min, cues to avoid circumduction  Same laterally, 2 min    Heel raise, single leg, 0 5-1 min x 2 sets each  Standing hip abduction, 0 5 min x 2 sets  Lateral Step up 4" step, 45 sec x 2 sets each  Heel walking, 2 min    Overground walking 100 feet 33 sec    ASSESSMENT:  Good improvement with overground walking speed, but it took a lot of warm-up and stretching and movement to achieve this  Reviewed home exercises in various levels of tightness, but as ambulatory as possible  SHORT-TERM GOALS: 1 month(s)  1  Prerna Angela walks at least 10 minutes consecutively  Completes 30 minute walking video, but not directly assessed  2  Prerna Angela increases passive right knee flexion to 100 degrees to allow reciprocal stair ambulation  NOT MET  3  Prerna Angela reports 50% improvement in ability to walk grocery store distances without severe fatigue  MET  LONG-TERM GOALS: 2 month(s)  1  Patient reports at least 20 total minutes per day of overground walking for exercise  Doing trash and mail 6 days per week, about 10 minutes of walking, slight downhill and uphill  2  Patient independently manages home exercise program   PROGRESSED, PARTIALLY MET  3  Patient reports return to baseline with reading and small detail work  PLAN OF CARE:  Patient will benefit from physical therapy 1-2 times per week for 1 month  Neuromuscular re-education, therapeutic exercises, and therapeutic activities as outlined in grids

## 2021-07-15 PROBLEM — K76.0 FATTY LIVER: Status: ACTIVE | Noted: 2021-07-15

## 2021-07-20 ENCOUNTER — OFFICE VISIT (OUTPATIENT)
Dept: PHYSICAL THERAPY | Facility: REHABILITATION | Age: 61
End: 2021-07-20
Payer: MEDICARE

## 2021-07-20 DIAGNOSIS — R29.898 LEG WEAKNESS, BILATERAL: ICD-10-CM
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
## 2021-07-20 DIAGNOSIS — R26.89 IMBALANCE: ICD-10-CM

## 2021-07-20 PROCEDURE — 97112 NEUROMUSCULAR REEDUCATION: CPT | Performed by: PHYSICAL THERAPIST

## 2021-07-20 PROCEDURE — 97110 THERAPEUTIC EXERCISES: CPT | Performed by: PHYSICAL THERAPIST

## 2021-07-20 NOTE — PROGRESS NOTES
Treatment Note     Today's date: 2021  Patient name: Zion Tamayo  : 1960  MRN: [id number]  Referring provider: Jennifer Johnson DO  Dx:   Encounter Diagnosis     ICD-10-CM    1  Fall, subsequent encounter  W19  XXXD    2  Imbalance  R26 89    3  Leg weakness, bilateral  R29 898      Subjective:   More sore in her back starting over the weekend, no acute cause  Tried stretching, heat, cold, and massage, still tight and prevents a lot of physical activity  Patient goals: walking at a normal pace without cane (not using prior to house), grocery shop without significant pain when completed, dancing at niece's wedding in 2022  Patient-Specific Functional Scale   Task is scored 0 (unable to perform activity) to 10 (ability to perform activity independently)  Activity 21   1  Walk at normal pace without a cane 8/10    2  Grocery shop without significant pain 5/10    3  Dance 3/10      Objective: See treatment diary below    Precautions - Hashimoto's, autoimmune disease      Mobility Measures 2020   Assistive device used? cane cane       5 Time Sit to Stand  (17" chair, arms across chest) Unable from 17" surface    17 8 seconds from 19" surface, hands out front    Unable from 19" surface without strong use of hands   Completes from 19" surface, unable from 17" surface     Completes laboriously from 17" surface without use of hands Completes from 19" surface in 16 seconds (5 times), unable from 17" surface without use of hands    3 Meter Timed  Up & Go 11 9 seconds 11 3 seconds       Walking speed Not tested        Functional Gait Assessment (see below)  Notes        6 Minute Walk Test (100 foot circular course)   750 feet    Walks with decreased excursion of the right knee   2 minute walk test 240 feet   400 feet 2 minute walk test 930 feet with 6 minute walk test 920 feet, pain about left posterior distal thigh, medial lower leg, and right lateral lower leg    Patient-Reported Outcome Measure: Activities-Specific Balance Confidence Scale (ABC Scale) Not completed Not completed           96 degrees seated right knee flexion after 2 minutes stretching 96 degrees after 1 minute of stretching      Overground walking 300 feet, focus on knee flexion    Supine trunk stretching:  Lower trunk rotation stretching, 2 min  Seated trunk flexion stretching, 1 min  Hamstring stretching, 1 min    hooklying bent knee raise, unilateral, 1 min  Same bilaterally, feet starting on airex foam, 1 min    Prone trigger point to right and left lumbar paraspinals, 1 min x 4    Walking focus on knee flexion over 2" targets, 2 5 min    Overground walking, 200 feet    Overground walking 100 feet 33 sec    SciFit level 1-1 5, 7 minutes, arms and legs, about 90s steps/minute    ASSESSMENT:  Again with good improvement in walking speed and quality during the session  Good adaptations to pain/soreness at home, continue to use stretching, massage as needed to allow more movement/walking  Complete re-evaluation next visit  SHORT-TERM GOALS: 1 month(s)  1  Chau Reed walks at least 10 minutes consecutively  Completes 30 minute walking video, but not directly assessed  2  Conroe Ore increases passive right knee flexion to 100 degrees to allow reciprocal stair ambulation  NOT MET  3  Chau Ore reports 50% improvement in ability to walk grocery store distances without severe fatigue  MET  LONG-TERM GOALS: 2 month(s)  1  Patient reports at least 20 total minutes per day of overground walking for exercise  Doing trash and mail 6 days per week, about 10 minutes of walking, slight downhill and uphill  2  Patient independently manages home exercise program   PROGRESSED, PARTIALLY MET  3  Patient reports return to baseline with reading and small detail work      PLAN OF CARE:  Patient will benefit from physical therapy 1-2 times per week for 1 month  Neuromuscular re-education, therapeutic exercises, and therapeutic activities as outlined in grids

## 2021-07-25 DIAGNOSIS — E03.8 HYPOTHYROIDISM DUE TO HASHIMOTO'S THYROIDITIS: ICD-10-CM

## 2021-07-25 DIAGNOSIS — E06.3 HYPOTHYROIDISM DUE TO HASHIMOTO'S THYROIDITIS: ICD-10-CM

## 2021-07-25 RX ORDER — LEVOTHYROXINE SODIUM 0.2 MG/1
200 TABLET ORAL DAILY
Qty: 90 TABLET | Refills: 1 | Status: SHIPPED | OUTPATIENT
Start: 2021-07-25 | End: 2022-01-26

## 2021-07-28 ENCOUNTER — EVALUATION (OUTPATIENT)
Dept: PHYSICAL THERAPY | Facility: REHABILITATION | Age: 61
End: 2021-07-28
Payer: MEDICARE
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
## 2021-07-28 DIAGNOSIS — R26.89 IMBALANCE: ICD-10-CM

## 2021-07-28 DIAGNOSIS — R29.898 LEG WEAKNESS, BILATERAL: ICD-10-CM

## 2021-07-28 PROCEDURE — 97112 NEUROMUSCULAR REEDUCATION: CPT | Performed by: PHYSICAL THERAPIST

## 2021-07-28 PROCEDURE — 97110 THERAPEUTIC EXERCISES: CPT | Performed by: PHYSICAL THERAPIST

## 2021-07-28 NOTE — PROGRESS NOTES
RE-EVALUATION / Treatment Note     Today's date: 2021  Patient name: Arnaldo Pinto  : 1960  MRN: [id number]  Referring provider: Joleen Bowen DO  Dx:   Encounter Diagnosis     ICD-10-CM    1  Fall, subsequent encounter  W19  XXXD    2  Imbalance  R26 89    3  Leg weakness, bilateral  R29 898      Subjective:   Court Brand did a lot of standing and walking in between appointments today, so is stiff at this point  Brought in exercise DVDs, a lot of dance, barre, line, ballroom dance  Some abdullahi chi, some other low impact exercise  Patient goals: walking at a normal pace without cane (not using prior to house), grocery shop without significant pain when completed, dancing at niece's wedding in 2022  Patient-Specific Functional Scale   Task is scored 0 (unable to perform activity) to 10 (ability to perform activity independently)  Activity 21   1  Walk at normal pace without a cane 8/10 9/10   2  Grocery shop without significant pain 5/10 6-7/10   3  Dance 3/10 3/10     Objective: See treatment diary below    Precautions - Hashimoto's, autoimmune disease    Mobility Measures 2020   Assistive device used? cane cane       5 Time Sit to Stand  (17" chair, arms across chest) Unable from 17" surface    17 8 seconds from 19" surface, hands out front    Unable from 19" surface without strong use of hands   Completes from 19" surface, unable from 17" surface     Completes laboriously from 17" surface without use of hands Completes from 19" surface in 16 seconds (5 times), unable from 17" surface without use of hands Unable from 18" surface without hands  16 seconds from 20" surface   3 Meter Timed  Up & Go 11 9 seconds 11 3 seconds       Walking speed Not tested        Functional Gait Assessment (see below)  Notes        6 Minute Walk Test (100 foot circular course)   750 feet    Walks with decreased excursion of the right knee 2 minute walk test 240 feet   400 feet 2 minute walk test 930 feet with 6 minute walk test 920 feet, pain about left posterior distal thigh, medial lower leg, and right lateral lower leg 1050 feet 6 minute walk test   Patient-Reported Outcome Measure: Activities-Specific Balance Confidence Scale (ABC Scale) Not completed Not completed           96 degrees seated right knee flexion after 2 minutes stretching 96 degrees after 1 minute of stretching 95-96 degrees After 2 minutes stretching     Reviewed home exercise program   It is appropriately varied to accommodate changes in patient condition, incorporating some aerobic exercise, low intensity strengthening, balance and stretching  We worked on flexing the right knee during dance-type movements, 4 min in parallel bars, mainly braiding walking  Treadmill 1 4-1 5 base pace, to 1 8 mph in 30 sec intervals, 5 min total  SciFit level 1, 12 min, about 90 steps/min    ASSESSMENT:  Marlena Ramirez is problem-solving various levels and locations of joint and muscle stiffness, but continues to gradually improve in her mobility  Right knee flexion passively is limited to 95-96 degrees and hasn't changed in 3 months, but overall mobility has gradually improved  Continue physical therapy to maximal benefit for mobility  SHORT-TERM GOALS: 1 month(s)  1  Marlena Ramirez walks at least 10 minutes consecutively  Completes 30 minute walking video, but not directly assessed  2  Mralena Ramirez increases passive right knee flexion to 100 degrees to allow reciprocal stair ambulation  NOT MET, at 95-96 degrees  3  Marlena Ramirez reports 50% improvement in ability to walk grocery store distances without severe fatigue  MET  LONG-TERM GOALS: 2 month(s)  1  Patient reports at least 20 total minutes per day of overground walking for exercise  PARTIALLY MET FOR CONSISTENCY     2  Patient independently manages home exercise program   PROGRESSED, MOSTLY MET     3  Patient reports return to baseline with reading and small detail work  PLAN OF CARE:  Patient will benefit from physical therapy 1 time per week for 1 month  Neuromuscular re-education, therapeutic exercises, and therapeutic activities as outlined in grids

## 2021-08-04 ENCOUNTER — OFFICE VISIT (OUTPATIENT)
Dept: PHYSICAL THERAPY | Facility: REHABILITATION | Age: 61
End: 2021-08-04
Payer: MEDICARE

## 2021-08-04 DIAGNOSIS — R29.898 LEG WEAKNESS, BILATERAL: ICD-10-CM
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
## 2021-08-04 DIAGNOSIS — R26.89 IMBALANCE: ICD-10-CM

## 2021-08-04 PROCEDURE — 97112 NEUROMUSCULAR REEDUCATION: CPT | Performed by: PHYSICAL THERAPIST

## 2021-08-04 PROCEDURE — 97110 THERAPEUTIC EXERCISES: CPT | Performed by: PHYSICAL THERAPIST

## 2021-08-04 NOTE — PROGRESS NOTES
Treatment Note     Today's date: 2021  Patient name: Nasreen Drake  : 1960  MRN: [id number]  Referring provider: Elizabeth Murray DO  Dx:   Encounter Diagnosis     ICD-10-CM    1  Fall, subsequent encounter  W19  XXXD    2  Imbalance  R26 89    3  Leg weakness, bilateral  R29 898      Subjective:   Did a lot of work for a [employer] event over the weekend  Increased stiffness about the right lateral lower leg with lack of sleep last night due to cramping  Patient goals: walking at a normal pace without cane (not using prior to house), grocery shop without significant pain when completed, dancing at [employer]'s wedding in 2022  Patient-Specific Functional Scale   Task is scored 0 (unable to perform activity) to 10 (ability to perform activity independently)  Activity 21   1  Walk at normal pace without a cane 8/10 9/10   2  Grocery shop without significant pain 5/10 6-7/10   3  Dance 3/10 3/10     Objective: See treatment diary below    Precautions - Hashimoto's, autoimmune disease    Mobility Measures 21   Assistive device used? 5 Time Sit to Stand  (17" chair, arms across chest) Completes laboriously from 17" surface without use of hands Completes from 19" surface in 16 seconds (5 times), unable from 17" surface without use of hands Unable from 18" surface without hands  16 seconds from 20" surface   3 Meter Timed  Up & Go      Walking speed      Functional Gait Assessment (see below)      6 Minute Walk Test (100 foot circular course)   930 feet with 6 minute walk test 920 feet, pain about left posterior distal thigh, medial lower leg, and right lateral lower leg 1050 feet 6 minute walk test   Patient-Reported Outcome Measure:  Activities-Specific Balance Confidence Scale (ABC Scale)       96 degrees seated right knee flexion after 2 minutes stretching 96 degrees after 1 minute of stretching 95-96 degrees After 2 minutes stretching     Overground walking with faster pace to promote knee flexion, 5 min  Supine right hamstring stretching 2 min  gastroc stretching 2 min  Walking in parallel bars stepping over 2-6" obstacles forcing knee flexion, 4 min  Sit to stand, 19" surface, about 8-10  Seated knee flexion stretching, 1 5 min  Sit to stand, completed from 18" surface twice without hands  Forward step ups 6" step right leg leading, 1 5 min  SciFit, level 6 6, 5 min    ASSESSMENT:  Improved with initial speed of walking today, continues to abduct the hip to clear higher hurdles but also isn't using all passive knee flexion range of motion, continue to practice motor control here  SHORT-TERM GOALS: 1 month(s)  1  Chau Reed walks at least 10 minutes consecutively  Completes 30 minute walking video, but not directly assessed  2  Llano Ore increases passive right knee flexion to 100 degrees to allow reciprocal stair ambulation  NOT MET, at 95-96 degrees  3  Llano Ore reports 50% improvement in ability to walk grocery store distances without severe fatigue  MET  LONG-TERM GOALS: 2 month(s)  1  Patient reports at least 20 total minutes per day of overground walking for exercise  PARTIALLY MET FOR CONSISTENCY     2  Patient independently manages home exercise program   PROGRESSED, MOSTLY MET  3  Patient reports return to baseline with reading and small detail work  PLAN OF CARE:  Patient will benefit from physical therapy 1 time per week for 1 month  Neuromuscular re-education, therapeutic exercises, and therapeutic activities as outlined in grids

## 2021-08-16 ENCOUNTER — OFFICE VISIT (OUTPATIENT)
Dept: FAMILY MEDICINE CLINIC | Facility: CLINIC | Age: 61
End: 2021-08-16
Payer: MEDICARE

## 2021-08-16 ENCOUNTER — TELEPHONE (OUTPATIENT)
Dept: FAMILY MEDICINE CLINIC | Facility: CLINIC | Age: 61
End: 2021-08-16

## 2021-08-16 VITALS
WEIGHT: 237 LBS | HEIGHT: 66 IN | RESPIRATION RATE: 16 BRPM | SYSTOLIC BLOOD PRESSURE: 118 MMHG | OXYGEN SATURATION: 97 % | BODY MASS INDEX: 38.09 KG/M2 | HEART RATE: 89 BPM | DIASTOLIC BLOOD PRESSURE: 78 MMHG | TEMPERATURE: 96.4 F

## 2021-08-16 DIAGNOSIS — D35.2 PITUITARY MICROADENOMA (HCC): ICD-10-CM

## 2021-08-16 DIAGNOSIS — R76.8 POSITIVE ANA (ANTINUCLEAR ANTIBODY): ICD-10-CM

## 2021-08-16 DIAGNOSIS — Z12.31 ENCOUNTER FOR SCREENING MAMMOGRAM FOR MALIGNANT NEOPLASM OF BREAST: ICD-10-CM

## 2021-08-16 DIAGNOSIS — E11.9 TYPE 2 DIABETES MELLITUS WITHOUT COMPLICATION, WITHOUT LONG-TERM CURRENT USE OF INSULIN (HCC): Primary | ICD-10-CM

## 2021-08-16 DIAGNOSIS — I10 ESSENTIAL HYPERTENSION: ICD-10-CM

## 2021-08-16 DIAGNOSIS — L30.9 DERMATITIS: ICD-10-CM

## 2021-08-16 PROCEDURE — 99215 OFFICE O/P EST HI 40 MIN: CPT | Performed by: NURSE PRACTITIONER

## 2021-08-16 RX ORDER — CETIRIZINE HYDROCHLORIDE 10 MG/1
TABLET ORAL
COMMUNITY
End: 2022-05-10

## 2021-08-16 NOTE — TELEPHONE ENCOUNTER
Patient would like to follow up regarding this message thread  She wants to know if she should repeat this test or what the plan of action is? She forgot to ask you during today's visit

## 2021-08-16 NOTE — PROGRESS NOTES
Assessment/Plan:   Diagnosis ICD-10-CM Associated Orders   1  Type 2 diabetes mellitus without complication, without long-term current use of insulin (HCC)  E11 9    2  Encounter for screening mammogram for malignant neoplasm of breast  Z12 31 Mammo screening bilateral w 3d & cad   3  Positive MT (antinuclear antibody)  R76 8 Ambulatory referral to Rheumatology   4  Dermatitis  L30 9 hydrocortisone 2 5 % cream   5  Essential hypertension  I10    6  Pituitary microadenoma ([address] )  D35 2    readings at home look great, continue to check daily  Overall feeling well  Continue with same medication, due for A1C in about 10 days- complete labs at that time  Will refer to rheuamtology for positive MT  Discussed b12 supplementation as recommended by neurologist  Will repeat MRI head regarding parietal abnormality on previous imaging  Follow up in  3 months in office for recheck  Use topical steroid for rash  Advised on s/s to watch out for (any changes that appear to be shingles)  At this time the rash does not look like shingles  Cool compresses PRN  Advised to call the office for any worsening of symptoms or no symptom improvement  Patient verbalizes understand and agrees with treatment plan  Diagnoses and all orders for this visit:    Type 2 diabetes mellitus without complication, without long-term current use of insulin ([facility] [address] )    Encounter for screening mammogram for malignant neoplasm of breast  -     Mammo screening bilateral w 3d & cad; Future    Positive MT (antinuclear antibody)  -     Ambulatory referral to Rheumatology; Future    Dermatitis  -     hydrocortisone 2 5 % cream; Apply topically 3 (three) times a day as needed for irritation or rash    Essential hypertension    Pituitary microadenoma (HCC)    Other orders  -     Cancel: Hemoglobin A1C ([facility]);  Future  -     cetirizine (ZyrTEC) 10 mg tablet; cetirizine 10 mg tablet   take 1 tablet by mouth once daily  -     metroNIDAZOLE (METROCREAM) 0 75 % cream; metronidazole 0 75 % topical cream                Subjective:        Patient ID: Cheli Thorne is a 59 y o  female  Chief Complaint   Patient presents with    Follow-up     1 month follow up diabetes     Back Problem     Pt state that her back feels itchy        Here for recheck of DM and HTN  A1C due 8/25/21  Lab Results       Component                Value               Date                       HGBA1C                   8 6 (H)             05/25/2021              Metformin 500 mg was started in June along with losartan  BP stable at today's visit  Sugars and BP readings at home over past month, these were daily:    7/17 - 123  130/77  121 131/77  130 139/78  159  135/80  127 128/73  134  115/72  136 120/70  130  121/70  121 115/73  126  121/75  124  125/70   123 125/71  136  120/70  130  121/70  126  131/72  128  128/70  130  120/70  125  129/72  126  134/70  8/9 -121  128/74  100  100/60  135  125/71     Was seen at [facility] by neurologist Dr Michelle Yoder MD  Recommend b12 supplement and rheumatology referral for positive MT  Started with back itching late last night, she believes she has a rash  Overall feeling well  No signs of hypoglycemia or hyperglcyemia  Tolerating medication well  The following portions of the patient's history were reviewed and updated as appropriate: allergies, current medications, past family history, past social history and problem list     Review of Systems   Constitutional: Negative for chills and fever  Eyes: Negative for discharge  Respiratory: Negative for shortness of breath  Cardiovascular: Negative for chest pain  Gastrointestinal: Negative for constipation and diarrhea  Genitourinary: Negative for difficulty urinating  Musculoskeletal: Negative for joint swelling  Skin: Positive for rash  Neurological: Negative for headaches  Hematological: Negative for adenopathy     Psychiatric/Behavioral: The patient is not nervous/anxious  Objective:  /78   Pulse 89   Temp (!) 96 4 °F (35 8 °C) (Temporal)   Resp 16   Ht 5' 6" (1 676 m)   Wt 108 kg (237 lb)   SpO2 97%   BMI 38 25 kg/m²      Physical Exam  Vitals and nursing note reviewed  Constitutional:       General: She is not in acute distress  Appearance: She is well-developed  She is obese  She is not diaphoretic  HENT:      Head: Normocephalic and atraumatic  Right Ear: External ear normal       Left Ear: External ear normal    Eyes:      General: Lids are normal          Right eye: No discharge  Left eye: No discharge  Conjunctiva/sclera: Conjunctivae normal    Cardiovascular:      Rate and Rhythm: Normal rate and regular rhythm  Heart sounds: No murmur heard  Pulmonary:      Effort: Pulmonary effort is normal  No respiratory distress  Breath sounds: Normal breath sounds  No wheezing  Musculoskeletal:         General: No deformity  Cervical back: Neck supple  Skin:     General: Skin is warm and dry  Findings: Rash present  Comments: Red maculopapular rash present, mild, two small scabbed areas that look like they were scratched open  Some redness consistent with scratching  No drainage or vesicular lesions   Neurological:      Mental Status: She is alert and oriented to person, place, and time  Psychiatric:         Speech: Speech normal          Behavior: Behavior normal          Thought Content:  Thought content normal          Judgment: Judgment normal                 Current Outpatient Medications:     albuterol (2 5 mg/3 mL) 0 083 % nebulizer solution, Take 1 vial (2 5 mg total) by nebulization every 6 (six) hours as needed for wheezing or shortness of breath, Disp: 50 vial, Rfl: 0    Azelastine HCl 137 MCG/SPRAY SOLN, '1 SPRAY PER NOSTRIL IN THE MORNING AS DIRECTED, Disp: , Rfl:     baclofen 20 mg tablet, Take 20 mg by mouth 2 (two) times a day , Disp: , Rfl:     Blood Glucose Monitoring Suppl ([employer] SYSTEM) w/Device KIT, Check BG daily dx E11 9, Disp: 1 kit, Rfl: 1    cetirizine (ZyrTEC) 10 mg tablet, cetirizine 10 mg tablet  take 1 tablet by mouth once daily, Disp: , Rfl:     clindamycin (CLEOCIN T) 1 % lotion, Once a day to the face, Disp: , Rfl: 0    DULoxetine (CYMBALTA) 30 mg delayed release capsule, TAKE 1 CAPSULE BY MOUTH DAILY, TO BE TAKEN WITH 60 MG FOR A TOTAL OF 90 MG DAILY  , Disp: 90 capsule, Rfl: 2    DULoxetine (CYMBALTA) 60 mg delayed release capsule, take 1 capsule by mouth once daily, Disp: 90 capsule, Rfl: 2    ergocalciferol (VITAMIN D2) 50,000 units, take 1 capsule by mouth every week, Disp: 4 capsule, Rfl: 10    fluticasone (FLONASE) 50 mcg/act nasal spray, 2 sprays into each nostril daily (Patient taking differently: 2 sprays into each nostril daily As needed), Disp: 1 Bottle, Rfl: 1    gabapentin (NEURONTIN) 100 mg capsule, Take 100 mg by mouth 2 (two) times a day , Disp: , Rfl: 0    glucose blood (ONETOUCH VERIO) test strip, Use check BG Daily dx E11 9, Disp: 100 each, Rfl: 1    ibuprofen (MOTRIN) 800 mg tablet, Take 1 tablet (800 mg total) by mouth every 6 (six) hours as needed for mild pain, Disp: 30 tablet, Rfl: 0    Levocetirizine Dihydrochloride (XYZAL PO), Take by mouth, Disp: , Rfl:     levothyroxine 200 mcg tablet, Take 1 tablet (200 mcg total) by mouth daily (Patient taking differently: Take 200 mcg by mouth daily Pt takes Monday through Saturday), Disp: 90 tablet, Rfl: 1    losartan (COZAAR) 50 mg tablet, Take 1 tablet (50 mg total) by mouth daily, Disp: 90 tablet, Rfl: 3    magnesium oxide (MAG-OX) 400 mg, Take 400 mg by mouth daily , Disp: , Rfl:     metFORMIN (GLUCOPHAGE-XR) 500 mg 24 hr tablet, Take 1 tablet (500 mg total) by mouth daily with dinner, Disp: 90 tablet, Rfl: 3    metroNIDAZOLE (METROCREAM) 0 75 % cream, metronidazole 0 75 % topical cream, Disp: , Rfl:     montelukast (SINGULAIR) 10 mg tablet, take 1 tablet by mouth at bedtime, Disp: 30 tablet, Rfl: 5    ONETOUCH DELICA LANCETS FINE MISC, Check BG 1x daily DX E11 9, Disp: 100 each, Rfl: 1    oxyCODONE-acetaminophen (PERCOCET) 5-325 mg per tablet, Take 1 tablet by mouth every 4 (four) hours as needed for severe pain, Disp: , Rfl:     rosuvastatin (CRESTOR) 5 mg tablet, take 1 tablet by mouth once daily, Disp: 90 tablet, Rfl: 3    Saline 0 65 % SOLN, into each nostril 2 (two) times a day, Disp: , Rfl:     Sulfacetamide Sodium, Acne, 10 % LOTN, Apply 1 application topically 2 (two) times a day , Disp: , Rfl:     Cholecalciferol 25 MCG (1000 UT) capsule, Vitamin D3 25 mcg (1,000 unit) capsule  Take by oral route   (Patient not taking: Reported on 7/13/2021), Disp: , Rfl:     hydrocortisone 2 5 % cream, Apply topically 3 (three) times a day as needed for irritation or rash, Disp: 30 g, Rfl: 0    Menthol (Icy Hot Back) 5 % PTCH, Apply topically (Patient not taking: Reported on 8/16/2021), Disp: , Rfl:   Allergies   Allergen Reactions    Fish-Derived Products - Food Allergy Angioedema    Iodinated Diagnostic Agents Anaphylaxis    Shellfish-Derived Products - Food Allergy Anaphylaxis     SEAFOOD/SHELLFISH    Valium [Diazepam] Anaphylaxis and Swelling    Grass Extracts [Gramineae Pollens] Itching, Swelling, Allergic Rhinitis, Cough and Headache    Pollen Extract Itching, Swelling, Allergic Rhinitis, Cough and Headache    Tree Extract Itching, Swelling, Allergic Rhinitis, Cough and Headache    Coumadin [Warfarin]     Metrizamide     Sweetness Enhancer      Artificial sweetners    Medical Tape Rash     Steri-strips & paper tape ok to use per patient

## 2021-08-16 NOTE — TELEPHONE ENCOUNTER
Recommend f/u imaging  CT chest abdomen and pelvis was normal  Would still f/u finding on MRI as this was new  Order placed  She can do this any time   It's been > 3 months

## 2021-08-16 NOTE — PATIENT INSTRUCTIONS
Complete labs after 8/25/21  A1C goal <7      Start b12 supplement 1000 mcg daily  We will call with results and make changes at that time  Follow up with rheumatology  Use cool compresses on back for itching  Use topical steroid cream as well  Call if any changes such as blistered areas  Please call the office if you are experiencing any worsening of symptoms or no symptom improvement

## 2021-08-18 ENCOUNTER — APPOINTMENT (OUTPATIENT)
Dept: PHYSICAL THERAPY | Facility: REHABILITATION | Age: 61
End: 2021-08-18
Payer: MEDICARE

## 2021-08-19 ENCOUNTER — OFFICE VISIT (OUTPATIENT)
Dept: PHYSICAL THERAPY | Facility: REHABILITATION | Age: 61
End: 2021-08-19
Payer: MEDICARE
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
## 2021-08-19 DIAGNOSIS — R26.89 IMBALANCE: ICD-10-CM

## 2021-08-19 DIAGNOSIS — R29.898 LEG WEAKNESS, BILATERAL: ICD-10-CM

## 2021-08-19 PROCEDURE — 97110 THERAPEUTIC EXERCISES: CPT | Performed by: PHYSICAL THERAPIST

## 2021-08-19 PROCEDURE — 97112 NEUROMUSCULAR REEDUCATION: CPT | Performed by: PHYSICAL THERAPIST

## 2021-08-19 NOTE — PROGRESS NOTES
Treatment Note     Today's date: 2021  Patient name: Yinka Ritter  : 1960  MRN: [id number]  Referring provider: Ashlyn Gay DO  Dx:   Encounter Diagnosis     ICD-10-CM    1  Fall, subsequent encounter  W19  XXXD    2  Imbalance  R26 89    3  Leg weakness, bilateral  R29 898      Subjective: Bad couple of days due to soreness and stiffness  Went to grocery store yesterday  Did a lot of work organizing material in cabinets  Gets lightheaded, sees spots  Patient goals: walking at a normal pace without cane (not using prior to house), grocery shop without significant pain when completed, dancing at niece's wedding in 2022  Patient-Specific Functional Scale   Task is scored 0 (unable to perform activity) to 10 (ability to perform activity independently)  Activity 21   1  Walk at normal pace without a cane 8/10 9/10    2  Grocery shop without significant pain 5/10 6-7/10    3  Dance 3/10 3/10      Objective: See treatment diary below    Precautions - Hashimoto's, autoimmune disease    Mobility Measures 21   Assistive device used? 5 Time Sit to Stand  (17" chair, arms across chest) Completes laboriously from 17" surface without use of hands Completes from 19" surface in 16 seconds (5 times), unable from 17" surface without use of hands Unable from 18" surface without hands  16 seconds from 20" surface    3 Meter Timed  Up & Go       Walking speed       Functional Gait Assessment (see below)       6 Minute Walk Test (100 foot circular course)   930 feet with 6 minute walk test 920 feet, pain about left posterior distal thigh, medial lower leg, and right lateral lower leg 1050 feet 6 minute walk test    Patient-Reported Outcome Measure:  Activities-Specific Balance Confidence Scale (ABC Scale)        96 degrees seated right knee flexion after 2 minutes stretching 96 degrees after 1 minute of stretching 95-96 degrees After 2 minutes stretching      Overground walking, 200 feet, 50 seconds per 100 feet  SciFit level 1 4, 6 5 min, 95 steps/min  Passive hip flexion, hamstring, gastroc stretching, bilateral, 5 min    SLR, 1 min each  Hip and trunk rotation stretches 3 min  Overground walking 100 feet, 38 sec, 38 sec, 34 sec    Sit to stand from 20" surface, keeping feet static, 3 sets of 7-10    ASSESSMENT:  Again stiff overall to start treatment, but bending knee fine during walking  Improved self-selected walking speed following some warm-up and stretching, complete in cycles at home to stay mobile  SHORT-TERM GOALS: 1 month(s)  1  Moose Leslie walks at least 10 minutes consecutively  Completes 30 minute walking video, but not directly assessed  2  Moose Leslie increases passive right knee flexion to 100 degrees to allow reciprocal stair ambulation  NOT MET, at 95-96 degrees  3  Moose Leslie reports 50% improvement in ability to walk grocery store distances without severe fatigue  MET  LONG-TERM GOALS: 2 month(s)  1  Patient reports at least 20 total minutes per day of overground walking for exercise  PARTIALLY MET FOR CONSISTENCY     2  Patient independently manages home exercise program   PROGRESSED, MOSTLY MET  3  Patient reports return to baseline with reading and small detail work  PLAN OF CARE:  Patient will benefit from physical therapy 1 time per week for 1 month  Neuromuscular re-education, therapeutic exercises, and therapeutic activities as outlined in grids

## 2021-08-24 ENCOUNTER — HOSPITAL ENCOUNTER (OUTPATIENT)
Dept: MAMMOGRAPHY | Facility: MEDICAL CENTER | Age: 61
Discharge: HOME | End: 2021-08-24
Payer: MEDICARE

## 2021-08-24 VITALS — HEIGHT: 66 IN | BODY MASS INDEX: 38.09 KG/M2 | WEIGHT: 237 LBS

## 2021-08-24 DIAGNOSIS — Z12.31 ENCOUNTER FOR SCREENING MAMMOGRAM FOR MALIGNANT NEOPLASM OF BREAST: ICD-10-CM

## 2021-08-24 PROCEDURE — 77063 BREAST TOMOSYNTHESIS BI: CPT

## 2021-08-24 PROCEDURE — 77067 SCR MAMMO BI INCL CAD: CPT

## 2021-08-25 ENCOUNTER — EVALUATION (OUTPATIENT)
Dept: PHYSICAL THERAPY | Facility: REHABILITATION | Age: 61
End: 2021-08-25
Payer: MEDICARE
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
## 2021-08-25 DIAGNOSIS — R26.89 IMBALANCE: ICD-10-CM

## 2021-08-25 PROCEDURE — 97112 NEUROMUSCULAR REEDUCATION: CPT | Performed by: PHYSICAL THERAPIST

## 2021-08-25 PROCEDURE — 97110 THERAPEUTIC EXERCISES: CPT | Performed by: PHYSICAL THERAPIST

## 2021-08-25 NOTE — PROGRESS NOTES
RE-EVALUATION / Treatment Note     Today's date: 2021  Patient name: Zion Tamayo  : 1960  MRN: [id number]  Referring provider: Jennifer Johnson DO  Dx:   Encounter Diagnosis     ICD-10-CM    1  Fall, subsequent encounter  W19  XXXD    2  Imbalance  R26 89      Subjective:   Chau Reed reports some swelling about her right ankle and lower leg, not responsive to elevating the foot  Stiffness about the hip and lower leg with initial movements  Follows with neurology  See PSFS below  Patient goals: walking at a normal pace without cane (not using prior to house), grocery shop without significant pain when completed, dancing at niece's wedding in 2022  Patient-Specific Functional Scale   Task is scored 0 (unable to perform activity) to 10 (ability to perform activity independently)  Activity 21   1  Walk at normal pace without a cane 8/10 9/10 5/10    2  Grocery shop without significant pain 5/10 6-7/10 8/10   3  Dance 3/10 3/10 5/10     Objective: See treatment diary below    Precautions - Hashimoto's, autoimmune disease    Mobility Measures 2020   Assistive device used? cane cane        5 Time Sit to Stand  (17" chair, arms across chest) Unable from 17" surface    17 8 seconds from 19" surface, hands out front    Unable from 19" surface without strong use of hands   Completes from 19" surface, unable from 17" surface     Completes laboriously from 17" surface without use of hands Completes from 19" surface in 16 seconds (5 times), unable from 17" surface without use of hands Unable from 18" surface without hands  16 seconds from 20" surface Unable from 18" surface without hands  12 7 seconds from 20" surface  14 0 seconds from 19" surface, stresses left knee   3 Meter Timed  Up & Go 11 9 seconds 11 3 seconds        Walking speed Not tested         Functional Gait Assessment (see below)  Notes         6 Minute Walk Test (100 foot circular course)   750 feet    Walks with decreased excursion of the right knee   2 minute walk test 240 feet   400 feet 2 minute walk test 930 feet with 6 minute walk test 920 feet, pain about left posterior distal thigh, medial lower leg, and right lateral lower leg 1050 feet 6 minute walk test 985 feet 6 minute walk test     Patient-Reported Outcome Measure: Activities-Specific Balance Confidence Scale (ABC Scale) Not completed Not completed            96 degrees seated right knee flexion after 2 minutes stretching 96 degrees after 1 minute of stretching 95-96 degrees After 2 minutes stretching 98 degrees after 2 minutes of stretching     Right single leg stance 3-4 seconds, 7-8 seconds left  Step down stair step with left railing, and step-to gait leading with the right leg, ascends with right railing and alternating gait with mild right circumduction with right swing phase gait  Standing calf stretches and knee flexion stretches, 5 min    Single leg stance, moving the left leg around in small amplitude circles, 1 min x 3    Treadmill 1 5 mph 5% grade, 3 min  Treadmill 1 7 mph, 1% grade, 3 min    SciFit level 2 2, 10 min, about 90 steps/min    ASSESSMENT:  Marlena Ramirez was moving slower today than during prior re-evaluation, but is better able to complete community ambulation over the past month  Mild swelling about the right lower leg (1 5 cm increased limb girth about right proximal lower leg versus left, and 0 5 cm difference ankle figure 8; no pitting)  Responds well to stretching, continues with mild weakness about the right gastroc and decreased right single leg stance versus the left  Continue walking to tolerance, strengthening and balance  SHORT-TERM GOALS: 1 month(s)  1  Marlena Ramirez walks at least 10 minutes consecutively  NOT MET  2  Marlena Ramirez increases passive right knee flexion to 100 degrees to allow reciprocal stair ambulation  APPROACHING    3  Marlena Ramirez reports 50% improvement in ability to walk grocery store distances without severe fatigue  MET  LONG-TERM GOALS: 2 month(s)  1  Patient reports at least 20 total minutes per day of overground walking for exercise  PARTIALLY MET FOR CONSISTENCY     2  Patient independently manages home exercise program   PROGRESSED, MOSTLY MET  3  Patient reports return to baseline with reading and small detail work  NOT MET  PLAN OF CARE:  Patient will benefit from physical therapy 1 time per week for 1 month  Neuromuscular re-education, therapeutic exercises, and therapeutic activities as outlined in grids

## 2021-08-30 ENCOUNTER — TELEPHONE (OUTPATIENT)
Dept: FAMILY MEDICINE CLINIC | Facility: CLINIC | Age: 61
End: 2021-08-30

## 2021-08-30 NOTE — TELEPHONE ENCOUNTER
Romi Garcia   Pt is asking should she get the flu vaccine now or wait,due to present health status? This below  is just for documentation purposes of telephone call  Kristi Morelos called the office asking about the 3rd COVID 19 booster injecitn  Pt expressed she meets the requirmemts ,due to she is moderatley to severly immunocompromised  Pt has hashimoto's and this has been discussed with Isabel Little  Pt aware to our knowledge that the 3rd dose would be givne what you received for example if reiceived 2 does of moderna it would be the moderna  Pt has vaccien card for proof pt will call Elif Magno' to schedule          Pt's number is [phone]

## 2021-08-30 NOTE — TELEPHONE ENCOUNTER
Tell the patient I would like her to wait to the end of the month of September or early October to get the flu vaccine

## 2021-09-01 ENCOUNTER — TELEPHONE (OUTPATIENT)
Dept: FAMILY MEDICINE CLINIC | Facility: CLINIC | Age: 61
End: 2021-09-01

## 2021-09-01 ENCOUNTER — OFFICE VISIT (OUTPATIENT)
Dept: PHYSICAL THERAPY | Facility: REHABILITATION | Age: 61
End: 2021-09-01
Payer: MEDICARE

## 2021-09-01 DIAGNOSIS — R26.89 IMBALANCE: ICD-10-CM

## 2021-09-01 DIAGNOSIS — R29.898 LEG WEAKNESS, BILATERAL: ICD-10-CM
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
## 2021-09-01 PROCEDURE — 97112 NEUROMUSCULAR REEDUCATION: CPT | Performed by: PHYSICAL THERAPIST

## 2021-09-01 PROCEDURE — 97110 THERAPEUTIC EXERCISES: CPT | Performed by: PHYSICAL THERAPIST

## 2021-09-01 NOTE — TELEPHONE ENCOUNTER
Patient called today  She started eating a lot of bananas  And noticed the swelling has gone down a lot in her legs  Wondering if she may have a potassium deficiency? Please advise

## 2021-09-01 NOTE — PROGRESS NOTES
Treatment Note     Today's date: 2021  Patient name: Laney Cobian  : 1960  MRN: [id number]  Referring provider: Brianna Roberts DO  Dx:   Encounter Diagnosis     ICD-10-CM    1  Fall, subsequent encounter  W19  XXXD    2  Imbalance  R26 89    3  Leg weakness, bilateral  R29 898      Subjective:   She is not doing the best today she feels tight and cramping today  She had her third covid shot yesterday and had arm cramps instantly with the shot no other side effects  She had doctors appointment for her rash around her neck and has not started her medication for that yet  She does have lidocaine and Biofreeze patches that she wears on her hips and ankles and provides temporary relief  She does take oxy about 1 time per month prn  Patient goals: walking at a normal pace without cane (not using prior to house), grocery shop without significant pain when completed, [place] [email or web address] [place]22  Patient-Specific Functional Scale   Task is scored 0 (unable to perform activity) to 10 (ability to perform activity independently)  Activity 21   1  Walk at normal pace without a cane 8/10 9/10 5/10    2  Grocery shop without significant pain 5/10 6-7/10 8/10   3  Dance 3/10 3/10 5/10     Objective: See treatment diary below    Precautions - Hashimoto's, autoimmune disease    Mobility Measures 2020   Assistive device used? cane cane        5 Time Sit to Stand  (17" chair, arms across chest) Unable from 17" surface    17 8 seconds from 19" surface, hands out front    Unable from 19" surface without strong use of hands   Completes from 19" surface, unable from 17" surface     Completes laboriously from 17" surface without use of hands Completes from 19" surface in 16 seconds (5 times), unable from 17" surface without use of hands Unable from 18" surface without hands  16 seconds from 20" surface Unable from 18" surface without hands  12 7 seconds from 20" surface  14 0 seconds from 19" surface, stresses left knee   3 Meter Timed  Up & Go 11 9 seconds 11 3 seconds        Walking speed Not tested         Functional Gait Assessment (see below)  Notes         6 Minute Walk Test (100 foot circular course)   750 feet    Walks with decreased excursion of the right knee   2 minute walk test 240 feet   400 feet 2 minute walk test 930 feet with 6 minute walk test 920 feet, pain about left posterior distal thigh, medial lower leg, and right lateral lower leg 1050 feet 6 minute walk test 985 feet 6 minute walk test     Patient-Reported Outcome Measure: Activities-Specific Balance Confidence Scale (ABC Scale) Not completed Not completed            96 degrees seated right knee flexion after 2 minutes stretching 96 degrees after 1 minute of stretching 95-96 degrees After 2 minutes stretching 98 degrees after 2 minutes of stretching     Overground walking, 300' about 80 seconds per lap    Right single leg stance 3-4 seconds, 7-8 seconds left  Stair step up 6' one use of hand rail, step to gait pattern for the stairs with left leg going down first and right leg going up   3 min  Step ups 6' up/back also lateral one rail 3 min total    Step ups 4' up and back and lateral no rail 3 min total     Sit to stands with 2 cushions on chair, 30s, 50s, 40s   Seated LAQ 2x15 , no pain with these, slight clicking in the L knee    Standing calf stretches and seated knee flexion stretches with turf under heel to allow for more knee motion without ankle pain, 5 min    Single leg stance, moving the left leg around in small amplitude circles, 1 min ea side    No treadmill today due to flare up and wanting to stretch instead  Treadmill 1 5 mph 5% grade, 3 min  Treadmill 1 7 mph, 1% grade, 3 min    SciFit level 2 2, 10 min, about 90 steps/min    Stretchin min total, hamstring and claf  100 foot timed lap following able to complete in 41 seconds    ASSESSMENT:  Guadelupe Carrel was moving slower today due to being tight and having cramps in both hips  She was very limited during the session due to being flared up today, encouraged her to continue moving during the session but also at home when she has days like this  During sit to stands she lacks using knee extension strength due to knee pain and does more of a lean forward with a hip hinge then  Continued emphasis to keep her on a consistent exercise routine to reduce her flare ups  Demonstrated good carry over in the session with a reduction in lap time for 100 feet from 80 seconds to 41 seconds  SHORT-TERM GOALS: 1 month(s)  1  Guadelupe Carrel walks at least 10 minutes consecutively  NOT MET  2  Guadelupe Carrel increases passive right knee flexion to 100 degrees to allow reciprocal stair ambulation  APPROACHING  3  Guadelupe Carrel reports 50% improvement in ability to walk grocery store distances without severe fatigue  MET  LONG-TERM GOALS: 2 month(s)  1  Patient reports at least 20 total minutes per day of overground walking for exercise  PARTIALLY MET FOR CONSISTENCY     2  Patient independently manages home exercise program   PROGRESSED, MOSTLY MET  3  Patient reports return to baseline with reading and small detail work  NOT MET  PLAN OF CARE:  Patient will benefit from physical therapy 1 time per week for 1 month  Neuromuscular re-education, therapeutic exercises, and therapeutic activities as outlined in grids

## 2021-09-02 ENCOUNTER — TELEPHONE (OUTPATIENT)
Dept: ENDOCRINOLOGY | Facility: CLINIC | Age: 61
End: 2021-09-02

## 2021-09-02 NOTE — TELEPHONE ENCOUNTER
Pt called today to let you know that her PCP ordered metformin 500 mg 1 tab at dinner (BS are running between 115-130)  , Pt c/o  having high blood sugar this morning  of 165, pt is- taking flonase, azelastine and a new cream for her skin, she is not sure if all this medication are making her  Blood sugar running high or maybe because when she start having pain and  started having nauseas she is not eating  full meals   Pt would like to know what she should eat when she has nauseas, she did eat  some crackers but does not feel that this is  Good for her since can  Elevated her BS , what other snack can she eat?

## 2021-09-02 NOTE — TELEPHONE ENCOUNTER
Agree with starting metformin  When she met with dietician it was recommended that she stick with 45grams of carb per meal and 15g carb per snack to help with control of Diabetes  Whatever she can eat that doesn't cause nausea is fine but look at the nutrition info for the crackers to determine the portion she would need to get 45g for meal or 15g per snack  The blood sugars are not critically high but agree with starting metformin and follow up with Dr Latosha Pederson 9/14 as scheduled    If she would like follow up with dietician let us know  thanks

## 2021-09-08 ENCOUNTER — OFFICE VISIT (OUTPATIENT)
Dept: PHYSICAL THERAPY | Facility: REHABILITATION | Age: 61
End: 2021-09-08
Payer: MEDICARE

## 2021-09-08 DIAGNOSIS — R26.89 IMBALANCE: ICD-10-CM
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
## 2021-09-08 PROCEDURE — 97112 NEUROMUSCULAR REEDUCATION: CPT | Performed by: PHYSICAL THERAPIST

## 2021-09-08 PROCEDURE — 97110 THERAPEUTIC EXERCISES: CPT | Performed by: PHYSICAL THERAPIST

## 2021-09-08 NOTE — PROGRESS NOTES
Treatment Note     Today's date: 2021  Patient name: Madai Lorenz  : 1960  MRN: [id number]  Referring provider: Sharona Falcon DO  Dx:   Encounter Diagnosis     ICD-10-CM    1  Fall, subsequent encounter  W19  XXXD    2  Imbalance  R26 89      Subjective:   Pain just inferior to the usual spot about the distal right lateral lower extremity, started today  Patient goals: walking at a normal pace without cane (not using prior to house), grocery shop without significant pain when completed, dancing at niece's wedding in 2022  Patient-Specific Functional Scale   Task is scored 0 (unable to perform activity) to 10 (ability to perform activity independently)  Activity 21   1  Walk at normal pace without a cane 8/10 9/10 5/10    2  Grocery shop without significant pain 5/10 6-7/10 8/10   3  Dance 3/10 3/10 5/10     Objective: See treatment diary below    Precautions - Hashimoto's, autoimmune disease    Mobility Measures 2020   Assistive device used? cane cane        5 Time Sit to Stand  (17" chair, arms across chest) Unable from 17" surface    17 8 seconds from 19" surface, hands out front    Unable from 19" surface without strong use of hands   Completes from 19" surface, unable from 17" surface     Completes laboriously from 17" surface without use of hands Completes from 19" surface in 16 seconds (5 times), unable from 17" surface without use of hands Unable from 18" surface without hands  16 seconds from 20" surface Unable from 18" surface without hands  12 7 seconds from 20" surface  14 0 seconds from 19" surface, stresses left knee   3 Meter Timed  Up & Go 11 9 seconds 11 3 seconds        Walking speed Not tested         Functional Gait Assessment (see below)  Notes         6 Minute Walk Test (100 foot circular course)   750 feet    Walks with decreased excursion of the right knee   2 minute walk test 240 feet   400 feet 2 minute walk test 930 feet with 6 minute walk test 920 feet, pain about left posterior distal thigh, medial lower leg, and right lateral lower leg 1050 feet 6 minute walk test 985 feet 6 minute walk test     Patient-Reported Outcome Measure: Activities-Specific Balance Confidence Scale (ABC Scale) Not completed Not completed            96 degrees seated right knee flexion after 2 minutes stretching 96 degrees after 1 minute of stretching 95-96 degrees After 2 minutes stretching 98 degrees after 2 minutes of stretching     Overground walking, about 1200 feet at around 35 sec/100 feet lap    Right single leg stance, tap to targets 1 5' apart, 30 sec x 2 each  Then same with stance leg on airex foam, 30 sec each  Then plus over a 6" obstacle, 30 sec each    Heel raises, increased weight on one leg, 30-60 sec x 2 sets each    Step up to 6" step, 1 min  Then laterally, 1 min    Balance, turning 360 degrees in 6 steps, 2 min  Balance, dancing, braiding walking, 10 feet x 8    SciFit level 1 6, 15 min, about 85 steps/min    ASSESSMENT:  Good initial speed of walking today despite some initial pain about the right lateral lower leg  Improved gastroc strength and able to maintain balance while turning  SHORT-TERM GOALS: 1 month(s)  1  Yoel valencia walks at least 10 minutes consecutively  NOT MET  2  Yoel valencia increases passive right knee flexion to 100 degrees to allow reciprocal stair ambulation  APPROACHING  3  Yoel valencia reports 50% improvement in ability to walk grocery store distances without severe fatigue  MET  LONG-TERM GOALS: 2 month(s)  1  Patient reports at least 20 total minutes per day of overground walking for exercise  PARTIALLY MET FOR CONSISTENCY     2  Patient independently manages home exercise program   PROGRESSED, MOSTLY MET  3  Patient reports return to baseline with reading and small detail work  NOT MET      PLAN OF CARE:  Patient will benefit from physical therapy 1 time per week for 1 month  Neuromuscular re-education, therapeutic exercises, and therapeutic activities as outlined in grids

## 2021-09-15 ENCOUNTER — OFFICE VISIT (OUTPATIENT)
Dept: PHYSICAL THERAPY | Facility: REHABILITATION | Age: 61
End: 2021-09-15
Payer: MEDICARE
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
## 2021-09-15 DIAGNOSIS — R26.89 IMBALANCE: ICD-10-CM

## 2021-09-15 PROCEDURE — 97110 THERAPEUTIC EXERCISES: CPT | Performed by: PHYSICAL THERAPIST

## 2021-09-15 PROCEDURE — 97112 NEUROMUSCULAR REEDUCATION: CPT | Performed by: PHYSICAL THERAPIST

## 2021-09-15 NOTE — PROGRESS NOTES
Treatment Note     Today's date: 9/15/2021  Patient name: Mellisa Butcher  : 1960  MRN: [id number]  Referring provider: Savanna Osullivan DO  Dx:   Encounter Diagnosis     ICD-10-CM    1  Fall, subsequent encounter  W19  XXXD    2  Imbalance  R26 89      Subjective:     Some good days and bad days in terms of mobility  Patient goals: walking at a normal pace without cane (not using prior to house), grocery shop without significant pain when completed, dancing at niece's wedding in 2022  Patient-Specific Functional Scale   Task is scored 0 (unable to perform activity) to 10 (ability to perform activity independently)  Activity 21   1  Walk at normal pace without a cane 8/10 9/10 5/10    2  Grocery shop without significant pain 5/10 6-7/10 8/10   3  Dance 3/10 3/10 5/10     Objective: See treatment diary below    Precautions - Hashimoto's, autoimmune disease    Mobility Measures 2020   Assistive device used? cane cane        5 Time Sit to Stand  (17" chair, arms across chest) Unable from 17" surface    17 8 seconds from 19" surface, hands out front    Unable from 19" surface without strong use of hands   Completes from 19" surface, unable from 17" surface     Completes laboriously from 17" surface without use of hands Completes from 19" surface in 16 seconds (5 times), unable from 17" surface without use of hands Unable from 18" surface without hands  16 seconds from 20" surface Unable from 18" surface without hands  12 7 seconds from 20" surface  14 0 seconds from 19" surface, stresses left knee   3 Meter Timed  Up & Go 11 9 seconds 11 3 seconds        Walking speed Not tested         Functional Gait Assessment (see below)  Notes         6 Minute Walk Test (100 foot circular course)   750 feet    Walks with decreased excursion of the right knee   2 minute walk test 240 feet   400 feet 2 minute walk test 930 feet with 6 minute walk test 920 feet, pain about left posterior distal thigh, medial lower leg, and right lateral lower leg 1050 feet 6 minute walk test 985 feet 6 minute walk test     Patient-Reported Outcome Measure: Activities-Specific Balance Confidence Scale (ABC Scale) Not completed Not completed            96 degrees seated right knee flexion after 2 minutes stretching 96 degrees after 1 minute of stretching 95-96 degrees After 2 minutes stretching 98 degrees after 2 minutes of stretching     Overground walking, about 1000 feet at around 40 sec/100 feet lap  Heel raises, increased weight on one leg, 30-60 sec x 2 sets each  Step up to 6" step, 1 min  Then laterally, 1 min   Step up to 8" step, 30 sec each    Right single leg stance, tap to targets, on airex foam, 1 5' apart, 30 sec x 2 each  Then same with stance leg on airex foam, 30 sec each    Balance, turning 360 degrees in 4-6 steps, 3 min  Balance, dancing, braiding walking, to beats  bpm,10 feet x 8    SciFit level 1 4, 10 min, about 85 - 90 steps/min    ASSESSMENT:  Good improvement in tolerance of turning quickly to work towards dancing goal   Continue to work on endurance and intensity of these exercises  SHORT-TERM GOALS: 1 month(s)  1  Stefani Campos walks at least 10 minutes consecutively  NOT MET  2  Stefani Campos increases passive right knee flexion to 100 degrees to allow reciprocal stair ambulation  APPROACHING  3  Stefani Campos reports 50% improvement in ability to walk grocery store distances without severe fatigue  MET  LONG-TERM GOALS: 2 month(s)  1  Patient reports at least 20 total minutes per day of overground walking for exercise  PARTIALLY MET FOR CONSISTENCY     2  Patient independently manages home exercise program   PROGRESSED, MOSTLY MET  3  Patient reports return to baseline with reading and small detail work  NOT MET      PLAN OF CARE:  Patient will benefit from physical therapy 1 time per week for 1 month  Neuromuscular re-education, therapeutic exercises, and therapeutic activities as outlined in grids

## 2021-09-22 ENCOUNTER — APPOINTMENT (OUTPATIENT)
Dept: PHYSICAL THERAPY | Facility: REHABILITATION | Age: 61
End: 2021-09-22
Payer: MEDICARE

## 2021-09-23 DIAGNOSIS — M79.18 MYOFASCIAL PAIN: ICD-10-CM

## 2021-09-23 DIAGNOSIS — G70.9 NEUROMUSCULAR DISORDER (HCC): ICD-10-CM

## 2021-09-24 ENCOUNTER — HOSPITAL ENCOUNTER (OUTPATIENT)
Dept: MRI IMAGING | Facility: HOSPITAL | Age: 61
Discharge: HOME | End: 2021-09-24
Payer: MEDICARE

## 2021-09-24 ENCOUNTER — APPOINTMENT (OUTPATIENT)
Dept: LAB | Facility: HOSPITAL | Age: 61
End: 2021-09-24
Payer: MEDICARE

## 2021-09-24 DIAGNOSIS — D35.2 PITUITARY MICROADENOMA (HCC): ICD-10-CM

## 2021-09-24 DIAGNOSIS — M89.8X8 MASS OF PARIETAL BONE OF SKULL: ICD-10-CM

## 2021-09-24 DIAGNOSIS — E03.4 HYPOTHYROIDISM DUE TO ACQUIRED ATROPHY OF THYROID: ICD-10-CM

## 2021-09-24 DIAGNOSIS — E11.9 TYPE 2 DIABETES MELLITUS WITHOUT COMPLICATION, WITHOUT LONG-TERM CURRENT USE OF INSULIN (HCC): ICD-10-CM

## 2021-09-24 LAB
EST. AVERAGE GLUCOSE BLD GHB EST-MCNC: 189 MG/DL
HBA1C MFR BLD: 8.2 %
T4 FREE SERPL-MCNC: 1.24 NG/DL (ref 0.76–1.46)
TSH SERPL DL<=0.05 MIU/L-ACNC: 1.41 UIU/ML (ref 0.36–3.74)

## 2021-09-24 PROCEDURE — 84443 ASSAY THYROID STIM HORMONE: CPT

## 2021-09-24 PROCEDURE — 83036 HEMOGLOBIN GLYCOSYLATED A1C: CPT

## 2021-09-24 PROCEDURE — 36415 COLL VENOUS BLD VENIPUNCTURE: CPT

## 2021-09-24 PROCEDURE — A9585 GADOBUTROL INJECTION: HCPCS | Performed by: NURSE PRACTITIONER

## 2021-09-24 PROCEDURE — 70553 MRI BRAIN STEM W/O & W/DYE: CPT

## 2021-09-24 PROCEDURE — G1004 CDSM NDSC: HCPCS

## 2021-09-24 PROCEDURE — 84439 ASSAY OF FREE THYROXINE: CPT

## 2021-09-24 RX ORDER — DULOXETIN HYDROCHLORIDE 60 MG/1
CAPSULE, DELAYED RELEASE ORAL
Qty: 90 CAPSULE | Refills: 2 | Status: SHIPPED | OUTPATIENT
Start: 2021-09-24 | End: 2022-03-29 | Stop reason: SDUPTHER

## 2021-09-24 RX ADMIN — GADOBUTROL 10 ML: 604.72 INJECTION INTRAVENOUS at 15:05

## 2021-09-27 ENCOUNTER — OFFICE VISIT (OUTPATIENT)
Dept: FAMILY MEDICINE CLINIC | Facility: CLINIC | Age: 61
End: 2021-09-27
Payer: MEDICARE

## 2021-09-27 VITALS
RESPIRATION RATE: 16 BRPM | OXYGEN SATURATION: 98 % | HEART RATE: 83 BPM | TEMPERATURE: 97.1 F | DIASTOLIC BLOOD PRESSURE: 82 MMHG | SYSTOLIC BLOOD PRESSURE: 138 MMHG

## 2021-09-27 DIAGNOSIS — E06.3 HASHIMOTO'S DISEASE: ICD-10-CM

## 2021-09-27 DIAGNOSIS — Z23 ENCOUNTER FOR IMMUNIZATION: ICD-10-CM

## 2021-09-27 DIAGNOSIS — M79.671 RIGHT FOOT PAIN: Primary | ICD-10-CM

## 2021-09-27 DIAGNOSIS — I10 ESSENTIAL HYPERTENSION: ICD-10-CM

## 2021-09-27 DIAGNOSIS — E11.9 TYPE 2 DIABETES MELLITUS WITHOUT COMPLICATION, WITHOUT LONG-TERM CURRENT USE OF INSULIN (HCC): ICD-10-CM

## 2021-09-27 DIAGNOSIS — S99.921A INJURY OF RIGHT FOOT, INITIAL ENCOUNTER: ICD-10-CM

## 2021-09-27 DIAGNOSIS — M25.562 ACUTE PAIN OF LEFT KNEE: ICD-10-CM

## 2021-09-27 PROCEDURE — G0008 ADMIN INFLUENZA VIRUS VAC: HCPCS | Performed by: FAMILY MEDICINE

## 2021-09-27 PROCEDURE — 99214 OFFICE O/P EST MOD 30 MIN: CPT | Performed by: FAMILY MEDICINE

## 2021-09-27 PROCEDURE — 90682 RIV4 VACC RECOMBINANT DNA IM: CPT | Performed by: FAMILY MEDICINE

## 2021-09-27 RX ORDER — METFORMIN HYDROCHLORIDE 500 MG/1
500 TABLET, EXTENDED RELEASE ORAL 2 TIMES DAILY WITH MEALS
Qty: 180 TABLET | Refills: 3 | Status: SHIPPED | OUTPATIENT
Start: 2021-09-27 | End: 2022-05-10

## 2021-09-27 RX ORDER — CLOBETASOL PROPIONATE 0.5 MG/G
CREAM TOPICAL DAILY
COMMUNITY
End: 2022-05-10

## 2021-09-27 RX ORDER — BLOOD SUGAR DIAGNOSTIC
STRIP MISCELLANEOUS
Qty: 100 EACH | Refills: 5 | Status: SHIPPED | OUTPATIENT
Start: 2021-09-27

## 2021-09-27 RX ORDER — BETAMETHASONE DIPROPIONATE 0.5 MG/G
CREAM TOPICAL
COMMUNITY
Start: 2021-09-08 | End: 2022-05-10

## 2021-09-28 ENCOUNTER — APPOINTMENT (OUTPATIENT)
Dept: RADIOLOGY | Facility: MEDICAL CENTER | Age: 61
End: 2021-09-28
Payer: MEDICARE

## 2021-09-28 DIAGNOSIS — S99.921A INJURY OF RIGHT FOOT, INITIAL ENCOUNTER: ICD-10-CM

## 2021-09-28 DIAGNOSIS — M79.671 RIGHT FOOT PAIN: ICD-10-CM

## 2021-09-28 PROCEDURE — 73630 X-RAY EXAM OF FOOT: CPT

## 2021-09-29 ENCOUNTER — OFFICE VISIT (OUTPATIENT)
Dept: PHYSICAL THERAPY | Facility: REHABILITATION | Age: 61
End: 2021-09-29
Payer: MEDICARE

## 2021-09-29 ENCOUNTER — TELEPHONE (OUTPATIENT)
Dept: FAMILY MEDICINE CLINIC | Facility: CLINIC | Age: 61
End: 2021-09-29

## 2021-09-29 DIAGNOSIS — R29.898 LEG WEAKNESS, BILATERAL: ICD-10-CM
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
## 2021-09-29 DIAGNOSIS — M25.562 ACUTE PAIN OF LEFT KNEE: Primary | ICD-10-CM

## 2021-09-29 DIAGNOSIS — R26.89 IMBALANCE: ICD-10-CM

## 2021-09-29 PROCEDURE — 97110 THERAPEUTIC EXERCISES: CPT | Performed by: PHYSICAL THERAPIST

## 2021-09-29 NOTE — PROGRESS NOTES
RE-EVALUATION / Treatment Note     Today's date: 2021  Patient name: Arnaldo Pinto  : 1960  MRN: [id number]  Referring provider: Joleen Bowen DO  Dx:   Encounter Diagnosis     ICD-10-CM    1  Fall, subsequent encounter  W19  XXXD    2  Imbalance  R26 89    3  Leg weakness, bilateral  R29 898      Subjective:   Felt left knee pain and swelling, became worse this past Friday, heard it pop and felt pain in her knee  Saturday and  she couldn't sleep much without oxycodone  Doesn't feel right and doesn't trust it, using rollator walker  Feels pain about the left lateral knee in an arc  Seen by orthopedist, suggested knee replacements when older  Patient goals: walking at a normal pace without cane (not using prior to house), grocery shop without significant pain when completed, [place] [employer] in 2022  Patient-Specific Functional Scale   Task is scored 0 (unable to perform activity) to 10 (ability to perform activity independently)  Activity 21   1  Walk at normal pace without a cane 8/10 9/10 5/10  010   2  Grocery shop without significant pain 5/10 6-7/10 8/10 010   3  Dance 3/10 3/10 5/10      Objective: See treatment diary below    About -10 degrees active left knee flexion, 118 degrees passive flexion in sitting  90 degrees passive right knee flexion  Kicks both feet out (towards extension) when transitioning to sit  (+) Campos's Test, pain left lateral meniscus    Walks with decreased step lengths, decreased hip extension, weight on rollator walker  Precautions - Hashimoto's, autoimmune disease    Mobility Measures 21   Assistive device used?     Deferred by therapist   5 Time Sit to Stand  (17" chair, arms across chest) Completes from 19" surface in 16 seconds (5 times), unable from 17" surface without use of hands Unable from 18" surface without hands  16 seconds from 20" surface Unable from 18" surface without hands  12 7 seconds from 20" surface  14 0 seconds from 19" surface, stresses left knee    3 Meter Timed  Up & Go       Walking speed       Functional Gait Assessment (see below)       6 Minute Walk Test (100 foot circular course)   920 feet, pain about left posterior distal thigh, medial lower leg, and right lateral lower leg 1050 feet 6 minute walk test 985 feet 6 minute walk test      Patient-Reported Outcome Measure: Activities-Specific Balance Confidence Scale (ABC Scale)        96 degrees after 1 minute of stretching 95-96 degrees After 2 minutes stretching 98 degrees after 2 minutes of stretching      Supine left SLR, 12 x 2 sets  LAQ, left, 12 x 2 sets  Standing heel raise, slight knee flexion, comfortable range, 15 x 2 sets    Seated left gastroc stretch with towel behind knee, 1 min x 2 sets    sidelying left hip abduction, 12 x 2 sets    Seated icing to left anterior knee, 10 min    ASSESSMENT:  Regression in mobility due to acute on chronic onset left lateral knee pain consistent with lateral meniscal pathology  We focused on hip and knee strengthening in comfortable ranges of motion  Good understanding of modified home exercise program       SHORT-TERM GOALS: 1 month(s)  1  Prerna Angela walks at least 10 minutes consecutively  NOT MET  2  Prerna Angela increases passive right knee flexion to 100 degrees to allow reciprocal stair ambulation  NOT MET     3  Prerna Angela reports 50% improvement in ability to walk grocery store distances without severe fatigue  NOT MET  David Mccabe LONG-TERM GOALS: 2 month(s)  1  Patient reports at least 20 total minutes per day of overground walking for exercise  NOT MET  2  Patient independently manages home exercise program   NOT MET  3  Patient reports return to baseline with reading and small detail work  NOT MET      PLAN OF CARE:  Patient will benefit from physical therapy 1 time per week for 1 month  Neuromuscular re-education, therapeutic exercises, and therapeutic activities as outlined in grids

## 2021-09-29 NOTE — TELEPHONE ENCOUNTER
Pt has not been seen at [place] [id number] at [facility] end  Can you please refer her to there ? Please advise   Thanks

## 2021-09-29 NOTE — TELEPHONE ENCOUNTER
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

## 2021-10-01 ENCOUNTER — TELEPHONE (OUTPATIENT)
Dept: FAMILY MEDICINE CLINIC | Facility: CLINIC | Age: 61
End: 2021-10-01

## 2021-10-01 ENCOUNTER — PATIENT OUTREACH (OUTPATIENT)
Dept: FAMILY MEDICINE CLINIC | Facility: CLINIC | Age: 61
End: 2021-10-01

## 2021-10-01 DIAGNOSIS — Z59.1 HOUSING UNSATISFACTORY: Primary | ICD-10-CM

## 2021-10-01 DIAGNOSIS — E11.9 TYPE 2 DIABETES MELLITUS WITHOUT COMPLICATION, WITHOUT LONG-TERM CURRENT USE OF INSULIN (HCC): ICD-10-CM

## 2021-10-01 RX ORDER — LANCETS 33 GAUGE
EACH MISCELLANEOUS ONCE
Qty: 100 EACH | Refills: 3 | Status: SHIPPED | OUTPATIENT
Start: 2021-10-01 | End: 2021-10-01

## 2021-10-01 RX ORDER — LANCETS 30 GAUGE
EACH MISCELLANEOUS
Qty: 100 EACH | Refills: 1 | Status: SHIPPED | OUTPATIENT
Start: 2021-10-01

## 2021-10-01 RX ORDER — BLOOD SUGAR DIAGNOSTIC
STRIP MISCELLANEOUS
Qty: 100 STRIP | Refills: 1 | Status: SHIPPED | OUTPATIENT
Start: 2021-10-01

## 2021-10-01 SDOH — ECONOMIC STABILITY - HOUSING INSECURITY: INADEQUATE HOUSING: Z59.1

## 2021-10-04 ENCOUNTER — PATIENT OUTREACH (OUTPATIENT)
Dept: FAMILY MEDICINE CLINIC | Facility: CLINIC | Age: 61
End: 2021-10-04

## 2021-10-05 ENCOUNTER — PATIENT OUTREACH (OUTPATIENT)
Dept: FAMILY MEDICINE CLINIC | Facility: CLINIC | Age: 61
End: 2021-10-05

## 2021-10-06 ENCOUNTER — OFFICE VISIT (OUTPATIENT)
Dept: PHYSICAL THERAPY | Facility: REHABILITATION | Age: 61
End: 2021-10-06
Payer: MEDICARE

## 2021-10-06 DIAGNOSIS — R29.898 LEG WEAKNESS, BILATERAL: ICD-10-CM

## 2021-10-06 DIAGNOSIS — R26.89 IMBALANCE: ICD-10-CM
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
## 2021-10-06 PROCEDURE — 97112 NEUROMUSCULAR REEDUCATION: CPT | Performed by: PHYSICAL THERAPIST

## 2021-10-06 PROCEDURE — 97110 THERAPEUTIC EXERCISES: CPT | Performed by: PHYSICAL THERAPIST

## 2021-10-12 ENCOUNTER — TELEMEDICINE (OUTPATIENT)
Dept: PSYCHIATRY | Facility: CLINIC | Age: 61
End: 2021-10-12
Payer: MEDICARE

## 2021-10-12 DIAGNOSIS — G47.00 INSOMNIA, UNSPECIFIED TYPE: ICD-10-CM

## 2021-10-12 DIAGNOSIS — F43.23 ADJUSTMENT DISORDER WITH MIXED ANXIETY AND DEPRESSED MOOD: Primary | ICD-10-CM

## 2021-10-12 DIAGNOSIS — M79.18 MYOFASCIAL PAIN: ICD-10-CM

## 2021-10-12 DIAGNOSIS — F43.10 PTSD (POST-TRAUMATIC STRESS DISORDER): ICD-10-CM

## 2021-10-12 DIAGNOSIS — G70.9 NEUROMUSCULAR DISORDER (HCC): ICD-10-CM

## 2021-10-12 PROCEDURE — 99213 OFFICE O/P EST LOW 20 MIN: CPT | Performed by: PSYCHIATRY & NEUROLOGY

## 2021-10-13 ENCOUNTER — OFFICE VISIT (OUTPATIENT)
Dept: PHYSICAL THERAPY | Facility: REHABILITATION | Age: 61
End: 2021-10-13
Payer: MEDICARE
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
## 2021-10-13 DIAGNOSIS — R26.89 IMBALANCE: ICD-10-CM

## 2021-10-13 DIAGNOSIS — R29.898 LEG WEAKNESS, BILATERAL: ICD-10-CM

## 2021-10-13 PROCEDURE — 97112 NEUROMUSCULAR REEDUCATION: CPT | Performed by: PHYSICAL THERAPIST

## 2021-10-13 PROCEDURE — 97110 THERAPEUTIC EXERCISES: CPT | Performed by: PHYSICAL THERAPIST

## 2021-10-20 ENCOUNTER — OFFICE VISIT (OUTPATIENT)
Dept: PHYSICAL THERAPY | Facility: REHABILITATION | Age: 61
End: 2021-10-20
Payer: MEDICARE
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
## 2021-10-20 DIAGNOSIS — R26.89 IMBALANCE: ICD-10-CM

## 2021-10-20 DIAGNOSIS — R29.898 LEG WEAKNESS, BILATERAL: ICD-10-CM

## 2021-10-20 PROCEDURE — 97110 THERAPEUTIC EXERCISES: CPT | Performed by: PHYSICAL THERAPIST

## 2021-10-20 PROCEDURE — 97112 NEUROMUSCULAR REEDUCATION: CPT | Performed by: PHYSICAL THERAPIST

## 2021-10-27 ENCOUNTER — OFFICE VISIT (OUTPATIENT)
Dept: PHYSICAL THERAPY | Facility: REHABILITATION | Age: 61
End: 2021-10-27
Payer: MEDICARE
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
## 2021-10-27 DIAGNOSIS — R29.898 LEG WEAKNESS, BILATERAL: ICD-10-CM

## 2021-10-27 DIAGNOSIS — R26.89 IMBALANCE: ICD-10-CM

## 2021-10-27 PROCEDURE — 97112 NEUROMUSCULAR REEDUCATION: CPT | Performed by: PHYSICAL THERAPIST

## 2021-10-27 PROCEDURE — 97110 THERAPEUTIC EXERCISES: CPT | Performed by: PHYSICAL THERAPIST

## 2021-10-29 ENCOUNTER — APPOINTMENT (OUTPATIENT)
Dept: RADIOLOGY | Facility: MEDICAL CENTER | Age: 61
End: 2021-10-29
Payer: MEDICARE

## 2021-10-29 ENCOUNTER — CONSULT (OUTPATIENT)
Dept: OBGYN CLINIC | Facility: MEDICAL CENTER | Age: 61
End: 2021-10-29
Payer: MEDICARE

## 2021-10-29 VITALS
HEIGHT: 66 IN | DIASTOLIC BLOOD PRESSURE: 79 MMHG | WEIGHT: 237 LBS | BODY MASS INDEX: 38.09 KG/M2 | HEART RATE: 90 BPM | SYSTOLIC BLOOD PRESSURE: 126 MMHG

## 2021-10-29 DIAGNOSIS — M17.11 PRIMARY OSTEOARTHRITIS OF RIGHT KNEE: Primary | ICD-10-CM

## 2021-10-29 DIAGNOSIS — M17.0 ARTHRITIS OF BOTH KNEES: Primary | ICD-10-CM

## 2021-10-29 DIAGNOSIS — M17.0 ARTHRITIS OF BOTH KNEES: ICD-10-CM

## 2021-10-29 DIAGNOSIS — M25.562 ACUTE PAIN OF LEFT KNEE: ICD-10-CM

## 2021-10-29 DIAGNOSIS — M17.12 PRIMARY OSTEOARTHRITIS OF LEFT KNEE: ICD-10-CM

## 2021-10-29 PROCEDURE — 99203 OFFICE O/P NEW LOW 30 MIN: CPT | Performed by: ORTHOPAEDIC SURGERY

## 2021-10-29 PROCEDURE — 73564 X-RAY EXAM KNEE 4 OR MORE: CPT

## 2021-11-03 ENCOUNTER — APPOINTMENT (OUTPATIENT)
Dept: PHYSICAL THERAPY | Facility: REHABILITATION | Age: 61
End: 2021-11-03
Payer: MEDICARE

## 2021-11-08 ENCOUNTER — TELEPHONE (OUTPATIENT)
Dept: FAMILY MEDICINE CLINIC | Facility: CLINIC | Age: 61
End: 2021-11-08

## 2021-11-08 ENCOUNTER — TELEMEDICINE (OUTPATIENT)
Dept: FAMILY MEDICINE CLINIC | Facility: CLINIC | Age: 61
End: 2021-11-08
Payer: MEDICARE

## 2021-11-08 DIAGNOSIS — R19.7 DIARRHEA OF PRESUMED INFECTIOUS ORIGIN: Primary | ICD-10-CM

## 2021-11-08 PROCEDURE — 99214 OFFICE O/P EST MOD 30 MIN: CPT | Performed by: NURSE PRACTITIONER

## 2021-11-09 ENCOUNTER — APPOINTMENT (OUTPATIENT)
Dept: LAB | Facility: MEDICAL CENTER | Age: 61
End: 2021-11-09
Payer: MEDICARE

## 2021-11-09 DIAGNOSIS — R19.7 DIARRHEA OF PRESUMED INFECTIOUS ORIGIN: ICD-10-CM

## 2021-11-09 LAB
ALBUMIN SERPL BCP-MCNC: 3.8 G/DL (ref 3.5–5)
ALP SERPL-CCNC: 108 U/L (ref 46–116)
ALT SERPL W P-5'-P-CCNC: 56 U/L (ref 12–78)
AMYLASE SERPL-CCNC: 28 IU/L (ref 25–115)
ANION GAP SERPL CALCULATED.3IONS-SCNC: 13 MMOL/L (ref 4–13)
AST SERPL W P-5'-P-CCNC: 39 U/L (ref 5–45)
BASOPHILS # BLD AUTO: 0.05 THOUSANDS/ΜL (ref 0–0.1)
BASOPHILS NFR BLD AUTO: 1 % (ref 0–1)
BILIRUB SERPL-MCNC: 0.48 MG/DL (ref 0.2–1)
BUN SERPL-MCNC: 15 MG/DL (ref 5–25)
CALCIUM SERPL-MCNC: 9.3 MG/DL (ref 8.3–10.1)
CHLORIDE SERPL-SCNC: 104 MMOL/L (ref 100–108)
CO2 SERPL-SCNC: 24 MMOL/L (ref 21–32)
CREAT SERPL-MCNC: 0.99 MG/DL (ref 0.6–1.3)
EOSINOPHIL # BLD AUTO: 0.21 THOUSAND/ΜL (ref 0–0.61)
EOSINOPHIL NFR BLD AUTO: 3 % (ref 0–6)
ERYTHROCYTE [DISTWIDTH] IN BLOOD BY AUTOMATED COUNT: 12.4 % (ref 11.6–15.1)
GFR SERPL CREATININE-BSD FRML MDRD: 61 ML/MIN/1.73SQ M
GLUCOSE P FAST SERPL-MCNC: 147 MG/DL (ref 65–99)
HCT VFR BLD AUTO: 46.2 % (ref 34.8–46.1)
HGB BLD-MCNC: 15.2 G/DL (ref 11.5–15.4)
IMM GRANULOCYTES # BLD AUTO: 0.04 THOUSAND/UL (ref 0–0.2)
IMM GRANULOCYTES NFR BLD AUTO: 1 % (ref 0–2)
LIPASE SERPL-CCNC: 80 U/L (ref 73–393)
LYMPHOCYTES # BLD AUTO: 2.49 THOUSANDS/ΜL (ref 0.6–4.47)
LYMPHOCYTES NFR BLD AUTO: 33 % (ref 14–44)
MCH RBC QN AUTO: 30.2 PG (ref 26.8–34.3)
MCHC RBC AUTO-ENTMCNC: 32.9 G/DL (ref 31.4–37.4)
MCV RBC AUTO: 92 FL (ref 82–98)
MONOCYTES # BLD AUTO: 0.53 THOUSAND/ΜL (ref 0.17–1.22)
MONOCYTES NFR BLD AUTO: 7 % (ref 4–12)
NEUTROPHILS # BLD AUTO: 4.34 THOUSANDS/ΜL (ref 1.85–7.62)
NEUTS SEG NFR BLD AUTO: 55 % (ref 43–75)
NRBC BLD AUTO-RTO: 0 /100 WBCS
PLATELET # BLD AUTO: 297 THOUSANDS/UL (ref 149–390)
PMV BLD AUTO: 9.6 FL (ref 8.9–12.7)
POTASSIUM SERPL-SCNC: 4.1 MMOL/L (ref 3.5–5.3)
PROT SERPL-MCNC: 8 G/DL (ref 6.4–8.2)
RBC # BLD AUTO: 5.04 MILLION/UL (ref 3.81–5.12)
SODIUM SERPL-SCNC: 141 MMOL/L (ref 136–145)
WBC # BLD AUTO: 7.66 THOUSAND/UL (ref 4.31–10.16)

## 2021-11-09 PROCEDURE — 85025 COMPLETE CBC W/AUTO DIFF WBC: CPT

## 2021-11-09 PROCEDURE — 82150 ASSAY OF AMYLASE: CPT

## 2021-11-09 PROCEDURE — 36415 COLL VENOUS BLD VENIPUNCTURE: CPT

## 2021-11-09 PROCEDURE — 80053 COMPREHEN METABOLIC PANEL: CPT

## 2021-11-09 PROCEDURE — 83690 ASSAY OF LIPASE: CPT

## 2021-11-10 ENCOUNTER — APPOINTMENT (OUTPATIENT)
Dept: PHYSICAL THERAPY | Facility: REHABILITATION | Age: 61
End: 2021-11-10
Payer: MEDICARE

## 2021-11-10 ENCOUNTER — TELEPHONE (OUTPATIENT)
Dept: FAMILY MEDICINE CLINIC | Facility: CLINIC | Age: 61
End: 2021-11-10

## 2021-11-11 ENCOUNTER — APPOINTMENT (OUTPATIENT)
Dept: LAB | Facility: CLINIC | Age: 61
End: 2021-11-11
Payer: MEDICARE

## 2021-11-11 ENCOUNTER — APPOINTMENT (OUTPATIENT)
Dept: PHYSICAL THERAPY | Facility: REHABILITATION | Age: 61
End: 2021-11-11
Payer: MEDICARE

## 2021-11-11 DIAGNOSIS — R19.7 DIARRHEA OF PRESUMED INFECTIOUS ORIGIN: ICD-10-CM

## 2021-11-11 PROCEDURE — 87329 GIARDIA AG IA: CPT

## 2021-11-11 PROCEDURE — 87205 SMEAR GRAM STAIN: CPT

## 2021-11-11 PROCEDURE — 87209 SMEAR COMPLEX STAIN: CPT

## 2021-11-11 PROCEDURE — 87177 OVA AND PARASITES SMEARS: CPT

## 2021-11-11 PROCEDURE — 87505 NFCT AGENT DETECTION GI: CPT

## 2021-11-12 LAB
CAMPYLOBACTER DNA SPEC NAA+PROBE: NORMAL
SALMONELLA DNA SPEC QL NAA+PROBE: NORMAL
SHIGA TOXIN STX GENE SPEC NAA+PROBE: NORMAL
SHIGELLA DNA SPEC QL NAA+PROBE: NORMAL

## 2021-11-13 LAB — G LAMBLIA AG STL QL IA: NEGATIVE

## 2021-11-15 ENCOUNTER — APPOINTMENT (OUTPATIENT)
Dept: LAB | Facility: HOSPITAL | Age: 61
End: 2021-11-15
Payer: MEDICARE

## 2021-11-15 DIAGNOSIS — R19.7 DIARRHEA OF PRESUMED INFECTIOUS ORIGIN: ICD-10-CM

## 2021-11-15 LAB — WBC STL QL MICRO: NORMAL

## 2021-11-15 PROCEDURE — 87493 C DIFF AMPLIFIED PROBE: CPT

## 2021-11-16 LAB
C DIFF TOX GENS STL QL NAA+PROBE: NEGATIVE
O+P STL CONC: NORMAL

## 2021-11-17 ENCOUNTER — OFFICE VISIT (OUTPATIENT)
Dept: PHYSICAL THERAPY | Facility: REHABILITATION | Age: 61
End: 2021-11-17
Payer: MEDICARE

## 2021-11-17 ENCOUNTER — TELEPHONE (OUTPATIENT)
Dept: GASTROENTEROLOGY | Facility: AMBULARY SURGERY CENTER | Age: 61
End: 2021-11-17

## 2021-11-17 ENCOUNTER — TELEPHONE (OUTPATIENT)
Dept: ADMINISTRATIVE | Facility: OTHER | Age: 61
End: 2021-11-17

## 2021-11-17 ENCOUNTER — OFFICE VISIT (OUTPATIENT)
Dept: ENDOCRINOLOGY | Facility: CLINIC | Age: 61
End: 2021-11-17
Payer: MEDICARE

## 2021-11-17 VITALS
HEIGHT: 66 IN | BODY MASS INDEX: 38.89 KG/M2 | DIASTOLIC BLOOD PRESSURE: 82 MMHG | HEART RATE: 84 BPM | SYSTOLIC BLOOD PRESSURE: 130 MMHG | WEIGHT: 242 LBS

## 2021-11-17 DIAGNOSIS — E11.65 TYPE 2 DIABETES MELLITUS WITH HYPERGLYCEMIA, WITHOUT LONG-TERM CURRENT USE OF INSULIN (HCC): ICD-10-CM

## 2021-11-17 DIAGNOSIS — R29.898 LEG WEAKNESS, BILATERAL: ICD-10-CM
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
## 2021-11-17 DIAGNOSIS — E03.8 HYPOTHYROIDISM DUE TO HASHIMOTO'S THYROIDITIS: Primary | ICD-10-CM

## 2021-11-17 DIAGNOSIS — R26.89 IMBALANCE: ICD-10-CM

## 2021-11-17 DIAGNOSIS — D35.2 PITUITARY MICROADENOMA (HCC): ICD-10-CM

## 2021-11-17 DIAGNOSIS — E06.3 HYPOTHYROIDISM DUE TO HASHIMOTO'S THYROIDITIS: Primary | ICD-10-CM

## 2021-11-17 DIAGNOSIS — E55.9 VITAMIN D DEFICIENCY: ICD-10-CM

## 2021-11-17 PROCEDURE — 97110 THERAPEUTIC EXERCISES: CPT | Performed by: PHYSICAL THERAPIST

## 2021-11-17 PROCEDURE — 97112 NEUROMUSCULAR REEDUCATION: CPT | Performed by: PHYSICAL THERAPIST

## 2021-11-17 PROCEDURE — 99214 OFFICE O/P EST MOD 30 MIN: CPT | Performed by: INTERNAL MEDICINE

## 2021-11-22 ENCOUNTER — OFFICE VISIT (OUTPATIENT)
Dept: GASTROENTEROLOGY | Facility: CLINIC | Age: 61
End: 2021-11-22
Payer: MEDICARE

## 2021-11-22 ENCOUNTER — APPOINTMENT (OUTPATIENT)
Dept: LAB | Facility: CLINIC | Age: 61
End: 2021-11-22
Payer: MEDICARE

## 2021-11-22 VITALS
OXYGEN SATURATION: 98 % | BODY MASS INDEX: 37.28 KG/M2 | TEMPERATURE: 98.7 F | DIASTOLIC BLOOD PRESSURE: 80 MMHG | WEIGHT: 232 LBS | SYSTOLIC BLOOD PRESSURE: 120 MMHG | HEART RATE: 90 BPM | HEIGHT: 66 IN

## 2021-11-22 DIAGNOSIS — R19.7 DIARRHEA, UNSPECIFIED TYPE: ICD-10-CM

## 2021-11-22 DIAGNOSIS — R10.9 ABDOMINAL PAIN, UNSPECIFIED ABDOMINAL LOCATION: ICD-10-CM

## 2021-11-22 DIAGNOSIS — R19.7 DIARRHEA, UNSPECIFIED TYPE: Primary | ICD-10-CM

## 2021-11-22 LAB
CRP SERPL QL: 3.5 MG/L
T4 FREE SERPL-MCNC: 1.17 NG/DL (ref 0.76–1.46)
TSH SERPL DL<=0.05 MIU/L-ACNC: 5.1 UIU/ML (ref 0.36–3.74)

## 2021-11-22 PROCEDURE — 84439 ASSAY OF FREE THYROXINE: CPT

## 2021-11-22 PROCEDURE — 86140 C-REACTIVE PROTEIN: CPT

## 2021-11-22 PROCEDURE — 83516 IMMUNOASSAY NONANTIBODY: CPT

## 2021-11-22 PROCEDURE — 99214 OFFICE O/P EST MOD 30 MIN: CPT | Performed by: PHYSICIAN ASSISTANT

## 2021-11-22 PROCEDURE — 86255 FLUORESCENT ANTIBODY SCREEN: CPT

## 2021-11-22 PROCEDURE — 36415 COLL VENOUS BLD VENIPUNCTURE: CPT

## 2021-11-22 PROCEDURE — 84443 ASSAY THYROID STIM HORMONE: CPT

## 2021-11-22 PROCEDURE — 82784 ASSAY IGA/IGD/IGG/IGM EACH: CPT

## 2021-11-22 RX ORDER — DICYCLOMINE HYDROCHLORIDE 10 MG/1
10 CAPSULE ORAL
Qty: 120 CAPSULE | Refills: 1 | Status: SHIPPED | OUTPATIENT
Start: 2021-11-22 | End: 2022-03-28

## 2021-11-23 LAB
ENDOMYSIUM IGA SER QL: NEGATIVE
GLIADIN PEPTIDE IGA SER-ACNC: 5 UNITS (ref 0–19)
GLIADIN PEPTIDE IGG SER-ACNC: 3 UNITS (ref 0–19)
IGA SERPL-MCNC: 318 MG/DL (ref 87–352)
TTG IGA SER-ACNC: <2 U/ML (ref 0–3)
TTG IGG SER-ACNC: <2 U/ML (ref 0–5)

## 2021-11-24 ENCOUNTER — APPOINTMENT (OUTPATIENT)
Dept: LAB | Facility: CLINIC | Age: 61
End: 2021-11-24
Payer: MEDICARE

## 2021-11-24 ENCOUNTER — OFFICE VISIT (OUTPATIENT)
Dept: PHYSICAL THERAPY | Facility: REHABILITATION | Age: 61
End: 2021-11-24
Payer: MEDICARE

## 2021-11-24 DIAGNOSIS — R26.89 IMBALANCE: ICD-10-CM
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
## 2021-11-24 DIAGNOSIS — R29.898 LEG WEAKNESS, BILATERAL: ICD-10-CM

## 2021-11-24 DIAGNOSIS — R19.7 DIARRHEA, UNSPECIFIED TYPE: ICD-10-CM

## 2021-11-24 PROCEDURE — 97110 THERAPEUTIC EXERCISES: CPT | Performed by: PHYSICAL THERAPIST

## 2021-11-24 PROCEDURE — 83993 ASSAY FOR CALPROTECTIN FECAL: CPT

## 2021-11-24 PROCEDURE — 97112 NEUROMUSCULAR REEDUCATION: CPT | Performed by: PHYSICAL THERAPIST

## 2021-11-27 DIAGNOSIS — J30.89 SEASONAL ALLERGIC RHINITIS DUE TO OTHER ALLERGIC TRIGGER: ICD-10-CM

## 2021-11-28 LAB — CALPROTECTIN STL-MCNT: <16 UG/G (ref 0–120)

## 2021-11-29 RX ORDER — MONTELUKAST SODIUM 10 MG/1
TABLET ORAL
Qty: 30 TABLET | Refills: 5 | Status: SHIPPED | OUTPATIENT
Start: 2021-11-29 | End: 2022-05-23

## 2021-12-01 ENCOUNTER — OFFICE VISIT (OUTPATIENT)
Dept: PHYSICAL THERAPY | Facility: REHABILITATION | Age: 61
End: 2021-12-01
Payer: MEDICARE

## 2021-12-01 DIAGNOSIS — R26.89 IMBALANCE: ICD-10-CM

## 2021-12-01 DIAGNOSIS — R29.898 LEG WEAKNESS, BILATERAL: ICD-10-CM
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
## 2021-12-01 PROCEDURE — 97112 NEUROMUSCULAR REEDUCATION: CPT | Performed by: PHYSICAL THERAPIST

## 2021-12-01 PROCEDURE — 97110 THERAPEUTIC EXERCISES: CPT | Performed by: PHYSICAL THERAPIST

## 2021-12-08 ENCOUNTER — OFFICE VISIT (OUTPATIENT)
Dept: PHYSICAL THERAPY | Facility: REHABILITATION | Age: 61
End: 2021-12-08
Payer: MEDICARE

## 2021-12-08 DIAGNOSIS — R29.898 LEG WEAKNESS, BILATERAL: ICD-10-CM
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
## 2021-12-08 DIAGNOSIS — R26.89 IMBALANCE: ICD-10-CM

## 2021-12-08 PROCEDURE — 97110 THERAPEUTIC EXERCISES: CPT | Performed by: PHYSICAL THERAPIST

## 2021-12-08 PROCEDURE — 97112 NEUROMUSCULAR REEDUCATION: CPT | Performed by: PHYSICAL THERAPIST

## 2021-12-15 ENCOUNTER — OFFICE VISIT (OUTPATIENT)
Dept: PHYSICAL THERAPY | Facility: REHABILITATION | Age: 61
End: 2021-12-15
Payer: MEDICARE

## 2021-12-15 DIAGNOSIS — R26.89 IMBALANCE: ICD-10-CM
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
## 2021-12-15 DIAGNOSIS — R29.898 LEG WEAKNESS, BILATERAL: ICD-10-CM

## 2021-12-15 PROCEDURE — 97110 THERAPEUTIC EXERCISES: CPT | Performed by: PHYSICAL THERAPIST

## 2021-12-15 PROCEDURE — 97112 NEUROMUSCULAR REEDUCATION: CPT | Performed by: PHYSICAL THERAPIST

## 2021-12-20 ENCOUNTER — TELEPHONE (OUTPATIENT)
Dept: FAMILY MEDICINE CLINIC | Facility: CLINIC | Age: 61
End: 2021-12-20

## 2022-01-05 ENCOUNTER — TELEPHONE (OUTPATIENT)
Dept: PREADMISSION TESTING | Facility: HOSPITAL | Age: 62
End: 2022-01-05

## 2022-01-07 ENCOUNTER — CONSULT (OUTPATIENT)
Dept: RHEUMATOLOGY | Facility: CLINIC | Age: 62
End: 2022-01-07
Payer: MEDICARE

## 2022-01-07 ENCOUNTER — APPOINTMENT (OUTPATIENT)
Dept: LAB | Facility: MEDICAL CENTER | Age: 62
End: 2022-01-07
Payer: MEDICARE

## 2022-01-07 VITALS
HEART RATE: 99 BPM | HEIGHT: 66 IN | BODY MASS INDEX: 36.64 KG/M2 | WEIGHT: 228 LBS | SYSTOLIC BLOOD PRESSURE: 142 MMHG | DIASTOLIC BLOOD PRESSURE: 74 MMHG | TEMPERATURE: 98.2 F

## 2022-01-07 DIAGNOSIS — R19.7 DIARRHEA OF PRESUMED INFECTIOUS ORIGIN: Primary | ICD-10-CM

## 2022-01-07 DIAGNOSIS — M25.50 ARTHRALGIA, UNSPECIFIED JOINT: ICD-10-CM

## 2022-01-07 DIAGNOSIS — R76.8 POSITIVE ANA (ANTINUCLEAR ANTIBODY): Primary | ICD-10-CM

## 2022-01-07 DIAGNOSIS — M79.10 MYALGIA: ICD-10-CM

## 2022-01-07 LAB
CK SERPL-CCNC: 108 U/L (ref 26–192)
IGA SERPL-MCNC: 301 MG/DL (ref 70–400)
IGG SERPL-MCNC: 1220 MG/DL (ref 700–1600)
IGM SERPL-MCNC: 191 MG/DL (ref 40–230)

## 2022-01-07 PROCEDURE — 82784 ASSAY IGA/IGD/IGG/IGM EACH: CPT

## 2022-01-07 PROCEDURE — 86235 NUCLEAR ANTIGEN ANTIBODY: CPT | Performed by: INTERNAL MEDICINE

## 2022-01-07 PROCEDURE — 99204 OFFICE O/P NEW MOD 45 MIN: CPT | Performed by: INTERNAL MEDICINE

## 2022-01-07 PROCEDURE — 86364 TISS TRNSGLTMNASE EA IG CLAS: CPT

## 2022-01-07 PROCEDURE — 83520 IMMUNOASSAY QUANT NOS NONAB: CPT | Performed by: INTERNAL MEDICINE

## 2022-01-07 PROCEDURE — 36415 COLL VENOUS BLD VENIPUNCTURE: CPT | Performed by: INTERNAL MEDICINE

## 2022-01-07 PROCEDURE — 82085 ASSAY OF ALDOLASE: CPT | Performed by: INTERNAL MEDICINE

## 2022-01-07 PROCEDURE — 82550 ASSAY OF CK (CPK): CPT | Performed by: INTERNAL MEDICINE

## 2022-01-07 PROCEDURE — 83516 IMMUNOASSAY NONANTIBODY: CPT | Performed by: INTERNAL MEDICINE

## 2022-01-07 PROCEDURE — 82784 ASSAY IGA/IGD/IGG/IGM EACH: CPT | Performed by: INTERNAL MEDICINE

## 2022-01-07 PROCEDURE — 86231 EMA EACH IG CLASS: CPT

## 2022-01-07 PROCEDURE — 86258 DGP ANTIBODY EACH IG CLASS: CPT

## 2022-01-07 RX ORDER — MAGNESIUM 200 MG
TABLET ORAL
COMMUNITY
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
## 2022-01-07 NOTE — PATIENT INSTRUCTIONS
Do labs  Contact us after get colonoscopy  Will then consider a prednisone course and trial of hydroxychloroquine    Return to clinic in 4 months    Connective Tissue Disorders   AMBULATORY CARE:   A connective tissue disorder  can affect any connective tissue in your body  Connective tissues support your organs, attach muscles to bones, and create scar tissue after an injury  Cartilage is an example of connective tissue  There are many types of connective tissue disorders, such as rheumatoid arthritis, lupus, and scleroderma  The most common affected areas are joints, muscles, and skin  Your organs, eyes, nervous system, and blood vessels can also be affected  Common signs and symptoms of a connective tissue disorder:  Signs and symptoms depend on the type of connective tissue disorder and if it is severe  Symptoms may be mild or severe, and may come and go:  · Fever or fatigue    · Skin rash or thickening, blisters, or sensitivity to sunlight    · Rash on your cheeks that goes across your nose    · Joint pain, swelling, or warmth    · Deformed joints, or limited range of motion    · Cold, numb, or swollen fingers    · Loss of appetite, or weight loss without trying    · Dry mouth or eyes, vision problems, or an eye infection such as conjunctivitis    · Hair loss    Call 911 for any of the following:   · You are sweating, and your lips are pale or blue  · You have trouble breathing, chest pain or pressure, or a fast heartbeat  · You are vomiting blood  · You have a high fever  Seek care immediately if:   · You lose feeling in your hands or feet  · You lose feeling on one side of your body  · You have sudden pain in your eyes and vision problems  Contact your healthcare provider if:   · You have trouble urinating, or you urinate less than usual     · You have trouble having a bowel movement, or you lose control of your bowel movements      · Your muscle or joint tightness worsens, or your fingers begin to curl  · Your symptoms get worse, even after treatment  · Your skin is itchy, swollen, or has a rash  · You have questions or concerns about your condition or care  Treatment  may include any of the following:  · Medicines  may be given to prevent your immune system from attacking healthy cells  You may also need medicines to stop the disease from getting worse  You may need to use topical creams or lotions to control a rash or other symptoms that affect your skin  · Prescription pain medicine  may be given  Ask your healthcare provider how to take this medicine safely  Some prescription pain medicines contain acetaminophen  Do not take other medicines that contain acetaminophen without talking to your healthcare provider  Too much acetaminophen may cause liver damage  Prescription pain medicine may cause constipation  Ask your healthcare provider how to prevent or treat constipation  · NSAIDs , such as ibuprofen, help decrease swelling, pain, and fever  This medicine is available with or without a doctor's order  NSAIDs can cause stomach bleeding or kidney problems in certain people  If you take blood thinner medicine, always ask your healthcare provider if NSAIDs are safe for you  Always read the medicine label and follow directions  · Acetaminophen  helps reduce pain and fever  Acetaminophen is available without a doctor's order  Ask how much to take and how often to take it  Follow directions  Acetaminophen can cause liver problems if not taken correctly  · Steroids  may be given to reduce swelling and pain  Manage your connective tissue disorder:   · Rest as needed  Talk to your healthcare provider if you are having trouble sleeping because of pain or other symptoms  Rest your joints if they are stiff or painful  Your healthcare provider may suggest support devices such as crutches or splints to help your joints rest      · Eat a variety of healthy foods    Healthy foods include fruits, vegetables, lean meats, fish, and low-fat dairy products  Work with your healthcare provider or dietitian to create healthy meal plans  · Go to physical or occupational therapy as directed  A physical therapist can help you create an exercise plan  Exercise may help increase your energy  Exercise can also help keep stiff joints flexible and increase range of motion  An occupational therapist can help you learn to do your daily activities when you have pain or swelling  · Talk to your healthcare provider about pregnancy  If you are a woman and want to get pregnant, talk to your healthcare provider  You or your baby might be at risk for complications  You may need to wait until your disease is controlled or your medications are finished before you get pregnant  You may also have trouble getting pregnant because of your disease  Your healthcare provider may be able to suggest ways to improve your ability to become pregnant  · Do not smoke  Nicotine and other chemicals in cigarettes and cigars can cause blood vessel and lung damage  Ask your healthcare provider for information if you currently smoke and need help to quit  E-cigarettes or smokeless tobacco still contain nicotine  Talk to your healthcare provider before you use these products  · Manage stress  Stress may slow healing and lead to illness  Learn ways to control stress, such as relaxation, deep breathing, or listening to music  Manage flares:  A flare means something triggered your symptoms  Stress, cold weather, and sunlight are examples of triggers  Your healthcare provider can help you create a management plan that includes what to do if you have a flare  Treat flares quickly to help prevent serious illness  · Apply ice or heat as directed  Ice helps reduce pain and swelling, and may help prevent tissue damage  Use an ice pack, or put crushed ice in a bag   Cover the bag with a towel and apply to the painful area for 15 to 20 minutes every hour, or as directed  Heat helps reduce pain and muscle spasms  Apply a warm compress to the area for 20 minutes every 2 hours, or as directed  · Elevate the area above the level of your heart  Elevation can help reduce swelling and pain, especially in your joints  Elevate the area as often as possible  · Keep your hands and feet warm  Certain connective tissue disorders can cause your hands and feet to become cold and painful  Over time, ulcers or gangrene (tissue death) may develop if frequent or severe attacks are not prevented  Dress warmly in cold weather, including gloves and thick socks  It may help to wiggle your fingers or toes to improve circulation  Follow up with your healthcare provider as directed: You may need ongoing tests or treatment  Write down your questions so you remember to ask them during your visits  © Copyright [employer] 2021 Information is for End User's use only and may not be sold, redistributed or otherwise used for commercial purposes  All illustrations and images included in CareNotes® are the copyrighted property of [employer]  or Mellissa Ulloa   The above information is an  only  It is not intended as medical advice for individual conditions or treatments  Talk to your doctor, nurse or pharmacist before following any medical regimen to see if it is safe and effective for you

## 2022-01-10 LAB
ALDOLASE SERPL-CCNC: 9 U/L (ref 3.3–10.3)
ENDOMYSIUM IGA SER QL: NEGATIVE
GLIADIN PEPTIDE IGA SER-ACNC: 4 UNITS (ref 0–19)
GLIADIN PEPTIDE IGG SER-ACNC: 3 UNITS (ref 0–19)
IGA SERPL-MCNC: 326 MG/DL (ref 87–352)
TTG IGA SER-ACNC: <2 U/ML (ref 0–3)
TTG IGG SER-ACNC: <2 U/ML (ref 0–5)

## 2022-01-12 ENCOUNTER — OFFICE VISIT (OUTPATIENT)
Dept: PHYSICAL THERAPY | Facility: REHABILITATION | Age: 62
End: 2022-01-12
Payer: MEDICARE
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
## 2022-01-12 DIAGNOSIS — R26.89 IMBALANCE: ICD-10-CM

## 2022-01-12 DIAGNOSIS — R29.898 LEG WEAKNESS, BILATERAL: ICD-10-CM

## 2022-01-12 PROCEDURE — 97110 THERAPEUTIC EXERCISES: CPT | Performed by: PHYSICAL THERAPIST

## 2022-01-12 NOTE — PROGRESS NOTES
Treatment Note     Today's date: 2022  Patient name: Elver Client  : 1960  MRN: [id number]  Referring provider: Felicita Terrazas DO  Dx:   Encounter Diagnosis     ICD-10-CM    1  Fall, subsequent encounter  W19  XXXD    2  Imbalance  R26 [id number]    3  Leg weakness, bilateral  R29 [id number]      Subjective:   Has been having a problem with the right Achilles, straightening the foot to walk hurts, pointing it is no problem  Increased pain with walking on it  Able to take trash out, estimates about 80 feet down and back, get mail going downhill and uphill  Pain increases with prolonged standing  Sensitive to touch the back of a pillow  Better with boots than tennis shoe, worse where the back of a sneaker contacts the back of her leg  Unsure of specific injury  Patient goals: walking at a normal pace without cane (not using prior to house), grocery shop without significant pain when completed, dancing at [employer]'s wedding in 2022  - To move without pain and to not get tired as frequently (added 10/20/21)    Patient-Specific Functional Scale   Task is scored 0 (unable to perform activity) to 10 (ability to perform activity independently)  Activity 6/30/21 7/28/21 8/25/21 9/29/21 10/20/21 11/17/21    1  Walk at normal pace without a cane 8/10 9/10 5/10  0/10 3/10 3/10 due to speed or knee bending    2  Grocery shop without significant pain 5/10 6-7/10 8/10 010 4/10 7-8/10    3  Dance 3/10 3/10 5/10  1/10 2/10    4  Move without pain and to not get tired as frequently       7/10      Objective: See treatment diary below    Precautions - Hashimoto's, autoimmune disease    Mobility Measures 6/23/21 7/28/21 8/25/21 9/29/21 10/20/21 11/17/21 1/13/22   Assistive device used?     Deferred by therapist      5 Time Sit to Stand  (17" chair, arms across chest) Completes from 19" surface in 16 seconds (5 times), unable from 17" surface without use of hands Unable from 18" surface without hands  16 seconds from 20" surface Unable from 18" surface without hands  12 7 seconds from 20" surface  14 0 seconds from 19" surface, stresses left knee  Unable from 18' surface    18' surface with use of one hand, 23 seconds, second trial, 25 seconds   17 8 seconds from 20" surface    3 Meter Timed  Up & Go      12 0 seconds    Walking speed       0 53 m/s   Functional Gait Assessment (see below)          6 Minute Walk Test (100 foot circular course)   920 feet, pain about left posterior distal thigh, medial lower leg, and right lateral lower leg 1050 feet 6 minute walk test 985 feet 6 minute walk test    Deferred by patient  Unable, deferred by therapist   Patient-Reported Outcome Measure: Activities-Specific Balance Confidence Scale (ABC Scale)          Right knee flexion PROM, sitting 96 degrees after 1 minute of stretching 95-96 degrees After 2 minutes stretching 98 degrees after 2 minutes of stretching  97 degrees after 2 minutes stretching 95 degrees (at least 120 degrees left          -11 degrees right knee extension    -11 degrees left     Walking with slowed speed, using cane    6 degrees passive right ankle range of motion; tender to palpation about the distal 2-3 inch segment of the Achilles tendon, not about the proximal to mid calf, no redness or significant change in swelling     Seated gastroc stretch with towel, right, 2 min x 2 sets  Seated resisted plantarflexion with blue band, 10 x 3 sets    Prone long-duration gastroc stretch 7 minutes with concurrent massage to distal aspect of the Achilles tendon and area tissue    Standing gastroc stretch, right, 1 min x 3 sets    SciFit, level 1 2-1 3, 10min, 810 steps    Walks with shorter step lengths on the left, minimal decrease in step time  ASSESSMENT:   Vinicius Hamilton had been improving in her mobility up until last visit, walking for longer and functional durations to be able to do limited community ambulation    She now has distal right Achilles pain with prolonged standing and walking  The gastroc is somewhat tight there  Mild improvement after prolonged stretching, continue with this as we want better range of motion to make walking easier  We deferred a lot of the mobility measures today due to new onset of pain  Instructed in written home exercise program, good return demonstration  SHORT-TERM GOALS: 1 month(s)  1  Fior Mccarthy walks at least 10 minutes consecutively  MET PREVIOUSLY, CONTINUES  3  [facility] reports 50% improvement in ability to walk grocery store distances without severe fatigue  PROGRESSING  New goal: Demonstrates at least 11 degrees passive right dorsiflexion bi 2/12/22  LONG-TERM GOALS: by 3/12/22  1  Patient reports at least 20 total minutes per day of overground walking for exercise  NOT MET  2  Patient independently manages home exercise program   MET with need for revisions  PLAN OF CARE:  Patient will benefit from physical therapy 1 time per week for 1 month  Neuromuscular re-education, therapeutic exercises, and therapeutic activities as outlined in grids

## 2022-01-15 DIAGNOSIS — M10.40 OTHER SECONDARY GOUT, UNSPECIFIED SITE: ICD-10-CM

## 2022-01-15 DIAGNOSIS — M25.50 ARTHRALGIA, UNSPECIFIED JOINT: Primary | ICD-10-CM

## 2022-01-17 ENCOUNTER — TELEPHONE (OUTPATIENT)
Dept: GASTROENTEROLOGY | Facility: HOSPITAL | Age: 62
End: 2022-01-17

## 2022-01-18 ENCOUNTER — ANESTHESIA (OUTPATIENT)
Dept: GASTROENTEROLOGY | Facility: HOSPITAL | Age: 62
End: 2022-01-18

## 2022-01-18 ENCOUNTER — HOSPITAL ENCOUNTER (OUTPATIENT)
Dept: GASTROENTEROLOGY | Facility: HOSPITAL | Age: 62
Setting detail: OUTPATIENT SURGERY
Discharge: HOME | End: 2022-01-18
Attending: INTERNAL MEDICINE | Admitting: INTERNAL MEDICINE
Payer: MEDICARE

## 2022-01-18 ENCOUNTER — ANESTHESIA EVENT (OUTPATIENT)
Dept: GASTROENTEROLOGY | Facility: HOSPITAL | Age: 62
End: 2022-01-18

## 2022-01-18 VITALS
HEART RATE: 82 BPM | OXYGEN SATURATION: 96 % | DIASTOLIC BLOOD PRESSURE: 63 MMHG | TEMPERATURE: 97 F | SYSTOLIC BLOOD PRESSURE: 136 MMHG | HEIGHT: 66 IN | RESPIRATION RATE: 20 BRPM | BODY MASS INDEX: 36.64 KG/M2 | WEIGHT: 228 LBS

## 2022-01-18 DIAGNOSIS — R19.7 DIARRHEA, UNSPECIFIED TYPE: ICD-10-CM

## 2022-01-18 DIAGNOSIS — R10.9 ABDOMINAL PAIN, UNSPECIFIED ABDOMINAL LOCATION: ICD-10-CM

## 2022-01-18 LAB
EJ AB SER QL: NEGATIVE
ENA JO1 AB SER IA-ACNC: <20 UNITS
ENA PM/SCL AB SER-ACNC: <20 UNITS
ENA SS-A 52KD IGG SER IA-ACNC: <20 UNITS
FIBRILLARIN AB SER QL: NEGATIVE
GLUCOSE SERPL-MCNC: 138 MG/DL (ref 65–140)
KU AB SER QL: NEGATIVE
MDA5 AB SER LINE BLOT-ACNC: <20 UNITS
MI2 AB SER QL: NEGATIVE
MJ AB SER LINE BLOT-ACNC: <20 UNITS
OJ AB SER QL: NEGATIVE
PL12 AB SER QL: NEGATIVE
PL7 AB SER QL: NEGATIVE
SAE1 IGG SER QL LINE BLOT: <20 UNITS
SRP AB SERPL QL: NEGATIVE
TIF1-GAMMA AB SER LINE BLOT-ACNC: <20 UNITS
U1 SNRNP AB SER IA-ACNC: <20 UNITS
U2 SNRNP AB SER QL: NEGATIVE

## 2022-01-18 PROCEDURE — 82948 REAGENT STRIP/BLOOD GLUCOSE: CPT

## 2022-01-18 PROCEDURE — 43239 EGD BIOPSY SINGLE/MULTIPLE: CPT | Performed by: INTERNAL MEDICINE

## 2022-01-18 PROCEDURE — 45380 COLONOSCOPY AND BIOPSY: CPT | Performed by: INTERNAL MEDICINE

## 2022-01-18 PROCEDURE — 88305 TISSUE EXAM BY PATHOLOGIST: CPT | Performed by: SPECIALIST

## 2022-01-18 RX ORDER — PROPOFOL 10 MG/ML
INJECTION, EMULSION INTRAVENOUS AS NEEDED
Status: DISCONTINUED | OUTPATIENT
Start: 2022-01-18 | End: 2022-01-18

## 2022-01-18 RX ORDER — LIDOCAINE HYDROCHLORIDE 10 MG/ML
INJECTION, SOLUTION EPIDURAL; INFILTRATION; INTRACAUDAL; PERINEURAL AS NEEDED
Status: DISCONTINUED | OUTPATIENT
Start: 2022-01-18 | End: 2022-01-18

## 2022-01-18 RX ORDER — SODIUM CHLORIDE 9 MG/ML
INJECTION, SOLUTION INTRAVENOUS CONTINUOUS PRN
Status: DISCONTINUED | OUTPATIENT
Start: 2022-01-18 | End: 2022-01-18

## 2022-01-18 RX ORDER — SODIUM CHLORIDE 9 MG/ML
100 INJECTION, SOLUTION INTRAVENOUS CONTINUOUS
Status: DISCONTINUED | OUTPATIENT
Start: 2022-01-18 | End: 2022-01-22 | Stop reason: HOSPADM

## 2022-01-18 RX ADMIN — PROPOFOL 50 MG: 10 INJECTION, EMULSION INTRAVENOUS at 09:00

## 2022-01-18 RX ADMIN — PROPOFOL 50 MG: 10 INJECTION, EMULSION INTRAVENOUS at 09:05

## 2022-01-18 RX ADMIN — SODIUM CHLORIDE: 0.9 INJECTION, SOLUTION INTRAVENOUS at 08:32

## 2022-01-18 RX ADMIN — PROPOFOL 100 MG: 10 INJECTION, EMULSION INTRAVENOUS at 08:54

## 2022-01-18 RX ADMIN — PROPOFOL 50 MG: 10 INJECTION, EMULSION INTRAVENOUS at 09:10

## 2022-01-18 RX ADMIN — LIDOCAINE HYDROCHLORIDE 50 MG: 10 INJECTION, SOLUTION EPIDURAL; INFILTRATION; INTRACAUDAL; PERINEURAL at 08:47

## 2022-01-18 RX ADMIN — PROPOFOL 50 MG: 10 INJECTION, EMULSION INTRAVENOUS at 08:50

## 2022-01-18 RX ADMIN — PROPOFOL 150 MG: 10 INJECTION, EMULSION INTRAVENOUS at 08:47

## 2022-01-18 RX ADMIN — SODIUM CHLORIDE 100 ML/HR: 0.9 INJECTION, SOLUTION INTRAVENOUS at 07:41

## 2022-01-18 NOTE — DISCHARGE INSTRUCTIONS
Colonoscopy   WHAT YOU NEED TO KNOW:   A colonoscopy is a procedure to examine the inside of your colon (intestine) with a scope  Polyps or tissue growths may have been removed during your colonoscopy  It is normal to feel bloated and to have some abdominal discomfort  You should be passing gas  If you have hemorrhoids or you had polyps removed, you may have a small amount of bleeding  DISCHARGE INSTRUCTIONS:   Seek care immediately if:   · You have a large amount of bright red blood in your bowel movements  · Your abdomen is hard and firm and you have severe pain  · You have sudden trouble breathing  Call your doctor if:   · You develop a rash or hives  · You have a fever within 24 hours of your procedure  · You have nausea and vomiting  · You feel anesthesia effects greater than 24 hours  · You have not had a bowel movement for 3 days after your procedure  · You have questions or concerns about your condition or care  After your colonoscopy:   · Do not lift, strain, or run  until your healthcare provider says it is okay  · Rest as much as possible  You have been given medicine to relax you  Do not  drive or make important decisions for at least 24 hours  Return to your normal activity as directed  · Relieve gas and discomfort from bloating  by lying on your left side with a heating pad on your abdomen  You may need to take short walks to help the gas move out  Eat small meals until bloating is relieved  If you had polyps removed: For 7 days after your procedure:  · Do not  take aspirin  · Do not  go on long car rides  Help prevent constipation:   · Eat a variety of healthy foods  Healthy foods include fruit, vegetables, whole-grain breads, low-fat dairy products, beans, lean meat, and fish  Ask if you need to be on a special diet  Your healthcare provider may recommend that you eat high-fiber foods such as cooked beans   Fiber helps you have regular bowel movements  · Drink liquids as directed  Adults should drink between 9 and 13 eight-ounce cups of liquid every day  Ask what amount is best for you  For most people, good liquids to drink are water, juice, and milk  · Exercise as directed  Talk to your healthcare provider about the best exercise plan for you  Exercise can help prevent constipation, decrease your blood pressure and improve your health  Follow up with your doctor as directed:  Write down your questions so you remember to ask them during your visits  © Copyright [employer] 2021 Information is for End User's use only and may not be sold, redistributed or otherwise used for commercial purposes  All illustrations and images included in CareNotes® are the copyrighted property of [employer] Inc  or [facility] Fran Ulloa   The above information is an  only  It is not intended as medical advice for individual conditions or treatments  Talk to your doctor, nurse or pharmacist before following any medical regimen to see if it is safe and effective for you  Upper Endoscopy   WHAT YOU NEED TO KNOW:   An upper endoscopy is also called an upper gastrointestinal (GI) endoscopy, or an esophagogastroduodenoscopy (EGD)  You may feel bloated, gassy, or have some abdominal discomfort after your procedure  Your throat may be sore for 24 to 36 hours  You may burp or pass gas from air that is still inside your body  DISCHARGE INSTRUCTIONS:   Call 911 if:   · You have sudden chest pain or trouble breathing  Seek care immediately if:   · You feel dizzy or faint  · You have trouble swallowing  · You have severe throat pain  · Your bowel movements are very dark or black  · Your abdomen is hard and firm and you have severe pain  · You vomit blood  Contact your healthcare provider if:   · You feel full or bloated and cannot burp or pass gas  · You have not had a bowel movement for 3 days after your procedure      · You have neck pain  · You have a fever or chills  · You have nausea or are vomiting  · You have a rash or hives  · You have questions or concerns about your endoscopy  Relieve a sore throat:  Suck on throat lozenges or crushed ice  Gargle with a small amount of warm salt water  Mix 1 teaspoon of salt and 1 cup of warm water to make salt water  Relieve gas and discomfort from bloating:  Lie on your right side with a heating pad on your abdomen  Take short walks to help pass gas  Eat small meals until bloating is relieved  Rest after your procedure:  Do not drive or make important decisions until the day after your procedure  Return to your normal activity as directed  You can usually return to work the day after your procedure  Follow up with your healthcare provider as directed:  Write down your questions so you remember to ask them during your visits  © Copyright [employer] 2021 Information is for End User's use only and may not be sold, redistributed or otherwise used for commercial purposes  All illustrations and images included in CareNotes® are the copyrighted property of A [employer] A M [employer] Inc  or [facility] Fran Ulloa   The above information is an  only  It is not intended as medical advice for individual conditions or treatments  Talk to your doctor, nurse or pharmacist before following any medical regimen to see if it is safe and effective for you

## 2022-01-18 NOTE — ANESTHESIA PREPROCEDURE EVALUATION
Echo 2016, no further required per most recent card note   CONCLUSIONS    Normal echocardiogram       Visually Estimated LV Ejection Fraction is:60%       Procedure:  COLONOSCOPY  EGD    Relevant Problems   ANESTHESIA (within normal limits)      CARDIO   (+) Essential hypertension      ENDO   (+) Hypothyroidism due to Hashimoto's thyroiditis   (+) Type 2 diabetes mellitus with hyperglycemia, without long-term current use of insulin (HCC)   (+) Type 2 diabetes mellitus without complication, without long-term current use of insulin (HCC)      GI/HEPATIC   (+) Fatty liver      HEMATOLOGY   (+) Blood coagulation disorder (HCC)   (+) Thrombocytopenic disorder (HCC)      MUSCULOSKELETAL   (+) Degeneration of intervertebral disc of cervical region   (+) Disorder of muscle   (+) Fibromyositis   (+) Lateral epicondylitis   (+) Osteoarthritis of foot joint   (+) Synovitis      NEURO/PSYCH   (+) Chronic foot pain, right   (+) Chronic pain of right ankle   (+) Current mild episode of major depressive disorder without prior episode (HCC)   (+) History of DVT (deep vein thrombosis)   (+) History of pulmonary embolus (PE)   (+) Neuromuscular disorder (HCC)   (+) Paresthesia      PULMONARY   (+) Asthma        Physical Exam    Airway    Mallampati score: III  TM Distance: >3 FB  Neck ROM: limited     Dental       Cardiovascular  Rate: normal,     Pulmonary  Pulmonary exam normal     Other Findings  TMJ  Per pt denies anything remaining that is loose or removeable      Anesthesia Plan  ASA Score- 3     Anesthesia Type- IV sedation with anesthesia with ASA Monitors  Additional Monitors:   Airway Plan:     Comment: Per patient, appropriately NPO, denies active CP/SOB/wheezing/symptoms related to heartburn/nausea/vomiting  Plan Factors-Exercise tolerance (METS): >4 METS  Chart reviewed  Patient summary reviewed  Patient is not a current smoker  Induction- intravenous      Postoperative Plan-     Informed Consent- Anesthetic plan and risks discussed with patient  I personally reviewed this patient with the CRNA  Discussed and agreed on the Anesthesia Plan with the SAPPHIRE Zee

## 2022-01-19 ENCOUNTER — OFFICE VISIT (OUTPATIENT)
Dept: PHYSICAL THERAPY | Facility: REHABILITATION | Age: 62
End: 2022-01-19
Payer: MEDICARE

## 2022-01-19 ENCOUNTER — APPOINTMENT (OUTPATIENT)
Dept: LAB | Facility: CLINIC | Age: 62
End: 2022-01-19
Payer: MEDICARE

## 2022-01-19 DIAGNOSIS — M76.61 TENDONITIS, ACHILLES, RIGHT: ICD-10-CM

## 2022-01-19 DIAGNOSIS — R29.898 LEG WEAKNESS, BILATERAL: ICD-10-CM
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
## 2022-01-19 DIAGNOSIS — R26.89 IMBALANCE: ICD-10-CM

## 2022-01-19 LAB — CRP SERPL QL: 7.6 MG/L

## 2022-01-19 PROCEDURE — 86430 RHEUMATOID FACTOR TEST QUAL: CPT | Performed by: INTERNAL MEDICINE

## 2022-01-19 PROCEDURE — 86200 CCP ANTIBODY: CPT | Performed by: INTERNAL MEDICINE

## 2022-01-19 PROCEDURE — 86140 C-REACTIVE PROTEIN: CPT | Performed by: INTERNAL MEDICINE

## 2022-01-19 PROCEDURE — 97110 THERAPEUTIC EXERCISES: CPT | Performed by: PHYSICAL THERAPIST

## 2022-01-19 PROCEDURE — 36415 COLL VENOUS BLD VENIPUNCTURE: CPT | Performed by: INTERNAL MEDICINE

## 2022-01-19 NOTE — PROGRESS NOTES
Treatment Note     Today's date: 2022  Patient name: Brennen Lema  : 1960  MRN: [id number]  Referring provider: Asha Haddad DO  Dx:   Encounter Diagnosis     ICD-10-CM    1  Fall, subsequent encounter  W19  XXXD    2  Imbalance  R26 89    3  Leg weakness, bilateral  R29 898    4  Tendonitis, Achilles, right  M76 61      Subjective:   Seen by Dr Eliezer Tijerina DPM, diagnosed with right Achilles tendonitis  This may possibly be due to underlying connective tissue dysfunction causing tendonitis, possible rupture  Wearing walking boot since 22, all the time, leg feels better  To therapy with walking boot and rollator walker     Patient goals: walking at a normal pace without cane (not using prior to house), grocery shop without significant pain when completed, dancing at [employer]'s wedding in 2022  - To move without pain and to not get tired as frequently (added 10/20/21)    Patient-Specific Functional Scale   Task is scored 0 (unable to perform activity) to 10 (ability to perform activity independently)  Activity 6/30/21 7/28/21 8/25/21 9/29/21 10/20/21 11/17/21    1  Walk at normal pace without a cane 8/10 9/10 5/10  0/10 3/10 310 due to speed or knee bending    2  Grocery shop without significant pain 5/10 6-7/10 8/10 0/10 4/10 7-810    3  Dance 3/10 3/10 5/10  1/10 2/10    4  Move without pain and to not get tired as frequently       7/10      Objective: See treatment diary below    Precautions - Hashimoto's, autoimmune disease    Mobility Measures 6/23/21 7/28/21 8/25/21 9/29/21 10/20/21 11/17/21 1/13/22   Assistive device used?     Deferred by therapist      5 Time Sit to Stand  (17" chair, arms across chest) Completes from 19" surface in 16 seconds (5 times), unable from 17" surface without use of hands Unable from 18" surface without hands  16 seconds from 20" surface Unable from 18" surface without hands  12 7 seconds from 20" surface  14 0 seconds from 19" surface, stresses left knee  Unable from 25' surface    18' surface with use of one hand, 23 seconds, second trial, 25 seconds   17 8 seconds from 20" surface    3 Meter Timed  Up & Go      12 0 seconds    Walking speed       0 53 m/s   Functional Gait Assessment (see below)          6 Minute Walk Test (100 foot circular course)   920 feet, pain about left posterior distal thigh, medial lower leg, and right lateral lower leg 1050 feet 6 minute walk test 985 feet 6 minute walk test    Deferred by patient  Unable, deferred by therapist   Patient-Reported Outcome Measure: Activities-Specific Balance Confidence Scale (ABC Scale)          Right knee flexion PROM, sitting 96 degrees after 1 minute of stretching 95-96 degrees After 2 minutes stretching 98 degrees after 2 minutes of stretching  97 degrees after 2 minutes stretching 95 degrees (at least 120 degrees left          -11 degrees right knee extension    -11 degrees left     Walking with slowed speed, using cane    6 degrees passive right ankle range of motion; tender to palpation about the distal 2-3 inch segment of the Achilles tendon, not about the proximal to mid calf, no redness or significant change in swelling     SciFit level 1, 90 steps/min, 11 min    Seated gastroc stretch with towel, right, straight knee, 2 min  Seated gastroc stretch with towel, right, bent knee, 2 min  Above repeated following IASTM    Prone long-duration gastroc stretch 10 minutes with concurrent massage and IASTM to distal aspect of the Achilles tendon and area tissue    Seated resisted plantarflexion with purple band, 20   With green band 20 x 2 sets    Bridge, 15    Sit to stand with walking boot, about 10 x 2 sets    ASSESSMENT:   Achilles tendonitis makes sense in terms of connective tissue dysfunction, and she's had good relief from use of the walking boot  Improved dorsiflexion range of motion to 10 degrees with knee fully extended following stretching    Continue stretching and strengthening at home  SHORT-TERM GOALS: 1 month(s)  1  Tiara Hull walks at least 10 minutes consecutively  MET PREVIOUSLY, CONTINUES  3  Tiara Hull reports 50% improvement in ability to walk grocery store distances without severe fatigue  PROGRESSING  New goal: Demonstrates at least 11 degrees passive right dorsiflexion bi 2/12/22  LONG-TERM GOALS: by 3/12/22  1  Patient reports at least 20 total minutes per day of overground walking for exercise  NOT MET  2  Patient independently manages home exercise program   MET with need for revisions  PLAN OF CARE:  Patient will benefit from physical therapy 1 time per week for 1 month  Neuromuscular re-education, therapeutic exercises, and therapeutic activities as outlined in grids

## 2022-01-20 LAB — RHEUMATOID FACT SER QL LA: NEGATIVE

## 2022-01-21 DIAGNOSIS — A04.8 H. PYLORI INFECTION: Primary | ICD-10-CM

## 2022-01-21 LAB — CCP AB SER IA-ACNC: 2.6

## 2022-01-21 RX ORDER — CLARITHROMYCIN 500 MG/1
500 TABLET, COATED ORAL EVERY 12 HOURS SCHEDULED
Qty: 28 TABLET | Refills: 0 | Status: SHIPPED | OUTPATIENT
Start: 2022-01-21 | End: 2022-02-04

## 2022-01-21 RX ORDER — OMEPRAZOLE 40 MG/1
40 CAPSULE, DELAYED RELEASE ORAL 2 TIMES DAILY
Qty: 28 CAPSULE | Refills: 0 | Status: SHIPPED | OUTPATIENT
Start: 2022-01-21 | End: 2022-02-02

## 2022-01-21 RX ORDER — AMOXICILLIN 500 MG/1
1000 CAPSULE ORAL EVERY 12 HOURS SCHEDULED
Qty: 56 CAPSULE | Refills: 0 | Status: SHIPPED | OUTPATIENT
Start: 2022-01-21 | End: 2022-02-04

## 2022-01-21 NOTE — PROGRESS NOTES
I have discussed diagnosis of H  pylori with the patient and ordered treatment  She will stop her statin while on clarithromycin  She will get H  pylori stool test in March off PPI  We doan schedule follow up clinic visit to discuss diarrhea  All questions answered

## 2022-01-24 ENCOUNTER — APPOINTMENT (OUTPATIENT)
Dept: LAB | Facility: CLINIC | Age: 62
End: 2022-01-24
Payer: MEDICARE

## 2022-01-24 DIAGNOSIS — M25.50 ARTHRALGIA, UNSPECIFIED JOINT: ICD-10-CM

## 2022-01-24 LAB — ERYTHROCYTE [SEDIMENTATION RATE] IN BLOOD: 56 MM/HOUR (ref 0–29)

## 2022-01-24 PROCEDURE — 36415 COLL VENOUS BLD VENIPUNCTURE: CPT

## 2022-01-24 PROCEDURE — 85652 RBC SED RATE AUTOMATED: CPT

## 2022-01-26 ENCOUNTER — OFFICE VISIT (OUTPATIENT)
Dept: PHYSICAL THERAPY | Facility: REHABILITATION | Age: 62
End: 2022-01-26
Payer: MEDICARE

## 2022-01-26 DIAGNOSIS — E06.3 HYPOTHYROIDISM DUE TO HASHIMOTO'S THYROIDITIS: ICD-10-CM

## 2022-01-26 DIAGNOSIS — R26.89 IMBALANCE: ICD-10-CM

## 2022-01-26 DIAGNOSIS — E03.8 HYPOTHYROIDISM DUE TO HASHIMOTO'S THYROIDITIS: ICD-10-CM
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
## 2022-01-26 DIAGNOSIS — R29.898 LEG WEAKNESS, BILATERAL: ICD-10-CM

## 2022-01-26 PROCEDURE — 97110 THERAPEUTIC EXERCISES: CPT | Performed by: PHYSICAL THERAPIST

## 2022-01-26 PROCEDURE — 97140 MANUAL THERAPY 1/> REGIONS: CPT | Performed by: PHYSICAL THERAPIST

## 2022-01-26 RX ORDER — LEVOTHYROXINE SODIUM 0.2 MG/1
200 TABLET ORAL DAILY
Qty: 90 TABLET | Refills: 1 | Status: SHIPPED | OUTPATIENT
Start: 2022-01-26 | End: 2022-07-21

## 2022-01-26 NOTE — PROGRESS NOTES
Treatment Note     Today's date: 2022  Patient name: Yuriy Bosch  : 1960  MRN: [id number]  Referring provider: Veronica Beach DO  Dx:   Encounter Diagnosis     ICD-10-CM    1  Fall, subsequent encounter  W19  XXXD    2  Imbalance  R26 89    3  Leg weakness, bilateral  R29 898      Subjective:   Continues with walking boot per podiatrist   Some increased pain after manual therapy last week, then abated, returns possibly with changing weather  On her feet and off her feet  To therapy with boot and rolling walker  Doesn't feel too different walking with or without the boot  Has been working on range of motion and isometrics     Patient goals: walking at a normal pace without cane (not using prior to house), grocery shop without significant pain when completed, dancing at niece's wedding in 2022  - To move without pain and to not get tired as frequently (added 10/20/21)    Patient-Specific Functional Scale   Task is scored 0 (unable to perform activity) to 10 (ability to perform activity independently)  Activity 6/30/21 7/28/21 8/25/21 9/29/21 10/20/21 11/17/21    1  Walk at normal pace without a cane 8/10 9/10 5/10  0/10 3/10 3/10 due to speed or knee bending    2  Grocery shop without significant pain 5/10 6-7/10 8/10 0/10 4/10 7-810    3  Dance 3/10 3/10 5/10  1/10 2/10    4  Move without pain and to not get tired as frequently       7/10      Objective: See treatment diary below    Precautions - Hashimoto's, autoimmune disease    Mobility Measures 6/23/21 7/28/21 8/25/21 9/29/21 10/20/21 11/17/21 1/13/22   Assistive device used?     Deferred by therapist      5 Time Sit to Stand  (17" chair, arms across chest) Completes from 19" surface in 16 seconds (5 times), unable from 17" surface without use of hands Unable from 18" surface without hands  16 seconds from 20" surface Unable from 18" surface without hands  12 7 seconds from 20" surface  14 0 seconds from 19" surface, stresses left knee  Unable from 25' surface    18' surface with use of one hand, 23 seconds, second trial, 25 seconds   17 8 seconds from 20" surface    3 Meter Timed  Up & Go      12 0 seconds    Walking speed       0 53 m/s   Functional Gait Assessment (see below)          6 Minute Walk Test (100 foot circular course)   920 feet, pain about left posterior distal thigh, medial lower leg, and right lateral lower leg 1050 feet 6 minute walk test 985 feet 6 minute walk test    Deferred by patient  Unable, deferred by therapist   Patient-Reported Outcome Measure:  Activities-Specific Balance Confidence Scale (ABC Scale)          Right knee flexion PROM, sitting 96 degrees after 1 minute of stretching 95-96 degrees After 2 minutes stretching 98 degrees after 2 minutes of stretching  97 degrees after 2 minutes stretching 95 degrees (at least 120 degrees left          -11 degrees right knee extension    -11 degrees left     Walking with slowed speed, using cane    6 degrees passive right ankle range of motion; tender to palpation about the distal 2-3 inch segment of the Achilles tendon, not about the proximal to mid calf, no redness or significant change in swelling     SciFit level 1 5, 90 steps/min, 12 min    Initial active right dorsiflexion 10 degrees  Initial passive right dorsiflexion 12 degrees    Seated gastroc stretching with strap, right, straight knee, 3 min    Soft tissue massage to right Achilles and surrounding tissue, initially focused on lateral side and moving to mid calf and medial side, increased dorsiflexion to 14-15 degrees actively after long-duration low load stretching and following seated gastroc stretch with strap, right, straight knee, 2 min    Seated resisted plantarflexion with purple band, 20 - to HEP     Bridge, 1 min x 2 sets  sidelying hip abduction, 1 min  Sit to stand with walking boot, about 10 x 2 sets    ASSESSMENT:   Improved dorsiflexion initially and after stretching during therapy today  Continue strengthening exercises at home, using boot in weight bearing positions  SHORT-TERM GOALS: 1 month(s)  1  Clarisa López walks at least 10 minutes consecutively  MET PREVIOUSLY, CONTINUES  3  Clarisa López reports 50% improvement in ability to walk grocery store distances without severe fatigue  PROGRESSING  New goal: Demonstrates at least 11 degrees passive right dorsiflexion bi 2/12/22  LONG-TERM GOALS: by 3/12/22  1  Patient reports at least 20 total minutes per day of overground walking for exercise  NOT MET  2  Patient independently manages home exercise program   MET with need for revisions  PLAN OF CARE:  Patient will benefit from physical therapy 1 time per week for 1 month  Neuromuscular re-education, therapeutic exercises, and therapeutic activities as outlined in grids

## 2022-01-27 ENCOUNTER — TELEPHONE (OUTPATIENT)
Dept: GASTROENTEROLOGY | Facility: MEDICAL CENTER | Age: 62
End: 2022-01-27

## 2022-01-27 NOTE — TELEPHONE ENCOUNTER
----- Message from Jed Guy MD sent at 1/21/2022  1:59 PM EST -----  Please schedule clinic follow up appointment  Thanks  Discussed H  pylori results with patient  See telephone encounter

## 2022-01-28 LAB — HLA-B27 QL NAA+PROBE: NEGATIVE

## 2022-02-02 ENCOUNTER — OFFICE VISIT (OUTPATIENT)
Dept: PHYSICAL THERAPY | Facility: REHABILITATION | Age: 62
End: 2022-02-02
Payer: MEDICARE

## 2022-02-02 DIAGNOSIS — R29.898 LEG WEAKNESS, BILATERAL: ICD-10-CM

## 2022-02-02 DIAGNOSIS — A04.8 H. PYLORI INFECTION: ICD-10-CM

## 2022-02-02 DIAGNOSIS — M76.61 TENDONITIS, ACHILLES, RIGHT: ICD-10-CM

## 2022-02-02 DIAGNOSIS — R26.89 IMBALANCE: ICD-10-CM
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
## 2022-02-02 PROCEDURE — 97140 MANUAL THERAPY 1/> REGIONS: CPT

## 2022-02-02 PROCEDURE — 97110 THERAPEUTIC EXERCISES: CPT

## 2022-02-02 RX ORDER — OMEPRAZOLE 40 MG/1
CAPSULE, DELAYED RELEASE ORAL
Qty: 28 CAPSULE | Refills: 0 | Status: SHIPPED | OUTPATIENT
Start: 2022-02-02 | End: 2022-05-04

## 2022-02-02 NOTE — PROGRESS NOTES
Daily Note     Today's date: 2022  Patient name: Elver Lee  : 1960  MRN: [id number]  Referring provider: Felicita Terrazas DO  Dx:   Encounter Diagnosis     ICD-10-CM    1  Fall, subsequent encounter  W19  XXXD    2  Imbalance  R26 89    3  Leg weakness, bilateral  R29 898    4  Tendonitis, Achilles, right  M76 61                   Subjective: Patient noted no pain currently in ankle pre treatment in boot  Objective: See treatment diary below      Assessment: Tolerated treatment fair  Patient was able to perform listed exercises  STM helped to decrease fascia restrictions in R ankle  Patient would benefit from continued PT      Plan: Continue per plan of care  Patient goals: walking at a normal pace without cane (not using prior to house), grocery shop without significant pain when completed, dancing at niece's wedding in 2022  - To move without pain and to not get tired as frequently (added 10/20/21)    Patient-Specific Functional Scale   Task is scored 0 (unable to perform activity) to 10 (ability to perform activity independently)  Activity 6/30/21 7/28/21 8/25/21 9/29/21 10/20/21 11/17/21    1  Walk at normal pace without a cane 8/10 9/10 5/10  0/10 3/10 3/10 due to speed or knee bending    2  Grocery shop without significant pain 5/10 6-7/10 8/10 0/10 4/10 7-8/10    3  Dance 3/10 3/10 5/10  1/10 2/10    4  Move without pain and to not get tired as frequently       7/10      Objective: See treatment diary below    Precautions - Hashimoto's, autoimmune disease    Mobility Measures 6/23/21 7/28/21 8/25/21 9/29/21 10/20/21 11/17/21 1/13/22   Assistive device used?     Deferred by therapist      5 Time Sit to Stand  (17" chair, arms across chest) Completes from 19" surface in 16 seconds (5 times), unable from 17" surface without use of hands Unable from 18" surface without hands  16 seconds from 20" surface Unable from 18" surface without hands  12 7 seconds from 20" surface  14 0 seconds from 19" surface, stresses left knee  Unable from 18' surface    18' surface with use of one hand, 23 seconds, second trial, 25 seconds   17 8 seconds from 20" surface    3 Meter Timed  Up & Go      12 0 seconds    Walking speed       0 53 m/s   Functional Gait Assessment (see below)          6 Minute Walk Test (100 foot circular course)   920 feet, pain about left posterior distal thigh, medial lower leg, and right lateral lower leg 1050 feet 6 minute walk test 985 feet 6 minute walk test    Deferred by patient  Unable, deferred by therapist   Patient-Reported Outcome Measure: Activities-Specific Balance Confidence Scale (ABC Scale)          Right knee flexion PROM, sitting 96 degrees after 1 minute of stretching 95-96 degrees After 2 minutes stretching 98 degrees after 2 minutes of stretching  97 degrees after 2 minutes stretching 95 degrees (at least 120 degrees left          -11 degrees right knee extension    -11 degrees left     Walking with slowed speed, using cane    6 degrees passive right ankle range of motion; tender to palpation about the distal 2-3 inch segment of the Achilles tendon, not about the proximal to mid calf, no redness or significant change in swelling     2/2/22 exercises performed below      SciFit level 1 5, 90 steps/min, 12 min    Initial active right dorsiflexion 10 degrees  Initial passive right dorsiflexion 12 degrees    Seated gastroc stretching with strap, right, straight knee, 3 min    Soft tissue massage to right Achilles and surrounding tissue, initially focused on lateral side and moving to mid calf and medial side,  following seated gastroc stretch with strap, right, straight knee, 2 min    Seated resisted plantarflexion with purple band, 20 - to HEP     Bridge, 1 min x 2 sets  sidelying hip abduction, 1 min  Sit to stand with walking boot, about 10 x 2 sets

## 2022-02-07 ENCOUNTER — TELEPHONE (OUTPATIENT)
Dept: FAMILY MEDICINE CLINIC | Facility: CLINIC | Age: 62
End: 2022-02-07

## 2022-02-07 NOTE — TELEPHONE ENCOUNTER
Patient called stating she is still having an issue with dry eyes, she is on her 2nd medication and it is not working, she was seeing Willem Almanza  She would like another physician to help her, do you have a recommendation for her  Please advise patient at [phone]

## 2022-02-08 NOTE — TELEPHONE ENCOUNTER
She can try [facility] [address] [facility]   Dr Sandy Abad

## 2022-02-09 ENCOUNTER — OFFICE VISIT (OUTPATIENT)
Dept: PHYSICAL THERAPY | Facility: REHABILITATION | Age: 62
End: 2022-02-09
Payer: MEDICARE

## 2022-02-09 DIAGNOSIS — R29.898 LEG WEAKNESS, BILATERAL: ICD-10-CM

## 2022-02-09 DIAGNOSIS — R26.89 IMBALANCE: ICD-10-CM
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
## 2022-02-09 PROCEDURE — 97110 THERAPEUTIC EXERCISES: CPT | Performed by: PHYSICAL THERAPIST

## 2022-02-09 NOTE — PROGRESS NOTES
Daily Note     Today's date: 2022  Patient name: Lang Severe  : 1960  MRN: [id number]  Referring provider: Oswaldo Dougherty DO  Dx:   Encounter Diagnosis     ICD-10-CM    1  Fall, subsequent encounter  W19  XXXD    2  Imbalance  R26 89    3  Leg weakness, bilateral  R29 898         Subjective:   Feels the leg is doing better  If walking a lot around the house without the brace, will hurt the next day  Feels it about the lower portion of the Achilles  Did a bit without the brace  Did grocery shopping without the brace, used a grocery cart for support  So a little sore today  Better range of motion  Still a little stiff  Will see podiatrist tomorrow  Objective: See treatment diary below    Patient goals: walking at a normal pace without cane (not using prior to house), grocery shop without significant pain when completed, dancing at [employer]'s wedding in 2022  - To move without pain and to not get tired as frequently (added 10/20/21)    Patient-Specific Functional Scale   Task is scored 0 (unable to perform activity) to 10 (ability to perform activity independently)  Activity 8/25/21 9/29/21 10/20/21 11/17/21 2/09/22   1  Walk at normal pace without a cane 5/10  0/10 3/10 3/10 due to speed or knee bending 7-8/10 initially, decreases with continued walking   2  Grocery shop without significant pain 8/10 0/10 4/10 7-8/10 7/10   3  Dance 5/10  1/10 2/10 0/10   4  Move without pain and to not get tired as frequently     10 5-6/10     Objective: See treatment diary below    Precautions - Hashimoto's, autoimmune disease    Mobility Measures 8/25/21 9/29/21 10/20/21 11/17/21 1/13/22 2/09/22   Assistive device used?   Deferred by therapist       5 Time Sit to Stand  (17" chair, arms across chest) Unable from 18" surface without hands  12 7 seconds from 20" surface  14 0 seconds from 19" surface, stresses left knee  Unable from 18' surface    18' surface with use of one hand, 23 seconds, second trial, 25 seconds   17 8 seconds from 20" surface     3 Meter Timed  Up & Go    12 0 seconds     Walking speed     0 53 m/s 0 60 -0 74 m/s   Functional Gait Assessment (see below)         6 Minute Walk Test (100 foot circular course)   985 feet 6 minute walk test    Deferred by patient  Unable, deferred by therapist    Patient-Reported Outcome Measure: Activities-Specific Balance Confidence Scale (ABC Scale)         Right knee flexion PROM, sitting 98 degrees after 2 minutes of stretching  97 degrees after 2 minutes stretching 95 degrees (at least 120 degrees left         -11 degrees right knee extension    -11 degrees left     Walking with slowed speed, using cane    6 degrees passive right ankle range of motion; tender to palpation about the distal 2-3 inch segment of the Achilles tendon, not about the proximal to mid calf, no redness or significant change in swelling Walks with walking boot on right ankle, decreased knee flexion excursion about the right knee, decreased speed, without assistive device  14 degrees active dorsiflexion with knee fully extended  16 degrees passively    Resists maximal pressure into right plantarflexion without pain    Without walking boot, increased right step length versus left, walking speed above     2/9/22 exercises performed below      SciFit level 1 5, 105 steps/min, 10 min    Initial active right dorsiflexion 14 degrees  Initial passive right dorsiflexion 16 degrees    Seated gastroc stretching with strap, right, straight knee, 2 min    Soft tissue massage to right Achilles and surrounding tissue, initially focused on lateral side and moving to distal calf and medial side,  following seated gastroc stretch with strap, right, straight knee, 2 min    Seated resisted plantarflexion with blue and black band (concurrently) 20 x 3 sets    Bridge, weight on right side, 1 min x 2 sets  sidelying hip abduction, right, 1 min x 2 sets  Sit to stand, about 10 x 2 sets ASSESSMENT:   Again improved dorsiflexion range of motion initially prior to stretching today, and tolerating increased resistance into resisted plantarflexion, less tender to palpation about the distal Achilles  All indicate resolving Achilles tendonopathy  Based on a history of connective tissue dysfunction, as allowed by physician precautions, recommend gradual return to walking without walking boot, gradual return to strengthening (especially gastroc and other muscles of gait propulsion to normalize step length and increase gait speed), and continued stretching  SHORT-TERM GOALS: 1 month(s)  1  Beba Caballero walks at least 10 minutes consecutively  MET PREVIOUSLY, ABLE TO GROCERY SHOP, CONTINUES FOR CONSTANT WALKING     3  Beba Khanlain reports 50% improvement in ability to walk grocery store distances without severe fatigue  PROGRESSING  4  Demonstrates at least 11 degrees passive right dorsiflexion bi 2/12/22  MET  New goal: When released by physician, tolerates at least 6 minutes consecutive overground walking without walking boot  New goal: When released by physician, tolerates at least 10 bilateral heel raises  New goal: When released by physician, walks with step length without one inch of contralateral leg  LONG-TERM GOALS: by 3/12/22  1  Patient reports at least 20 total minutes per day of overground walking for exercise  NOT MET  2  Patient independently manages home exercise program   MET with need for revisions  Dr Malorie Berry, DPM, fax [phone]:  Patient will benefit from physical therapy 1 time per week for 1 month  Neuromuscular re-education, therapeutic exercises, and therapeutic activities as outlined in grids

## 2022-02-10 DIAGNOSIS — E78.00 PURE HYPERCHOLESTEROLEMIA: ICD-10-CM

## 2022-02-10 RX ORDER — ROSUVASTATIN CALCIUM 5 MG/1
TABLET, COATED ORAL
Qty: 90 TABLET | Refills: 3 | Status: SHIPPED | OUTPATIENT
Start: 2022-02-10

## 2022-02-15 ENCOUNTER — APPOINTMENT (OUTPATIENT)
Dept: LAB | Facility: CLINIC | Age: 62
End: 2022-02-15
Payer: MEDICARE

## 2022-02-15 DIAGNOSIS — E11.9 TYPE 2 DIABETES MELLITUS WITHOUT COMPLICATION, WITHOUT LONG-TERM CURRENT USE OF INSULIN (HCC): ICD-10-CM

## 2022-02-15 DIAGNOSIS — E06.3 HASHIMOTO'S DISEASE: ICD-10-CM

## 2022-02-15 LAB
ALBUMIN SERPL BCP-MCNC: 3.6 G/DL (ref 3.5–5)
ALP SERPL-CCNC: 119 U/L (ref 46–116)
ALT SERPL W P-5'-P-CCNC: 70 U/L (ref 12–78)
ANION GAP SERPL CALCULATED.3IONS-SCNC: 6 MMOL/L (ref 4–13)
AST SERPL W P-5'-P-CCNC: 51 U/L (ref 5–45)
BILIRUB SERPL-MCNC: 0.66 MG/DL (ref 0.2–1)
BUN SERPL-MCNC: 20 MG/DL (ref 5–25)
CALCIUM SERPL-MCNC: 9.4 MG/DL (ref 8.3–10.1)
CHLORIDE SERPL-SCNC: 105 MMOL/L (ref 100–108)
CHOLEST SERPL-MCNC: 196 MG/DL
CO2 SERPL-SCNC: 26 MMOL/L (ref 21–32)
CREAT SERPL-MCNC: 0.91 MG/DL (ref 0.6–1.3)
EST. AVERAGE GLUCOSE BLD GHB EST-MCNC: 151 MG/DL
GFR SERPL CREATININE-BSD FRML MDRD: 67 ML/MIN/1.73SQ M
GLUCOSE P FAST SERPL-MCNC: 164 MG/DL (ref 65–99)
HBA1C MFR BLD: 6.9 %
HDLC SERPL-MCNC: 53 MG/DL
LDLC SERPL CALC-MCNC: 98 MG/DL (ref 0–100)
NONHDLC SERPL-MCNC: 143 MG/DL
POTASSIUM SERPL-SCNC: 4.3 MMOL/L (ref 3.5–5.3)
PROT SERPL-MCNC: 8.3 G/DL (ref 6.4–8.2)
SODIUM SERPL-SCNC: 137 MMOL/L (ref 136–145)
TRIGL SERPL-MCNC: 225 MG/DL
TSH SERPL DL<=0.05 MIU/L-ACNC: 2.01 UIU/ML (ref 0.36–3.74)

## 2022-02-15 PROCEDURE — 80061 LIPID PANEL: CPT

## 2022-02-15 PROCEDURE — 83036 HEMOGLOBIN GLYCOSYLATED A1C: CPT

## 2022-02-15 PROCEDURE — 80053 COMPREHEN METABOLIC PANEL: CPT

## 2022-02-15 PROCEDURE — 84443 ASSAY THYROID STIM HORMONE: CPT

## 2022-02-15 PROCEDURE — 36415 COLL VENOUS BLD VENIPUNCTURE: CPT

## 2022-02-16 ENCOUNTER — OFFICE VISIT (OUTPATIENT)
Dept: PHYSICAL THERAPY | Facility: REHABILITATION | Age: 62
End: 2022-02-16
Payer: MEDICARE

## 2022-02-16 ENCOUNTER — OFFICE VISIT (OUTPATIENT)
Dept: FAMILY MEDICINE CLINIC | Facility: CLINIC | Age: 62
End: 2022-02-16
Payer: MEDICARE

## 2022-02-16 VITALS
DIASTOLIC BLOOD PRESSURE: 82 MMHG | HEIGHT: 66 IN | TEMPERATURE: 97.6 F | RESPIRATION RATE: 16 BRPM | HEART RATE: 92 BPM | OXYGEN SATURATION: 98 % | SYSTOLIC BLOOD PRESSURE: 150 MMHG | WEIGHT: 229.4 LBS | BODY MASS INDEX: 36.87 KG/M2

## 2022-02-16 DIAGNOSIS — D69.6 THROMBOCYTOPENIC DISORDER (HCC): ICD-10-CM

## 2022-02-16 DIAGNOSIS — R29.898 LEG WEAKNESS, BILATERAL: ICD-10-CM

## 2022-02-16 DIAGNOSIS — F32.0 CURRENT MILD EPISODE OF MAJOR DEPRESSIVE DISORDER WITHOUT PRIOR EPISODE (HCC): ICD-10-CM

## 2022-02-16 DIAGNOSIS — R26.89 IMBALANCE: ICD-10-CM

## 2022-02-16 DIAGNOSIS — Z11.4 SCREENING FOR HIV (HUMAN IMMUNODEFICIENCY VIRUS): ICD-10-CM

## 2022-02-16 DIAGNOSIS — G31.84 MILD NEUROCOGNITIVE DISORDER DUE TO TRAUMATIC BRAIN INJURY, SEQUELA (HCC): ICD-10-CM

## 2022-02-16 DIAGNOSIS — E11.65 TYPE 2 DIABETES MELLITUS WITH HYPERGLYCEMIA, WITHOUT LONG-TERM CURRENT USE OF INSULIN (HCC): ICD-10-CM

## 2022-02-16 DIAGNOSIS — G70.9 NEUROMUSCULAR DISORDER (HCC): ICD-10-CM

## 2022-02-16 DIAGNOSIS — S06.9X9S MILD NEUROCOGNITIVE DISORDER DUE TO TRAUMATIC BRAIN INJURY, SEQUELA (HCC): ICD-10-CM
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
## 2022-02-16 DIAGNOSIS — E06.3 HASHIMOTO'S DISEASE: Primary | ICD-10-CM

## 2022-02-16 DIAGNOSIS — D35.2 PITUITARY MICROADENOMA (HCC): ICD-10-CM

## 2022-02-16 PROCEDURE — 97110 THERAPEUTIC EXERCISES: CPT | Performed by: PHYSICAL THERAPIST

## 2022-02-16 PROCEDURE — 99214 OFFICE O/P EST MOD 30 MIN: CPT | Performed by: FAMILY MEDICINE

## 2022-02-16 NOTE — PROGRESS NOTES
Treatment Note     Today's date: 2022  Patient name: Marielena Maki  : 1960  MRN: [id number]  Referring provider: Robert Montaño DO  Dx:   Encounter Diagnosis     ICD-10-CM    1  Fall, subsequent encounter  W19  XXXD    2  Imbalance  R26 89    3  Leg weakness, bilateral  R29 898         Subjective:   Seen by podiatrist, reports is doing well  Can go without walking boot as tolerated  Pleased with progress here  Had done some walking in grocery store the day before the appointment with podiatry, and didn't cause pain  Instructed to gradually increase resistance with strengthening and distance of walking  To therapy with winter boots  See neurology note scanned into chart from [facility] neurology  Objective: See treatment diary below    Patient goals: walking at a normal pace without cane (not using prior to house), grocery shop without significant pain when completed, dancing at niece's wedding in 2022  - To move without pain and to not get tired as frequently (added 10/20/21)    Patient-Specific Functional Scale   Task is scored 0 (unable to perform activity) to 10 (ability to perform activity independently)  Activity 8/25/21 9/29/21 10/20/21 11/17/21 2/09/22   1  Walk at normal pace without a cane 5/10  0/10 3/10 3/10 due to speed or knee bending 7-8/10 initially, decreases with continued walking   2  Grocery shop without significant pain 8/10 0/10 4/10 7-8/10 7/10   3  Dance 5/10  1/10 2/10 0/10   4  Move without pain and to not get tired as frequently     7/10 5-6/10     Objective: See treatment diary below    Precautions - Hashimoto's, autoimmune disease    Mobility Measures 8/25/21 9/29/21 10/20/21 11/17/21 1/13/22 2/09/22   Assistive device used?   Deferred by therapist       5 Time Sit to Stand  (17" chair, arms across chest) Unable from 18" surface without hands  12 7 seconds from 20" surface  14 0 seconds from 19" surface, stresses left knee  Unable from 18' surface    18' surface with use of one hand, 23 seconds, second trial, 25 seconds   17 8 seconds from 20" surface     3 Meter Timed  Up & Go    12 0 seconds     Walking speed     0 53 m/s 0 60 -0 74 m/s   Functional Gait Assessment (see below)         6 Minute Walk Test (100 foot circular course)   985 feet 6 minute walk test    Deferred by patient  Unable, deferred by therapist    Patient-Reported Outcome Measure: Activities-Specific Balance Confidence Scale (ABC Scale)         Right knee flexion PROM, sitting 98 degrees after 2 minutes of stretching  97 degrees after 2 minutes stretching 95 degrees (at least 120 degrees left         -11 degrees right knee extension    -11 degrees left     Walking with slowed speed, using cane    6 degrees passive right ankle range of motion; tender to palpation about the distal 2-3 inch segment of the Achilles tendon, not about the proximal to mid calf, no redness or significant change in swelling Walks with walking boot on right ankle, decreased knee flexion excursion about the right knee, decreased speed, without assistive device  14 degrees active dorsiflexion with knee fully extended  16 degrees passively    Resists maximal pressure into right plantarflexion without pain    Without walking boot, increased right step length versus left, walking speed above     2/16/22 exercises performed below      SciFit level 6, 100 steps/min, 12 min    Soft tissue and cross friction massage to right Achilles and surrounding tissue, initially focused on lateral side and moving to distal calf and medial side,  following seated gastroc stretch with strap, right, straight knee, 3 min    Seated resisted plantarflexion with blue and black band (concurrently) 20 x 3 sets  Seated bent knee plantarflexion, resisted at knees, 15  Standing, most weight on left leg, plantarflexion, 15 x 2 sets    Right knee flexion to 97 degrees    Bridge, weight on right side, 1 min x 2 sets  sidelying hip abduction, right, 1 min x 2 sets  Sit to stand, about 15 x 2 sets     Step up 4" step, 1 min each  Standing gastroc stretch 20 sec each      ASSESSMENT:   Reviewed gradual progression of strengthening exercises involving concentric and eccentric strengthening of the right gastroc and soleus, good understanding  We will gradually increase resistance and volume of walking  SHORT-TERM GOALS: 1 month(s)  1  [facility] walks at least 10 minutes consecutively  MET PREVIOUSLY, ABLE TO GROCERY SHOP, CONTINUES FOR CONSTANT WALKING     3  [facility] reports 50% improvement in ability to walk grocery store distances without severe fatigue  PROGRESSING  4  Demonstrates at least 11 degrees passive right dorsiflexion bi 2/12/22  MET  New goal: When released by physician, tolerates at least 6 minutes consecutive overground walking without walking boot  New goal: When released by physician, tolerates at least 10 bilateral heel raises  New goal: When released by physician, walks with step length without one inch of contralateral leg  LONG-TERM GOALS: by 3/12/22  1  Patient reports at least 20 total minutes per day of overground walking for exercise  NOT MET  2  Patient independently manages home exercise program   MET with need for revisions  Dr Wendy Almonte, DPM, fax [phone]:  Patient will benefit from physical therapy 1 time per week for 1 month  Neuromuscular re-education, therapeutic exercises, and therapeutic activities as outlined in grids

## 2022-02-23 ENCOUNTER — OFFICE VISIT (OUTPATIENT)
Dept: URGENT CARE | Facility: MEDICAL CENTER | Age: 62
End: 2022-02-23
Payer: MEDICARE

## 2022-02-23 ENCOUNTER — HOSPITAL ENCOUNTER (EMERGENCY)
Facility: HOSPITAL | Age: 62
Discharge: HOME | End: 2022-02-23
Attending: EMERGENCY MEDICINE | Admitting: EMERGENCY MEDICINE
Payer: MEDICARE

## 2022-02-23 ENCOUNTER — APPOINTMENT (EMERGENCY)
Dept: CT IMAGING | Facility: HOSPITAL | Age: 62
End: 2022-02-23
Payer: MEDICARE

## 2022-02-23 ENCOUNTER — TELEPHONE (OUTPATIENT)
Dept: FAMILY MEDICINE CLINIC | Facility: CLINIC | Age: 62
End: 2022-02-23

## 2022-02-23 ENCOUNTER — APPOINTMENT (OUTPATIENT)
Dept: PHYSICAL THERAPY | Facility: REHABILITATION | Age: 62
End: 2022-02-23
Payer: MEDICARE

## 2022-02-23 VITALS
DIASTOLIC BLOOD PRESSURE: 85 MMHG | SYSTOLIC BLOOD PRESSURE: 138 MMHG | HEART RATE: 93 BPM | RESPIRATION RATE: 16 BRPM | TEMPERATURE: 97 F | OXYGEN SATURATION: 96 %

## 2022-02-23 VITALS
SYSTOLIC BLOOD PRESSURE: 155 MMHG | BODY MASS INDEX: 36.92 KG/M2 | TEMPERATURE: 98.3 F | WEIGHT: 229.72 LBS | OXYGEN SATURATION: 98 % | HEART RATE: 97 BPM | RESPIRATION RATE: 18 BRPM | HEIGHT: 66 IN | DIASTOLIC BLOOD PRESSURE: 74 MMHG

## 2022-02-23 DIAGNOSIS — R68.84 JAW PAIN: Primary | ICD-10-CM

## 2022-02-23 DIAGNOSIS — K05.6 PERIODONTAL DISEASE: Primary | ICD-10-CM

## 2022-02-23 PROBLEM — E11.9 TYPE 2 DIABETES MELLITUS WITHOUT COMPLICATION, WITHOUT LONG-TERM CURRENT USE OF INSULIN (HCC): Status: RESOLVED | Noted: 2018-03-08 | Resolved: 2022-02-23

## 2022-02-23 LAB
ATRIAL RATE: 85 BPM
ATRIAL RATE: 87 BPM
GLUCOSE SERPL-MCNC: 104 MG/DL (ref 65–140)
P AXIS: 50 DEGREES
P AXIS: 69 DEGREES
PR INTERVAL: 140 MS
PR INTERVAL: 142 MS
QRS AXIS: 35 DEGREES
QRS AXIS: 70 DEGREES
QRSD INTERVAL: 74 MS
QRSD INTERVAL: 76 MS
QT INTERVAL: 358 MS
QT INTERVAL: 370 MS
QTC INTERVAL: 430 MS
QTC INTERVAL: 440 MS
T WAVE AXIS: 63 DEGREES
T WAVE AXIS: 74 DEGREES
VENTRICULAR RATE: 85 BPM
VENTRICULAR RATE: 87 BPM

## 2022-02-23 PROCEDURE — 70490 CT SOFT TISSUE NECK W/O DYE: CPT

## 2022-02-23 PROCEDURE — 82948 REAGENT STRIP/BLOOD GLUCOSE: CPT

## 2022-02-23 PROCEDURE — G1004 CDSM NDSC: HCPCS

## 2022-02-23 PROCEDURE — 93005 ELECTROCARDIOGRAM TRACING: CPT | Performed by: PHYSICIAN ASSISTANT

## 2022-02-23 PROCEDURE — 99284 EMERGENCY DEPT VISIT MOD MDM: CPT

## 2022-02-23 PROCEDURE — 93005 ELECTROCARDIOGRAM TRACING: CPT

## 2022-02-23 PROCEDURE — G0463 HOSPITAL OUTPT CLINIC VISIT: HCPCS | Performed by: PHYSICIAN ASSISTANT

## 2022-02-23 PROCEDURE — 93010 ELECTROCARDIOGRAM REPORT: CPT

## 2022-02-23 PROCEDURE — 99213 OFFICE O/P EST LOW 20 MIN: CPT | Performed by: PHYSICIAN ASSISTANT

## 2022-02-23 PROCEDURE — 99284 EMERGENCY DEPT VISIT MOD MDM: CPT | Performed by: EMERGENCY MEDICINE

## 2022-02-23 RX ORDER — PENICILLIN V POTASSIUM 250 MG/1
500 TABLET ORAL ONCE
Status: COMPLETED | OUTPATIENT
Start: 2022-02-23 | End: 2022-02-23

## 2022-02-23 RX ORDER — PENICILLIN V POTASSIUM 500 MG/1
500 TABLET ORAL 4 TIMES DAILY
Qty: 28 TABLET | Refills: 0 | Status: SHIPPED | OUTPATIENT
Start: 2022-02-23 | End: 2022-03-02

## 2022-02-23 RX ADMIN — PENICILLIN V POTASSIUM 500 MG: 250 TABLET, FILM COATED ORAL at 20:43

## 2022-02-23 NOTE — TELEPHONE ENCOUNTER
Cliff Anne called the office 2 days ago pt began with pain along her jaw bone  There was an indentation where the pain started  Pt has swelling on her left side  No swelling in her mouth  along the jaw line accompanied by her swelling she does have numbness  Pt constantly has swelling on jaw line  Pt has had a cough for along time not new  No fever no flu like symptoms  Pt has tried oral gel, a otc topical oral gel, pt has tried ice  Not heat  Pt denies teeth or gum pain  No chest pain no sob no difficulty no trouble breathing  No left am pain  No pain no weakness on numbness  Pt aware availability  and tomorrow are you able to give recommendation  I could offer an appointment Friday ,but pt would like advisement    Pt's number is [phone]
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
## 2022-02-23 NOTE — PROGRESS NOTES
Assessment/Plan:    Blood pressure stable  A1c 6 9 from 8 2  Continue statin  Continue metformin  Continue losartan/AR B  Continue thyroid medication  Recommend yearly diabetic eye examination  Recheck 6 months with repeat labs including ratio  Continue with Neurology done marielle Mahan regarding neuromuscular disorder  Depression stable  Diagnoses and all orders for this visit:    Hashimoto's disease  -     TSH, 3rd generation; Future  -     T4, free; Future    Screening for HIV (human immunodeficiency virus)    Type 2 diabetes mellitus with hyperglycemia, without long-term current use of insulin (HCC)  -     Microalbumin / creatinine urine ratio  -     Lipid panel; Future  -     Hemoglobin A1C; Future  -     Comprehensive metabolic panel; Future    Neuromuscular disorder ([email or web address] )    Current mild episode of major depressive disorder without prior episode ([email or web address] )    Thrombocytopenic disorder (HCC)    Pituitary microadenoma (HCC)    Mild neurocognitive disorder due to traumatic brain injury, sequela ([email or web address] )        1  Hashimoto's disease  TSH, 3rd generation    T4, free   2  Screening for HIV (human immunodeficiency virus)     3  Type 2 diabetes mellitus with hyperglycemia, without long-term current use of insulin (HCC)  Microalbumin / creatinine urine ratio    Lipid panel    Hemoglobin A1C    Comprehensive metabolic panel   4  Neuromuscular disorder ([email or web address] )     5  Current mild episode of major depressive disorder without prior episode ([email or web address] )     6  [facility]     7  Pituitary microadenoma ([email or web address] )     8  Mild neurocognitive disorder due to traumatic brain injury, sequela (HCC)         Subjective:        Patient ID: Brennen Lema is a 59 y o  female  Chief Complaint   Patient presents with    Follow-up     re check DM HTN       Recheck        The following portions of the patient's history were reviewed and updated as appropriate: past medical history, past surgical history and problem list       Review of Systems   Constitutional: Negative for appetite change, fatigue, fever and unexpected weight change  HENT: Negative for congestion, ear pain, postnasal drip, rhinorrhea, sinus pressure, sinus pain and sore throat  Eyes: Negative for redness and visual disturbance  Respiratory: Negative for cough, chest tightness and shortness of breath  Cardiovascular: Negative for chest pain, palpitations and leg swelling  Gastrointestinal: Negative for abdominal distention, abdominal pain, diarrhea and nausea  Endocrine: Negative for cold intolerance and heat intolerance  Genitourinary: Negative for dysuria and hematuria  Musculoskeletal: Positive for gait problem and myalgias  Negative for arthralgias  Skin: Negative for pallor and rash  Neurological: Negative for dizziness, tremors, weakness, light-headedness and headaches  Psychiatric/Behavioral: Negative for behavioral problems  The patient is not nervous/anxious  Objective:  /82 (BP Location: Left arm, Patient Position: Sitting, Cuff Size: Adult)   Pulse 92   Temp 97 6 °F (36 4 °C) (Temporal)   Resp 16   Ht 5' 6" (1 676 m)   Wt 104 kg (229 lb 6 4 oz)   SpO2 98%   BMI 37 03 kg/m²        Physical Exam  Vitals and nursing note reviewed  Constitutional:       General: She is not in acute distress  Appearance: She is obese  She is not diaphoretic  HENT:      Head: Normocephalic and atraumatic  Right Ear: Tympanic membrane normal       Left Ear: Tympanic membrane normal    Eyes:      General: No scleral icterus  Conjunctiva/sclera: Conjunctivae normal       Pupils: Pupils are equal, round, and reactive to light  Neck:      Thyroid: No thyromegaly  Cardiovascular:      Rate and Rhythm: Normal rate and regular rhythm  Pulses:           Carotid pulses are 0 on the right side and 0 on the left side  Heart sounds: Normal heart sounds  No murmur heard        Pulmonary:      Effort: Pulmonary effort is normal  No respiratory distress  Breath sounds: Normal breath sounds  No wheezing  Abdominal:      General: There is no distension  Palpations: Abdomen is soft  Musculoskeletal:      Cervical back: Normal range of motion and neck supple  Right lower leg: No edema  Left lower leg: No edema  Lymphadenopathy:      Cervical: No cervical adenopathy  Skin:     General: Skin is warm  Coloration: Skin is not pale  Neurological:      Mental Status: She is alert and oriented to person, place, and time  Cranial Nerves: No cranial nerve deficit  Deep Tendon Reflexes: Reflexes are normal and symmetric  Psychiatric:         Behavior: Behavior normal          Thought Content:  Thought content normal          Judgment: Judgment normal

## 2022-02-23 NOTE — TELEPHONE ENCOUNTER
I called pt she is aware no availability today or tomorrow  I could offer Friday 02/25/22  Pt is going to go to [facility] now ,due to how bad the pain is and does not want to wait

## 2022-02-24 ENCOUNTER — OFFICE VISIT (OUTPATIENT)
Dept: DENTISTRY | Facility: CLINIC | Age: 62
End: 2022-02-24

## 2022-02-24 VITALS — HEART RATE: 94 BPM | TEMPERATURE: 99 F | DIASTOLIC BLOOD PRESSURE: 82 MMHG | SYSTOLIC BLOOD PRESSURE: 139 MMHG

## 2022-02-24 DIAGNOSIS — K08.89 PAIN, DENTAL: Primary | ICD-10-CM

## 2022-02-24 DIAGNOSIS — F43.23 ADJUSTMENT DISORDER WITH MIXED ANXIETY AND DEPRESSED MOOD: ICD-10-CM

## 2022-02-24 PROCEDURE — D0330 PANORAMIC RADIOGRAPHIC IMAGE: HCPCS | Performed by: DENTAL HYGIENIST

## 2022-02-24 PROCEDURE — D0140 LIMITED ORAL EVALUATION - PROBLEM FOCUSED: HCPCS | Performed by: DENTIST

## 2022-02-24 RX ORDER — DULOXETIN HYDROCHLORIDE 30 MG/1
CAPSULE, DELAYED RELEASE ORAL
Qty: 90 CAPSULE | Refills: 2 | Status: SHIPPED | OUTPATIENT
Start: 2022-02-24 | End: 2022-03-29 | Stop reason: SDUPTHER

## 2022-02-24 NOTE — PROGRESS NOTES
Emergency/Limited Exam    Today;  Limited Exam, PAN    Pt is a 59 yr old female  Presents w/ pain on LL  She was to the ER and had a CAT scan done and was also given antibiotics  Took PAN and tooth #20 has a PARL with huge decay on DOL  Patient's finances are limited and she prefers to have the tooth extracted    TX plan:  Ext #20, then Comp EX  Exam provided by:  Dr Nadia Castro:   Ext 20 - 60 min on Ezra day - ASAP - pt has infection  Nv2:  CompEx, Bw4, FMP - 60 min

## 2022-02-24 NOTE — ED NOTES
Patient transported to [facility], RN  02/23/22 [id number]
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
## 2022-02-24 NOTE — DISCHARGE INSTRUCTIONS
You have been seen for periodontal disease  You should return to the ED if you develop trouble swallowing, inability to open your mouth, facial swelling, or other worsening symptoms  Follow up with your dentist or the dental clinic  Take ibuprofen or Motrin for pain  Take penicillin as directed

## 2022-02-24 NOTE — ED ATTENDING ATTESTATION
2/23/2022  I, Sheila Iglesias MD, saw and evaluated the patient  I have discussed the patient with the resident/non-physician practitioner and agree with the resident's/non-physician practitioner's findings, Plan of Care, and MDM as documented in the resident's/non-physician practitioner's note, except where noted  All available labs and Radiology studies were reviewed  I was present for key portions of any procedure(s) performed by the resident/non-physician practitioner and I was immediately available to provide assistance  At this point I agree with the current assessment done in the Emergency Department  I have conducted an independent evaluation of this patient a history and physical is as follows:    77-year-old female presents for evaluation of left lower jaw pain for the past several days  Symptoms started gradually, constant, nonradiating, worse with touching the area  She has tried over-the-counter medications minimal improvement in symptoms  No facial swelling, trismus, fevers, chills neck pain or neck stiffness, dentalgia, chest pain, shortness of breath, dyspnea on exertion  Ten systems reviewed otherwise negative  Exam no distress, no facial swelling or trismus, tenderness palpation over the anterior portion of the left mandible without associated skin changes, normal appearing dentition in comes, no evidence of Hesham's angina, neck normal   Cardiac normal, lungs normal   Medical decision making; aside jaw pain will do EKG to rule out atypical chest pain, CT soft tissue neck to rule out acute pathology, p r n  Pain meds, reassess      ED Course         Critical Care Time  Procedures

## 2022-02-25 ENCOUNTER — TELEPHONE (OUTPATIENT)
Dept: OTHER | Facility: OTHER | Age: 62
End: 2022-02-25

## 2022-02-25 NOTE — TELEPHONE ENCOUNTER
Patient had a GI procedure and was put on antibiotics for 14 days and when she was done she was to bring in a stool sample  Before she could do that she wound up in the ER the other day and they said she had another infection and put her back on an antibiotic  She wants to know if she can give the stool sample or wait until she is done with this round of antibiotics  Please call the patient to adivise

## 2022-02-28 ENCOUNTER — OFFICE VISIT (OUTPATIENT)
Dept: DENTISTRY | Facility: CLINIC | Age: 62
End: 2022-02-28

## 2022-02-28 VITALS — TEMPERATURE: 96.8 F | SYSTOLIC BLOOD PRESSURE: 168 MMHG | HEART RATE: 103 BPM | DIASTOLIC BLOOD PRESSURE: 81 MMHG

## 2022-02-28 DIAGNOSIS — K04.7 DENTAL ABSCESS: Primary | ICD-10-CM

## 2022-02-28 PROCEDURE — D7140 EXTRACTION, ERUPTED TOOTH OR EXPOSED ROOT (ELEVATION AND/OR FORCEPS REMOVAL): HCPCS | Performed by: DENTIST

## 2022-02-28 NOTE — PROGRESS NOTES
Oral Surgery    [facility] presents for Ext #20    [facility], patient denies any changes  Obtained a direct and personal consent  Risks and complications were explained  Pt agreed and consented  Consent scanned in doc center  Pre-Op BP WNL  Administered 1 carp of 2 % Lidocaine w/ 1:100,000 epi via IANB and 2 carps of 4% Articaine w/ 1:100,000 epi via infiltrations  ?  Adequate anesthesia obtained, reflected gingiva, elevated, and extracted #20  Socket irrigated, and gauze with pressure placed  Upon dismissal, patient received POI, gauze  Pt told to finish Abx course from ED      NV: comp/fmx

## 2022-03-02 ENCOUNTER — OFFICE VISIT (OUTPATIENT)
Dept: PHYSICAL THERAPY | Facility: REHABILITATION | Age: 62
End: 2022-03-02
Payer: MEDICARE
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
## 2022-03-02 DIAGNOSIS — R26.89 IMBALANCE: ICD-10-CM

## 2022-03-02 PROCEDURE — 97110 THERAPEUTIC EXERCISES: CPT | Performed by: PHYSICAL THERAPIST

## 2022-03-02 NOTE — PROGRESS NOTES
RE-EVALUATION / Treatment Note     Today's date: 3/2/2022  Patient name: Sandra Sharpe  : 1960  MRN: [id number]  Referring provider: Rosie Guzman DO  Dx:   Encounter Diagnosis     ICD-10-CM    1  Fall, subsequent encounter  W19  XXXD    2  Imbalance  R26 89         Subjective:   Had abscess near a tooth last week and had to have it removed, had swelling about the left side of the face and is still on an antibiotic  Walking without boot  Today is the first day where she's been able to get away from the bathroom as previously she was nauseous  Goes to [employer]  Objective: See treatment diary below    Patient goals: walking at a normal pace without cane (not using prior to house), grocery shop without significant pain when completed, dancing at niece's wedding in 2022  - To move without pain and to not get tired as frequently (added 10/20/21)    Patient-Specific Functional Scale   Task is scored 0 (unable to perform activity) to 10 (ability to perform activity independently)  Activity 8/25/21 9/29/21 10/20/21 11/17/21 2/09/22   1  Walk at normal pace without a cane 5/10  0/10 3/10 3/10 due to speed or knee bending 7-8/10 initially, decreases with continued walking   2  Grocery shop without significant pain 8/10 0/10 4/10 7-8/10 7/10   3  Dance 5/10  1/10 2/10 0/10   4  Move without pain and to not get tired as frequently     7/10 5-6/10     Objective: See treatment diary below    Precautions - Hashimoto's, autoimmune disease    Mobility Measures 8/25/21 9/29/21 10/20/21 11/17/21 1/13/22 2/09/22 3/02/22   Assistive device used?   Deferred by therapist        5 Time Sit to Stand  (17" chair, arms across chest) Unable from 18" surface without hands  12 7 seconds from 20" surface  14 0 seconds from 19" surface, stresses left knee  Unable from 18' surface    18' surface with use of one hand, 23 seconds, second trial, 25 seconds   17 8 seconds from 20" surface   Able to get up from 18" surface without use of hands   3 Meter Timed  Up & Go    12 0 seconds      Walking speed     0 53 m/s 0 60 -0 74 m/s    Functional Gait Assessment (see below)          6 Minute Walk Test (100 foot circular course)   985 feet 6 minute walk test    Deferred by patient  Unable, deferred by therapist  700 feet, stopped at 1 min 30 sec left due to hip and lower extremity tightness     Patient-Reported Outcome Measure: Activities-Specific Balance Confidence Scale (ABC Scale)          Right knee flexion PROM, sitting 98 degrees after 2 minutes of stretching  97 degrees after 2 minutes stretching 95 degrees (at least 120 degrees left   100 degrees        -11 degrees right knee extension    -11 degrees left     Walking with slowed speed, using cane    6 degrees passive right ankle range of motion; tender to palpation about the distal 2-3 inch segment of the Achilles tendon, not about the proximal to mid calf, no redness or significant change in swelling Walks with walking boot on right ankle, decreased knee flexion excursion about the right knee, decreased speed, without assistive device  14 degrees active dorsiflexion with knee fully extended  16 degrees passively    Resists maximal pressure into right plantarflexion without pain    Without walking boot, increased right step length versus left, walking speed above Ankle range of motion within normal limits  Even step lengths, mild decrease in knee excursion       6 minutes walk test: 700 feet  Standing gastroc stretching 2 min each side    Treadmill walking 1 0 mph 5 min    Heel raise, 30 sec x 2 sets bilaterally    Step ups 6" step:   laterally 30 sec each leg   forwards 30 sec each leg    Seated hamstring stretch, 2 min    SciFit level 6, legs only 90 steps/min, 10 min    Supine hip flexion stretching 45 sec each  Hip rotation stretches, supine, 40 sec x 2 sets each  Repeated modified in sitting, 40 sec each    Right knee flexion to 100 degrees ASSESSMENT:   Loretta Richey had some regression in mobility in the past two weeks due to development of an abscess and nausea  Continued to gradually progress lower extremity strengthening and increase loading of the gastrocs in terms of strengthening and volume of walking  This seems to have mostly resolved as far as pain goes and we want to work to the prior level of function  Continue to increase volume of walking outside of therapy also  We also have some hip rotation tightness, good return demonstration of stretching to help in this area to improve flexibility and mobility  SHORT-TERM GOALS: 1 month(s)  1  Loretta Richey walks at least 10 minutes consecutively  MET PREVIOUSLY, ABLE TO GROCERY SHOP, CONTINUES FOR CONSTANT WALKING     3  Loretta Richey reports 50% improvement in ability to walk grocery store distances without severe fatigue  PROGRESSING  4  Demonstrates at least 11 degrees passive right dorsiflexion bi 2/12/22  MET  New goal: When released by physician, tolerates at least 6 minutes consecutive overground walking without walking boot  PROGRESSING  New goal: When released by physician, tolerates at least 10 bilateral heel raises  MET  New goal: When released by physician, walks with step length without one inch of contralateral leg  MET  LONG-TERM GOALS: by 3/12/22  1  Patient reports at least 20 total minutes per day of overground walking for exercise  NOT MET  2  Patient independently manages home exercise program   MET with need for revisions  Dr Trena Steve, DPM, fax [phone]:  Patient will benefit from physical therapy 1 time per week for 1 month  Neuromuscular re-education, therapeutic exercises, and therapeutic activities as outlined in grids

## 2022-03-16 ENCOUNTER — OFFICE VISIT (OUTPATIENT)
Dept: PHYSICAL THERAPY | Facility: REHABILITATION | Age: 62
End: 2022-03-16
Payer: MEDICARE
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
## 2022-03-16 DIAGNOSIS — R29.898 LEG WEAKNESS, BILATERAL: ICD-10-CM

## 2022-03-16 DIAGNOSIS — R26.89 IMBALANCE: ICD-10-CM

## 2022-03-16 PROCEDURE — 97110 THERAPEUTIC EXERCISES: CPT | Performed by: PHYSICAL THERAPIST

## 2022-03-16 NOTE — PROGRESS NOTES
Treatment Note     Today's date: 3/16/2022  Patient name: Ricarda Roe  : 1960  MRN: [id number]  Referring provider: Thong Tinsley DO  Dx:   Encounter Diagnosis     ICD-10-CM    1  Fall, subsequent encounter  W19  XXXD    2  Imbalance  R26 89    3  Leg weakness, bilateral  R29 898         Subjective:   Had acute onset pain about the posterior right lower leg starting  without specific cause, seen by physician, reports this is part of connective tissue dysfunction  Feels a bit better today, initially had trouble putting weight into the leg    Objective: See treatment diary below    Patient goals: walking at a normal pace without cane (not using prior to house), grocery shop without significant pain when completed, dancing at niece's wedding in 2022  - To move without pain and to not get tired as frequently (added 10/20/21)    Patient-Specific Functional Scale   Task is scored 0 (unable to perform activity) to 10 (ability to perform activity independently)  Activity 8/25/21 9/29/21 10/20/21 11/17/21 2/09/22   1  Walk at normal pace without a cane 5/10  0/10 3/10 3/10 due to speed or knee bending 7-8/10 initially, decreases with continued walking   2  Grocery shop without significant pain 8/10 0/10 4/10 7-8/10 7/10   3  Dance 5/10  1/10 2/10 0/10   4  Move without pain and to not get tired as frequently     7/10 5-6/10     Objective: See treatment diary below    Precautions - Hashimoto's, autoimmune disease    Mobility Measures 2/09/22 3/02/22   Assistive device used? 5 Time Sit to Stand  (17" chair, arms across chest)  Able to get up from 18" surface without use of hands   3 Meter Timed  Up & Go     Walking speed 0 60 -0 74 m/s    Functional Gait Assessment (see below)     6 Minute Walk Test (100 foot circular course)    700 feet, stopped at 1 min 30 sec left due to hip and lower extremity tightness     Patient-Reported Outcome Measure:  Activities-Specific Balance Confidence Scale (ABC Scale)     Right knee flexion PROM, sitting  100 degrees     Walks with walking boot on right ankle, decreased knee flexion excursion about the right knee, decreased speed, without assistive device  14 degrees active dorsiflexion with knee fully extended  16 degrees passively    Resists maximal pressure into right plantarflexion without pain    Without walking boot, increased right step length versus left, walking speed above Ankle range of motion within normal limits  Even step lengths, mild decrease in knee excursion  3 minutes walking, using cane on left hand per therapist suggestion to decreased pain right ankle    SciFit level 1, 5 min, about 70 steps/min    Seated gastroc stretching 2 min right ankle  Passive cross friction massage during passive stretching, 4 min  Seated gastroc stretching,  1 min right ankle    Ankle plantarflexion, green band, 1 min x 2 sets    Knee flexion stretching, 1 min    Sit to stand, 1 min    SciFit level 6, legs only 90 steps/min, 5 min    ASSESSMENT:   We see some regression in mobility related to acute onset right posterior lower leg pain similar to what she experienced with tendonopathy  We reviewed open-chain exercises with stretching and to begin again strengthening for the right gastroc and for muscles involved in sit to stand  Walk to tolerance with cane in left hand  SHORT-TERM GOALS: 1 month(s)  1  Tiara Hull walks at least 10 minutes consecutively  MET PREVIOUSLY, ABLE TO GROCERY SHOP, CONTINUES FOR CONSTANT WALKING     3  Hubertcamilla Della reports 50% improvement in ability to walk grocery store distances without severe fatigue  PROGRESSING  4  Demonstrates at least 11 degrees passive right dorsiflexion bi 2/12/22  MET  New goal: When released by physician, tolerates at least 6 minutes consecutive overground walking without walking boot  PROGRESSING  New goal: When released by physician, tolerates at least 10 bilateral heel raises  MET    New goal: When released by physician, walks with step length without one inch of contralateral leg  MET  LONG-TERM GOALS: by 3/12/22  1  Patient reports at least 20 total minutes per day of overground walking for exercise  NOT MET  2  Patient independently manages home exercise program   MET with need for revisions  Dr Kamran Nickerson, DPM, fax [phone]:  Patient will benefit from physical therapy 1 time per week for 1 month  Neuromuscular re-education, therapeutic exercises, and therapeutic activities as outlined in grids

## 2022-03-17 NOTE — TELEPHONE ENCOUNTER
Pt rescheduled her appointment to 5-4-22 because she was unable to get a stool sample, prev appointment was 3-21  22   She is asking if the doctor would like to see her sooner      Call back #  [phone]

## 2022-03-18 NOTE — TELEPHONE ENCOUNTER
Did she complete for H pylori treatment? I don't see a follow-up stool sample  If she is feeling well and wishes to follow-up in May that should be okay

## 2022-03-21 NOTE — TELEPHONE ENCOUNTER
Pt will have stool study done this week, has follow up appt 3-28-22 because she's still not feeling well

## 2022-03-23 ENCOUNTER — OFFICE VISIT (OUTPATIENT)
Dept: PHYSICAL THERAPY | Facility: REHABILITATION | Age: 62
End: 2022-03-23
Payer: MEDICARE

## 2022-03-23 DIAGNOSIS — R29.898 LEG WEAKNESS, BILATERAL: ICD-10-CM

## 2022-03-23 DIAGNOSIS — R26.89 IMBALANCE: ICD-10-CM
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
## 2022-03-23 PROCEDURE — 97110 THERAPEUTIC EXERCISES: CPT | Performed by: PHYSICAL THERAPIST

## 2022-03-23 PROCEDURE — 97112 NEUROMUSCULAR REEDUCATION: CPT | Performed by: PHYSICAL THERAPIST

## 2022-03-23 NOTE — PROGRESS NOTES
Treatment Note     Today's date: 3/23/2022  Patient name: Ebony Soni  : 1960  MRN: [id number]  Referring provider: Martha Gonzales DO  Dx:   Encounter Diagnosis     ICD-10-CM    1  Fall, subsequent encounter  W19  XXXD    2  Imbalance  R26 89    3  Leg weakness, bilateral  R29 898         Subjective: Right lower extremity is better than last week, continue pain with standing  Went to horse show party, did okay with walking on stand, pain with static standing on sand  Able to complete standing heel raises  Able to get back to some standing hip/dance exercises  Objective: See treatment diary below    Patient goals: walking at a normal pace without cane (not using prior to house), grocery shop without significant pain when completed, dancing at [employer]'s wedding in 2022  - To move without pain and to not get tired as frequently (added 10/20/21)    Patient-Specific Functional Scale   Task is scored 0 (unable to perform activity) to 10 (ability to perform activity independently)  Activity 8/25/21 9/29/21 10/20/21 11/17/21 2/09/22   1  Walk at normal pace without a cane /10  0/10 3/10 3/10 due to speed or knee bending 7-8/10 initially, decreases with continued walking   2  Grocery shop without significant pain 8/10 0/10 4/10 7-8/10 7/10   3  Dance 5/10  1/10 2/10 0/10   4  Move without pain and to not get tired as frequently     10 5-6/10     Objective: See treatment diary below    Precautions - Hashimoto's, autoimmune disease    Mobility Measures 2/09/22 3/02/22   Assistive device used? 5 Time Sit to Stand  (17" chair, arms across chest)  Able to get up from 18" surface without use of hands   3 Meter Timed  Up & Go     Walking speed 0 60 -0 74 m/s    Functional Gait Assessment (see below)     6 Minute Walk Test (100 foot circular course)    700 feet, stopped at 1 min 30 sec left due to hip and lower extremity tightness     Patient-Reported Outcome Measure:  Activities-Specific Balance Confidence Scale (ABC Scale)     Right knee flexion PROM, sitting  100 degrees     Walks with walking boot on right ankle, decreased knee flexion excursion about the right knee, decreased speed, without assistive device  14 degrees active dorsiflexion with knee fully extended  16 degrees passively    Resists maximal pressure into right plantarflexion without pain    Without walking boot, increased right step length versus left, walking speed above Ankle range of motion within normal limits  Even step lengths, mild decrease in knee excursion  4 5 min overground walking, no cane needed  3 min overground walking about 35 sec/lap    Sit to stand, 10 x 2 sets, 18" surface    Supine hamstring stretch, 1 min each side x  2 sets  hooklying hip ER stretch, 40 sec each  Long sitting gastroc stretch 2 min each  Long-sitting heel press, green band, 15 x 2 sets    Forward step over 2" obstacles, alternating gait, 2 min  Same laterally, 2 min  Then both 1 5 min with batting balloon    SciFit level 1-2, legs only,  90 steps/min, 10 min    ASSESSMENT:   Again are moving better, good improvement in walking speed and tolerance to standing and moving dynamic balance activities  SHORT-TERM GOALS: 1 month(s)  1  Trinidad Barrett walks at least 10 minutes consecutively  MET PREVIOUSLY, ABLE TO GROCERY SHOP, CONTINUES FOR CONSTANT WALKING     3  Trinidad Barrett reports 50% improvement in ability to walk grocery store distances without severe fatigue  PROGRESSING  4  Demonstrates at least 11 degrees passive right dorsiflexion bi 2/12/22  MET  New goal: When released by physician, tolerates at least 6 minutes consecutive overground walking without walking boot  PROGRESSING  New goal: When released by physician, tolerates at least 10 bilateral heel raises  MET  New goal: When released by physician, walks with step length without one inch of contralateral leg  MET  LONG-TERM GOALS: by 3/12/22  1   Patient reports at least 20 total minutes per day of overground walking for exercise  NOT MET  2  Patient independently manages home exercise program   MET with need for revisions  Dr Paul Hill, DPM, fax [phone]:  Patient will benefit from physical therapy 1 time per week for 1 month  Neuromuscular re-education, therapeutic exercises, and therapeutic activities as outlined in grids

## 2022-03-24 ENCOUNTER — APPOINTMENT (OUTPATIENT)
Dept: LAB | Facility: CLINIC | Age: 62
End: 2022-03-24
Payer: MEDICARE

## 2022-03-24 DIAGNOSIS — A04.8 H. PYLORI INFECTION: ICD-10-CM

## 2022-03-24 PROCEDURE — 87338 HPYLORI STOOL AG IA: CPT

## 2022-03-25 LAB — H PYLORI AG STL QL IA: NEGATIVE

## 2022-03-28 ENCOUNTER — OFFICE VISIT (OUTPATIENT)
Dept: GASTROENTEROLOGY | Facility: CLINIC | Age: 62
End: 2022-03-28
Payer: MEDICARE

## 2022-03-28 VITALS
HEART RATE: 100 BPM | OXYGEN SATURATION: 98 % | WEIGHT: 232 LBS | RESPIRATION RATE: 18 BRPM | DIASTOLIC BLOOD PRESSURE: 70 MMHG | BODY MASS INDEX: 37.28 KG/M2 | TEMPERATURE: 97.7 F | SYSTOLIC BLOOD PRESSURE: 130 MMHG | HEIGHT: 66 IN

## 2022-03-28 DIAGNOSIS — K58.0 IRRITABLE BOWEL SYNDROME WITH DIARRHEA: Primary | ICD-10-CM

## 2022-03-28 DIAGNOSIS — K31.84 GASTROPARESIS: ICD-10-CM

## 2022-03-28 DIAGNOSIS — R68.81 EARLY SATIETY: ICD-10-CM

## 2022-03-28 PROCEDURE — 99214 OFFICE O/P EST MOD 30 MIN: CPT | Performed by: PHYSICIAN ASSISTANT

## 2022-03-28 RX ORDER — DICYCLOMINE HCL 20 MG
20 TABLET ORAL EVERY 6 HOURS
Qty: 120 TABLET | Refills: 2 | Status: SHIPPED | OUTPATIENT
Start: 2022-03-28

## 2022-03-28 RX ORDER — MONTELUKAST SODIUM 4 MG/1
1 TABLET, CHEWABLE ORAL 2 TIMES DAILY
Qty: 60 TABLET | Refills: 2 | Status: SHIPPED | OUTPATIENT
Start: 2022-03-28 | End: 2022-06-18
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
## 2022-03-28 NOTE — PATIENT INSTRUCTIONS
1  over-the-counter Gas-X as needed for bloating and gas         Gastroparesis   WHAT YOU NEED TO KNOW:   Gastroparesis is a condition that causes food to move more slowly than normal from the stomach to the intestines  Gastroparesis is not caused by blockage  Often, the cause may not be known  It may be caused by damage to a nerve that controls muscles used to move food to your small intestines  DISCHARGE INSTRUCTIONS:   You or someone else should call 911 if:   · Your heart is beating faster and you are breathing faster than usual     · You cannot be woken up  Return to the emergency department if:   · You are confused or have trouble thinking clearly  · You are dizzy or very drowsy  Contact your healthcare provider if:   · You are urinating less than usual     · Your symptoms return or become worse  · The color of your urine is dark yellow  · You have questions or concerns about your condition or care  Medicines:   · Medicines may be given to control your nausea and vomiting  You may  also receive medicines that help food move through your stomach at a more normal rate  · Take your medicine as directed  Contact your healthcare provider if you think your medicine is not helping or if you have side effects  Tell him or her if you are allergic to any medicine  Keep a list of the medicines, vitamins, and herbs you take  Include the amounts, and when and why you take them  Bring the list or the pill bottles to follow-up visits  Carry your medicine list with you in case of an emergency  Follow up with your healthcare provider as directed: You may need tests to check if treatment is working  You may need to see a dietitian for help with a nutrition plan  Write down your questions so you remember to ask them during your visits  Self-care: Your healthcare provider may suggest any of the following:  · Change your eating habits    Take small bites of food to make it easier for your body to digest  You may need to eat several small meals low in fiber and fat throughout the day  Ask your dietitian for help with planning your meals  · Do not eat raw fruits, vegetables, or whole grains  These can cause you to have undigested food in your stomach  The undigested food can form a blockage that can become life-threatening  · Drink liquids as directed  Liquids will prevent dehydration caused by vomiting  Slowly drink small amounts of liquids at a time  Ask your healthcare provider how much liquid to drink each day, and which liquids are best for you  You may also need to drink an oral rehydration solution (ORS)  An ORS has the right amounts of sugar, salt, and minerals in water to replace body fluids  · Do not lie down for 2 hours after your meals  Walking and sitting after meals help with digestion  · Control your blood sugar levels if you have diabetes  High blood sugar levels may make your symptoms worse  Ask your healthcare provider how to control your blood sugar levels  © Copyright [employer] 2022 Information is for End User's use only and may not be sold, redistributed or otherwise used for commercial purposes  All illustrations and images included in CareNotes® are the copyrighted property of [employer]  or [address] [place], [place] [address]rock flynn   The above information is an  only  It is not intended as medical advice for individual conditions or treatments  Talk to your doctor, nurse or pharmacist before following any medical regimen to see if it is safe and effective for you

## 2022-03-29 ENCOUNTER — TELEPHONE (OUTPATIENT)
Dept: OBGYN CLINIC | Facility: MEDICAL CENTER | Age: 62
End: 2022-03-29

## 2022-03-29 ENCOUNTER — TELEMEDICINE (OUTPATIENT)
Dept: PSYCHIATRY | Facility: CLINIC | Age: 62
End: 2022-03-29
Payer: MEDICARE

## 2022-03-29 DIAGNOSIS — G70.9 NEUROMUSCULAR DISORDER (HCC): ICD-10-CM

## 2022-03-29 DIAGNOSIS — G47.00 INSOMNIA, UNSPECIFIED TYPE: ICD-10-CM

## 2022-03-29 DIAGNOSIS — F43.10 PTSD (POST-TRAUMATIC STRESS DISORDER): ICD-10-CM

## 2022-03-29 DIAGNOSIS — F43.23 ADJUSTMENT DISORDER WITH MIXED ANXIETY AND DEPRESSED MOOD: Primary | ICD-10-CM

## 2022-03-29 DIAGNOSIS — M79.18 MYOFASCIAL PAIN: ICD-10-CM

## 2022-03-29 PROCEDURE — 90833 PSYTX W PT W E/M 30 MIN: CPT | Performed by: PSYCHIATRY & NEUROLOGY

## 2022-03-29 PROCEDURE — 99213 OFFICE O/P EST LOW 20 MIN: CPT | Performed by: PSYCHIATRY & NEUROLOGY

## 2022-03-29 RX ORDER — DULOXETIN HYDROCHLORIDE 30 MG/1
30 CAPSULE, DELAYED RELEASE ORAL
Qty: 90 CAPSULE | Refills: 2 | Status: SHIPPED | OUTPATIENT
Start: 2022-03-29

## 2022-03-29 RX ORDER — DULOXETIN HYDROCHLORIDE 60 MG/1
60 CAPSULE, DELAYED RELEASE ORAL DAILY
Qty: 90 CAPSULE | Refills: 2 | Status: SHIPPED | OUTPATIENT
Start: 2022-03-29

## 2022-03-29 NOTE — PATIENT INSTRUCTIONS
Adjustment disorder with mixed anxiety and depressed mood  -     DULoxetine (CYMBALTA) 30 mg delayed release capsule; Take 1 capsule (30 mg total) by mouth daily at bedtime    PTSD (post-traumatic stress disorder)    Insomnia, unspecified type    Myofascial pain  -     DULoxetine (CYMBALTA) 60 mg delayed release capsule; Take 1 capsule (60 mg total) by mouth daily    Neuromuscular disorder (HCC)  -     DULoxetine (CYMBALTA) 60 mg delayed release capsule;  Take 1 capsule (60 mg total) by mouth daily        Follow up in 6 months with [facility]

## 2022-03-29 NOTE — TELEPHONE ENCOUNTER
Patient has a question about gel injections  She has an inblance in bacteria  Should she wait to have the gel injection until the balance is normal, or does it not matter when it comes to this issue?     Francesca Hughes #[phone]

## 2022-03-29 NOTE — PSYCH
Virtual Regular Visit    Verification of patient location:    Patient is located in the following state in which I hold an active license [place]      Assessment/Plan:    Problem List Items Addressed This Visit        Nervous and Auditory    Neuromuscular disorder (HCC)    Relevant Medications    DULoxetine (CYMBALTA) 60 mg delayed release capsule       Other    Insomnia    Myofascial pain    Relevant Medications    DULoxetine (CYMBALTA) 60 mg delayed release capsule    Adjustment disorder with mixed anxiety and depressed mood - Primary    Relevant Medications    DULoxetine (CYMBALTA) 60 mg delayed release capsule    DULoxetine (CYMBALTA) 30 mg delayed release capsule      Other Visit Diagnoses     PTSD (post-traumatic stress disorder)        Relevant Medications    DULoxetine (CYMBALTA) 60 mg delayed release capsule    DULoxetine (CYMBALTA) 30 mg delayed release capsule                   Reason for visit is   Chief Complaint   Patient presents with    Medication Management    Virtual Regular Visit        Encounter provider Rebeca Castro MD    Provider located at [facility] [id number]      Recent Visits  No visits were found meeting these conditions  Showing recent visits within past 7 days and meeting all other requirements  Today's Visits  Date Type Provider Dept   03/29/22 1660 S  Ayde Middleton MD [facility]   Showing today's visits and meeting all other requirements  Future Appointments  No visits were found meeting these conditions  Showing future appointments within next 150 days and meeting all other requirements       The patient was identified by name and date of birth  Gabbi Marshall was informed that this is a telemedicine visit and that [facility] and patient was informed this is a secure, HIPAA-complaint platform   She agrees to proceed     My office door was closed  No one else was in the room  She acknowledged consent and understanding of privacy and security of the video platform  The patient has agreed to participate and understands they can discontinue the visit at any time  Patient is aware this is a billable service  Subjective:     Patient ID: Lang Severe is a 59 y o  female with adjustment disorder with adjustment disorder with mixed depression and anxiety presenting for a follow up visit  She is on cymbalta and trazodone PRN for sleep  HPI ROS Appetite Changes and Sleep: the patient has been doing okay  "I have a new diagnosis of connective tissue disease  I was expecting this " She stated that she has already had pain by "injuring my achilles tendon doing nothing but sleeping " She no longer has the build up of pain that she had in the past because "my body is used to a level of pain " She feels this is worse because the "risk of injury is so high "  She has also had H  Pylori infection and " a vaginal bacteria imbalance " The doctors are doing more testing  She feels like she has good and bad days, but when she is in pain she wants to be at home  She has her cats at home and they need to be watched always  Sleep is 4-7 hours a night, depending on he pain  Appetite is normal  No changes in her weight  She is trying to eat less due to wanting to decrease inflammation in her body  She denied SI/HI  Anxiety is "pretty good " She has some anticipation of  "well whats going to happened next?" Otherwise its not bad  She denied PTSD symptoms  She has been working on crafts and things to keep her busy  Denied drug use  She has a rare alcoholic beverage       Review Of Systems:     Mood Anxiety and Depression better   Behavior Normal    Thought Content Disturbing Thoughts, Feelings   General Emotional Problems, Sleep Disturbances and Decreased Functioning   Personality Normal   Other Psych Symptoms no flashbacks Constitutional As Noted in HPI   ENT Negative   Cardiovascular Negative   Respiratory Negative   Gastrointestinal As Noted in HPI   Genitourinary As Noted in HPI   Musculoskeletal As Noted in HPI, Joint Stiffness and Limb Pain   Integumentary Negative   Neurological As Noted in HPI, Numbness and neck pain   Endocrine Normal    Other Symptoms Normal          Laboratory Results:    Results for Beth Nolan (MRN [id number]) as of 3/29/2022 10:29   Ref  Range 2/15/2022 09:20   Sodium Latest Ref Range: 136 - 145 mmol/L 137   Potassium Latest Ref Range: 3 5 - 5 3 mmol/L 4 3   Chloride Latest Ref Range: 100 - 108 mmol/L 105   CO2 Latest Ref Range: 21 - 32 mmol/L 26   Anion Gap Latest Ref Range: 4 - 13 mmol/L 6   BUN Latest Ref Range: 5 - 25 mg/dL 20   Creatinine Latest Ref Range: 0 60 - 1 30 mg/dL 0 91   GLUCOSE FASTING Latest Ref Range: 65 - 99 mg/dL 164 (H)   Calcium Latest Ref Range: 8 3 - 10 1 mg/dL 9 4   AST Latest Ref Range: 5 - 45 U/L 51 (H)   ALT Latest Ref Range: 12 - 78 U/L 70   Alkaline Phosphatase Latest Ref Range: 46 - 116 U/L 119 (H)   Total Protein Latest Ref Range: 6 4 - 8 2 g/dL 8 3 (H)   Albumin Latest Ref Range: 3 5 - 5 0 g/dL 3 6   TOTAL BILIRUBIN Latest Ref Range: 0 20 - 1 00 mg/dL 0 66   eGFR Latest Units: ml/min/1 73sq m 67   Cholesterol Latest Ref Range: See Comment mg/dL 196   Triglycerides Latest Ref Range: See Comment mg/dL 225 (H)   HDL Latest Ref Range: >=50 mg/dL 53   Non-HDL Cholesterol Latest Units: mg/dl 143   LDL Calculated Latest Ref Range: 0 - 100 mg/dL 98   Hemoglobin A1C Latest Ref Range: Normal 3 8-5 6%; PreDiabetic 5 7-6 4%;  Diabetic >=6 5%; Glycemic control for adults with diabetes <7 0% % 6 9 (H)   eAG, EST AVG Glucose Latest Units: mg/dl 151   TSH 3RD GENERATON Latest Ref Range: 0 358 - 3 740 uIU/mL 2 010       Substance Abuse History:  Social History     Substance and Sexual Activity   Drug Use No       Family Psychiatric History:   Family History   Problem Relation Age of Onset    Breast cancer Mother 28    Aneurysm Father         CEREBRAL ARTERY ANEURYSM     Alcohol abuse Maternal Aunt     Parkinsonism Family     BRCA1 Negative Sister     No Known Problems Maternal Grandmother     No Known Problems Maternal Grandfather     No Known Problems Paternal Grandmother     No Known Problems Paternal Grandfather        The following portions of the patient's history were reviewed and updated as appropriate: allergies, current medications, past family history, past medical history, past social history, past surgical history and problem list     Social History     Socioeconomic History    Marital status: Single     Spouse name: Not on file    Number of children: 0    Years of education: 12    Highest education level: Not on file   Occupational History    Occupation: disabled   Tobacco Use    Smoking status: Never Smoker    Smokeless tobacco: Never Used   Vaping Use    Vaping Use: Never used   Substance and Sexual Activity    Alcohol use: Yes     Comment: 1-2 TIMES PER YEAR PER [employer]     Drug use: No    Sexual activity: Not Currently   Other Topics Concern    Not on file   Social History Narrative    Does not consume caffien     Social Determinants of Health     Financial Resource Strain: Not on file   Food Insecurity: Not on file   Transportation Needs: Not on file   Physical Activity: Not on file   Stress: Not on file   Social Connections: Not on file   Intimate Partner Violence: Not on file   Housing Stability: Not on file     Social History     Social History Narrative    Does not consume caffien       Objective:       Mental status:  Appearance calm and cooperative , adequate hygiene and grooming and good eye contact    Mood euthymic   Affect affect was constricted   Speech a normal rate   Thought Processes circumstantial   Hallucinations no hallucinations present    Thought Content no delusions   Abnormal Thoughts no suicidal thoughts  and no homicidal thoughts Orientation  oriented to person and place and time   Remote Memory short term memory intact and long term memory intact   Attention Span concentration impaired   Intellect Appears to be of Average Intelligence   Insight Limited insight   Judgement judgment was limited   Muscle Strength Decreased muscle strength and gait not assesed    Language no difficulty naming common objects, no difficulty repeating a phrase  and no difficulty writing a sentence    Fund of Knowledge displays adequate knowledge of current events, adequate fund of knowledge regarding past history and adequate fund of knowledge regarding vocabulary    Pain moderate to severe   Pain Scale Tolerable when not moving, but when she moves its higher  Assessment/Plan:       Diagnoses and all orders for this visit:    Adjustment disorder with mixed anxiety and depressed mood  -     DULoxetine (CYMBALTA) 30 mg delayed release capsule; Take 1 capsule (30 mg total) by mouth daily at bedtime    PTSD (post-traumatic stress disorder)    Insomnia, unspecified type    Myofascial pain  -     DULoxetine (CYMBALTA) 60 mg delayed release capsule; Take 1 capsule (60 mg total) by mouth daily    Neuromuscular disorder (HCC)  -     DULoxetine (CYMBALTA) 60 mg delayed release capsule; Take 1 capsule (60 mg total) by mouth daily        Follow up in 6 months with Shelby Alvarenga    Treatment Recommendations- Risks Benefits      Immediate Medical/Psychiatric/Psychotherapy Treatments and Any Precautions:she has a new dx of connective tissue disease  This is validating to her in that she knew something was wrong, but it also makes her anticipation of pain more prominent  No medication changes  Stable       Risks, Benefits And Possible Side Effects Of Medications:  PSYCH RISK, BENEFITS AND POSSIBLE SIDE EFFECTS discussed    Controlled Medication Discussion: Discussed with patient Black Box warning on concurrent use of benzodiazepines and opioid medications including sedation, respiratory depression, coma and death  Patient understands the risk of treatment with benzodiazepines in addition to opioids and wants to continue taking those medications  , Discussed with patient the risks of sedation, respiratory depression, impairment of ability to drive and potential for abuse and addiction related to treatment with benzodiazepine medications  The patient understands risk of treatment with benzodiazepine medications, agrees to not drive if feels impaired and agrees to take medications as prescribed  and The patient has been filling controlled prescriptions on time as prescribed to [facility]       Therapy provided: anxiety about her new diagnosis  Her nieces wedding  Time: 20 mins    This note was not shared with the patient due to reasonable likelihood of causing patient harm              I spent 25 minutes with patient today in which greater than 50% of the time was spent in counseling/coordination of care regarding treatment    20 [facility] verbally agrees to participate in [employer]  Pt is aware that [employer] could be limited without vital signs or the ability to perform a full hands-on physical exam  [place] understands she or the provider may request at any time to terminate the video visit and request the patient to seek care or treatment in person

## 2022-03-29 NOTE — BH TREATMENT PLAN
TREATMENT PLAN (Medication Management Only)        [facility]    Name and Date of Birth:  Gabbi Marshall 59 y o  1960  Date of Treatment Plan: March 29, 2022  Diagnosis/Diagnoses:    1  Adjustment disorder with mixed anxiety and depressed mood    2  PTSD (post-traumatic stress disorder)    3  Insomnia, unspecified type    4  Myofascial pain    5  Neuromuscular disorder [facility])      Strengths/Personal Resources for Self-Care: "I am creative  I am very caring person  I am dependable  "   Area/Areas of need (in own words): "more good days with the pain  "  1  Long Term Goal: i want to be able to dance and enjoy myself at my nieces wedding  the wedding is June 22     Target Date:6 months - 9/29/2022  Person/Persons responsible for completion of goal: Dr Haseeb Espinoza  2  Short Term Objective (s) - How will we reach this goal?:   A  Provider new recommended medication/dosage changes and/or continue medication(s): continue all other medications  B  N/A   C  N/A  Target Date:6 months - 9/29/2022  Person/Persons Responsible for Completion of Goal: Dr Haseeb Espinoza  Progress Towards Goals: continuing treatment  Treatment Modality: medication management every 6 months  Review due 180 days from date of this plan: 6 months - 9/29/2022  Expected length of service: ongoing treatment  My Physician/PA/NP and I have developed this plan together and I agree to work on the goals and objectives  I understand the treatment goals that were developed for my treatment    Treatment Plan done but not signed at time of office visit due to:  Plan reviewed by phone or in person  and verbal consent given due to Bonita social rhoda

## 2022-03-30 ENCOUNTER — OFFICE VISIT (OUTPATIENT)
Dept: PHYSICAL THERAPY | Facility: REHABILITATION | Age: 62
End: 2022-03-30
Payer: MEDICARE

## 2022-03-30 DIAGNOSIS — R29.898 LEG WEAKNESS, BILATERAL: ICD-10-CM
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
## 2022-03-30 DIAGNOSIS — R26.89 IMBALANCE: ICD-10-CM

## 2022-03-30 PROCEDURE — 97110 THERAPEUTIC EXERCISES: CPT | Performed by: PHYSICAL THERAPIST

## 2022-03-30 PROCEDURE — 97112 NEUROMUSCULAR REEDUCATION: CPT | Performed by: PHYSICAL THERAPIST

## 2022-03-30 NOTE — PROGRESS NOTES
Treatment Note     Today's date: 3/30/2022  Patient name: Eran Gonsalez  : 1960  MRN: [id number]  Referring provider: Chang Power DO  Dx:   Encounter Diagnosis     ICD-10-CM    1  Fall, subsequent encounter  W19  XXXD    2  Imbalance  R26 89    3  Leg weakness, bilateral  R29 898         Subjective:   Did laundry yesterday and did better than expected  Wearing dance shoes as it has a higher arch  Limited sometimes by pain in the side and trunk, feels like cramping and lasting a couple minutes  Objective: See treatment diary below    Patient goals: walking at a normal pace without cane (not using prior to house), grocery shop without significant pain when completed, dancing at niece's wedding in 2022  - To move without pain and to not get tired as frequently (added 10/20/21)    Patient-Specific Functional Scale   Task is scored 0 (unable to perform activity) to 10 (ability to perform activity independently)  Activity 8/25/21 9/29/21 10/20/21 11/17/21 2/09/22   1  Walk at normal pace without a cane 5/10  0/10 3/10 3/10 due to speed or knee bending -8/10 initially, decreases with continued walking   2  Grocery shop without significant pain 8/10 0/10 4/10 7-8/10 7/10   3  Dance 5/10  1/10 2/10 0/10   4  Move without pain and to not get tired as frequently     7/10 5-6/10     Objective: See treatment diary below    Precautions - Hashimoto's, autoimmune disease    Mobility Measures 2/09/22 3/02/22   Assistive device used? 5 Time Sit to Stand  (17" chair, arms across chest)  Able to get up from 18" surface without use of hands   3 Meter Timed  Up & Go     Walking speed 0 60 -0 74 m/s    Functional Gait Assessment (see below)     6 Minute Walk Test (100 foot circular course)    700 feet, stopped at 1 min 30 sec left due to hip and lower extremity tightness     Patient-Reported Outcome Measure:  Activities-Specific Balance Confidence Scale (ABC Scale)     Right knee flexion PROM, sitting  100 degrees     Walks with walking boot on right ankle, decreased knee flexion excursion about the right knee, decreased speed, without assistive device  14 degrees active dorsiflexion with knee fully extended  16 degrees passively    Resists maximal pressure into right plantarflexion without pain    Without walking boot, increased right step length versus left, walking speed above Ankle range of motion within normal limits  Even step lengths, mild decrease in knee excursion  Treadmill walking, 2 hands, 1 2 mph, 5 min    Supine hamstring stretch 1 min each side x 2 sets each  hooklying hip ER stretch, 1 min each  SLR, 4 lbs, 10 each  sidelying hip abduction, 4 lbs, 10 each    Standing gastroc stretch, 1 min each    Sidestepping in parallel bars while batting balloon with racquet, minimal stepping responses, 3 min    Step ups 6" step, about 45 sec each   Same laterally    In Solo:   Step over 2" obstacles, then with catching ball, 2 min  Same laterally, 2 min    ASSESSMENT:   Again are moving better, good improvement in walking speed and tolerance to standing and moving dynamic balance activities  SHORT-TERM GOALS: 1 month(s)  1  Grayson Michelle walks at least 10 minutes consecutively  MET PREVIOUSLY, ABLE TO GROCERY SHOP, CONTINUES FOR CONSTANT WALKING     3  Grayson Michelle reports 50% improvement in ability to walk grocery store distances without severe fatigue  PROGRESSING  4  Demonstrates at least 11 degrees passive right dorsiflexion bi 2/12/22  MET  New goal: When released by physician, tolerates at least 6 minutes consecutive overground walking without walking boot  PROGRESSING  New goal: When released by physician, tolerates at least 10 bilateral heel raises  MET  New goal: When released by physician, walks with step length without one inch of contralateral leg  MET  LONG-TERM GOALS: by 3/12/22  1  Patient reports at least 20 total minutes per day of overground walking for exercise    NOT MET   2  Patient independently manages home exercise program   MET with need for revisions  Dr Reginald Stewart, DPM, fax [phone]:  Patient will benefit from physical therapy 1 time per week for 1 month  Neuromuscular re-education, therapeutic exercises, and therapeutic activities as outlined in grids

## 2022-03-30 NOTE — TELEPHONE ENCOUNTER
I spoke with Ms  Bartolo Kelley and answered her questions  She will also discuss with the doctor working her up for this imbalance  She will call when she wants the injections

## 2022-04-06 ENCOUNTER — OFFICE VISIT (OUTPATIENT)
Dept: PHYSICAL THERAPY | Facility: REHABILITATION | Age: 62
End: 2022-04-06
Payer: MEDICARE
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
## 2022-04-06 DIAGNOSIS — R26.89 IMBALANCE: ICD-10-CM

## 2022-04-06 DIAGNOSIS — R29.898 LEG WEAKNESS, BILATERAL: ICD-10-CM

## 2022-04-06 PROCEDURE — 97112 NEUROMUSCULAR REEDUCATION: CPT | Performed by: PHYSICAL THERAPIST

## 2022-04-06 PROCEDURE — 97110 THERAPEUTIC EXERCISES: CPT | Performed by: PHYSICAL THERAPIST

## 2022-04-06 NOTE — PROGRESS NOTES
RE-EVALUATION / Treatment Note     Today's date: 2022  Patient name: Esther Swanson  : 1960  MRN: [id number]  Referring provider: Carol Thurman DO  Dx:   Encounter Diagnosis     ICD-10-CM    1  Fall, subsequent encounter  W19  XXXD    2  Imbalance  R26 89    3  Leg weakness, bilateral  R29 898         Subjective: The right knee feels stiff due to weather         Objective: See treatment diary below    Patient goals: walking at a normal pace without cane (not using prior to house), grocery shop without significant pain when completed, dancing at [employer] wedding in 2022  - To move without pain and to not get tired as frequently (added 10/20/21)    Patient-Specific Functional Scale   Task is scored 0 (unable to perform activity) to 10 (ability to perform activity independently)  Activity 8/25/21 9/29/21 10/20/21 11/17/21 2/09/22 4/06/22   1  Walk at normal pace without a cane 5/10  010 3/10 3/10 due to speed or knee bending -8/10 initially, decreases with continued walking 6/10   2  Grocery shop without significant pain 8/10 0/10 4/10 7-8/10 7/10 7-8/10   3  Dance 5/10  1/10 2/10 010 410   4  Move without pain and to not get tired as frequently     7/10 5-6/10 7/10     Objective: See treatment diary below    Precautions - Hashimoto's, autoimmune disease    Mobility Measures 2/09/22 3/02/22 4/06/22   Assistive device used? 5 Time Sit to Stand  (17" chair, arms across chest)  Able to get up from 18" surface without use of hands    3 Meter Timed  Up & Go      Walking speed 0 60 -0 74 m/s     Functional Gait Assessment (see below)      6 Minute Walk Test (100 foot circular course)    700 feet, stopped at 1 min 30 sec left due to hip and lower extremity tightness   875 feet   Patient-Reported Outcome Measure:  Activities-Specific Balance Confidence Scale (ABC Scale)      Right knee flexion PROM, sitting  100 degrees  90 degrees    Walks with walking boot on right ankle, decreased knee flexion excursion about the right knee, decreased speed, without assistive device  14 degrees active dorsiflexion with knee fully extended  16 degrees passively    Resists maximal pressure into right plantarflexion without pain    Without walking boot, increased right step length versus left, walking speed above Ankle range of motion within normal limits  Even step lengths, mild decrease in knee excursion  Ankle ROM within functional limits    Able to do 20 bilateral heel raises with even weight bearing    3/5 right plantarflexion, 2+/5 left     Functional Gait Assessment  2/3 Gait level surface  2/3 Change in gait speed  2/3 Gait with horizontal head turns  3/3 Gait with vertical head turns  3/3 Gait and pivot turn  1/3 Step over obstacle  0/3 Gait with narrow base of support  2/3 Gait with eyes closed  2/3 Ambulating backwards  1/3 Steps  18/30 Total score (less than 22/30 indicates increased risk of fall)       6 minute walk test  See above     Step ups 6" step, 2 min  Heel raises bilaterally 1 min    Sidestepping with balloon in air, 1 min x 3 sets  Braiding walking 1 min x 2 sets  Same faster pace, 1 min    SciFit, level 1, 17 min, 1923 steps      ASSESSMENT:   We are moving better, faster and for greater duration than a month ago, less limited by knee and ankle pain  We are returning to some higher level dynamic movements  Continues with functional limitation in dynamic balance  Recommend continued physical therapy to improve mobility  SHORT-TERM GOALS: 1 month(s)  1  Kevin Limon walks at least 10 minutes consecutively  MET PREVIOUSLY, ABLE TO GROCERY SHOP, CONTINUES FOR CONSTANT WALKING     3  Kevin Limon reports 50% improvement in ability to walk grocery store distances without severe fatigue  MET FOR FATIGUE, CONTINUED for PAIN     4  Demonstrates at least 11 degrees passive right dorsiflexion bi 2/12/22  MET      New goal: When released by physician, tolerates at least 6 minutes consecutive overground walking without walking boot  MET  New goal: When released by physician, tolerates at least 10 bilateral heel raises  MET  New goal: When released by physician, walks with step length without one inch of contralateral leg  MET  LONG-TERM GOALS: by 3/12/22  1  Patient reports at least 20 total minutes per day of overground walking for exercise  NOT MET  2  Patient independently manages home exercise program   MET with need for revisions  Dr Bonnie Mujica, DPM, fax [phone]:  Patient will benefit from physical therapy 1 time per week for 1 month  Neuromuscular re-education, therapeutic exercises, and therapeutic activities as outlined in grids

## 2022-04-13 ENCOUNTER — OFFICE VISIT (OUTPATIENT)
Dept: PHYSICAL THERAPY | Facility: REHABILITATION | Age: 62
End: 2022-04-13
Payer: MEDICARE

## 2022-04-13 DIAGNOSIS — R26.89 IMBALANCE: ICD-10-CM

## 2022-04-13 DIAGNOSIS — R29.898 LEG WEAKNESS, BILATERAL: ICD-10-CM
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
## 2022-04-13 PROCEDURE — 97112 NEUROMUSCULAR REEDUCATION: CPT | Performed by: PHYSICAL THERAPIST

## 2022-04-13 PROCEDURE — 97110 THERAPEUTIC EXERCISES: CPT | Performed by: PHYSICAL THERAPIST

## 2022-04-13 NOTE — PROGRESS NOTES
Treatment Note     Today's date: 2022  Patient name: Adin Sewell  : 1960  MRN: [id number]  Referring provider: Nicolas Deras DO  Dx:   Encounter Diagnosis     ICD-10-CM    1  Fall, subsequent encounter  W19  XXXD    2  Imbalance  R26 89    3  Leg weakness, bilateral  R29 898         Subjective: The right knee feels better, some pain in the left knee  Objective: See treatment diary below    Patient goals: walking at a normal pace without cane (not using prior to house), grocery shop without significant pain when completed, dancing at [employer]'s wedding in 2022  - To move without pain and to not get tired as frequently (added 10/20/21)    Patient-Specific Functional Scale   Task is scored 0 (unable to perform activity) to 10 (ability to perform activity independently)  Activity 8/25/21 9/29/21 10/20/21 11/17/21 2/09/22 4/06/22   1  Walk at normal pace without a cane 5/10  0/10 3/10 3/10 due to speed or knee bending -8/10 initially, decreases with continued walking 6/10   2  Grocery shop without significant pain 8/10 0/10 4/10 7-8/10 7/10 7-8/10   3  Dance 5/10  1/10 2/10 0/10 4/10   4  Move without pain and to not get tired as frequently     7/10 5-6/10 7/10     Objective: See treatment diary below    Precautions - Hashimoto's, autoimmune disease    Mobility Measures 2/09/22 3/02/22 4/06/22   Assistive device used? 5 Time Sit to Stand  (17" chair, arms across chest)  Able to get up from 18" surface without use of hands    3 Meter Timed  Up & Go      Walking speed 0 60 -0 74 m/s     Functional Gait Assessment (see below)      6 Minute Walk Test (100 foot circular course)    700 feet, stopped at 1 min 30 sec left due to hip and lower extremity tightness   875 feet   Patient-Reported Outcome Measure:  Activities-Specific Balance Confidence Scale (ABC Scale)      Right knee flexion PROM, sitting  100 degrees  90 degrees    Walks with walking boot on right ankle, decreased knee flexion excursion about the right knee, decreased speed, without assistive device  14 degrees active dorsiflexion with knee fully extended  16 degrees passively    Resists maximal pressure into right plantarflexion without pain    Without walking boot, increased right step length versus left, walking speed above Ankle range of motion within normal limits  Even step lengths, mild decrease in knee excursion  Ankle ROM within functional limits    Able to do 20 bilateral heel raises with even weight bearing    3/5 right plantarflexion, 2+/5 left     Functional Gait Assessment  2/3 Gait level surface  2/3 Change in gait speed  2/3 Gait with horizontal head turns  3/3 Gait with vertical head turns  3/3 Gait and pivot turn  1/3 Step over obstacle  0/3 Gait with narrow base of support  2/3 Gait with eyes closed  2/3 Ambulating backwards  1/3 Steps  18/30 Total score (less than 22/30 indicates increased risk of fall)       6 minute walk, 35-40 sec/lap    Heel raise, modified to incorporate pushing from contralateral leg due to inability to complete as single leg, 30 sec x 2 sets each  Standing gastroc stretch, 1 min each    Step ups 6" step, 1 min each    Braiding step - 4 step - to metranome with 1 crossover step, about 45-60 sec x 5-6 sets with tempo increasing from 80 steps/min to 122 steps/min  Turn 180 degrees in 4 steps to metranome, 80 spm down to 70 spm, 2 min    Sidestepping with balloon in air, 3 min, reactive balance changing directions quickly laterally    SciFit, level 1 9, 15 min, 105 steps/min      ASSESSMENT:   Better dynamic balance with exercises in the parallel bars  Continue to increase tempo with these to challenge dynamic balance and ability to move legs quickly  Continue to incorporate some dance and continue strengthening and stretching at home  SHORT-TERM GOALS: 1 month(s)  1  Shaylaan Alter walks at least 10 minutes consecutively   MET PREVIOUSLY, ABLE TO GROCERY SHOP, CONTINUES FOR CONSTANT WALKING     3  Curtis Ceja reports 50% improvement in ability to walk grocery store distances without severe fatigue  MET FOR FATIGUE, CONTINUED for PAIN     4  Demonstrates at least 11 degrees passive right dorsiflexion bi 2/12/22  MET  New goal: When released by physician, tolerates at least 6 minutes consecutive overground walking without walking boot  MET  New goal: When released by physician, tolerates at least 10 bilateral heel raises  MET  New goal: When released by physician, walks with step length without one inch of contralateral leg  MET  LONG-TERM GOALS: by 3/12/22  1  Patient reports at least 20 total minutes per day of overground walking for exercise  NOT MET  2  Patient independently manages home exercise program   MET with need for revisions  Dr Juan R Guzman, DPM, fax [phone]:  Patient will benefit from physical therapy 1 time per week for 1 month  Neuromuscular re-education, therapeutic exercises, and therapeutic activities as outlined in grids

## 2022-04-15 ENCOUNTER — TELEPHONE (OUTPATIENT)
Dept: OBGYN CLINIC | Facility: CLINIC | Age: 62
End: 2022-04-15

## 2022-04-15 DIAGNOSIS — M17.0 ARTHRITIS OF BOTH KNEES: Primary | ICD-10-CM

## 2022-04-19 ENCOUNTER — APPOINTMENT (OUTPATIENT)
Dept: PHYSICAL THERAPY | Facility: REHABILITATION | Age: 62
End: 2022-04-19
Payer: MEDICARE

## 2022-04-20 ENCOUNTER — OFFICE VISIT (OUTPATIENT)
Dept: PHYSICAL THERAPY | Facility: REHABILITATION | Age: 62
End: 2022-04-20
Payer: MEDICARE
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
## 2022-04-20 DIAGNOSIS — R26.89 IMBALANCE: ICD-10-CM

## 2022-04-20 DIAGNOSIS — R29.898 LEG WEAKNESS, BILATERAL: ICD-10-CM

## 2022-04-20 PROCEDURE — 97110 THERAPEUTIC EXERCISES: CPT | Performed by: PHYSICAL THERAPIST

## 2022-04-20 PROCEDURE — 97112 NEUROMUSCULAR REEDUCATION: CPT | Performed by: PHYSICAL THERAPIST

## 2022-04-20 NOTE — PROGRESS NOTES
Treatment Note     Today's date: 2022  Patient name: Tonia Vega  : 1960  MRN: [id number]  Referring provider: Juliet Kaufman DO  Dx:   Encounter Diagnosis     ICD-10-CM    1  Fall, subsequent encounter  W19  XXXD    2  Imbalance  R26 89    3  Leg weakness, bilateral  R29 898    Total time in clinic (min): 50 minutes    Subjective:   R hand has been achey in middle of hand and fingers  Moving okay, had to do stairs this weekend which went fine performing in a non reciprocal pattern  Both knees have been bothering, relating it to weather changes  Objective: See treatment diary below    Patient goals: walking at a normal pace without cane (not using prior to house), grocery shop without significant pain when completed, dancing at [employer] wedding in 2022  - To move without pain and to not get tired as frequently (added 10/20/21)    Patient-Specific Functional Scale   Task is scored 0 (unable to perform activity) to 10 (ability to perform activity independently)  Activity 8/25/21 9/29/21 10/20/21 11/17/21 2/09/22 4/06/22   1  Walk at normal pace without a cane 5/10  0/10 3/10 3/10 due to speed or knee bending 7-8/10 initially, decreases with continued walking /10   2  Grocery shop without significant pain 8/10 0/10 4/10 7-8/10 7/10 7-8/10   3  Dance /10  1/10 2/10 0/10 4/10   4  Move without pain and to not get tired as frequently     7/10 5-6/10 710     Objective: See treatment diary below    Precautions - Hashimoto's, autoimmune disease    Mobility Measures 2/09/22 3/02/22 4/06/22   Assistive device used?       5 Time Sit to Stand  (17" chair, arms across chest)  Able to get up from 18" surface without use of hands    3 Meter Timed  Up & Go      Walking speed 0 60 -0 74 m/s     Functional Gait Assessment (see below)      6 Minute Walk Test (100 foot circular course)    700 feet, stopped at 1 min 30 sec left due to hip and lower extremity tightness   875 feet Patient-Reported Outcome Measure: Activities-Specific Balance Confidence Scale (ABC Scale)      Right knee flexion PROM, sitting  100 degrees  90 degrees    Walks with walking boot on right ankle, decreased knee flexion excursion about the right knee, decreased speed, without assistive device  14 degrees active dorsiflexion with knee fully extended  16 degrees passively    Resists maximal pressure into right plantarflexion without pain    Without walking boot, increased right step length versus left, walking speed above Ankle range of motion within normal limits  Even step lengths, mild decrease in knee excursion  Ankle ROM within functional limits    Able to do 20 bilateral heel raises with even weight bearing    3/5 right plantarflexion, 2+/5 left     Functional Gait Assessment  2/3 Gait level surface  2/3 Change in gait speed  2/3 Gait with horizontal head turns  3/3 Gait with vertical head turns  3/3 Gait and pivot turn  1/3 Step over obstacle  0/3 Gait with narrow base of support  2/3 Gait with eyes closed  2/3 Ambulating backwards  1/3 Steps  18/30 Total score (less than 22/30 indicates increased risk of fall)       400 ft walking warm up    Heel raise:  About 30 sec each leg, modified to incorporate pushing from contralateral leg due to inability to complete as single leg    Standing gastroc stretch, about 30-45 sec each leg    Step ups 6" step  Fwd about 45-60 sec each leg  Lateral about 45-60 sec each leg    Braiding step - 4 step - to metranome with 1 crossover step fwd or behind  About 1 min about x2 sets to metronome 80-95 steps per minute  Same to beat of music (90-100spm) for about 1 min x2 sets  Same to beat of music (100-110 bpm) about 1 min  Same to beat of music (110-120spm) about 30 sec    Sidestepping with balloon in air, batting balloon back and forth  1 min  Sidestepping changing directions quickly, batting balloon back and forth  1 min x2 sets    SciFit, L 1 6-2 0 for 7 min 30 sec    ASSESSMENT:   Alyssa Marques demonstrated good dynamic and reactive balance while quickly changing directions within the parallel bars  Alyssa Marques had some soreness in bilat knees, that responds well to stretching  Alyssa Marques did well translating quick stepping with direction changes from a metronome beat to a similar beat of music  Continue to incorporate some quick step dance moves while increasing tempo in addition to strengthening/stretching  SHORT-TERM GOALS: 1 month(s)  1  Alyssa Marques walks at least 10 minutes consecutively  MET PREVIOUSLY, ABLE TO GROCERY SHOP, CONTINUES FOR CONSTANT WALKING     3  Alyssa Marques reports 50% improvement in ability to walk grocery store distances without severe fatigue  MET FOR FATIGUE, CONTINUED for PAIN     4  Demonstrates at least 11 degrees passive right dorsiflexion bi 2/12/22  MET  New goal: When released by physician, tolerates at least 6 minutes consecutive overground walking without walking boot  MET  New goal: When released by physician, tolerates at least 10 bilateral heel raises  MET  New goal: When released by physician, walks with step length without one inch of contralateral leg  MET  LONG-TERM GOALS: by 3/12/22  1  Patient reports at least 20 total minutes per day of overground walking for exercise  NOT MET  2  Patient independently manages home exercise program   MET with need for revisions  Dr Jennyfer Foy, DPM, fax [phone]:  Patient will benefit from physical therapy 1 time per week for 1 month  Neuromuscular re-education, therapeutic exercises, and therapeutic activities as outlined in grids

## 2022-04-22 ENCOUNTER — APPOINTMENT (OUTPATIENT)
Dept: PHYSICAL THERAPY | Facility: REHABILITATION | Age: 62
End: 2022-04-22
Payer: MEDICARE

## 2022-04-22 NOTE — PROGRESS NOTES
Treatment Note     Today's date: 2022  Patient name: Hattie Gallegos  : 1960  MRN: [id number]  Referring provider: Willem Wyman DO  Dx:   No diagnosis found  Subjective:   R hand has been achey in middle of hand and fingers  Moving okay, had to do stairs this weekend which went fine performing in a non reciprocal pattern  Both knees have been bothering, relating it to weather changes  Objective: See treatment diary below    Patient goals: walking at a normal pace without cane (not using prior to house), grocery shop without significant pain when completed, dancing at [employer] wedding in 2022  - To move without pain and to not get tired as frequently (added 10/20/21)    Patient-Specific Functional Scale   Task is scored 0 (unable to perform activity) to 10 (ability to perform activity independently)  Activity 8/25/21 9/29/21 10/20/21 11/17/21 2/09/22 4/06/22   1  Walk at normal pace without a cane 5/10  0/10 3/10 3/10 due to speed or knee bending -8/10 initially, decreases with continued walking 6/10   2  Grocery shop without significant pain 8/10 0/10 4/10 7-8/10 7/10 7-8/10   3  Dance 5/10  1/10 2/10 0/10 4/10   4  Move without pain and to not get tired as frequently     7/10 5-6/10 710     Objective: See treatment diary below    Precautions - Hashimoto's, autoimmune disease    Mobility Measures 2/09/22 3/02/22 4/06/22   Assistive device used? 5 Time Sit to Stand  (17" chair, arms across chest)  Able to get up from 18" surface without use of hands    3 Meter Timed  Up & Go      Walking speed 0 60 -0 74 m/s     Functional Gait Assessment (see below)      6 Minute Walk Test (100 foot circular course)    700 feet, stopped at 1 min 30 sec left due to hip and lower extremity tightness   875 feet   Patient-Reported Outcome Measure:  Activities-Specific Balance Confidence Scale (ABC Scale)      Right knee flexion PROM, sitting  100 degrees  90 degrees    Walks with walking boot on right ankle, decreased knee flexion excursion about the right knee, decreased speed, without assistive device  14 degrees active dorsiflexion with knee fully extended  16 degrees passively    Resists maximal pressure into right plantarflexion without pain    Without walking boot, increased right step length versus left, walking speed above Ankle range of motion within normal limits  Even step lengths, mild decrease in knee excursion  Ankle ROM within functional limits    Able to do 20 bilateral heel raises with even weight bearing    3/5 right plantarflexion, 2+/5 left     Functional Gait Assessment  2/3 Gait level surface  2/3 Change in gait speed  2/3 Gait with horizontal head turns  3/3 Gait with vertical head turns  3/3 Gait and pivot turn  1/3 Step over obstacle  0/3 Gait with narrow base of support  2/3 Gait with eyes closed  2/3 Ambulating backwards  1/3 Steps  18/30 Total score (less than 22/30 indicates increased risk of fall)       400 ft walking warm up    Heel raise:  About 30 sec each leg, modified to incorporate pushing from contralateral leg due to inability to complete as single leg    Standing gastroc stretch, about 30-45 sec each leg    Step ups 6" step  Fwd about 45-60 sec each leg  Lateral about 45-60 sec each leg    Braiding step - 4 step - to metranome with 1 crossover step fwd or behind  About 1 min about x2 sets to metronome 80-95 steps per minute  Same to beat of music (90-100spm) for about 1 min x2 sets  Same to beat of music (100-110 bpm) about 1 min  Same to beat of music (110-120spm) about 30 sec    Sidestepping with balloon in air, batting balloon back and forth  1 min  Sidestepping changing directions quickly, batting balloon back and forth  1 min x2 sets    SciFit, L 1 6-2 0 for 7 min 30 sec    ASSESSMENT:   Lazarus Barges demonstrated good dynamic and reactive balance while quickly changing directions within the parallel bars   Lazarus Barges had some soreness in bilat knees, that responds well to stretching  Judie Virk did well translating quick stepping with direction changes from a metronome beat to a similar beat of music  Continue to incorporate some quick step dance moves while increasing tempo in addition to strengthening/stretching  SHORT-TERM GOALS: 1 month(s)  1  Judie Virk walks at least 10 minutes consecutively  MET PREVIOUSLY, ABLE TO GROCERY SHOP, CONTINUES FOR CONSTANT WALKING     3  Judie Virk reports 50% improvement in ability to walk grocery store distances without severe fatigue  MET FOR FATIGUE, CONTINUED for PAIN     4  Demonstrates at least 11 degrees passive right dorsiflexion bi 2/12/22  MET  New goal: When released by physician, tolerates at least 6 minutes consecutive overground walking without walking boot  MET  New goal: When released by physician, tolerates at least 10 bilateral heel raises  MET  New goal: When released by physician, walks with step length without one inch of contralateral leg  MET  LONG-TERM GOALS: by 3/12/22  1  Patient reports at least 20 total minutes per day of overground walking for exercise  NOT MET  2  Patient independently manages home exercise program   MET with need for revisions  Dr Aranza Bolden, DPM, fax [phone]:  Patient will benefit from physical therapy 1 time per week for 1 month  Neuromuscular re-education, therapeutic exercises, and therapeutic activities as outlined in grids

## 2022-04-27 ENCOUNTER — OFFICE VISIT (OUTPATIENT)
Dept: PHYSICAL THERAPY | Facility: REHABILITATION | Age: 62
End: 2022-04-27
Payer: MEDICARE
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
## 2022-04-27 DIAGNOSIS — R26.89 IMBALANCE: ICD-10-CM

## 2022-04-27 DIAGNOSIS — R29.898 LEG WEAKNESS, BILATERAL: ICD-10-CM

## 2022-04-27 PROCEDURE — 97112 NEUROMUSCULAR REEDUCATION: CPT | Performed by: PHYSICAL THERAPIST

## 2022-04-27 PROCEDURE — 97530 THERAPEUTIC ACTIVITIES: CPT | Performed by: PHYSICAL THERAPIST

## 2022-04-27 PROCEDURE — 97110 THERAPEUTIC EXERCISES: CPT | Performed by: PHYSICAL THERAPIST

## 2022-04-27 NOTE — PROGRESS NOTES
Treatment Note     Today's date: 2022  Patient name: Aristides Lanier  : 1960  MRN: [id number]  Referring provider: Sophia Ren DO  Dx:   Encounter Diagnosis     ICD-10-CM    1  Fall, subsequent encounter  W19  XXXD    2  Imbalance  R26 89    3  Leg weakness, bilateral  R29 898    Total time in clinic (min): 50 minutes    Subjective:   Still feeling achey sensation in R palmar aspect of hand, with some neck pain noted  Has tried doing a little dance since last session, went okay  Wants to do some increased stretching for the LEs as she has been feeling more tight recently  Also starting injections for knees at the end of May  Objective: See treatment diary below    Patient goals: walking at a normal pace without cane (not using prior to house), grocery shop without significant pain when completed, dancing at niece's wedding in 2022  - To move without pain and to not get tired as frequently (added 10/20/21)    Patient-Specific Functional Scale   Task is scored 0 (unable to perform activity) to 10 (ability to perform activity independently)  Activity 8/25/21 9/29/21 10/20/21 11/17/21 2/09/22 4/06/22   1  Walk at normal pace without a cane 5/10  0/10 3/10 3/10 due to speed or knee bending 7-8/10 initially, decreases with continued walking /10   2  Grocery shop without significant pain 8/10 0/10 4/10 7-8/10 7/10 7-8/10   3  Dance 5/10  1/10 2/10 0/10 4/10   4  Move without pain and to not get tired as frequently     10 5-6/10 7/10     Objective: See treatment diary below    Precautions - Hashimoto's, autoimmune disease    Mobility Measures 2/09/22 3/02/22 4/06/22   Assistive device used?       5 Time Sit to Stand  (17" chair, arms across chest)  Able to get up from 18" surface without use of hands    3 Meter Timed  Up & Go      Walking speed 0 60 -0 74 m/s     Functional Gait Assessment (see below)      6 Minute Walk Test (100 foot circular course)    700 feet, stopped at 1 min 30 sec left due to hip and lower extremity tightness   875 feet   Patient-Reported Outcome Measure: Activities-Specific Balance Confidence Scale (ABC Scale)      Right knee flexion PROM, sitting  100 degrees  90 degrees    Walks with walking boot on right ankle, decreased knee flexion excursion about the right knee, decreased speed, without assistive device  14 degrees active dorsiflexion with knee fully extended  16 degrees passively    Resists maximal pressure into right plantarflexion without pain    Without walking boot, increased right step length versus left, walking speed above Ankle range of motion within normal limits  Even step lengths, mild decrease in knee excursion  Ankle ROM within functional limits    Able to do 20 bilateral heel raises with even weight bearing    3/5 right plantarflexion, 2+/5 left     Functional Gait Assessment  2/3 Gait level surface  2/3 Change in gait speed  2/3 Gait with horizontal head turns  3/3 Gait with vertical head turns  3/3 Gait and pivot turn  1/3 Step over obstacle  0/3 Gait with narrow base of support  2/3 Gait with eyes closed  2/3 Ambulating backwards  1/3 Steps  18/30 Total score (less than 22/30 indicates increased risk of fall)       400 ft walking warm up    Bilat heel raise, x1 min  Unilateral heel raise R x10 sec, L x40 sec    Step ups  Fwd alternating R/L leading, 1 min  x1 min on 6" step  x1 min on 8" step  Lateral alternating R/L on 6" step, 1min x2 sets    Standing gastroc stretch  x1 each side, hold for 30 sec    Braiding step - 4-5 steps - to metranome with 1-2 crossover step fwd or behind  About 1 min to beat of music (90-100spm), x2 sets  About 1 min to beat of music (110-120 spm), x2 sets    Sidestepping in parallel bars, tossing ball back and forth  x1min  Sidestepping with 360 degree turns, tossing ball back and forth  x1min    Manual HS stretch with DF, hold about 30 sec, x2 sets ea side    SciFit, L 1 6-2 0, 12 min    ASSESSMENT:   Johnny delgado well while continuing to further challenge dynamic balance  Jamal Necessary did well completing random turns while sidestepping, but noted some infrequent knee pain  In addition, Jamal Necessary is continuing to maintain or slightly increase tempo during quick stepping movements with no imbalance noted  Some manual stretching was performed today as Jamal Necessary noted she has been feeling more tight than usual, but demonstrates good tissue extensibility in bilat HS  Continue to incorporate LE strengthening/stretching in addition to quick step movements to a tempo  SHORT-TERM GOALS: 1 month(s)  1  Jamal Necessary walks at least 10 minutes consecutively  MET PREVIOUSLY, ABLE TO GROCERY SHOP, CONTINUES FOR CONSTANT WALKING     3  Jamal Necessary reports 50% improvement in ability to walk grocery store distances without severe fatigue  MET FOR FATIGUE, CONTINUED for PAIN     4  Demonstrates at least 11 degrees passive right dorsiflexion bi 2/12/22  MET  New goal: When released by physician, tolerates at least 6 minutes consecutive overground walking without walking boot  MET  New goal: When released by physician, tolerates at least 10 bilateral heel raises  MET  New goal: When released by physician, walks with step length without one inch of contralateral leg  MET  LONG-TERM GOALS: by 3/12/22  1  Patient reports at least 20 total minutes per day of overground walking for exercise  NOT MET  2  Patient independently manages home exercise program   MET with need for revisions  Dr Gant , DPM, fax [phone]:  Patient will benefit from physical therapy 1 time per week for 1 month  Neuromuscular re-education, therapeutic exercises, and therapeutic activities as outlined in grids

## 2022-05-04 ENCOUNTER — OFFICE VISIT (OUTPATIENT)
Dept: PHYSICAL THERAPY | Facility: REHABILITATION | Age: 62
End: 2022-05-04
Payer: MEDICARE

## 2022-05-04 ENCOUNTER — OFFICE VISIT (OUTPATIENT)
Dept: GASTROENTEROLOGY | Facility: MEDICAL CENTER | Age: 62
End: 2022-05-04
Payer: MEDICARE

## 2022-05-04 VITALS
WEIGHT: 224.6 LBS | BODY MASS INDEX: 36.25 KG/M2 | TEMPERATURE: 99.3 F | HEART RATE: 96 BPM | SYSTOLIC BLOOD PRESSURE: 128 MMHG | DIASTOLIC BLOOD PRESSURE: 85 MMHG

## 2022-05-04 DIAGNOSIS — R29.898 LEG WEAKNESS, BILATERAL: ICD-10-CM

## 2022-05-04 DIAGNOSIS — A04.8 H. PYLORI INFECTION: ICD-10-CM

## 2022-05-04 DIAGNOSIS — K31.84 GASTROPARESIS: ICD-10-CM
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
## 2022-05-04 DIAGNOSIS — R19.7 DIARRHEA, UNSPECIFIED TYPE: ICD-10-CM

## 2022-05-04 DIAGNOSIS — K58.0 IRRITABLE BOWEL SYNDROME WITH DIARRHEA: Primary | ICD-10-CM

## 2022-05-04 DIAGNOSIS — R68.81 EARLY SATIETY: ICD-10-CM

## 2022-05-04 DIAGNOSIS — R26.89 IMBALANCE: ICD-10-CM

## 2022-05-04 DIAGNOSIS — R10.9 ABDOMINAL PAIN, UNSPECIFIED ABDOMINAL LOCATION: ICD-10-CM

## 2022-05-04 PROCEDURE — 99214 OFFICE O/P EST MOD 30 MIN: CPT | Performed by: INTERNAL MEDICINE

## 2022-05-04 PROCEDURE — 97110 THERAPEUTIC EXERCISES: CPT | Performed by: PHYSICAL THERAPIST

## 2022-05-04 PROCEDURE — 97112 NEUROMUSCULAR REEDUCATION: CPT | Performed by: PHYSICAL THERAPIST
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
## 2022-05-04 NOTE — PROGRESS NOTES
Treatment Note     Today's date: 2022  Patient name: Hattie Gallegos  : 1960  MRN: [id number]  Referring provider: Willem Wyman DO  Dx:   Encounter Diagnosis     ICD-10-CM    1  Fall, subsequent encounter  W19  XXXD    2  Imbalance  R26 89    3  Leg weakness, bilateral  R29 898    Total time in clinic (min): 45 minutes    Subjective:   Still feeling achey sensation in R palmar aspect of hand, with some neck pain noted  Finished up breathing treatments on Tuesday, was not able to exercise during that time  Starting water aerobics on Friday at the [employer], contemplated doing land-based aerobic but didn't want to push too far too fast      Objective: See treatment diary below    Patient goals: walking at a normal pace without cane (not using prior to house), grocery shop without significant pain when completed, dancing at [employer] wedding in 2022  - To move without pain and to not get tired as frequently (added 10/20/21)    Patient-Specific Functional Scale   Task is scored 0 (unable to perform activity) to 10 (ability to perform activity independently)  Activity 8/25/21 9/29/21 10/20/21 11/17/21 2/09/22 4/06/22   1  Walk at normal pace without a cane 5/10  0/10 3/10 3/10 due to speed or knee bending -8/10 initially, decreases with continued walking 6/10   2  Grocery shop without significant pain 8/10 0/10 4/10 7-8/10 7/10 7-8/10   3  Dance 5/10  1/10 2/10 0/10 410   4  Move without pain and to not get tired as frequently     7/10 5-6/10 710     Objective: See treatment diary below    Precautions - Hashimoto's, autoimmune disease    Mobility Measures 2/09/22 3/02/22 4/06/22   Assistive device used?       5 Time Sit to Stand  (17" chair, arms across chest)  Able to get up from 18" surface without use of hands    3 Meter Timed  Up & Go      Walking speed 0 60 -0 74 m/s     Functional Gait Assessment (see below)      6 Minute Walk Test (100 foot circular course)    700 feet, stopped at 1 min 30 sec left due to hip and lower extremity tightness   875 feet   Patient-Reported Outcome Measure: Activities-Specific Balance Confidence Scale (ABC Scale)      Right knee flexion PROM, sitting  100 degrees  90 degrees    Walks with walking boot on right ankle, decreased knee flexion excursion about the right knee, decreased speed, without assistive device  14 degrees active dorsiflexion with knee fully extended  16 degrees passively    Resists maximal pressure into right plantarflexion without pain    Without walking boot, increased right step length versus left, walking speed above Ankle range of motion within normal limits  Even step lengths, mild decrease in knee excursion  Ankle ROM within functional limits    Able to do 20 bilateral heel raises with even weight bearing    3/5 right plantarflexion, 2+/5 left     Functional Gait Assessment  2/3 Gait level surface  2/3 Change in gait speed  2/3 Gait with horizontal head turns  3/3 Gait with vertical head turns  3/3 Gait and pivot turn  1/3 Step over obstacle  0/3 Gait with narrow base of support  2/3 Gait with eyes closed  2/3 Ambulating backwards  1/3 Steps  18/30 Total score (less than 22/30 indicates increased risk of fall)       Bilat heel raise, 30 sec, x2 sets  Unilateral heel raise R and L leg for about 25 sec each    Standing gastroc stretch, hold about 10 sec, x2 sets    Seated AROM knee flexion, hold about 30 sec    Braiding step - 4-5 steps - to metranome with 1-2 crossover step fwd or behind  About 1 min to beat of music (90-100spm)  About 1 min to metronome beat at 100 spm  About 1 min to metronome beat at 110 spm  About 1 min to metronome beat at 115 spm  1 5 min to beat of music (110-120 spm)    All for about 10ft: x about 2 min each  Sidestepping in parallel bars, batting ball back and forth  Sidestepping over 2" cones, batting balloon back and forth  Fwd stepping over 2" cones, back and forth  Sidestepping with 360 degree turns, tossing ball back and forth    Manual HS stretch with DF, hold about 30 sec, x2 sets ea side  Piriformis stretch, hold 20 sec, x2 sets ea side  AROM hip flexion, hold about 20 sec, x2 sets ea side    Chin tucks 2x10    ASSESSMENT:   Improvements in quick step movements, such as crossover steps, to faster tempo beats w/o much imbalance or mis-steps  R knee stiffness noted, not able to flex knee back past about 100 degrees in sitting today  However, José Miguel Rios seemed to tolerate most activities well with only slight knee pain during abrupt turning  Continue to challenge dynamic balance, incorporating quick stepping and turning, to increased tempos  Water aerobics is a great idea and a good way to increase physical activity and a low-impact manner  SHORT-TERM GOALS: 1 month(s)  1  José Miguel Rios walks at least 10 minutes consecutively  MET PREVIOUSLY, ABLE TO GROCERY SHOP, CONTINUES FOR CONSTANT WALKING     3  José Miguel Rios reports 50% improvement in ability to walk grocery store distances without severe fatigue  MET FOR FATIGUE, CONTINUED for PAIN     4  Demonstrates at least 11 degrees passive right dorsiflexion bi 2/12/22  MET  New goal: When released by physician, tolerates at least 6 minutes consecutive overground walking without walking boot  MET  New goal: When released by physician, tolerates at least 10 bilateral heel raises  MET  New goal: When released by physician, walks with step length without one inch of contralateral leg  MET  LONG-TERM GOALS: by 3/12/22  1  Patient reports at least 20 total minutes per day of overground walking for exercise  NOT MET  2  Patient independently manages home exercise program   MET with need for revisions  Dr Ellen Sharp, DPM, fax [phone]:  Patient will benefit from physical therapy 1 time per week for 1 month  Neuromuscular re-education, therapeutic exercises, and therapeutic activities as outlined in grids

## 2022-05-05 ENCOUNTER — OFFICE VISIT (OUTPATIENT)
Dept: GASTROENTEROLOGY | Facility: CLINIC | Age: 62
End: 2022-05-05
Payer: MEDICARE

## 2022-05-05 DIAGNOSIS — R19.7 DIARRHEA, UNSPECIFIED TYPE: Primary | ICD-10-CM

## 2022-05-05 PROCEDURE — 91065 BREATH HYDROGEN/METHANE TEST: CPT | Performed by: INTERNAL MEDICINE

## 2022-05-05 NOTE — PROGRESS NOTES
[facility]       Bacterial Overgrowth Analytical Record    Bishop Ryan 59 y o  female MRN: [id number]      Date of Test: 05/04/2022    Substrate Given: Lactulose    Ordering Provider: Katrina Traore    Medical Assistant: Valdez Miller    Symptoms: diarrhea    The patient presents for bacterial overgrowth testing  Patient fasted overnight  Baseline readings obtained  Breath test performed every 20 min for a total of 3 hr    Sample Clock Time ppmH2 ppmCH4 Co2% Gabriel   Baseline 10:26am   3 2 0 5 Too High   #1  20 minutes 10:52am 23 7 3 3 1 66   #2  40 minutes 11:12am 17 3 3 3 1 66   #3  60 minutes 11:32am 14 5 3 5 1 57   #4  80 minutes 11:52am 15 5 3 0 1 83   #5  100 minutes 12:12pm 21 5 2 1 2 61   #6  120 minutes 12:30pm 11 5 3 0 1 83   #7  140 minutes 12:52pm 8 2 2 8 1 96   #8  160 minutes 1:12pm 11 6 2 0 2 75   #9  180 minutes 1:32pm 16 4 2 8 1 96       Physician interpretation: Negative for SIBO; initial 20ppm rise likely lab error given subsequent values with lower levels  No CH4 ppm >10

## 2022-05-08 ENCOUNTER — NURSE TRIAGE (OUTPATIENT)
Dept: OTHER | Facility: OTHER | Age: 62
End: 2022-05-08

## 2022-05-08 NOTE — TELEPHONE ENCOUNTER
Regarding: high sugar - watery stool, nausea  ----- Message from [facility] sent at 5/8/2022 10:59 AM EDT -----  "I have watery stool, nausea and a grumbling sound in my stomach   My sugar is currently at 165 "

## 2022-05-08 NOTE — TELEPHONE ENCOUNTER
Patient calling in stating that she is having watery diarrhea for the past day  She states she is also having some nausea along with it and vomited once  Despite having so much diarrhea the patient is still drinking well and showing no signs of dehydration  Her BG was stable at 165 this morning and she is having no other symptoms  Advised patient to continue to drink fluids and try some gatorade or pedialyte mixed with water so get some electrolytes  Also advised her she could try alina tea to help with the nausea and try to eat a bland diet  Instructed patient to follow up if her symptoms do not get better with home care  She has no further questions at this time

## 2022-05-08 NOTE — TELEPHONE ENCOUNTER
Reason for Disposition   [1] SEVERE diarrhea (e g , 7 or more times / day more than normal) AND [2] age > 60 years    Answer Assessment - Initial Assessment Questions  1  DIARRHEA SEVERITY: "How bad is the diarrhea?" "How many extra stools have you had in the past 24 hours than normal?"     - NO DIARRHEA (SCALE 0)    - MILD (SCALE 1-3): Few loose or mushy BMs; increase of 1-3 stools over normal daily number of stools; mild increase in ostomy output  -  MODERATE (SCALE 4-7): Increase of 4-6 stools daily over normal; moderate increase in ostomy output  * SEVERE (SCALE 8-10; OR 'WORST POSSIBLE'): Increase of 7 or more stools daily over normal; moderate increase in ostomy output; incontinence  Over 10 times but sometimes it is just a small amount  2  ONSET: "When did the diarrhea begin?"       Friday night     3  BM CONSISTENCY: "How loose or watery is the diarrhea?"       Watery     4  VOMITING: "Are you also vomiting?" If Yes, ask: "How many times in the past 24 hours?"       Vomited once and is having nausea     5  ABDOMINAL PAIN: "Are you having any abdominal pain?" If Yes, ask: "What does it feel like?" (e g , crampy, dull, intermittent, constant)      Cramping and dull     6  ABDOMINAL PAIN SEVERITY: If present, ask: "How bad is the pain?"  (e g , Scale 1-10; mild, moderate, or severe)    - MILD (1-3): doesn't interfere with normal activities, abdomen soft and not tender to touch     - MODERATE (4-7): interferes with normal activities or awakens from sleep, tender to touch     - SEVERE (8-10): excruciating pain, doubled over, unable to do any normal activities        8/10 at its worst    7  ORAL INTAKE: If vomiting, "Have you been able to drink liquids?" "How much fluids have you had in the past 24 hours?"      Had one piece of toast yesterday and is drinking lots of water     8   HYDRATION: "Any signs of dehydration?" (e g , dry mouth [not just dry lips], too weak to stand, dizziness, new weight loss) "When did you last urinate?"      Feels weak but able to stand and walk and not having any dizziness     9  EXPOSURE: "Have you traveled to a foreign country recently?" "Have you been exposed to anyone with diarrhea?" "Could you have eaten any food that was spoiled?"      Denies     10  ANTIBIOTIC USE: "Are you taking antibiotics now or have you taken antibiotics in the past 2 months?"        No     11   OTHER SYMPTOMS: "Do you have any other symptoms?" (e g , fever, blood in stool)        Temperature was 99 2 at the highest    Protocols used: DIARRHEA-ADULT-AH

## 2022-05-10 ENCOUNTER — OFFICE VISIT (OUTPATIENT)
Dept: FAMILY MEDICINE CLINIC | Facility: CLINIC | Age: 62
End: 2022-05-10
Payer: MEDICARE

## 2022-05-10 ENCOUNTER — TELEPHONE (OUTPATIENT)
Dept: FAMILY MEDICINE CLINIC | Facility: CLINIC | Age: 62
End: 2022-05-10

## 2022-05-10 VITALS
BODY MASS INDEX: 36.52 KG/M2 | TEMPERATURE: 97.7 F | WEIGHT: 227.2 LBS | RESPIRATION RATE: 16 BRPM | SYSTOLIC BLOOD PRESSURE: 162 MMHG | DIASTOLIC BLOOD PRESSURE: 102 MMHG | OXYGEN SATURATION: 98 % | HEIGHT: 66 IN | HEART RATE: 96 BPM

## 2022-05-10 DIAGNOSIS — G70.9 NEUROMUSCULAR DISORDER (HCC): ICD-10-CM

## 2022-05-10 DIAGNOSIS — K29.50 CHRONIC GASTRITIS WITHOUT BLEEDING, UNSPECIFIED GASTRITIS TYPE: Primary | ICD-10-CM

## 2022-05-10 DIAGNOSIS — R09.89 GLOBUS SYNDROME: ICD-10-CM

## 2022-05-10 DIAGNOSIS — E11.65 TYPE 2 DIABETES MELLITUS WITH HYPERGLYCEMIA, WITHOUT LONG-TERM CURRENT USE OF INSULIN (HCC): ICD-10-CM

## 2022-05-10 PROBLEM — E06.3 HASHIMOTO'S THYROIDITIS: Status: ACTIVE | Noted: 2022-04-25

## 2022-05-10 PROCEDURE — 99214 OFFICE O/P EST MOD 30 MIN: CPT | Performed by: FAMILY MEDICINE

## 2022-05-10 RX ORDER — DULAGLUTIDE 0.75 MG/.5ML
0.75 INJECTION, SOLUTION SUBCUTANEOUS WEEKLY
Qty: 2 ML | Refills: 2 | Status: SHIPPED | OUTPATIENT
Start: 2022-05-10

## 2022-05-10 NOTE — TELEPHONE ENCOUNTER
Javi Sykes called the office in regards to the copay for her trulicity is $689  Pt can get it for free from [place] if Orion Mahtis fills out paper work for her trulicity  Pt will not be able to take it until it get sent from them  Pt will see if they can fax form to our office  Pt is fearful the form will be received once you  leaves office for the week could another doctor advise it? Per verbal conversation with Orion Mathis we may ask another physician to advise pt's application while he is out of office

## 2022-05-10 NOTE — TELEPHONE ENCOUNTER
Matteo Torres called [facility] [phone] pt spoke to Angelo Mejias  Pt was informed  Only the doctor's office can  call to request the application be faxed   Pt requested we please call to request the doctor's portion of the application     I spoke to Rita at [facility]  The application will be faxed to our office

## 2022-05-11 ENCOUNTER — OFFICE VISIT (OUTPATIENT)
Dept: PHYSICAL THERAPY | Facility: REHABILITATION | Age: 62
End: 2022-05-11
Payer: MEDICARE

## 2022-05-11 DIAGNOSIS — R29.898 LEG WEAKNESS, BILATERAL: ICD-10-CM
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
## 2022-05-11 DIAGNOSIS — R26.89 IMBALANCE: ICD-10-CM

## 2022-05-11 PROCEDURE — 97112 NEUROMUSCULAR REEDUCATION: CPT | Performed by: PHYSICAL THERAPIST

## 2022-05-11 NOTE — PROGRESS NOTES
Patient status post GI evaluation  Not aware that she was having this much GI issues a the  Would recommend stop metformin as this is likely the cause of this  Discussed AACE guidelines  Agrees to start Trulicity if covered by insurance  Patient requesting magnesium level  Refer to ear nose and throat due to throat sensation that has been a chronic issue  Will continue her normal visits with us with regards to type 2 diabetes  Risk factor modification  Blood pressure not at goal today  Continue losartan  She will monitor blood pressures over the next 2 weeks and call back  Blood pressure medicines can be increased  Recommend yearly diabetic eye and foot examination  Assessment/Plan:    No problem-specific Assessment & Plan notes found for this encounter  Diagnoses and all orders for this visit:    Chronic gastritis without bleeding, unspecified gastritis type    Type 2 diabetes mellitus with hyperglycemia, without long-term current use of insulin (HCC)  -     Dulaglutide (Trulicity) 3 35 OZ/5 8EQ SOPN; Inject 0 5 mL (0 75 mg total) under the skin once a week    Neuromuscular disorder (HCC)  -     Magnesium; Future    Globus syndrome  -     Ambulatory Referral to Otolaryngology; Future        1  Chronic gastritis without bleeding, unspecified gastritis type     2  Type 2 diabetes mellitus with hyperglycemia, without long-term current use of insulin (HCC)  Dulaglutide (Trulicity) 6 65 BD/6 5YI SOPN   3  Neuromuscular disorder (HCC)  Magnesium   4  Globus syndrome  Ambulatory Referral to Otolaryngology       Subjective:        Patient ID: Pam Sanders is a 59 y o  female  Chief Complaint   Patient presents with    Follow-up     Discuss medication suggested by Gastroenterologist, patient states she only wants Dr Jessica Lu doing diabetic foot exam        38-year-old female with type 2 diabetes here today  Still GI recently    They were suggesting stop metformin due to stomach and GI side effects  The following portions of the patient's history were reviewed and updated as appropriate: past medical history, past surgical history and problem list       Review of Systems   Constitutional: Negative for appetite change, fatigue, fever and unexpected weight change  HENT: Negative for congestion, ear pain, postnasal drip, rhinorrhea, sinus pressure, sinus pain and sore throat  Eyes: Negative for redness and visual disturbance  Respiratory: Negative for chest tightness and shortness of breath  Cardiovascular: Negative for chest pain, palpitations and leg swelling  Gastrointestinal: Positive for diarrhea  Negative for abdominal distention, abdominal pain and nausea  Endocrine: Negative for cold intolerance and heat intolerance  Genitourinary: Negative for dysuria and hematuria  Musculoskeletal: Negative for arthralgias, gait problem and myalgias  Skin: Negative for pallor and rash  Neurological: Negative for dizziness, tremors, weakness, light-headedness and headaches  Psychiatric/Behavioral: Negative for behavioral problems  The patient is not nervous/anxious  Objective:  BP (!) 162/102 (BP Location: Left arm, Patient Position: Sitting, Cuff Size: Adult)   Pulse 96   Temp 97 7 °F (36 5 °C) (Temporal)   Resp 16   Ht 5' 6" (1 676 m)   Wt 103 kg (227 lb 3 2 oz)   SpO2 98%   BMI 36 67 kg/m²        Physical Exam  Vitals and nursing note reviewed  Constitutional:       General: She is not in acute distress  Appearance: She is not diaphoretic  HENT:      Head: Normocephalic and atraumatic  Eyes:      General: No scleral icterus  Conjunctiva/sclera: Conjunctivae normal       Pupils: Pupils are equal, round, and reactive to light  Neck:      Thyroid: No thyromegaly  Cardiovascular:      Rate and Rhythm: Normal rate and regular rhythm  Pulses:           Carotid pulses are 0 on the right side and 0 on the left side       Heart sounds: Normal heart sounds  No murmur heard  Pulmonary:      Effort: Pulmonary effort is normal  No respiratory distress  Breath sounds: Normal breath sounds  No wheezing  Abdominal:      General: There is no distension  Palpations: Abdomen is soft  Musculoskeletal:      Cervical back: Normal range of motion and neck supple  Right lower leg: No edema  Left lower leg: No edema  Lymphadenopathy:      Cervical: No cervical adenopathy  Skin:     General: Skin is warm  Coloration: Skin is not pale  Neurological:      General: No focal deficit present  Mental Status: She is alert and oriented to person, place, and time  Cranial Nerves: No cranial nerve deficit  Deep Tendon Reflexes: Reflexes are normal and symmetric  Psychiatric:         Mood and Affect: Mood normal          Behavior: Behavior normal          Thought Content:  Thought content normal          Judgment: Judgment normal

## 2022-05-11 NOTE — PROGRESS NOTES
Treatment Note     Today's date: 2022  Patient name: Sofi Cedeño  : 1960  MRN: [id number]  Referring provider: Jorge L Saravia DO  Dx:   Encounter Diagnosis     ICD-10-CM    1  Fall, subsequent encounter  W19  XXXD    2  Leg weakness, bilateral  R29 898    3  Imbalance  R26 89         Subjective: Both knees are scheduled for injections the last week of May, starting a series of a few injections  Starts water aerobic next week  Has to be off muscle relaxants due to GI issue  Objective: See treatment diary below    Patient goals: walking at a normal pace without cane (not using prior to house), grocery shop without significant pain when completed, dancing at niece's wedding in 2022  - To move without pain and to not get tired as frequently (added 10/20/21)    Patient-Specific Functional Scale   Task is scored 0 (unable to perform activity) to 10 (ability to perform activity independently)  Activity 8/25/21 9/29/21 10/20/21 11/17/21 2/09/22 4/06/22 5/18/22   1  Walk at normal pace without a cane 5/10  0/10 3/10 3/10 due to speed or knee bending 7-8/10 initially, decreases with continued walking 6/10    2  Grocery shop without significant pain 8/10 0/10 4/10 7-8/10 7/10 7-8/10    3  Dance 5/10  1/10 2/10 0/10 4/10    4  Move without pain and to not get tired as frequently     /10 5-6/10 7/10      Objective: See treatment diary below    Precautions - Hashimoto's, autoimmune disease    Mobility Measures 2/09/22 3/02/22 4/06/22 5/11/22   Assistive device used?        5 Time Sit to Stand  (17" chair, arms across chest)  Able to get up from 18" surface without use of hands     3 Meter Timed  Up & Go       Walking speed 0 60 -0 74 m/s      Functional Gait Assessment (see below)      6 Minute Walk Test (100 foot circular course)    700 feet, stopped at 1 min 30 sec left due to hip and lower extremity tightness   875 feet 1175 feet   Patient-Reported Outcome Measure: Activities-Specific Balance Confidence Scale (ABC Scale)       Right knee flexion PROM, sitting  100 degrees  90 degrees 98 degrees    Walks with walking boot on right ankle, decreased knee flexion excursion about the right knee, decreased speed, without assistive device  14 degrees active dorsiflexion with knee fully extended  16 degrees passively    Resists maximal pressure into right plantarflexion without pain    Without walking boot, increased right step length versus left, walking speed above Ankle range of motion within normal limits  Even step lengths, mild decrease in knee excursion  Ankle ROM within functional limits    Able to do 20 bilateral heel raises with even weight bearing    3/5 right plantarflexion, 2+/5 left Ankle ROM within functional limits    Completes bilateral heel raises       Functional Gait Assessment  2/3 Gait level surface  3/3 Change in gait speed  2/3 Gait with horizontal head turns  3/3 Gait with vertical head turns  3/3 Gait and pivot turn  1/3 Step over obstacle  0/3 Gait with narrow base of support  2/3 Gait with eyes closed  1/3 Ambulating backwards  1/3 Steps  17/30 Total score (less than 22/30 indicates increased risk of fall)       Standing gastroc stretch, hold about 20 sec, x 4-5 sets    Below in parallel bars:  Braiding step - 4 steps - to metronome with 1-2 crossover step fwd or behind  About 1 min to beat of music (90-100spm)  About 1 min to metronome beat at 110-120 spm, x 4   About 1 min to metronome beat at 120-130 spm x 3  180 - 360 degree turn during 4 step count, 100-110 1 min x 3    ASSESSMENT:   Batool Martinez performed very well with overground walking and has improved significantly since previous soft tissue injury and previous use of walking boot  We continue to be able to increase volume of walking as well as volume of dynamic balance movements    A Functional Gait Assessment score of 17/30 is still less than what we want for community mobility, but patient does moderate her speed due to chronic lower extremity stiffness and in reducing speed, generally reduces risk of falling in this area  We continue to progress towards better self-management of these mobility issues  Water aerobics is a great idea and a good way to increase physical activity and a low-impact manner  We will see how upcoming knee injections affect mobility  SHORT-TERM GOALS: 1 month(s)  1  Wilfrid Freeman walks at least 10 minutes consecutively  MET  3  Wilfrid Freeman reports 50% improvement in ability to walk grocery store distances without severe fatigue  MET FOR FATIGUE, CONTINUED for PAIN     4  Demonstrates at least 11 degrees passive right dorsiflexion bi 2/12/22  MET  New goal: When released by physician, tolerates at least 6 minutes consecutive overground walking without walking boot  MET  New goal: When released by physician, tolerates at least 10 bilateral heel raises  MET  New goal: When released by physician, walks with step length without one inch of contralateral leg  MET  New goal: Walks at least 1350 feet during 6 minute walk test   New goal: Scores at least 20/30 on FGA  New goal: Able to purposefully walk for exercise at least 15 minutes at a time at least 3-4 times per week  LONG-TERM GOALS: by 3/12/22  1  Patient reports at least 20 total minutes per day of overground walking for exercise  PROGRESSING  2  Patient independently manages home exercise program   MET with need for revisions  Dr Clarisa Riley, DPM, fax [phone]:  Patient will benefit from physical therapy 1 time per week for 1 month  Neuromuscular re-education, therapeutic exercises, and therapeutic activities as outlined in grids

## 2022-05-12 ENCOUNTER — APPOINTMENT (OUTPATIENT)
Dept: LAB | Facility: MEDICAL CENTER | Age: 62
End: 2022-05-12
Payer: MEDICARE

## 2022-05-12 ENCOUNTER — OFFICE VISIT (OUTPATIENT)
Dept: RHEUMATOLOGY | Facility: CLINIC | Age: 62
End: 2022-05-12
Payer: MEDICARE

## 2022-05-12 VITALS — HEIGHT: 66 IN | WEIGHT: 227 LBS | BODY MASS INDEX: 36.48 KG/M2

## 2022-05-12 DIAGNOSIS — R76.8 POSITIVE ANA (ANTINUCLEAR ANTIBODY): ICD-10-CM

## 2022-05-12 DIAGNOSIS — Z79.899 HIGH RISK MEDICATION USE: ICD-10-CM

## 2022-05-12 DIAGNOSIS — G70.9 NEUROMUSCULAR DISORDER (HCC): ICD-10-CM

## 2022-05-12 DIAGNOSIS — M79.10 MYALGIA: ICD-10-CM

## 2022-05-12 DIAGNOSIS — M35.9 UNDIFFERENTIATED CONNECTIVE TISSUE DISEASE (HCC): Primary | ICD-10-CM

## 2022-05-12 LAB
ALBUMIN SERPL BCP-MCNC: 3.6 G/DL (ref 3.5–5)
ALP SERPL-CCNC: 105 U/L (ref 46–116)
ALT SERPL W P-5'-P-CCNC: 44 U/L (ref 12–78)
ANION GAP SERPL CALCULATED.3IONS-SCNC: 3 MMOL/L (ref 4–13)
AST SERPL W P-5'-P-CCNC: 26 U/L (ref 5–45)
BASOPHILS # BLD MANUAL: 0.09 THOUSAND/UL (ref 0–0.1)
BASOPHILS NFR MAR MANUAL: 1 % (ref 0–1)
BILIRUB SERPL-MCNC: 0.26 MG/DL (ref 0.2–1)
BUN SERPL-MCNC: 17 MG/DL (ref 5–25)
C3 SERPL-MCNC: 160 MG/DL (ref 90–180)
C4 SERPL-MCNC: 31 MG/DL (ref 10–40)
CALCIUM SERPL-MCNC: 9.7 MG/DL (ref 8.3–10.1)
CHLORIDE SERPL-SCNC: 108 MMOL/L (ref 100–108)
CO2 SERPL-SCNC: 29 MMOL/L (ref 21–32)
CREAT SERPL-MCNC: 0.87 MG/DL (ref 0.6–1.3)
CREAT UR-MCNC: 148 MG/DL
CRP SERPL QL: 5.2 MG/L
EOSINOPHIL # BLD MANUAL: 0.36 THOUSAND/UL (ref 0–0.4)
EOSINOPHIL NFR BLD MANUAL: 4 % (ref 0–6)
ERYTHROCYTE [DISTWIDTH] IN BLOOD BY AUTOMATED COUNT: 12.7 % (ref 11.6–15.1)
ERYTHROCYTE [SEDIMENTATION RATE] IN BLOOD: 33 MM/HOUR (ref 0–29)
GFR SERPL CREATININE-BSD FRML MDRD: 71 ML/MIN/1.73SQ M
GLUCOSE P FAST SERPL-MCNC: 159 MG/DL (ref 65–99)
HCT VFR BLD AUTO: 41.3 % (ref 34.8–46.1)
HGB BLD-MCNC: 13.5 G/DL (ref 11.5–15.4)
LYMPHOCYTES # BLD AUTO: 1.17 THOUSAND/UL (ref 0.6–4.47)
LYMPHOCYTES # BLD AUTO: 13 % (ref 14–44)
MAGNESIUM SERPL-MCNC: 2 MG/DL (ref 1.6–2.6)
MCH RBC QN AUTO: 29.6 PG (ref 26.8–34.3)
MCHC RBC AUTO-ENTMCNC: 32.7 G/DL (ref 31.4–37.4)
MCV RBC AUTO: 91 FL (ref 82–98)
MICROALBUMIN UR-MCNC: 10.9 MG/L (ref 0–20)
MICROALBUMIN/CREAT 24H UR: 7 MG/G CREATININE (ref 0–30)
MONOCYTES # BLD AUTO: 0.36 THOUSAND/UL (ref 0–1.22)
MONOCYTES NFR BLD: 4 % (ref 4–12)
NEUTROPHILS # BLD MANUAL: 4.93 THOUSAND/UL (ref 1.85–7.62)
NEUTS BAND NFR BLD MANUAL: 4 % (ref 0–8)
NEUTS SEG NFR BLD AUTO: 51 % (ref 43–75)
PLATELET # BLD AUTO: 296 THOUSANDS/UL (ref 149–390)
PLATELET BLD QL SMEAR: ADEQUATE
PMV BLD AUTO: 10.1 FL (ref 8.9–12.7)
POTASSIUM SERPL-SCNC: 3.5 MMOL/L (ref 3.5–5.3)
PROT SERPL-MCNC: 7.8 G/DL (ref 6.4–8.2)
RBC # BLD AUTO: 4.56 MILLION/UL (ref 3.81–5.12)
RBC MORPH BLD: NORMAL
SODIUM SERPL-SCNC: 140 MMOL/L (ref 136–145)
VARIANT LYMPHS # BLD AUTO: 23 %
WBC # BLD AUTO: 8.97 THOUSAND/UL (ref 4.31–10.16)

## 2022-05-12 PROCEDURE — 82570 ASSAY OF URINE CREATININE: CPT | Performed by: FAMILY MEDICINE

## 2022-05-12 PROCEDURE — 86160 COMPLEMENT ANTIGEN: CPT | Performed by: INTERNAL MEDICINE

## 2022-05-12 PROCEDURE — 80053 COMPREHEN METABOLIC PANEL: CPT | Performed by: INTERNAL MEDICINE

## 2022-05-12 PROCEDURE — 82043 UR ALBUMIN QUANTITATIVE: CPT | Performed by: FAMILY MEDICINE

## 2022-05-12 PROCEDURE — 86140 C-REACTIVE PROTEIN: CPT | Performed by: INTERNAL MEDICINE

## 2022-05-12 PROCEDURE — 36415 COLL VENOUS BLD VENIPUNCTURE: CPT | Performed by: INTERNAL MEDICINE

## 2022-05-12 PROCEDURE — 99214 OFFICE O/P EST MOD 30 MIN: CPT | Performed by: INTERNAL MEDICINE

## 2022-05-12 PROCEDURE — 85027 COMPLETE CBC AUTOMATED: CPT | Performed by: INTERNAL MEDICINE

## 2022-05-12 PROCEDURE — 85652 RBC SED RATE AUTOMATED: CPT | Performed by: INTERNAL MEDICINE

## 2022-05-12 PROCEDURE — 83735 ASSAY OF MAGNESIUM: CPT

## 2022-05-12 PROCEDURE — 85007 BL SMEAR W/DIFF WBC COUNT: CPT | Performed by: INTERNAL MEDICINE

## 2022-05-12 PROCEDURE — 86225 DNA ANTIBODY NATIVE: CPT | Performed by: INTERNAL MEDICINE

## 2022-05-12 RX ORDER — TIZANIDINE 2 MG/1
2 TABLET ORAL EVERY 8 HOURS PRN
Qty: 90 TABLET | Refills: 3 | Status: SHIPPED | OUTPATIENT
Start: 2022-05-12

## 2022-05-12 NOTE — PROGRESS NOTES
Assessment and Plan:   Genny Domingo is a 57 y o   female who presents for follow up of undifferentiated connective tissue disease (positive MT of 1:80 in a speckled pattern, myalgia, and arthralgia)  Admits to bilateral calf tightness  Hard to get up from a seated position  Always has elevated inflammatory markers  Do lupus activity labs  Try tizanidine up to 3 times a day as needed for muscle pain  Start prednisone and hydroxychloroquine after gastric emptying study    Plan:  Diagnoses and all orders for this visit:    Undifferentiated connective tissue disease (HCC)    Positive MT (antinuclear antibody)  -     Anti-DNA antibody, double-stranded  -     Sedimentation rate, automated  -     C-reactive protein  -     CBC and differential  -     Comprehensive metabolic panel  -     C3 complement  -     C4 complement  -     Manual Differential(PHLEBS Do Not Order)    Myalgia  -     tiZANidine (ZANAFLEX) 2 mg tablet; Take 1 tablet (2 mg total) by mouth every 8 (eight) hours as needed for muscle spasms    High risk medication use  High risk medication use - Benefits and risks of hydroxychloroquine, including but not limited to retinal toxicity, corneal deposits, gastrointestinal side effects, and headaches were discussed with the patient  The need for a regular eye exam to monitor for ocular toxicity while on this medication was also explained to the patient  Follow-up plan: RTC in 4 months         Rheumatic Disease Summary  Initial visit 1/7/22: Genny Domingo is a 57 y o   female who presents as a Rheumatology consult referred by GABY Desouza for evaluation of positive MT of 1:80 in a speckled pattern, myalgia, and arthralgia  Prior blood work, EMG studies, and muscle biopsy unrevealing  Meets criteria for UCTD and can consider prednisone course and trial of hydroxychloroquine in the future  Recommend patient contacts us after having performed endoscopy with GI for initiation of therapy  Will obtain additional labs to complete myositis workup  Contact us after get colonoscopy, will then consider a prednisone course and trial of hydroxychloroquine  HPI  Airam Silva is a 57 y o   female who presents for follow up  Last clinic visit was 1/7/22 which was her initial visit  Has been taken off metformin; will be starting hyaluronic acid injections through Ortho  Complains of tightness in both calves  The following portions of the patient's history were reviewed and updated as appropriate: allergies, current medications, past family history, past medical history, past social history, past surgical history and problem list     Review of Systems:   Review of Systems   Constitutional: Negative for fatigue  HENT: Positive for sinus pressure and sinus pain  Negative for mouth sores  Eyes: Negative for pain  Dry eyes   Respiratory: Negative for shortness of breath  Cardiovascular: Negative for leg swelling  Musculoskeletal: Positive for arthralgias and joint swelling  Skin: Negative for rash  Neurological: Positive for headaches  Negative for weakness  Hematological: Negative for adenopathy  Psychiatric/Behavioral: Negative for sleep disturbance  Reviewed and agree      Home Medications:    Current Outpatient Medications:     albuterol (2 5 mg/3 mL) 0 083 % nebulizer solution, Take 1 vial (2 5 mg total) by nebulization every 6 (six) hours as needed for wheezing or shortness of breath, Disp: 50 vial, Rfl: 0    Azelastine HCl 137 MCG/SPRAY SOLN, '1 SPRAY PER NOSTRIL IN THE MORNING AS DIRECTED, Disp: , Rfl:     Blood Glucose Monitoring Suppl (Seven[employer] Storage Software SYSTEM) w/Device KIT, Check BG daily dx E11 9, Disp: 1 kit, Rfl: 1    colestipol (COLESTID) 1 g tablet, Take 1 tablet (1 g total) by mouth 2 (two) times a day, Disp: 60 tablet, Rfl: 2    Cyanocobalamin (Vitamin B-12) 1000 MCG SUBL, Place under the tongue, Disp: , Rfl:     dicyclomine (BENTYL) 20 mg tablet, Take 1 tablet (20 mg total) by mouth every 6 (six) hours As needed for abdominal pain, Disp: 120 tablet, Rfl: 2    Dulaglutide (Trulicity) 0 22 AT/2 1RH SOPN, Inject 0 5 mL (0 75 mg total) under the skin once a week, Disp: 2 mL, Rfl: 2    DULoxetine (CYMBALTA) 30 mg delayed release capsule, Take 1 capsule (30 mg total) by mouth daily at bedtime, Disp: 90 capsule, Rfl: 2    DULoxetine (CYMBALTA) 60 mg delayed release capsule, Take 1 capsule (60 mg total) by mouth daily, Disp: 90 capsule, Rfl: 2    fluticasone (FLONASE) 50 mcg/act nasal spray, 2 sprays into each nostril daily (Patient taking differently: 2 sprays into each nostril daily As needed), Disp: 1 Bottle, Rfl: 1    gabapentin (NEURONTIN) 100 mg capsule, Take 100 mg by mouth 2 (two) times a day , Disp: , Rfl: 0    glucose blood (OneTouch Verio) test strip, Use check BG twice daily dx E11 9, Disp: 100 each, Rfl: 5    glucose blood (OneTouch Verio) test strip, Test once daily DX E11 9, Disp: 100 strip, Rfl: 1    hydrocortisone 2 5 % cream, Apply topically 3 (three) times a day as needed for irritation or rash, Disp: 30 g, Rfl: 0    ibuprofen (MOTRIN) 800 mg tablet, Take 1 tablet (800 mg total) by mouth every 6 (six) hours as needed for mild pain, Disp: 30 tablet, Rfl: 0    Lancets (OneTouch Delica Plus [id number]) MISC, Check BG 1x daily DX E11 9, Disp: 100 each, Rfl: 1    levothyroxine 200 mcg tablet, Take 1 tablet (200 mcg total) by mouth daily Pt takes Monday through Saturday, Disp: 90 tablet, Rfl: 1    losartan (COZAAR) 50 mg tablet, Take 1 tablet (50 mg total) by mouth daily, Disp: 90 tablet, Rfl: 3    Menthol (Icy Hot Back) 5 % PTCH, Apply topically , Disp: , Rfl:     metroNIDAZOLE (METROCREAM) 0 75 % cream, metronidazole 0 75 % topical cream, Disp: , Rfl:     rosuvastatin (CRESTOR) 5 mg tablet, take 1 tablet by mouth once daily, Disp: 90 tablet, Rfl: 3    Saline 0 65 % SOLN, into each nostril 2 (two) times a day, Disp: , Rfl:     tiZANidine (ZANAFLEX) 2 mg tablet, Take 1 tablet (2 mg total) by mouth every 8 (eight) hours as needed for muscle spasms, Disp: 90 tablet, Rfl: 3    ergocalciferol (VITAMIN D2) 50,000 units, take 1 capsule by mouth every week, Disp: 4 capsule, Rfl: 10    hydroxychloroquine (PLAQUENIL) 200 mg tablet, Take 1 tablet (200 mg total) by mouth in the morning and 1 tablet (200 mg total) in the evening , Disp: 60 tablet, Rfl: 3    montelukast (SINGULAIR) 10 mg tablet, take 1 tablet by mouth at bedtime, Disp: 30 tablet, Rfl: 5    OneTouch Delica Lancets 63X MISC, Use once for 1 dose, Disp: 100 each, Rfl: 3    predniSONE 5 mg tablet, 4 tabs x7 days, then 3 tabs x7 days, then 2 tabs x7 days, then 1 tab x7 days, then stop , Disp: 70 tablet, Rfl: 0  No current facility-administered medications for this visit  Objective:    Vitals:    05/12/22 1123   Weight: 103 kg (227 lb)   Height: 5' 6" (1 676 m)       Physical Exam  Constitutional:       General: She is not in acute distress  HENT:      Head: Normocephalic and atraumatic  Eyes:      Conjunctiva/sclera: Conjunctivae normal    Cardiovascular:      Rate and Rhythm: Normal rate and regular rhythm  Heart sounds: S1 normal and S2 normal      No friction rub  Pulmonary:      Effort: Pulmonary effort is normal  No respiratory distress  Breath sounds: Normal breath sounds  No wheezing, rhonchi or rales  Musculoskeletal:         General: Tenderness present  Cervical back: Neck supple  Comments: Right palm tenderness   Skin:     Coloration: Skin is not pale  Neurological:      Mental Status: She is alert  Mental status is at baseline  Psychiatric:         Mood and Affect: Mood normal          Behavior: Behavior normal        Reviewed labs and imaging  Imaging:   EGD 1/18/22  Normal EGD  XR right knee 10/29/21  Severe tricompartmental osteoarthritis as evidenced by joint space narrowing, osteophyte formation and subchondral sclerosis   Changes are more pronounced on the left side      XR left knee 10/29/21  Severe tricompartmental osteoarthritis as evidenced by joint space narrowing, osteophyte formation and subchondral sclerosis  Changes are more pronounced on the left side  No lytic or blastic osseous lesion  Meniscal chondrocalcinosis is identified      XR right foot 9/28/21  There is no acute fracture or dislocation    Calcaneal spur(s) noted   Linear artifact overlies the Achilles tendon on lateral view    Labs:   Appointment on 05/12/2022   Component Date Value Ref Range Status    Magnesium 05/12/2022 2 0  1 6 - 2 6 mg/dL Final   Office Visit on 05/12/2022   Component Date Value Ref Range Status    ds DNA Ab 05/12/2022 <1  0 - 9 IU/mL Final                                       Negative      <5                                     Equivocal  5 - 9                                     Positive      >9    Sed Rate 05/12/2022 33 (A) 0 - 29 mm/hour Final    CRP 05/12/2022 5 2 (A) <3 0 mg/L Final    WBC 05/12/2022 8 97  4 31 - 10 16 Thousand/uL Final    RBC 05/12/2022 4 56  3 81 - 5 12 Million/uL Final    Hemoglobin 05/12/2022 13 5  11 5 - 15 4 g/dL Final    Hematocrit 05/12/2022 41 3  34 8 - 46 1 % Final    MCV 05/12/2022 91  82 - 98 fL Final    MCH 05/12/2022 29 6  26 8 - 34 3 pg Final    MCHC 05/12/2022 32 7  31 4 - 37 4 g/dL Final    RDW 05/12/2022 12 7  11 6 - 15 1 % Final    MPV 05/12/2022 10 1  8 9 - 12 7 fL Final    Platelets 94/85/2342 296  149 - 390 Thousands/uL Final    Sodium 05/12/2022 140  136 - 145 mmol/L Final    Potassium 05/12/2022 3 5  3 5 - 5 3 mmol/L Final    Chloride 05/12/2022 108  100 - 108 mmol/L Final    CO2 05/12/2022 29  21 - 32 mmol/L Final    ANION GAP 05/12/2022 3 (A) 4 - 13 mmol/L Final    BUN 05/12/2022 17  5 - 25 mg/dL Final    Creatinine 05/12/2022 0 87  0 60 - 1 30 mg/dL Final    Standardized to IDMS reference method    Glucose, Fasting 05/12/2022 159 (A) 65 - 99 mg/dL Final    Specimen collection should occur prior to Sulfasalazine administration due to the potential for falsely depressed results  Specimen collection should occur prior to Sulfapyridine administration due to the potential for falsely elevated results   Calcium 05/12/2022 9 7  8 3 - 10 1 mg/dL Final    AST 05/12/2022 26  5 - 45 U/L Final    Specimen collection should occur prior to Sulfasalazine administration due to the potential for falsely depressed results   ALT 05/12/2022 44  12 - 78 U/L Final    Specimen collection should occur prior to Sulfasalazine and/or Sulfapyridine administration due to the potential for falsely depressed results   Alkaline Phosphatase 05/12/2022 105  46 - 116 U/L Final    Total Protein 05/12/2022 7 8  6 4 - 8 2 g/dL Final    Albumin 05/12/2022 3 6  3 5 - 5 0 g/dL Final    Total Bilirubin 05/12/2022 0 26  0 20 - 1 00 mg/dL Final    Use of this assay is not recommended for patients undergoing treatment with eltrombopag due to the potential for falsely elevated results      eGFR 05/12/2022 71  ml/min/1 73sq m Final    C3 Complement 05/12/2022 160 0  90 0 - 180 0 mg/dL Final    C4, COMPLEMENT 05/12/2022 31 0  10 0 - 40 0 mg/dL Final    Segmented % 05/12/2022 51  43 - 75 % Final    Bands % 05/12/2022 4  0 - 8 % Final    Lymphocytes % 05/12/2022 13 (A) 14 - 44 % Final    Monocytes % 05/12/2022 4  4 - 12 % Final    Eosinophils, % 05/12/2022 4  0 - 6 % Final    Basophils % 05/12/2022 1  0 - 1 % Final    Atypical Lymphocytes % 05/12/2022 23 (A) <=0 % Final    Absolute Neutrophils 05/12/2022 4 93  1 85 - 7 62 Thousand/uL Final    Lymphocytes Absolute 05/12/2022 1 17  0 60 - 4 47 Thousand/uL Final    Monocytes Absolute 05/12/2022 0 36  0 00 - 1 22 Thousand/uL Final    Eosinophils Absolute 05/12/2022 0 36  0 00 - 0 40 Thousand/uL Final    Basophils Absolute 05/12/2022 0 09  0 00 - 0 10 Thousand/uL Final    RBC Morphology 05/12/2022 Normal   Final    Platelet Estimate 00/74/6165 Adequate  Adequate Final Appointment on 03/24/2022   Component Date Value Ref Range Status    H pylori Ag, Stl 03/24/2022 Negative  Negative Final   Admission on 02/23/2022, Discharged on 02/23/2022   Component Date Value Ref Range Status    POC Glucose 02/23/2022 104  65 - 140 mg/dl Final    Ventricular Rate 02/23/2022 87  BPM Final    Atrial Rate 02/23/2022 87  BPM Final    HI Interval 02/23/2022 140  ms Final    QRSD Interval 02/23/2022 74  ms Final    QT Interval 02/23/2022 358  ms Final    QTC Interval 02/23/2022 430  ms Final    P Axis 02/23/2022 69  degrees Final    QRS Axis 02/23/2022 70  degrees Final    T Wave [place] 02/23/2022 74  degrees Final   Office Visit on 02/23/2022   Component Date Value Ref Range Status    Ventricular Rate 02/23/2022 85  BPM Final    Atrial Rate 02/23/2022 85  BPM Final    HI Interval 02/23/2022 142  ms Final    QRSD Interval 02/23/2022 76  ms Final    QT Interval 02/23/2022 370  ms Final    QTC Interval 02/23/2022 440  ms Final    P Axis 02/23/2022 50  degrees Final    QRS Axis 02/23/2022 35  degrees Final    T Wave [place] 02/23/2022 63  degrees Final   Office Visit on 02/16/2022   Component Date Value Ref Range Status    Creatinine, Ur 05/12/2022 148 0  mg/dL Final    Microalbum  ,U,Random 05/12/2022 10 9  0 0 - 20 0 mg/L Final    Microalb Creat Ratio 05/12/2022 7  0 - 30 mg/g creatinine Final   Appointment on 02/15/2022   Component Date Value Ref Range Status    Hemoglobin A1C 02/15/2022 6 9 (A) Normal 3 8-5 6%; PreDiabetic 5 7-6 4%;  Diabetic >=6 5%; Glycemic control for adults with diabetes <7 0% % Final    EAG 02/15/2022 151  mg/dl Final    Cholesterol 02/15/2022 196  See Comment mg/dL Final    Cholesterol:         Pediatric <18 Years        Desirable          <170 mg/dL      Borderline High    170-199 mg/dL      High               >=200 mg/dL        Adult >=18 Years            Desirable         <200 mg/dL      Borderline High   200-239 mg/dL      High              >239 mg/dL      Triglycerides 02/15/2022 225 (A) See Comment mg/dL Final    Triglyceride:     0-9Y            <75mg/dL     10Y-17Y         <90 mg/dL       >=18Y     Normal          <150 mg/dL     Borderline High 150-199 mg/dL     High            200-499 mg/dL        Very High       >499 mg/dL    Specimen collection should occur prior to N-Acetylcysteine or Metamizole administration due to the potential for falsely depressed results   HDL, Direct 02/15/2022 53  >=50 mg/dL Final    Specimen collection should occur prior to Metamizole administration due to the potential for falsley depressed results   LDL Calculated 02/15/2022 98  0 - 100 mg/dL Final    LDL Cholesterol:     Optimal           <100 mg/dl     Near Optimal      100-129 mg/dl     Above Optimal       Borderline High 130-159 mg/dl       High            160-189 mg/dl       Very High       >189 mg/dl         This screening LDL is a calculated result  It does not have the accuracy of the Direct Measured LDL in the monitoring of patients with hyperlipidemia and/or statin therapy  Direct Measure LDL (BXJ990) must be ordered separately in these patients   Non-HDL-Chol (CHOL-HDL) 02/15/2022 143  mg/dl Final    Sodium 02/15/2022 137  136 - 145 mmol/L Final    Potassium 02/15/2022 4 3  3 5 - 5 3 mmol/L Final    Chloride 02/15/2022 105  100 - 108 mmol/L Final    CO2 02/15/2022 26  21 - 32 mmol/L Final    ANION GAP 02/15/2022 6  4 - 13 mmol/L Final    BUN 02/15/2022 20  5 - 25 mg/dL Final    Creatinine 02/15/2022 0 91  0 60 - 1 30 mg/dL Final    Standardized to IDMS reference method    Glucose, Fasting 02/15/2022 164 (A) 65 - 99 mg/dL Final    Specimen collection should occur prior to Sulfasalazine administration due to the potential for falsely depressed results  Specimen collection should occur prior to Sulfapyridine administration due to the potential for falsely elevated results      Calcium 02/15/2022 9 4  8 3 - 10 1 mg/dL Final    AST 02/15/2022 51 (A) 5 - 45 U/L Final    Specimen collection should occur prior to Sulfasalazine administration due to the potential for falsely depressed results   ALT 02/15/2022 70  12 - 78 U/L Final    Specimen collection should occur prior to Sulfasalazine and/or Sulfapyridine administration due to the potential for falsely depressed results   Alkaline Phosphatase 02/15/2022 119 (A) 46 - 116 U/L Final    Total Protein 02/15/2022 8 3 (A) 6 4 - 8 2 g/dL Final    Albumin 02/15/2022 3 6  3 5 - 5 0 g/dL Final    Total Bilirubin 02/15/2022 0 66  0 20 - 1 00 mg/dL Final    Use of this assay is not recommended for patients undergoing treatment with eltrombopag due to the potential for falsely elevated results   eGFR 02/15/2022 67  ml/min/1 73sq m Final    TSH 3RD GENERATON 02/15/2022 2 010  0 358 - 3 740 uIU/mL Final    The recommended reference ranges for TSH during pregnancy are as follows:   First trimester 0 1 to 2 5 uIU/mL   Second trimester  0 2 to 3 0 uIU/mL   Third trimester 0 3 to 3 0 uIU/m    Note: Normal ranges may not apply to patients who are transgender, non-binary, or whose legal sex, sex at birth, and gender identity differ     Appointment on 01/24/2022   Component Date Value Ref Range Status    Sed Rate 01/24/2022 56 (A) 0 - 29 mm/hour Final   Hospital Outpatient Visit on 01/18/2022   Component Date Value Ref Range Status    POC Glucose 01/18/2022 138  65 - 140 mg/dl Final    Case Report 01/18/2022    Final                    Value:Surgical Pathology Report                         Case: [id number]                                   Authorizing Provider:  Christina Pina MD         Collected:           01/18/2022 0850              Ordering Location:     [facility] Received:            01/18/2022 1339                                     [facility]                                                              Pathologist:           Lety Castellanos MD Specimens:   A) - Duodenum, duodenum bx r/o celiac                                                               B) - Stomach, stomach bx r/o h pylori                                                               C) - Colon, random colon bx r/o microscopic colitis                                        Final Diagnosis 01/18/2022    Final                    Value: This result contains rich text formatting which cannot be displayed here   Additional Information 01/18/2022    Final                    Value: This result contains rich text formatting which cannot be displayed here  [facility] Labor Description 01/18/2022    Final                    Value: This result contains rich text formatting which cannot be displayed here  Orders Only on 01/15/2022   Component Date Value Ref Range Status    CRP 01/19/2022 7 6 (A) <3 0 mg/L Final    Rheumatoid Factor 01/19/2022 Negative  Negative Final    Cyclic Citrullinated Peptide Ab  01/19/2022 2 6  See comment Final    HLA B27 01/19/2022 Negative   Final    HLA-B*27 Negative  B27 allele interpretation for all loci based on IMGT/HLA  database version 3 44  This test was developed and its performance characteristics  determined by [facility]   It has not been cleared or approved  by the Food and Drug Administration  [facility] CLIA ID Number [id number]  This test was performed using PCR (Polymerase Chain Reaction)/SSOP  (Sequence Specific Oligonucleotide Probes) technique  SBT (Sequence  Based Typing) and/or SSP (Sequence Specific Primers) may be used as  supplemental methods when necessary  Please contact [facility] Customer  Service at [phone] if you have any questions  Director of [facility]   Dr Katy Mathew, PhD   There may be more visits with results that are not included

## 2022-05-12 NOTE — PATIENT INSTRUCTIONS
Do labs  Try tizanidine up to 3 times a day as needed for muscle pain  Will start prednisone and hydroxychloroquine after gastric emptying study  Return to clinic in 4 months  Connective Tissue Disorders   AMBULATORY CARE:   A connective tissue disorder  can affect any connective tissue in your body  Connective tissues support your organs, attach muscles to bones, and create scar tissue after an injury  Cartilage is an example of connective tissue  There are many types of connective tissue disorders, such as rheumatoid arthritis, lupus, and scleroderma  The most common affected areas are joints, muscles, and skin  Your organs, eyes, nervous system, and blood vessels can also be affected  Common signs and symptoms of a connective tissue disorder:  Signs and symptoms depend on the type of connective tissue disorder and if it is severe  Symptoms may be mild or severe, and may come and go:  Fever or fatigue    Skin rash or thickening, blisters, or sensitivity to sunlight    Rash on your cheeks that goes across your nose    Joint pain, swelling, or warmth    Deformed joints, or limited range of motion    Cold, numb, or swollen fingers    Loss of appetite, or weight loss without trying    Dry mouth or eyes, vision problems, or an eye infection such as conjunctivitis    Hair loss    Call 911 for any of the following: You are sweating, and your lips are pale or blue  You have trouble breathing, chest pain or pressure, or a fast heartbeat  You are vomiting blood  You have a high fever  Seek care immediately if:   You lose feeling in your hands or feet  You lose feeling on one side of your body  You have sudden pain in your eyes and vision problems  Contact your healthcare provider if:   You have trouble urinating, or you urinate less than usual     You have trouble having a bowel movement, or you lose control of your bowel movements      Your muscle or joint tightness worsens, or your fingers begin to curl  Your symptoms get worse, even after treatment  Your skin is itchy, swollen, or has a rash  You have questions or concerns about your condition or care  Treatment  may include any of the following:  Medicines  may be given to prevent your immune system from attacking healthy cells  You may also need medicines to stop the disease from getting worse  You may need to use topical creams or lotions to control a rash or other symptoms that affect your skin  Prescription pain medicine  may be given  Ask your healthcare provider how to take this medicine safely  Some prescription pain medicines contain acetaminophen  Do not take other medicines that contain acetaminophen without talking to your healthcare provider  Too much acetaminophen may cause liver damage  Prescription pain medicine may cause constipation  Ask your healthcare provider how to prevent or treat constipation  NSAIDs , such as ibuprofen, help decrease swelling, pain, and fever  This medicine is available with or without a doctor's order  NSAIDs can cause stomach bleeding or kidney problems in certain people  If you take blood thinner medicine, always ask your healthcare provider if NSAIDs are safe for you  Always read the medicine label and follow directions  Acetaminophen  helps reduce pain and fever  Acetaminophen is available without a doctor's order  Ask how much to take and how often to take it  Follow directions  Acetaminophen can cause liver problems if not taken correctly  Steroids  may be given to reduce swelling and pain  Manage your connective tissue disorder:   Rest as needed  Talk to your healthcare provider if you are having trouble sleeping because of pain or other symptoms  Rest your joints if they are stiff or painful  Your healthcare provider may suggest support devices such as crutches or splints to help your joints rest      Eat a variety of healthy foods    Healthy foods include fruits, vegetables, lean meats, fish, and low-fat dairy products  Work with your healthcare provider or dietitian to create healthy meal plans  Go to physical or occupational therapy as directed  A physical therapist can help you create an exercise plan  Exercise may help increase your energy  Exercise can also help keep stiff joints flexible and increase range of motion  An occupational therapist can help you learn to do your daily activities when you have pain or swelling  Talk to your healthcare provider about pregnancy  If you are a woman and want to get pregnant, talk to your healthcare provider  You or your baby might be at risk for complications  You may need to wait until your disease is controlled or your medications are finished before you get pregnant  You may also have trouble getting pregnant because of your disease  Your healthcare provider may be able to suggest ways to improve your ability to become pregnant  Do not smoke  Nicotine and other chemicals in cigarettes and cigars can cause blood vessel and lung damage  Ask your healthcare provider for information if you currently smoke and need help to quit  E-cigarettes or smokeless tobacco still contain nicotine  Talk to your healthcare provider before you use these products  Manage stress  Stress may slow healing and lead to illness  Learn ways to control stress, such as relaxation, deep breathing, or listening to music  Manage flares:  A flare means something triggered your symptoms  Stress, cold weather, and sunlight are examples of triggers  Your healthcare provider can help you create a management plan that includes what to do if you have a flare  Treat flares quickly to help prevent serious illness  Apply ice or heat as directed  Ice helps reduce pain and swelling, and may help prevent tissue damage  Use an ice pack, or put crushed ice in a bag   Cover the bag with a towel and apply to the painful area for 15 to 20 minutes every hour, or as directed  Heat helps reduce pain and muscle spasms  Apply a warm compress to the area for 20 minutes every 2 hours, or as directed  Elevate the area above the level of your heart  Elevation can help reduce swelling and pain, especially in your joints  Elevate the area as often as possible  Keep your hands and feet warm  Certain connective tissue disorders can cause your hands and feet to become cold and painful  Over time, ulcers or gangrene (tissue death) may develop if frequent or severe attacks are not prevented  Dress warmly in cold weather, including gloves and thick socks  It may help to wiggle your fingers or toes to improve circulation  Follow up with your healthcare provider as directed: You may need ongoing tests or treatment  Write down your questions so you remember to ask them during your visits  © Copyright [employer] 2022 Information is for End User's use only and may not be sold, redistributed or otherwise used for commercial purposes  All illustrations and images included in CareNotes® are the copyrighted property of A [employer] Inc  or [facility] Fran Ulloa   The above information is an  only  It is not intended as medical advice for individual conditions or treatments  Talk to your doctor, nurse or pharmacist before following any medical regimen to see if it is safe and effective for you

## 2022-05-13 LAB — DSDNA AB SER-ACNC: <1 IU/ML (ref 0–9)

## 2022-05-17 ENCOUNTER — TELEPHONE (OUTPATIENT)
Dept: OBGYN CLINIC | Facility: HOSPITAL | Age: 62
End: 2022-05-17

## 2022-05-17 NOTE — TELEPHONE ENCOUNTER
Dr Armenta Deep    Patient insurance company is asking for auth for muscle relaxer or they can change it to another medication        # [phone]      Medication  tiZANidine (ZANAFLEX) 2 mg tablet [03844]    tiZANidine (ZANAFLEX) 2 mg tablet [[id number]]     Order Details  Dose: 2 mg Route: Oral Frequency: Every 8 hours PRN for muscle spasms   Dispense Quantity: 90 tablet Refills: 3          Sig: Take 1 tablet (2 mg total) by mouth every 8 (eight) hours as needed for muscle spasms

## 2022-05-18 ENCOUNTER — OFFICE VISIT (OUTPATIENT)
Dept: PHYSICAL THERAPY | Facility: REHABILITATION | Age: 62
End: 2022-05-18
Payer: MEDICARE
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
## 2022-05-18 DIAGNOSIS — R29.898 LEG WEAKNESS, BILATERAL: ICD-10-CM

## 2022-05-18 DIAGNOSIS — R26.89 IMBALANCE: ICD-10-CM

## 2022-05-18 PROCEDURE — 97110 THERAPEUTIC EXERCISES: CPT | Performed by: PHYSICAL THERAPIST

## 2022-05-18 NOTE — PROGRESS NOTES
Treatment Note     Today's date: 2022  Patient name: Mercer Lundborg  : 1960  MRN: [id number]  Referring provider: Golda Meigs, DO  Dx:   Encounter Diagnosis     ICD-10-CM    1  Fall, subsequent encounter  W19  XXXD    2  Leg weakness, bilateral  R29 898    3  Imbalance  R26 89         Subjective: Both knees are scheduled for injections the last week of May, starting a series of a few injections  Has to be off muscle relaxants due to GI issue  New onset of pain about the right lateral leg, started around yesterday, not responsive to stretching  Objective: See treatment diary below    Patient goals: walking at a normal pace without cane (not using prior to house), grocery shop without significant pain when completed, dancing at niece's wedding in 2022  - To move without pain and to not get tired as frequently (added 10/20/21)    Patient-Specific Functional Scale   Task is scored 0 (unable to perform activity) to 10 (ability to perform activity independently)  Activity 8/25/21 9/29/21 10/20/21 11/17/21 2/09/22 4/06/22 5/18/22   1  Walk at normal pace without a cane 5/10  0/10 3/10 3/10 due to speed or knee bending 7-810 initially, decreases with continued walking /10    2  Grocery shop without significant pain 8/10 0/10 4/10 7-8/10 7/10 7-8/10    3  Dance 5/10  1/10 2/10 0/10 4/10    4  Move without pain and to not get tired as frequently     7/10 5-6/10 7/10      Objective: See treatment diary below    Precautions - Hashimoto's, autoimmune disease    Mobility Measures 2/09/22 3/02/22 4/06/22 5/11/22   Assistive device used?        5 Time Sit to Stand  (17" chair, arms across chest)  Able to get up from 18" surface without use of hands     3 Meter Timed  Up & Go       Walking speed 0 60 -0 74 m/s      Functional Gait Assessment (see below)      6 Minute Walk Test (100 foot circular course)    700 feet, stopped at 1 min 30 sec left due to hip and lower extremity tightness   875 feet 1175 feet   Patient-Reported Outcome Measure: Activities-Specific Balance Confidence Scale (ABC Scale)       Right knee flexion PROM, sitting  100 degrees  90 degrees 98 degrees    Walks with walking boot on right ankle, decreased knee flexion excursion about the right knee, decreased speed, without assistive device  14 degrees active dorsiflexion with knee fully extended  16 degrees passively    Resists maximal pressure into right plantarflexion without pain    Without walking boot, increased right step length versus left, walking speed above Ankle range of motion within normal limits  Even step lengths, mild decrease in knee excursion  Ankle ROM within functional limits    Able to do 20 bilateral heel raises with even weight bearing    3/5 right plantarflexion, 2+/5 left Ankle ROM within functional limits    Completes bilateral heel raises       Pain about the right lateral thigh, lateral knee and lateral proximal lower leg  Mild tightness with right ITB with sidelying test    SciFit level 2 10 minutes, about 90 steps/min    Completed cross friction and soft tissue massage to the right lateral thigh and lateral knee,   Followed by sidelying R ITB stretching, 15 min total    Standing R ITB stretches, 2 min  Seated R ITB stretches, 2 min    Standing lateral step up 6" step, 40 sec each    Heel raise, 1 min      ASSESSMENT:   Responded well to stretching of the right ileotibial band, this is consistent with intermittent soft tissue tightness/injury  We want to identify, address, and continue with regular amount of physical activity  Deferred higher level mobility/agility today  SHORT-TERM GOALS: 1 month(s)  1  Brianna Bahena walks at least 10 minutes consecutively  MET  3  Brianna Bahena reports 50% improvement in ability to walk grocery store distances without severe fatigue  MET FOR FATIGUE, CONTINUED for PAIN     4  Demonstrates at least 11 degrees passive right dorsiflexion bi 2/12/22  MET     New goal: When released by physician, tolerates at least 6 minutes consecutive overground walking without walking boot  MET  New goal: When released by physician, tolerates at least 10 bilateral heel raises  MET  New goal: When released by physician, walks with step length without one inch of contralateral leg  MET  New goal: Walks at least 1350 feet during 6 minute walk test   New goal: Scores at least 20/30 on FGA  New goal: Able to purposefully walk for exercise at least 15 minutes at a time at least 3-4 times per week  LONG-TERM GOALS: by 3/12/22  1  Patient reports at least 20 total minutes per day of overground walking for exercise  PROGRESSING  2  Patient independently manages home exercise program   MET with need for revisions  Dr Adriana Mesa, DPM, fax [phone]:  Patient will benefit from physical therapy 1 time per week for 1 month  Neuromuscular re-education, therapeutic exercises, and therapeutic activities as outlined in grids

## 2022-05-19 ENCOUNTER — HOSPITAL ENCOUNTER (OUTPATIENT)
Dept: NUCLEAR MEDICINE | Facility: HOSPITAL | Age: 62
Discharge: HOME | End: 2022-05-19
Payer: MEDICARE

## 2022-05-19 DIAGNOSIS — R68.81 EARLY SATIETY: ICD-10-CM

## 2022-05-19 DIAGNOSIS — K31.84 GASTROPARESIS: ICD-10-CM

## 2022-05-19 PROCEDURE — A9541 TC99M SULFUR COLLOID: HCPCS

## 2022-05-19 PROCEDURE — G1004 CDSM NDSC: HCPCS

## 2022-05-19 PROCEDURE — 78264 GASTRIC EMPTYING IMG STUDY: CPT

## 2022-05-22 ENCOUNTER — TELEPHONE (OUTPATIENT)
Dept: RHEUMATOLOGY | Facility: CLINIC | Age: 62
End: 2022-05-22

## 2022-05-22 DIAGNOSIS — E55.9 VITAMIN D DEFICIENCY: ICD-10-CM

## 2022-05-22 DIAGNOSIS — R76.8 POSITIVE ANA (ANTINUCLEAR ANTIBODY): Primary | ICD-10-CM

## 2022-05-22 DIAGNOSIS — J30.89 SEASONAL ALLERGIC RHINITIS DUE TO OTHER ALLERGIC TRIGGER: ICD-10-CM

## 2022-05-22 DIAGNOSIS — M35.9 UNDIFFERENTIATED CONNECTIVE TISSUE DISEASE (HCC): ICD-10-CM

## 2022-05-22 RX ORDER — HYDROXYCHLOROQUINE SULFATE 200 MG/1
200 TABLET, FILM COATED ORAL 2 TIMES DAILY
Qty: 60 TABLET | Refills: 3 | Status: SHIPPED | OUTPATIENT
Start: 2022-05-22 | End: 2022-06-21

## 2022-05-22 RX ORDER — PREDNISONE 1 MG/1
TABLET ORAL
Qty: 70 TABLET | Refills: 0 | Status: SHIPPED | OUTPATIENT
Start: 2022-05-22

## 2022-05-23 RX ORDER — MONTELUKAST SODIUM 10 MG/1
TABLET ORAL
Qty: 30 TABLET | Refills: 5 | Status: SHIPPED | OUTPATIENT
Start: 2022-05-23

## 2022-05-23 RX ORDER — ERGOCALCIFEROL 1.25 MG/1
CAPSULE ORAL
Qty: 4 CAPSULE | Refills: 10 | Status: SHIPPED | OUTPATIENT
Start: 2022-05-23

## 2022-05-23 NOTE — TELEPHONE ENCOUNTER
Please let pt know that I sent to her pharmacy both a prednisone course and hydroxychloroquine to start taking now that she is done with her gastric emptying study

## 2022-05-25 ENCOUNTER — OFFICE VISIT (OUTPATIENT)
Dept: PHYSICAL THERAPY | Facility: REHABILITATION | Age: 62
End: 2022-05-25
Payer: MEDICARE

## 2022-05-25 DIAGNOSIS — R29.898 LEG WEAKNESS, BILATERAL: ICD-10-CM

## 2022-05-25 DIAGNOSIS — R26.89 IMBALANCE: ICD-10-CM
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
## 2022-05-25 PROCEDURE — 97530 THERAPEUTIC ACTIVITIES: CPT | Performed by: PHYSICAL THERAPIST

## 2022-05-25 PROCEDURE — 97110 THERAPEUTIC EXERCISES: CPT | Performed by: PHYSICAL THERAPIST

## 2022-05-25 PROCEDURE — 97112 NEUROMUSCULAR REEDUCATION: CPT | Performed by: PHYSICAL THERAPIST

## 2022-05-25 NOTE — PROGRESS NOTES
Treatment Note     Today's date: 2022  Patient name: Clint Beckman  : 1960  MRN: [id number]  Referring provider: Perry Hamilton DO  Dx:   Encounter Diagnosis     ICD-10-CM    1  Fall, subsequent encounter  W19  XXXD    2  Imbalance  R26 89    3  Leg weakness, bilateral  R29 898       Subjective: Went back on muscle relaxants and right lateral hip pain resolved  Objective: See treatment diary below    Patient goals: walking at a normal pace without cane (not using prior to house), grocery shop without significant pain when completed, dancing at niece's wedding in 2022  - To move without pain and to not get tired as frequently (added 10/20/21)    Patient-Specific Functional Scale   Task is scored 0 (unable to perform activity) to 10 (ability to perform activity independently)  Activity 8/25/21 9/29/21 10/20/21 11/17/21 2/09/22 4/06/22 5/11/22   1  Walk at normal pace without a cane 5/10  0/10 3/10 3/10 due to speed or knee bending -8/10 initially, decreases with continued walking 6/10    2  Grocery shop without significant pain 8/10 0/10 4/10 7-8/10 7/10 7-8/10    3  Dance 5/10  1/10 2/10 0/10 410    4  Move without pain and to not get tired as frequently     7/10 5-6/10 7/10      Objective: See treatment diary below    Precautions - Hashimoto's, autoimmune disease    Mobility Measures 2/09/22 3/02/22 4/06/22 5/11/22   Assistive device used? 5 Time Sit to Stand  (17" chair, arms across chest)  Able to get up from 18" surface without use of hands     3 Meter Timed  Up & Go       Walking speed 0 60 -0 74 m/s      Functional Gait Assessment (see below)      6 Minute Walk Test (100 foot circular course)    700 feet, stopped at 1 min 30 sec left due to hip and lower extremity tightness   875 feet 1175 feet   Patient-Reported Outcome Measure:  Activities-Specific Balance Confidence Scale (ABC Scale)       Right knee flexion PROM, sitting  100 degrees  90 degrees 98 degrees    Walks with walking boot on right ankle, decreased knee flexion excursion about the right knee, decreased speed, without assistive device  14 degrees active dorsiflexion with knee fully extended  16 degrees passively    Resists maximal pressure into right plantarflexion without pain    Without walking boot, increased right step length versus left, walking speed above Ankle range of motion within normal limits  Even step lengths, mild decrease in knee excursion  Ankle ROM within functional limits    Able to do 20 bilateral heel raises with even weight bearing    3/5 right plantarflexion, 2+/5 left Ankle ROM within functional limits    Completes bilateral heel raises       Walking on treadmill, to 12 minutes 1 0 up to 1 4 mph    Worked on dynamic balance/ agility/ endurance with dance songs within parallel bars and next to parallel bars  Sidestep braiding to 110 spm and later to 120/130 spm  Lateral movement with 360 degree turn to 100 spm  Lateral step-to and forward step-to to 130 spm  About 15 min total with intermittent gastroc and ankle stretches     Standing R ITB stretches, 2 min  Seated hip ER stretching, 40 sec each  Seated hamstring stretch, 30 sec x 2    Standing lateral step up 4" step, 60sec each  Heel raise, 1 min  Standing hip abduction, green band, 30 sec each    ASSESSMENT:   We did well with about 15 minutes of discontinuous dancing, recommend doing conditioning exercises in this area at home  Continue ankle and hip strengthening and stretching as needed  Look to increase volume of movement within therapy  SHORT-TERM GOALS: 1 month(s)  1  Marsha Stack walks at least 10 minutes consecutively  MET  3  Marsha Stack reports 50% improvement in ability to walk grocery store distances without severe fatigue  MET FOR FATIGUE, CONTINUED for PAIN     4  Demonstrates at least 11 degrees passive right dorsiflexion bi 2/12/22  MET      New goal: When released by physician, tolerates at least 6 minutes consecutive overground walking without walking boot  MET  New goal: When released by physician, tolerates at least 10 bilateral heel raises  MET  New goal: When released by physician, walks with step length without one inch of contralateral leg  MET  New goal: Walks at least 1350 feet during 6 minute walk test   New goal: Scores at least 20/30 on FGA  New goal: Able to purposefully walk for exercise at least 15 minutes at a time at least 3-4 times per week  NOT MET  LONG-TERM GOALS: by 3/12/22  1  Patient reports at least 20 total minutes per day of overground walking for exercise  PROGRESSING  2  Patient independently manages home exercise program   MET with need for revisions  Dr Frederic De Dios, DPM, fax [phone]:  Patient will benefit from physical therapy 1 time per week for 1 month  Neuromuscular re-education, therapeutic exercises, and therapeutic activities as outlined in grids

## 2022-05-27 ENCOUNTER — PROCEDURE VISIT (OUTPATIENT)
Dept: OBGYN CLINIC | Facility: MEDICAL CENTER | Age: 62
End: 2022-05-27
Payer: MEDICARE

## 2022-05-27 VITALS
BODY MASS INDEX: 36.16 KG/M2 | HEART RATE: 78 BPM | HEIGHT: 66 IN | SYSTOLIC BLOOD PRESSURE: 139 MMHG | DIASTOLIC BLOOD PRESSURE: 83 MMHG | WEIGHT: 225 LBS

## 2022-05-27 DIAGNOSIS — M17.0 BILATERAL PRIMARY OSTEOARTHRITIS OF KNEE: Primary | ICD-10-CM

## 2022-05-27 PROCEDURE — 20610 DRAIN/INJ JOINT/BURSA W/O US: CPT | Performed by: PHYSICIAN ASSISTANT

## 2022-05-27 RX ORDER — HYALURONATE SODIUM 10 MG/ML
20 SYRINGE (ML) INTRAARTICULAR
Status: COMPLETED | OUTPATIENT
Start: 2022-05-27 | End: 2022-05-27

## 2022-05-27 RX ADMIN — Medication 20 MG: at 11:14

## 2022-05-27 NOTE — PROGRESS NOTES
Patient received bilateral knee Euflexxa injections 1/3  Post injection instructions reviewed  Follow up 1 week  Large joint arthrocentesis: bilateral knee  Universal Protocol:  Consent: Verbal consent obtained    Risks and benefits: risks, benefits and alternatives were discussed  Consent given by: patient  Site marked: the operative site was marked  Supporting Documentation  Indications: pain   Procedure Details  Location: knee - bilateral knee  Preparation: Patient was prepped and draped in the usual sterile fashion  Needle size: 22 G  Ultrasound guidance: no  Approach: anterolateral    Medications (Right): 20 mg Sodium Hyaluronate 20 MG/2MLMedications (Left): 20 mg Sodium Hyaluronate 20 MG/2ML   Patient tolerance: patient tolerated the procedure well with no immediate complications  Dressing:  Sterile dressing applied

## 2022-06-01 ENCOUNTER — TELEPHONE (OUTPATIENT)
Dept: FAMILY MEDICINE CLINIC | Facility: CLINIC | Age: 62
End: 2022-06-01

## 2022-06-01 ENCOUNTER — OFFICE VISIT (OUTPATIENT)
Dept: PHYSICAL THERAPY | Facility: REHABILITATION | Age: 62
End: 2022-06-01
Payer: MEDICARE

## 2022-06-01 DIAGNOSIS — R26.89 IMBALANCE: ICD-10-CM

## 2022-06-01 DIAGNOSIS — R29.898 LEG WEAKNESS, BILATERAL: ICD-10-CM
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
## 2022-06-01 PROCEDURE — 97110 THERAPEUTIC EXERCISES: CPT | Performed by: PHYSICAL THERAPIST

## 2022-06-01 NOTE — PROGRESS NOTES
Treatment Note     Today's date: 2022  Patient name: Jose Angel Dockery  : 1960  MRN: [id number]  Referring provider: Manuel Oro DO  Dx:   Encounter Diagnosis     ICD-10-CM    1  Fall, subsequent encounter  W19  XXXD    2  Imbalance  R26 89    3  Leg weakness, bilateral  R29 898       Subjective:   Started knee injections, received 1 of 3, deferred aquatic therapy for a bit  Objective: See treatment diary below    Patient goals: walking at a normal pace without cane (not using prior to house), grocery shop without significant pain when completed, dancing at niece's wedding in 2022  - To move without pain and to not get tired as frequently (added 10/20/21)    Patient-Specific Functional Scale   Task is scored 0 (unable to perform activity) to 10 (ability to perform activity independently)  Activity 8/25/21 9/29/21 10/20/21 11/17/21 2/09/22 4/06/22 5/11/22   1  Walk at normal pace without a cane 5/10  0/10 3/10 3/10 due to speed or knee bending -8/10 initially, decreases with continued walking 6/10    2  Grocery shop without significant pain 8/10 0/10 4/10 7-8/10 7/10 7-8/10    3  Dance 5/10  1/10 2/10 0/10 4/10    4  Move without pain and to not get tired as frequently     7/10 5-6/10 710      Objective: See treatment diary below    Precautions - Hashimoto's, autoimmune disease    Mobility Measures 2/09/22 3/02/22 4/06/22 5/11/22   Assistive device used? 5 Time Sit to Stand  (17" chair, arms across chest)  Able to get up from 18" surface without use of hands     3 Meter Timed  Up & Go       Walking speed 0 60 -0 74 m/s      Functional Gait Assessment (see below)      6 Minute Walk Test (100 foot circular course)    700 feet, stopped at 1 min 30 sec left due to hip and lower extremity tightness   875 feet 1175 feet   Patient-Reported Outcome Measure:  Activities-Specific Balance Confidence Scale (ABC Scale)       Right knee flexion PROM, sitting  100 degrees  90 degrees 98 degrees    Walks with walking boot on right ankle, decreased knee flexion excursion about the right knee, decreased speed, without assistive device  14 degrees active dorsiflexion with knee fully extended  16 degrees passively    Resists maximal pressure into right plantarflexion without pain    Without walking boot, increased right step length versus left, walking speed above Ankle range of motion within normal limits  Even step lengths, mild decrease in knee excursion  Ankle ROM within functional limits    Able to do 20 bilateral heel raises with even weight bearing    3/5 right plantarflexion, 2+/5 left Ankle ROM within functional limits    Completes bilateral heel raises       Walking overground, 6 min, 1000 feet  Scifit level 2 mainly, 7 min, about 90 steps/min, legs only  Right knee flexion to 104 degrees, 1 min stretching    Standing heel raise, single leg on left, about 10 x 2 sets, with about 50% weight bearing for most reps on right, 10 x 2 sets  Right single leg stance on airex foam, move left, 40 sec x 2 sets each  Standing hip abduction, green band, about 10 each    Worked on dynamic balance/ agility/ endurance with dance songs within parallel bars     Sidestep braiding to 110 spm   Lateral movement with 360 degree turn to 100 spm  About 10 min total with intermittent gastroc and ankle stretches     ASSESSMENT:   Instructed in some initial aquatic exercises  Doing better with knee flexion stretches  Continue conditioning into aerobic endurance with home exercise program       SHORT-TERM GOALS: 1 month(s)  1  Sanju Peña walks at least 10 minutes consecutively  MET  3  Sanju Peña reports 50% improvement in ability to walk grocery store distances without severe fatigue  MET FOR FATIGUE, CONTINUED for PAIN     4  Demonstrates at least 11 degrees passive right dorsiflexion bi 2/12/22  MET      New goal: When released by physician, tolerates at least 6 minutes consecutive overground walking without walking boot   MET  New goal: When released by physician, tolerates at least 10 bilateral heel raises  MET  New goal: When released by physician, walks with step length without one inch of contralateral leg  MET  New goal: Walks at least 1350 feet during 6 minute walk test   New goal: Scores at least 20/30 on FGA  New goal: Able to purposefully walk for exercise at least 15 minutes at a time at least 3-4 times per week  NOT MET  LONG-TERM GOALS: by 3/12/22  1  Patient reports at least 20 total minutes per day of overground walking for exercise  PROGRESSING  2  Patient independently manages home exercise program   MET with need for revisions  Dr Jennyfer Foy, DPM, fax [phone]:  Patient will benefit from physical therapy 1 time per week for 1 month  Neuromuscular re-education, therapeutic exercises, and therapeutic activities as outlined in grids

## 2022-06-01 NOTE — TELEPHONE ENCOUNTER
Patient called and wants to know if you received her Danyell Form for her Trulicity medication  They are going to close out her request if they don't receive the paperwork ASAP  Please advise patient at [phone]

## 2022-06-03 ENCOUNTER — PROCEDURE VISIT (OUTPATIENT)
Dept: OBGYN CLINIC | Facility: MEDICAL CENTER | Age: 62
End: 2022-06-03
Payer: MEDICARE

## 2022-06-03 ENCOUNTER — TELEPHONE (OUTPATIENT)
Dept: FAMILY MEDICINE CLINIC | Facility: CLINIC | Age: 62
End: 2022-06-03

## 2022-06-03 VITALS
HEIGHT: 66 IN | HEART RATE: 80 BPM | DIASTOLIC BLOOD PRESSURE: 78 MMHG | BODY MASS INDEX: 35.52 KG/M2 | SYSTOLIC BLOOD PRESSURE: 132 MMHG | WEIGHT: 221 LBS

## 2022-06-03 DIAGNOSIS — M17.0 PRIMARY OSTEOARTHRITIS OF BOTH KNEES: Primary | ICD-10-CM

## 2022-06-03 PROCEDURE — 20610 DRAIN/INJ JOINT/BURSA W/O US: CPT | Performed by: PHYSICIAN ASSISTANT

## 2022-06-03 RX ORDER — HYALURONATE SODIUM 10 MG/ML
20 SYRINGE (ML) INTRAARTICULAR
Status: COMPLETED | OUTPATIENT
Start: 2022-06-03 | End: 2022-06-03

## 2022-06-03 RX ADMIN — Medication 20 MG: at 11:18

## 2022-06-03 NOTE — PROGRESS NOTES
Patient Name:  Lam Daniels  MR#:  [id number]    The patient presents for her second injection of Euflexxa into the bilateral knees  She notes overall significant improvement after undergoing the first injection  Large joint arthrocentesis: bilateral knee  Procedure Details  Location: knee - bilateral knee  Needle size: 22 G  Ultrasound guidance: no  Approach: anterolateral    Medications (Right): 20 mg Sodium Hyaluronate 20 MG/2MLMedications (Left): 20 mg Sodium Hyaluronate 20 MG/2ML   Patient tolerance: patient tolerated the procedure well with no immediate complications  Dressing:  Sterile dressing applied        Reviewed post-injection care with patient  Follow-up in one week for third injection

## 2022-06-03 NOTE — TELEPHONE ENCOUNTER
Patient came into the office day to drop off Paperwork from [employer] for Dr Nikhil Pruitt to complete  Placed in Dr Brown Emplucien mail bin for completion  Patient asked if we would fax the paperwork for her when competed

## 2022-06-08 ENCOUNTER — OFFICE VISIT (OUTPATIENT)
Dept: PHYSICAL THERAPY | Facility: REHABILITATION | Age: 62
End: 2022-06-08
Payer: MEDICARE

## 2022-06-08 DIAGNOSIS — R29.898 LEG WEAKNESS, BILATERAL: ICD-10-CM

## 2022-06-08 DIAGNOSIS — R26.89 IMBALANCE: ICD-10-CM
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
## 2022-06-08 PROCEDURE — 97112 NEUROMUSCULAR REEDUCATION: CPT | Performed by: PHYSICAL THERAPIST

## 2022-06-08 PROCEDURE — 97110 THERAPEUTIC EXERCISES: CPT | Performed by: PHYSICAL THERAPIST

## 2022-06-08 NOTE — PROGRESS NOTES
Treatment Note     Today's date: 2022  Patient name: Yolanda Barron  : 1960  MRN: [id number]  Referring provider: Mirlande Alonso DO  Dx:   Encounter Diagnosis     ICD-10-CM    1  Fall, subsequent encounter  W19  XXXD    2  Imbalance  R26 89    3  Leg weakness, bilateral  R29 898       Subjective:   Started knee injections, received 2 of 3  Did a lot of water aerobics classes, did deep water running, chair aerobics, and yoga yesterday, and able to stretch to deal with tightness  Intends to branch to other classes and land-based exercise equipment  Objective: See treatment diary below    Patient goals: walking at a normal pace without cane (not using prior to house), grocery shop without significant pain when completed, dancing at niece's wedding in 2022  - To move without pain and to not get tired as frequently (added 10/20/21)    Patient-Specific Functional Scale   Task is scored 0 (unable to perform activity) to 10 (ability to perform activity independently)  Activity 8/25/21 9/29/21 10/20/21 11/17/21 2/09/22 4/06/22 6/15/22   1  Walk at normal pace without a cane 5/10  0/10 3/10 3/10 due to speed or knee bending 7-810 initially, decreases with continued walking /10    2  Grocery shop without significant pain 8/10 0/10 4/10 7-8/10 7/10 7-8/10    3  Dance 5/10  1/10 2/10 0/10 4/10    4  Move without pain and to not get tired as frequently     10 5-6/10 7/10      Objective: See treatment diary below    Precautions - Hashimoto's, autoimmune disease    Mobility Measures 2/09/22 3/02/22 4/06/22 5/11/22 6/15/22   Assistive device used?         5 Time Sit to Stand  (17" chair, arms across chest)  Able to get up from 18" surface without use of hands      3 Meter Timed  Up & Go        Walking speed 0 60 -0 74 m/s       Functional Gait Assessment (see below)       6 Minute Walk Test (100 foot circular course)    700 feet, stopped at 1 min 30 sec left due to hip and lower extremity tightness   875 feet 1175 feet    Patient-Reported Outcome Measure: Activities-Specific Balance Confidence Scale (ABC Scale)        Right knee flexion PROM, sitting  100 degrees  90 degrees 98 degrees     Walks with walking boot on right ankle, decreased knee flexion excursion about the right knee, decreased speed, without assistive device  14 degrees active dorsiflexion with knee fully extended  16 degrees passively    Resists maximal pressure into right plantarflexion without pain    Without walking boot, increased right step length versus left, walking speed above Ankle range of motion within normal limits  Even step lengths, mild decrease in knee excursion  Ankle ROM within functional limits    Able to do 20 bilateral heel raises with even weight bearing    3/5 right plantarflexion, 2+/5 left Ankle ROM within functional limits    Completes bilateral heel raises          Scifit level 2 mainly, 12 min, about 90 steps/min, legs only  Right knee flexion to 105 degrees, 1 min stretching    Seated triceps stretches, upper trap stretching, 3 min    Modified child's pose, 1 min    Stair ambulation, step-to in descent with right leading, alternating in ascent, x 1 sets    Step down 6" step leading with the left leg, about 10  Step up 6" step leading with right leg, about 12  Lateral step up 6" step, with R leading, about 15    Standing heel raise bilaterally    Worked on dynamic balance/ agility/ endurance with dance songs within parallel bars   Sidestep braiding to 110 spm, 2 5 min  To 130 spm 1 min x 3 sets    Lateral movement with 360 degree turn to 110 spm, 1 min x 2 sets    About 3 min total with intermittent gastroc and ankle stretches     ASSESSMENT  [facility] is doing very well with staying physically active and managing post-exercise tightness/stiffness  Helped and showed some neck and triceps and biceps/wrist flexor stretching based on response to chair-based exercise class    The injections seem to be allowing better knee range of motion  SHORT-TERM GOALS: 1 month(s)  1  Ren Boucher walks at least 10 minutes consecutively  MET  3  Ren Boucher reports 50% improvement in ability to walk grocery store distances without severe fatigue  MET FOR FATIGUE, CONTINUED for PAIN     4  Demonstrates at least 11 degrees passive right dorsiflexion bi 2/12/22  MET  New goal: When released by physician, tolerates at least 6 minutes consecutive overground walking without walking boot  MET  New goal: When released by physician, tolerates at least 10 bilateral heel raises  MET  New goal: When released by physician, walks with step length without one inch of contralateral leg  MET  New goal: Walks at least 1350 feet during 6 minute walk test   New goal: Scores at least 20/30 on FGA  New goal: Able to purposefully walk for exercise at least 15 minutes at a time at least 3-4 times per week  NOT MET  LONG-TERM GOALS: by 3/12/22  1  Patient reports at least 20 total minutes per day of overground walking for exercise  PROGRESSING  2  Patient independently manages home exercise program   MET with need for revisions  Dr Jeff Sheikh, DPM, fax [phone]:  Patient will benefit from physical therapy 1 time per week for 1 month  Neuromuscular re-education, therapeutic exercises, and therapeutic activities as outlined in grids

## 2022-06-10 ENCOUNTER — PROCEDURE VISIT (OUTPATIENT)
Dept: OBGYN CLINIC | Facility: MEDICAL CENTER | Age: 62
End: 2022-06-10
Payer: MEDICARE

## 2022-06-10 VITALS
BODY MASS INDEX: 36.16 KG/M2 | DIASTOLIC BLOOD PRESSURE: 81 MMHG | SYSTOLIC BLOOD PRESSURE: 136 MMHG | HEIGHT: 66 IN | HEART RATE: 83 BPM | WEIGHT: 225 LBS

## 2022-06-10 DIAGNOSIS — M17.0 BILATERAL PRIMARY OSTEOARTHRITIS OF KNEE: Primary | ICD-10-CM

## 2022-06-10 PROCEDURE — 20610 DRAIN/INJ JOINT/BURSA W/O US: CPT | Performed by: PHYSICIAN ASSISTANT

## 2022-06-10 RX ORDER — HYALURONATE SODIUM 10 MG/ML
20 SYRINGE (ML) INTRAARTICULAR
Status: COMPLETED | OUTPATIENT
Start: 2022-06-10 | End: 2022-06-10

## 2022-06-10 RX ADMIN — Medication 20 MG: at 11:31

## 2022-06-10 NOTE — PROGRESS NOTES
Patient is here for bilateral knee Euflexxa injections 3/3  Notes improvement  Post injection instructions reviewed  She will call in 5 months for repeat VS injections  She avoids CSI due to allergies  Large joint arthrocentesis: bilateral knee  Universal Protocol:  Consent: Verbal consent obtained    Risks and benefits: risks, benefits and alternatives were discussed  Consent given by: patient  Site marked: the operative site was marked  Supporting Documentation  Indications: pain   Procedure Details  Location: knee - bilateral knee  Preparation: Patient was prepped and draped in the usual sterile fashion  Needle size: 22 G  Ultrasound guidance: no  Approach: anterolateral    Medications (Right): 20 mg Sodium Hyaluronate 20 MG/2MLMedications (Left): 20 mg Sodium Hyaluronate 20 MG/2ML   Patient tolerance: patient tolerated the procedure well with no immediate complications  Dressing:  Sterile dressing applied

## 2022-06-15 ENCOUNTER — APPOINTMENT (OUTPATIENT)
Dept: PHYSICAL THERAPY | Facility: REHABILITATION | Age: 62
End: 2022-06-15
Payer: MEDICARE

## 2022-06-18 DIAGNOSIS — K58.0 IRRITABLE BOWEL SYNDROME WITH DIARRHEA: ICD-10-CM

## 2022-06-18 RX ORDER — MONTELUKAST SODIUM 4 MG/1
TABLET, CHEWABLE ORAL
Qty: 60 TABLET | Refills: 2 | Status: SHIPPED | OUTPATIENT
Start: 2022-06-18

## 2022-06-21 ENCOUNTER — HOSPITAL ENCOUNTER (OUTPATIENT)
Dept: RADIOLOGY | Facility: HOSPITAL | Age: 62
Discharge: HOME | End: 2022-06-21
Payer: MEDICARE

## 2022-06-21 DIAGNOSIS — R13.13 PHARYNGEAL DYSPHAGIA: ICD-10-CM

## 2022-06-21 PROCEDURE — 92611 MOTION FLUOROSCOPY/SWALLOW: CPT

## 2022-06-21 PROCEDURE — 74230 X-RAY XM SWLNG FUNCJ C+: CPT
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
## 2022-06-22 ENCOUNTER — OFFICE VISIT (OUTPATIENT)
Dept: PHYSICAL THERAPY | Facility: REHABILITATION | Age: 62
End: 2022-06-22
Payer: MEDICARE
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
## 2022-06-22 DIAGNOSIS — R26.89 IMBALANCE: ICD-10-CM

## 2022-06-22 DIAGNOSIS — R29.898 LEG WEAKNESS, BILATERAL: ICD-10-CM

## 2022-06-22 PROCEDURE — 97112 NEUROMUSCULAR REEDUCATION: CPT | Performed by: PHYSICAL THERAPIST

## 2022-06-22 PROCEDURE — 97110 THERAPEUTIC EXERCISES: CPT | Performed by: PHYSICAL THERAPIST

## 2022-06-22 NOTE — PROGRESS NOTES
Treatment Note     Today's date: 2022  Patient name: Hattie Gallegos  : 1960  MRN: [id number]  Referring provider: Willem Wyman DO  Dx:   No diagnosis found  Subjective:   Legs are getting stronger from exercise classes  Does seated aerobics, seated yoga, and deep water running  Does free swim and water aerobics or stretching in the pool  Finished knee injections last week, still has a little swelling  Did 5 minutes on elliptical at gym  May late use machines  Coughing and allergies going on  Intermittent pain and locking of knees, but loosens quickly again  Doesn't take as long to get moving again  Objective: See treatment diary below    Patient goals: walking at a normal pace without cane (not using prior to house), grocery shop without significant pain when completed, dancing at niece's wedding in 2022  - To move without pain and to not get tired as frequently (added 10/20/21)    Patient-Specific Functional Scale   Task is scored 0 (unable to perform activity) to 10 (ability to perform activity independently)  Activity 8/25/21 9/29/21 10/20/21 11/17/21 2/09/22 4/06/22 6/22/22   1  Walk at normal pace without a cane 5/10  0/10 3/10 3/10 due to speed or knee bending 7-810 initially, decreases with continued walking 10 8-9/10 can do this, not long distances   2  Grocery shop without significant pain 8/10 0/10 4/10 7-8/10 7/10 7-8/10 8/10   3  Dance /10  1/10 2/10 0/10 4/10 7/10, getting better every week, working in coordination   4  Move without pain and to not get tired as frequently     7/10 5-6/10 7/10 6-7/10     Objective: See treatment diary below    Precautions - Hashimoto's, autoimmune disease    Mobility Measures 2/09/22 3/02/22 4/06/22 5/11/22 6/22/22   Assistive device used?         5 Time Sit to Stand  (17" chair, arms across chest)  Able to get up from 18" surface without use of hands   Able without hands     1-2 times in a row       3 Meter Timed  Up & Go     9 1 seconds   Walking speed 0 60 -0 74 m/s       Functional Gait Assessment (see below)   18/30 17/30    6 Minute Walk Test (100 foot circular course)    700 feet, stopped at 1 min 30 sec left due to hip and lower extremity tightness   875 feet 1175 feet 1170 feet   Patient-Reported Outcome Measure: Activities-Specific Balance Confidence Scale (ABC Scale)        Right knee flexion PROM, sitting  100 degrees  90 degrees 98 degrees 105 degrees    Walks with walking boot on right ankle, decreased knee flexion excursion about the right knee, decreased speed, without assistive device  14 degrees active dorsiflexion with knee fully extended  16 degrees passively    Resists maximal pressure into right plantarflexion without pain    Without walking boot, increased right step length versus left, walking speed above Ankle range of motion within normal limits  Even step lengths, mild decrease in knee excursion  Ankle ROM within functional limits    Able to do 20 bilateral heel raises with even weight bearing    3/5 right plantarflexion, 2+/5 left Ankle ROM within functional limits    Completes bilateral heel raises   Ankle ROM within functional limits  3-/5 heel raise       Functional Gait Assessment  2/3 Gait level surface  3/3 Change in gait speed  3/3 Gait with horizontal head turns  3/3 Gait with vertical head turns  3/3 Gait and pivot turn  2/3 Step over obstacle  1/3 Gait with narrow base of support  2/3 Gait with eyes closed  1/3 Ambulating backwards  1/3 Steps  21/30 Total score (less than 22/30 indicates increased risk of fall)       Single leg heel raise, about 6-8 each with railing support    Lateral step up 6" step, 45 sec each  Lateral step up 8" step, 45 sec each    Worked on dynamic balance/ agility/ endurance with dance songs out of parallel bars   Sidestep braiding, sidestep with turning,  to 120-130 spm, 2 5 min x 2 sets    SciFit level 2 4, about 10 min, about 110 steps/min    ASSESSMENT  Good improvement in dynamic balance and PSFS score  Good management of exercises outside of therapy, good routine incorporating aerobic exercise and light strengthening  Continue to progress as able  SHORT-TERM GOALS: 1 month(s)  1  Carney Never walks at least 10 minutes consecutively  MET  3  Carney Never reports 50% improvement in ability to walk grocery store distances without severe fatigue  MET  4  Demonstrates at least 11 degrees passive right dorsiflexion by 2/12/22  MET  New goal: When released by physician, tolerates at least 6 minutes consecutive overground walking without walking boot  MET  New goal: When released by physician, tolerates at least 10 bilateral heel raises  MET  New goal: When released by physician, walks with step length without one inch of contralateral leg  MET  New goal: Walks at least 1350 feet during 6 minute walk test   MET  New goal: Scores at least 20/30 on FGA    MET  New goal: Able to purposefully walk for exercise at least 15 minutes at a time at least 3-4 times per week  PROGRESSING  LONG-TERM GOALS: by 3/12/22  1  Patient reports at least 20 total minutes per day of overground walking for exercise  PROGRESSING  2  Patient independently manages home exercise program   MET with need for revisions  Dr Nisreen Bentley, DPM, fax [phone]:  Patient will benefit from physical therapy 1 time per week for 1 month  Neuromuscular re-education, therapeutic exercises, and therapeutic activities as outlined in grids

## 2022-06-23 DIAGNOSIS — I10 ESSENTIAL HYPERTENSION: ICD-10-CM

## 2022-06-23 RX ORDER — LOSARTAN POTASSIUM 50 MG/1
TABLET ORAL
Qty: 90 TABLET | Refills: 3 | Status: SHIPPED | OUTPATIENT
Start: 2022-06-23

## 2022-06-29 ENCOUNTER — OFFICE VISIT (OUTPATIENT)
Dept: GASTROENTEROLOGY | Facility: CLINIC | Age: 62
End: 2022-06-29
Payer: MEDICARE

## 2022-06-29 ENCOUNTER — OFFICE VISIT (OUTPATIENT)
Dept: PHYSICAL THERAPY | Facility: REHABILITATION | Age: 62
End: 2022-06-29
Payer: MEDICARE

## 2022-06-29 VITALS
TEMPERATURE: 97.1 F | SYSTOLIC BLOOD PRESSURE: 130 MMHG | DIASTOLIC BLOOD PRESSURE: 78 MMHG | HEIGHT: 66 IN | RESPIRATION RATE: 18 BRPM | HEART RATE: 80 BPM | WEIGHT: 225 LBS | OXYGEN SATURATION: 98 % | BODY MASS INDEX: 36.16 KG/M2

## 2022-06-29 DIAGNOSIS — K31.84 GASTROPARESIS: Primary | ICD-10-CM
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
## 2022-06-29 DIAGNOSIS — R29.898 LEG WEAKNESS, BILATERAL: ICD-10-CM

## 2022-06-29 DIAGNOSIS — R26.89 IMBALANCE: ICD-10-CM

## 2022-06-29 PROCEDURE — 99214 OFFICE O/P EST MOD 30 MIN: CPT | Performed by: PHYSICIAN ASSISTANT

## 2022-06-29 PROCEDURE — 97110 THERAPEUTIC EXERCISES: CPT | Performed by: PHYSICAL THERAPIST

## 2022-06-29 RX ORDER — DOXYCYCLINE HYCLATE 100 MG/1
100 CAPSULE ORAL 2 TIMES DAILY
COMMUNITY
Start: 2022-06-22
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
## 2022-06-29 NOTE — PROGRESS NOTES
Treatment Note     Today's date: 2022  Patient name: Sofi Cedeño  : 1960  MRN: [id number]  Referring provider: Jorge L Saravia DO  Dx:   Encounter Diagnosis     ICD-10-CM    1  Fall, subsequent encounter  W19  XXXD    2  Imbalance  R26 89    3  Leg weakness, bilateral  R29 898    Total time in clinic (min): 45 minutes    Subjective: Went to family wedding over the weekend, danced off and on for 3 house  Continues with water aerobics  Tried land-based class, alleycat aerobics  Did chair yoga, water running  Does lap swim and water aerobics  Long-term goals, getting back to swimming (accomplished) and dance  Continues to work on trunk and hip flexibility  Objective: See treatment diary below    Patient goals: walking at a normal pace without cane (not using prior to house), grocery shop without significant pain when completed, dancing at niece's wedding in 2022  - To move without pain and to not get tired as frequently (added 10/20/21)    Patient-Specific Functional Scale   Task is scored 0 (unable to perform activity) to 10 (ability to perform activity independently)  Activity 8/25/21 9/29/21 10/20/21 11/17/21 2/09/22 4/06/22 6/22/22   1  Walk at normal pace without a cane 5/10  0/10 3/10 3/10 due to speed or knee bending 7-8/10 initially, decreases with continued walking 6/10 8-910 can do this, not long distances   2  Grocery shop without significant pain 8/10 0/10 4/10 7-810 7/10 7-8/10 8/10   3  Dance 5/10  1/10 2/10 010 4/10 7/10, getting better every week, working in coordination   4  Move without pain and to not get tired as frequently     10 5-6/10 7/10 6-7/10     Objective: See treatment diary below    Precautions - Hashimoto's, autoimmune disease    Mobility Measures 2/09/22 3/02/22 4/06/22 5/11/22 6/22/22   Assistive device used?         5 Time Sit to Stand  (17" chair, arms across chest)  Able to get up from 18" surface without use of hands   Able without hands     1-2 times in a row       3 Meter Timed  Up & Go     9 1 seconds   Walking speed 0 60 -0 74 m/s       Functional Gait Assessment (see below)   18/30 17/30    6 Minute Walk Test (100 foot circular course)    700 feet, stopped at 1 min 30 sec left due to hip and lower extremity tightness   875 feet 1175 feet 1170 feet   Patient-Reported Outcome Measure: Activities-Specific Balance Confidence Scale (ABC Scale)        Right knee flexion PROM, sitting  100 degrees  90 degrees 98 degrees 105 degrees    Walks with walking boot on right ankle, decreased knee flexion excursion about the right knee, decreased speed, without assistive device  14 degrees active dorsiflexion with knee fully extended  16 degrees passively    Resists maximal pressure into right plantarflexion without pain    Without walking boot, increased right step length versus left, walking speed above Ankle range of motion within normal limits  Even step lengths, mild decrease in knee excursion  Ankle ROM within functional limits    Able to do 20 bilateral heel raises with even weight bearing    3/5 right plantarflexion, 2+/5 left Ankle ROM within functional limits    Completes bilateral heel raises   Ankle ROM within functional limits  3-/5 heel raise       Functional Gait Assessment  2/3 Gait level surface  3/3 Change in gait speed  3/3 Gait with horizontal head turns  3/3 Gait with vertical head turns  3/3 Gait and pivot turn  2/3 Step over obstacle  1/3 Gait with narrow base of support  2/3 Gait with eyes closed  1/3 Ambulating backwards  1/3 Steps  21/30 Total score (less than 22/30 indicates increased risk of fall)       SciFit level 2, 12 min, about 85 steps/min    hooklying lower trunk rotation stretch, 1 min each x 2 sets  hooklying hip ER stretch, 1 min each  Seated trunk rotation stretch 15 sec x 2 sets each    Lateral step up 8" step, 45 sec each  Lateral step up 8" step, 45 sec each  Heel raise, about 10-15 each x 2-3 sets      ASSESSMENT  Trial exercise bike next session as we may have enough knee ROM to do this at this point  Continues to do a good job of managing exercises outside of therapy, good routine incorporating aerobic exercise and light strengthening  We are changing exercises as needed to accommodate intermittent tightness that limits participation in physical activity   Continue to progress as able  SHORT-TERM GOALS: 1 month(s)  1  Dione Whitehead walks at least 10 minutes consecutively  MET  3  Dione Whitehead reports 50% improvement in ability to walk grocery store distances without severe fatigue  MET  4  Demonstrates at least 11 degrees passive right dorsiflexion by 2/12/22  MET  New goal: When released by physician, tolerates at least 6 minutes consecutive overground walking without walking boot  MET  New goal: When released by physician, tolerates at least 10 bilateral heel raises  MET  New goal: When released by physician, walks with step length without one inch of contralateral leg  MET  New goal: Walks at least 1350 feet during 6 minute walk test   MET  New goal: Scores at least 20/30 on FGA    MET  New goal: Able to purposefully walk for exercise at least 15 minutes at a time at least 3-4 times per week  PROGRESSING  LONG-TERM GOALS: by 3/12/22  1  Patient reports at least 20 total minutes per day of overground walking for exercise  PROGRESSING  2  Patient independently manages home exercise program   MET with need for revisions  PLAN OF CARE:  Patient will benefit from physical therapy 1 time per week for 1 month  Neuromuscular re-education, therapeutic exercises, and therapeutic activities as outlined in grids

## 2022-07-13 ENCOUNTER — OFFICE VISIT (OUTPATIENT)
Dept: PHYSICAL THERAPY | Facility: REHABILITATION | Age: 62
End: 2022-07-13
Payer: MEDICARE

## 2022-07-13 DIAGNOSIS — R29.898 LEG WEAKNESS, BILATERAL: ICD-10-CM
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
## 2022-07-13 DIAGNOSIS — R26.89 IMBALANCE: ICD-10-CM

## 2022-07-13 PROCEDURE — 97110 THERAPEUTIC EXERCISES: CPT | Performed by: PHYSICAL THERAPIST

## 2022-07-13 NOTE — PROGRESS NOTES
Treatment Note     Today's date: 2022  Patient name: Eula Phillips  : 1960  MRN: [id number]  Referring provider: John Parikh DO  Dx:   Encounter Diagnosis     ICD-10-CM    1  Fall, subsequent encounter  W19  XXXD    2  Imbalance  R26 89    3  Leg weakness, bilateral  R29 898         Subjective: When walking sometimes, feels the each foot flap down  Happens immediately upon walking and after an amount of walking  Ankles have been hurting  No pain associated this  Outside of legs have been tight  Knees don't seem to be involved  Does get swelling in ankles  Noticed first after the wedding  Denies catching toes on ground when walking  Had to stop exercise classes for a bit due to congestion, but will return today starting with swimming  Long-term goals, getting back to swimming (accomplished) and dance  Continues to work on trunk and hip flexibility  Objective: See treatment diary below    Patient goals: walking at a normal pace without cane (not using prior to house), grocery shop without significant pain when completed, dancing at niece's wedding in 2022  - To move without pain and to not get tired as frequently (added 10/20/21)    Patient-Specific Functional Scale   Task is scored 0 (unable to perform activity) to 10 (ability to perform activity independently)  Activity 8/25/21 9/29/21 10/20/21 11/17/21 2/09/22 4/06/22 6/22/22   1  Walk at normal pace without a cane 5/10  0/10 3/10 3/10 due to speed or knee bending -8/10 initially, decreases with continued walking 6/10 8-9/10 can do this, not long distances   2  Grocery shop without significant pain 8/10 0/10 4/10 7-8/10 7/10 7-8/10 8/10   3  Dance 5/10  1/10 2/10 0/10 4/10 7/10, getting better every week, working in coordination   4     Move without pain and to not get tired as frequently     7/10 5-610 7/10 6-7/10     Objective: See treatment diary below    Precautions - Hashimoto's, autoimmune disease    Mobility Measures 2/09/22 3/02/22 4/06/22 5/11/22 6/22/22 7/20/22   Assistive device used? 5 Time Sit to Stand  (17" chair, arms across chest)  Able to get up from 18" surface without use of hands   Able without hands     1-2 times in a row        3 Meter Timed  Up & Go     9 1 seconds    Walking speed 0 60 -0 74 m/s        Functional Gait Assessment (see below)   18/30 17/30     6 Minute Walk Test (100 foot circular course)    700 feet, stopped at 1 min 30 sec left due to hip and lower extremity tightness   875 feet 1175 feet 1170 feet    Patient-Reported Outcome Measure: Activities-Specific Balance Confidence Scale (ABC Scale)         Right knee flexion PROM, sitting  100 degrees  90 degrees 98 degrees 105 degrees     Walks with walking boot on right ankle, decreased knee flexion excursion about the right knee, decreased speed, without assistive device  14 degrees active dorsiflexion with knee fully extended  16 degrees passively    Resists maximal pressure into right plantarflexion without pain    Without walking boot, increased right step length versus left, walking speed above Ankle range of motion within normal limits  Even step lengths, mild decrease in knee excursion   Ankle ROM within functional limits    Able to do 20 bilateral heel raises with even weight bearing    3/5 right plantarflexion, 2+/5 left Ankle ROM within functional limits    Completes bilateral heel raises   Ankle ROM within functional limits  3-/5 heel raise        Overground walking, 5 min, mild intermittent right foot slap after 4 5 minutes of walking  Bilateral heel raise, 40 sec  Single leg heel raise, 30 sec each with railing  Instruction to not stretch tibialis anterior with prolonged stretches    Lateral step up 8" step, 50 sec each  Lateral step up 8" step, 50 sec each    Sit to stand, foam under chair, 30 sec x 2 sets  Single leg stance tap targets standing on foam, 30 sec x 2 sets each    Seated dorsiflexion, feet on decline, 30-40 sec x 2 sets    hooklying lower trunk rotation stretch, 1 min total  hooklying hip ER stretch, 1 min each    SciFit level 1, 6 min, 95 steps/min  SciFit level 3, 6 min, 90 steps/min      ASSESSMENT  Trialed exercise bike, lacks knee flexion range of motion to make this comfortable at this time  Continues to do a good job of managing exercises outside of therapy, good routine incorporating aerobic exercise and light strengthening  We have a milder end with pool-based exercise and a moderate level with land-based classes and dance exercise at home  We are changing exercises as needed to accommodate intermittent tightness that limits participation in physical activity  Gradually progress load to tibialis anterior  SHORT-TERM GOALS: 1 month(s)  1  Dolores Bush walks at least 10 minutes consecutively  MET  3  Dolores Bush reports 50% improvement in ability to walk grocery store distances without severe fatigue  MET  4  Demonstrates at least 11 degrees passive right dorsiflexion by 2/12/22  MET  New goal: When released by physician, tolerates at least 6 minutes consecutive overground walking without walking boot  MET  New goal: When released by physician, tolerates at least 10 bilateral heel raises  MET  New goal: When released by physician, walks with step length without one inch of contralateral leg  MET  New goal: Walks at least 1350 feet during 6 minute walk test   MET  New goal: Scores at least 20/30 on FGA    MET  New goal: Able to purposefully walk for exercise at least 15 minutes at a time at least 3-4 times per week  PROGRESSING  LONG-TERM GOALS: by 3/12/22  1  Patient reports at least 20 total minutes per day of overground walking for exercise  PROGRESSING  2  Patient independently manages home exercise program   MET with need for revisions      PLAN OF CARE:  Patient will benefit from physical therapy 1 time per week for 1 month  Neuromuscular re-education, therapeutic exercises, and therapeutic activities as outlined in grids

## 2022-07-15 DIAGNOSIS — M79.18 MYOFASCIAL PAIN: ICD-10-CM

## 2022-07-15 RX ORDER — IBUPROFEN 800 MG/1
800 TABLET ORAL EVERY 6 HOURS PRN
Qty: 30 TABLET | Refills: 0 | Status: SHIPPED | OUTPATIENT
Start: 2022-07-15

## 2022-07-20 ENCOUNTER — OFFICE VISIT (OUTPATIENT)
Dept: PHYSICAL THERAPY | Facility: REHABILITATION | Age: 62
End: 2022-07-20
Payer: MEDICARE

## 2022-07-20 DIAGNOSIS — R26.89 IMBALANCE: ICD-10-CM
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
## 2022-07-20 DIAGNOSIS — R29.898 LEG WEAKNESS, BILATERAL: ICD-10-CM

## 2022-07-20 PROCEDURE — 97112 NEUROMUSCULAR REEDUCATION: CPT | Performed by: PHYSICAL THERAPIST

## 2022-07-20 PROCEDURE — 97110 THERAPEUTIC EXERCISES: CPT | Performed by: PHYSICAL THERAPIST

## 2022-07-20 NOTE — PROGRESS NOTES
Treatment Note     Today's date: 2022  Patient name: Anuradha Sorensen  : 1960  MRN: [id number]  Referring provider: Keon Locke DO  Dx:   Encounter Diagnosis     ICD-10-CM    1  Fall, subsequent encounter  W19  XXXD    2  Imbalance  R26 89    3  Leg weakness, bilateral  R29 898         Subjective:    New onset of pain about the right lateral lower leg, about half way down  [place] some instability and losing balance and catching to the right  Continues with aquatic-based Y classes  Objective: See treatment diary below    Patient goals: walking at a normal pace without cane (not using prior to house), grocery shop without significant pain when completed, dancing at niece's wedding in 2022  - To move without pain and to not get tired as frequently (added 10/20/21)  Patient-Specific Functional Scale   Task is scored 0 (unable to perform activity) to 10 (ability to perform activity independently)  Activity 8/25/21 9/29/21 10/20/21 11/17/21 2/09/22 4/06/22 6/22/22 7/27/22   1  Walk at normal pace without a cane 5/10  010 3/10 3/10 due to speed or knee bending -8/10 initially, decreases with continued walking 6/10 8-10 can do this, not long distances    2  Grocery shop without significant pain 8/10 0/10 4/10 7-8/10 7/10 7-8/10 8/10    3  Dance 5/10  1/10 2/10 0/10 4/10 7/10, getting better every week, working in coordination    4  Move without pain and to not get tired as frequently     7/10 5-6/10 7/10 6-7/10      Objective: See treatment diary below    Precautions - Hashimoto's, autoimmune disease    Mobility Measures 2/09/22 3/02/22 4/06/22 5/11/22 6/22/22 7/27/22   Assistive device used?          5 Time Sit to Stand  (17" chair, arms across chest)  Able to get up from 18" surface without use of hands   Able without hands     1-2 times in a row        3 Meter Timed  Up & Go     9 1 seconds    Walking speed 0 60 -0 74 m/s        Functional Gait Assessment (see below)    17/30     6 Minute Walk Test (100 foot circular course)    700 feet, stopped at 1 min 30 sec left due to hip and lower extremity tightness   875 feet 1175 feet 1170 feet    Patient-Reported Outcome Measure: Activities-Specific Balance Confidence Scale (ABC Scale)         Right knee flexion PROM, sitting  100 degrees  90 degrees 98 degrees 105 degrees     Walks with walking boot on right ankle, decreased knee flexion excursion about the right knee, decreased speed, without assistive device  14 degrees active dorsiflexion with knee fully extended  16 degrees passively    Resists maximal pressure into right plantarflexion without pain    Without walking boot, increased right step length versus left, walking speed above Ankle range of motion within normal limits  Even step lengths, mild decrease in knee excursion  Ankle ROM within functional limits    Able to do 20 bilateral heel raises with even weight bearing    3/5 right plantarflexion, 2+/5 left Ankle ROM within functional limits    Completes bilateral heel raises   Ankle ROM within functional limits  3-/5 heel raise      SciFit 10 min, level 2-1, about 80-90 steps/min    Seated ankle eversion, yellow band, 20 x 2 sets  sidelying cross friction massage to right lateral lower leg, mid and distal leg, followed by gentle stretching into ankle inversion    Lateral step up 8" step, railing, about 30 sec each  Sit to stand, lowered plinth, 15 x 2 sets    Single leg stance tap targets standing on ground, 30 sec x 2 sets each    ASSESSMENT  Mild setback with pain about the right lateral lower leg  Shawn Meyer has had some issues seemingly consistent with connective tissue injury, such as Achilles strain, anticipate resolution with temporary reducing workload (avoiding prolonged walking, while continuing aquatic-based exercise) and low-load eccentric loading, added to home program       SHORT-TERM GOALS: 1 month(s)  1  Shawn Meyer walks at least 10 minutes consecutively  MET    3  Mateo Loco reports 50% improvement in ability to walk grocery store distances without severe fatigue  MET  4  Demonstrates at least 11 degrees passive right dorsiflexion by 2/12/22  MET  New goal: When released by physician, tolerates at least 6 minutes consecutive overground walking without walking boot  MET  New goal: When released by physician, tolerates at least 10 bilateral heel raises  MET  New goal: When released by physician, walks with step length without one inch of contralateral leg  MET  New goal: Walks at least 1350 feet during 6 minute walk test   MET  New goal: Scores at least 20/30 on FGA    MET  New goal: Able to purposefully walk for exercise at least 15 minutes at a time at least 3-4 times per week  PROGRESSING  LONG-TERM GOALS: by 3/12/22  1  Patient reports at least 20 total minutes per day of overground walking for exercise  PROGRESSING  2  Patient independently manages home exercise program   MET with need for revisions  PLAN OF CARE:  Patient will benefit from physical therapy 1 time per week for 1 month  Neuromuscular re-education, therapeutic exercises, and therapeutic activities as outlined in grids

## 2022-07-21 DIAGNOSIS — E03.8 HYPOTHYROIDISM DUE TO HASHIMOTO'S THYROIDITIS: ICD-10-CM

## 2022-07-21 DIAGNOSIS — E06.3 HYPOTHYROIDISM DUE TO HASHIMOTO'S THYROIDITIS: ICD-10-CM

## 2022-07-21 RX ORDER — LEVOTHYROXINE SODIUM 0.2 MG/1
200 TABLET ORAL DAILY
Qty: 90 TABLET | Refills: 1 | Status: SHIPPED | OUTPATIENT
Start: 2022-07-21

## 2022-07-27 ENCOUNTER — OFFICE VISIT (OUTPATIENT)
Dept: PHYSICAL THERAPY | Facility: REHABILITATION | Age: 62
End: 2022-07-27
Payer: MEDICARE
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
## 2022-07-27 DIAGNOSIS — R26.89 IMBALANCE: ICD-10-CM

## 2022-07-27 PROCEDURE — 97112 NEUROMUSCULAR REEDUCATION: CPT | Performed by: PHYSICAL THERAPIST

## 2022-07-27 PROCEDURE — 97110 THERAPEUTIC EXERCISES: CPT | Performed by: PHYSICAL THERAPIST

## 2022-07-27 NOTE — PROGRESS NOTES
RE-EVALUATION / Treatment Note     Today's date: 2022  Patient name: Gerard Smith  : 1960  MRN: [id number]  Referring provider: Bijal Ochoa DO  Dx:   Encounter Diagnosis     ICD-10-CM    1  Fall, subsequent encounter  W19  XXXD    2  Imbalance  R26 89         Subjective:    Saturday was cleaning bathroom, got severely dizzy and nauseous, vomited for about 10 minutes, got chills  Took COVID test, was negative, then was achy about the lower legs, thighs and back   was okay  Monday was okay and did laundry  Tuesday went to pool, from 9:30am-noon, after which arms and abs felt tired  Okay again today  Objective: See treatment diary below    Patient goals: walking at a normal pace without cane (not using prior to house), grocery shop without significant pain when completed, dancing at niece's wedding in 2022  - To move without pain and to not get tired as frequently (added 10/20/21)  Patient-Specific Functional Scale   Task is scored 0 (unable to perform activity) to 10 (ability to perform activity independently)  Activity 8/25/21 9/29/21 10/20/21 11/17/21 2/09/22 4/06/22 6/22/22 7/27/22   1  Walk at normal pace without a cane 5/10  0/10 3/10 3/10 due to speed or knee bending -8/10 initially, decreases with continued walking 6/10 8-9/10 can do this, not long distances    2  Grocery shop without significant pain 8/10 010 4/10 7-8/10 7/10 7-8/10 8/10    3  Dance 5/10  1/10 2/10 [phone], getting better every week, working in coordination    4  Move without pain and to not get tired as frequently     7/10 5-6/10 7/10 6-7/10      Objective: See treatment diary below    Precautions - Hashimoto's, autoimmune disease    Mobility Measures 2/09/22 3/02/22 4/06/22 5/11/22 6/22/22 7/27/22   Assistive device used?          5 Time Sit to Stand  (17" chair, arms across chest)  Able to get up from 18" surface without use of hands   Able without hands     1-2 times in a row     21 0 seconds   3 Meter Timed  Up & Go     9 1 seconds 8 8 seconds   Walking speed 0 60 -0 74 m/s        Functional Gait Assessment (see below)   18/30 17/30 24/30   6 Minute Walk Test (100 foot circular course)    700 feet, stopped at 1 min 30 sec left due to hip and lower extremity tightness   875 feet 1175 feet 1170 feet 1140 feet   Patient-Reported Outcome Measure: Activities-Specific Balance Confidence Scale (ABC Scale)         Right knee flexion PROM, sitting  100 degrees  90 degrees 98 degrees 105 degrees 104 degrees    Walks with walking boot on right ankle, decreased knee flexion excursion about the right knee, decreased speed, without assistive device  14 degrees active dorsiflexion with knee fully extended  16 degrees passively    Resists maximal pressure into right plantarflexion without pain    Without walking boot, increased right step length versus left, walking speed above Ankle range of motion within normal limits  Even step lengths, mild decrease in knee excursion  Ankle ROM within functional limits    Able to do 20 bilateral heel raises with even weight bearing    3/5 right plantarflexion, 2+/5 left Ankle ROM within functional limits    Completes bilateral heel raises   Ankle ROM within functional limits  3-/5 heel raise 3/5 heel raise right  4/5 heel raise left     Functional Gait Assessment  2/3 Gait level surface  3/3 Change in gait speed  3/3 Gait with horizontal head turns  3/3 Gait with vertical head turns  3/3 Gait and pivot turn  2/3 Step over obstacle  3/3 Gait with narrow base of support  2/3 Gait with eyes closed  2/3 Ambulating backwards  1/3 Steps  24/30 Total score (less than 22/30 indicates increased risk of fall)       Heel raise, about 10 each x 2 sets single leg with railing    Lateral step up to 6" step 10-15 each, 2 sets on one side, x 8" step 10-15 other side    Uphill 6% grade treadmill, 1 6 mph 6 min    SciFit level 2, about 85-90 steps/min, 12 min    ASSESSMENT  Mild setback with pain about the right lateral lower leg, but resolving  Tera Garcia has had some issues seemingly consistent with connective tissue injury, such as Achilles strain, anticipate resolution with temporary reducing workload (avoiding prolonged walking, while continuing aquatic-based exercise) and low-load eccentric loading, added to home program   Setbacks have been of shorter duration and have had less affect on long-term mobility  Continue per plan of care  SHORT-TERM GOALS: 1 month(s)  1  Tera Garcia walks at least 10 minutes consecutively  MET  3  Tera Garcia reports 50% improvement in ability to walk grocery store distances without severe fatigue  MET  4  Demonstrates at least 11 degrees passive right dorsiflexion by 2/12/22  MET  New goal: When released by physician, tolerates at least 6 minutes consecutive overground walking without walking boot  MET  New goal: When released by physician, tolerates at least 10 bilateral heel raises  MET  New goal: When released by physician, walks with step length without one inch of contralateral leg  MET  New goal: Walks at least 1350 feet during 6 minute walk test   MET  New goal: Scores at least 20/30 on FGA    MET  New goal: Able to purposefully walk for exercise at least 15 minutes at a time at least 3-4 times per week  PROGRESSING  Rocío Alexis LONG-TERM GOALS: by 8/27/22  1  Patient reports at least 20 total minutes per day of overground walking for exercise  PROGRESSING  2  Patient independently manages home exercise program   MET with continued progressions  PLAN OF CARE:  Patient will benefit from physical therapy 1 time per week for 1 month  Neuromuscular re-education, therapeutic exercises, and therapeutic activities as outlined in grids

## 2022-08-03 ENCOUNTER — OFFICE VISIT (OUTPATIENT)
Dept: PHYSICAL THERAPY | Facility: REHABILITATION | Age: 62
End: 2022-08-03
Payer: MEDICARE

## 2022-08-03 DIAGNOSIS — R29.898 LEG WEAKNESS, BILATERAL: ICD-10-CM
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
## 2022-08-03 DIAGNOSIS — R26.89 IMBALANCE: ICD-10-CM

## 2022-08-03 PROCEDURE — 97110 THERAPEUTIC EXERCISES: CPT | Performed by: PHYSICAL THERAPIST

## 2022-08-03 NOTE — PROGRESS NOTES
Treatment Note     Today's date: 8/3/2022  Patient name: Cherelle Kilpatrick  : 1960  MRN: [id number]  Referring provider: Pam Dia DO  Dx:   Encounter Diagnosis     ICD-10-CM    1  Fall, subsequent encounter  W19  XXXD    2  Imbalance  R26 89    3  Leg weakness, bilateral  R29 898         Subjective:    Continued pain about the right lateral lower leg, also the inside of the right lower leg, and the outside of the left lower leg  No pitting edema  Pain about the top of the right foot  Busy helping with benefit for [employer] over the weekend  Has returned to swimming but not yet swim based exercise classes  Trying different footwear to help with lower leg pain, today using more dance-type shoes with high supportive arches  Also improves with distal leg stretching  Objective: See treatment diary below    Patient goals: walking at a normal pace without cane (not using prior to house), grocery shop without significant pain when completed, dancing at niece's wedding in 2022  - To move without pain and to not get tired as frequently (added 10/20/21)  Patient-Specific Functional Scale   Task is scored 0 (unable to perform activity) to 10 (ability to perform activity independently)  Activity 8/25/21 9/29/21 10/20/21 11/17/21 2/09/22 4/06/22 6/22/22 7/27/22   1  Walk at normal pace without a cane 5/10  0/10 3/10 3/10 due to speed or knee bending -8/10 initially, decreases with continued walking 6/10 8-910 can do this, not long distances    2  Grocery shop without significant pain 8/10 0/10 4/10 7-8/10 7/10 7-8/10 8/10    3  Dance 5/10  1/10 2/10 0/10 4/10 7/10, getting better every week, working in coordination    4  Move without pain and to not get tired as frequently     10 5-610 7/10 6-7/10      Objective: See treatment diary below    Precautions - Hashimoto's, autoimmune disease    Mobility Measures 2/09/22 3/02/22 4/06/22 5/11/22 6/22/22 7/27/22   Assistive device used? 5 Time Sit to Stand  (17" chair, arms across chest)  Able to get up from 18" surface without use of hands   Able without hands     1-2 times in a row     21 0 seconds   3 Meter Timed  Up & Go     9 1 seconds 8 8 seconds   Walking speed 0 60 -0 74 m/s        Functional Gait Assessment (see below)   18/30 17/30 24/30   6 Minute Walk Test (100 foot circular course)    700 feet, stopped at 1 min 30 sec left due to hip and lower extremity tightness   875 feet 1175 feet 1170 feet 1140 feet   Patient-Reported Outcome Measure: Activities-Specific Balance Confidence Scale (ABC Scale)         Right knee flexion PROM, sitting  100 degrees  90 degrees 98 degrees 105 degrees 104 degrees    Walks with walking boot on right ankle, decreased knee flexion excursion about the right knee, decreased speed, without assistive device  14 degrees active dorsiflexion with knee fully extended  16 degrees passively    Resists maximal pressure into right plantarflexion without pain    Without walking boot, increased right step length versus left, walking speed above Ankle range of motion within normal limits  Even step lengths, mild decrease in knee excursion  Ankle ROM within functional limits    Able to do 20 bilateral heel raises with even weight bearing    3/5 right plantarflexion, 2+/5 left Ankle ROM within functional limits    Completes bilateral heel raises   Ankle ROM within functional limits  3-/5 heel raise 3/5 heel raise right  4/5 heel raise left     Functional Gait Assessment  2/3 Gait level surface  3/3 Change in gait speed  3/3 Gait with horizontal head turns  3/3 Gait with vertical head turns  3/3 Gait and pivot turn  2/3 Step over obstacle  3/3 Gait with narrow base of support  2/3 Gait with eyes closed  2/3 Ambulating backwards  1/3 Steps  24/30 Total score (less than 22/30 indicates increased risk of fall)       SciFit level 1-2, 5 min, about 85 steps/min, 5 min    Cross friction massage and IASTM about the lateral right lower leg, sidelying, 5 min, followed by ankle inversion and plantarflexion stretching    Ankle eversion, yellow band, 20 x 2 sets    Heel raise, even weight bearing, 10 x 2 sets    Standing on airex foam, tap to target, about 40 sec each side    Lateral step up 6" step, 40 sec each    ASSESSMENT  Zeferino Lira has had some issues seemingly consistent with connective tissue injury, such as Achilles strain  No pitting edema, 5/5 dorsiflexion and eversion bilaterally so we don't see peroneal nerve motor involvement  Return to water-based exercise classes as able  Continue light to moderate ankle eversion strengthening  SHORT-TERM GOALS: 1 month(s)  1  Zeferino Lira walks at least 10 minutes consecutively  MET  3  Zeferino Lira reports 50% improvement in ability to walk grocery store distances without severe fatigue  MET  4  Demonstrates at least 11 degrees passive right dorsiflexion by 2/12/22  MET  New goal: When released by physician, tolerates at least 6 minutes consecutive overground walking without walking boot  MET  New goal: When released by physician, tolerates at least 10 bilateral heel raises  MET  New goal: When released by physician, walks with step length without one inch of contralateral leg  MET  New goal: Walks at least 1350 feet during 6 minute walk test   MET  New goal: Scores at least 20/30 on FGA    MET  New goal: Able to purposefully walk for exercise at least 15 minutes at a time at least 3-4 times per week  PROGRESSING  Perico Todd LONG-TERM GOALS: by 8/27/22  1  Patient reports at least 20 total minutes per day of overground walking for exercise  PROGRESSING  2  Patient independently manages home exercise program   MET with continued progressions  PLAN OF CARE:  Patient will benefit from physical therapy 1 time per week for 1 month  Neuromuscular re-education, therapeutic exercises, and therapeutic activities as outlined in grids

## 2022-08-09 ENCOUNTER — TELEPHONE (OUTPATIENT)
Dept: FAMILY MEDICINE CLINIC | Facility: CLINIC | Age: 62
End: 2022-08-09

## 2022-08-10 ENCOUNTER — OFFICE VISIT (OUTPATIENT)
Dept: PHYSICAL THERAPY | Facility: REHABILITATION | Age: 62
End: 2022-08-10
Payer: MEDICARE
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
## 2022-08-10 DIAGNOSIS — R26.89 IMBALANCE: ICD-10-CM

## 2022-08-10 DIAGNOSIS — R29.898 LEG WEAKNESS, BILATERAL: ICD-10-CM

## 2022-08-10 PROCEDURE — 97112 NEUROMUSCULAR REEDUCATION: CPT | Performed by: PHYSICAL THERAPIST

## 2022-08-10 PROCEDURE — 97110 THERAPEUTIC EXERCISES: CPT | Performed by: PHYSICAL THERAPIST

## 2022-08-10 NOTE — TELEPHONE ENCOUNTER
Tell the patient I did more research and I found physiatry closer  [facility] it at [address] in [place]  I think this would be better than going to [facility]    See if willing to do so

## 2022-08-10 NOTE — PROGRESS NOTES
Treatment Note     Today's date: 8/10/2022  Patient name: Ivana Pitts  : 1960  MRN: [id number]  Referring provider: Dale Goodman DO  Dx:   Encounter Diagnosis     ICD-10-CM    1  Fall, subsequent encounter  W19  XXXD    2  Imbalance  R26 89    3  Leg weakness, bilateral  R29 898         Subjective:    Seen by podiatrist, concerned that pain is from the back, follows with primary care physician for this issue  Proscribed brace for drop foot, but doesn't experience this consistently  Back to water-based exercises Monday, not sure if up for water running but does water aerobics  Objective: See treatment diary below    Patient goals: walking at a normal pace without cane (not using prior to house), grocery shop without significant pain when completed, dancing at niece's wedding in 2022  - To move without pain and to not get tired as frequently (added 10/20/21)  Patient-Specific Functional Scale   Task is scored 0 (unable to perform activity) to 10 (ability to perform activity independently)  Activity 8/25/21 9/29/21 10/20/21 11/17/21 2/09/22 4/06/22 6/22/22 7/27/22   1  Walk at normal pace without a cane 5/10  0/10 3/10 3/10 due to speed or knee bending -8/10 initially, decreases with continued walking 6/10 8-9/10 can do this, not long distances    2  Grocery shop without significant pain 8/10 0/10 4/10 7-8/10 7/10 7-8/10 8/10    3  Dance 5/10  1/10 2/10 0/10 4/10 7/10, getting better every week, working in coordination    4  Move without pain and to not get tired as frequently     7/10 5-6/10 7/10 6-7/10      Objective: See treatment diary below    Precautions - Hashimoto's, autoimmune disease    Mobility Measures 2/09/22 3/02/22 4/06/22 5/11/22 6/22/22 7/27/22   Assistive device used?          5 Time Sit to Stand  (17" chair, arms across chest)  Able to get up from 18" surface without use of hands   Able without hands     1-2 times in a row     21 0 seconds   3 Meter Timed  Up & Go     9 1 seconds 8 8 seconds   Walking speed 0 60 -0 74 m/s        Functional Gait Assessment (see below)   18/30 17/30 24/30   6 Minute Walk Test (100 foot circular course)    700 feet, stopped at 1 min 30 sec left due to hip and lower extremity tightness   875 feet 1175 feet 1170 feet 1140 feet   Patient-Reported Outcome Measure: Activities-Specific Balance Confidence Scale (ABC Scale)         Right knee flexion PROM, sitting  100 degrees  90 degrees 98 degrees 105 degrees 104 degrees    Walks with walking boot on right ankle, decreased knee flexion excursion about the right knee, decreased speed, without assistive device  14 degrees active dorsiflexion with knee fully extended  16 degrees passively    Resists maximal pressure into right plantarflexion without pain    Without walking boot, increased right step length versus left, walking speed above Ankle range of motion within normal limits  Even step lengths, mild decrease in knee excursion   Ankle ROM within functional limits    Able to do 20 bilateral heel raises with even weight bearing    3/5 right plantarflexion, 2+/5 left Ankle ROM within functional limits    Completes bilateral heel raises   Ankle ROM within functional limits  3-/5 heel raise 3/5 heel raise right  4/5 heel raise left     Treadmill 1 5 mph 4 min  Treadmill 1 5 mph 4 min  Treadmill 1 5 mph 3 lbs right ankle, 2 min    Heel raise:  Left single leg, 15 x 2 sets  Right increased weight bearing, 10 x 2 sets    SLS no right, on airex foam, tap targets 30 sec x 2 sets    Step up to 8" step 10 each, no railing  Lateral step up to 8" step light railing, 10 each    Sit to stand 20" surface, 10 x 2 sets    Ankle eversion, yellow band, 10x 2 sets   Heel walking with railings, 12 feet x 2 x 3 sets    SciFit level 1 6, 5 min, about 85 steps/min,10 min     Inferior glide grade 3-4, increased left shoulder pain-free elevation, 4 min  Sleeper stretch, right shoulder, 30 sec x 3  Shoulder ER, pink band, 10-15 each side    ASSESSMENT  Normal foot clearance with walking  We do see decreased control of eccentric dorsiflexion with heel walking  I'd defer any bracing at this time  Right shoulder painful arc at  degrees elevation, responded well to brief period of stretching  Instructed in exercises for home program    Continue participation in water-based exercises and incorporate land-based walking as able  Mario Jimenez has had some issues seemingly consistent with connective tissue injury, such as Achilles strain  No pitting edema, 5/5 dorsiflexion and eversion bilaterally so we don't see peroneal nerve motor involvement  Return to water-based exercise classes as able  Continue light to moderate ankle eversion strengthening  SHORT-TERM GOALS: 1 month(s)  1  Mario Jimenez walks at least 10 minutes consecutively  MET  3  Mario Jimenez reports 50% improvement in ability to walk grocery store distances without severe fatigue  MET  4  Demonstrates at least 11 degrees passive right dorsiflexion by 2/12/22  MET  New goal: When released by physician, tolerates at least 6 minutes consecutive overground walking without walking boot  MET  New goal: When released by physician, tolerates at least 10 bilateral heel raises  MET  New goal: When released by physician, walks with step length without one inch of contralateral leg  MET  New goal: Walks at least 1350 feet during 6 minute walk test   MET  New goal: Scores at least 20/30 on FGA    MET  New goal: Able to purposefully walk for exercise at least 15 minutes at a time at least 3-4 times per week  PROGRESSING  Serge Solorzano LONG-TERM GOALS: by 8/27/22  1  Patient reports at least 20 total minutes per day of overground walking for exercise  PROGRESSING  2  Patient independently manages home exercise program   MET with continued progressions      PLAN OF CARE:  Patient will benefit from physical therapy 1 time per week for 1 month  Neuromuscular re-education, therapeutic exercises, and therapeutic activities as outlined in grids

## 2022-08-10 NOTE — TELEPHONE ENCOUNTER
Tell the patient this specialty does not have many doctors  There is a Dr Ana Laura Barrientos with [facility] so I believe he would be down the road on [facility] [address]   See which she would like to do

## 2022-08-11 ENCOUNTER — RA CDI HCC (OUTPATIENT)
Dept: OTHER | Facility: HOSPITAL | Age: 62
End: 2022-08-11

## 2022-08-11 NOTE — PROGRESS NOTES
[address]  coding opportunities       Chart reviewed, no opportunity found: CHART REVIEWED, NO OPPORTUNITY FOUND        Patients Insurance     Medicare Insurance: Medicare

## 2022-08-11 NOTE — TELEPHONE ENCOUNTER
Patient called back  She remembered she once saw  Mirta Fowler at [facility]  She is going to call there for an appointment

## 2022-08-16 ENCOUNTER — APPOINTMENT (OUTPATIENT)
Dept: LAB | Facility: CLINIC | Age: 62
End: 2022-08-16
Payer: MEDICARE

## 2022-08-16 DIAGNOSIS — E11.65 TYPE 2 DIABETES MELLITUS WITH HYPERGLYCEMIA, WITHOUT LONG-TERM CURRENT USE OF INSULIN (HCC): ICD-10-CM

## 2022-08-16 DIAGNOSIS — E06.3 HASHIMOTO'S DISEASE: ICD-10-CM

## 2022-08-16 LAB
ALBUMIN SERPL BCP-MCNC: 3.7 G/DL (ref 3.5–5)
ALP SERPL-CCNC: 102 U/L (ref 46–116)
ALT SERPL W P-5'-P-CCNC: 35 U/L (ref 12–78)
ANION GAP SERPL CALCULATED.3IONS-SCNC: 6 MMOL/L (ref 4–13)
AST SERPL W P-5'-P-CCNC: 24 U/L (ref 5–45)
BILIRUB SERPL-MCNC: 0.45 MG/DL (ref 0.2–1)
BUN SERPL-MCNC: 18 MG/DL (ref 5–25)
CALCIUM SERPL-MCNC: 9.1 MG/DL (ref 8.3–10.1)
CHLORIDE SERPL-SCNC: 106 MMOL/L (ref 96–108)
CHOLEST SERPL-MCNC: 160 MG/DL
CO2 SERPL-SCNC: 26 MMOL/L (ref 21–32)
CREAT SERPL-MCNC: 1.03 MG/DL (ref 0.6–1.3)
GFR SERPL CREATININE-BSD FRML MDRD: 57 ML/MIN/1.73SQ M
GLUCOSE P FAST SERPL-MCNC: 117 MG/DL (ref 65–99)
HDLC SERPL-MCNC: 55 MG/DL
LDLC SERPL CALC-MCNC: 69 MG/DL (ref 0–100)
NONHDLC SERPL-MCNC: 105 MG/DL
POTASSIUM SERPL-SCNC: 4.2 MMOL/L (ref 3.5–5.3)
PROT SERPL-MCNC: 7.9 G/DL (ref 6.4–8.4)
SODIUM SERPL-SCNC: 138 MMOL/L (ref 135–147)
T4 FREE SERPL-MCNC: 1.04 NG/DL (ref 0.76–1.46)
TRIGL SERPL-MCNC: 181 MG/DL
TSH SERPL DL<=0.05 MIU/L-ACNC: 11.7 UIU/ML (ref 0.45–4.5)

## 2022-08-16 PROCEDURE — 83036 HEMOGLOBIN GLYCOSYLATED A1C: CPT

## 2022-08-16 PROCEDURE — 84439 ASSAY OF FREE THYROXINE: CPT

## 2022-08-16 PROCEDURE — 84443 ASSAY THYROID STIM HORMONE: CPT

## 2022-08-16 PROCEDURE — 36415 COLL VENOUS BLD VENIPUNCTURE: CPT

## 2022-08-16 PROCEDURE — 80053 COMPREHEN METABOLIC PANEL: CPT

## 2022-08-16 PROCEDURE — 80061 LIPID PANEL: CPT

## 2022-08-17 ENCOUNTER — OFFICE VISIT (OUTPATIENT)
Dept: PHYSICAL THERAPY | Facility: REHABILITATION | Age: 62
End: 2022-08-17
Payer: MEDICARE

## 2022-08-17 ENCOUNTER — OFFICE VISIT (OUTPATIENT)
Dept: FAMILY MEDICINE CLINIC | Facility: CLINIC | Age: 62
End: 2022-08-17
Payer: MEDICARE

## 2022-08-17 VITALS
OXYGEN SATURATION: 97 % | BODY MASS INDEX: 36.9 KG/M2 | SYSTOLIC BLOOD PRESSURE: 140 MMHG | WEIGHT: 229.6 LBS | RESPIRATION RATE: 16 BRPM | TEMPERATURE: 97.6 F | HEART RATE: 85 BPM | DIASTOLIC BLOOD PRESSURE: 80 MMHG | HEIGHT: 66 IN

## 2022-08-17 DIAGNOSIS — R26.89 IMBALANCE: ICD-10-CM

## 2022-08-17 DIAGNOSIS — I25.10 ASCVD (ARTERIOSCLEROTIC CARDIOVASCULAR DISEASE): ICD-10-CM

## 2022-08-17 DIAGNOSIS — E06.3 HASHIMOTO'S DISEASE: ICD-10-CM

## 2022-08-17 DIAGNOSIS — J98.01 BRONCHOSPASM: ICD-10-CM

## 2022-08-17 DIAGNOSIS — Z00.00 HEALTHCARE MAINTENANCE: Primary | ICD-10-CM
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
## 2022-08-17 DIAGNOSIS — Q04.8 CEREBELLAR TONSILLAR ECTOPIA (HCC): ICD-10-CM

## 2022-08-17 DIAGNOSIS — E11.65 TYPE 2 DIABETES MELLITUS WITH HYPERGLYCEMIA, WITHOUT LONG-TERM CURRENT USE OF INSULIN (HCC): ICD-10-CM

## 2022-08-17 DIAGNOSIS — R29.898 LEG WEAKNESS, BILATERAL: ICD-10-CM

## 2022-08-17 LAB
EST. AVERAGE GLUCOSE BLD GHB EST-MCNC: 154 MG/DL
HBA1C MFR BLD: 7 %

## 2022-08-17 PROCEDURE — 97112 NEUROMUSCULAR REEDUCATION: CPT | Performed by: PHYSICAL THERAPIST

## 2022-08-17 PROCEDURE — G0438 PPPS, INITIAL VISIT: HCPCS | Performed by: FAMILY MEDICINE

## 2022-08-17 PROCEDURE — 97110 THERAPEUTIC EXERCISES: CPT | Performed by: PHYSICAL THERAPIST

## 2022-08-17 PROCEDURE — 99214 OFFICE O/P EST MOD 30 MIN: CPT | Performed by: FAMILY MEDICINE

## 2022-08-17 RX ORDER — LIFITEGRAST 50 MG/ML
SOLUTION/ DROPS OPHTHALMIC
COMMUNITY

## 2022-08-17 RX ORDER — ALBUTEROL SULFATE 2.5 MG/3ML
2.5 SOLUTION RESPIRATORY (INHALATION) EVERY 6 HOURS PRN
Qty: 75 ML | Refills: 2 | Status: SHIPPED | OUTPATIENT
Start: 2022-08-17

## 2022-08-17 NOTE — PROGRESS NOTES
Treatment Note     Today's date: 2022  Patient name: Eula Phillips  : 1960  MRN: [id number]  Referring provider: John Parikh DO  Dx:   Encounter Diagnosis     ICD-10-CM    1  Fall, subsequent encounter  W19  XXXD    2  Imbalance  R26 89    3  Leg weakness, bilateral  R29 898         Subjective:     Pain about the right lateral lower leg  Will have MRI to see if the lumbar region is contributing to leg pain  Finds foot drop intermittently, but not constantly  Objective: See treatment diary below    Patient goals: walking at a normal pace without cane (not using prior to house), grocery shop without significant pain when completed, dancing at niece's wedding in 2022  - To move without pain and to not get tired as frequently (added 10/20/21)  Patient-Specific Functional Scale   Task is scored 0 (unable to perform activity) to 10 (ability to perform activity independently)  Activity 8/25/21 9/29/21 10/20/21 11/17/21 2/09/22 4/06/22 6/22/22 7/27/22   1  Walk at normal pace without a cane 5/10  0/10 3/10 3/10 due to speed or knee bending -8/10 initially, decreases with continued walking 6/10 8-910 can do this, not long distances    2  Grocery shop without significant pain 8/10 0/10 4/10 7-8/10 7/10 7-8/10 8/10    3  Dance 5/10  1/10 2/10 0/10 4/10 7/10, getting better every week, working in coordination    4  Move without pain and to not get tired as frequently     7/10 5-610 7/10 6-7/10      Objective: See treatment diary below    Precautions - Hashimoto's, autoimmune disease    Mobility Measures 2/09/22 3/02/22 4/06/22 5/11/22 6/22/22 7/27/22   Assistive device used?          5 Time Sit to Stand  (17" chair, arms across chest)  Able to get up from 18" surface without use of hands   Able without hands     1-2 times in a row     21 0 seconds   3 Meter Timed  Up & Go     9 1 seconds 8 8 seconds   Walking speed 0 60 -0 74 m/s        Functional Gait Assessment (see below)    17/30 24/30   6 Minute Walk Test (100 foot circular course)    700 feet, stopped at 1 min 30 sec left due to hip and lower extremity tightness   875 feet 1175 feet 1170 feet 1140 feet   Patient-Reported Outcome Measure: Activities-Specific Balance Confidence Scale (ABC Scale)         Right knee flexion PROM, sitting  100 degrees  90 degrees 98 degrees 105 degrees 104 degrees    Walks with walking boot on right ankle, decreased knee flexion excursion about the right knee, decreased speed, without assistive device  14 degrees active dorsiflexion with knee fully extended  16 degrees passively    Resists maximal pressure into right plantarflexion without pain    Without walking boot, increased right step length versus left, walking speed above Ankle range of motion within normal limits  Even step lengths, mild decrease in knee excursion  Ankle ROM within functional limits    Able to do 20 bilateral heel raises with even weight bearing    3/5 right plantarflexion, 2+/5 left Ankle ROM within functional limits    Completes bilateral heel raises   Ankle ROM within functional limits  3-/5 heel raise 3/5 heel raise right  4/5 heel raise left     Overground walking  8 - 9 min, about 35-40 sec per 100 feet lap  With 5 lbs on ankles 500 feet  Without weight on ankles, 500 feet    Ankle eversion, blue band, 40 sec x 2 sets    Single leg stance on foam, tap to target, 40 sec x 2 sets    Lateral step up 8" step, 45 sec each  Forwards step up 8" step 45 sec    Sit to stand 18" surface, 40 sec x 2 sets    Right shoulder inferior glide grade 3-4 at about 100 degrees shoulder scaption, 2 min    SciFit level 1 0, 5 min, about 90 steps/min,15 min     ASSESSMENT  Mild initial decreased control of heel strike to forefoot loading, but improve to normal after about 1 minute of walking and continued for the 15 minutes of walking  Able to progress in duration and intensity of exercises today    No provocation of LE pain with lumbar flexion/extension/quadrant positions, it seems unlikely that the lumbar region is involved here  Continue to progress with gym/pool-based exercises as able  SHORT-TERM GOALS: 1 month(s)  1  Vinicius Hamilton walks at least 10 minutes consecutively  MET  3  Vinicius Hamilton reports 50% improvement in ability to walk grocery store distances without severe fatigue  MET  4  Demonstrates at least 11 degrees passive right dorsiflexion by 2/12/22  MET  New goal: When released by physician, tolerates at least 6 minutes consecutive overground walking without walking boot  MET  New goal: When released by physician, tolerates at least 10 bilateral heel raises  MET  New goal: When released by physician, walks with step length without one inch of contralateral leg  MET  New goal: Walks at least 1350 feet during 6 minute walk test   MET  New goal: Scores at least 20/30 on FGA    MET  New goal: Able to purposefully walk for exercise at least 15 minutes at a time at least 3-4 times per week  PROGRESSING  Lily Pop LONG-TERM GOALS: by 8/27/22  1  Patient reports at least 20 total minutes per day of overground walking for exercise  PROGRESSING  2  Patient independently manages home exercise program   MET with continued progressions  PLAN OF CARE:  Patient will benefit from physical therapy 1 time per week for 1 month  Neuromuscular re-education, therapeutic exercises, and therapeutic activities as outlined in grids

## 2022-08-17 NOTE — PROGRESS NOTES
Diabetic Foot Exam    Patient's shoes and socks removed  Right Foot/Ankle   Right Foot Inspection  Skin Exam: skin normal and skin intact  No dry skin, no warmth, no callus, no erythema, no maceration, no abnormal color, no pre-ulcer, no ulcer and no callus  Toe Exam: ROM and strength within normal limits  Sensory   Monofilament testing: intact    Vascular  Capillary refills: < 3 seconds  The right DP pulse is 2+  The right PT pulse is 2+  Left Foot/Ankle  Left Foot Inspection  Skin Exam: skin normal and skin intact  No dry skin, no warmth, no erythema, no maceration, normal color, no pre-ulcer, no ulcer and no callus  Toe Exam: ROM and strength within normal limits  Sensory   Monofilament testing: intact    Vascular  Capillary refills: < 3 seconds  The left DP pulse is 2+  The left PT pulse is 2+       Assign Risk Category  No deformity present  No loss of protective sensation  Risk: 0
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
## 2022-08-18 NOTE — PROGRESS NOTES
Assessment/Plan:  Blood pressure today fair  A1c 7 0  CMP stable  Continue ARB  LDL cholesterol 69  Continue rosuvastatin  TSH 11 7 out of range  Have asked her to increase her levothyroxine to 250 mcg every other day and continue with 200 mcg all other days  Recheck TSH in 4-6 weeks  Will continue to follow and titrate appropriately  Continue with Rheumatology  Respiratory status stable  Cerebral tonsillar ectopia stable  Otherwise recheck 6 months with repeat labs  Recommend yearly mammography  Routine gyn examinations  No problem-specific Assessment & Plan notes found for this encounter  Diagnoses and all orders for this visit:    Healthcare maintenance    Type 2 diabetes mellitus with hyperglycemia, without long-term current use of insulin (HCC)  -     Hemoglobin A1C; Future  -     Comprehensive metabolic panel; Future    Hashimoto's disease  -     TSH, 3rd generation; Future  -     T4, free; Future  -     TSH, 3rd generation; Future    ASCVD (arteriosclerotic cardiovascular disease)    Bronchospasm  -     albuterol (2 5 mg/3 mL) 0 083 % nebulizer solution; Take 3 mL (2 5 mg total) by nebulization every 6 (six) hours as needed for wheezing or shortness of breath    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HCC)    Other orders  -     lifitegrast (Xiidra) 5 % op solution; Xiidra 5 % eye drops in a dropperette        1  Healthcare maintenance     2  Type 2 diabetes mellitus with hyperglycemia, without long-term current use of insulin (HCC)  Hemoglobin A1C    Comprehensive metabolic panel   3  Hashimoto's disease  TSH, 3rd generation    T4, free    TSH, 3rd generation   4  ASCVD (arteriosclerotic cardiovascular disease)     5  Bronchospasm  albuterol (2 5 mg/3 mL) 0 083 % nebulizer solution   6  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HCC)         Subjective:        Patient ID: Yuriy Bosch is a 57 y o  female  Chief Complaint   Patient presents with   [facility] Wellness Visit       Routine check    Down with lots orthopedic issues  Has had her knees injected  The following portions of the patient's history were reviewed and updated as appropriate: past medical history, past surgical history and problem list       Review of Systems   Constitutional: Positive for fatigue  Negative for appetite change, fever and unexpected weight change  HENT: Negative for congestion, ear pain, postnasal drip, rhinorrhea, sinus pressure, sinus pain and sore throat  Eyes: Negative for redness and visual disturbance  Respiratory: Negative for chest tightness and shortness of breath  Cardiovascular: Negative for chest pain, palpitations and leg swelling  Gastrointestinal: Negative for abdominal distention, abdominal pain, diarrhea and nausea  Endocrine: Negative for cold intolerance and heat intolerance  Genitourinary: Negative for dysuria and hematuria  Musculoskeletal: Positive for arthralgias, gait problem and myalgias  Skin: Negative for pallor and rash  Neurological: Negative for dizziness, tremors, weakness, light-headedness and headaches  Psychiatric/Behavioral: Negative for behavioral problems  The patient is not nervous/anxious  Objective:  /80 (BP Location: Left arm, Patient Position: Sitting, Cuff Size: Adult)   Pulse 85   Temp 97 6 °F (36 4 °C) (Temporal)   Resp 16   Ht 5' 6" (1 676 m)   Wt 104 kg (229 lb 9 6 oz)   SpO2 97%   BMI 37 06 kg/m²        Physical Exam  Vitals and nursing note reviewed  Constitutional:       General: She is not in acute distress  Appearance: She is obese  She is not diaphoretic  HENT:      Head: Normocephalic and atraumatic  Eyes:      General: No scleral icterus  Conjunctiva/sclera: Conjunctivae normal       Pupils: Pupils are equal, round, and reactive to light  Neck:      Thyroid: No thyromegaly  Cardiovascular:      Rate and Rhythm: Normal rate and regular rhythm        Pulses:           Carotid pulses are 0 on the right side and 0 on the left side  Heart sounds: Normal heart sounds  No murmur heard  Pulmonary:      Effort: Pulmonary effort is normal  No respiratory distress  Breath sounds: Normal breath sounds  No wheezing  Abdominal:      General: There is no distension  Palpations: Abdomen is soft  Musculoskeletal:      Cervical back: Normal range of motion and neck supple  Right lower leg: No edema  Left lower leg: No edema  Lymphadenopathy:      Cervical: No cervical adenopathy  Skin:     General: Skin is warm  Coloration: Skin is not pale  Neurological:      General: No focal deficit present  Mental Status: She is alert and oriented to person, place, and time  Cranial Nerves: No cranial nerve deficit  Deep Tendon Reflexes: Reflexes are normal and symmetric  Psychiatric:         Mood and Affect: Mood normal          Behavior: Behavior normal          Thought Content:  Thought content normal          Judgment: Judgment normal

## 2022-08-24 ENCOUNTER — OFFICE VISIT (OUTPATIENT)
Dept: GASTROENTEROLOGY | Facility: CLINIC | Age: 62
End: 2022-08-24
Payer: MEDICARE

## 2022-08-24 VITALS
BODY MASS INDEX: 37.45 KG/M2 | SYSTOLIC BLOOD PRESSURE: 142 MMHG | DIASTOLIC BLOOD PRESSURE: 90 MMHG | HEIGHT: 66 IN | HEART RATE: 92 BPM | WEIGHT: 233 LBS | TEMPERATURE: 97.8 F | OXYGEN SATURATION: 98 %

## 2022-08-24 DIAGNOSIS — A04.8 H. PYLORI INFECTION: ICD-10-CM

## 2022-08-24 DIAGNOSIS — R68.81 EARLY SATIETY: Primary | ICD-10-CM

## 2022-08-24 DIAGNOSIS — R19.7 DIARRHEA, UNSPECIFIED TYPE: ICD-10-CM

## 2022-08-24 PROCEDURE — 99214 OFFICE O/P EST MOD 30 MIN: CPT | Performed by: INTERNAL MEDICINE
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
## 2022-08-31 ENCOUNTER — HOSPITAL ENCOUNTER (OUTPATIENT)
Dept: MAMMOGRAPHY | Facility: MEDICAL CENTER | Age: 62
Discharge: HOME | End: 2022-08-31
Payer: MEDICARE

## 2022-08-31 ENCOUNTER — OFFICE VISIT (OUTPATIENT)
Dept: PHYSICAL THERAPY | Facility: REHABILITATION | Age: 62
End: 2022-08-31
Payer: MEDICARE

## 2022-08-31 VITALS — HEIGHT: 66 IN | WEIGHT: 233.69 LBS | BODY MASS INDEX: 37.56 KG/M2
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
## 2022-08-31 DIAGNOSIS — R29.898 LEG WEAKNESS, BILATERAL: ICD-10-CM

## 2022-08-31 DIAGNOSIS — Z12.31 ENCOUNTER FOR SCREENING MAMMOGRAM FOR MALIGNANT NEOPLASM OF BREAST: ICD-10-CM

## 2022-08-31 DIAGNOSIS — R26.89 IMBALANCE: ICD-10-CM

## 2022-08-31 PROCEDURE — 97110 THERAPEUTIC EXERCISES: CPT | Performed by: PHYSICAL THERAPIST

## 2022-08-31 PROCEDURE — 77063 BREAST TOMOSYNTHESIS BI: CPT

## 2022-08-31 PROCEDURE — 77067 SCR MAMMO BI INCL CAD: CPT

## 2022-08-31 NOTE — PROGRESS NOTES
RE-EVALUATION / Treatment Note     Today's date: 2022  Patient name: Yovanny Fink  : 1960  MRN: [id number]  Referring provider: Rafia Boucher DO  Dx:   Encounter Diagnosis     ICD-10-CM    1  Fall, subsequent encounter  W19  XXXD    2  Imbalance  R26 89    3  Leg weakness, bilateral  R29 898         Subjective:     Tired from worsening of thyroid condition  Has MRI for lower leg next week  Has been able to continue with pool-based exercises  Objective: See treatment diary below    Patient goals: walking at a normal pace without cane (not using prior to house), grocery shop without significant pain when completed, dancing at niece's wedding in 2022  - To move without pain and to not get tired as frequently (added 10/20/21)  Patient-Specific Functional Scale   Task is scored 0 (unable to perform activity) to 10 (ability to perform activity independently)  Activity 8/25/21 9/29/21 10/20/21 11/17/21 2/09/22 4/06/22 6/22/22 7/27/22   1  Walk at normal pace without a cane 5/10  0/10 3/10 3/10 due to speed or knee bending -8/10 initially, decreases with continued walking 6/10 8-10 can do this, not long distances    2  Grocery shop without significant pain 8/10 0/10 4/10 7-8/10 7/10 7-8/10 8/10    3  Dance /10  1/10 2/10 0/10 4/10 7/10, getting better every week, working in coordination    4  Move without pain and to not get tired as frequently     7/10 5-610 7/10 6-7/10      Objective: See treatment diary below    Precautions - Hashimoto's, autoimmune disease    Mobility Measures 2/09/22 3/02/22 4/06/22 5/11/22 6/22/22 7/27/22 8/31/22   Assistive device used?           5 Time Sit to Stand  (17" chair, arms across chest)  Able to get up from 18" surface without use of hands   Able without hands     1-2 times in a row     21 0 seconds Required practice to stand from 18" surface without hands   3 Meter Timed  Up & Go     9 1 seconds 8 8 seconds    Walking speed 0 60 -0 74 m/s Functional Gait Assessment (see below)   18/30 17/30 24/30    6 Minute Walk Test (100 foot circular course)    700 feet, stopped at 1 min 30 sec left due to hip and lower extremity tightness   875 feet 1175 feet 1170 feet 1140 feet 400 feet       Patient-Reported Outcome Measure: Activities-Specific Balance Confidence Scale (ABC Scale)          Right knee flexion PROM, sitting  100 degrees  90 degrees 98 degrees 105 degrees 104 degrees 90 degrees    Walks with walking boot on right ankle, decreased knee flexion excursion about the right knee, decreased speed, without assistive device  14 degrees active dorsiflexion with knee fully extended  16 degrees passively    Resists maximal pressure into right plantarflexion without pain    Without walking boot, increased right step length versus left, walking speed above Ankle range of motion within normal limits  Even step lengths, mild decrease in knee excursion  Ankle ROM within functional limits    Able to do 20 bilateral heel raises with even weight bearing    3/5 right plantarflexion, 2+/5 left Ankle ROM within functional limits    Completes bilateral heel raises   Ankle ROM within functional limits  3-/5 heel raise 3/5 heel raise right  4/5 heel raise left 5/5 right dorsiflexion, eversion       Heel walking with countertop support, 40 sec x 2 sets    Single leg stance on foam, tap to target, 40 sec x 2 sets, on right leg    Lateral step up 6" step, 40 sec x 2 sets with right    Sit to stand 18" surface, 60 sec  Sit to stand 17" surface 30 sec      SciFit level 1 0, 20 min, about 105 steps/min     ASSESSMENT  Decline in mobility over the past month due to increased pain about the right lateral lower leg  No provocation of LE pain with lumbar flexion/extension/quadrant positions, it seems unlikely that the lumbar region is involved here  We mainly walked with a right AFO, no difficulty clearing the foot during swing phase gait    Raina Crump can walk about the home without a brace if the foot is clearing fine, which it usually does  Continue to progress with gym/pool-based exercises as able  SHORT-TERM GOALS: 1 month(s)  1  Cliff Anne walks at least 10 minutes consecutively  MET  3  Cliff Anne reports 50% improvement in ability to walk grocery store distances without severe fatigue  MET  4  Demonstrates at least 11 degrees passive right dorsiflexion by 2/12/22  MET  New goal: When released by physician, tolerates at least 6 minutes consecutive overground walking without walking boot  MET  New goal: When released by physician, tolerates at least 10 bilateral heel raises  MET  New goal: When released by physician, walks with step length without one inch of contralateral leg  MET  New goal: Walks at least 1350 feet during 6 minute walk test   MET  New goal: Scores at least 20/30 on FGA    MET  New goal: Able to purposefully walk for exercise at least 15 minutes at a time at least 3-4 times per week  NOT MET    LONG-TERM GOALS: by 9/30/22  1  Patient reports at least 20 total minutes per day of overground walking for exercise  PROGRESSING  2  Patient independently manages home exercise program   MET with continued progressions  PLAN OF CARE:  Patient will benefit from physical therapy 1 time per week for 1 month  Neuromuscular re-education, therapeutic exercises, and therapeutic activities as outlined in grids

## 2022-09-02 ENCOUNTER — HOSPITAL ENCOUNTER (OUTPATIENT)
Dept: MRI IMAGING | Facility: HOSPITAL | Age: 62
End: 2022-09-02
Payer: MEDICARE

## 2022-09-02 DIAGNOSIS — M21.371 FOOT DROP, RIGHT FOOT: ICD-10-CM

## 2022-09-02 PROCEDURE — 72148 MRI LUMBAR SPINE W/O DYE: CPT

## 2022-09-07 ENCOUNTER — OFFICE VISIT (OUTPATIENT)
Dept: PHYSICAL THERAPY | Facility: REHABILITATION | Age: 62
End: 2022-09-07
Payer: MEDICARE

## 2022-09-07 DIAGNOSIS — R26.89 IMBALANCE: ICD-10-CM
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
## 2022-09-07 DIAGNOSIS — R29.898 LEG WEAKNESS, BILATERAL: ICD-10-CM

## 2022-09-07 PROCEDURE — 97110 THERAPEUTIC EXERCISES: CPT | Performed by: PHYSICAL THERAPIST

## 2022-09-07 NOTE — PROGRESS NOTES
Treatment Note     Today's date: 2022  Patient name: Patric Landaverde  : 1960  MRN: [id number]  Referring provider: Obi Dailey DO  Dx:   Encounter Diagnosis     ICD-10-CM    1  Fall, subsequent encounter  W19  XXXD    2  Imbalance  R26 89    3  Leg weakness, bilateral  R29 898         Subjective:  Had normal lumbar MRI  Seen by physiatry following lumbar MRI  Has MRI for lower leg scheduled this week  Foot drop cleared up this week  Not using leg brace unless experiencing foot drop, which is not happening at this point  Neck stiff and sore  Objective: See treatment diary below    Patient goals: walking at a normal pace without cane (not using prior to house), grocery shop without significant pain when completed, dancing at niece's wedding in 2022  - To move without pain and to not get tired as frequently (added 10/20/21)    Patient-Specific Functional Scale   Task is scored 0 (unable to perform activity) to 10 (ability to perform activity independently)  Activity 8/25/21 9/29/21 10/20/21 11/17/21 2/09/22 4/06/22 6/22/22 7/27/22   1  Walk at normal pace without a cane 5/10  0/10 3/10 3/10 due to speed or knee bending 7-810 initially, decreases with continued walking 6/10 8-9/10 can do this, not long distances    2  Grocery shop without significant pain 8/10 0/10 4/10 7-8/10 7/10 7-8/10 8/10    3  Dance 5/10  1/10 2/10 0/10 4/10 7/10, getting better every week, working in coordination    4  Move without pain and to not get tired as frequently     /10 5-6/10 7/10 6-7/10      Objective: See treatment diary below    Precautions - Hashimoto's, autoimmune disease    Mobility Measures 2/09/22 3/02/22 4/06/22 5/11/22 6/22/22 7/27/22 8/31/22   Assistive device used?           5 Time Sit to Stand  (17" chair, arms across chest)  Able to get up from 18" surface without use of hands   Able without hands     1-2 times in a row     21 0 seconds Required practice to stand from 18" surface without hands   3 Meter Timed  Up & Go     9 1 seconds 8 8 seconds    Walking speed 0 60 -0 74 m/s         Functional Gait Assessment (see below)   18/30 17/30 24/30    6 Minute Walk Test (100 foot circular course)    700 feet, stopped at 1 min 30 sec left due to hip and lower extremity tightness   875 feet 1175 feet 1170 feet 1140 feet 400 feet       Patient-Reported Outcome Measure: Activities-Specific Balance Confidence Scale (ABC Scale)          Right knee flexion PROM, sitting  100 degrees  90 degrees 98 degrees 105 degrees 104 degrees 90 degrees    Walks with walking boot on right ankle, decreased knee flexion excursion about the right knee, decreased speed, without assistive device  14 degrees active dorsiflexion with knee fully extended  16 degrees passively    Resists maximal pressure into right plantarflexion without pain    Without walking boot, increased right step length versus left, walking speed above Ankle range of motion within normal limits  Even step lengths, mild decrease in knee excursion   Ankle ROM within functional limits    Able to do 20 bilateral heel raises with even weight bearing    3/5 right plantarflexion, 2+/5 left Ankle ROM within functional limits    Completes bilateral heel raises   Ankle ROM within functional limits  3-/5 heel raise 3/5 heel raise right  4/5 heel raise left 5/5 right dorsiflexion, eversion       Treadmill 1 2 mph 5 min  Treadmill downhill 8% grade, 1 0 mph, 1 5 min    Standing heel raise, about 10 on right  Heel walking 20 sec    Trigger point to right UT and neck rotation stretches, 5 min    Single leg stance on foam, tap to target, 30 sec x 2 sets, on right leg  Braiding walking 2 min, stepping over 2" targets 1' apart, 1 min    Lateral step up 6" step, 30 sec each  Forward step up 6" step, 1 min each    SciFit level 2 0, 12 min, about 105 steps/min     ASSESSMENT  Good improvement in mobility over the past week with decreased pain and improved muscle strength  Agree that AFO should only be used if needed  Continue to progress with gym/pool-based exercises as able  SHORT-TERM GOALS: 1 month(s)  1  Julian Ramirez walks at least 10 minutes consecutively  MET  3  Julian Ramirez reports 50% improvement in ability to walk grocery store distances without severe fatigue  MET  4  Demonstrates at least 11 degrees passive right dorsiflexion by 2/12/22  MET  New goal: When released by physician, tolerates at least 6 minutes consecutive overground walking without walking boot  MET  New goal: When released by physician, tolerates at least 10 bilateral heel raises  MET  New goal: When released by physician, walks with step length without one inch of contralateral leg  MET  New goal: Walks at least 1350 feet during 6 minute walk test   MET  New goal: Scores at least 20/30 on FGA    MET  New goal: Able to purposefully walk for exercise at least 15 minutes at a time at least 3-4 times per week  NOT MET    LONG-TERM GOALS: by 9/30/22  1  Patient reports at least 20 total minutes per day of overground walking for exercise  PROGRESSING  2  Patient independently manages home exercise program   MET with continued progressions  PLAN OF CARE:  Patient will benefit from physical therapy 1 time per week for 1 month  Neuromuscular re-education, therapeutic exercises, and therapeutic activities as outlined in grids

## 2022-09-09 DIAGNOSIS — M35.9 UNDIFFERENTIATED CONNECTIVE TISSUE DISEASE (HCC): ICD-10-CM

## 2022-09-09 DIAGNOSIS — K58.0 IRRITABLE BOWEL SYNDROME WITH DIARRHEA: ICD-10-CM

## 2022-09-09 DIAGNOSIS — R76.8 POSITIVE ANA (ANTINUCLEAR ANTIBODY): ICD-10-CM

## 2022-09-09 RX ORDER — MONTELUKAST SODIUM 4 MG/1
TABLET, CHEWABLE ORAL
Qty: 60 TABLET | Refills: 2 | Status: SHIPPED | OUTPATIENT
Start: 2022-09-09

## 2022-09-09 RX ORDER — HYDROXYCHLOROQUINE SULFATE 200 MG/1
TABLET, FILM COATED ORAL
Qty: 60 TABLET | Refills: 3 | Status: SHIPPED | OUTPATIENT
Start: 2022-09-09 | End: 2022-09-27 | Stop reason: SDUPTHER

## 2022-09-11 ENCOUNTER — HOSPITAL ENCOUNTER (OUTPATIENT)
Dept: MRI IMAGING | Facility: HOSPITAL | Age: 62
Discharge: HOME | End: 2022-09-11

## 2022-09-11 DIAGNOSIS — M79.604 PAIN IN RIGHT LEG: ICD-10-CM

## 2022-09-14 ENCOUNTER — OFFICE VISIT (OUTPATIENT)
Dept: PHYSICAL THERAPY | Facility: REHABILITATION | Age: 62
End: 2022-09-14
Payer: MEDICARE

## 2022-09-14 DIAGNOSIS — R29.898 LEG WEAKNESS, BILATERAL: ICD-10-CM

## 2022-09-14 DIAGNOSIS — R26.89 IMBALANCE: ICD-10-CM
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
## 2022-09-14 PROCEDURE — 97110 THERAPEUTIC EXERCISES: CPT | Performed by: PHYSICAL THERAPIST

## 2022-09-14 NOTE — PROGRESS NOTES
Treatment Note     Today's date: 2022  Patient name: Ana M Ruano  : 1960  MRN: [id number]  Referring provider: Paulette Robles DO  Dx:   Encounter Diagnosis     ICD-10-CM    1  Fall, subsequent encounter  W19  XXXD    2  Imbalance  R26 89    3  Leg weakness, bilateral  R29 898         Subjective:  Was to have tibia/fibular MRI this past , machine broke and is rescheduled for 22  Right knee is I little stiff, trying to move it  It was mentioned to her that the right knee doesn't move as much when walking  So somewhat sore with moving it  Gym pool has been closed  Objective: See treatment diary below    Patient goals: walking at a normal pace without cane (not using prior to house), grocery shop without significant pain when completed, dancing at niece's wedding in 2022  - To move without pain and to not get tired as frequently (added 10/20/21)    Patient-Specific Functional Scale   Task is scored 0 (unable to perform activity) to 10 (ability to perform activity independently)  Activity 8/25/21 9/29/21 10/20/21 11/17/21 2/09/22 4/06/22 6/22/22 7/27/22   1  Walk at normal pace without a cane 5/10  0/10 3/10 3/10 due to speed or knee bending -8/10 initially, decreases with continued walking 6/10 8-9/10 can do this, not long distances    2  Grocery shop without significant pain 8/10 0/10 4/10 7-8/10 7/10 7-8/10 8/10    3  Dance 5/10  1/10 2/10 0/10 4/10 7/10, getting better every week, working in coordination    4  Move without pain and to not get tired as frequently     7/10 5-6/10 7/10 6-7/10      Objective: See treatment diary below    Precautions - Hashimoto's, autoimmune disease    Mobility Measures 2/09/22 3/02/22 4/06/22 5/11/22 6/22/22 7/27/22 8/31/22   Assistive device used?           5 Time Sit to Stand  (17" chair, arms across chest)  Able to get up from 18" surface without use of hands   Able without hands     1-2 times in a row     21 0 seconds Required practice to stand from 18" surface without hands   3 Meter Timed  Up & Go     9 1 seconds 8 8 seconds    Walking speed 0 60 -0 74 m/s         Functional Gait Assessment (see below)   18/30 17/30 24/30    6 Minute Walk Test (100 foot circular course)    700 feet, stopped at 1 min 30 sec left due to hip and lower extremity tightness   875 feet 1175 feet 1170 feet 1140 feet 400 feet       Patient-Reported Outcome Measure: Activities-Specific Balance Confidence Scale (ABC Scale)          Right knee flexion PROM, sitting  100 degrees  90 degrees 98 degrees 105 degrees 104 degrees 90 degrees    Walks with walking boot on right ankle, decreased knee flexion excursion about the right knee, decreased speed, without assistive device  14 degrees active dorsiflexion with knee fully extended  16 degrees passively    Resists maximal pressure into right plantarflexion without pain    Without walking boot, increased right step length versus left, walking speed above Ankle range of motion within normal limits  Even step lengths, mild decrease in knee excursion   Ankle ROM within functional limits    Able to do 20 bilateral heel raises with even weight bearing    3/5 right plantarflexion, 2+/5 left Ankle ROM within functional limits    Completes bilateral heel raises   Ankle ROM within functional limits  3-/5 heel raise 3/5 heel raise right  4/5 heel raise left 5/5 right dorsiflexion, eversion       Overground walking about 40 sec/ 100 feet lap, 5 min  Treadmill uphill 7 5% grade, 1 0 mph 5 min    Single leg stance, on airex, tap target, 40 sec each  Single leg stance, on TB pad, tap target, 40 sec each    Single leg heel raise, 10 each x 2 sets   Heel walking 60 sec  Lateral step up 8" step, 40 sec each    Braiding walking, 1 5 min  8 feet down and back, timed, best 7 6 sec, x 5 sets    SciFit level 2 0, 20 min, about 100 steps/min     ASSESSMENT  We were working on flexing the right knee while walking, challenging this with uphill treadmill walking  Able to advance difficulty of static balance on compliant surface  Able to progress back into braiding walking again, at faster speeds  SHORT-TERM GOALS: 1 month(s)  1  Alber Isaac walks at least 10 minutes consecutively  MET  3  Alberpat PowellHorry reports 50% improvement in ability to walk grocery store distances without severe fatigue  MET  4  Demonstrates at least 11 degrees passive right dorsiflexion by 2/12/22  MET  New goal: When released by physician, tolerates at least 6 minutes consecutive overground walking without walking boot  MET  New goal: When released by physician, tolerates at least 10 bilateral heel raises  MET  New goal: When released by physician, walks with step length without one inch of contralateral leg  MET  New goal: Walks at least 1350 feet during 6 minute walk test   MET  New goal: Scores at least 20/30 on FGA    MET  New goal: Able to purposefully walk for exercise at least 15 minutes at a time at least 3-4 times per week  NOT MET    LONG-TERM GOALS: by 9/30/22  1  Patient reports at least 20 total minutes per day of overground walking for exercise  PROGRESSING  2  Patient independently manages home exercise program   MET with continued progressions  PLAN OF CARE:  Patient will benefit from physical therapy 1 time per week for 1 month  Neuromuscular re-education, therapeutic exercises, and therapeutic activities as outlined in grids

## 2022-09-16 ENCOUNTER — HOSPITAL ENCOUNTER (OUTPATIENT)
Dept: RADIOLOGY | Facility: HOSPITAL | Age: 62
Discharge: HOME | End: 2022-09-16
Payer: MEDICARE

## 2022-09-16 PROCEDURE — 73718 MRI LOWER EXTREMITY W/O DYE: CPT

## 2022-09-16 PROCEDURE — G1004 CDSM NDSC: HCPCS

## 2022-09-20 ENCOUNTER — OFFICE VISIT (OUTPATIENT)
Dept: PSYCHIATRY | Facility: CLINIC | Age: 62
End: 2022-09-20
Payer: MEDICARE

## 2022-09-20 DIAGNOSIS — F43.23 ADJUSTMENT DISORDER WITH MIXED ANXIETY AND DEPRESSED MOOD: Primary | ICD-10-CM

## 2022-09-20 PROCEDURE — 99214 OFFICE O/P EST MOD 30 MIN: CPT | Performed by: PSYCHIATRY & NEUROLOGY

## 2022-09-20 NOTE — BH TREATMENT PLAN
TREATMENT PLAN (Medication Management Only)        [facility]    Name and Date of Birth:  Ricarda Roe 57 y o  1960  Date of Treatment Plan: September 20, 2022  Diagnosis/Diagnoses:    1  Adjustment disorder with mixed anxiety and depressed mood      Strengths/Personal Resources for Self-Care: supportive family, taking medications as prescribed, ability to communicate well, ability to reason, ability to understand psychiatric illness  Area/Areas of need (in own words): anxiety, depression  1  Long Term Goal: Feel better about self  Target Date:6 months - 3/20/2023  Person/Persons responsible for completion of goal: Tess Miniroxana  2  Short Term Objective (s) - How will we reach this goal?:   A  Provider new recommended medication/dosage changes and/or continue medication(s): continue current medications as prescribed  B  N/A   C  N/A  Target Date:6 months - 3/20/2023  Person/Persons Responsible for Completion of Goal: Tess Miniroxana  Progress Towards Goals: continuing treatment  Treatment Modality: medication management every 6 months  Review due 180 days from date of this plan: 6 months - 3/20/2023  Expected length of service: ongoing treatment  My Physician/PA/NP and I have developed this plan together and I agree to work on the goals and objectives  I understand the treatment goals that were developed for my treatment
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
## 2022-09-21 ENCOUNTER — APPOINTMENT (OUTPATIENT)
Dept: PHYSICAL THERAPY | Facility: REHABILITATION | Age: 62
End: 2022-09-21
Payer: MEDICARE

## 2022-09-22 ENCOUNTER — APPOINTMENT (OUTPATIENT)
Dept: PHYSICAL THERAPY | Facility: REHABILITATION | Age: 62
End: 2022-09-22
Payer: MEDICARE

## 2022-09-26 NOTE — PROGRESS NOTES
Assessment and Plan:   Arsenio Cotto is a 57 y o   female who presents for follow up of undifferentiated connective tissue disease (positive MT of 1:80 in a speckled pattern, myalgia, and arthralgia)  Is complaining of muscle tightening  Also having right shoulder problems  Continue hydroxychloroquine twice a day; get regular eye exams  Increase tizanidine to 3 times a day    Return to clinic in 7 months    Plan:  Diagnoses and all orders for this visit:    Undifferentiated connective tissue disease ([address] )  -     hydroxychloroquine (PLAQUENIL) 200 mg tablet; Take 1 tablet (200 mg total) by mouth 2 (two) times a day    Positive MT (antinuclear antibody)  -     hydroxychloroquine (PLAQUENIL) 200 mg tablet; Take 1 tablet (200 mg total) by mouth 2 (two) times a day    Myalgia  -     tiZANidine (ZANAFLEX) 2 mg tablet; Take 1 tablet (2 mg total) by mouth every 8 (eight) hours as needed for muscle spasms    Arthralgia, unspecified joint    High risk medication use   High risk medication use - Benefits and risks of hydroxychloroquine, including but not limited to retinal toxicity, corneal deposits, gastrointestinal side effects, and headaches were discussed with the patient  The need for a regular eye exam to monitor for ocular toxicity while on this medication was also explained to the patient  Follow-up plan: RTC in 7 months        Rheumatic Disease Summary  Initial visit 1/7/22: Arsenio Cotto is a 57 y o   female who presents as a Rheumatology consult referred by GABY Martinez for evaluation of positive MT of 1:80 in a speckled pattern, myalgia, and arthralgia  Prior blood work, EMG studies, and muscle biopsy unrevealing  Meets criteria for UCTD and can consider prednisone course and trial of hydroxychloroquine in the future  Recommend patient contacts us after having performed endoscopy with GI for initiation of therapy  Will obtain additional labs to complete myositis workup   Contact us after get colonoscopy, will then consider a prednisone course and trial of hydroxychloroquine  5/12/2022: Singh Jernigan is a 57 y o   female who presents for follow up of undifferentiated connective tissue disease (positive MT of 1:80 in a speckled pattern, myalgia, and arthralgia)  Admits to bilateral calf tightness  Hard to get up from a seated position  Always has elevated inflammatory markers  Do lupus activity labs  Try tizanidine up to 3 times a day as needed for muscle pain  Start prednisone and hydroxychloroquine after gastric emptying study  HPI  Singh Jernigan is a 57 y o   female who presents for follow up of UCTD  Last clinic visit was 5/12/2022  Patient feels that she is still tightening up  Her muscles do not elongate  Has been having right shoulder problems  Also admits to tendonitis and Raynaud's symptoms  The following portions of the patient's history were reviewed and updated as appropriate: allergies, current medications, past family history, past medical history, past social history, past surgical history and problem list     Review of Systems:   Review of Systems   Constitutional: Negative for fatigue  HENT: Negative for mouth sores  Eyes: Negative for pain  Respiratory: Negative for shortness of breath  Cardiovascular: Negative for leg swelling  Musculoskeletal: Positive for arthralgias and myalgias  Negative for joint swelling  Skin: Negative for rash  Neurological: Negative for weakness  Hematological: Negative for adenopathy  Psychiatric/Behavioral: Negative for sleep disturbance  Reviewed and agree      Home Medications:    Current Outpatient Medications:   •  albuterol (2 5 mg/3 mL) 0 083 % nebulizer solution, Take 3 mL (2 5 mg total) by nebulization every 6 (six) hours as needed for wheezing or shortness of breath, Disp: 75 mL, Rfl: 2  •  Azelastine HCl 137 MCG/SPRAY SOLN, '1 SPRAY PER NOSTRIL IN THE MORNING AS DIRECTED, Disp: , Rfl:   • Blood Glucose Monitoring Suppl ([employer] SYSTEM) w/Device KIT, Check BG daily dx E11 9, Disp: 1 kit, Rfl: 1  •  colestipol (COLESTID) 1 g tablet, take 1 tablet by mouth twice a day, Disp: 60 tablet, Rfl: 2  •  Cyanocobalamin (Vitamin B-12) 1000 MCG SUBL, Place under the tongue, Disp: , Rfl:   •  dicyclomine (BENTYL) 20 mg tablet, Take 1 tablet (20 mg total) by mouth every 6 (six) hours As needed for abdominal pain, Disp: 120 tablet, Rfl: 2  •  doxycycline hyclate (VIBRAMYCIN) 100 mg capsule, Take 100 mg by mouth 2 (two) times a day, Disp: , Rfl:   •  Dulaglutide (Trulicity) 0 09 YL/8 9KI SOPN, Inject 0 5 mL (0 75 mg total) under the skin once a week, Disp: 2 mL, Rfl: 2  •  DULoxetine (CYMBALTA) 60 mg delayed release capsule, Take 1 capsule (60 mg total) by mouth daily, Disp: 90 capsule, Rfl: 2  •  ergocalciferol (VITAMIN D2) 50,000 units, take 1 capsule by mouth every week, Disp: 4 capsule, Rfl: 10  •  fluticasone (FLONASE) 50 mcg/act nasal spray, 2 sprays into each nostril daily (Patient taking differently: 2 sprays into each nostril daily As needed), Disp: 1 Bottle, Rfl: 1  •  gabapentin (NEURONTIN) 100 mg capsule, Take 100 mg by mouth 2 (two) times a day , Disp: , Rfl: 0  •  glucose blood (OneTouch Verio) test strip, Use check BG twice daily dx E11 9, Disp: 100 each, Rfl: 5  •  glucose blood (OneTouch Verio) test strip, Test once daily DX E11 9, Disp: 100 strip, Rfl: 1  •  hydrocortisone 2 5 % cream, Apply topically 3 (three) times a day as needed for irritation or rash, Disp: 30 g, Rfl: 0  •  hydroxychloroquine (PLAQUENIL) 200 mg tablet, Take 1 tablet (200 mg total) by mouth 2 (two) times a day, Disp: 180 tablet, Rfl: 2  •  ibuprofen (MOTRIN) 800 mg tablet, Take 1 tablet (800 mg total) by mouth every 6 (six) hours as needed for mild pain, Disp: 30 tablet, Rfl: 0  •  Lancets (OneTouch Delica Plus [id number]) MISC, Check BG 1x daily DX E11 9, Disp: 100 each, Rfl: 1  •  levothyroxine 200 mcg tablet, Take 1 tablet (200 mcg total) by mouth daily Pt takes Monday through Saturday, Disp: 90 tablet, Rfl: 1  •  lifitegrast (Xiidra) 5 % op solution, Xiidra 5 % eye drops in a dropperette, Disp: , Rfl:   •  losartan (COZAAR) 50 mg tablet, take 1 tablet by mouth once daily, Disp: 90 tablet, Rfl: 3  •  Menthol (Icy Hot Back) 5 % PTCH, Apply topically , Disp: , Rfl:   •  metroNIDAZOLE (METROCREAM) 0 75 % cream, metronidazole 0 75 % topical cream, Disp: , Rfl:   •  montelukast (SINGULAIR) 10 mg tablet, take 1 tablet by mouth at bedtime, Disp: 30 tablet, Rfl: 5  •  rosuvastatin (CRESTOR) 5 mg tablet, take 1 tablet by mouth once daily, Disp: 90 tablet, Rfl: 3  •  Saline 0 65 % SOLN, into each nostril 2 (two) times a day, Disp: , Rfl:   •  tiZANidine (ZANAFLEX) 2 mg tablet, Take 1 tablet (2 mg total) by mouth every 8 (eight) hours as needed for muscle spasms, Disp: 90 tablet, Rfl: 6    Objective:    Vitals:    09/27/22 1518   Weight: 104 kg (230 lb)   Height: 5' 6" (1 676 m)       Physical Exam  Constitutional:       General: She is not in acute distress  HENT:      Head: Normocephalic and atraumatic  Eyes:      Conjunctiva/sclera: Conjunctivae normal    Cardiovascular:      Rate and Rhythm: Normal rate and regular rhythm  Heart sounds: S1 normal and S2 normal      No friction rub  Pulmonary:      Effort: Pulmonary effort is normal  No respiratory distress  Breath sounds: Normal breath sounds  No wheezing, rhonchi or rales  Musculoskeletal:         General: Tenderness present  Cervical back: Neck supple  Comments: Right ankle tenderness   Skin:     Coloration: Skin is not pale  Neurological:      Mental Status: She is alert  Mental status is at baseline  Psychiatric:         Mood and Affect: Mood normal          Behavior: Behavior normal        Reviewed labs and imaging  Imaging:   MRI right tibia fibula 9/16/2022  No focal abnormality in the area of clinical concern in the right lower leg    Mild subcutaneous edema bilaterally  Moderate bilateral knee osteoarthritis  MRI lumbar spine 9/2/2022  The lumbar vertebral body heights are maintained demonstrating no acute fracture or subluxation  No focal lumbar disc herniation, central canal stenosis, or neural foraminal narrowing  Stable fatty infiltration of the filum terminale without cord tethering  EGD 1/18/22  Normal EGD  XR right knee 10/29/21  Severe tricompartmental osteoarthritis as evidenced by joint space narrowing, osteophyte formation and subchondral sclerosis  Changes are more pronounced on the left side      XR left knee 10/29/21  Severe tricompartmental osteoarthritis as evidenced by joint space narrowing, osteophyte formation and subchondral sclerosis  Changes are more pronounced on the left side  No lytic or blastic osseous lesion  Meniscal chondrocalcinosis is identified      XR right foot 9/28/21  There is no acute fracture or dislocation  Calcaneal spur(s) noted   Linear artifact overlies the Achilles tendon on lateral view    Labs:   Appointment on 08/16/2022   Component Date Value Ref Range Status   • Cholesterol 08/16/2022 160  See Comment mg/dL Final    Cholesterol:         Pediatric <18 Years        Desirable          <170 mg/dL      Borderline High    170-199 mg/dL      High               >=200 mg/dL        Adult >=18 Years            Desirable         <200 mg/dL      Borderline High   200-239 mg/dL      High              >239 mg/dL     • Triglycerides 08/16/2022 181 (A) See Comment mg/dL Final    Triglyceride:     0-9Y            <75mg/dL     10Y-17Y         <90 mg/dL       >=18Y     Normal          <150 mg/dL     Borderline High 150-199 mg/dL     High            200-499 mg/dL        Very High       >499 mg/dL    Specimen collection should occur prior to N-Acetylcysteine or Metamizole administration due to the potential for falsely depressed results     • HDL, Direct 08/16/2022 55  >=50 mg/dL Final    Specimen collection should occur prior to Metamizole administration due to the potential for falsley depressed results  • LDL Calculated 08/16/2022 69  0 - 100 mg/dL Final    LDL Cholesterol:     Optimal           <100 mg/dl     Near Optimal      100-129 mg/dl     Above Optimal       Borderline High 130-159 mg/dl       High            160-189 mg/dl       Very High       >189 mg/dl         This screening LDL is a calculated result  It does not have the accuracy of the Direct Measured LDL in the monitoring of patients with hyperlipidemia and/or statin therapy  Direct Measure LDL (KAD611) must be ordered separately in these patients  • Non-HDL-Chol (CHOL-HDL) 08/16/2022 105  mg/dl Final   • Hemoglobin A1C 08/16/2022 7 0 (A) Normal 3 8-5 6%; PreDiabetic 5 7-6 4%; Diabetic >=6 5%; Glycemic control for adults with diabetes <7 0% % Final   • EAG 08/16/2022 154  mg/dl Final   • Sodium 08/16/2022 138  135 - 147 mmol/L Final   • Potassium 08/16/2022 4 2  3 5 - 5 3 mmol/L Final   • Chloride 08/16/2022 106  96 - 108 mmol/L Final   • CO2 08/16/2022 26  21 - 32 mmol/L Final   • ANION GAP 08/16/2022 6  4 - 13 mmol/L Final   • BUN 08/16/2022 18  5 - 25 mg/dL Final   • Creatinine 08/16/2022 1 03  0 60 - 1 30 mg/dL Final    Standardized to IDMS reference method   • Glucose, Fasting 08/16/2022 117 (A) 65 - 99 mg/dL Final    Specimen collection should occur prior to Sulfasalazine administration due to the potential for falsely depressed results  Specimen collection should occur prior to Sulfapyridine administration due to the potential for falsely elevated results  • Calcium 08/16/2022 9 1  8 3 - 10 1 mg/dL Final   • AST 08/16/2022 24  5 - 45 U/L Final    Specimen collection should occur prior to Sulfasalazine administration due to the potential for falsely depressed results      • ALT 08/16/2022 35  12 - 78 U/L Final    Specimen collection should occur prior to Sulfasalazine and/or Sulfapyridine administration due to the potential for falsely depressed results  • Alkaline Phosphatase 08/16/2022 102  46 - 116 U/L Final   • Total Protein 08/16/2022 7 9  6 4 - 8 4 g/dL Final   • Albumin 08/16/2022 3 7  3 5 - 5 0 g/dL Final   • Total Bilirubin 08/16/2022 0 45  0 20 - 1 00 mg/dL Final    Use of this assay is not recommended for patients undergoing treatment with eltrombopag due to the potential for falsely elevated results  • eGFR 08/16/2022 57  ml/min/1 73sq m Final   • TSH 3RD GENERATON 08/16/2022 11 700 (A) 0 450 - 4 500 uIU/mL Final    The recommended reference ranges for TSH during pregnancy are as follows:   First trimester 0 1 to 2 5 uIU/mL   Second trimester  0 2 to 3 0 uIU/mL   Third trimester 0 3 to 3 0 uIU/m    Note: Normal ranges may not apply to patients who are transgender, non-binary, or whose legal sex, sex at birth, and gender identity differ  Adult TSH (3rd generation) reference range follows the recommended guidelines of the [employer], January, 2020  • Free T4 08/16/2022 1 04  0 76 - 1 46 ng/dL Final    Specimen collection should occur prior to Sulfasalazine administration due to the potential for falsely elevated results     Appointment on 05/12/2022   Component Date Value Ref Range Status   • Magnesium 05/12/2022 2 0  1 6 - 2 6 mg/dL Final   Office Visit on 05/12/2022   Component Date Value Ref Range Status   • ds DNA Ab 05/12/2022 <1  0 - 9 IU/mL Final                                       Negative      <5                                     Equivocal  5 - 9                                     Positive      >9   • Sed Rate 05/12/2022 33 (A) 0 - 29 mm/hour Final   • CRP 05/12/2022 5 2 (A) <3 0 mg/L Final   • WBC 05/12/2022 8 97  4 31 - 10 16 Thousand/uL Final   • RBC 05/12/2022 4 56  3 81 - 5 12 Million/uL Final   • Hemoglobin 05/12/2022 13 5  11 5 - 15 4 g/dL Final   • Hematocrit 05/12/2022 41 3  34 8 - 46 1 % Final   • MCV 05/12/2022 91  82 - 98 fL Final   • MCH 05/12/2022 29 6  26 8 - 34 3 pg Final   • MCHC 05/12/2022 32 7  31 4 - 37 4 g/dL Final   • RDW 05/12/2022 12 7  11 6 - 15 1 % Final   • MPV 05/12/2022 10 1  8 9 - 12 7 fL Final   • Platelets 42/90/5715 296  149 - 390 Thousands/uL Final   • Sodium 05/12/2022 140  136 - 145 mmol/L Final   • Potassium 05/12/2022 3 5  3 5 - 5 3 mmol/L Final   • Chloride 05/12/2022 108  100 - 108 mmol/L Final   • CO2 05/12/2022 29  21 - 32 mmol/L Final   • ANION GAP 05/12/2022 3 (A) 4 - 13 mmol/L Final   • BUN 05/12/2022 17  5 - 25 mg/dL Final   • Creatinine 05/12/2022 0 87  0 60 - 1 30 mg/dL Final    Standardized to IDMS reference method   • Glucose, Fasting 05/12/2022 159 (A) 65 - 99 mg/dL Final    Specimen collection should occur prior to Sulfasalazine administration due to the potential for falsely depressed results  Specimen collection should occur prior to Sulfapyridine administration due to the potential for falsely elevated results  • Calcium 05/12/2022 9 7  8 3 - 10 1 mg/dL Final   • AST 05/12/2022 26  5 - 45 U/L Final    Specimen collection should occur prior to Sulfasalazine administration due to the potential for falsely depressed results  • ALT 05/12/2022 44  12 - 78 U/L Final    Specimen collection should occur prior to Sulfasalazine and/or Sulfapyridine administration due to the potential for falsely depressed results  • Alkaline Phosphatase 05/12/2022 105  46 - 116 U/L Final   • Total Protein 05/12/2022 7 8  6 4 - 8 2 g/dL Final   • Albumin 05/12/2022 3 6  3 5 - 5 0 g/dL Final   • Total Bilirubin 05/12/2022 0 26  0 20 - 1 00 mg/dL Final    Use of this assay is not recommended for patients undergoing treatment with eltrombopag due to the potential for falsely elevated results     • eGFR 05/12/2022 71  ml/min/1 73sq m Final   • C3 Complement 05/12/2022 160 0  90 0 - 180 0 mg/dL Final   • C4, COMPLEMENT 05/12/2022 31 0  10 0 - 40 0 mg/dL Final   • Segmented % 05/12/2022 51  43 - 75 % Final   • Bands % 05/12/2022 4  0 - 8 % Final   • Lymphocytes % 05/12/2022 13 (A) 14 - 44 % Final   • Monocytes % 05/12/2022 4  4 - 12 % Final   • Eosinophils, % 05/12/2022 4  0 - 6 % Final   • Basophils % 05/12/2022 1  0 - 1 % Final   • Atypical Lymphocytes % 05/12/2022 23 (A) <=0 % Final   • Absolute Neutrophils 05/12/2022 4 93  1 85 - 7 62 Thousand/uL Final   • Lymphocytes Absolute 05/12/2022 1 17  0 60 - 4 47 Thousand/uL Final   • Monocytes Absolute 05/12/2022 0 36  0 00 - 1 22 Thousand/uL Final   • Eosinophils Absolute 05/12/2022 0 36  0 00 - 0 40 Thousand/uL Final   • Basophils Absolute 05/12/2022 0 09  0 00 - 0 10 Thousand/uL Final   • RBC Morphology 05/12/2022 Normal   Final   • Platelet Estimate 08/41/8282 Adequate  Adequate Final

## 2022-09-27 ENCOUNTER — OFFICE VISIT (OUTPATIENT)
Dept: RHEUMATOLOGY | Facility: CLINIC | Age: 62
End: 2022-09-27
Payer: MEDICARE

## 2022-09-27 VITALS — BODY MASS INDEX: 36.96 KG/M2 | HEIGHT: 66 IN | WEIGHT: 230 LBS

## 2022-09-27 DIAGNOSIS — M79.10 MYALGIA: ICD-10-CM

## 2022-09-27 DIAGNOSIS — R76.8 POSITIVE ANA (ANTINUCLEAR ANTIBODY): ICD-10-CM

## 2022-09-27 DIAGNOSIS — Z79.899 HIGH RISK MEDICATION USE: ICD-10-CM

## 2022-09-27 DIAGNOSIS — M25.50 ARTHRALGIA, UNSPECIFIED JOINT: ICD-10-CM

## 2022-09-27 DIAGNOSIS — M35.9 UNDIFFERENTIATED CONNECTIVE TISSUE DISEASE (HCC): Primary | ICD-10-CM

## 2022-09-27 PROCEDURE — 99214 OFFICE O/P EST MOD 30 MIN: CPT | Performed by: INTERNAL MEDICINE

## 2022-09-27 RX ORDER — TIZANIDINE 2 MG/1
2 TABLET ORAL EVERY 8 HOURS PRN
Qty: 90 TABLET | Refills: 6 | Status: SHIPPED | OUTPATIENT
Start: 2022-09-27

## 2022-09-27 RX ORDER — HYDROXYCHLOROQUINE SULFATE 200 MG/1
200 TABLET, FILM COATED ORAL 2 TIMES DAILY
Qty: 180 TABLET | Refills: 2 | Status: SHIPPED | OUTPATIENT
Start: 2022-09-27 | End: 2023-06-24

## 2022-09-27 NOTE — PATIENT INSTRUCTIONS
Continue hydroxychloroquine twice a day; get regular eye exams  Increase tizanidine to 3 times a day    Return to clinic in 7 months

## 2022-09-28 ENCOUNTER — APPOINTMENT (OUTPATIENT)
Dept: LAB | Facility: MEDICAL CENTER | Age: 62
End: 2022-09-28
Payer: MEDICARE

## 2022-09-28 ENCOUNTER — OFFICE VISIT (OUTPATIENT)
Dept: PHYSICAL THERAPY | Facility: REHABILITATION | Age: 62
End: 2022-09-28
Payer: MEDICARE

## 2022-09-28 DIAGNOSIS — E06.3 HASHIMOTO'S DISEASE: ICD-10-CM

## 2022-09-28 DIAGNOSIS — R26.89 IMBALANCE: ICD-10-CM
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
## 2022-09-28 LAB — TSH SERPL DL<=0.05 MIU/L-ACNC: 2.82 UIU/ML (ref 0.45–4.5)

## 2022-09-28 PROCEDURE — 84443 ASSAY THYROID STIM HORMONE: CPT

## 2022-09-28 PROCEDURE — 36415 COLL VENOUS BLD VENIPUNCTURE: CPT

## 2022-09-28 PROCEDURE — 97110 THERAPEUTIC EXERCISES: CPT | Performed by: PHYSICAL THERAPIST

## 2022-09-28 NOTE — PROGRESS NOTES
Treatment Note     Today's date: 2022  Patient name: Yovanny Fink  : 1960  MRN: [id number]  Referring provider: Rafia Boucher DO  Dx:   Encounter Diagnosis     ICD-10-CM    1  Fall, subsequent encounter  W19  XXXD    2  Imbalance  R26 89         Subjective: Had tibia/fibular MRI showing edema  Has been doing better with mobility, less limited by distal lower extremity pain  Notes weight varies by about 10 lbs with swelling  Will get back to [employer] this week, as they have a  again  Thyroid function has varied somewhat affecting swelling and weight  Objective: See treatment diary below    Patient goals: walking at a normal pace without cane (not using prior to house), grocery shop without significant pain when completed, dancing at niece's wedding in 2022  - To move without pain and to not get tired as frequently (added 10/20/21)    Patient-Specific Functional Scale   Task is scored 0 (unable to perform activity) to 10 (ability to perform activity independently)  Activity 8/25/21 9/29/21 10/20/21 11/17/21 2/09/22 4/06/22 6/22/22 7/27/22   1  Walk at normal pace without a cane 5/10  0/10 3/10 3/10 due to speed or knee bending 7-8/10 initially, decreases with continued walking 10 8-9/10 can do this, not long distances    2  Grocery shop without significant pain 8/10 0/10 4/10 7-8/10 7/10 7-8/10 8/10    3  Dance 5/10  1/10 2/10 0/10 4/10 7/10, getting better every week, working in coordination    4  Move without pain and to not get tired as frequently     7/10 5-6/10 7/10 6-7/10      Objective: See treatment diary below    Precautions - Hashimoto's, autoimmune disease    Mobility Measures 2/09/22 3/02/22 4/06/22 5/11/22 6/22/22 7/27/22 8/31/22 9/28/22   Assistive device used?            5 Time Sit to Stand  (17" chair, arms across chest)  Able to get up from 18" surface without use of hands   Able without hands     1-2 times in a row     21 0 seconds Required practice to stand from 18" surface without hands Able from 20" surface, able from 18" surface   3 Meter Timed  Up & Go     9 1 seconds 8 8 seconds     Walking speed 0 60 -0 74 m/s          Functional Gait Assessment (see below)   18/30 17/30 24/30     6 Minute Walk Test (100 foot circular course)    700 feet, stopped at 1 min 30 sec left due to hip and lower extremity tightness   875 feet 1175 feet 1170 feet 1140 feet 400 feet     980 feet   Patient-Reported Outcome Measure: Activities-Specific Balance Confidence Scale (ABC Scale)           Right knee flexion PROM, sitting  100 degrees  90 degrees 98 degrees 105 degrees 104 degrees 90 degrees     Walks with walking boot on right ankle, decreased knee flexion excursion about the right knee, decreased speed, without assistive device  14 degrees active dorsiflexion with knee fully extended  16 degrees passively    Resists maximal pressure into right plantarflexion without pain    Without walking boot, increased right step length versus left, walking speed above Ankle range of motion within normal limits  Even step lengths, mild decrease in knee excursion  Ankle ROM within functional limits    Able to do 20 bilateral heel raises with even weight bearing    3/5 right plantarflexion, 2+/5 left Ankle ROM within functional limits    Completes bilateral heel raises   Ankle ROM within functional limits  3-/5 heel raise 3/5 heel raise right  4/5 heel raise left 5/5 right dorsiflexion, eversion        Overground walking about 37 sec/ 100 feet lap, 8 min  Treadmill uphill 5% grade, 1 0 mph 8 min    Single leg stance, on TB pad, tap target, 40 sec each    heel raise, off edge of 4" step, about 12-15 x 2 sets  Lateral step up 6" step, 60 sec each    Braiding walking, 3 min    SciFit level 2 0, 20 min, about 100 steps/min     ASSESSMENT  Again has returned towards same level of walking endurance as baseline, not yet to prior speed    No foot drop with about 16 minutes of walking today, discontinue use of brace  Continue to increase volume of lower extremity strengthening exercises  Able to progress back into braiding walking again, at faster speeds  SHORT-TERM GOALS: 1 month(s)  1  Emiliano Jara walks at least 10 minutes consecutively  MET  3  Emiliano Jara reports 50% improvement in ability to walk grocery store distances without severe fatigue  MET  4  Demonstrates at least 11 degrees passive right dorsiflexion by 2/12/22  MET  New goal: When released by physician, tolerates at least 6 minutes consecutive overground walking without walking boot  MET  New goal: When released by physician, tolerates at least 10 bilateral heel raises  MET  New goal: When released by physician, walks with step length without one inch of contralateral leg  MET  New goal: Walks at least 1350 feet during 6 minute walk test   MET  New goal: Scores at least 20/30 on FGA    MET  New goal: Able to purposefully walk for exercise at least 15 minutes at a time at least 3-4 times per week  NOT MET    LONG-TERM GOALS: by 9/30/22  1  Patient reports at least 20 total minutes per day of overground walking for exercise  PROGRESSING  2  Patient independently manages home exercise program   MET with continued progressions  PLAN OF CARE:  Patient will benefit from physical therapy 1 time per week for 1 month  Neuromuscular re-education, therapeutic exercises, and therapeutic activities as outlined in grids

## 2022-10-05 ENCOUNTER — APPOINTMENT (OUTPATIENT)
Dept: PHYSICAL THERAPY | Facility: REHABILITATION | Age: 62
End: 2022-10-05

## 2022-10-12 ENCOUNTER — OFFICE VISIT (OUTPATIENT)
Dept: PHYSICAL THERAPY | Facility: REHABILITATION | Age: 62
End: 2022-10-12
Payer: MEDICARE

## 2022-10-12 DIAGNOSIS — R29.898 LEG WEAKNESS, BILATERAL: ICD-10-CM

## 2022-10-12 DIAGNOSIS — R26.89 IMBALANCE: ICD-10-CM
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
## 2022-10-12 PROCEDURE — 97110 THERAPEUTIC EXERCISES: CPT | Performed by: PHYSICAL THERAPIST

## 2022-10-12 NOTE — PROGRESS NOTES
Treatment Note     Today's date: 10/12/2022  Patient name: Petty Hawkins  : 1960  MRN: [id number]  Referring provider: Rosemary Poole DO  Dx:   Encounter Diagnosis     ICD-10-CM    1  Fall, subsequent encounter  W19  XXXD    2  Imbalance  R26 89    3  Leg weakness, bilateral  R29 898         Subjective:  New medication for connective tissue dysfunction  Back at gym and getting to lots of swimming  Doing better with scissor kick for back and sidestroke  New medication makes her drowsy  Objective: See treatment diary below    Patient goals: walking at a normal pace without cane (not using prior to house), grocery shop without significant pain when completed, dancing at niece's wedding in 2022  - To move without pain and to not get tired as frequently (added 10/20/21)    Patient-Specific Functional Scale   Task is scored 0 (unable to perform activity) to 10 (ability to perform activity independently)  Activity 8/25/21 9/29/21 10/20/21 11/17/21 2/09/22 4/06/22 6/22/22 7/27/22   1  Walk at normal pace without a cane 5/10  0/10 3/10 3/10 due to speed or knee bending -8/10 initially, decreases with continued walking 6/10 8-10 can do this, not long distances    2  Grocery shop without significant pain 8/10 0/10 4/10 7-8/10 7/10 7-8/10 8/10    3  Dance 5/10  1/10 2/10 0/10 4/10 7/10, getting better every week, working in coordination    4  Move without pain and to not get tired as frequently     7/10 5-6/10 7/10 6-7/10      Objective: See treatment diary below    Precautions - Hashimoto's, autoimmune disease    Mobility Measures 6/22/22 7/27/22 8/31/22 9/28/22 10/12/22   Assistive device used?         5 Time Sit to Stand  (17" chair, arms across chest) Able without hands     1-2 times in a row     21 0 seconds Required practice to stand from 18" surface without hands Able from 20" surface, able from 18" surface    3 Meter Timed  Up & Go 9 1 seconds 8 8 seconds      Walking speed Functional Gait Assessment (see below)  24/30      6 Minute Walk Test (100 foot circular course)   1170 feet 1140 feet 400 feet     980 feet 1200 feet   Patient-Reported Outcome Measure: Activities-Specific Balance Confidence Scale (ABC Scale)        Right knee flexion PROM, sitting 105 degrees 104 degrees 90 degrees      Ankle ROM within functional limits  3-/5 heel raise 3/5 heel raise right  4/5 heel raise left 5/5 right dorsiflexion, eversion         6 minute walk test, 1200 feet    Seated hamstring stretch 30 sec each  Treadmill 5 lbs on ankles  1 5-1 7 mph, 9 min 20 sec    7% incline  1 6 mph 7/10 RPE  4 5 min    Single leg stance, on TB pad, tap target, 30 sec each    heel raise, single leg, flat ground, about 12-15 x 2 sets  Lateral step up 8" step, L, 30 sec, 6 " step, 30 sec  30 sec x 2 with 6" step with R    Braiding walking, 2 min  Best black to black section of parallel bars, down and back, 11 1 sec    SciFit level 2 0, 17 5 min, about 105-110 steps/min     ASSESSMENT  Yaima Soriano walked the furthest in a long while in the 6 minute walk test   Overall volume of movement continues to improve  We are seeing more excursion of the right knee during walking, especially uphill walking  Good foot position throughout the gait cycle  Continue to advance volume and higher level dynamic balance  SHORT-TERM GOALS: 1 month(s)  1  Yaima Soriano walks at least 10 minutes consecutively  MET  3  Yaima Bo reports 50% improvement in ability to walk grocery store distances without severe fatigue  MET  4  Demonstrates at least 11 degrees passive right dorsiflexion by 2/12/22  MET  New goal: When released by physician, tolerates at least 6 minutes consecutive overground walking without walking boot  MET  New goal: When released by physician, tolerates at least 10 bilateral heel raises  MET  New goal: When released by physician, walks with step length without one inch of contralateral leg  MET      New goal: Walks at least 1350 feet during 6 minute walk test   MET  New goal: Scores at least 20/30 on FGA    MET  New goal: Able to purposefully walk for exercise at least 15 minutes at a time at least 3-4 times per week  NOT MET    LONG-TERM GOALS: by 9/30/22  1  Patient reports at least 20 total minutes per day of overground walking for exercise  PROGRESSING  2  Patient independently manages home exercise program   MET with continued progressions  PLAN OF CARE:  Patient will benefit from physical therapy 1 time per week for 1 month  Neuromuscular re-education, therapeutic exercises, and therapeutic activities as outlined in grids

## 2022-10-19 ENCOUNTER — OFFICE VISIT (OUTPATIENT)
Dept: PHYSICAL THERAPY | Facility: REHABILITATION | Age: 62
End: 2022-10-19
Payer: MEDICARE
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
## 2022-10-19 DIAGNOSIS — R26.89 IMBALANCE: ICD-10-CM

## 2022-10-19 DIAGNOSIS — R29.898 LEG WEAKNESS, BILATERAL: ICD-10-CM

## 2022-10-19 PROCEDURE — 97110 THERAPEUTIC EXERCISES: CPT | Performed by: PHYSICAL THERAPIST

## 2022-10-19 NOTE — PROGRESS NOTES
Treatment Note     Today's date: 10/12/2022  Patient name: Gerard Smith  : 1960  MRN: [id number]  Referring provider: Bijal Ochoa DO  Dx:   Encounter Diagnosis     ICD-10-CM    1  Fall, subsequent encounter  W19  XXXD    2  Imbalance  R26 89    3  Leg weakness, bilateral  R29 898       Subjective:  Continues with new medication for connective tissue dysfunction  Wasn't able to get to the gym in the past couple days, had power outage today, looking for ways to do land-based exercise classes  Objective: See treatment diary below    Patient goals: walking at a normal pace without cane (not using prior to house), grocery shop without significant pain when completed, dancing at niece's wedding in 2022  - To move without pain and to not get tired as frequently (added 10/20/21)    Patient-Specific Functional Scale   Task is scored 0 (unable to perform activity) to 10 (ability to perform activity independently)  Activity 8/25/21 9/29/21 10/20/21 11/17/21 2/09/22 4/06/22 6/22/22 7/27/22   1  Walk at normal pace without a cane 5/10  0/10 3/10 3/10 due to speed or knee bending -8/10 initially, decreases with continued walking 6/10 8-10 can do this, not long distances    2  Grocery shop without significant pain 8/10 0/10 4/10 7-8/10 7/10 7-8/10 8/10    3  Dance 5/10  1/10 2/10 0/10 410 7/10, getting better every week, working in coordination    4  Move without pain and to not get tired as frequently     7/10 5-6/10 7/10 6-7/10      Objective: See treatment diary below    Precautions - Hashimoto's, autoimmune disease    Mobility Measures 6/22/22 7/27/22 8/31/22 9/28/22 10/12/22   Assistive device used?         5 Time Sit to Stand  (17" chair, arms across chest) Able without hands     1-2 times in a row     21 0 seconds Required practice to stand from 18" surface without hands Able from 20" surface, able from 18" surface    3 Meter Timed  Up & Go 9 1 seconds 8 8 seconds      Walking speed Functional Gait Assessment (see below)  24/30      6 Minute Walk Test (100 foot circular course)   1170 feet 1140 feet 400 feet     980 feet 1200 feet   Patient-Reported Outcome Measure: Activities-Specific Balance Confidence Scale (ABC Scale)        Right knee flexion PROM, sitting 105 degrees 104 degrees 90 degrees      Ankle ROM within functional limits  3-/5 heel raise 3/5 heel raise right  4/5 heel raise left 5/5 right dorsiflexion, eversion         Overground walking with 5 lbs on ankles, about 40 sec per 100 feet lap, 8 min    Treadmill   8% incline  1 5-1 7 mph to 9/10 RPE  6 min    Single leg stance, on TB pad, tap target, 30 sec each x 2 sets    heel raise, single leg, flat ground, about 12-15 each, some assist for the right leg  Lateral step up 6" step, 30 sec x 2 sets    Braiding walking, 2 min  Best black to black section of parallel bars, down and back, 10 7 sec    SciFit level 2 0, 10 min, legs only, about 105-110 steps/min     ASSESSMENT  Reassess 6 minute walk test next visit  Work on modifications for land-based exercise classes  Water-based classes are a great way to move with low impact, land-based exercise has better carryover to land-based goals (walking endurance in a store, for example)  SHORT-TERM GOALS: 1 month(s)  1  Mario Herpolly walks at least 10 minutes consecutively  MET  3  Clairemark Herpolly reports 50% improvement in ability to walk grocery store distances without severe fatigue  MET  4  Demonstrates at least 11 degrees passive right dorsiflexion by 2/12/22  MET  New goal: When released by physician, tolerates at least 6 minutes consecutive overground walking without walking boot  MET  New goal: When released by physician, tolerates at least 10 bilateral heel raises  MET  New goal: When released by physician, walks with step length without one inch of contralateral leg  MET  New goal: Walks at least 1350 feet during 6 minute walk test   MET    New goal: Scores at least 20/30 on FGA    MET  New goal: Able to purposefully walk for exercise at least 15 minutes at a time at least 3-4 times per week  NOT MET    LONG-TERM GOALS: by 9/30/22  1  Patient reports at least 20 total minutes per day of overground walking for exercise  PROGRESSING  2  Patient independently manages home exercise program   MET with continued progressions  PLAN OF CARE:  Patient will benefit from physical therapy 1 time per week for 1 month  Neuromuscular re-education, therapeutic exercises, and therapeutic activities as outlined in grids

## 2022-10-21 ENCOUNTER — TELEPHONE (OUTPATIENT)
Dept: OBGYN CLINIC | Facility: HOSPITAL | Age: 62
End: 2022-10-21

## 2022-10-21 NOTE — TELEPHONE ENCOUNTER
Caller: Juma Perkins    Doctor: Marika Miller    Reason for call: Patient is moving to a new apartment and needs a letter stating that her cats, Obdulia Myrick are emotional support animals  Please fax this letter to [facility] at [phone]  Patient states she has not been able to reach her other doctors and the landlord requires this letter as soon as possible       Call back#: [phone]

## 2022-10-25 ENCOUNTER — TELEPHONE (OUTPATIENT)
Dept: PSYCHIATRY | Facility: CLINIC | Age: 62
End: 2022-10-25

## 2022-10-25 NOTE — TELEPHONE ENCOUNTER
Berto Pat will be moving into a new apartment, and has 2 cats coming along  The new apartment complex is requesting a brief letter for emotional support animals to allow her to bring her cats with her   She will come in to sign a DOMENIC for this type of letter

## 2022-11-02 ENCOUNTER — OFFICE VISIT (OUTPATIENT)
Dept: PHYSICAL THERAPY | Facility: REHABILITATION | Age: 62
End: 2022-11-02
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
## 2022-11-02 DIAGNOSIS — R26.89 IMBALANCE: ICD-10-CM

## 2022-11-02 NOTE — PROGRESS NOTES
Treatment Note     Today's date: 10/12/2022  Patient name: Leonce Cockayne  : 1960  MRN: [id number]  Referring provider: Josesito Redmond DO  Dx:   Encounter Diagnosis     ICD-10-CM    1  Fall, subsequent encounter  W19  XXXD    2  Imbalance  R26 89    3  Leg weakness, bilateral  R29 898       Subjective:  Moving 22, so really busy  Hasn't been able to get to swimming lately  Has had more right ankle pain  Continues with new medication for connective tissue dysfunction  That has been good until she started moving  Moving to [facility], will continue with exercise at [facility]       Objective: See treatment diary below    Patient goals: walking at a normal pace without cane (not using prior to house), grocery shop without significant pain when completed, dancing at niece's wedding in 2022  - To move without pain and to not get tired as frequently (added 10/20/21)    Patient-Specific Functional Scale   Task is scored 0 (unable to perform activity) to 10 (ability to perform activity independently)  Activity 8/25/21 9/29/21 10/20/21 11/17/21 2/09/22 4/06/22 6/22/22 7/27/22   1  Walk at normal pace without a cane 5/10  0/10 3/10 310 due to speed or knee bending -8/10 initially, decreases with continued walking 6/10 8-10 can do this, not long distances    2  Grocery shop without significant pain 8/10 0/10 4/10 7-8/10 7/10 7-8/10 8/10    3  Dance 5/10  1/10 2/10 0/10 4/10 7/10, getting better every week, working in coordination    4  Move without pain and to not get tired as frequently     7/10 5-6/10 7/10 6-7/10      Objective: See treatment diary below    Precautions - Hashimoto's, autoimmune disease    Mobility Measures 6/22/22 7/27/22 8/31/22 9/28/22 10/12/22   Assistive device used?         5 Time Sit to Stand  (17" chair, arms across chest) Able without hands     1-2 times in a row     21 0 seconds Required practice to stand from 18" surface without hands Able from 20" surface, able from 18" surface    3 Meter Timed  Up & Go 9 1 seconds 8 8 seconds      Walking speed        Functional Gait Assessment (see below)  24/30      6 Minute Walk Test (100 foot circular course)   1170 feet 1140 feet 400 feet     980 feet 1200 feet   Patient-Reported Outcome Measure: Activities-Specific Balance Confidence Scale (ABC Scale)        Right knee flexion PROM, sitting 105 degrees 104 degrees 90 degrees      Ankle ROM within functional limits  3-/5 heel raise 3/5 heel raise right  4/5 heel raise left 5/5 right dorsiflexion, eversion         SciFit level 1, about 120 steps/min, 11 min    Previously had pain/swelling about the top/lateral right ankle, not present currently  Doing some ankle movement, some ankle plantarflexion isometrics  Ankle eversion, green band, 50 sec    Standing heel raise,   Standing on airex foam, tap target with opposite foot, about 20-30 sec x 2 each    Squat to 21" target, about 15    Lateral step down from 4" step, about 12-14 each side    Braiding walking, 1 min x 2 sets    (Best black to black section of parallel bars, down and back, 10 7 sec)    SciFit level 1, 5 min, legs only, about 105-110 steps/min         ASSESSMENT  Reassess 6 minute walk test next visit  Deferred today due to increased ankle pain  We modified the program and volume of standing/walking due to exacerbation of ankle (and secondarily, knee) pain  But we are continuing to keep the ankle moving, we discussed the range of movements available based on symptoms, from open-chain ankle active range of motion to closed-chain step-ups and braiding walking  Also can continue to strengthen proximal muscles  Also can complete non-weight bearing activities, such as recumbent stepper  This helps illustrate the importance of regular physical activity, which patient should be able to return to after packing and moving next week  SHORT-TERM GOALS: 1 month(s)  1   Shukri Lacy walks at least 10 minutes consecutively  MET  3  Sabine Andrade reports 50% improvement in ability to walk grocery store distances without severe fatigue  MET  4  Demonstrates at least 11 degrees passive right dorsiflexion by 2/12/22  MET  New goal: When released by physician, tolerates at least 6 minutes consecutive overground walking without walking boot  MET  New goal: When released by physician, tolerates at least 10 bilateral heel raises  MET  New goal: When released by physician, walks with step length without one inch of contralateral leg  MET  New goal: Walks at least 1350 feet during 6 minute walk test   MET  New goal: Scores at least 20/30 on FGA    MET  New goal: Able to purposefully walk for exercise at least 15 minutes at a time at least 3-4 times per week  NOT MET    LONG-TERM GOALS: by 9/30/22  1  Patient reports at least 20 total minutes per day of overground walking for exercise  PROGRESSING  2  Patient independently manages home exercise program   MET with continued progressions  PLAN OF CARE:  Patient will benefit from physical therapy 1 time per week for 1 month  Neuromuscular re-education, therapeutic exercises, and therapeutic activities as outlined in grids

## 2022-11-09 ENCOUNTER — APPOINTMENT (OUTPATIENT)
Dept: PHYSICAL THERAPY | Facility: REHABILITATION | Age: 62
End: 2022-11-09

## 2022-11-10 ENCOUNTER — APPOINTMENT (OUTPATIENT)
Dept: PHYSICAL THERAPY | Facility: REHABILITATION | Age: 62
End: 2022-11-10

## 2022-11-13 DIAGNOSIS — J30.89 SEASONAL ALLERGIC RHINITIS DUE TO OTHER ALLERGIC TRIGGER: ICD-10-CM

## 2022-11-13 RX ORDER — MONTELUKAST SODIUM 10 MG/1
TABLET ORAL
Qty: 30 TABLET | Refills: 5 | Status: SHIPPED | OUTPATIENT
Start: 2022-11-13

## 2022-11-15 ENCOUNTER — RA CDI HCC (OUTPATIENT)
Dept: OTHER | Facility: HOSPITAL | Age: 62
End: 2022-11-15

## 2022-11-15 NOTE — PROGRESS NOTES
[address]  coding opportunities     E[address] 22     Chart Reviewed number of suggestions sent to Provider: 1     Patients Insurance     Medicare Insurance: [employer]

## 2022-11-16 ENCOUNTER — APPOINTMENT (OUTPATIENT)
Dept: PHYSICAL THERAPY | Facility: REHABILITATION | Age: 62
End: 2022-11-16

## 2022-11-23 ENCOUNTER — OFFICE VISIT (OUTPATIENT)
Dept: PHYSICAL THERAPY | Facility: REHABILITATION | Age: 62
End: 2022-11-23

## 2022-11-23 DIAGNOSIS — R26.89 IMBALANCE: ICD-10-CM
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
## 2022-11-23 NOTE — PROGRESS NOTES
RE-EVALUATION / Treatment Note     Today's date: 10/12/2022  Patient name: Nilsa Damico  : 1960  MRN: [id number]  Referring provider: Dee Holt DO  Dx:   Encounter Diagnosis     ICD-10-CM    1  Fall, subsequent encounter  W19  XXXD    2  Imbalance  R26 89    3  Leg weakness, bilateral  R29 898       Subjective:  Moved the week of 22  Has 4-5 steps with railing on one side  Uses cart after going up steps  Too busy to be back at swimming routines  Back from waist to shoulders hurts, as does right wrist and hand  [employer] before moving, hit a bump, hit head on counter  No dizziness  Able to get up  Objective: See treatment diary below    Patient goals: walking at a normal pace without cane (not using prior to house), grocery shop without significant pain when completed, dancing at niece's wedding  - To move without pain and to not get tired as frequently (added 10/20/21)    Patient-Specific Functional Scale   Task is scored 0 (unable to perform activity) to 10 (ability to perform activity independently)  Activity 8/25/21 9/29/21 10/20/21 11/17/21 2/09/22 4/06/22 6/22/22 7/27/22   1  Walk at normal pace without a cane 5/10  0/10 3/10 3/10 due to speed or knee bending -8/10 initially, decreases with continued walking 6/10 8-9/10 can do this, not long distances    2  Grocery shop without significant pain 8/10 0/10 4/10 7-8/10 7/10 7-8/10 8/10    3  Dance 5/10  1/10 2/10 010 4/10 710, getting better every week, working in coordination    4  Move without pain and to not get tired as frequently     7/10 5-6/10 7/10 6-7/10      Objective: See treatment diary below    Precautions - Hashimoto's, autoimmune disease    Mobility Measures 6/22/22 7/27/22 8/31/22 9/28/22 10/12/22 11/23/22   Assistive device used?          5 Time Sit to Stand  (17" chair, arms across chest) Able without hands     1-2 times in a row     21 0 seconds Required practice to stand from 18" surface without hands Able from 20" surface, able from 18" surface  Able from 20" surface,  16 seconds 5 times sit to stand    Unable from 18" surface without light use of hand    3 Meter Timed  Up & Go 9 1 seconds 8 8 seconds    12 9 seconds   Walking speed         Functional Gait Assessment (see below)  24/30       6 Minute Walk Test (100 foot circular course)   1170 feet 1140 feet 400 feet     980 feet 1200 feet 845 feet   Patient-Reported Outcome Measure: Activities-Specific Balance Confidence Scale (ABC Scale)         Right knee flexion PROM, sitting 105 degrees 104 degrees 90 degrees   91 degrees    Ankle ROM within functional limits  3-/5 heel raise 3/5 heel raise right  4/5 heel raise left 5/5 right dorsiflexion, eversion     3/5 right plantarflexion  4/5 left plantarflexion       Bilateral shoulder elevation about 150 degrees   Physiological IR to T2  Physiological ER to upper lumbar spine  Provocation of right shoulder pain about right AC joint with elevation  Right wrist pain with wrist extension end-range  Inferior glide left shoulder, grade 3-4, at  degrees scaption, 4 min     Shoulder ER, yellow band, 30 sec x 2 sets  Should scapular row, yellow and blue bands, 40 sec x 2 sets    Heel raises, about 10  Standing gastroc stretch 20 sec each    SciFit level 1, feet only, about 90 steps/min, 10 min     ASSESSMENT  Mobility declined over the past 3 weeks, likely due to soreness from a fall, as well as likely decreased physical activity  Generalized stiffness and decreased joint excursion with walking  Recognized decline in mobility and tied to decreased physical activity  Recommend return to aquatic-based exercise  We should again be able to work to more land-based exercise  SHORT-TERM GOALS: 1 month(s)  1  Romy Valle walks at least 10 minutes consecutively  MET  3  Romy Valle reports 50% improvement in ability to walk grocery store distances without severe fatigue  MET    4  Demonstrates at least 11 degrees passive right dorsiflexion by 2/12/22  MET  New goal: When released by physician, tolerates at least 6 minutes consecutive overground walking without walking boot  MET  New goal: When released by physician, tolerates at least 10 bilateral heel raises  MET  New goal: When released by physician, walks with step length without one inch of contralateral leg  MET  New goal: Walks at least 1350 feet during 6 minute walk test   MET  New goal: Scores at least 20/30 on FGA    MET  New goal: Able to purposefully walk for exercise at least 15 minutes at a time at least 3-4 times per week  NOT MET    LONG-TERM GOALS: by 1/23/23  1  Patient reports at least 20 total minutes per day of overground walking for exercise  NOT MET     2  Patient independently manages home exercise program   MET with continued progressions  PLAN OF CARE:  Patient will benefit from physical therapy 1 time per week for 1 month  Neuromuscular re-education, therapeutic exercises, and therapeutic activities as outlined in grids

## 2022-11-30 ENCOUNTER — OFFICE VISIT (OUTPATIENT)
Dept: PHYSICAL THERAPY | Facility: REHABILITATION | Age: 62
End: 2022-11-30
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
## 2022-11-30 DIAGNOSIS — R26.89 IMBALANCE: ICD-10-CM

## 2022-11-30 NOTE — PROGRESS NOTES
Treatment Note     Today's date: 22   Patient name: Pam Sanders  : 1960  MRN: [id number]  Referring provider: Yrn Carrasco DO  Dx:   Encounter Diagnosis     ICD-10-CM    1  Fall, subsequent encounter  W19  XXXD    2  Imbalance  R26 89    3  Leg weakness, bilateral  R29 898       Subjective:  Moved the week of 22  Sister is helping her get rid of excess things around town, hasn't yet been back to [employer]  Shoulder is better with reaching and lifting  Able to move smaller furniture  Objective: See treatment diary below    Patient goals: walking at a normal pace without cane (not using prior to house), grocery shop without significant pain when completed, dancing at niece's wedding  - To move without pain and to not get tired as frequently (added 10/20/21)    Patient-Specific Functional Scale   Task is scored 0 (unable to perform activity) to 10 (ability to perform activity independently)  Activity 8/25/21 9/29/21 10/20/21 11/17/21 2/09/22 4/06/22 6/22/22 7/27/22   1  Walk at normal pace without a cane 5/10  0/10 3/10 3/10 due to speed or knee bending -8/10 initially, decreases with continued walking 6/10 8-9/10 can do this, not long distances    2  Grocery shop without significant pain 8/10 0/10 4/10 7-8/10 7/10 7-8/10 8/10    3  Dance 5/10  1/10 2/10 0/10 4/10 7/10, getting better every week, working in coordination    4  Move without pain and to not get tired as frequently     7/10 5-6/10 7/10 6-7/10      Objective: See treatment diary below    Precautions - Hashimoto's, autoimmune disease    Mobility Measures 6/22/22 7/27/22 8/31/22 9/28/22 10/12/22 11/23/22 11/30/22   Assistive device used?           5 Time Sit to Stand  (17" chair, arms across chest) Able without hands     1-2 times in a row     21 0 seconds Required practice to stand from 18" surface without hands Able from 20" surface, able from 18" surface  Able from 20" surface,  16 seconds 5 times sit to stand    Unable from 18" surface without light use of hand     3 Meter Timed  Up & Go 9 1 seconds 8 8 seconds    12 9 seconds    Walking speed          Functional Gait Assessment (see below)  24/30        6 Minute Walk Test (100 foot circular course)   1170 feet 1140 feet 400 feet     980 feet 1200 feet 845 feet    Patient-Reported Outcome Measure: Activities-Specific Balance Confidence Scale (ABC Scale)          Right knee flexion PROM, sitting 105 degrees 104 degrees 90 degrees   91 degrees     Ankle ROM within functional limits  3-/5 heel raise 3/5 heel raise right  4/5 heel raise left 5/5 right dorsiflexion, eversion     3/5 right plantarflexion  4/5 left plantarflexion        Overground walking (6 minute walk 800 feet) total of 1000 feet  Treadmill 5% grade 1 0 mph to work on flexing the knee during swing phase gait, 5 min at 1 0 mph, then 5 min 1% grade and 1 0 mph    Shoulder ER, yellow band, 40 sec x 2 sets  Should scapular row, black bands, 40 sec x 2 sets    Sit to stand 19" surface, 30 sec x 2 sets    Heel raises, L only, about 10-15 x 2 sets  Braiding walking 1 5 min    SciFit level 1, feet only, about 90 steps/min, 10 min      ASSESSMENT  Continued stiffness and decreased joint excursion, improved with initial walking and treadmill walking  Continues to kick the knee forward with transition to standing  Recommend gradual return to aquatic-based exercise  SHORT-TERM GOALS: 1 month(s)  1  Marvie Counts walks at least 10 minutes consecutively  MET  3  Marvie Counts reports 50% improvement in ability to walk grocery store distances without severe fatigue  MET  4  Demonstrates at least 11 degrees passive right dorsiflexion by 2/12/22  MET  New goal: When released by physician, tolerates at least 6 minutes consecutive overground walking without walking boot  MET  New goal: When released by physician, tolerates at least 10 bilateral heel raises  MET    New goal: When released by physician, walks with step length without one inch of contralateral leg  MET  New goal: Walks at least 1350 feet during 6 minute walk test   MET  New goal: Scores at least 20/30 on FGA    MET  New goal: Able to purposefully walk for exercise at least 15 minutes at a time at least 3-4 times per week  NOT MET    LONG-TERM GOALS: by 1/23/23  1  Patient reports at least 20 total minutes per day of overground walking for exercise  NOT MET     2  Patient independently manages home exercise program   MET with continued progressions  PLAN OF CARE:  Patient will benefit from physical therapy 1 time per week for 1 month  Neuromuscular re-education, therapeutic exercises, and therapeutic activities as outlined in grids

## 2022-12-01 ENCOUNTER — OFFICE VISIT (OUTPATIENT)
Dept: FAMILY MEDICINE CLINIC | Facility: CLINIC | Age: 62
End: 2022-12-01

## 2022-12-01 VITALS
WEIGHT: 229 LBS | DIASTOLIC BLOOD PRESSURE: 82 MMHG | SYSTOLIC BLOOD PRESSURE: 130 MMHG | HEIGHT: 66 IN | TEMPERATURE: 97.9 F | BODY MASS INDEX: 36.8 KG/M2

## 2022-12-01 DIAGNOSIS — Z11.4 SCREENING FOR HIV (HUMAN IMMUNODEFICIENCY VIRUS): ICD-10-CM

## 2022-12-01 DIAGNOSIS — Z23 ENCOUNTER FOR IMMUNIZATION: ICD-10-CM

## 2022-12-01 DIAGNOSIS — E11.65 TYPE 2 DIABETES MELLITUS WITH HYPERGLYCEMIA, WITHOUT LONG-TERM CURRENT USE OF INSULIN (HCC): Primary | ICD-10-CM

## 2022-12-01 LAB — SL AMB POCT HEMOGLOBIN AIC: 6.8 (ref ?–6.5)

## 2022-12-01 NOTE — PATIENT INSTRUCTIONS

## 2022-12-01 NOTE — PROGRESS NOTES
Chief Complaint   Patient presents with   • Diabetes   • Hypertension     Name: Elizabet Rajan      : 1960      MRN: [id number]  Encounter Provider: Radha Weber MD  Encounter Date: 2022   Encounter department: [facility] PR[facility]    Assessment & Plan     HbA1c 6 8, improving  Patient only takes Trulicity weekly  Continue Trulicity  Continue to work on diet and exercise  Administer PCV 20 today  RTC in 3 months for follow-up  1  Type 2 diabetes mellitus with hyperglycemia, without long-term current use of insulin (HCC)  -     POCT hemoglobin A1c    2  Encounter for immunization  -     Pneumococcal Conjugate Vaccine 20-valent (PCV20)    3  Screening for HIV (human immunodeficiency virus)  -     HIV 1/2 Antigen/Antibody (4th Generation) w Reflex [facility]; Future         Subjective      She presents today for follow-up on her diabetes  States that she is doing well  Reports having a diabetic foot exam at her last visit in August   Due for PCV vaccine today  Denies any new complaints today  Review of Systems   Constitutional: Negative for activity change, appetite change, chills, fatigue and fever  HENT: Negative for congestion, rhinorrhea, sneezing and sore throat  Respiratory: Negative for cough, chest tightness, shortness of breath and wheezing  Cardiovascular: Negative for chest pain and palpitations  Gastrointestinal: Negative for abdominal distention, abdominal pain, constipation, diarrhea, nausea and vomiting  Skin: Negative for rash  Neurological: Negative for dizziness, weakness, numbness and headaches  All other systems reviewed and are negative        Current Outpatient Medications on File Prior to Visit   Medication Sig   • albuterol (2 5 mg/3 mL) 0 083 % nebulizer solution Take 3 mL (2 5 mg total) by nebulization every 6 (six) hours as needed for wheezing or shortness of breath   • Azelastine HCl 137 MCG/SPRAY SOLN '1 SPRAY PER NOSTRIL IN THE MORNING AS DIRECTED   • Blood Glucose Monitoring Suppl ([employer] SYSTEM) w/Device KIT Check BG daily dx E11 9   • colestipol (COLESTID) 1 g tablet take 1 tablet by mouth twice a day   • Cyanocobalamin (Vitamin B-12) 1000 MCG SUBL Place under the tongue   • dicyclomine (BENTYL) 20 mg tablet Take 1 tablet (20 mg total) by mouth every 6 (six) hours As needed for abdominal pain   • doxycycline hyclate (VIBRAMYCIN) 100 mg capsule Take 100 mg by mouth 2 (two) times a day   • Dulaglutide (Trulicity) 4 01 JE/5 9TG SOPN Inject 0 5 mL (0 75 mg total) under the skin once a week   • DULoxetine (CYMBALTA) 60 mg delayed release capsule Take 1 capsule (60 mg total) by mouth daily   • ergocalciferol (VITAMIN D2) 50,000 units take 1 capsule by mouth every week   • fluticasone (FLONASE) 50 mcg/act nasal spray 2 sprays into each nostril daily (Patient taking differently: 2 sprays into each nostril daily As needed)   • gabapentin (NEURONTIN) 100 mg capsule Take 100 mg by mouth 2 (two) times a day    • glucose blood (OneTouch Verio) test strip Use check BG twice daily dx E11 9   • glucose blood (OneTouch Verio) test strip Test once daily DX E11 9   • hydrocortisone 2 5 % cream Apply topically 3 (three) times a day as needed for irritation or rash   • hydroxychloroquine (PLAQUENIL) 200 mg tablet Take 1 tablet (200 mg total) by mouth 2 (two) times a day   • ibuprofen (MOTRIN) 800 mg tablet Take 1 tablet (800 mg total) by mouth every 6 (six) hours as needed for mild pain   • Lancets (OneTouch Delica Plus [id number]) MISC Check BG 1x daily DX E11 9   • levothyroxine 200 mcg tablet Take 1 tablet (200 mcg total) by mouth daily Pt takes Monday through Saturday   • lifitegrast (Xiidra) 5 % op solution Xiidra 5 % eye drops in a dropperette   • losartan (COZAAR) 50 mg tablet take 1 tablet by mouth once daily   • Menthol (Icy Hot Back) 5 % PTCH Apply topically    • metroNIDAZOLE (METROCREAM) 0 75 % cream metronidazole 0 75 % topical cream • montelukast (SINGULAIR) 10 mg tablet take 1 tablet by mouth at bedtime   • rosuvastatin (CRESTOR) 5 mg tablet take 1 tablet by mouth once daily   • Saline 0 65 % SOLN into each nostril 2 (two) times a day   • tiZANidine (ZANAFLEX) 2 mg tablet Take 1 tablet (2 mg total) by mouth every 8 (eight) hours as needed for muscle spasms       Objective     /82   Temp 97 9 °F (36 6 °C)   Ht 5' 6" (1 676 m)   Wt 104 kg (229 lb)   BMI 36 96 kg/m²     Physical Exam  Constitutional:       General: She is not in acute distress  Appearance: She is well-developed and well-nourished  She is not toxic-appearing or diaphoretic  HENT:      Head: Normocephalic and atraumatic  Nose: Nose normal  No congestion or rhinorrhea  Mouth/Throat:      Mouth: Oropharynx is clear and moist       Pharynx: No oropharyngeal exudate  Eyes:      General: No scleral icterus  Right eye: No discharge  Left eye: No discharge  Conjunctiva/sclera: Conjunctivae normal    Cardiovascular:      Rate and Rhythm: Normal rate and regular rhythm  Pulses: Normal pulses  Heart sounds: Normal heart sounds  No murmur heard  Pulmonary:      Effort: Pulmonary effort is normal  No respiratory distress  Breath sounds: Normal breath sounds  No wheezing  Abdominal:      General: There is no distension  Palpations: Abdomen is soft  Tenderness: There is no abdominal tenderness  Musculoskeletal:         General: No edema  Skin:     General: Skin is warm and dry  Coloration: Skin is not pale  Findings: No erythema  Neurological:      Mental Status: She is alert     Psychiatric:         Mood and Affect: Mood normal          Behavior: Behavior normal        Nikki Bernard MD

## 2022-12-02 ENCOUNTER — TELEPHONE (OUTPATIENT)
Dept: ADMINISTRATIVE | Facility: OTHER | Age: 62
End: 2022-12-02

## 2022-12-02 NOTE — TELEPHONE ENCOUNTER
Upon review of the In Basket request we have noted that [facility] is unable to complete in office workflow items, because of this we are requesting that you forward this request/concern to the appropriate education email address  The Quality team members assigned to this email will be more than happy to assist you  Any additional questions or concerns should be emailed to the Practice Liaisons via the appropriate education email address, please do not reply via In Basket      Thank you  Kiarra Espinal MA

## 2022-12-02 NOTE — TELEPHONE ENCOUNTER
----- Message from Tahmina Ghosh sent at 12/1/2022  1:31 PM EST -----  Regarding: Request Quality  12/01/22 1:31 PM    Hello, our patient Carson South has had Diabetic Foot Exam completed/performed  Please assist in updating the patient chart by her last encounter dated 8/17/2022  Patient is here for a visit and looks like the Diabetic Foot Exam was completed at last visit but is showing as overdue       Thank you,  Tahmina Ghosh  [phone]

## 2022-12-06 ENCOUNTER — TELEPHONE (OUTPATIENT)
Dept: FAMILY MEDICINE CLINIC | Facility: CLINIC | Age: 62
End: 2022-12-06

## 2022-12-06 NOTE — TELEPHONE ENCOUNTER
Spoke with patient,she says tylenol will not work  She has been taking ibuprofen and that is not helping either  She says this is not normal aches and pains  She says she has a autoimmune disorder and gets these severe pains and that's the only time she get the oxycodone

## 2022-12-06 NOTE — TELEPHONE ENCOUNTER
There is no indication to use opioids for general aches  I recommend she use tylenol, epsom salt baths, and stretching

## 2022-12-07 ENCOUNTER — OFFICE VISIT (OUTPATIENT)
Dept: PHYSICAL THERAPY | Facility: REHABILITATION | Age: 62
End: 2022-12-07
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
## 2022-12-07 DIAGNOSIS — R26.89 IMBALANCE: ICD-10-CM

## 2022-12-07 NOTE — PROGRESS NOTES
Treatment Note     Today's date: 22   Patient name: Brennen Lema  : 1960  MRN: [id number]  Referring provider: Asha Haddad DO  Dx:   Encounter Diagnosis     ICD-10-CM    1  Fall, subsequent encounter  W19  XXXD    2  Imbalance  R26 89    3  Leg weakness, bilateral  R29 898       Subjective:  Has been having more pain around both ankles, both shoulders, ribs, so didn't yet return to [employer] / gym  Feels may be due to weather, in contact with Dr Joana Cadena's office about pain relief  Tried to do some putting things away from the move, got tired/sweat/nausea, so couldn't do much  Took trash out, involves 5 steps and then 2 big steps  Can get up from couch with pushing from one hand, transitions floor to stand carefully and walking hands in from a modified quadruped position  Objective: See treatment diary below    Patient goals: walking at a normal pace without cane (not using prior to house), grocery shop without significant pain when completed, dancing at [employer]'s wedding  - To move without pain and to not get tired as frequently (added 10/20/21)    Patient-Specific Functional Scale   Task is scored 0 (unable to perform activity) to 10 (ability to perform activity independently)  Activity 8/25/21 9/29/21 10/20/21 11/17/21 2/09/22 4/06/22 6/22/22 7/27/22   1  Walk at normal pace without a cane 5/10  0/10 3/10 3/10 due to speed or knee bending -8/10 initially, decreases with continued walking 6/10 8-9/10 can do this, not long distances    2  Grocery shop without significant pain 8/10 0/10 4/10 7-8/10 7/10 7-8/10 8/10    3  Dance 5/10  1/10 2/10 0/10 4/10 710, getting better every week, working in coordination    4     Move without pain and to not get tired as frequently     7/10 5-6/10 7/10 6-7/10      Objective: See treatment diary below    Precautions - Hashimoto's, autoimmune disease    Mobility Measures 6/22/22 7/27/22 8/31/22 9/28/22 10/12/22 11/23/22 11/30/22 Assistive device used? 5 Time Sit to Stand  (17" chair, arms across chest) Able without hands     1-2 times in a row     21 0 seconds Required practice to stand from 18" surface without hands Able from 20" surface, able from 18" surface  Able from 20" surface,  16 seconds 5 times sit to stand    Unable from 18" surface without light use of hand     3 Meter Timed  Up & Go 9 1 seconds 8 8 seconds    12 9 seconds    Walking speed          Functional Gait Assessment (see below)  24/30        6 Minute Walk Test (100 foot circular course)   1170 feet 1140 feet 400 feet     980 feet 1200 feet 845 feet    Patient-Reported Outcome Measure: Activities-Specific Balance Confidence Scale (ABC Scale)          Right knee flexion PROM, sitting 105 degrees 104 degrees 90 degrees   91 degrees     Ankle ROM within functional limits  3-/5 heel raise 3/5 heel raise right  4/5 heel raise left 5/5 right dorsiflexion, eversion     3/5 right plantarflexion  4/5 left plantarflexion        Treadmill 1 3 mph, 12 min  1 0 mph 2 min    Sit to stand from 20" surface, 10 hands out front  Sit to stand from 18 5" surface, 7; pain about right ankle   Standing gastroc stretch via runners stretch and dropping heel off of raised surface, about 3 min  Repeat sit to stand from 18 5" surface,     Heel raises, L only, 15 x 2 sets    Static single leg stance on foam, forwards tap 4" target, intermittent use of railings, 30 sec x 2  Static single leg stance on foam, lateral tap 4" target, intermittent use of railings, 30 sec x 2    Braiding walking 2 min    SciFit level 1, feet only, ice pack to right ankle, about 90 steps/min, 17 min, 100 steps/min      ASSESSMENT  Initial stiffness but improved with movement  Recommend this at home, starting with light movement and then progressing as able  Would benefit from getting back to [employer] as this helped manage stiffness and pain  Use modalities at home as needed      SHORT-TERM GOALS: 1 month(s)  1  Vern Johnson walks at least 10 minutes consecutively  MET  3  Vern Johnson reports 50% improvement in ability to walk grocery store distances without severe fatigue  MET  4  Demonstrates at least 11 degrees passive right dorsiflexion by 2/12/22  MET  New goal: When released by physician, tolerates at least 6 minutes consecutive overground walking without walking boot  MET  New goal: When released by physician, tolerates at least 10 bilateral heel raises  MET  New goal: When released by physician, walks with step length without one inch of contralateral leg  MET  New goal: Walks at least 1350 feet during 6 minute walk test   MET  New goal: Scores at least 20/30 on FGA    MET  New goal: Able to purposefully walk for exercise at least 15 minutes at a time at least 3-4 times per week  NOT MET    LONG-TERM GOALS: by 1/23/23  1  Patient reports at least 20 total minutes per day of overground walking for exercise  NOT MET     2  Patient independently manages home exercise program   MET with continued progressions  PLAN OF CARE:  Patient will benefit from physical therapy 1 time per week for 1 month  Neuromuscular re-education, therapeutic exercises, and therapeutic activities as outlined in grids

## 2022-12-08 ENCOUNTER — OFFICE VISIT (OUTPATIENT)
Dept: FAMILY MEDICINE CLINIC | Facility: CLINIC | Age: 62
End: 2022-12-08

## 2022-12-08 VITALS
RESPIRATION RATE: 16 BRPM | TEMPERATURE: 98.3 F | HEART RATE: 91 BPM | HEIGHT: 66 IN | DIASTOLIC BLOOD PRESSURE: 82 MMHG | OXYGEN SATURATION: 98 % | WEIGHT: 228.8 LBS | BODY MASS INDEX: 36.77 KG/M2 | SYSTOLIC BLOOD PRESSURE: 152 MMHG

## 2022-12-08 DIAGNOSIS — M79.10 MUSCULAR PAIN: ICD-10-CM

## 2022-12-08 DIAGNOSIS — M35.9 CONNECTIVE TISSUE DISEASE (HCC): Primary | ICD-10-CM

## 2022-12-08 RX ORDER — OXYCODONE HYDROCHLORIDE 5 MG/1
5 TABLET ORAL EVERY 8 HOURS PRN
Qty: 6 TABLET | Refills: 0 | Status: SHIPPED | OUTPATIENT
Start: 2022-12-08

## 2022-12-08 NOTE — PROGRESS NOTES
Name: Mar Callahan      : 1960      MRN: [id number]  Encounter Provider: Rianna Lazar MD  Encounter Date: 2022   Encounter department: [address] [place] [address] Road     Will send for 6 pills of oxycodone 5 mg  PDMP reviewed, no fills in the last year  Recommend stretching  RTC as needed  1  Connective tissue disease ([address] )  -     oxyCODONE (Roxicodone) 5 immediate release tablet; Take 1 tablet (5 mg total) by mouth every 8 (eight) hours as needed for moderate pain or severe pain Max Daily Amount: 15 mg    2  Muscular pain  -     oxyCODONE (Roxicodone) 5 immediate release tablet; Take 1 tablet (5 mg total) by mouth every 8 (eight) hours as needed for moderate pain or severe pain Max Daily Amount: 15 mg         Subjective      She presents today to discuss generalized pain throughout her body  States that she has a history of connective tissue disorder and the only medication that helps is oxycodone  She does not use this medication often  States that she recently moved and since then she has been having muscular aches throughout her body mostly in her lower extremities  Review of Systems   Constitutional: Negative for activity change, appetite change, chills, fatigue and fever  HENT: Negative for congestion, rhinorrhea, sneezing and sore throat  Respiratory: Negative for cough, chest tightness, shortness of breath and wheezing  Cardiovascular: Negative for chest pain and palpitations  Gastrointestinal: Negative for abdominal distention, abdominal pain, constipation, diarrhea, nausea and vomiting  Musculoskeletal: Positive for arthralgias, back pain and myalgias  Negative for joint swelling  Skin: Negative for rash  Neurological: Negative for dizziness, weakness, numbness and headaches  All other systems reviewed and are negative        Current Outpatient Medications on File Prior to Visit   Medication Sig   • albuterol (2 5 mg/3 mL) 0 083 % nebulizer solution Take 3 mL (2 5 mg total) by nebulization every 6 (six) hours as needed for wheezing or shortness of breath   • Azelastine HCl 137 MCG/SPRAY SOLN '1 SPRAY PER NOSTRIL IN THE MORNING AS DIRECTED   • Blood Glucose Monitoring Suppl (ONETOUCH VERIO IQ SYSTEM) w/Device KIT Check BG daily dx E11 9   • colestipol (COLESTID) 1 g tablet take 1 tablet by mouth twice a day   • Cyanocobalamin (Vitamin B-12) 1000 MCG SUBL Place under the tongue   • dicyclomine (BENTYL) 20 mg tablet Take 1 tablet (20 mg total) by mouth every 6 (six) hours As needed for abdominal pain   • doxycycline hyclate (VIBRAMYCIN) 100 mg capsule Take 100 mg by mouth 2 (two) times a day   • Dulaglutide (Trulicity) 5 32 MT/1 3VT SOPN Inject 0 5 mL (0 75 mg total) under the skin once a week   • DULoxetine (CYMBALTA) 60 mg delayed release capsule Take 1 capsule (60 mg total) by mouth daily   • ergocalciferol (VITAMIN D2) 50,000 units take 1 capsule by mouth every week   • fluticasone (FLONASE) 50 mcg/act nasal spray 2 sprays into each nostril daily (Patient taking differently: 2 sprays into each nostril daily As needed)   • gabapentin (NEURONTIN) 100 mg capsule Take 100 mg by mouth 2 (two) times a day    • glucose blood (OneTouch Verio) test strip Use check BG twice daily dx E11 9   • glucose blood (OneTouch Verio) test strip Test once daily DX E11 9   • hydrocortisone 2 5 % cream Apply topically 3 (three) times a day as needed for irritation or rash   • hydroxychloroquine (PLAQUENIL) 200 mg tablet Take 1 tablet (200 mg total) by mouth 2 (two) times a day   • ibuprofen (MOTRIN) 800 mg tablet Take 1 tablet (800 mg total) by mouth every 6 (six) hours as needed for mild pain   • Lancets (OneTouch Delica Plus [id number]) MISC Check BG 1x daily DX E11 9   • levothyroxine 200 mcg tablet Take 1 tablet (200 mcg total) by mouth daily Pt takes Monday through Saturday   • lifitegrast (Xiidra) 5 % op solution Xiidra 5 % eye drops in a dropperette   • losartan (COZAAR) 50 mg tablet take 1 tablet by mouth once daily   • Menthol (Icy Hot Back) 5 % PTCH Apply topically    • metroNIDAZOLE (METROCREAM) 0 75 % cream metronidazole 0 75 % topical cream   • montelukast (SINGULAIR) 10 mg tablet take 1 tablet by mouth at bedtime   • rosuvastatin (CRESTOR) 5 mg tablet take 1 tablet by mouth once daily   • Saline 0 65 % SOLN into each nostril 2 (two) times a day   • tiZANidine (ZANAFLEX) 2 mg tablet Take 1 tablet (2 mg total) by mouth every 8 (eight) hours as needed for muscle spasms       Objective     /82 (BP Location: Left arm, Patient Position: Sitting, Cuff Size: Adult)   Pulse 91   Temp 98 3 °F (36 8 °C) (Temporal)   Resp 16   Ht 5' 6" (1 676 m)   Wt 104 kg (228 lb 12 8 oz)   SpO2 98%   BMI 36 93 kg/m²     Physical Exam  Constitutional:       General: She is not in acute distress  Appearance: She is well-developed  She is not toxic-appearing or diaphoretic  HENT:      Head: Normocephalic and atraumatic  Nose: Nose normal       Mouth/Throat:      Pharynx: No oropharyngeal exudate  Eyes:      General: No scleral icterus  Right eye: No discharge  Left eye: No discharge  Conjunctiva/sclera: Conjunctivae normal    Cardiovascular:      Rate and Rhythm: Normal rate and regular rhythm  Heart sounds: Normal heart sounds  No murmur heard  Pulmonary:      Effort: Pulmonary effort is normal  No respiratory distress  Breath sounds: Normal breath sounds  No wheezing  Abdominal:      General: There is no distension  Palpations: Abdomen is soft  Tenderness: There is no abdominal tenderness  Musculoskeletal:         General: Tenderness present  No swelling or signs of injury  Normal range of motion  Skin:     General: Skin is warm and dry  Coloration: Skin is not pale  Findings: No erythema  Neurological:      Mental Status: She is alert     Psychiatric:         Behavior: Behavior normal        Coty Valdez Mando Flores MD

## 2022-12-14 ENCOUNTER — OFFICE VISIT (OUTPATIENT)
Dept: PHYSICAL THERAPY | Facility: REHABILITATION | Age: 62
End: 2022-12-14
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
## 2022-12-14 DIAGNOSIS — R26.89 IMBALANCE: ICD-10-CM

## 2022-12-14 NOTE — PROGRESS NOTES
Treatment Note     Today's date: 22   Patient name: Ebony Soni  : 1960  MRN: [id number]  Referring provider: Martha Gonzales DO  Dx:   Encounter Diagnosis     ICD-10-CM    1  Fall, subsequent encounter  W19  XXXD    2  Imbalance  R26 89    3  Leg weakness, bilateral  R29 898       Subjective:  Pain about the anterior ankle, for about 2 weeks now, no acute cause  Has returned once to [employer], ankle was sore driving home  Objective: See treatment diary below    Patient goals: walking at a normal pace without cane (not using prior to house), grocery shop without significant pain when completed, dancing at niece's wedding  - To move without pain and to not get tired as frequently (added 10/20/21)    Patient-Specific Functional Scale   Task is scored 0 (unable to perform activity) to 10 (ability to perform activity independently)  Activity 8/25/21 9/29/21 10/20/21 11/17/21 2/09/22 4/06/22 6/22/22 7/27/22   1  Walk at normal pace without a cane 5/10  0/10 3/10 3/10 due to speed or knee bending -8/10 initially, decreases with continued walking 6/10 8-10 can do this, not long distances    2  Grocery shop without significant pain 8/10 0/10 4/10 7-8/10 7/10 7-8/10 8/10    3  Dance 5/10  1/10 2/10 0/10 4/10 7/10, getting better every week, working in coordination    4  Move without pain and to not get tired as frequently     7/10 5-610 7/10 6-7/10      Objective: See treatment diary below    Precautions - Hashimoto's, autoimmune disease    Mobility Measures 6/22/22 7/27/22 8/31/22 9/28/22 10/12/22 11/23/22 11/30/22   Assistive device used?           5 Time Sit to Stand  (17" chair, arms across chest) Able without hands     1-2 times in a row     21 0 seconds Required practice to stand from 18" surface without hands Able from 20" surface, able from 18" surface  Able from 20" surface,  16 seconds 5 times sit to stand    Unable from 18" surface without light use of hand     3 Meter Timed  Up & Go 9 1 seconds 8 8 seconds    12 9 seconds    Walking speed          Functional Gait Assessment (see below)  24/30        6 Minute Walk Test (100 foot circular course)   1170 feet 1140 feet 400 feet     980 feet 1200 feet 845 feet    Patient-Reported Outcome Measure: Activities-Specific Balance Confidence Scale (ABC Scale)          Right knee flexion PROM, sitting 105 degrees 104 degrees 90 degrees   91 degrees     Ankle ROM within functional limits  3-/5 heel raise 3/5 heel raise right  4/5 heel raise left 5/5 right dorsiflexion, eversion     3/5 right plantarflexion  4/5 left plantarflexion        Treadmill 1 3 mph, 7 min, limited by dorsal foot pain  Standing gastroc stretch, runner's stretch 2 min    Seated soleus and knee flexion stretches 2-3 min total  Sit to stand from 20" surface, hold 7 lb ball, 10-12 x 2 sets  Standing heel raise, 45 sec bilaterally    Static single leg stance on foam, forwards tap 4" target, all planes, infrequent use of railings, 1 min x 2 sets each    SciFit level 1-2, feet only, 6 min, about 75 steps/min      ASSESSMENT  Initial stiffness again but improved with movement  [employer] is modifying exercise routines, incorporating more chair-based and static exercises  We want to continue to move the affected joints (figure 8 ankle 54 0 cm right and 52 5 cm left)  Would benefit from getting back to [facility] as this helped manage stiffness and pain  SHORT-TERM GOALS: 1 month(s)  1  [employer] walks at least 10 minutes consecutively  MET  3  [employer] reports 50% improvement in ability to walk grocery store distances without severe fatigue  MET  4  Demonstrates at least 11 degrees passive right dorsiflexion by 2/12/22  MET  New goal: When released by physician, tolerates at least 6 minutes consecutive overground walking without walking boot  MET  New goal: When released by physician, tolerates at least 10 bilateral heel raises  MET    New goal: When released by physician, walks with step length without one inch of contralateral leg  MET  New goal: Walks at least 1350 feet during 6 minute walk test   MET  New goal: Scores at least 20/30 on FGA    MET  New goal: Able to purposefully walk for exercise at least 15 minutes at a time at least 3-4 times per week  NOT MET    LONG-TERM GOALS: by 1/23/23  1  Patient reports at least 20 total minutes per day of overground walking for exercise  NOT MET     2  Patient independently manages home exercise program   MET with continued progressions  PLAN OF CARE:  Patient will benefit from physical therapy 1 time per week for 1 month  Neuromuscular re-education, therapeutic exercises, and therapeutic activities as outlined in grids

## 2022-12-21 ENCOUNTER — APPOINTMENT (OUTPATIENT)
Dept: PHYSICAL THERAPY | Facility: REHABILITATION | Age: 62
End: 2022-12-21

## 2023-01-03 ENCOUNTER — OFFICE VISIT (OUTPATIENT)
Dept: FAMILY MEDICINE CLINIC | Facility: CLINIC | Age: 63
End: 2023-01-03

## 2023-01-03 VITALS
SYSTOLIC BLOOD PRESSURE: 149 MMHG | DIASTOLIC BLOOD PRESSURE: 74 MMHG | HEART RATE: 98 BPM | BODY MASS INDEX: 36.45 KG/M2 | TEMPERATURE: 97.1 F | RESPIRATION RATE: 16 BRPM | WEIGHT: 226.8 LBS | OXYGEN SATURATION: 97 % | HEIGHT: 66 IN

## 2023-01-03 DIAGNOSIS — J06.9 UPPER RESPIRATORY TRACT INFECTION, UNSPECIFIED TYPE: Primary | ICD-10-CM

## 2023-01-03 DIAGNOSIS — M79.18 MYOFASCIAL PAIN: ICD-10-CM

## 2023-01-03 RX ORDER — AMOXICILLIN AND CLAVULANATE POTASSIUM 875; 125 MG/1; MG/1
1 TABLET, FILM COATED ORAL EVERY 12 HOURS SCHEDULED
Qty: 14 TABLET | Refills: 0 | Status: SHIPPED | OUTPATIENT
Start: 2023-01-03 | End: 2023-01-10

## 2023-01-03 RX ORDER — IBUPROFEN 800 MG/1
800 TABLET ORAL EVERY 6 HOURS PRN
Qty: 30 TABLET | Refills: 0 | Status: SHIPPED | OUTPATIENT
Start: 2023-01-03

## 2023-01-03 RX ORDER — PREDNISONE 10 MG/1
TABLET ORAL
Qty: 15 TABLET | Refills: 0 | Status: SHIPPED | OUTPATIENT
Start: 2023-01-03 | End: 2023-01-08

## 2023-01-03 NOTE — PROGRESS NOTES
Name: Ricarda Roe      : 1960      MRN: [id number]  Encounter Provider: Heidy Wilburn MD  Encounter Date: 1/3/2023   Encounter department: [address] [place] [address] Road     Will treat patient for suspected URI  We will also start prednisone taper for hoarseness of voice  Recommend increase p o  hydration  Salt water gargles  Refill ibuprofen for myofascial pain  RTC as needed    1  Upper respiratory tract infection, unspecified type  -     amoxicillin-clavulanate (AUGMENTIN) 875-125 mg per tablet; Take 1 tablet by mouth every 12 (twelve) hours for 7 days  -     predniSONE 10 mg tablet; Take 5 tablets (50 mg total) by mouth daily for 1 day, THEN 4 tablets (40 mg total) daily for 1 day, THEN 3 tablets (30 mg total) daily for 1 day, THEN 2 tablets (20 mg total) daily for 1 day, THEN 1 tablet (10 mg total) daily for 1 day  2  Myofascial pain  -     ibuprofen (MOTRIN) 800 mg tablet; Take 1 tablet (800 mg total) by mouth every 6 (six) hours as needed for mild pain         Subjective      She presents today with an approximately 8-day history of URI symptoms  States that she was with her family and someone else was ill at that time  She is COVID-negative x2 at home  Reports that she is also lost her voice  Denies any fevers or any other symptoms at this time  Review of Systems   Constitutional: Positive for fatigue  Negative for activity change, appetite change, chills and fever  HENT: Positive for congestion, rhinorrhea and voice change  Negative for sneezing and sore throat  Respiratory: Positive for cough  Negative for chest tightness, shortness of breath and wheezing  Cardiovascular: Negative for chest pain and palpitations  Gastrointestinal: Negative for abdominal distention, abdominal pain, constipation, diarrhea, nausea and vomiting  Musculoskeletal: Negative for arthralgias, back pain, joint swelling and myalgias  Skin: Negative for rash  Neurological: Negative for dizziness, weakness, numbness and headaches  Hematological: Negative for adenopathy  All other systems reviewed and are negative        Current Outpatient Medications on File Prior to Visit   Medication Sig   • albuterol (2 5 mg/3 mL) 0 083 % nebulizer solution Take 3 mL (2 5 mg total) by nebulization every 6 (six) hours as needed for wheezing or shortness of breath   • Azelastine HCl 137 MCG/SPRAY SOLN '1 SPRAY PER NOSTRIL IN THE MORNING AS DIRECTED   • Blood Glucose Monitoring Suppl (ONETOUCH VERIO IQ SYSTEM) w/Device KIT Check BG daily dx E11 9   • colestipol (COLESTID) 1 g tablet take 1 tablet by mouth twice a day   • Cyanocobalamin (Vitamin B-12) 1000 MCG SUBL Place under the tongue   • dicyclomine (BENTYL) 20 mg tablet Take 1 tablet (20 mg total) by mouth every 6 (six) hours As needed for abdominal pain   • doxycycline hyclate (VIBRAMYCIN) 100 mg capsule Take 100 mg by mouth 2 (two) times a day   • Dulaglutide (Trulicity) 2 46 SC/9 9UO SOPN Inject 0 5 mL (0 75 mg total) under the skin once a week   • DULoxetine (CYMBALTA) 60 mg delayed release capsule Take 1 capsule (60 mg total) by mouth daily   • ergocalciferol (VITAMIN D2) 50,000 units take 1 capsule by mouth every week   • fluticasone (FLONASE) 50 mcg/act nasal spray 2 sprays into each nostril daily (Patient taking differently: 2 sprays into each nostril daily As needed)   • gabapentin (NEURONTIN) 100 mg capsule Take 100 mg by mouth 2 (two) times a day    • glucose blood (OneTouch Verio) test strip Use check BG twice daily dx E11 9   • glucose blood (OneTouch Verio) test strip Test once daily DX E11 9   • hydrocortisone 2 5 % cream Apply topically 3 (three) times a day as needed for irritation or rash   • hydroxychloroquine (PLAQUENIL) 200 mg tablet Take 1 tablet (200 mg total) by mouth 2 (two) times a day   • Lancets (OneTouch Delica Plus [id number]) MISC Check BG 1x daily DX E11 9   • levothyroxine 200 mcg tablet Take 1 tablet (200 mcg total) by mouth daily Pt takes Monday through Saturday   • lifitegrast (Xiidra) 5 % op solution Xiidra 5 % eye drops in a dropperette   • losartan (COZAAR) 50 mg tablet take 1 tablet by mouth once daily   • Menthol (Icy Hot Back) 5 % PTCH Apply topically    • metroNIDAZOLE (METROCREAM) 0 75 % cream metronidazole 0 75 % topical cream   • montelukast (SINGULAIR) 10 mg tablet take 1 tablet by mouth at bedtime   • oxyCODONE (Roxicodone) 5 immediate release tablet Take 1 tablet (5 mg total) by mouth every 8 (eight) hours as needed for moderate pain or severe pain Max Daily Amount: 15 mg   • rosuvastatin (CRESTOR) 5 mg tablet take 1 tablet by mouth once daily   • Saline 0 65 % SOLN into each nostril 2 (two) times a day   • tiZANidine (ZANAFLEX) 2 mg tablet Take 1 tablet (2 mg total) by mouth every 8 (eight) hours as needed for muscle spasms   • [DISCONTINUED] ibuprofen (MOTRIN) 800 mg tablet Take 1 tablet (800 mg total) by mouth every 6 (six) hours as needed for mild pain       Objective     /74 (BP Location: Right arm, Patient Position: Sitting, Cuff Size: Large)   Pulse 98   Temp (!) 97 1 °F (36 2 °C) (Temporal)   Resp 16   Ht 5' 6" (1 676 m)   Wt 103 kg (226 lb 12 8 oz)   SpO2 97%   BMI 36 61 kg/m²     Physical Exam  Vitals reviewed  Constitutional:       General: She is not in acute distress  Appearance: Normal appearance  She is well-developed  She is not toxic-appearing or diaphoretic  HENT:      Head: Normocephalic and atraumatic  Right Ear: Tympanic membrane, ear canal and external ear normal  There is no impacted cerumen  Left Ear: Tympanic membrane, ear canal and external ear normal  There is no impacted cerumen  Nose: Congestion and rhinorrhea present  Mouth/Throat:      Mouth: Mucous membranes are moist       Pharynx: No oropharyngeal exudate or posterior oropharyngeal erythema  Eyes:      General: No scleral icterus  Right eye: No discharge  Left eye: No discharge  Conjunctiva/sclera: Conjunctivae normal    Cardiovascular:      Rate and Rhythm: Normal rate and regular rhythm  Pulses: Normal pulses  Heart sounds: Normal heart sounds  No murmur heard  Pulmonary:      Effort: Pulmonary effort is normal  No respiratory distress  Breath sounds: Normal breath sounds  No wheezing  Abdominal:      General: There is no distension  Palpations: Abdomen is soft  Tenderness: There is no abdominal tenderness  Musculoskeletal:         General: No tenderness  Normal range of motion  Lymphadenopathy:      Cervical: No cervical adenopathy  Neurological:      Mental Status: She is alert     Psychiatric:         Mood and Affect: Mood normal          Behavior: Behavior normal        Mp Hoskins MD

## 2023-01-04 ENCOUNTER — APPOINTMENT (OUTPATIENT)
Dept: PHYSICAL THERAPY | Facility: REHABILITATION | Age: 63
End: 2023-01-04

## 2023-01-11 ENCOUNTER — OFFICE VISIT (OUTPATIENT)
Dept: PHYSICAL THERAPY | Facility: REHABILITATION | Age: 63
End: 2023-01-11
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
## 2023-01-11 DIAGNOSIS — R26.89 IMBALANCE: ICD-10-CM

## 2023-01-11 NOTE — PROGRESS NOTES
PHYSICAL THERAPY RE-EVALUATION / Treatment Note     Today's date: 22   Patient name: Keenan Lubin  : 1960  MRN: [id number]  Referring provider: Tiffany Ortiz DO  Dx:   Encounter Diagnosis     ICD-10-CM    1  Fall, subsequent encounter  W19  XXXD    2  Imbalance  R26 89    3  Leg weakness, bilateral  R29 898       Subjective:  Got upper respiratory infection right after Marco A, finished course of antibiotics on 23, still little voice and coughing  Couldn't lay flat, up and coughing  Feels tired, like she's been hit by a brick wall, abdomen sore from coughing  Left hip is bothering her a bit, right shoulder painful with overhead reaching  Feels nerves pinched, sensation in digits 4-5  Objective: See treatment diary below    Patient goals: walking at a normal pace without cane (not using prior to house), grocery shop without significant pain when completed, dancing at niece's wedding  - To move without pain and to not get tired as frequently (added 10/20/21)    Patient-Specific Functional Scale   Task is scored 0 (unable to perform activity) to 10 (ability to perform activity independently)  Activity 8/25/21 9/29/21 10/20/21 11/17/21 2/09/22 4/06/22 6/22/22 7/27/22 1/11/23   1  Walk at normal pace without a cane 5/10  0/10 3/10 3/10 due to speed or knee bending -8/10 initially, decreases with continued walking 6/10 8-9/10 can do this, not long distances  Slow due to respiratory and conditioning   2  Grocery shop without significant pain 8/10 0/10 4/10 7-8/10 7/10 7-8/10 8/10  Haven't done recently, will do today   3  Dance 5/10  1/10 2/10 0/10 4/10 7/10, getting better every week, working in coordination  Not right now   4     Move without pain and to not get tired as frequently     /10 5-610 7/10 6-7/10       Objective: See treatment diary below    Precautions - Hashimoto's, autoimmune disease    Mobility Measures 8/31/22 9/28/22 10/12/22 11/23/22 11/30/22 1/11/23   Assistive device used? /65  80 bpm     5 Time Sit to Stand  (17" chair, arms across chest) Required practice to stand from 18" surface without hands Able from 20" surface, able from 18" surface  Able from 20" surface,  16 seconds 5 times sit to stand    Unable from 18" surface without light use of hand   Arms in front  15 1 seconds     3 Meter Timed  Up & Go    12 9 seconds  10 8 seconds   Walking speed         Functional Gait Assessment (see below)         6 Minute Walk Test (100 foot circular course)   400 feet     980 feet 1200 feet 845 feet  1005 feet  101 bpm  96% SpO2   Patient-Reported Outcome Measure: Activities-Specific Balance Confidence Scale (ABC Scale)         Right knee flexion PROM, sitting 90 degrees   91 degrees  95 degrees  (115 degrees left)    5/5 right dorsiflexion, eversion     3/5 right plantarflexion  4/5 left plantarflexion         Treadmill 1 3 mph, 10 minutes  Standing heel raise, unilaterally, 45 sec each  Lateral step up to 6" step 30-40 sec each  Same forwards 30-40 sec each    SciFit level 1-2, feet only, 10 min, 90 steps/min      ASSESSMENT  Vern Johnson is a bit deconditioned as compared to her baseline but is moving faster with transfers, short-distance walking and 6 minute walk, likely due to significant abatement of ankle and foot pain  Will return to [employer] [facility] as respiratory infection clears  SHORT-TERM GOALS: by 2/11/23  1  Vern Johnson walks at least 10 minutes consecutively  MET  3  Vern Johnson reports 50% improvement in ability to walk grocery store distances without severe fatigue  MET  4  Demonstrates at least 11 degrees passive right dorsiflexion by 2/12/22  MET  New goal: When released by physician, tolerates at least 6 minutes consecutive overground walking without walking boot  MET  New goal: When released by physician, tolerates at least 10 bilateral heel raises  MET    New goal: When released by physician, walks with step length without one inch of contralateral leg  MET  New goal: Walks at least 1350 feet during 6 minute walk test   MET PREVIOUSLY, CONTINUES  New goal: Scores at least 20/30 on FGA  MET PREVIOUSLY, CONTINUES  New goal: Able to purposefully walk for exercise at least 15 minutes at a time at least 3-4 times per week  NOT MET, CONTINUES    LONG-TERM GOALS: by 3/11/23  1  Patient reports at least 20 total minutes per day of overground walking for exercise  NOT MET     2  Patient independently manages home exercise program   MET with continued progressions  PLAN OF CARE:  Patient will benefit from physical therapy 1 time per week for 1 month  Neuromuscular re-education, therapeutic exercises, and therapeutic activities as outlined in grids

## 2023-01-18 ENCOUNTER — APPOINTMENT (OUTPATIENT)
Dept: PHYSICAL THERAPY | Facility: REHABILITATION | Age: 63
End: 2023-01-18

## 2023-01-18 ENCOUNTER — OFFICE VISIT (OUTPATIENT)
Dept: FAMILY MEDICINE CLINIC | Facility: CLINIC | Age: 63
End: 2023-01-18

## 2023-01-18 VITALS
BODY MASS INDEX: 36.64 KG/M2 | SYSTOLIC BLOOD PRESSURE: 140 MMHG | WEIGHT: 228 LBS | HEIGHT: 66 IN | TEMPERATURE: 97.9 F | DIASTOLIC BLOOD PRESSURE: 78 MMHG

## 2023-01-18 DIAGNOSIS — R05.1 ACUTE COUGH: ICD-10-CM

## 2023-01-18 DIAGNOSIS — R09.82 POST-NASAL DRIP: Primary | ICD-10-CM

## 2023-01-18 PROBLEM — N18.31 STAGE 3A CHRONIC KIDNEY DISEASE (HCC): Status: ACTIVE | Noted: 2023-01-18

## 2023-01-18 PROBLEM — M35.9 CONNECTIVE TISSUE DISEASE (HCC): Status: ACTIVE | Noted: 2023-01-18

## 2023-01-18 PROBLEM — S06.9X9S UNSPECIFIED INTRACRANIAL INJURY WITH LOSS OF CONSCIOUSNESS OF UNSPECIFIED DURATION, SEQUELA (HCC): Status: ACTIVE | Noted: 2023-01-18

## 2023-01-18 RX ORDER — SODIUM CHLORIDE FOR INHALATION 0.9 %
3 VIAL, NEBULIZER (ML) INHALATION AS NEEDED
Qty: 30 ML | Refills: 0 | Status: SHIPPED | OUTPATIENT
Start: 2023-01-18

## 2023-01-18 RX ORDER — TRIAMCINOLONE ACETONIDE 55 UG/1
2 SPRAY, METERED NASAL DAILY
Qty: 16.9 ML | Refills: 1 | Status: SHIPPED | OUTPATIENT
Start: 2023-01-18

## 2023-01-18 NOTE — PROGRESS NOTES
Chief Complaint   Patient presents with   • Follow-up     Cough,sore throat     Name: Christophe Guerra      : 1960      MRN: [id number]  Encounter Provider: Angelic Rodriges MD  Encounter Date: 2023   Encounter department: [facility]    Assessment & Plan     Possibly postnasal drip  We will switch Flonase to Nasacort  Recommend nasal saline washes  We will also order saline nebulizer solutions  Also recommend patient to use a humidifier in her room  RTC if symptoms do not improve  1  Post-nasal drip  -     Triamcinolone Acetonide (Nasacort Allergy 24HR) 55 MCG/ACT nasal spray; 2 sprays by Each Nare route daily    2  Acute cough  -     sodium chloride 0 9 % nebulizer solution; Take 3 mL by nebulization as needed for wheezing or shortness of breath         Subjective      She presents today to discuss a cough and sore throat that is bothering her for quite some time now  States that the cough is worse at night  Also reports dry air in her apartment  Denies any fevers or any other symptoms at this time  Review of Systems   Constitutional: Negative for fatigue and fever  HENT: Positive for postnasal drip and sore throat  Negative for congestion, rhinorrhea and sneezing  Eyes: Negative for redness  Respiratory: Positive for cough  Negative for chest tightness, shortness of breath and wheezing  Cardiovascular: Negative for chest pain and palpitations  Gastrointestinal: Negative for abdominal distention, abdominal pain, constipation, diarrhea, nausea and vomiting  Skin: Negative for rash  Hematological: Negative for adenopathy  All other systems reviewed and are negative        Current Outpatient Medications on File Prior to Visit   Medication Sig   • albuterol (2 5 mg/3 mL) 0 083 % nebulizer solution Take 3 mL (2 5 mg total) by nebulization every 6 (six) hours as needed for wheezing or shortness of breath   • Azelastine HCl 137 MCG/SPRAY SOLN '1 SPRAY PER NOSTRIL IN THE MORNING AS DIRECTED   • Blood Glucose Monitoring Suppl (ONETOUCH VERIO IQ SYSTEM) w/Device KIT Check BG daily dx E11 9   • colestipol (COLESTID) 1 g tablet take 1 tablet by mouth twice a day   • Cyanocobalamin (Vitamin B-12) 1000 MCG SUBL Place under the tongue   • dicyclomine (BENTYL) 20 mg tablet Take 1 tablet (20 mg total) by mouth every 6 (six) hours As needed for abdominal pain   • doxycycline hyclate (VIBRAMYCIN) 100 mg capsule Take 100 mg by mouth 2 (two) times a day   • Dulaglutide (Trulicity) 9 42 PB/7 1HQ SOPN Inject 0 5 mL (0 75 mg total) under the skin once a week   • DULoxetine (CYMBALTA) 60 mg delayed release capsule Take 1 capsule (60 mg total) by mouth daily   • ergocalciferol (VITAMIN D2) 50,000 units take 1 capsule by mouth every week   • gabapentin (NEURONTIN) 100 mg capsule Take 100 mg by mouth 2 (two) times a day    • glucose blood (OneTouch Verio) test strip Use check BG twice daily dx E11 9   • glucose blood (OneTouch Verio) test strip Test once daily DX E11 9   • hydrocortisone 2 5 % cream Apply topically 3 (three) times a day as needed for irritation or rash   • hydroxychloroquine (PLAQUENIL) 200 mg tablet Take 1 tablet (200 mg total) by mouth 2 (two) times a day   • ibuprofen (MOTRIN) 800 mg tablet Take 1 tablet (800 mg total) by mouth every 6 (six) hours as needed for mild pain   • Lancets (OneTouch Delica Plus [id number]) MISC Check BG 1x daily DX E11 9   • levothyroxine 200 mcg tablet Take 1 tablet (200 mcg total) by mouth daily Pt takes Monday through Saturday   • lifitegrast (Xiidra) 5 % op solution Xiidra 5 % eye drops in a dropperette   • losartan (COZAAR) 50 mg tablet take 1 tablet by mouth once daily   • Menthol (Icy Hot Back) 5 % PTCH Apply topically    • metroNIDAZOLE (METROCREAM) 0 75 % cream metronidazole 0 75 % topical cream   • montelukast (SINGULAIR) 10 mg tablet take 1 tablet by mouth at bedtime   • oxyCODONE (Roxicodone) 5 immediate release tablet Take 1 tablet (5 mg total) by mouth every 8 (eight) hours as needed for moderate pain or severe pain Max Daily Amount: 15 mg   • rosuvastatin (CRESTOR) 5 mg tablet take 1 tablet by mouth once daily   • Saline 0 65 % SOLN into each nostril 2 (two) times a day   • tiZANidine (ZANAFLEX) 2 mg tablet Take 1 tablet (2 mg total) by mouth every 8 (eight) hours as needed for muscle spasms   • [DISCONTINUED] fluticasone (FLONASE) 50 mcg/act nasal spray 2 sprays into each nostril daily (Patient taking differently: 2 sprays into each nostril daily As needed)       Objective     /78   Temp 97 9 °F (36 6 °C)   Ht 5' 6" (1 676 m)   Wt 103 kg (228 lb)   BMI 36 80 kg/m²     Physical Exam  Constitutional:       General: She is not in acute distress  Appearance: She is well-developed  She is not toxic-appearing or diaphoretic  HENT:      Head: Normocephalic and atraumatic  Right Ear: Tympanic membrane, ear canal and external ear normal  There is no impacted cerumen  Left Ear: Tympanic membrane, ear canal and external ear normal  There is no impacted cerumen  Nose: Nose normal  No congestion or rhinorrhea  Mouth/Throat:      Mouth: Mucous membranes are moist       Pharynx: No oropharyngeal exudate or posterior oropharyngeal erythema  Eyes:      General: No scleral icterus  Right eye: No discharge  Left eye: No discharge  Conjunctiva/sclera: Conjunctivae normal    Cardiovascular:      Rate and Rhythm: Normal rate and regular rhythm  Pulses: Normal pulses  Heart sounds: Normal heart sounds  No murmur heard  Pulmonary:      Effort: Pulmonary effort is normal  No respiratory distress  Breath sounds: Normal breath sounds  No wheezing  Lymphadenopathy:      Cervical: No cervical adenopathy  Neurological:      Mental Status: She is alert     Psychiatric:         Mood and Affect: Mood normal          Behavior: Behavior normal        Poli Sanderson MD

## 2023-01-25 ENCOUNTER — APPOINTMENT (OUTPATIENT)
Dept: PHYSICAL THERAPY | Facility: REHABILITATION | Age: 63
End: 2023-01-25

## 2023-02-01 ENCOUNTER — OFFICE VISIT (OUTPATIENT)
Dept: PHYSICAL THERAPY | Facility: REHABILITATION | Age: 63
End: 2023-02-01
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
## 2023-02-01 NOTE — PROGRESS NOTES
PHYSICAL THERAPY RE-EVALUATION / Treatment Note     Today's date: 22   Patient name: Skyler Akhtar  : 1960  MRN: [id number]  Referring provider: Lucy Gibbs DO  Dx:   Encounter Diagnosis     ICD-10-CM    1  Fall, subsequent encounter  W19  XXXD    2  Imbalance  R26 89    3  Leg weakness, bilateral  R29 898       Subjective:  Knees are bothering her, hanging lots of pictures and up and down a lot  Left leg wouldn't straighten yesterday, tried to stretch  Lasting with changing in weather, cold moisture  Getting like back has ice in it  Would like to get apartment in [facility]       Objective: See treatment diary below    Patient goals: walking at a normal pace without cane (not using prior to house), grocery shop without significant pain when completed, dancing at niece's wedding  - To move without pain and to not get tired as frequently (added 10/20/21)    Patient-Specific Functional Scale   Task is scored 0 (unable to perform activity) to 10 (ability to perform activity independently)  Activity 8/25/21 9/29/21 10/20/21 11/17/21 2/09/22 4/06/22 6/22/22 7/27/22 1/11/23   1  Walk at normal pace without a cane 5/10  0/10 3/10 310 due to speed or knee bending -8/10 initially, decreases with continued walking 6/10 8-9/10 can do this, not long distances  Slow due to respiratory and conditioning   2  Grocery shop without significant pain 8/10 0/10 4/10 7-8/10 7/10 7-8/10 8/10  Haven't done recently, will do today   3  Dance 5/10  1/10 2/10 010 410 710, getting better every week, working in coordination  Not right now   4  Move without pain and to not get tired as frequently     7/10 5-6/10 7/10 6-7/10       Objective: See treatment diary below    Precautions - Hashimoto's, autoimmune disease    Mobility Measures 8/31/22 9/28/22 10/12/22 11/23/22 11/30/22 1/11/23   Assistive device used?       /65  80 bpm     5 Time Sit to Stand  (17" chair, arms across chest) Required practice to stand from 18" surface without hands Able from 20" surface, able from 18" surface  Able from 20" surface,  16 seconds 5 times sit to stand    Unable from 18" surface without light use of hand   Arms in front  15 1 seconds     3 Meter Timed  Up & Go    12 9 seconds  10 8 seconds   Walking speed         Functional Gait Assessment (see below)         6 Minute Walk Test (100 foot circular course)   400 feet     980 feet 1200 feet 845 feet  1005 feet  101 bpm  96% SpO2   Patient-Reported Outcome Measure: Activities-Specific Balance Confidence Scale (ABC Scale)         Right knee flexion PROM, sitting 90 degrees   91 degrees  95 degrees  (115 degrees left)    5/5 right dorsiflexion, eversion     3/5 right plantarflexion  4/5 left plantarflexion         SciFit level 1, legs only, 6 min, 105 steps/min    Treadmill, 2 rails:   0 9 mph 1 min  1 0 mph 6 min  Experienced stiffness about the legs, stopped and we stretch the knee into flexion, 93 degrees right, then knee extension stretching, 4 min    Standing heel raise, unilaterally, 45 sec each x 2 sets  Standing knee flexion to target focus on bending knees, 1 min  Attempted step ups, knee pain  Sit to stand, 20" surface, about 5 x 2 sets    SciFit level 2, feet only, 10 min, 90 steps/min      ASSESSMENT  Lenore Balbuena was initially stiff and moving slowly, required physical activity and grading of activity to get moving again at faster speeds  She does best when she is doing a higher level of physical activity, such [facility]  In the meantime, as she works her way back there, she should continue walking and exercises at home  SHORT-TERM GOALS: by 2/11/23  1  Lenore Balbuena walks at least 10 minutes consecutively  MET  3  Lenore Balbuena reports 50% improvement in ability to walk grocery store distances without severe fatigue  MET  4  Demonstrates at least 11 degrees passive right dorsiflexion by 2/12/22  MET      New goal: When released by physician, tolerates at least 6 minutes consecutive overground walking without walking boot  MET  New goal: When released by physician, tolerates at least 10 bilateral heel raises  MET  New goal: When released by physician, walks with step length without one inch of contralateral leg  MET  New goal: Walks at least 1350 feet during 6 minute walk test   MET PREVIOUSLY, CONTINUES  New goal: Scores at least 20/30 on FGA  MET PREVIOUSLY, CONTINUES  New goal: Able to purposefully walk for exercise at least 15 minutes at a time at least 3-4 times per week  NOT MET, CONTINUES    LONG-TERM GOALS: by 3/11/23  1  Patient reports at least 20 total minutes per day of overground walking for exercise  NOT MET     2  Patient independently manages home exercise program   MET with continued progressions  PLAN OF CARE:  Patient will benefit from physical therapy 1 time per week for 1 month  Neuromuscular re-education, therapeutic exercises, and therapeutic activities as outlined in grids

## 2023-02-08 ENCOUNTER — OFFICE VISIT (OUTPATIENT)
Dept: PHYSICAL THERAPY | Facility: REHABILITATION | Age: 63
End: 2023-02-08
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
## 2023-02-08 NOTE — PROGRESS NOTES
PHYSICAL THERAPY TREATMENT NOTE    Today's date: 23  Patient name: Denis Saucedo  : 1960  MRN: [id number]  Referring provider: Mariela Bianchi DO  Dx:   Encounter Diagnosis     ICD-10-CM    1  Fall, subsequent encounter  W19  XXXD    2  Imbalance  R26 89    3  Leg weakness, bilateral  R29 898       Subjective:  Doing a bit better with walking, but pain about the medial distal thigh, proximal to the knee  Hasn't yet been back to [facility], enjoys [employer] pool there and exercises classes, intend    Objective: See treatment diary below    Patient goals: walking at a normal pace without cane (not using prior to house), grocery shop without significant pain when completed, dancing at niece's wedding  - To move without pain and to not get tired as frequently (added 10/20/21)    Patient-Specific Functional Scale   Task is scored 0 (unable to perform activity) to 10 (ability to perform activity independently)  Activity 8/25/21 9/29/21 10/20/21 11/17/21 2/09/22 4/06/22 6/22/22 7/27/22 1/11/23   1  Walk at normal pace without a cane 5/10  010 3/10 3/10 due to speed or knee bending -8/10 initially, decreases with continued walking 6/10 8-9/10 can do this, not long distances  Slow due to respiratory and conditioning   2  Grocery shop without significant pain 8/10 0/10 4/10 7-8/10 7/10 7-8/10 8/10  Haven't done recently, will do today   3  Dance 5/10  1/10 2/10 010 4/10 710, getting better every week, working in coordination  Not right now   4  Move without pain and to not get tired as frequently     7/10 5-6/10 7/10 6-7/10       Objective: See treatment diary below    Precautions - Hashimoto's, autoimmune disease    Mobility Measures 8/31/22 9/28/22 10/12/22 11/23/22 11/30/22 1/11/23   Assistive device used?       /65  80 bpm     5 Time Sit to Stand  (17" chair, arms across chest) Required practice to stand from 18" surface without hands Able from 20" surface, able from 18" surface  Able from 20" surface,  16 seconds 5 times sit to stand    Unable from 18" surface without light use of hand   Arms in front  15 1 seconds     3 Meter Timed  Up & Go    12 9 seconds  10 8 seconds   Walking speed         Functional Gait Assessment (see below)         6 Minute Walk Test (100 foot circular course)   400 feet     980 feet 1200 feet 845 feet  1005 feet  101 bpm  96% SpO2   Patient-Reported Outcome Measure: Activities-Specific Balance Confidence Scale (ABC Scale)         Right knee flexion PROM, sitting 90 degrees   91 degrees  95 degrees  (115 degrees left)    5/5 right dorsiflexion, eversion     3/5 right plantarflexion  4/5 left plantarflexion         Overground walking, gradual increasing speed, 8 min  Treadmill 1 4 mph 2 min  Uphill 8% grade 1 0 mph, 3 min, focus on flexing right knee  Seated knee flexion stretch, 2 min    Forward step up 8" step, about 10 each  Lateral step up 4" step, stopped due to right knee pain  Standing heel raise, about 10 each, then bilaterally for 1 min  Standing hip abduction, orange band, 40 sec x 2 sets    SciFit level , legs only, level 1 8, 10 minutes        ASSESSMENT  Moving faster than 2 weeks ago  Again initially stiff and moving slowly, required physical activity and grading of activity to get moving again at faster speeds  She does best when she is doing a higher level of physical activity, [facility]  Will restart with one class, easier class, rather than pool or higher level class, as instructor is familiar with her  In the meantime, as she works her way back there, she should continue walking and exercises at home  SHORT-TERM GOALS: by 2/11/23  1  Raven Bailey walks at least 10 minutes consecutively  MET  3  Raven Bailey reports 50% improvement in ability to walk grocery store distances without severe fatigue  MET  4  Demonstrates at least 11 degrees passive right dorsiflexion by 2/12/22  MET      New goal: When released by physician, tolerates at least 6 minutes consecutive overground walking without walking boot  MET  New goal: When released by physician, tolerates at least 10 bilateral heel raises  MET  New goal: When released by physician, walks with step length without one inch of contralateral leg  MET  New goal: Walks at least 1350 feet during 6 minute walk test   MET PREVIOUSLY, CONTINUES  New goal: Scores at least 20/30 on FGA  MET PREVIOUSLY, CONTINUES  New goal: Able to purposefully walk for exercise at least 15 minutes at a time at least 3-4 times per week  NOT MET, CONTINUES    LONG-TERM GOALS: by 3/11/23  1  Patient reports at least 20 total minutes per day of overground walking for exercise  NOT MET     2  Patient independently manages home exercise program   MET with continued progressions  PLAN OF CARE:  Patient will benefit from physical therapy 1 time per week for 1 month  Neuromuscular re-education, therapeutic exercises, and therapeutic activities as outlined in grids

## 2023-02-15 ENCOUNTER — APPOINTMENT (OUTPATIENT)
Dept: PHYSICAL THERAPY | Facility: REHABILITATION | Age: 63
End: 2023-02-15

## 2023-02-16 ENCOUNTER — APPOINTMENT (OUTPATIENT)
Dept: PHYSICAL THERAPY | Facility: REHABILITATION | Age: 63
End: 2023-02-16

## 2023-02-16 DIAGNOSIS — E78.00 PURE HYPERCHOLESTEROLEMIA: ICD-10-CM

## 2023-02-16 RX ORDER — ROSUVASTATIN CALCIUM 5 MG/1
TABLET, COATED ORAL
Qty: 90 TABLET | Refills: 3 | Status: SHIPPED | OUTPATIENT
Start: 2023-02-16

## 2023-02-22 ENCOUNTER — OFFICE VISIT (OUTPATIENT)
Dept: PHYSICAL THERAPY | Facility: REHABILITATION | Age: 63
End: 2023-02-22
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
## 2023-02-22 NOTE — PROGRESS NOTES
PHYSICAL THERAPY TREATMENT NOTE    Today's date: 23  Patient name: Brennen Lema  : 1960  MRN: [id number]  Referring provider: Asha Haddad DO  Dx:   Encounter Diagnosis     ICD-10-CM    1  Fall, subsequent encounter  W19  XXXD    2  Imbalance  R26 89    3  Leg weakness, bilateral  R29 898       Subjective: Intermittent swelling about the inside of the right lower leg, was going to go to the pool but didn't  Using ice as needed  Did grocery shopping this past Tuesday, able to do mail the day before  Pain about the left ankle/foot, abates with brace  Objective: See treatment diary below    Patient goals:   -getting back to the pool    Patient-Specific Functional Scale   Task is scored 0 (unable to perform activity) to 10 (ability to perform activity independently)  Activity 8/25/21 9/29/21 10/20/21 11/17/21 2/09/22 4/06/22 6/22/22 7/27/22 1/11/23 2/22/23   1  Walk at normal pace without a cane 5/10  0/10 3/10 3/10 due to speed or knee bending -8/10 initially, decreases with continued walking 6/10 8-9/10 can do this, not long distances  Slow due to respiratory and conditioning Notes slowed pace, does'nt use cane  2    Grocery shop without significant pain 8/10 0/10 4/10 7-8/10 7/10 7-8/10 8/10  Haven't done recently, will do today Did well most of grocery shopping, but a lot of pain at the end, new, large grocery store, about 1 hour  3    Dance 5/10  1/10 2/10 010 4/10 710, getting better every week, working in coordination  Not right now    4  Move without pain and to not get tired as frequently     7/10 5-6/10 7/10 6-7/10   Can do an hour maximum, that's a combination of standing and sitting     Objective: See treatment diary below    Precautions - Hashimoto's, autoimmune disease    Mobility Measures 8/31/22 9/28/22 10/12/22 11/23/22 11/30/22 1/11/23 2/22/23   Assistive device used?       /65  80 bpm      5 Time Sit to Stand  (17" chair, arms across chest) Required practice to stand from 18" surface without hands Able from 20" surface, able from 18" surface  Able from 20" surface,  16 seconds 5 times sit to stand    Unable from 18" surface without light use of hand   Arms in front  15 1 seconds   Unable, pain about right medial knee, proximal knee   3 Meter Timed  Up & Go    12 9 seconds  10 8 seconds 16 0 seconds   Walking speed          Functional Gait Assessment (see below)          6 Minute Walk Test (100 foot circular course)   400 feet     980 feet 1200 feet 845 feet  1005 feet  101 bpm  96% SpO2 850 feet  98 bpm  98% SpO2       Patient-Reported Outcome Measure: Activities-Specific Balance Confidence Scale (ABC Scale)          Right knee flexion PROM, sitting 90 degrees   91 degrees  95 degrees  (115 degrees left) 86 degrees    5/5 right dorsiflexion, eversion     3/5 right plantarflexion  4/5 left plantarflexion          Walking over obstacles focus on flexing right knee, 2 min  Supine knee flexion stretch, hamstring stretch, 3 min  SciFit legs only, level 1 6, 4 min    ASSESSMENT  Julian Ramirez returns to therapy after 2 weeks off due to schedule, moving slower due to right knee region pain  It does not seem to be the right knee, but instead distal to the knee, connective tissue  Continue light loading low intensity movements, as initial movement frequently allows more movement after a few minutes  The [employer] would likely be helpful to progress with regularly being able to walk for exercise, and will remain a goal      SHORT-TERM GOALS: by 2/11/23  1  Julian Ramirez walks at least 10 minutes consecutively  MET  3  Julian Ramirez reports 50% improvement in ability to walk grocery store distances without severe fatigue  MET  4  Demonstrates at least 11 degrees passive right dorsiflexion by 2/12/22  MET  New goal: When released by physician, tolerates at least 6 minutes consecutive overground walking without walking boot  MET    New goal: When released by physician, tolerates at least 10 bilateral heel raises  MET  New goal: When released by physician, walks with step length without one inch of contralateral leg  MET  New goal: Walks at least 1350 feet during 6 minute walk test   MET PREVIOUSLY, CONTINUES  New goal: Scores at least 20/30 on FGA  MET PREVIOUSLY, CONTINUES  New goal: Able to purposefully walk for exercise at least 15 minutes at a time at least 3-4 times per week  NOT MET, CONTINUES    LONG-TERM GOALS: by 3/11/23  1  Patient reports at least 20 total minutes per day of overground walking for exercise  NOT MET CONSISTENTLY  2  Patient independently manages home exercise program   MET with continued progressions  PLAN OF CARE:  Patient will benefit from physical therapy 1 time per week for 1 month  Neuromuscular re-education, therapeutic exercises, and therapeutic activities as outlined in grids

## 2023-03-01 ENCOUNTER — OFFICE VISIT (OUTPATIENT)
Dept: PHYSICAL THERAPY | Facility: REHABILITATION | Age: 63
End: 2023-03-01
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
## 2023-03-02 NOTE — PROGRESS NOTES
PHYSICAL THERAPY TREATMENT NOTE    Today's date: 23  Patient name: Seema Graf  : 1960  MRN: [id number]  Referring provider: Betty Kim DO  Dx:   Encounter Diagnosis     ICD-10-CM    1  Fall, subsequent encounter  W19  XXXD    2  Imbalance  R26 89    3  Leg weakness, bilateral  R29 898       Subjective: Intermittent swelling about the inside of the right lower leg, was going to go to the pool but didn't  Using ice as needed  Did grocery shopping this past Tuesday, able to do mail the day before  Pain about the left ankle/foot, abates with brace  Objective: See treatment diary below    Patient goals:   -getting back to the pool    Patient-Specific Functional Scale   Task is scored 0 (unable to perform activity) to 10 (ability to perform activity independently)  Activity 8/25/21 9/29/21 10/20/21 11/17/21 2/09/22 4/06/22 6/22/22 7/27/22 1/11/23 2/22/23   1  Walk at normal pace without a cane 5/10  010 3/10 3/10 due to speed or knee bending -8/10 initially, decreases with continued walking 6/10 8-9/10 can do this, not long distances  Slow due to respiratory and conditioning Notes slowed pace, does'nt use cane  2    Grocery shop without significant pain 8/10 0/10 4/10 7-810 7/10 7-8/10 8/10  Haven't done recently, will do today Did well most of grocery shopping, but a lot of pain at the end, new, large grocery store, about 1 hour  3    Dance 5/10  1/10 2/10 010 10 710, getting better every week, working in coordination  Not right now    4  Move without pain and to not get tired as frequently     7/10 5-6/10 7/10 6-7/10   Can do an hour maximum, that's a combination of standing and sitting     Objective: See treatment diary below    Precautions - Hashimoto's, autoimmune disease    Mobility Measures 8/31/22 9/28/22 10/12/22 11/23/22 11/30/22 1/11/23 2/22/23   Assistive device used?       /65  80 bpm      5 Time Sit to Stand  (17" chair, arms across chest) Required practice to stand from 18" surface without hands Able from 20" surface, able from 18" surface  Able from 20" surface,  16 seconds 5 times sit to stand    Unable from 18" surface without light use of hand   Arms in front  15 1 seconds   Unable, pain about right medial knee, proximal knee   3 Meter Timed  Up & Go    12 9 seconds  10 8 seconds 16 0 seconds   Walking speed          Functional Gait Assessment (see below)          6 Minute Walk Test (100 foot circular course)   400 feet     980 feet 1200 feet 845 feet  1005 feet  101 bpm  96% SpO2 850 feet  98 bpm  98% SpO2       Patient-Reported Outcome Measure: Activities-Specific Balance Confidence Scale (ABC Scale)          Right knee flexion PROM, sitting 90 degrees   91 degrees  95 degrees  (115 degrees left) 86 degrees    5/5 right dorsiflexion, eversion     3/5 right plantarflexion  4/5 left plantarflexion          Walking over obstacles focus on flexing right knee, 2 min  Supine knee flexion stretch, hamstring stretch, 3 min  SciFit legs only, level 1 6, 4 min    ASSESSMENT  Bettye Rajput returns to therapy after 2 weeks off due to schedule, moving slower due to right knee region pain  It does not seem to be the right knee, but instead distal to the knee, connective tissue  Continue light loading low intensity movements, as initial movement frequently allows more movement after a few minutes  The [employer] would likely be helpful to progress with regularly being able to walk for exercise, and will remain a goal      SHORT-TERM GOALS: by 2/11/23  1  Bettye Rajput walks at least 10 minutes consecutively  MET  3  Bettye Rajput reports 50% improvement in ability to walk grocery store distances without severe fatigue  MET  4  Demonstrates at least 11 degrees passive right dorsiflexion by 2/12/22  MET  New goal: When released by physician, tolerates at least 6 minutes consecutive overground walking without walking boot  MET    New goal: When released by physician, tolerates at least 10 bilateral heel raises  MET  New goal: When released by physician, walks with step length without one inch of contralateral leg  MET  New goal: Walks at least 1350 feet during 6 minute walk test   MET PREVIOUSLY, CONTINUES  New goal: Scores at least 20/30 on FGA  MET PREVIOUSLY, CONTINUES  New goal: Able to purposefully walk for exercise at least 15 minutes at a time at least 3-4 times per week  NOT MET, CONTINUES    LONG-TERM GOALS: by 3/11/23  1  Patient reports at least 20 total minutes per day of overground walking for exercise  NOT MET CONSISTENTLY  2  Patient independently manages home exercise program   MET with continued progressions  PLAN OF CARE:  Patient will benefit from physical therapy 1 time per week for 1 month  Neuromuscular re-education, therapeutic exercises, and therapeutic activities as outlined in grids

## 2023-03-02 NOTE — PROGRESS NOTES
PHYSICAL THERAPY TREATMENT NOTE    Today's date: 3/02/23  Patient name: Skyler Akhtar  : 1960  MRN: [id number]  Referring provider: Lucy Gibbs DO  Dx:   Encounter Diagnosis     ICD-10-CM    1  Fall, subsequent encounter  W19  XXXD    2  Imbalance  R26 89    3  Leg weakness, bilateral  R29 898       Subjective:  [facility]  Wouldn't go tomorrow as would like a day of rest after therapy  Weekend is busy with less offerings at [facility]  Will try next week  Pain about inside of the right lower leg  Able to do grocery shopping, limited walking for exercise  Objective: See treatment diary below    Patient goals:   -getting back to the pool    Patient-Specific Functional Scale   Task is scored 0 (unable to perform activity) to 10 (ability to perform activity independently)  Activity 8/25/21 9/29/21 10/20/21 11/17/21 2/09/22 4/06/22 6/22/22 7/27/22 1/11/23 2/22/23   1  Walk at normal pace without a cane 5/10  0/10 3/10 3/10 due to speed or knee bending -8/10 initially, decreases with continued walking 6/10 8-9/10 can do this, not long distances  Slow due to respiratory and conditioning Notes slowed pace, does'nt use cane  2    Grocery shop without significant pain 8/10 0/10 4/10 7-810 7/10 7-8/10 8/10  Haven't done recently, will do today Did well most of grocery shopping, but a lot of pain at the end, new, large grocery store, about 1 hour  3    Dance 5/10  1/10 2/10 [phone], getting better every week, working in coordination  Not right now    4  Move without pain and to not get tired as frequently     7/10 5-6/10 7/10 6-7/10   Can do an hour maximum, that's a combination of standing and sitting     Objective: See treatment diary below    Precautions - Hashimoto's, autoimmune disease    Mobility Measures 8/31/22 9/28/22 10/12/22 11/23/22 11/30/22 1/11/23 2/22/23   Assistive device used?       /65  80 bpm      5 Time Sit to Stand  (17" chair, arms across chest) Required practice to stand from 18" surface without hands Able from 20" surface, able from 18" surface  Able from 20" surface,  16 seconds 5 times sit to stand    Unable from 18" surface without light use of hand   Arms in front  15 1 seconds   Unable, pain about right medial knee, proximal knee   3 Meter Timed  Up & Go    12 9 seconds  10 8 seconds 16 0 seconds   Walking speed          Functional Gait Assessment (see below)          6 Minute Walk Test (100 foot circular course)   400 feet     980 feet 1200 feet 845 feet  1005 feet  101 bpm  96% SpO2 850 feet  98 bpm  98% SpO2       Patient-Reported Outcome Measure: Activities-Specific Balance Confidence Scale (ABC Scale)          Right knee flexion PROM, sitting 90 degrees   91 degrees  95 degrees  (115 degrees left) 86 degrees    5/5 right dorsiflexion, eversion     3/5 right plantarflexion  4/5 left plantarflexion          SciFit level 1 4 10 min  Left ankle ROM within normal limits  Denies pain with resisted ankle inversion, eversion, dorsiflexion, plantarflexion, resists maximal pressure,  no pain with end-range     Seated right knee flexion stretching 2 5 min  Step ups forwards 6" step, 1-2 rails  Laterally 6" step or 4" step  X 10-12 x 1 set each side of the above    Up 5% incline on treadmill, about 1 2 mph 10 minutes, cue to flex knee    ASSESSMENT  No specific range of motion nor strength limitation in the left ankle  Stretch as needed, return to progressive heel raises and steps ups as able  Walk for exercise as able  Better knee range of motion on right, 90-95 degrees again, continue to be active and stretch as needed  The [employer] would likely be helpful to progress with regularly being able to walk for exercise, and will remain a goal      SHORT-TERM GOALS: by 2/11/23  1  Luana Sykes walks at least 10 minutes consecutively  MET  3  Luana Sykes reports 50% improvement in ability to walk grocery store distances without severe fatigue  MET    4  Demonstrates at least 11 degrees passive right dorsiflexion by 2/12/22  MET  New goal: When released by physician, tolerates at least 6 minutes consecutive overground walking without walking boot  MET  New goal: When released by physician, tolerates at least 10 bilateral heel raises  MET  New goal: When released by physician, walks with step length without one inch of contralateral leg  MET  New goal: Walks at least 1350 feet during 6 minute walk test   MET PREVIOUSLY, CONTINUES  New goal: Scores at least 20/30 on FGA  MET PREVIOUSLY, CONTINUES  New goal: Able to purposefully walk for exercise at least 15 minutes at a time at least 3-4 times per week  NOT MET, CONTINUES    LONG-TERM GOALS: by 3/11/23  1  Patient reports at least 20 total minutes per day of overground walking for exercise  NOT MET CONSISTENTLY  2  Patient independently manages home exercise program   MET with continued progressions  PLAN OF CARE:  Patient will benefit from physical therapy 1 time per week for 1 month  Neuromuscular re-education, therapeutic exercises, and therapeutic activities as outlined in grids

## 2023-03-03 ENCOUNTER — RA CDI HCC (OUTPATIENT)
Dept: OTHER | Facility: HOSPITAL | Age: 63
End: 2023-03-03

## 2023-03-09 ENCOUNTER — TELEMEDICINE (OUTPATIENT)
Dept: PSYCHIATRY | Facility: CLINIC | Age: 63
End: 2023-03-09

## 2023-03-09 DIAGNOSIS — F43.23 ADJUSTMENT DISORDER WITH MIXED ANXIETY AND DEPRESSED MOOD: Primary | ICD-10-CM
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
## 2023-03-13 ENCOUNTER — TELEPHONE (OUTPATIENT)
Dept: ADMINISTRATIVE | Facility: OTHER | Age: 63
End: 2023-03-13

## 2023-03-13 NOTE — TELEPHONE ENCOUNTER
Upon review of the In Basket request and the patient's chart, initial outreach has been made via fax to facility  Please see Contacts section for details       Thank you  Andreas Downey MA

## 2023-03-13 NOTE — TELEPHONE ENCOUNTER
----- Message from Timi Barlow RN sent at 3/13/2023  9:49 AM EDT -----  Regarding: diabetic foot exam  03/13/23 9:49 AM    Hello, our patient Linsey Balderrama has had Diabetic Foot Exam completed/performed  Please assist in updating the patient chart by pulling the document from the Media Tab-11/19/2021-[facility]-Jodi Petersen DPM  The date of service is 01/27/2021      Thank you,  Timi Barlow RN  [facility] PRIMARY [facility]

## 2023-03-13 NOTE — TELEPHONE ENCOUNTER
----- Message from Nalini Stokes RN sent at 3/13/2023  9:53 AM EDT -----  Regarding: diabetic eye exam  03/13/23 9:54 AM    Hello, our patient Marielena Maki has had Diabetic Eye Exam completed/performed  Please assist in updating the patient chart by pulling the document from the Media Tab10/12/2020-Maite Barragan MD  The date of service is 10/08/2020       Thank you,  Nalini Stokes RN  [facility]

## 2023-03-13 NOTE — LETTER
Diabetic Foot Exam Form    Date Requested: 23  Patient: Rosa Abraham  Patient : 1960   Referring Provider: Johana Velásquez DO    Diabetic Foot Exam Performed with shoes and socks removed        Yes         No     Date of Diabetic Foot Exam ______________________________  Risk Score ____________________________________________    Left Foot       Visual Inspection         Monofilament Testing Sensory Exam        Pedal Pulses         Additional Comments         Right Foot      Visual Inspection         Monofilament Testing Sensory Exam       Pedal Pulses         Additional Comments         Comments __________________________________________________________    Practice Providing Exam ______________________________________________    Exam Performed By (print name) _______________________________________      Provider Signature ___________________________________________________      These reports are needed for  compliance  Please fax this completed form and a copy of the Diabetic Foot Exam report to our office located at [place] [address] as soon as possible via Fax [phone] tyron Purdy: Phone [phone]    We thank you for your assistance in treating our mutual patient

## 2023-03-15 ENCOUNTER — OFFICE VISIT (OUTPATIENT)
Dept: PHYSICAL THERAPY | Facility: REHABILITATION | Age: 63
End: 2023-03-15
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
## 2023-03-15 NOTE — PROGRESS NOTES
PHYSICAL THERAPY TREATMENT NOTE    Today's date: 3/02/23  Patient name: Eula Watson  : 1960  MRN: [id number]  Referring provider: Destin Brizuela DO  Dx:   Encounter Diagnosis     ICD-10-CM    1  Fall, subsequent encounter  W19  XXXD    2  Imbalance  R26 89    3  Leg weakness, bilateral  R29 898       Subjective:  Walked a bit to exercise cat  Leg pain very dependent on weather, [facility]  Hasn't been grocery shopping this week  Been doing seated aerobics, seated and regular yoga, lots of dance videos  Objective: See treatment diary below    Patient goals:   -getting back to the pool    Patient-Specific Functional Scale   Task is scored 0 (unable to perform activity) to 10 (ability to perform activity independently)  Activity 10/20/21 11/17/21 2/09/22 4/06/22 6/22/22 7/27/22 1/11/23 2/22/23 3/15/23   1  Walk at normal pace without a cane 3/10 3/10 due to speed or knee bending 7-8/10 initially, decreases with continued walking 6/10 8-9/10 can do this, not long distances  Slow due to respiratory and conditioning Notes slowed pace, doesn't use cane  Doesn't use   2  Grocery shop without significant pain 4/10 7-8/10 7/10 7-8/10 8/10  Haven't done recently, will do today Did well most of grocery shopping, but a lot of pain at the end, new, large grocery store, about 1 hour  Not in last week   3  Dance 1/10 2/10 0/10 4/10 7/10, getting better every week, working in coordination  Not right now     4  Move without pain and to not get tired as frequently   7/10 5-6/10 7/10 6-7/10   Can do an hour maximum, that's a combination of standing and sitting      Objective: See treatment diary below    Precautions - Hashimoto's, autoimmune disease    Mobility Measures 10/12/22 11/23/22 11/30/22 1/11/23 2/22/23 3/15/23   Assistive device used?     /65  80 bpm       5 Time Sit to Stand  (17" chair, arms across chest)  Able from 20" surface,  16 seconds 5 times sit to stand    Unable from 18" surface without light use of hand   Arms in front  15 1 seconds   Unable, pain about right medial knee, proximal knee    3 Meter Timed  Up & Go  12 9 seconds  10 8 seconds 16 0 seconds    Walking speed         Functional Gait Assessment (see below)         6 Minute Walk Test (100 foot circular course)   1200 feet 845 feet  1005 feet  101 bpm  96% SpO2 850 feet  98 bpm  98% SpO2     950 feet   Patient-Reported Outcome Measure: Activities-Specific Balance Confidence Scale (ABC Scale)         Right knee flexion PROM, sitting  91 degrees  95 degrees  (115 degrees left) 86 degrees 90 degrees     3/5 right plantarflexion  4/5 left plantarflexion           6 minute walk test 950 feet    SciFit level 1, 3 min, 95 steps/min  Level 1 8, 3 min steps/min  Level 1 9, 4 min    Seated right knee extension and flexion stretching 2min  Sit to stand 19" surface, 3-4 x 2 sets    Step up:  4" surface right, 2  6" surface, left, 8-10 x 2 sets    Standing heel raise, 45 sec    Lateral stepping with crossover focus on flexing knee 2 min    Up 6% incline on treadmill, about 1 2 mph 10 minutes, cue to flex knee    ASSESSMENT  No limitation at the ankle at this point, the right knee was stiff and we worked on flexing it while moving  The [employer] would likely be helpful to progress with regularly being able to walk for exercise, and will remain a goal      SHORT-TERM GOALS: by 2/11/23  1  Rena Bird walks at least 10 minutes consecutively  MET  3  Rena Bird reports 50% improvement in ability to walk grocery store distances without severe fatigue  MET  4  Demonstrates at least 11 degrees passive right dorsiflexion by 2/12/22  MET  New goal: When released by physician, tolerates at least 6 minutes consecutive overground walking without walking boot  MET  New goal: When released by physician, tolerates at least 10 bilateral heel raises  MET    New goal: When released by physician, walks with step length without one inch of contralateral leg  MET  New goal: Walks at least 1350 feet during 6 minute walk test   MET PREVIOUSLY, CONTINUES  New goal: Scores at least 20/30 on FGA  MET PREVIOUSLY, CONTINUES  New goal: Able to purposefully walk for exercise at least 15 minutes at a time at least 3-4 times per week  NOT MET, CONTINUES    LONG-TERM GOALS: by 3/11/23  1  Patient reports at least 20 total minutes per day of overground walking for exercise  NOT MET CONSISTENTLY  2  Patient independently manages home exercise program   MET with continued progressions  PLAN OF CARE:  Patient will benefit from physical therapy 1 time per week for 1 month  Neuromuscular re-education, therapeutic exercises, and therapeutic activities as outlined in grids

## 2023-03-16 ENCOUNTER — APPOINTMENT (OUTPATIENT)
Dept: LAB | Facility: CLINIC | Age: 63
End: 2023-03-16

## 2023-03-16 DIAGNOSIS — E11.65 TYPE 2 DIABETES MELLITUS WITH HYPERGLYCEMIA, WITHOUT LONG-TERM CURRENT USE OF INSULIN (HCC): ICD-10-CM

## 2023-03-16 DIAGNOSIS — Z11.4 SCREENING FOR HIV (HUMAN IMMUNODEFICIENCY VIRUS): ICD-10-CM

## 2023-03-16 DIAGNOSIS — E06.3 HASHIMOTO'S DISEASE: ICD-10-CM

## 2023-03-16 LAB
ALBUMIN SERPL BCP-MCNC: 3.7 G/DL (ref 3.5–5)
ALP SERPL-CCNC: 120 U/L (ref 46–116)
ALT SERPL W P-5'-P-CCNC: 35 U/L (ref 12–78)
ANION GAP SERPL CALCULATED.3IONS-SCNC: 4 MMOL/L (ref 4–13)
AST SERPL W P-5'-P-CCNC: 27 U/L (ref 5–45)
BILIRUB SERPL-MCNC: 0.4 MG/DL (ref 0.2–1)
BUN SERPL-MCNC: 14 MG/DL (ref 5–25)
CALCIUM SERPL-MCNC: 9.4 MG/DL (ref 8.3–10.1)
CHLORIDE SERPL-SCNC: 107 MMOL/L (ref 96–108)
CO2 SERPL-SCNC: 27 MMOL/L (ref 21–32)
CREAT SERPL-MCNC: 0.83 MG/DL (ref 0.6–1.3)
EST. AVERAGE GLUCOSE BLD GHB EST-MCNC: 140 MG/DL
GFR SERPL CREATININE-BSD FRML MDRD: 74 ML/MIN/1.73SQ M
GLUCOSE P FAST SERPL-MCNC: 129 MG/DL (ref 65–99)
HBA1C MFR BLD: 6.5 %
POTASSIUM SERPL-SCNC: 3.8 MMOL/L (ref 3.5–5.3)
PROT SERPL-MCNC: 7.7 G/DL (ref 6.4–8.4)
SODIUM SERPL-SCNC: 138 MMOL/L (ref 135–147)
T4 FREE SERPL-MCNC: 1.08 NG/DL (ref 0.76–1.46)
TSH SERPL DL<=0.05 MIU/L-ACNC: 0.93 UIU/ML (ref 0.45–4.5)

## 2023-03-17 ENCOUNTER — OFFICE VISIT (OUTPATIENT)
Dept: FAMILY MEDICINE CLINIC | Facility: CLINIC | Age: 63
End: 2023-03-17

## 2023-03-17 VITALS
DIASTOLIC BLOOD PRESSURE: 78 MMHG | SYSTOLIC BLOOD PRESSURE: 140 MMHG | BODY MASS INDEX: 37.28 KG/M2 | WEIGHT: 232 LBS | HEIGHT: 66 IN

## 2023-03-17 DIAGNOSIS — N18.31 STAGE 3A CHRONIC KIDNEY DISEASE (HCC): ICD-10-CM

## 2023-03-17 DIAGNOSIS — E11.65 TYPE 2 DIABETES MELLITUS WITH HYPERGLYCEMIA, WITHOUT LONG-TERM CURRENT USE OF INSULIN (HCC): ICD-10-CM

## 2023-03-17 DIAGNOSIS — M35.9 CONNECTIVE TISSUE DISEASE (HCC): ICD-10-CM

## 2023-03-17 DIAGNOSIS — D69.6 THROMBOCYTOPENIC DISORDER (HCC): ICD-10-CM

## 2023-03-17 DIAGNOSIS — S06.9X9S UNSPECIFIED INTRACRANIAL INJURY WITH LOSS OF CONSCIOUSNESS OF UNSPECIFIED DURATION, SEQUELA (HCC): ICD-10-CM

## 2023-03-17 DIAGNOSIS — F32.0 CURRENT MILD EPISODE OF MAJOR DEPRESSIVE DISORDER WITHOUT PRIOR EPISODE (HCC): ICD-10-CM

## 2023-03-17 DIAGNOSIS — D35.2 PITUITARY MICROADENOMA (HCC): ICD-10-CM

## 2023-03-17 DIAGNOSIS — G70.9 NEUROMUSCULAR DISORDER (HCC): ICD-10-CM

## 2023-03-17 DIAGNOSIS — Q04.8 CEREBELLAR TONSILLAR ECTOPIA (HCC): ICD-10-CM

## 2023-03-17 LAB
HIV 1+2 AB+HIV1 P24 AG SERPL QL IA: NORMAL
HIV 2 AB SERPL QL IA: NORMAL
HIV1 AB SERPL QL IA: NORMAL
HIV1 P24 AG SERPL QL IA: NORMAL

## 2023-03-17 NOTE — TELEPHONE ENCOUNTER
As a follow-up, a second attempt has been made for outreach via fax to facility  Please see Contacts section for details      Thank you  Unique Nayak MA

## 2023-03-17 NOTE — PROGRESS NOTES
Chief Complaint   Patient presents with   • Diabetes   • Hypertension     No refills needed      Name: Gino Whitehead      : 1960      MRN: [id number]  Encounter Provider: Susan Landa MD  Encounter Date: 3/17/2023   Encounter department: [address] [place] [address] PRIMARY CARE    Assessment & Plan     Reviewed labs in great detail  HbA1c improved at 6 5  Continue diabetic regimen  Reports having diabetic foot exam completed recently, awaiting records  She does have an upcoming ophthalmology appointment for diabetic eye exam   Continue follow-up with neurology as well as rheumatology for neuromuscular disorders as well as connective tissue disorders  Doing well with her depression  Continue treatment as ordered  Thrombocytopenia seems to have improved from prior labs  Avoid nephrotoxins due to chronic kidney disease  RTC in 6 months for follow-up  1  Type 2 diabetes mellitus with hyperglycemia, without long-term current use of insulin ([address] )    2  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[address] )    3  Neuromuscular disorder ([address] )    4  Connective tissue disease ([address] )    5  Current mild episode of major depressive disorder without prior episode ([address] )    6  Thrombocytopenic disorder ([address] )    7  Pituitary microadenoma ([address] )    8  Unspecified intracranial injury with loss of consciousness of unspecified duration, sequela (HCC)    9  Stage 3a chronic kidney disease ([address] )    BMI Counseling: Body mass index is 37 45 kg/m²  The BMI is above normal  Nutrition recommendations include encouraging healthy choices of fruits and vegetables and consuming healthier snacks  Exercise recommendations include moderate physical activity 150 minutes/week  Rationale for BMI follow-up plan is due to patient being overweight or obese  Subjective      She presents today for follow-up  Recently had blood work done  Overall states that she is feeling well and doing better    Continues to follow-up with rheumatology as well as neurology  She is compliant with her medications  Denies any new complaints today  Review of Systems   Constitutional: Negative for activity change, appetite change, chills, fatigue and fever  HENT: Negative for congestion, rhinorrhea, sneezing and sore throat  Respiratory: Negative for cough, chest tightness, shortness of breath and wheezing  Cardiovascular: Negative for chest pain and palpitations  Gastrointestinal: Negative for abdominal distention, abdominal pain, constipation, diarrhea, nausea and vomiting  Musculoskeletal: Positive for arthralgias and myalgias  Negative for back pain and joint swelling  Skin: Negative for rash  Neurological: Negative for dizziness, weakness, numbness and headaches  Hematological: Negative for adenopathy  Psychiatric/Behavioral: Negative for dysphoric mood  All other systems reviewed and are negative        Current Outpatient Medications on File Prior to Visit   Medication Sig   • albuterol (2 5 mg/3 mL) 0 083 % nebulizer solution Take 3 mL (2 5 mg total) by nebulization every 6 (six) hours as needed for wheezing or shortness of breath   • Azelastine HCl 137 MCG/SPRAY SOLN '1 SPRAY PER NOSTRIL IN THE MORNING AS DIRECTED   • Blood Glucose Monitoring Suppl (ONETOUCH VERIO IQ SYSTEM) w/Device KIT Check BG daily dx E11 9   • colestipol (COLESTID) 1 g tablet take 1 tablet by mouth twice a day   • Cyanocobalamin (Vitamin B-12) 1000 MCG SUBL Place under the tongue   • dicyclomine (BENTYL) 20 mg tablet Take 1 tablet (20 mg total) by mouth every 6 (six) hours As needed for abdominal pain   • doxycycline hyclate (VIBRAMYCIN) 100 mg capsule Take 100 mg by mouth 2 (two) times a day   • Dulaglutide (Trulicity) 4 76 BS/3 5EV SOPN Inject 0 5 mL (0 75 mg total) under the skin once a week   • DULoxetine (CYMBALTA) 60 mg delayed release capsule Take 1 capsule (60 mg total) by mouth daily   • ergocalciferol (VITAMIN D2) 50,000 units take 1 capsule by mouth every week   • gabapentin (NEURONTIN) 100 mg capsule Take 100 mg by mouth 2 (two) times a day    • glucose blood (OneTouch Verio) test strip Use check BG twice daily dx E11 9   • glucose blood (OneTouch Verio) test strip Test once daily DX E11 9   • hydrocortisone 2 5 % cream Apply topically 3 (three) times a day as needed for irritation or rash   • hydroxychloroquine (PLAQUENIL) 200 mg tablet Take 1 tablet (200 mg total) by mouth 2 (two) times a day   • ibuprofen (MOTRIN) 800 mg tablet Take 1 tablet (800 mg total) by mouth every 6 (six) hours as needed for mild pain   • Lancets (OneTouch Delica Plus FMWCSE52K) MISC Check BG 1x daily DX E11 9   • levothyroxine 200 mcg tablet Take 1 tablet (200 mcg total) by mouth daily Pt takes Monday through Saturday   • lifitegrast (Xiidra) 5 % op solution Xiidra 5 % eye drops in a dropperette   • losartan (COZAAR) 50 mg tablet take 1 tablet by mouth once daily   • Menthol (Icy Hot Back) 5 % PTCH Apply topically    • metroNIDAZOLE (METROCREAM) 0 75 % cream metronidazole 0 75 % topical cream   • montelukast (SINGULAIR) 10 mg tablet take 1 tablet by mouth at bedtime   • oxyCODONE (Roxicodone) 5 immediate release tablet Take 1 tablet (5 mg total) by mouth every 8 (eight) hours as needed for moderate pain or severe pain Max Daily Amount: 15 mg   • rosuvastatin (CRESTOR) 5 mg tablet take 1 tablet by mouth once daily   • Saline 0 65 % SOLN into each nostril 2 (two) times a day   • sodium chloride 0 9 % nebulizer solution Take 3 mL by nebulization as needed for wheezing or shortness of breath   • tiZANidine (ZANAFLEX) 2 mg tablet Take 1 tablet (2 mg total) by mouth every 8 (eight) hours as needed for muscle spasms   • Triamcinolone Acetonide (Nasacort Allergy 24HR) 55 MCG/ACT nasal spray 2 sprays by Each Nare route daily       Objective     /78   Ht 5' 6" (1 676 m)   Wt 105 kg (232 lb)   BMI 37 45 kg/m²     Physical Exam  Vitals reviewed     Constitutional:       General: She is not in acute distress  Appearance: Normal appearance  She is well-developed  She is not toxic-appearing or diaphoretic  HENT:      Head: Normocephalic and atraumatic  Right Ear: Tympanic membrane, ear canal and external ear normal  There is no impacted cerumen  Left Ear: Tympanic membrane, ear canal and external ear normal  There is no impacted cerumen  Nose: Nose normal       Mouth/Throat:      Pharynx: No oropharyngeal exudate  Eyes:      General: No scleral icterus  Right eye: No discharge  Left eye: No discharge  Conjunctiva/sclera: Conjunctivae normal    Cardiovascular:      Rate and Rhythm: Normal rate and regular rhythm  Pulses: Normal pulses  Heart sounds: Normal heart sounds  No murmur heard  Pulmonary:      Effort: Pulmonary effort is normal  No respiratory distress  Breath sounds: Normal breath sounds  No wheezing  Abdominal:      General: There is no distension  Palpations: Abdomen is soft  Tenderness: There is no abdominal tenderness  Musculoskeletal:         General: No tenderness  Normal range of motion  Lymphadenopathy:      Cervical: No cervical adenopathy  Skin:     General: Skin is warm and dry  Coloration: Skin is not pale  Findings: No erythema  Neurological:      Mental Status: She is alert     Psychiatric:         Mood and Affect: Mood normal          Behavior: Behavior normal        Allen Miguel MD

## 2023-03-20 NOTE — TELEPHONE ENCOUNTER
Upon review of the In Basket request we were able to locate, review, and update the patient chart as requested for Diabetic Foot Exam     Any additional questions or concerns should be emailed to the Practice Liaisons via the appropriate education email address, please do not reply via In Basket      Thank you  Festus Scott MA

## 2023-03-22 ENCOUNTER — OFFICE VISIT (OUTPATIENT)
Dept: PHYSICAL THERAPY | Facility: REHABILITATION | Age: 63
End: 2023-03-22
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
## 2023-03-22 NOTE — PROGRESS NOTES
PHYSICAL THERAPY TREATMENT NOTE    Today's date: 3/02/23  Patient name: Carmine Galeana  : 1960  MRN: [id number]  Referring provider: Roel Cotto DO  Dx:   Encounter Diagnosis     ICD-10-CM    1  Fall, subsequent encounter  W19  XXXD    2  Imbalance  R26 89    3  Leg weakness, bilateral  R29 898       Subjective:  [facility], did 2 [facility] classes, a deep water class with leg kicks, and a class involving water weights  The right shoulder was sore with using the water weights  Pain about the right medial knee  Did okay with grocery shopping last week  Objective: See treatment diary below    Patient goals:   -getting back to the pool    Patient-Specific Functional Scale   Task is scored 0 (unable to perform activity) to 10 (ability to perform activity independently)  Activity 10/20/21 11/17/21 2/09/22 4/06/22 6/22/22 7/27/22 1/11/23 2/22/23 3/15/23   1  Walk at normal pace without a cane 3/10 3/10 due to speed or knee bending 7-8/10 initially, decreases with continued walking 6/10 8-9/10 can do this, not long distances  Slow due to respiratory and conditioning Notes slowed pace, doesn't use cane  Doesn't use   2  Grocery shop without significant pain 4/10 7-8/10 7/10 7-8/10 8/10  Haven't done recently, will do today Did well most of grocery shopping, but a lot of pain at the end, new, large grocery store, about 1 hour  Not in last week   3  Dance 1/10 2/10 0/10 4/10 7/10, getting better every week, working in coordination  Not right now     4  Move without pain and to not get tired as frequently   7/10 5-6/10 7/10 6-7/10   Can do an hour maximum, that's a combination of standing and sitting      Objective: See treatment diary below    Precautions - Hashimoto's, autoimmune disease    Mobility Measures 10/12/22 11/23/22 11/30/22 1/11/23 2/22/23 3/15/23   Assistive device used?     /65  80 bpm       5 Time Sit to Stand  (17" chair, arms across chest)  Able from 20" surface,  16 seconds 5 times sit to stand    Unable from 18" surface without light use of hand   Arms in front  15 1 seconds   Unable, pain about right medial knee, proximal knee    3 Meter Timed  Up & Go  12 9 seconds  10 8 seconds 16 0 seconds    Walking speed         Functional Gait Assessment (see below)         6 Minute Walk Test (100 foot circular course)   1200 feet 845 feet  1005 feet  101 bpm  96% SpO2 850 feet  98 bpm  98% SpO2     950 feet   Patient-Reported Outcome Measure: Activities-Specific Balance Confidence Scale (ABC Scale)         Right knee flexion PROM, sitting  91 degrees  95 degrees  (115 degrees left) 86 degrees 90 degrees     3/5 right plantarflexion  4/5 left plantarflexion           SciFit level 1, 10 min    Full shoulder scaption, pain with shoulder abduction with thumb neutral or down, but not with thumb up, normal physiological IR and ER  Shoulder rows, doubled blue/black band, about 45 sec x 3 sets    Supine L shoulder GH inferior glide in about  degrees scaption, increased pain free shoulder scaption in supine and sitting  Shoulder flexion and physiological ER, 20 sec each    Up 7% incline on treadmill, 1 1 mph, 5 min    Standing heel raise, about 20 sec ecah    Sit to stand 19" surface, 3    Step up:  5" surface right, 4  5" surface, left, 10     ASSESSMENT  Again no limitation at the ankle at this point  We continue to have knee stiffness affecting stair ambulation and transfers to stand  It's great that we're [facility] to attend [facility], continue to support increased physical activity and look to gradually expand walking  SHORT-TERM GOALS: by 2/11/23  1  Tess Rogers walks at least 10 minutes consecutively  MET  3  Tess Rogers reports 50% improvement in ability to walk grocery store distances without severe fatigue  MET  4  Demonstrates at least 11 degrees passive right dorsiflexion by 2/12/22  MET      New goal: When released by physician, tolerates at least 6 minutes consecutive overground walking without walking boot  MET  New goal: When released by physician, tolerates at least 10 bilateral heel raises  MET  New goal: When released by physician, walks with step length without one inch of contralateral leg  MET  New goal: Walks at least 1350 feet during 6 minute walk test   MET PREVIOUSLY, CONTINUES  New goal: Scores at least 20/30 on FGA  MET PREVIOUSLY, CONTINUES  New goal: Able to purposefully walk for exercise at least 15 minutes at a time at least 3-4 times per week  NOT MET, CONTINUES    LONG-TERM GOALS: by 3/11/23  1  Patient reports at least 20 total minutes per day of overground walking for exercise  NOT MET CONSISTENTLY  2  Patient independently manages home exercise program   MET with continued progressions  PLAN OF CARE:  Patient will benefit from physical therapy 1 time per week for 1 month  Neuromuscular re-education, therapeutic exercises, and therapeutic activities as outlined in grids

## 2023-03-27 DIAGNOSIS — E06.3 HYPOTHYROIDISM DUE TO HASHIMOTO'S THYROIDITIS: ICD-10-CM

## 2023-03-27 DIAGNOSIS — E03.8 HYPOTHYROIDISM DUE TO HASHIMOTO'S THYROIDITIS: ICD-10-CM

## 2023-03-28 RX ORDER — LEVOTHYROXINE SODIUM 0.2 MG/1
200 TABLET ORAL DAILY
Qty: 90 TABLET | Refills: 1 | Status: SHIPPED | OUTPATIENT
Start: 2023-03-28

## 2023-03-29 ENCOUNTER — OFFICE VISIT (OUTPATIENT)
Dept: PHYSICAL THERAPY | Facility: REHABILITATION | Age: 63
End: 2023-03-29
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
## 2023-03-29 NOTE — PROGRESS NOTES
PHYSICAL THERAPY TREATMENT NOTE    Today's date: 3/02/23  Patient name: Ashleigh Martins  : 1960  MRN: [id number]  Referring provider: Jose Alberto Van DO  Dx:   Encounter Diagnosis     ICD-10-CM    1  Fall, subsequent encounter  W19  XXXD    2  Imbalance  R26 89    3  Leg weakness, bilateral  R29 898       Subjective:  A[facility] to participate in [facility] again, deep water exercise and aquatic strengthening  More inside walking over the past week, did pretty well with this  Easier getting out of the water  Objective: See treatment diary below    Patient goals:   -getting back to the pool    Patient-Specific Functional Scale   Task is scored 0 (unable to perform activity) to 10 (ability to perform activity independently)  Activity 10/20/21 11/17/21 2/09/22 4/06/22 6/22/22 7/27/22 1/11/23 2/22/23 3/15/23 3/29/23   1  Walk at normal pace without a cane 3/10 3/10 due to speed or knee bending -8/10 initially, decreases with continued walking 6/10 8-9/10 can do this, not long distances  Slow due to respiratory and conditioning Notes slowed pace, doesn't use cane  Doesn't use 8/10, not as fast as previously   2  Grocery shop without significant pain 4/10 7-8/10 7/10 7-8/10 8/10  Haven't done recently, will do today Did well most of grocery shopping, but a lot of pain at the end, new, large grocery store, about 1 hour  Not in last week Usually does pretty well, worse if standing in line a while   3  Dance 1/10 2/10 0/10 4/10 7/10, getting better every week, working in coordination  Not right now   Can do dance stretch video, movement ones are too fast, except for movement for seniors   4     Move without pain and to not get tired as frequently   7/10 5-6/10 7/10 6-7/10   Can do an hour maximum, that's a combination of standing and sitting  Can do this     Objective: See treatment diary below    Precautions - Hashimoto's, autoimmune disease    Mobility Measures 10/12/22 11/23/22 11/30/22 1/11/23 2/22/23 3/15/23 "  Assistive device used? /65  80 bpm       5 Time Sit to Stand  (17\" chair, arms across chest)  Able from 20\" surface,  16 seconds 5 times sit to stand    Unable from 18\" surface without light use of hand   Arms in front  15 1 seconds   Unable, pain about right medial knee, proximal knee    3 Meter Timed  Up & Go  12 9 seconds  10 8 seconds 16 0 seconds    Walking speed         Functional Gait Assessment (see below)         6 Minute Walk Test (100 foot circular course)   1200 feet 845 feet  1005 feet  101 bpm  96% SpO2 850 feet  98 bpm  98% SpO2     950 feet    830 feet 3/29/23   Patient-Reported Outcome Measure: Activities-Specific Balance Confidence Scale (ABC Scale)         Right knee flexion PROM, sitting  91 degrees  95 degrees  (115 degrees left) 86 degrees 90 degrees    96 degrees 3/29/23     3/5 right plantarflexion  4/5 left plantarflexion           6 minute walk test 830 feet  96 degrees right knee passive seated flexion    4\" step step up laterally with R leg leading about 8  6\" step step up laterally with L leg, about 15   4\" step forwards, R, about 10    20\" surface sit to stand, 20    Standing heel raise, single leg, about 10 each x 2 sets    Braiding walking within parallel bars, 1 min x 4 sets,  90 bpm, able to maintain pace  SciFit level 1 7, legs only, 90 - 95 spm, 15 minutes       ASSESSMENT  Sol Herbert is again able to increase physical activity in a meaningful and sustainable way  We are moving faster than previously, which will allow more community ambulation  The ankle is maintaining functional mobility, and the right knee is less limiting to transfers and walking  SHORT-TERM GOALS: by 4/29/23  1  Sol Herbert walks at least 10 minutes consecutively  MET  3  Sol Herbert reports 50% improvement in ability to walk grocery store distances without severe fatigue  MET  4  Demonstrates at least 11 degrees passive right dorsiflexion by 2/12/22  MET      New goal: When released by physician, " tolerates at least 6 minutes consecutive overground walking without walking boot  MET  New goal: When released by physician, tolerates at least 10 bilateral heel raises  MET  New goal: When released by physician, walks with step length without one inch of contralateral leg  MET  New goal: Walks at least 1350 feet during 6 minute walk test   NOT MET  New goal: Scores at least 20/30 on FGA  MET PREVIOUSLY, CONTINUES  New goal: Able to purposefully walk for exercise at least 15 minutes at a time at least 3-4 times per week  PROGRESSING  LONG-TERM GOALS: by 4/29/23  1  Patient reports at least 20 total minutes per day of overground walking for exercise  PROGRESSING  2  Patient independently manages home exercise program   MET with continued progressions  PLAN OF CARE:  Patient will benefit from physical therapy 1 time per week for 1 month  Neuromuscular re-education, therapeutic exercises, and therapeutic activities as outlined in grids

## 2023-04-05 ENCOUNTER — OFFICE VISIT (OUTPATIENT)
Dept: PHYSICAL THERAPY | Facility: REHABILITATION | Age: 63
End: 2023-04-05
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
## 2023-04-05 NOTE — PROGRESS NOTES
PHYSICAL THERAPY TREATMENT NOTE    Today's date: 23  Patient name: Taya Morfin  : 1960  MRN: [id number]  Referring provider: Celia Guido DO  Dx:   Encounter Diagnosis     ICD-10-CM    1  Fall, subsequent encounter  W19  XXXD    2  Imbalance  R26 89    3  Leg weakness, bilateral  R29 898       Subjective:  It's going to rain today, joints feel it  Ankle sore but did better with topical cream   Did relaxation, yoga, stretching class  Going Thursday and Friday  Did grocery shopping, fine until the end where she's standing in line  Dance movements stopped due to knee pain, but walked in hallway with cat and lazer  It's been a while since using a cane  Objective: See treatment diary below    Patient goals:   -getting back to the pool    Patient-Specific Functional Scale   Task is scored 0 (unable to perform activity) to 10 (ability to perform activity independently)  Activity 10/20/21 11/17/21 2/09/22 4/06/22 6/22/22 7/27/22 1/11/23 2/22/23 3/15/23 3/29/23   1  Walk at normal pace without a cane 3/10 3/10 due to speed or knee bending -8/10 initially, decreases with continued walking 6/10 8-9/10 can do this, not long distances  Slow due to respiratory and conditioning Notes slowed pace, doesn't use cane  Doesn't use 8/10, not as fast as previously   2  Grocery shop without significant pain 4/10 7-8/10 7/10 7-8/10 8/10  Haven't done recently, will do today Did well most of grocery shopping, but a lot of pain at the end, new, large grocery store, about 1 hour  Not in last week Usually does pretty well, worse if standing in line a while   3  Dance 1/10 2/10 0/10 4/10 7/10, getting better every week, working in coordination  Not right now   Can do dance stretch video, movement ones are too fast, except for movement for seniors   4     Move without pain and to not get tired as frequently   7/10 5-6/10 7/10 6-7/10   Can do an hour maximum, that's a combination of standing and sitting  Can do "this     Objective: See treatment diary below    Precautions - Hashimoto's, autoimmune disease    Mobility Measures 10/12/22 11/23/22 11/30/22 1/11/23 2/22/23 3/15/23   Assistive device used? /65  80 bpm       5 Time Sit to Stand  (17\" chair, arms across chest)  Able from 20\" surface,  16 seconds 5 times sit to stand    Unable from 18\" surface without light use of hand   Arms in front  15 1 seconds   Unable, pain about right medial knee, proximal knee    3 Meter Timed  Up & Go  12 9 seconds  10 8 seconds 16 0 seconds    Walking speed         Functional Gait Assessment (see below)         6 Minute Walk Test (100 foot circular course)   1200 feet 845 feet  1005 feet  101 bpm  96% SpO2 850 feet  98 bpm  98% SpO2     950 feet    830 feet 3/29/23   Patient-Reported Outcome Measure: Activities-Specific Balance Confidence Scale (ABC Scale)         Right knee flexion PROM, sitting  91 degrees  95 degrees  (115 degrees left) 86 degrees 90 degrees    96 degrees 3/29/23     3/5 right plantarflexion  4/5 left plantarflexion         Treadmill   2% grade  0 8 mph   6% grade  0 8 mph   10 min    6\" step laterally with L, 45 sec, about 15 x 2 sets  6\" step laterally with R, 10    20\" surface sit to stand, 5  Single leg heel raise, about 12-15 on L, 8-10 on right, x 2 sets    Seated knee flexion stretch 30 sec  Sit to stand 20\" surface, about 10 total    Sidestepping in parallel bars, 2 min  High stepping in parallel bars, 1 min    SciFit level 1 6, legs only, 100 spm, 15 minutes       ASSESSMENT  Saba Pepper is again able to increase physical activity in a meaningful and sustainable way, continues with [facility], walking within building, stretching and video-based exercise  Incorporate more sit to stand / strengthening exercise at home  Today she had more stiffness attributed to changes in weather but we were able to modify exercise to stay mobile  SHORT-TERM GOALS: by 4/29/23  1   Saab Pepper walks at least 10 minutes " consecutively  MET  3  Marion Aldridge reports 50% improvement in ability to walk grocery store distances without severe fatigue  MET  4  Demonstrates at least 11 degrees passive right dorsiflexion by 2/12/22  MET  New goal: When released by physician, tolerates at least 6 minutes consecutive overground walking without walking boot  MET  New goal: When released by physician, tolerates at least 10 bilateral heel raises  MET  New goal: When released by physician, walks with step length without one inch of contralateral leg  MET  New goal: Walks at least 1350 feet during 6 minute walk test   NOT MET  New goal: Scores at least 20/30 on FGA  MET PREVIOUSLY, CONTINUES  New goal: Able to purposefully walk for exercise at least 15 minutes at a time at least 3-4 times per week  PROGRESSING  LONG-TERM GOALS: by 4/29/23  1  Patient reports at least 20 total minutes per day of overground walking for exercise  PROGRESSING  2  Patient independently manages home exercise program   MET with continued progressions  PLAN OF CARE:  Patient will benefit from physical therapy 1 time per week for 1 month  Neuromuscular re-education, therapeutic exercises, and therapeutic activities as outlined in grids

## 2023-04-19 ENCOUNTER — APPOINTMENT (OUTPATIENT)
Dept: PHYSICAL THERAPY | Facility: REHABILITATION | Age: 63
End: 2023-04-19

## 2023-04-26 ENCOUNTER — OFFICE VISIT (OUTPATIENT)
Dept: PHYSICAL THERAPY | Facility: REHABILITATION | Age: 63
End: 2023-04-26
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
## 2023-04-26 NOTE — PROGRESS NOTES
PHYSICAL THERAPY TREATMENT NOTE    Today's date: 23  Patient name: Danny No  : 1960  MRN: [id number]  Referring provider: Rafita Dotson DO  Dx:   Encounter Diagnosis     ICD-10-CM    1  Fall, subsequent encounter  W19  XXXD    2  Imbalance  R26 89    3  Leg weakness, bilateral  R29 898       Subjective: The right top of the foot is sore when pushing off the ground when walking  Hasn't been walking much for exercise, just getting the mail and trash  No aquatic exercise this past week as she had a bad sinus headache, didn't resolve in time for class  Will go tomorrow/Friday  That day, did cleaning and rearranging at home, and an exercise tape  Objective: See treatment diary below  /69 73 bpm    Patient goals:   -getting back to the pool    Patient-Specific Functional Scale   Task is scored 0 (unable to perform activity) to 10 (ability to perform activity independently)  Activity 10/20/21 11/17/21 2/09/22 4/06/22 6/22/22 7/27/22 1/11/23 2/22/23 3/15/23 3/29/23 4/12/23   1  Walk at normal pace without a cane 3/10 3/10 due to speed or knee bending -8/10 initially, decreases with continued walking 6/10 8-9/10 can do this, not long distances  Slow due to respiratory and conditioning Notes slowed pace, doesn't use cane  Doesn't use 8/10, not as fast as previously Varies day to day, today -8/10 due to right knee pain   2  Grocery shop without significant pain 4/10 7-8/10 7/10 7-8/10 8/10  Haven't done recently, will do today Did well most of grocery shopping, but a lot of pain at the end, new, large grocery store, about 1 hour  Not in last week Usually does pretty well, worse if standing in line a while 8/10, still gets significant pain but towards the end, or if standing in [place]     3    Dance 1/10 2/10 010 4/10 7/10, getting better every week, working in coordination  Not right now   Can do dance stretch video, movement ones are too fast, except for movement for seniors Not doing "this yet, doing in water; doing land-based dance stretches at home   4  Move without pain and to not get tired as frequently   7/10 5-6/10 7/10 6-7/10   Can do an hour maximum, that's a combination of standing and sitting  Can do this 7/10, getting better (10/10 would be getting mail without pain, going up and down a half flight of stairs, not as affected by weather)     Objective: See treatment diary below    Precautions - Hashimoto's, autoimmune disease    Mobility Measures 10/12/22 11/23/22 11/30/22 1/11/23 2/22/23 3/15/23 4/12/23   Assistive device used? /65  80 bpm        5 Time Sit to Stand  (17\" chair, arms across chest)  Able from 20\" surface,  16 seconds 5 times sit to stand    Unable from 18\" surface without light use of hand   Arms in front  15 1 seconds   Unable, pain about right medial knee, proximal knee  Able from 18\" surface without arms pushing on chair  __  About 32 seconds, one posterior loss of balance   3 Meter Timed  Up & Go  12 9 seconds  10 8 seconds 16 0 seconds  11 7 seconds   Walking speed          Functional Gait Assessment (see below)       18/30   6 Minute Walk Test (100 foot circular course)   1200 feet 845 feet  1005 feet  101 bpm  96% SpO2 850 feet  98 bpm  98% SpO2     950 feet    830 feet 3/29/23 1025 feet  No assistive device   Patient-Reported Outcome Measure:  Activities-Specific Balance Confidence Scale (ABC Scale)          Right knee flexion PROM, sitting  91 degrees  95 degrees  (115 degrees left) 86 degrees 90 degrees    96 degrees 3/29/23 95 degrees     3/5 right plantarflexion  4/5 left plantarflexion              Treadmill   1 5 mph 2 min  5% grade 1 5 mph, 6 min    Standing heel raise, bilaterally, about 40 sec  Sit to stand, modified to higher surface, hold 11 lb weight, about 15 x 2 sets    Step up 6\" step, forwards, about 10-12 each with railing  Laterally 6\" step, about 10-12 each with minimal railing    Standing retrieve object from:  18\" surface, 15  14\" " surface, 10 x 2 sets  Look to target on wall, in order to bend knees to retrieve object from floor     SciFit level 2 0 up to 1 8 down, legs only, 105 steps/min, 20 minutes     ASSESSMENT  Able to get up a higher step than previously using both legs  Still reticent to bend knees to reach the floor  She has good hamstring length to be able to retrieve objects from the floor but would be unable to get up and down off the ground  Continue to work on this, we initiated some bending of the knees to retrieve low objects  Can add recumbent bike to work at [employer]  Continues to verbalize gradations/moifications to maintain physical activity despite intermittent pain in variable locations  SHORT-TERM GOALS: by 4/29/23  1  Sri Gunjan walks at least 10 minutes consecutively  MET  3  [place] reports 50% improvement in ability to walk grocery store distances without severe fatigue  MET  4  Demonstrates at least 11 degrees passive right dorsiflexion by 2/12/22  MET  New goal: When released by physician, tolerates at least 6 minutes consecutive overground walking without walking boot  MET  New goal: When released by physician, tolerates at least 10 bilateral heel raises  MET  New goal: When released by physician, walks with step length without one inch of contralateral leg  MET  New goal: Walks at least 1350 feet during 6 minute walk test   NOT MET, CONTINUES  New goal: Scores at least 20/30 on FGA  NOT MET, CONTINUES  New goal: Able to purposefully walk for exercise at least 15 minutes at a time at least 3-4 times per week  Dwayne Dias  LONG-TERM GOALS: by 4/29/23  1  Patient reports at least 20 total minutes per day of overground walking for exercise  PROGRESSING  2  Patient independently manages home exercise program   MET with continued progressions      PLAN OF CARE:  Patient will benefit from physical therapy 1 time per week for 1 month  Neuromuscular re-education, therapeutic exercises, and therapeutic activities as outlined in grids

## 2023-05-02 ENCOUNTER — OFFICE VISIT (OUTPATIENT)
Dept: RHEUMATOLOGY | Facility: CLINIC | Age: 63
End: 2023-05-02

## 2023-05-02 VITALS
BODY MASS INDEX: 37.93 KG/M2 | DIASTOLIC BLOOD PRESSURE: 82 MMHG | WEIGHT: 236 LBS | SYSTOLIC BLOOD PRESSURE: 134 MMHG | HEART RATE: 84 BPM | HEIGHT: 66 IN

## 2023-05-02 DIAGNOSIS — Z79.899 HIGH RISK MEDICATION USE: ICD-10-CM

## 2023-05-02 DIAGNOSIS — M79.10 MYALGIA: ICD-10-CM

## 2023-05-02 DIAGNOSIS — M35.9 UNDIFFERENTIATED CONNECTIVE TISSUE DISEASE (HCC): Primary | ICD-10-CM

## 2023-05-02 DIAGNOSIS — M25.50 ARTHRALGIA, UNSPECIFIED JOINT: ICD-10-CM

## 2023-05-02 DIAGNOSIS — R76.8 POSITIVE ANA (ANTINUCLEAR ANTIBODY): ICD-10-CM

## 2023-05-02 RX ORDER — HYDROXYCHLOROQUINE SULFATE 200 MG/1
200 TABLET, FILM COATED ORAL 2 TIMES DAILY
Qty: 180 TABLET | Refills: 2 | Status: SHIPPED | OUTPATIENT
Start: 2023-05-02 | End: 2024-01-27

## 2023-05-02 RX ORDER — TIZANIDINE 2 MG/1
2 TABLET ORAL EVERY 8 HOURS PRN
Qty: 90 TABLET | Refills: 6 | Status: SHIPPED | OUTPATIENT
Start: 2023-05-02

## 2023-05-02 NOTE — PATIENT INSTRUCTIONS
Continue hydroxychloroquine twice a day; get regular eye exams  Take tizanidine up to 3 times a day   do labs    Return to clinic in 6 months

## 2023-05-02 NOTE — PROGRESS NOTES
Assessment and Plan:   Osman Patton is a 57 y o   female who presents for follow up of undifferentiated connective tissue disease (positive MT of 1:80 in a speckled pattern, myalgia, and arthralgia)  Voltaren gel was causing itching  Eye exam is coming up  - Continue Hydroxychloroquine twice a day; continue regular eye exams     - Continue Tizanidine TID prn for muscle cramps  - do lupus activity labs    RTC in 6 months    Plan:  Diagnoses and all orders for this visit:    Undifferentiated connective tissue disease ([address] )  -     hydroxychloroquine (PLAQUENIL) 200 mg tablet; Take 1 tablet (200 mg total) by mouth 2 (two) times a day  -     CBC and differential  -     Urinalysis with microscopic  -     C4 complement  -     C3 complement  -     Protein / creatinine ratio, urine  -     Sedimentation rate, automated  -     Anti-DNA antibody, double-stranded  -     C-reactive protein    Positive MT (antinuclear antibody)  -     hydroxychloroquine (PLAQUENIL) 200 mg tablet; Take 1 tablet (200 mg total) by mouth 2 (two) times a day    Myalgia  -     tiZANidine (ZANAFLEX) 2 mg tablet; Take 1 tablet (2 mg total) by mouth every 8 (eight) hours as needed for muscle spasms    Arthralgia, unspecified joint    High risk medication use  High risk medication use - Benefits and risks of hydroxychloroquine, including but not limited to retinal toxicity, corneal deposits, gastrointestinal side effects, and headaches were discussed with the patient  The need for a regular eye exam to monitor for ocular toxicity while on this medication was also explained to the patient  Follow-up plan: RTC in 6 months        Rheumatic Disease Summary  Initial visit 1/7/22: Albaro España a 57 y  o   female who presents as a Rheumatology consult referred by GABY Kulkarni for evaluation of positive MT of 1:80 in a speckled pattern, myalgia, and arthralgia  Prior blood work, EMG studies, and muscle biopsy unrevealing   Meets criteria for UCTD and can consider prednisone course and trial of hydroxychloroquine in the future  Recommend patient contacts us after having performed endoscopy with GI for initiation of therapy  Will obtain additional labs to complete myositis workup  Contact us after get colonoscopy, will then consider a prednisone course and trial of hydroxychloroquine       5/12/2022: Milly Pina is a 57 y o   female who presents for follow up of undifferentiated connective tissue disease (positive MT of 1:80 in a speckled pattern, myalgia, and arthralgia)  Admits to bilateral calf tightness  Hard to get up from a seated position  Always has elevated inflammatory markers  Do lupus activity labs  Try tizanidine up to 3 times a day as needed for muscle pain  Start prednisone and hydroxychloroquine after gastric emptying study  9/27/22: Is complaining of muscle tightening  Also having right shoulder problems    Continue hydroxychloroquine twice a day; get regular eye exams  Increase tizanidine to 3 times a day      HPI  Milly Pina is a 57 y o   female who presents for follow up of undifferentiated connective tissue disease (positive MT of 1:80 in a speckled pattern, myalgia, and arthralgia)  Last clinic visit was 9/27/22  She admits she has not had eye exam yet but does have appointment scheduled  She admits she had been taking Tizanidine - often only needing is twice daily and did seem to help  She reports today her knees and L foot are hurting her today  She does get numbness occasionally in L foot and hands, mostly when typing on the computer for prolonged periods  She does state that on occasion will have a flare of pain that is rapid onset and can lead to vomiting due to severity of the pain  Denies any relation to activity, time of day, food  When it happens when notes numbness in her hands, arms, some sweating and then episode of vomiting  She otherwise has no other complaints       The following portions of the patient's history were reviewed and updated as appropriate: allergies, current medications, past family history, past medical history, past social history, past surgical history and problem list     Review of Systems:   Review of Systems   Constitutional: Negative for chills, diaphoresis and fever  HENT: Negative for congestion and tinnitus  Dry mouth   Eyes: Positive for redness  Negative for visual disturbance  Dry eyes   Respiratory: Negative for shortness of breath and wheezing  Cardiovascular: Negative for chest pain, palpitations and leg swelling  Gastrointestinal: Negative for constipation, diarrhea, nausea and vomiting  Genitourinary: Negative for dysuria  Musculoskeletal: Positive for arthralgias, back pain, joint swelling, myalgias and neck pain  Skin: Positive for color change and rash  Allergic/Immunologic: Positive for environmental allergies  Neurological: Positive for tremors, numbness and headaches  Negative for dizziness and weakness  Psychiatric/Behavioral: Negative for confusion and sleep disturbance  The patient is not nervous/anxious  Reviewed and agree      Home Medications:    Current Outpatient Medications:   •  hydroxychloroquine (PLAQUENIL) 200 mg tablet, Take 1 tablet (200 mg total) by mouth 2 (two) times a day, Disp: 180 tablet, Rfl: 2  •  tiZANidine (ZANAFLEX) 2 mg tablet, Take 1 tablet (2 mg total) by mouth every 8 (eight) hours as needed for muscle spasms, Disp: 90 tablet, Rfl: 6  •  albuterol (2 5 mg/3 mL) 0 083 % nebulizer solution, Take 3 mL (2 5 mg total) by nebulization every 6 (six) hours as needed for wheezing or shortness of breath, Disp: 75 mL, Rfl: 2  •  Azelastine HCl 137 MCG/SPRAY SOLN, '1 SPRAY PER NOSTRIL IN THE MORNING AS DIRECTED, Disp: , Rfl:   •  Blood Glucose Monitoring Suppl ([employer] SYSTEM) w/Device KIT, Check BG daily dx E11 9, Disp: 1 kit, Rfl: 1  •  colestipol (COLESTID) 1 g tablet, take 1 tablet by mouth twice a day, Disp: 60 tablet, Rfl: 2  •  Cyanocobalamin (Vitamin B-12) 1000 MCG SUBL, Place under the tongue, Disp: , Rfl:   •  Diclofenac Sodium (VOLTAREN) 1 %, Apply 2 g topically 4 (four) times a day, Disp: 50 g, Rfl: 0  •  dicyclomine (BENTYL) 20 mg tablet, Take 1 tablet (20 mg total) by mouth every 6 (six) hours As needed for abdominal pain, Disp: 120 tablet, Rfl: 2  •  doxycycline hyclate (VIBRAMYCIN) 100 mg capsule, Take 100 mg by mouth 2 (two) times a day (Patient not taking: Reported on 5/8/2023), Disp: , Rfl:   •  dulaglutide (Trulicity) 4 73 PM/7 4HY injection, Inject 0 5 mL (0 75 mg total) under the skin once a week, Disp: 2 mL, Rfl: 2  •  DULoxetine (CYMBALTA) 60 mg delayed release capsule, Take 1 capsule (60 mg total) by mouth daily, Disp: 90 capsule, Rfl: 2  •  ergocalciferol (VITAMIN D2) 50,000 units, TAKE 1 CAPSULE BY MOUTH WEEKLY, Disp: 4 capsule, Rfl: 1  •  gabapentin (NEURONTIN) 100 mg capsule, Take 100 mg by mouth 2 (two) times a day , Disp: , Rfl: 0  •  glucose blood (OneTouch Verio) test strip, Use check BG twice daily dx E11 9, Disp: 100 each, Rfl: 5  •  glucose blood (OneTouch Verio) test strip, Test once daily DX E11 9, Disp: 100 strip, Rfl: 1  •  hydrocortisone 2 5 % cream, Apply topically 3 (three) times a day as needed for irritation or rash, Disp: 30 g, Rfl: 0  •  ibuprofen (MOTRIN) 800 mg tablet, Take 1 tablet (800 mg total) by mouth every 6 (six) hours as needed for mild pain, Disp: 30 tablet, Rfl: 0  •  Lancets (OneTouch Delica Plus [id number]) MISC, Check BG 1x daily DX E11 9, Disp: 100 each, Rfl: 1  •  levothyroxine 200 mcg tablet, Take 1 tablet (200 mcg total) by mouth daily Pt takes Monday through Saturday, Disp: 90 tablet, Rfl: 1  •  lifitegrast (Xiidra) 5 % op solution, Xiidra 5 % eye drops in a dropperette, Disp: , Rfl:   •  losartan (COZAAR) 50 mg tablet, take 1 tablet by mouth once daily, Disp: 90 tablet, Rfl: 3  •  metroNIDAZOLE (METROCREAM) 0 75 % cream, metronidazole "0 75 % topical cream, Disp: , Rfl:   •  montelukast (SINGULAIR) 10 mg tablet, take 1 tablet by mouth at bedtime, Disp: 30 tablet, Rfl: 5  •  oxyCODONE (Roxicodone) 5 immediate release tablet, Take 1 tablet (5 mg total) by mouth every 8 (eight) hours as needed for moderate pain or severe pain Max Daily Amount: 15 mg, Disp: 6 tablet, Rfl: 0  •  rosuvastatin (CRESTOR) 5 mg tablet, take 1 tablet by mouth once daily, Disp: 90 tablet, Rfl: 3  •  Saline 0 65 % SOLN, into each nostril 2 (two) times a day, Disp: , Rfl:   •  sodium chloride 0 9 % nebulizer solution, Take 3 mL by nebulization as needed for wheezing or shortness of breath, Disp: 30 mL, Rfl: 0  •  Triamcinolone Acetonide (Nasacort Allergy 24HR) 55 MCG/ACT nasal spray, 2 sprays by Each Nare route daily, Disp: 16 9 mL, Rfl: 1    Objective:    Vitals:    05/02/23 1437   BP: 134/82   Pulse: 84   Weight: 107 kg (236 lb)   Height: 5' 6\" (1 676 m)       Physical Exam  Vitals and nursing note reviewed  Constitutional:       Appearance: Normal appearance  HENT:      Head: Normocephalic and atraumatic  Right Ear: External ear normal       Left Ear: External ear normal       Nose: Nose normal       Mouth/Throat:      Mouth: Mucous membranes are moist    Eyes:      Extraocular Movements: Extraocular movements intact  Comments: R eye injected conjunctiva   Cardiovascular:      Pulses: Normal pulses  Heart sounds: Normal heart sounds  Musculoskeletal:         General: Tenderness present  Cervical back: Normal range of motion  Comments: R knee and L ankle tenderness   Skin:     General: Skin is warm  Capillary Refill: Capillary refill takes less than 2 seconds  Findings: No rash  Neurological:      General: No focal deficit present  Mental Status: She is alert and oriented to person, place, and time  Reviewed labs and imaging      Imaging:   MRI right tibia fibula 9/16/2022  No focal abnormality in the area of clinical " concern in the right lower leg  Mild subcutaneous edema bilaterally  Moderate bilateral knee osteoarthritis      MRI lumbar spine 9/2/2022  The lumbar vertebral body heights are maintained demonstrating no acute fracture or subluxation  No focal lumbar disc herniation, central canal stenosis, or neural foraminal narrowing  Stable fatty infiltration of the filum terminale without cord tethering      EGD 1/18/22  Normal EGD      XR right knee 10/29/21  Severe tricompartmental osteoarthritis as evidenced by joint space narrowing, osteophyte formation and subchondral sclerosis  Changes are more pronounced on the left side      XR left knee 10/29/21  Severe tricompartmental osteoarthritis as evidenced by joint space narrowing, osteophyte formation and subchondral sclerosis  Changes are more pronounced on the left side  No lytic or blastic osseous lesion  Meniscal chondrocalcinosis is identified      XR right foot 9/28/21  There is no acute fracture or dislocation    Calcaneal spur(s) noted   Linear artifact overlies the Achilles tendon on lateral view    Labs:   Office Visit on 05/02/2023   Component Date Value Ref Range Status   • WBC 05/08/2023 8 62  4 31 - 10 16 Thousand/uL Final   • RBC 05/08/2023 4 73  3 81 - 5 12 Million/uL Final   • Hemoglobin 05/08/2023 14 3  11 5 - 15 4 g/dL Final   • Hematocrit 05/08/2023 42 0  34 8 - 46 1 % Final   • MCV 05/08/2023 89  82 - 98 fL Final   • MCH 05/08/2023 30 2  26 8 - 34 3 pg Final   • MCHC 05/08/2023 34 0  31 4 - 37 4 g/dL Final   • RDW 05/08/2023 12 4  11 6 - 15 1 % Final   • MPV 05/08/2023 9 8  8 9 - 12 7 fL Final   • Platelets 81/49/2229 310  149 - 390 Thousands/uL Final   • nRBC 05/08/2023 0  /100 WBCs Final   • Neutrophils Relative 05/08/2023 68  43 - 75 % Final   • Immat GRANS % 05/08/2023 0  0 - 2 % Final   • Lymphocytes Relative 05/08/2023 24  14 - 44 % Final   • Monocytes Relative 05/08/2023 6  4 - 12 % Final   • Eosinophils Relative 05/08/2023 1  0 - 6 % Final • Basophils Relative 05/08/2023 1  0 - 1 % Final   • Neutrophils Absolute 05/08/2023 5 88  1 85 - 7 62 Thousands/µL Final   • Immature Grans Absolute 05/08/2023 0 03  0 00 - 0 20 Thousand/uL Final   • Lymphocytes Absolute 05/08/2023 2 09  0 60 - 4 47 Thousands/µL Final   • Monocytes Absolute 05/08/2023 0 51  0 17 - 1 22 Thousand/µL Final   • Eosinophils Absolute 05/08/2023 0 07  0 00 - 0 61 Thousand/µL Final   • Basophils Absolute 05/08/2023 0 04  0 00 - 0 10 Thousands/µL Final   • Color, UA 05/08/2023 Yellow   Final   • Clarity, UA 05/08/2023 Clear   Final   • Specific Gravity, UA 05/08/2023 1 030  1 003 - 1 030 Final   • pH, UA 05/08/2023 6 0  4 5, 5 0, 5 5, 6 0, 6 5, 7 0, 7 5, 8 0 Final   • Leukocytes, UA 05/08/2023 Moderate (A)  Negative Final   • Nitrite, UA 05/08/2023 Negative  Negative Final   • Protein, UA 05/08/2023 300 (3+) (A)  Negative mg/dl Final   • Glucose, UA 05/08/2023 Negative  Negative mg/dl Final   • Ketones, UA 05/08/2023 Negative  Negative mg/dl Final   • Urobilinogen, UA 05/08/2023 <2 0  <2 0 mg/dl mg/dl Final   • Bilirubin, UA 05/08/2023 Negative  Negative Final   • Occult Blood, UA 05/08/2023 Negative  Negative Final   • RBC, UA 05/08/2023 2-4 (A)  None Seen, 1-2 /hpf Final   • WBC, UA 05/08/2023 4-10 (A)  None Seen, 1-2 /hpf Final   • Epithelial Cells 05/08/2023 Occasional  None Seen, Occasional /hpf Final   • Bacteria, UA 05/08/2023 None Seen  None Seen, Occasional /hpf Final   • MUCUS THREADS 05/08/2023 Occasional (A)  None Seen Final   • C4, COMPLEMENT 05/08/2023 41 0 (H)  10 0 - 40 0 mg/dL Final   • C3 Complement 05/08/2023 182 0 (H)  90 0 - 180 0 mg/dL Final   • Creatinine, Ur 05/08/2023 281 0  mg/dL Final   • Protein Urine Random 05/08/2023 84  mg/dL Final   • Prot/Creat Ratio, Ur 05/08/2023 0 30 (H)  0 00 - 0 10 Final   • Sed Rate 05/08/2023 47 (H)  0 - 29 mm/hour Final   • ds DNA Ab 05/08/2023 <1  0 - 9 IU/mL Final                                       Negative      <5 Equivocal  5 - 9                                     Positive      >9   • CRP 05/08/2023 7 5 (H)  <3 0 mg/L Final   Appointment on 03/16/2023   Component Date Value Ref Range Status   • TSH 3RD GENERATON 03/16/2023 0 930  0 450 - 4 500 uIU/mL Final    The recommended reference ranges for TSH during pregnancy are as follows:   First trimester 0 1 to 2 5 uIU/mL   Second trimester  0 2 to 3 0 uIU/mL   Third trimester 0 3 to 3 0 uIU/m    Note: Normal ranges may not apply to patients who are transgender, non-binary, or whose legal sex, sex at birth, and gender identity differ  Adult TSH (3rd generation) reference range follows the recommended guidelines of the American Thy[employer] Association, January, 2020  • Free T4 03/16/2023 1 08  0 76 - 1 46 ng/dL Final    Specimen collection should occur prior to Sulfasalazine administration due to the potential for falsely elevated results  • Hemoglobin A1C 03/16/2023 6 5 (H)  Normal 3 8-5 6%; PreDiabetic 5 7-6 4%; Diabetic >=6 5%; Glycemic control for adults with diabetes <7 0% % Final   • EAG 03/16/2023 140  mg/dl Final   • Sodium 03/16/2023 138  135 - 147 mmol/L Final   • Potassium 03/16/2023 3 8  3 5 - 5 3 mmol/L Final   • Chloride 03/16/2023 107  96 - 108 mmol/L Final   • CO2 03/16/2023 27  21 - 32 mmol/L Final   • ANION GAP 03/16/2023 4  4 - 13 mmol/L Final   • BUN 03/16/2023 14  5 - 25 mg/dL Final   • Creatinine 03/16/2023 0 83  0 60 - 1 30 mg/dL Final    Standardized to IDMS reference method   • Glucose, Fasting 03/16/2023 129 (H)  65 - 99 mg/dL Final    Specimen collection should occur prior to Sulfasalazine administration due to the potential for falsely depressed results  Specimen collection should occur prior to Sulfapyridine administration due to the potential for falsely elevated results     • Calcium 03/16/2023 9 4  8 3 - 10 1 mg/dL Final   • AST 03/16/2023 27  5 - 45 U/L Final    Specimen collection should occur prior to Sulfasalazine administration due to the potential for falsely depressed results  • ALT 03/16/2023 35  12 - 78 U/L Final    Specimen collection should occur prior to Sulfasalazine and/or Sulfapyridine administration due to the potential for falsely depressed results  • Alkaline Phosphatase 03/16/2023 120 (H)  46 - 116 U/L Final   • Total Protein 03/16/2023 7 7  6 4 - 8 4 g/dL Final   • Albumin 03/16/2023 3 7  3 5 - 5 0 g/dL Final   • Total Bilirubin 03/16/2023 0 40  0 20 - 1 00 mg/dL Final    Use of this assay is not recommended for patients undergoing treatment with eltrombopag due to the potential for falsely elevated results  • eGFR 03/16/2023 74  ml/min/1 73sq m Final   • HIV-1 p24 Antigen 03/16/2023 Non-Reactive  Non-Reactive Final   • HIV-1 Antibody 03/16/2023 Non-Reactive  Non-Reactive Final   • HIV-2 Antibody 03/16/2023 Non-Reactive  Non-Reactive Final   • HIV Ag-Ab 5th Gen 03/16/2023 Non-Reactive  Non-Reactive Final    A Non-Reactive test result does not preclude the possibility of exposure or infection with HIV-1 and/or HIV-2  Non-Reactive results can occur if the quantity of marker present is below the detection limits or is not present during the stage of disease in which a sample is collected  Repeat testing should be considered where there is clinical suspicion of infection        Office Visit on 12/01/2022   Component Date Value Ref Range Status   • Hemoglobin A1C 12/01/2022 6 8 (A)  6 5 Final

## 2023-05-03 ENCOUNTER — OFFICE VISIT (OUTPATIENT)
Dept: PHYSICAL THERAPY | Facility: REHABILITATION | Age: 63
End: 2023-05-03
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
## 2023-05-03 NOTE — PROGRESS NOTES
PHYSICAL THERAPY EVALUATION     Date: 23  Name: Kylie Dean  : 1960  Referring Provider: Avis Arevalo DO  AUTHORIZATION:   Insurance: Payor: Loco Gorman / Plan: MEDICARE A AND B / Product Type: Medicare A & B Fee for Service /     SUBJECTIVE:  HPI: Kylie Dean is a 57 y o  female referred to outpatient physical therapy for the following diagnosis   Encounter Diagnosis   Name Primary?  Fall, subsequent encounter Yes         Subjective: Followed by rheumatology, has various blood tests upcoming  Both posterior calf regions and medial lower leg regions, tops of feet have been sore  Wearing brace over the one ankle, which helps  Uses ankle and knee braces at times  Had muscle cramping, signed up for pool classes, but didn't go due to cramping, constant stretching helps  Objective: See treatment diary below    Patient goals:   -getting back to the pool    Patient-Specific Functional Scale   Task is scored 0 (unable to perform activity) to 10 (ability to perform activity independently)  Activity 10/20/21 11/17/21 2/09/22 4/06/22 6/22/22 7/27/22 1/11/23 2/22/23 3/15/23 3/29/23 4/12/23   1  Walk at normal pace without a cane 3/10 3/10 due to speed or knee bending -8/10 initially, decreases with continued walking 6/10 8-9/10 can do this, not long distances  Slow due to respiratory and conditioning Notes slowed pace, doesn't use cane  Doesn't use 8/10, not as fast as previously Varies day to day, today -8/10 due to right knee pain   2  Grocery shop without significant pain 4/10 7-8/10 7/10 7-8/10 8/10  Haven't done recently, will do today Did well most of grocery shopping, but a lot of pain at the end, new, large grocery store, about 1 hour  Not in last week Usually does pretty well, worse if standing in line a while 8/10, still gets significant pain but towards the end, or if standing in [place]     3    Dance 1/10 2/10 0/10 4/10 7/10, getting better every week, working in coordination "Not right now   Can do dance stretch video, movement ones are too fast, except for movement for seniors Not doing this yet, doing in water; doing land-based dance stretches at home   4  Move without pain and to not get tired as frequently   7/10 5-6/10 7/10 6-7/10   Can do an hour maximum, that's a combination of standing and sitting  Can do this 7/10, getting better (10/10 would be getting mail without pain, going up and down a half flight of stairs, not as affected by weather)     Objective: See treatment diary below    Precautions - Hashimoto's, autoimmune disease    Mobility Measures 10/12/22 11/23/22 11/30/22 1/11/23 2/22/23 3/15/23 4/12/23    Assistive device used? /65  80 bpm         5 Time Sit to Stand  (17\" chair, arms across chest)  Able from 20\" surface,  16 seconds 5 times sit to stand    Unable from 18\" surface without light use of hand   Arms in front  15 1 seconds   Unable, pain about right medial knee, proximal knee  Able from 18\" surface without arms pushing on chair  __  About 32 seconds, one posterior loss of balance    3 Meter Timed  Up & Go  12 9 seconds  10 8 seconds 16 0 seconds  11 7 seconds    Walking speed           Functional Gait Assessment (see below)       18/30    6 Minute Walk Test (100 foot circular course)   1200 feet 845 feet  1005 feet  101 bpm  96% SpO2 850 feet  98 bpm  98% SpO2     950 feet    830 feet 3/29/23 1025 feet  No assistive device    Patient-Reported Outcome Measure:  Activities-Specific Balance Confidence Scale (ABC Scale)           Right knee flexion PROM, sitting  91 degrees  95 degrees  (115 degrees left) 86 degrees 90 degrees    96 degrees 3/29/23 95 degrees      3/5 right plantarflexion  4/5 left plantarflexion             Leg stiffness initially after a short-bout of sitting, 10 is moving optimally, 0 is completely stiff, 6/10 (2/10 after sitting for a meal)  Overground walking 200 feet  Scifit legs only, 7 minutes, about 105 steps/min, level 1  Right " "leg 7/10, 5/10 stiffness    Treadmill 1 2 mph   3 min  Left leg stiff, we decided to work on an uphill grade  1 2 mph 8% grade, 4 min  105 bpm, 97% SpO2   1 2 mph 1% grade 4 min    Standing heel raise, bilaterally, about 40 sec    Step up 6\" step, forwards, about 10-12 each with ipsilateral railing  Laterally 6\" step, about 5-8 each with minimal railing    Sit to stand, modified to higher surface, hold 11 lb weight, keeping eyes to top of wall, about 15 x 2 sets    Standing retrieve object from:  14\" surface, 15, then 10     SciFit level 2 0 up to 1 7 down, legs only, 105 steps/min, seat 21, 20 minutes     ASSESSMENT  Less restriction in activity due to knee stiffness, but it does continue to affect ability to move  Level of stiffness didn't much change during therapy session  Can practice bending within pool setting, which may be a good transition into these activities  Unable to kneel, for example  Continue to be active at highest available level each day  SHORT-TERM GOALS: by 4/29/23  1  Allie Lacy walks at least 10 minutes consecutively  MET  3  Allie [place] reports 50% improvement in ability to walk grocery store distances without severe fatigue  MET  4  Demonstrates at least 11 degrees passive right dorsiflexion by 2/12/22  MET  New goal: When released by physician, tolerates at least 6 minutes consecutive overground walking without walking boot  MET  New goal: When released by physician, tolerates at least 10 bilateral heel raises  MET  New goal: When released by physician, walks with step length without one inch of contralateral leg  MET  New goal: Walks at least 1350 feet during 6 minute walk test   NOT MET, CONTINUES  New goal: Scores at least 20/30 on FGA  NOT MET, CONTINUES  New goal: Able to purposefully walk for exercise at least 15 minutes at a time at least 3-4 times per week  Cindy Mejia  LONG-TERM GOALS: by 4/29/23  1   Patient reports at least 20 total minutes per " day of overground walking for exercise  PROGRESSING  2  Patient independently manages home exercise program   MET with continued progressions  PLAN OF CARE:  Patient will benefit from physical therapy 1 time per week for 1 month  Neuromuscular re-education, therapeutic exercises, and therapeutic activities as outlined in grids

## 2023-05-08 ENCOUNTER — OFFICE VISIT (OUTPATIENT)
Dept: FAMILY MEDICINE CLINIC | Facility: CLINIC | Age: 63
End: 2023-05-08

## 2023-05-08 ENCOUNTER — APPOINTMENT (OUTPATIENT)
Dept: LAB | Facility: CLINIC | Age: 63
End: 2023-05-08

## 2023-05-08 VITALS
SYSTOLIC BLOOD PRESSURE: 128 MMHG | BODY MASS INDEX: 37.61 KG/M2 | WEIGHT: 234 LBS | HEIGHT: 66 IN | DIASTOLIC BLOOD PRESSURE: 80 MMHG

## 2023-05-08 DIAGNOSIS — E55.9 VITAMIN D DEFICIENCY: ICD-10-CM

## 2023-05-08 DIAGNOSIS — M35.9 CONNECTIVE TISSUE DISEASE (HCC): Primary | ICD-10-CM

## 2023-05-08 DIAGNOSIS — J45.20 MILD INTERMITTENT ASTHMA WITHOUT COMPLICATION: ICD-10-CM

## 2023-05-08 DIAGNOSIS — E11.65 TYPE 2 DIABETES MELLITUS WITH HYPERGLYCEMIA, WITHOUT LONG-TERM CURRENT USE OF INSULIN (HCC): ICD-10-CM

## 2023-05-08 DIAGNOSIS — M79.10 MUSCULAR PAIN: ICD-10-CM

## 2023-05-08 DIAGNOSIS — I10 ESSENTIAL HYPERTENSION: ICD-10-CM

## 2023-05-08 DIAGNOSIS — E06.3 HYPOTHYROIDISM DUE TO HASHIMOTO'S THYROIDITIS: ICD-10-CM

## 2023-05-08 DIAGNOSIS — E03.8 HYPOTHYROIDISM DUE TO HASHIMOTO'S THYROIDITIS: ICD-10-CM

## 2023-05-08 LAB
BACTERIA UR QL AUTO: ABNORMAL /HPF
BASOPHILS # BLD AUTO: 0.04 THOUSANDS/ÂΜL (ref 0–0.1)
BASOPHILS NFR BLD AUTO: 1 % (ref 0–1)
BILIRUB UR QL STRIP: NEGATIVE
C3 SERPL-MCNC: 182 MG/DL (ref 90–180)
C4 SERPL-MCNC: 41 MG/DL (ref 10–40)
CLARITY UR: CLEAR
COLOR UR: YELLOW
CREAT UR-MCNC: 281 MG/DL
CRP SERPL QL: 7.5 MG/L
EOSINOPHIL # BLD AUTO: 0.07 THOUSAND/ÂΜL (ref 0–0.61)
EOSINOPHIL NFR BLD AUTO: 1 % (ref 0–6)
ERYTHROCYTE [DISTWIDTH] IN BLOOD BY AUTOMATED COUNT: 12.4 % (ref 11.6–15.1)
ERYTHROCYTE [SEDIMENTATION RATE] IN BLOOD: 47 MM/HOUR (ref 0–29)
GLUCOSE UR STRIP-MCNC: NEGATIVE MG/DL
HCT VFR BLD AUTO: 42 % (ref 34.8–46.1)
HGB BLD-MCNC: 14.3 G/DL (ref 11.5–15.4)
HGB UR QL STRIP.AUTO: NEGATIVE
IMM GRANULOCYTES # BLD AUTO: 0.03 THOUSAND/UL (ref 0–0.2)
IMM GRANULOCYTES NFR BLD AUTO: 0 % (ref 0–2)
KETONES UR STRIP-MCNC: NEGATIVE MG/DL
LEUKOCYTE ESTERASE UR QL STRIP: ABNORMAL
LYMPHOCYTES # BLD AUTO: 2.09 THOUSANDS/ÂΜL (ref 0.6–4.47)
LYMPHOCYTES NFR BLD AUTO: 24 % (ref 14–44)
MCH RBC QN AUTO: 30.2 PG (ref 26.8–34.3)
MCHC RBC AUTO-ENTMCNC: 34 G/DL (ref 31.4–37.4)
MCV RBC AUTO: 89 FL (ref 82–98)
MONOCYTES # BLD AUTO: 0.51 THOUSAND/ÂΜL (ref 0.17–1.22)
MONOCYTES NFR BLD AUTO: 6 % (ref 4–12)
MUCOUS THREADS UR QL AUTO: ABNORMAL
NEUTROPHILS # BLD AUTO: 5.88 THOUSANDS/ÂΜL (ref 1.85–7.62)
NEUTS SEG NFR BLD AUTO: 68 % (ref 43–75)
NITRITE UR QL STRIP: NEGATIVE
NON-SQ EPI CELLS URNS QL MICRO: ABNORMAL /HPF
NRBC BLD AUTO-RTO: 0 /100 WBCS
PH UR STRIP.AUTO: 6 [PH]
PLATELET # BLD AUTO: 310 THOUSANDS/UL (ref 149–390)
PMV BLD AUTO: 9.8 FL (ref 8.9–12.7)
PROT UR STRIP-MCNC: ABNORMAL MG/DL
PROT UR-MCNC: 84 MG/DL
PROT/CREAT UR: 0.3 MG/G{CREAT} (ref 0–0.1)
RBC # BLD AUTO: 4.73 MILLION/UL (ref 3.81–5.12)
RBC #/AREA URNS AUTO: ABNORMAL /HPF
SP GR UR STRIP.AUTO: 1.03 (ref 1–1.03)
UROBILINOGEN UR STRIP-ACNC: <2 MG/DL
WBC # BLD AUTO: 8.62 THOUSAND/UL (ref 4.31–10.16)
WBC #/AREA URNS AUTO: ABNORMAL /HPF

## 2023-05-08 RX ORDER — ERGOCALCIFEROL 1.25 MG/1
CAPSULE ORAL
Qty: 4 CAPSULE | Refills: 1 | Status: SHIPPED | OUTPATIENT
Start: 2023-05-08

## 2023-05-08 RX ORDER — OXYCODONE HYDROCHLORIDE 5 MG/1
5 TABLET ORAL EVERY 8 HOURS PRN
Qty: 6 TABLET | Refills: 0 | Status: CANCELLED | OUTPATIENT
Start: 2023-05-08

## 2023-05-08 NOTE — PROGRESS NOTES
Chief Complaint   Patient presents with   • Arm Pain     Left arm pain x 4 days      Name: Sheldon Thurman      : 1960      MRN: [id number]  Encounter Provider: Arminda Kayser, MD  Encounter Date: 2023   Encounter department: [address]     Most likely muscle strain, she does have underlying connective tissue disease  Patient does have oxycodone at home, may use this for the pain and I will also order Voltaren gel  Continue to move the arm around  Patient to call back if no improvement in the next few days  Doing well with her levothyroxine for hypothyroidism, continue levothyroxine as prescribed  Has not needed to use her albuterol inhaler in quite some time, recommend keeping it handy in case she does need it  Blood pressure stable today 128/80, continue BP medication as prescribed  Doing well with her diabetes  Continue to work on diet and exercise  RTC as needed    1  Connective tissue disease (HCC)  -     Diclofenac Sodium (VOLTAREN) 1 %; Apply 2 g topically 4 (four) times a day    2  Muscular pain  -     Diclofenac Sodium (VOLTAREN) 1 %; Apply 2 g topically 4 (four) times a day    3  Hypothyroidism due to Hashimoto's thyroiditis    4  Mild intermittent asthma without complication    5  Essential hypertension    6  Type 2 diabetes mellitus with hyperglycemia, without long-term current use of insulin (HCC)         Subjective      She presents today to discuss a 4-day history of right arm pain  Denies any trauma to the area but she did state that she moved some things around at home that may have been heavy  She also did move her cat with her right arm  Denies any numbness or tingling  Been well with her medications otherwise  Review of Systems   Constitutional: Negative for activity change, appetite change, chills, fatigue and fever  HENT: Negative for congestion, rhinorrhea, sneezing and sore throat      Eyes: Negative for pain, discharge, redness and itching  Respiratory: Negative for cough, chest tightness, shortness of breath and wheezing  Cardiovascular: Negative for chest pain and palpitations  Gastrointestinal: Negative for abdominal distention, abdominal pain, constipation, diarrhea, nausea and vomiting  Musculoskeletal: Positive for myalgias  Negative for arthralgias, back pain and joint swelling  Skin: Negative for rash  Neurological: Negative for dizziness, weakness, numbness and headaches  Hematological: Negative for adenopathy  Psychiatric/Behavioral: Negative for dysphoric mood  All other systems reviewed and are negative        Current Outpatient Medications on File Prior to Visit   Medication Sig   • albuterol (2 5 mg/3 mL) 0 083 % nebulizer solution Take 3 mL (2 5 mg total) by nebulization every 6 (six) hours as needed for wheezing or shortness of breath   • Azelastine HCl 137 MCG/SPRAY SOLN '1 SPRAY PER NOSTRIL IN THE MORNING AS DIRECTED   • Blood Glucose Monitoring Suppl (ONETOUCH VERIO IQ SYSTEM) w/Device KIT Check BG daily dx E11 9   • colestipol (COLESTID) 1 g tablet take 1 tablet by mouth twice a day   • Cyanocobalamin (Vitamin B-12) 1000 MCG SUBL Place under the tongue   • dicyclomine (BENTYL) 20 mg tablet Take 1 tablet (20 mg total) by mouth every 6 (six) hours As needed for abdominal pain   • Dulaglutide (Trulicity) 8 96 ZS/4 3PQ SOPN Inject 0 5 mL (0 75 mg total) under the skin once a week   • DULoxetine (CYMBALTA) 60 mg delayed release capsule Take 1 capsule (60 mg total) by mouth daily   • ergocalciferol (VITAMIN D2) 50,000 units take 1 capsule by mouth every week   • gabapentin (NEURONTIN) 100 mg capsule Take 100 mg by mouth 2 (two) times a day    • glucose blood (OneTouch Verio) test strip Use check BG twice daily dx E11 9   • glucose blood (OneTouch Verio) test strip Test once daily DX E11 9   • hydrocortisone 2 5 % cream Apply topically 3 (three) times a day as needed for irritation or rash "  • hydroxychloroquine (PLAQUENIL) 200 mg tablet Take 1 tablet (200 mg total) by mouth 2 (two) times a day   • ibuprofen (MOTRIN) 800 mg tablet Take 1 tablet (800 mg total) by mouth every 6 (six) hours as needed for mild pain   • Lancets (OneTouch Delica Plus IIDHNO81K) MISC Check BG 1x daily DX E11 9   • levothyroxine 200 mcg tablet Take 1 tablet (200 mcg total) by mouth daily Pt takes Monday through Saturday   • lifitegrast (Xiidra) 5 % op solution Xiidra 5 % eye drops in a dropperette   • losartan (COZAAR) 50 mg tablet take 1 tablet by mouth once daily   • metroNIDAZOLE (METROCREAM) 0 75 % cream metronidazole 0 75 % topical cream   • montelukast (SINGULAIR) 10 mg tablet take 1 tablet by mouth at bedtime   • oxyCODONE (Roxicodone) 5 immediate release tablet Take 1 tablet (5 mg total) by mouth every 8 (eight) hours as needed for moderate pain or severe pain Max Daily Amount: 15 mg   • rosuvastatin (CRESTOR) 5 mg tablet take 1 tablet by mouth once daily   • Saline 0 65 % SOLN into each nostril 2 (two) times a day   • sodium chloride 0 9 % nebulizer solution Take 3 mL by nebulization as needed for wheezing or shortness of breath   • tiZANidine (ZANAFLEX) 2 mg tablet Take 1 tablet (2 mg total) by mouth every 8 (eight) hours as needed for muscle spasms   • Triamcinolone Acetonide (Nasacort Allergy 24HR) 55 MCG/ACT nasal spray 2 sprays by Each Nare route daily   • [DISCONTINUED] Menthol (Icy Hot Back) 5 % PTCH Apply topically    • doxycycline hyclate (VIBRAMYCIN) 100 mg capsule Take 100 mg by mouth 2 (two) times a day (Patient not taking: Reported on 5/8/2023)       Objective     /80   Ht 5' 6\" (1 676 m)   Wt 106 kg (234 lb)   BMI 37 77 kg/m²     Physical Exam  Vitals reviewed  Constitutional:       General: She is not in acute distress  Appearance: Normal appearance  She is well-developed  She is obese  She is not toxic-appearing or diaphoretic  HENT:      Head: Normocephalic and atraumatic        Nose: " Nose normal       Mouth/Throat:      Mouth: Mucous membranes are moist    Eyes:      General: No scleral icterus  Right eye: No discharge  Left eye: No discharge  Conjunctiva/sclera: Conjunctivae normal    Cardiovascular:      Rate and Rhythm: Normal rate and regular rhythm  Heart sounds: Normal heart sounds  No murmur heard  Pulmonary:      Effort: Pulmonary effort is normal  No respiratory distress  Breath sounds: Normal breath sounds  No wheezing  Abdominal:      General: There is no distension  Palpations: Abdomen is soft  Tenderness: There is no abdominal tenderness  Musculoskeletal:         General: Tenderness (Right upper arm) present  No swelling, deformity or signs of injury  Normal range of motion  Lymphadenopathy:      Cervical: No cervical adenopathy  Skin:     General: Skin is warm and dry  Coloration: Skin is not pale  Findings: No erythema  Neurological:      Mental Status: She is alert     Psychiatric:         Mood and Affect: Mood normal          Behavior: Behavior normal        Tiffany Hamilton MD

## 2023-05-09 LAB — DSDNA AB SER-ACNC: <1 IU/ML (ref 0–9)

## 2023-05-10 ENCOUNTER — OFFICE VISIT (OUTPATIENT)
Dept: PHYSICAL THERAPY | Facility: REHABILITATION | Age: 63
End: 2023-05-10

## 2023-05-10 ENCOUNTER — TELEPHONE (OUTPATIENT)
Dept: FAMILY MEDICINE CLINIC | Facility: CLINIC | Age: 63
End: 2023-05-10
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
## 2023-05-10 DIAGNOSIS — E11.65 TYPE 2 DIABETES MELLITUS WITH HYPERGLYCEMIA, WITHOUT LONG-TERM CURRENT USE OF INSULIN (HCC): ICD-10-CM

## 2023-05-10 RX ORDER — DULAGLUTIDE 0.75 MG/.5ML
0.75 INJECTION, SOLUTION SUBCUTANEOUS WEEKLY
Qty: 2 ML | Refills: 2 | Status: SHIPPED | OUTPATIENT
Start: 2023-05-10

## 2023-05-10 RX ORDER — DULAGLUTIDE 0.75 MG/.5ML
0.75 INJECTION, SOLUTION SUBCUTANEOUS WEEKLY
Qty: 2 ML | Refills: 2 | Status: SHIPPED | OUTPATIENT
Start: 2023-05-10 | End: 2023-05-10 | Stop reason: SDUPTHER

## 2023-05-10 NOTE — PROGRESS NOTES
PHYSICAL THERAPY EVALUATION     Date: 05/10/23  Name: Jake Eaton  : 1960  Referring Provider: Tawnya Romero DO  AUTHORIZATION:   Insurance: Payor: Emerson Wei / Plan: MEDICARE A AND B / Product Type: Medicare A & B Fee for Service /     SUBJECTIVE:  HPI: Jake Eaton is a 57 y o  female referred to outpatient physical therapy for the following diagnosis   Encounter Diagnosis   Name Primary? • Fall, subsequent encounter Yes         Patient history:  Pain about the right shoulder, started  or , no specific cause, but possibly from moving couch  Seen by physician, not to swing  Okay if the left arm is lifting the right  Okay reaching across the body with the right arm  Pain with right shoulder abduction  Painful putting clothes on, putting on deodorant, driving using the right arm  Difficult to differentiate type of pain  Mostly about the proximal lateral brachium  Feels muscular, not bony  Better with shoulder shrugs  Intermittent paresthesias right 5th digit starting about the same time  Objective: See treatment diary below    Patient goals:   -getting back to the pool    Patient-Specific Functional Scale   Task is scored 0 (unable to perform activity) to 10 (ability to perform activity independently)  Activity 10/20/21 11/17/21 2/09/22 4/06/22 6/22/22 7/27/22 1/11/23 2/22/23 3/15/23 3/29/23 4/12/23   1  Walk at normal pace without a cane 3/10 3/10 due to speed or knee bending -8/10 initially, decreases with continued walking 6/10 8-9/10 can do this, not long distances  Slow due to respiratory and conditioning Notes slowed pace, doesn't use cane  Doesn't use 8/10, not as fast as previously Varies day to day, today 7-8/10 due to right knee pain   2  Grocery shop without significant pain 4/10 7-8/10 7/10 7-8/10 8/10  Haven't done recently, will do today Did well most of grocery shopping, but a lot of pain at the end, new, large grocery store, about 1 hour   Not in last "week Usually does pretty well, worse if standing in line a while 8/10, still gets significant pain but towards the end, or if standing in [place]  3    Dance 1/10 2/10 0/10 4/10 7/10, getting better every week, working in coordination  Not right now   Can do dance stretch video, movement ones are too fast, except for movement for seniors Not doing this yet, doing in water; doing land-based dance stretches at home   4  Move without pain and to not get tired as frequently   7/10 5-6/10 7/10 6-7/10   Can do an hour maximum, that's a combination of standing and sitting  Can do this 7/10, getting better (10/10 would be getting mail without pain, going up and down a half flight of stairs, not as affected by weather)     Precautions - Hashimoto's, autoimmune disease    Mobility Measures 10/12/22 11/23/22 11/30/22 1/11/23 2/22/23 3/15/23 4/12/23    Assistive device used? /65  80 bpm         5 Time Sit to Stand  (17\" chair, arms across chest)  Able from 20\" surface,  16 seconds 5 times sit to stand    Unable from 18\" surface without light use of hand   Arms in front  15 1 seconds   Unable, pain about right medial knee, proximal knee  Able from 18\" surface without arms pushing on chair  __  About 32 seconds, one posterior loss of balance    3 Meter Timed  Up & Go  12 9 seconds  10 8 seconds 16 0 seconds  11 7 seconds    Walking speed           Functional Gait Assessment (see below)       18/30    6 Minute Walk Test (100 foot circular course)   1200 feet 845 feet  1005 feet  101 bpm  96% SpO2 850 feet  98 bpm  98% SpO2     950 feet    830 feet 3/29/23 1025 feet  No assistive device    Patient-Reported Outcome Measure:  Activities-Specific Balance Confidence Scale (ABC Scale)           Right knee flexion PROM, sitting  91 degrees  95 degrees  (115 degrees left) 86 degrees 90 degrees    96 degrees 3/29/23 95 degrees      3/5 right plantarflexion  4/5 left plantarflexion             About 75 degrees neck rotation to both " sides   Moderate lack of neck extension, continues as chronic  Normal quadrant test   No provocation of paresthesias or shoulder pain with neck movements  Full elbow, wrist and finger movement bilaterally  Tender about the proximal lateral brachium and superior aspect of biceps tendon on right  5/5 elbow flexion, hurt a little bit on the right side  5/5 elbow extension  About 45 degrees right shoulder elevation  About 40 degrees active right shoulder abduction  Active physiologic IR to front ASIS  Supine active assisted right shoulder elevation to 165 degrees  Supine right shoulder abduction 60 degrees passively, pain-free endfeel with pain about spots described above  Supine passive 40 degrees right shoulder ER passively  Supine flexed elbow shoulder reach upwards, 7-10 x 2 sets  Grade 2 right GH distraction, 2 5 min x 2 sets  Shoulder ER AAROM cane, 1 min right  Shoulder elevation AAROM cane, 1 min right  Seated shoulder table slides, table clean, sagittal plane 1 5 min  Seated right elbow flexion, 1 lb,  20 x 2 sets    SciFit level 2 0 up to 1 7 down, legs only, 105 steps/min, seat 23, 20 minutes     ASSESSMENT  New onset right shoulder pain and restriction in active range of motion  Painful arc  We used AAROM, gentle stretches to improve AROM within the session, and anticipate improvement in shoulder function over the next week  Good understanding of inclusion of these into home program    Continue to be active  SHORT-TERM GOALS: by 4/29/23  1  Estela Lizemores walks at least 10 minutes consecutively  MET  3  Estela Lizemores reports 50% improvement in ability to walk grocery store distances without severe fatigue  MET  4  Demonstrates at least 11 degrees passive right dorsiflexion by 2/12/22  MET  New goal: When released by physician, tolerates at least 6 minutes consecutive overground walking without walking boot  MET    New goal: When released by physician, tolerates at least 10 bilateral heel raises  MET  New goal: When released by physician, walks with step length without one inch of contralateral leg  MET  New goal: Walks at least 1350 feet during 6 minute walk test   NOT MET, CONTINUES  New goal: Scores at least 20/30 on FGA  NOT MET, CONTINUES  New goal: Able to purposefully walk for exercise at least 15 minutes at a time at least 3-4 times per week  Nancy El  LONG-TERM GOALS: by 4/29/23  1  Patient reports at least 20 total minutes per day of overground walking for exercise  PROGRESSING  2  Patient independently manages home exercise program   MET with continued progressions  PLAN OF CARE:  Patient will benefit from physical therapy 1 time per week for 1 month  Neuromuscular re-education, therapeutic exercises, and therapeutic activities as outlined in grids

## 2023-05-17 ENCOUNTER — OFFICE VISIT (OUTPATIENT)
Dept: PHYSICAL THERAPY | Facility: REHABILITATION | Age: 63
End: 2023-05-17
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
## 2023-05-17 NOTE — PROGRESS NOTES
PHYSICAL THERAPY EVALUATION     Date: 23  Name: Do Mathis  : 1960  Referring Provider: Mayelin Alston DO  AUTHORIZATION:   Insurance: Payor: Matthew Alexander / Plan: MEDICARE A AND B / Product Type: Medicare A & B Fee for Service /     SUBJECTIVE:  HPI: Do Mathis is a 57 y o  female referred to outpatient physical therapy for the following diagnosis   Encounter Diagnosis   Name Primary? • Fall, subsequent encounter Yes         Patient history:  23  Back moving the right shoulder again better, can raise to 90 degrees abduction, and reaching across body  Back to aquatic exercises  Did biceps curls in the pool  Started feeling better Monday, a little worse after the class  Will return to another class tomorrow  5/10/23  Pain about the right shoulder, started  or , no specific cause, but possibly from moving couch  Seen by physician, not to swing  Okay if the left arm is lifting the right  Okay reaching across the body with the right arm  Pain with right shoulder abduction  Painful putting clothes on, putting on deodorant, driving using the right arm  Difficult to differentiate type of pain  Mostly about the proximal lateral brachium  Feels muscular, not bony  Better with shoulder shrugs  Intermittent paresthesias right 5th digit starting about the same time  Objective: See treatment diary below    Patient goals:   -getting back to the pool    Patient-Specific Functional Scale   Task is scored 0 (unable to perform activity) to 10 (ability to perform activity independently)  Activity 10/20/21 11/17/21 2/09/22 4/06/22 6/22/22 7/27/22 1/11/23 2/22/23 3/15/23 3/29/23 4/12/23   1  Walk at normal pace without a cane 3/10 3/10 due to speed or knee bending -8/10 initially, decreases with continued walking 6/10 8-9/10 can do this, not long distances  Slow due to respiratory and conditioning Notes slowed pace, doesn't use cane   Doesn't use 8/10, not as fast as "previously Varies day to day, today 7-8/10 due to right knee pain   2  Grocery shop without significant pain 4/10 7-8/10 7/10 7-8/10 8/10  Haven't done recently, will do today Did well most of grocery shopping, but a lot of pain at the end, new, large grocery store, about 1 hour  Not in last week Usually does pretty well, worse if standing in line a while 8/10, still gets significant pain but towards the end, or if standing in [place]  3    Dance 1/10 2/10 0/10 4/10 7/10, getting better every week, working in coordination  Not right now   Can do dance stretch video, movement ones are too fast, except for movement for seniors Not doing this yet, doing in water; doing land-based dance stretches at home   4  Move without pain and to not get tired as frequently   7/10 5-6/10 7/10 6-7/10   Can do an hour maximum, that's a combination of standing and sitting  Can do this 7/10, getting better (10/10 would be getting mail without pain, going up and down a half flight of stairs, not as affected by weather)     Precautions - Hashimoto's, autoimmune disease    Mobility Measures 1/11/23 2/22/23 3/15/23 4/12/23 5/17/23   Assistive device used? /65  80 bpm         5 Time Sit to Stand  (17\" chair, arms across chest) Arms in front  15 1 seconds   Unable, pain about right medial knee, proximal knee  Able from 18\" surface without arms pushing on chair  __  About 32 seconds, one posterior loss of balance 18 seconds    3 Meter Timed  Up & Go 10 8 seconds 16 0 seconds  11 7 seconds 10 6   Walking speed        Functional Gait Assessment (see below)    18/30 20/30   6 Minute Walk Test (100 foot circular course)   1005 feet  101 bpm  96% SpO2 850 feet  98 bpm  98% SpO2     950 feet    830 feet 3/29/23 1025 feet  No assistive device 900 feet  No assistive device   Patient-Reported Outcome Measure:  Activities-Specific Balance Confidence Scale (ABC Scale)        Right knee flexion PROM, sitting 95 degrees  (115 degrees left) 86 " "degrees 90 degrees    96 degrees 3/29/23 95 degrees 98 degrees             Functional Gait Assessment  2/3 Gait level surface  3/3 Change in gait speed  3/3 Gait with horizontal head turns  3/3 Gait with vertical head turns  3/3 Gait and pivot turn  1/3 Step over obstacle  0/3 Gait with narrow base of support  2/3 Gait with eyes closed  2/3 Ambulating backwards  1/3 Steps  20/30 Total score (less than 22/30 indicates increased risk of fall)       Pain with right supine shoulder active abduction to 100 degrees  We completed grade 3-4 inferior and posterior glides of right GH joint in supine about  degrees abduction/scaption, 5 min, following had abduction to 130 degrees    Seated right shoulder physiological ER, about 8-10 x 2 sets  Seated right elbow flexion, 2 lbs, about 15-20 x 2 sets    Step ups 6\" step forwards about 12 each  Heel raise single leg, 10-12 each    SciFit level 2 0 up to 1 7 down, legs only, 105 steps/min, seat 22, 9 minutes stopped      ASSESSMENT   The right shoulder again is moving better  Transfers and dynamic balance improved  Michelle Quezada doesn't require a cane for household mobility and mobility on flat surfaces  It's great she's again getting back to the aquatic classes  Movement will only help sustain movement  Continue physical therapy based on variability in presentation and active problem-solving with therapy  SHORT-TERM GOALS: by 4/29/23  1  Michelle Quezada walks at least 10 minutes consecutively  MET  3  Michelle Quezada reports 50% improvement in ability to walk grocery store distances without severe fatigue  MET  4  Demonstrates at least 11 degrees passive right dorsiflexion by 2/12/22  MET  New goal: When released by physician, tolerates at least 6 minutes consecutive overground walking without walking boot  MET  New goal: When released by physician, tolerates at least 10 bilateral heel raises  MET    New goal: When released by physician, walks with step length without one inch of " contralateral leg  MET  New goal: Walks at least 1350 feet during 6 minute walk test   NOT MET, CONTINUES  New goal: Scores at least 20/30 on FGA    MET  New goal: Able to purposefully walk for exercise at least 15 minutes at a time at least 3-4 times per week  Abbi Andrade  LONG-TERM GOALS: by 6/17/23  1  Patient reports at least 20 total minutes per day of overground walking for exercise  PROGRESSING  2  Patient independently manages home exercise program   MET with continued progressions  PLAN OF CARE:  Patient will benefit from physical therapy 1 time per week for 1 month  Neuromuscular re-education, therapeutic exercises, and therapeutic activities as outlined in grids

## 2023-05-24 ENCOUNTER — OFFICE VISIT (OUTPATIENT)
Dept: PHYSICAL THERAPY | Facility: REHABILITATION | Age: 63
End: 2023-05-24
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
## 2023-05-24 NOTE — PROGRESS NOTES
PHYSICAL THERAPY EVALUATION     Date: 23  Name: Bharti Luke  : 1960  Referring Provider: Belén Nixon DO  AUTHORIZATION:   Insurance: Payor: Negrita Echeverria / Plan: MEDICARE A AND B / Product Type: Medicare A & B Fee for Service /     SUBJECTIVE:  HPI: Bharti Luke is a 70 y o  female referred to outpatient physical therapy for the following diagnosis   Encounter Diagnosis   Name Primary? • Fall, subsequent encounter Yes       Patient history:  23  Walking okay, back to aquatics at [facility], able to do both classes  Not in grocery store  Right shoulder is doing better, most range of motion without pinching  23  Back moving the right shoulder again better, can raise to 90 degrees abduction, and reaching across body  Back to aquatic exercises  Did biceps curls in the pool  Started feeling better Monday, a little worse after the class  Will return to another class tomorrow  5/10/23  Pain about the right shoulder, started  or , no specific cause, but possibly from moving couch  Seen by physician, not to swing  Okay if the left arm is lifting the right  Okay reaching across the body with the right arm  Pain with right shoulder abduction  Painful putting clothes on, putting on deodorant, driving using the right arm  Difficult to differentiate type of pain  Mostly about the proximal lateral brachium  Feels muscular, not bony  Better with shoulder shrugs  Intermittent paresthesias right 5th digit starting about the same time  Objective: See treatment diary below    Patient goals:   -getting back to the pool    Patient-Specific Functional Scale   Task is scored 0 (unable to perform activity) to 10 (ability to perform activity independently)  Activity 10/20/21 11/17/21 2/09/22 4/06/22 6/22/22 7/27/22 1/11/23 2/22/23 3/15/23 3/29/23 4/12/23   1   Walk at normal pace without a cane 3/10 3/10 due to speed or knee bending -8/10 initially, decreases with "continued walking 6/10 8-9/10 can do this, not long distances  Slow due to respiratory and conditioning Notes slowed pace, doesn't use cane  Doesn't use 8/10, not as fast as previously Varies day to day, today 7-8/10 due to right knee pain   2  Grocery shop without significant pain 4/10 7-8/10 7/10 7-8/10 8/10  Haven't done recently, will do today Did well most of grocery shopping, but a lot of pain at the end, new, large grocery store, about 1 hour  Not in last week Usually does pretty well, worse if standing in line a while 8/10, still gets significant pain but towards the end, or if standing in soraya  3    Dance 1/10 2/10 0/10 4/10 7/10, getting better every week, working in coordination  Not right now   Can do dance stretch video, movement ones are too fast, except for movement for seniors Not doing this yet, doing in water; doing land-based dance stretches at home   4  Move without pain and to not get tired as frequently   7/10 5-6/10 7/10 6-7/10   Can do an hour maximum, that's a combination of standing and sitting  Can do this 7/10, getting better (10/10 would be getting mail without pain, going up and down a half flight of stairs, not as affected by weather)     Precautions - Hashimoto's, autoimmune disease    Mobility Measures 1/11/23 2/22/23 3/15/23 4/12/23 5/17/23   Assistive device used?  /65  80 bpm         5 Time Sit to Stand  (17\" chair, arms across chest) Arms in front  15 1 seconds   Unable, pain about right medial knee, proximal knee  Able from 18\" surface without arms pushing on chair  __  About 32 seconds, one posterior loss of balance 18 seconds    3 Meter Timed  Up & Go 10 8 seconds 16 0 seconds  11 7 seconds 10 6   Walking speed        Functional Gait Assessment (see below)    18/30 20/30   6 Minute Walk Test (100 foot circular course)   1005 feet  101 bpm  96% SpO2 850 feet  98 bpm  98% SpO2     950 feet    830 feet 3/29/23 1025 feet  No assistive device 900 feet  No assistive device " "  Patient-Reported Outcome Measure: Activities-Specific Balance Confidence Scale (ABC Scale)        Right knee flexion PROM, sitting 95 degrees  (115 degrees left) 86 degrees 90 degrees    96 degrees 3/29/23 95 degrees 98 degrees               Treadmill 1 2 mph 3 5 min  1 2 mph 6% grade 8 5 min    Pain-free horizontal abduction, scaption, abduction to 110 degrees      Pain with right supine shoulder active abduction to 120 degrees  We completed grade 3-4 inferior and posterior glides of right GH joint in supine about  degrees abduction/scaption, 5 min, following had abduction to 135 degrees    L shoulder ER, horizontal abduction, overhead reaching, stretching    Seated right elbow flexion, 2 lbs, about 15-20      Step ups 6\" step forwards about 12 each  Same laterally    Heel raise single leg, 10-12 each    SciFit level 2 0 - 1 0  100 steps/min, seat 22, 10 minutes      ASSESSMENT   The right shoulder again is moving better, she's able to exercise in the water without shoulder pain at this point, including backstroke  It's also great she's able to do a land-based (stretching) class  Completed seated class and can complete standing class if able  Movement will only help sustain movement  Continue physical therapy based on variability in presentation and active problem-solving with therapy  SHORT-TERM GOALS: by 4/29/23  1  Thomas Chowdhury walks at least 10 minutes consecutively  MET  3  Thomas Chowdhury reports 50% improvement in ability to walk grocery store distances without severe fatigue  MET  4  Demonstrates at least 11 degrees passive right dorsiflexion by 2/12/22  MET  New goal: When released by physician, tolerates at least 6 minutes consecutive overground walking without walking boot  MET  New goal: When released by physician, tolerates at least 10 bilateral heel raises  MET  New goal: When released by physician, walks with step length without one inch of contralateral leg  MET      New goal: Walks at " least 1350 feet during 6 minute walk test   NOT MET, CONTINUES  New goal: Scores at least 20/30 on FGA    MET  New goal: Able to purposefully walk for exercise at least 15 minutes at a time at least 3-4 times per week  Amari Smith  LONG-TERM GOALS: by 6/17/23  1  Patient reports at least 20 total minutes per day of overground walking for exercise  PROGRESSING  2  Patient independently manages home exercise program   MET with continued progressions  PLAN OF CARE:  Patient will benefit from physical therapy 1 time per week for 1 month  Neuromuscular re-education, therapeutic exercises, and therapeutic activities as outlined in grids

## 2023-05-26 ENCOUNTER — TELEPHONE (OUTPATIENT)
Dept: OBGYN CLINIC | Facility: HOSPITAL | Age: 63
End: 2023-05-26

## 2023-05-26 NOTE — TELEPHONE ENCOUNTER
Caller: Patient    Doctor: Dr Pawel Villatoro    Reason for call: Patient calling stating that she has the results from her lab work and urine sample and she is noticing that there are more abnormal findings  Patient is asking to speak to clinical team   She has some increased knee stiffness      Call back#: [phone]

## 2023-05-27 DIAGNOSIS — M35.9 UNDIFFERENTIATED CONNECTIVE TISSUE DISEASE (HCC): Primary | ICD-10-CM

## 2023-05-27 RX ORDER — PREDNISONE 5 MG/1
TABLET ORAL
Qty: 70 TABLET | Refills: 0 | Status: SHIPPED | OUTPATIENT
Start: 2023-05-27

## 2023-05-27 NOTE — TELEPHONE ENCOUNTER
Please let her know that her inflammatory markers are slightly elevated, I'm sending a 4-week prednisone course to her pharmacy to calm down the current inflammation

## 2023-05-30 DIAGNOSIS — E55.9 VITAMIN D DEFICIENCY: ICD-10-CM

## 2023-05-30 RX ORDER — ERGOCALCIFEROL 1.25 MG/1
CAPSULE ORAL
Qty: 12 CAPSULE | Refills: 1 | Status: SHIPPED | OUTPATIENT
Start: 2023-05-30

## 2023-05-30 NOTE — TELEPHONE ENCOUNTER
Caller: patient    Doctor: Vera Rhoades    Reason for call: patient called to confirm which pharmacy the medication went to    Call back#: n/a

## 2023-05-31 ENCOUNTER — OFFICE VISIT (OUTPATIENT)
Dept: PHYSICAL THERAPY | Facility: REHABILITATION | Age: 63
End: 2023-05-31
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
## 2023-05-31 NOTE — PROGRESS NOTES
PHYSICAL THERAPY EVALUATION     Date: 23  Name: Jennifer Kramer  : 1960  Referring Provider: Neli Chu DO  AUTHORIZATION:   Insurance: Payor: Ninfa Pain / Plan: MEDICARE A AND B / Product Type: Medicare A & B Fee for Service /     SUBJECTIVE:  HPI: Jennifer Kramer is a 70 y o  female referred to outpatient physical therapy for the following diagnosis   Encounter Diagnosis   Name Primary? • Fall, subsequent encounter Yes       Patient history:  23  Found out what [id number] blood test was, which was inflammation, so on prednisone for 30 days  On  and  there are double aquatic (back to back classes) that are complementary, so will do both  Left foot has been bothering her at the top of the foot, all the time, podiatrist says inflammation is pinching a nerve, no numbness  Tries moving and stretching and icing, and creams, doesn't help  Feels best when flexed up (describes dorsiflexed)  Shoulder is feeling better, able to do all aquatic exercises  23  Walking okay, back to aquatics at [facility], able to do both classes  Not in grocery store  Right shoulder is doing better, most range of motion without pinching  Objective: See treatment diary below    Patient goals:   -getting back to the pool    Patient-Specific Functional Scale   Task is scored 0 (unable to perform activity) to 10 (ability to perform activity independently)  Activity 10/20/21 11/17/21 2/09/22 4/06/22 6/22/22 7/27/22 1/11/23 2/22/23 3/15/23 3/29/23 4/12/23   1  Walk at normal pace without a cane 3/10 3/10 due to speed or knee bending -8/10 initially, decreases with continued walking 6/10 8-9/10 can do this, not long distances  Slow due to respiratory and conditioning Notes slowed pace, doesn't use cane  Doesn't use 8/10, not as fast as previously Varies day to day, today -8/10 due to right knee pain   2     Grocery shop without significant pain 4/10 7-8/10 7/10 7-8/10 8/10  Haven't done "recently, will do today Did well most of grocery shopping, but a lot of pain at the end, new, large grocery store, about 1 hour  Not in last week Usually does pretty well, worse if standing in line a while 8/10, still gets significant pain but towards the end, or if standing in [place]  3    Dance 1/10 2/10 0/10 4/10 7/10, getting better every week, working in coordination  Not right now   Can do dance stretch video, movement ones are too fast, except for movement for seniors Not doing this yet, doing in water; doing land-based dance stretches at home   4  Move without pain and to not get tired as frequently   7/10 5-6/10 7/10 6-7/10   Can do an hour maximum, that's a combination of standing and sitting  Can do this 7/10, getting better (10/10 would be getting mail without pain, going up and down a half flight of stairs, not as affected by weather)     Precautions - Hashimoto's, autoimmune disease    Mobility Measures 1/11/23 2/22/23 3/15/23 4/12/23 5/17/23   Assistive device used? /65  80 bpm         5 Time Sit to Stand  (17\" chair, arms across chest) Arms in front  15 1 seconds   Unable, pain about right medial knee, proximal knee  Able from 18\" surface without arms pushing on chair  __  About 32 seconds, one posterior loss of balance 18 seconds    3 Meter Timed  Up & Go 10 8 seconds 16 0 seconds  11 7 seconds 10 6   Walking speed        Functional Gait Assessment (see below)    18/30 20/30   6 Minute Walk Test (100 foot circular course)   1005 feet  101 bpm  96% SpO2 850 feet  98 bpm  98% SpO2     950 feet    830 feet 3/29/23 1025 feet  No assistive device 900 feet  No assistive device   Patient-Reported Outcome Measure:  Activities-Specific Balance Confidence Scale (ABC Scale)        Right knee flexion PROM, sitting 95 degrees  (115 degrees left) 86 degrees 90 degrees    96 degrees 3/29/23 95 degrees 98 degrees               Treadmill 4-5% grade  1 1- 1 2 mph 12 min    Pain-free supine flexion, mild pain " "with shoulder abduction around 100--110 degrees  Supine grade 3-4 inferior and posterior glides right GH joint supine 100 - 110 degrees, 5 min    Seated shoulder ER, about 10 x 2 sets  Seated right elbow flexion, 2 lbs, about 15-20      Step ups 6\" step forwards about 12 - 15 each  Same laterally    Standing left plantarflexion stretching, foot on step, foot behind, about 20 sec x 2 each    Heel raise single leg, about 10 x 2 sets on left    SciFit level 2 0 - 1 0  100 steps/min, seat 23, 10 minutes      ASSESSMENT   The right shoulder is almost pain free at this time and allows full participation in aquatics therapy, which is great  Left dorsal foot pain with plantarflexion, decreased pain with not wearing sneakers and dorsiflexion (relaxed position)  Continue physical therapy based on variability in presentation and active problem-solving with therapy  SHORT-TERM GOALS: by 4/29/23  1  Francisco Holbrook walks at least 10 minutes consecutively  MET  3  Francisco Holbrook reports 50% improvement in ability to walk grocery store distances without severe fatigue  MET  4  Demonstrates at least 11 degrees passive right dorsiflexion by 2/12/22  MET  New goal: When released by physician, tolerates at least 6 minutes consecutive overground walking without walking boot  MET  New goal: When released by physician, tolerates at least 10 bilateral heel raises  MET  New goal: When released by physician, walks with step length without one inch of contralateral leg  MET  New goal: Walks at least 1350 feet during 6 minute walk test   NOT MET, CONTINUES  New goal: Scores at least 20/30 on FGA    MET  New goal: Able to purposefully walk for exercise at least 15 minutes at a time at least 3-4 times per week  Colbert Simmonds  LONG-TERM GOALS: by 6/17/23  1  Patient reports at least 20 total minutes per day of overground walking for exercise  PROGRESSING    2  Patient independently manages home exercise program   MET with " continued progressions  PLAN OF CARE:  Patient will benefit from physical therapy 1 time per week for 1 month  Neuromuscular re-education, therapeutic exercises, and therapeutic activities as outlined in grids

## 2023-06-07 ENCOUNTER — OFFICE VISIT (OUTPATIENT)
Dept: PHYSICAL THERAPY | Facility: REHABILITATION | Age: 63
End: 2023-06-07
Payer: MEDICARE
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
## 2023-06-07 PROCEDURE — 97112 NEUROMUSCULAR REEDUCATION: CPT | Performed by: PHYSICAL THERAPIST

## 2023-06-07 PROCEDURE — 97110 THERAPEUTIC EXERCISES: CPT | Performed by: PHYSICAL THERAPIST

## 2023-06-07 NOTE — PROGRESS NOTES
PHYSICAL THERAPY EVALUATION     Date: 23  Name: Raffi Lees  : 1960  Referring Provider: Shobha Johnson DO  AUTHORIZATION:   Insurance: Payor: Alber Huff / Plan: MEDICARE A AND B / Product Type: Medicare A & B Fee for Service /     SUBJECTIVE:  HPI: Raffi Lees is a 70 y o  female referred to outpatient physical therapy for the following diagnosis   Encounter Diagnosis   Name Primary? • Fall, subsequent encounter Yes       Patient history:    23  Able to complete two aquatic classes consecutively, get through grocery store  Walking with cat in evening  Continues with ankle pain  Increases with open-chain plantarflexion when seated  Better with brace on the ankle  23  Found out what abnormal blood test was, which was inflammation, so on prednisone for 30 days  On  and  there are double aquatic (back to back classes) that are complementary, so will do both  Left foot has been bothering her at the top of the foot, all the time, podiatrist says inflammation is pinching a nerve, no numbness  Tries moving and stretching and icing, and creams, doesn't help  Feels best when flexed up (describes dorsiflexed)  Shoulder is feeling better, able to do all aquatic exercises  23  Walking okay, back to aquatics at [facility], able to do both classes  Not in grocery store  Right shoulder is doing better, most range of motion without pinching  Objective: See treatment diary below    Patient goals:   -getting back to the pool    Patient-Specific Functional Scale   Task is scored 0 (unable to perform activity) to 10 (ability to perform activity independently)  Activity 10/20/21 11/17/21 2/09/22 4/06/22 6/22/22 7/27/22 1/11/23 2/22/23 3/15/23 3/29/23 4/12/23   1   Walk at normal pace without a cane 3/10 3/10 due to speed or knee bending -8/10 initially, decreases with continued walking 6/10 8-9/10 can do this, not long distances  Slow due to respiratory and "conditioning Notes slowed pace, doesn't use cane  Doesn't use 8/10, not as fast as previously Varies day to day, today 7-8/10 due to right knee pain   2  Grocery shop without significant pain 4/10 7-8/10 7/10 7-8/10 8/10  Haven't done recently, will do today Did well most of grocery shopping, but a lot of pain at the end, new, large grocery store, about 1 hour  Not in last week Usually does pretty well, worse if standing in line a while 8/10, still gets significant pain but towards the end, or if standing in [place]  3    Dance 1/10 2/10 0/10 4/10 7/10, getting better every week, working in coordination  Not right now   Can do dance stretch video, movement ones are too fast, except for movement for seniors Not doing this yet, doing in water; doing land-based dance stretches at home   4  Move without pain and to not get tired as frequently   7/10 5-6/10 7/10 6-7/10   Can do an hour maximum, that's a combination of standing and sitting  Can do this 7/10, getting better (10/10 would be getting mail without pain, going up and down a half flight of stairs, not as affected by weather)     Precautions - Hashimoto's, autoimmune disease    Mobility Measures 1/11/23 2/22/23 3/15/23 4/12/23 5/17/23   Assistive device used? /65  80 bpm         5 Time Sit to Stand  (17\" chair, arms across chest) Arms in front  15 1 seconds   Unable, pain about right medial knee, proximal knee  Able from 18\" surface without arms pushing on chair  __  About 32 seconds, one posterior loss of balance 18 seconds    3 Meter Timed  Up & Go 10 8 seconds 16 0 seconds  11 7 seconds 10 6   Walking speed        Functional Gait Assessment (see below)    18/30 20/30   6 Minute Walk Test (100 foot circular course)   1005 feet  101 bpm  96% SpO2 850 feet  98 bpm  98% SpO2     950 feet    830 feet 3/29/23 1025 feet  No assistive device 900 feet  No assistive device   Patient-Reported Outcome Measure:  Activities-Specific Balance Confidence Scale (ABC " "Scale)        Right knee flexion PROM, sitting 95 degrees  (115 degrees left) 86 degrees 90 degrees    96 degrees 3/29/23 95 degrees 98 degrees             6 minute walk test overground 1000 feet    Treadmill   1 4 mph 7 min    One foot  On 4\" step, forward and lateral and back step touch to drive into dorsiflexion on static foot, 40 sec each x 2 sets  Standing heel raise bilaterally, heel dropped off 4\" step, 15   Partial squat or lunge with heel down, 1 5 min  Partial squat to reach to 8-10\" from floor while keeping eyes out window, 6-8 x 3 sets  Braiding walking 10 feet down and back     SciFit level 2 0 110 steps/min, seat 21, 20 minutes      ASSESSMENT   Right shoulder pain resolved  Walking and moving better, able to sustain more volume of movement and challenge deeper amounts of knee flexion  Continue to progress  Continue physical therapy based on variability in presentation and active problem-solving with therapy  SHORT-TERM GOALS: by 4/29/23  1  Dayanna Eldridge walks at least 10 minutes consecutively  MET  3  Dayanna Eldridge reports 50% improvement in ability to walk grocery store distances without severe fatigue  MET  4  Demonstrates at least 11 degrees passive right dorsiflexion by 2/12/22  MET  New goal: When released by physician, tolerates at least 6 minutes consecutive overground walking without walking boot  MET  New goal: When released by physician, tolerates at least 10 bilateral heel raises  MET  New goal: When released by physician, walks with step length without one inch of contralateral leg  MET  New goal: Walks at least 1350 feet during 6 minute walk test   NOT MET, CONTINUES  New goal: Scores at least 20/30 on FGA    MET  New goal: Able to purposefully walk for exercise at least 15 minutes at a time at least 3-4 times per week  Becky Conner  LONG-TERM GOALS: by 6/17/23  1  Patient reports at least 20 total minutes per day of overground walking for exercise  PROGRESSING    2  " Patient independently manages home exercise program   MET with continued progressions  PLAN OF CARE:  Patient will benefit from physical therapy 1 time per week for 1 month  Neuromuscular re-education, therapeutic exercises, and therapeutic activities as outlined in grids

## 2023-06-14 ENCOUNTER — APPOINTMENT (OUTPATIENT)
Dept: PHYSICAL THERAPY | Facility: REHABILITATION | Age: 63
End: 2023-06-14
Payer: MEDICARE

## 2023-06-21 ENCOUNTER — OFFICE VISIT (OUTPATIENT)
Dept: PHYSICAL THERAPY | Facility: REHABILITATION | Age: 63
End: 2023-06-21
Payer: MEDICARE
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
## 2023-06-21 PROCEDURE — 97112 NEUROMUSCULAR REEDUCATION: CPT | Performed by: PHYSICAL THERAPIST

## 2023-06-21 PROCEDURE — 97110 THERAPEUTIC EXERCISES: CPT | Performed by: PHYSICAL THERAPIST

## 2023-06-21 NOTE — PROGRESS NOTES
PHYSICAL THERAPY TREATMENT / MAINTENANCE / PROGRESS UPDATE    Date: 23  Name: Pau Ardon  : 1960  Referring Provider: Ally Kong DO  AUTHORIZATION:   Insurance: Payor: Bashir Rosas / Plan: MEDICARE A AND B / Product Type: Medicare A & B Fee for Service /     SUBJECTIVE:  HPI: Pau Ardon is a 70 y o  female referred to outpatient physical therapy for the following diagnosis   Encounter Diagnosis   Name Primary? • Fall, subsequent encounter Yes       23  Patient history:  [facility] so [facility]'s closed for about 2 weeks now, so no exercise classes  Went to eye doctor this past Friday, hit lots of traffic, and then in waiting room for an hour  Then traffic/detour on the way home, the Saturday and  was horrible, the leg didn't want to strengthen, pain, took a lot of stretching, by Monday was feeling better  Had swelling in left knee and lower leg, used ice  Walked with cane, should have walked with walker but didn't  Besides pool-based exercises, will be doing video-based exercises  23  Able to complete two aquatic classes consecutively, get through grocery store  Walking with cat in evening  Continues with ankle pain  Increases with open-chain plantarflexion when seated  Better with brace on the ankle  23  Found out what abnormal blood test was, which was inflammation, so on prednisone for 30 days  On  and  there are double aquatic (back to back classes) that are complementary, so will do both  Left foot has been bothering her at the top of the foot, all the time, podiatrist says inflammation is pinching a nerve, no numbness  Tries moving and stretching and icing, and creams, doesn't help  Feels best when flexed up (describes dorsiflexed)  Shoulder is feeling better, able to do all aquatic exercises  23  Walking okay, back to aquatics at [facility], able to do both classes  Not in grocery store      Right shoulder is doing better, most range of motion without pinching  Objective: See treatment diary below    Patient goals:   -getting back to the pool    Patient-Specific Functional Scale   Task is scored 0 (unable to perform activity) to 10 (ability to perform activity independently)  Activity 10/20/21 11/17/21 2/09/22 4/06/22 6/22/22 7/27/22 1/11/23 2/22/23 3/15/23 3/29/23 4/12/23 6/21/23   1  Walk at normal pace without a cane 3/10 3/10 due to speed or knee bending 7-8/10 initially, decreases with continued walking 6/10 8-9/10 can do this, not long distances  Slow due to respiratory and conditioning Notes slowed pace, doesn't use cane  Doesn't use 8/10, not as fast as previously Varies day to day, today 7-8/10 due to right knee pain Walks a little slower, 7-8/10   2  Grocery shop without significant pain 4/10 7-8/10 7/10 7-8/10 8/10  Haven't done recently, will do today Did well most of grocery shopping, but a lot of pain at the end, new, large grocery store, about 1 hour  Not in last week Usually does pretty well, worse if standing in line a while 8/10, still gets significant pain but towards the end, or if standing in soraya  Haven't done in a while, haven't needed to, wouldn't have worked Sat or Sunday  3    Dance 1/10 2/10 0/10 4/10 7/10, getting better every week, working in coordination  Not right now   Can do dance stretch video, movement ones are too fast, except for movement for seniors Not doing this yet, doing in water; doing land-based dance stretches at home Haven't attempted since weekend, can do dance-based exercise, turns are chalenging   4     Move without pain and to not get tired as frequently   7/10 5-6/10 7/10 6-7/10   Can do an hour maximum, that's a combination of standing and sitting  Can do this 7/10, getting better (10/10 would be getting mail without pain, going up and down a half flight of stairs, not as affected by weather)      Precautions - Hashimoto's, autoimmune disease    Mobility Measures "1/11/23 2/22/23 3/15/23 4/12/23 5/17/23 6/21/23   Assistive device used? /65  80 bpm       /68  73 bpm   5 Time Sit to Stand  (17\" chair, arms across chest) Arms in front  15 1 seconds   Unable, pain about right medial knee, proximal knee  Able from 18\" surface without arms pushing on chair  __  About 32 seconds, one posterior loss of balance 18 seconds  17 3 seconds   3 Meter Timed  Up & Go 10 8 seconds 16 0 seconds  11 7 seconds 10 6 9 5 seconds   Walking speed      0 85 meters/second   Functional Gait Assessment (see below)    18/30 20/30 19/30   6 Minute Walk Test (100 foot circular course)   1005 feet  101 bpm  96% SpO2 850 feet  98 bpm  98% SpO2     950 feet    830 feet 3/29/23 1025 feet  No assistive device 900 feet  No assistive device 1004 feet no AD   Patient-Reported Outcome Measure: Activities-Specific Balance Confidence Scale (ABC Scale)         Right knee flexion PROM, sitting 95 degrees  (115 degrees left) 86 degrees 90 degrees    96 degrees 3/29/23 95 degrees 98 degrees 99 degrees              Functional Gait Assessment  2/3 Gait level surface  2/3 Change in gait speed  3/3 Gait with horizontal head turns  3/3 Gait with vertical head turns  3/3 Gait and pivot turn  2/3 Step over obstacle  1/3 Gait with narrow base of support  1/3 Gait with eyes closed  1/3 Ambulating backwards  1/3 Steps  19/30 Total score (less than 22/30 indicates increased risk of fall)       One foot on 6\" step, # taps in 30 sec (step-ups)  L, 12, then 13  R, 11, then 11    Sit to stand   18\" surface  Carry 11 lbs  5 x 3 sets    SciFit level 2 0 110 steps/min, seat 21, 10 minutes      ASSESSMENT   Kip Orts is maintaining a good level of mobility, maintaining range of motion at the knee and ankle that allows limited community ambulation  It's great ethat we're maintaining a good level of aerobic exercise via [employer] and online exercise videos      Continue physical therapy based on variability in presentation and active " problem-solving with therapy, given rheumatic condition  SHORT-TERM GOALS: by 4/29/23  1  Nisa Perez walks at least 10 minutes consecutively  MET  3  Nisa Perez reports 50% improvement in ability to walk grocery store distances without severe fatigue  MET  4  Demonstrates at least 11 degrees passive right dorsiflexion by 2/12/22  MET  New goal: When released by physician, tolerates at least 6 minutes consecutive overground walking without walking boot  MET  New goal: When released by physician, tolerates at least 10 bilateral heel raises  MET  New goal: When released by physician, walks with step length without one inch of contralateral leg  MET  New goal: Walks at least 1350 feet during 6 minute walk test   NOT MET  New goal: Scores at least 20/30 on FGA  NOT MET  New goal: Able to purposefully walk for exercise at least 15 minutes at a time at least 3-4 times per week  Rand Nagy  LONG-TERM GOALS: by 6/17/23  1  Patient reports at least 20 total minutes per day of overground walking for exercise  PROGRESSING  2  Patient independently manages home exercise program   MET with continued progressions  New maintenance goals:  1  Walk 15 minutes consecutively for community ambulation and exercise  2  Manages 150 minutes of moderate intensity aerobic exercise per week  3  Maintains at least 1000 feet during 6 minute walk test, at most 15 seconds 5 times sit to stand  PLAN OF CARE:  Patient will benefit from physical therapy 1 time per week for 2 month  Neuromuscular re-education, therapeutic exercises, and therapeutic activities as outlined in grids

## 2023-06-23 DIAGNOSIS — G70.9 NEUROMUSCULAR DISORDER (HCC): ICD-10-CM

## 2023-06-23 DIAGNOSIS — M79.18 MYOFASCIAL PAIN: ICD-10-CM

## 2023-06-23 DIAGNOSIS — M79.18 MYOFASCIAL PAIN: Primary | ICD-10-CM

## 2023-06-23 DIAGNOSIS — E78.00 PURE HYPERCHOLESTEROLEMIA: ICD-10-CM

## 2023-06-23 DIAGNOSIS — J30.89 SEASONAL ALLERGIC RHINITIS DUE TO OTHER ALLERGIC TRIGGER: ICD-10-CM

## 2023-06-23 RX ORDER — DULOXETIN HYDROCHLORIDE 60 MG/1
60 CAPSULE, DELAYED RELEASE ORAL DAILY
Qty: 90 CAPSULE | Refills: 2 | Status: SHIPPED | OUTPATIENT
Start: 2023-06-23

## 2023-06-27 RX ORDER — MONTELUKAST SODIUM 10 MG/1
10 TABLET ORAL
Qty: 30 TABLET | Refills: 5 | Status: SHIPPED | OUTPATIENT
Start: 2023-06-27

## 2023-06-27 RX ORDER — GABAPENTIN 100 MG/1
100 CAPSULE ORAL 2 TIMES DAILY
Qty: 60 CAPSULE | Refills: 0 | Status: SHIPPED | OUTPATIENT
Start: 2023-06-27

## 2023-06-27 RX ORDER — ROSUVASTATIN CALCIUM 5 MG/1
5 TABLET, COATED ORAL DAILY
Qty: 90 TABLET | Refills: 3 | Status: SHIPPED | OUTPATIENT
Start: 2023-06-27

## 2023-06-27 RX ORDER — MAGNESIUM OXIDE 400 MG/1
400 TABLET ORAL DAILY
Qty: 30 TABLET | Refills: 2 | Status: SHIPPED | OUTPATIENT
Start: 2023-06-27

## 2023-06-28 ENCOUNTER — OFFICE VISIT (OUTPATIENT)
Dept: PHYSICAL THERAPY | Facility: REHABILITATION | Age: 63
End: 2023-06-28
Payer: MEDICARE
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
## 2023-06-28 PROCEDURE — 97112 NEUROMUSCULAR REEDUCATION: CPT | Performed by: PHYSICAL THERAPIST

## 2023-06-28 PROCEDURE — 97530 THERAPEUTIC ACTIVITIES: CPT | Performed by: PHYSICAL THERAPIST

## 2023-06-28 PROCEDURE — 97110 THERAPEUTIC EXERCISES: CPT | Performed by: PHYSICAL THERAPIST

## 2023-06-28 NOTE — PROGRESS NOTES
PHYSICAL THERAPY TREATMENT / MAINTENANCE      Date: 23  Name: Ela Metz  : 1960  Referring Provider: Idalmis Khan DO  AUTHORIZATION:   Insurance: Payor: Joan Sandoval / Plan: MEDICARE A AND B / Product Type: Medicare A & B Fee for Service /     SUBJECTIVE:  HPI: Ela Metz is a 70 y o  female referred to outpatient physical therapy for the following diagnosis   Encounter Diagnosis   Name Primary? • Fall, subsequent encounter Yes       23  Walking pretty well this week, nursing cat from surgery  No grocery store this week previously, will go today  [facility] due to [facility] , no updates on opening  Doing video-based exercise,  Seated aerobics, dance, walking-based exercise  Likes to do core classes but can't lay down due to neck pain, has never been able to do neck exercises  Prednisone is done  23  Patient history:  [facility], so [facility]'s closed for about 2 weeks now, so no exercise classes  Went to eye doctor this past Friday, hit lots of traffic, and then in waiting room for an hour  Then traffic/detour on the way home, the Saturday and  was horrible, the leg didn't want to strengthen, pain, took a lot of stretching, by Monday was feeling better  Had swelling in left knee and lower leg, used ice  Walked with cane, should have walked with walker but didn't  Besides pool-based exercises, will be doing video-based exercises  23  Able to complete two aquatic classes consecutively, get through grocery store  Walking with cat in evening  Continues with ankle pain  Increases with open-chain plantarflexion when seated  Better with brace on the ankle  23  Found out what abnormal blood test was, which was inflammation, so on prednisone for 30 days  On  and  there are double aquatic (back to back classes) that are complementary, so will do both    Left foot has been bothering her at the top of the foot, all the time, podiatrist says inflammation is pinching a nerve, no numbness  Tries moving and stretching and icing, and creams, doesn't help  Feels best when flexed up (describes dorsiflexed)  Shoulder is feeling better, able to do all aquatic exercises  5/24/23  Walking okay, back to aquatics at [facility], able to do both classes  Not in grocery store  Right shoulder is doing better, most range of motion without pinching  Objective: See treatment diary below    Patient goals:   -getting back to the pool    Patient-Specific Functional Scale   Task is scored 0 (unable to perform activity) to 10 (ability to perform activity independently)  Activity 10/20/21 11/17/21 2/09/22 4/06/22 6/22/22 7/27/22 1/11/23 2/22/23 3/15/23 3/29/23 4/12/23 6/21/23   1  Walk at normal pace without a cane 3/10 3/10 due to speed or knee bending 7-8/10 initially, decreases with continued walking 6/10 8-9/10 can do this, not long distances  Slow due to respiratory and conditioning Notes slowed pace, doesn't use cane  Doesn't use 8/10, not as fast as previously Varies day to day, today 7-8/10 due to right knee pain Walks a little slower, 7-8/10   2  Grocery shop without significant pain 4/10 7-8/10 7/10 7-8/10 8/10  Haven't done recently, will do today Did well most of grocery shopping, but a lot of pain at the end, new, large grocery store, about 1 hour  Not in last week Usually does pretty well, worse if standing in line a while 8/10, still gets significant pain but towards the end, or if standing in soraya  Haven't done in a while, haven't needed to, wouldn't have worked Sat or Sunday     3    Dance 1/10 2/10 0/10 4/10 7/10, getting better every week, working in coordination  Not right now   Can do dance stretch video, movement ones are too fast, except for movement for seniors Not doing this yet, doing in water; doing land-based dance stretches at home Haven't attempted since weekend, can do dance-based exercise, turns are chalenging "  4    Move without pain and to not get tired as frequently   7/10 5-6/10 7/10 6-7/10   Can do an hour maximum, that's a combination of standing and sitting  Can do this 7/10, getting better (10/10 would be getting mail without pain, going up and down a half flight of stairs, not as affected by weather)      Precautions - Hashimoto's, autoimmune disease    Mobility Measures 1/11/23 2/22/23 3/15/23 4/12/23 5/17/23 6/21/23   Assistive device used? /65  80 bpm       /68  73 bpm   5 Time Sit to Stand  (17\" chair, arms across chest) Arms in front  15 1 seconds   Unable, pain about right medial knee, proximal knee  Able from 18\" surface without arms pushing on chair  __  About 32 seconds, one posterior loss of balance 18 seconds  17 3 seconds   3 Meter Timed  Up & Go 10 8 seconds 16 0 seconds  11 7 seconds 10 6 9 5 seconds   Walking speed      0 85 meters/second   Functional Gait Assessment (see below)    18/30 20/30 19/30   6 Minute Walk Test (100 foot circular course)   1005 feet  101 bpm  96% SpO2 850 feet  98 bpm  98% SpO2     950 feet    830 feet 3/29/23 1025 feet  No assistive device 900 feet  No assistive device 1004 feet no AD   Patient-Reported Outcome Measure:  Activities-Specific Balance Confidence Scale (ABC Scale)         Right knee flexion PROM, sitting 95 degrees  (115 degrees left) 86 degrees 90 degrees    96 degrees 3/29/23 95 degrees 98 degrees 99 degrees              Treadmill 1 8 mph 5 min  1 5 mph 4 min    Sit to stand:  20\" surface, 5  18\" surface, 5   + 11 lb weight, 5  Using 15 lb weight, 5    Step ups 6\" step  Forwards about 30 sec each  Lateral about 30 sec each   Then 30 sec, # of taps (step-ups)  L 10   R 13    Braiding walking, slow, 1 min  Moderate speed, 45 sec x 2 sets    Squat (focus on bending knees to simulate movement in reaching to floor)  Visual focus requiring about 40 degrees neck extension  21\" surface, x 3  20\" surface, x 3  18\" surface, x 3    SciFit level 2 0 110 " steps/min, seat 22, 15minutes      ASSESSMENT   Hilda So is maintaining a good level of mobility, maintaining range of motion at the knee and ankle that allows community ambulation  Able to complete treadmill at faster speeds  Adjusting home exercises based on presence or abscence of pain  Continue to be as active as able  Continue physical therapy based on variability in presentation and active problem-solving with therapy, given rheumatic condition  SHORT-TERM GOALS: by 4/29/23  1  Hilda So walks at least 10 minutes consecutively  MET  3  Hilda So reports 50% improvement in ability to walk grocery store distances without severe fatigue  MET  4  Demonstrates at least 11 degrees passive right dorsiflexion by 2/12/22  MET  New goal: When released by physician, tolerates at least 6 minutes consecutive overground walking without walking boot  MET  New goal: When released by physician, tolerates at least 10 bilateral heel raises  MET  New goal: When released by physician, walks with step length without one inch of contralateral leg  MET  New goal: Walks at least 1350 feet during 6 minute walk test   NOT MET  New goal: Scores at least 20/30 on FGA  NOT MET  New goal: Able to purposefully walk for exercise at least 15 minutes at a time at least 3-4 times per week  Yvette Pore  LONG-TERM GOALS: by 6/17/23  1  Patient reports at least 20 total minutes per day of overground walking for exercise  PROGRESSING  2  Patient independently manages home exercise program   MET with continued progressions  New maintenance goals:  1  Walk 15 minutes consecutively for community ambulation and exercise  2  Manages 150 minutes of moderate intensity aerobic exercise per week  3  Maintains at least 1000 feet during 6 minute walk test, at most 15 seconds 5 times sit to stand      PLAN OF CARE:  Patient will benefit from physical therapy 1 time per week for 2 month  Neuromuscular re-education, therapeutic exercises, and therapeutic activities as outlined in grids

## 2023-07-02 DIAGNOSIS — I10 ESSENTIAL HYPERTENSION: ICD-10-CM

## 2023-07-03 RX ORDER — LOSARTAN POTASSIUM 50 MG/1
TABLET ORAL
Qty: 90 TABLET | Refills: 1 | Status: SHIPPED | OUTPATIENT
Start: 2023-07-03

## 2023-07-05 ENCOUNTER — OFFICE VISIT (OUTPATIENT)
Facility: REHABILITATION | Age: 63
End: 2023-07-05
Payer: MEDICARE
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
## 2023-07-05 PROCEDURE — 97530 THERAPEUTIC ACTIVITIES: CPT | Performed by: PHYSICAL THERAPIST

## 2023-07-05 PROCEDURE — 97110 THERAPEUTIC EXERCISES: CPT | Performed by: PHYSICAL THERAPIST
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
## 2023-07-11 ENCOUNTER — TELEMEDICINE (OUTPATIENT)
Dept: PSYCHIATRY | Facility: CLINIC | Age: 63
End: 2023-07-11
Payer: MEDICARE

## 2023-07-11 DIAGNOSIS — F43.23 ADJUSTMENT DISORDER WITH MIXED ANXIETY AND DEPRESSED MOOD: Primary | ICD-10-CM

## 2023-07-11 PROCEDURE — 99214 OFFICE O/P EST MOD 30 MIN: CPT | Performed by: PSYCHIATRY & NEUROLOGY
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
## 2023-07-12 ENCOUNTER — OFFICE VISIT (OUTPATIENT)
Facility: REHABILITATION | Age: 63
End: 2023-07-12
Payer: MEDICARE
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
## 2023-07-12 PROCEDURE — 97110 THERAPEUTIC EXERCISES: CPT | Performed by: PHYSICAL THERAPIST

## 2023-07-12 PROCEDURE — 97150 GROUP THERAPEUTIC PROCEDURES: CPT | Performed by: PHYSICAL THERAPIST

## 2023-07-12 NOTE — PROGRESS NOTES
PHYSICAL THERAPY TREATMENT / MAINTENANCE      Date: 23  Name: Leslee Lomeli  : 1960  Referring Provider: Stalin Groves DO  AUTHORIZATION:   Insurance: Payor: Kat Carr / Plan: MEDICARE A AND B / Product Type: Medicare A & B Fee for Service /     SUBJECTIVE:  HPI: Leslee Lomeli is a 70 y.o. female referred to outpatient physical therapy for the following diagnosis   Encounter Diagnosis   Name Primary? • Fall, subsequent encounter Yes     23  Calf muscles are tighter today, difficult to stretch. Notes stretching a lot and using a rolling pin to roll this area . Continues with home video or online video exercises. 23  [facility] still closed, continues with exercise videos and walking with cat.    23  Walking pretty well this week, nursing cat from surgery. No grocery store this week previously, will go today. [facility] , no updates on opening. Doing video-based exercise,  Seated aerobics, dance, walking-based exercise. Likes to do core classes but can't lay down due to neck pain, has never been able to do neck exercises. Prednisone is done. Objective: See treatment diary below    Patient goals:   -getting back to the pool    Patient-Specific Functional Scale   Task is scored 0 (unable to perform activity) to 10 (ability to perform activity independently)  Activity 10/20/21 11/17/21 2/09/22 4/06/22 6/22/22 7/27/22 1/11/23 2/22/23 3/15/23 3/29/23 4/12/23 6/21/23   1. Walk at normal pace without a cane 3/10 3/10 due to speed or knee bending -8/10 initially, decreases with continued walking 6/10 8-9/10 can do this, not long distances  Slow due to respiratory and conditioning Notes slowed pace, doesn't use cane. Doesn't use 8/10, not as fast as previously Varies day to day, today -8/10 due to right knee pain Walks a little slower, 7-8/10   2.    Grocery shop without significant pain 4/10 7-8/10 7/10 7-8/10 8/10  Haven't done recently, will do today Did well most of grocery shopping, but a lot of pain at the end, new, large grocery store, about 1 hour. Not in last week Usually does pretty well, worse if standing in line a while 8/10, still gets significant pain but towards the end, or if standing in soraya. Haven't done in a while, haven't needed to, wouldn't have worked Sat or Sunday. 3.   Dance 1/10 2/10 0/10 4/10 7/10, getting better every week, working in coordination  Not right now   Can do dance stretch video, movement ones are too fast, except for movement for seniors Not doing this yet, doing in water; doing land-based dance stretches at home Haven't attempted since weekend, can do dance-based exercise, turns are chalenging   4. Move without pain and to not get tired as frequently   7/10 5-6/10 7/10 6-7/10   Can do an hour maximum, that's a combination of standing and sitting  Can do this 7/10, getting better (10/10 would be getting mail without pain, going up and down a half flight of stairs, not as affected by weather)      Precautions - Hashimoto's, autoimmune disease    Mobility Measures 1/11/23 2/22/23 3/15/23 4/12/23 5/17/23 6/21/23   Assistive device used? /65  80 bpm       /68  73 bpm   5 Time Sit to Stand  (17" chair, arms across chest) Arms in front  15.1 seconds   Unable, pain about right medial knee, proximal knee  Able from 18" surface without arms pushing on chair  __  About 32 seconds, one posterior loss of balance 18 seconds  17.3 seconds   3 Meter Timed  Up & Go 10.8 seconds 16.0 seconds  11.7 seconds 10.6 9.5 seconds   Walking speed      0.85 meters/second   Functional Gait Assessment (see below)    18/30 20/30 19/30   6 Minute Walk Test (100 foot circular course)   1005 feet  101 bpm  96% SpO2 850 feet  98 bpm  98% SpO2     950 feet    830 feet 3/29/23 1025 feet  No assistive device 900 feet  No assistive device 1004 feet no AD   Patient-Reported Outcome Measure:  Activities-Specific Balance Confidence Scale (ABC Scale)         Right knee flexion PROM, sitting 95 degrees  (115 degrees left) 86 degrees 90 degrees    96 degrees 3/29/23 95 degrees 98 degrees 99 degrees              Treadmill walking 2% grade 1.5 mph 7 min    Step ups 7" step   laterally about 12-15 each x 2 sets     Standing heel raise, limited range, about 10 x 2 sets each    Standing calf stretches about 3 minutes total  Longsitting with calf on 2" round bar, pressure on different spots about 3 minutes each  Then longsitting gastroc stretch 30 - 60 sec each    Braiding walking, slow to medium speed, 1.5 min  Scapular row, 1 min    Squat (focus on bending knees to simulate movement in reaching to floor)  Visual focus requiring upright thoracic region  Target knuckles to 17" chair or palm to chair, about 6-7 each height    SciFit level 1 - 2.3 100 steps/min, seat 22, 15 minutes        ASSESSMENT   Mookie Herbert is maintaining a good level of mobility, maintaining range of motion at the knee and ankle that allows community ambulation. Some gastroc tightness today and unable to dorsiflex as far, we problem solved progressing existing calf stretches and manual massage. Continue to progress volume and speed of movement as able, able to squat now with knees bent. Continue physical therapy based on variability in presentation and active problem-solving with therapy, given rheumatic condition. SHORT-TERM GOALS: by 4/29/23  1. Mookie Herbert walks at least 10 minutes consecutively. MET. 3. Mookie Herbert reports 50% improvement in ability to walk grocery store distances without severe fatigue. MET. 4. Demonstrates at least 11 degrees passive right dorsiflexion by 2/12/22. MET. New goal: When released by physician, tolerates at least 6 minutes consecutive overground walking without walking boot. MET. New goal: When released by physician, tolerates at least 10 bilateral heel raises  MET.   New goal: When released by physician, walks with step length without one inch of contralateral leg. MET. New goal: Walks at least 1350 feet during 6 minute walk test.  NOT MET. New goal: Scores at least 20/30 on FGA. NOT MET. New goal: Able to purposefully walk for exercise at least 15 minutes at a time at least 3-4 times per week. Pio Winslow. LONG-TERM GOALS: by 6/17/23  1. Patient reports at least 20 total minutes per day of overground walking for exercise. PROGRESSING. 2. Patient independently manages home exercise program.  MET with continued progressions. New maintenance goals:  1. Walk 15 minutes consecutively for community ambulation and exercise. 2. Manages 150 minutes of moderate intensity aerobic exercise per week. 3. Maintains at least 1000 feet during 6 minute walk test, at most 15 seconds 5 times sit to stand. PLAN OF CARE:  Patient will benefit from physical therapy 1 time per week for 2 month  Neuromuscular re-education, therapeutic exercises, and therapeutic activities as outlined in grids.

## 2023-07-19 ENCOUNTER — OFFICE VISIT (OUTPATIENT)
Facility: REHABILITATION | Age: 63
End: 2023-07-19
Payer: MEDICARE
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
## 2023-07-19 PROCEDURE — 97530 THERAPEUTIC ACTIVITIES: CPT | Performed by: PHYSICAL THERAPIST

## 2023-07-19 PROCEDURE — 97110 THERAPEUTIC EXERCISES: CPT | Performed by: PHYSICAL THERAPIST

## 2023-07-19 NOTE — PROGRESS NOTES
PHYSICAL THERAPY TREATMENT / MAINTENANCE / PROGRESS NOTE    Date: 23  Name: Abdi Elaine  : 1960  Referring Provider: Maryjo Cheema DO  AUTHORIZATION:   Insurance: Payor: Damon Bryan / Plan: MEDICARE A AND B / Product Type: Medicare A & B Fee for Service /     SUBJECTIVE:  HPI: Abdi Elaine is a 70 y.o. female referred to outpatient physical therapy for the following diagnosis   Encounter Diagnosis   Name Primary? • Fall, subsequent encounter Yes   Objective: See treatment diary below    Moving a bit slower, it's been smoky outside this week. Patient goals:   -getting back to the pool    Patient-Specific Functional Scale   Task is scored 0 (unable to perform activity) to 10 (ability to perform activity independently)  Activity 10/20/21 11/17/21 2/09/22 4/06/22 6/22/22 7/27/22 1/11/23 2/22/23 3/15/23 3/29/23 4/12/23 6/21/23 7/19/23   1. Walk at normal pace without a cane 3/10 3/10 due to speed or knee bending -8/10 initially, decreases with continued walking 6/10 8-9/10 can do this, not long distances  Slow due to respiratory and conditioning Notes slowed pace, doesn't use cane. Doesn't use 8/10, not as fast as previously Varies day to day, today -8/10 due to right knee pain Walks a little slower, -8/10 Walks without cane   2. Grocery shop without significant pain 4/10 7-8/10 7/10 7-8/10 8/10  Haven't done recently, will do today Did well most of grocery shopping, but a lot of pain at the end, new, large grocery store, about 1 hour. Not in last week Usually does pretty well, worse if standing in line a while 8/10, still gets significant pain but towards the end, or if standing in soraya. Haven't done in a while, haven't needed to, wouldn't have worked Sat or . Did okay last time grocery shopping, hurt the next day.    3.   Dance 1/10 2/10 0/10 4/10 7/10, getting better every week, working in coordination  Not right now   Can do dance stretch video, movement ones are too fast, except for movement for seniors Not doing this yet, doing in water; doing land-based dance stretches at home Haven't attempted since weekend, can do dance-based exercise, turns are chalenging    4. Move without pain and to not get tired as frequently   7/10 5-6/10 7/10 6-7/10   Can do an hour maximum, that's a combination of standing and sitting  Can do this 7/10, getting better (10/10 would be getting mail without pain, going up and down a half flight of stairs, not as affected by weather)       Precautions - Hashimoto's, autoimmune disease    Mobility Measures 1/11/23 2/22/23 3/15/23 4/12/23 5/17/23 6/21/23 /19/23   Assistive device used? /65  80 bpm       /68  73 bpm 110 bpm   99% SpO2   5 Time Sit to Stand  (17" chair, arms across chest) Arms in front  15.1 seconds   Unable, pain about right medial knee, proximal knee  Able from 18" surface without arms pushing on chair  __  About 32 seconds, one posterior loss of balance 18 seconds  17.3 seconds Arms across chest 37 seconds     3 Meter Timed  Up & Go 10.8 seconds 16.0 seconds  11.7 seconds 10.6 9.5 seconds 9.1 seconds   Walking speed      0.85 meters/second    Functional Gait Assessment (see below)    18/30 20/30 19/30 21/30   6 Minute Walk Test (100 foot circular course)   1005 feet  101 bpm  96% SpO2 850 feet  98 bpm  98% SpO2     950 feet    830 feet 3/29/23 1025 feet  No assistive device 900 feet  No assistive device 1004 feet no  feet no AD   Patient-Reported Outcome Measure:  Activities-Specific Balance Confidence Scale (ABC Scale)          Right knee flexion PROM, sitting 95 degrees  (115 degrees left) 86 degrees 90 degrees    96 degrees 3/29/23 95 degrees 98 degrees 99 degrees 95 degrees             Functional Gait Assessment  2/3 Gait level surface  3/3 Change in gait speed  3/3 Gait with horizontal head turns  3/3 Gait with vertical head turns  3/3 Gait and pivot turn  1/3 Step over obstacle  3/3 Gait with narrow base of support  1/3 Gait with eyes closed  1/3 Ambulating backwards  1/3 Steps  21/30 Total score (less than 22/30 indicates increased risk of fall). .    Step ups 6" step   laterally about 10 each   forwards about 10     Standing heel raise, on incline,     Standing calf stretches about 3 minutes total    Braiding walking, slow speed, 1 min  Hip abduction, yellow band, 10 each    Squat (focus on bending knees to simulate movement in reaching to floor)  Target knuckles to 17" chair or palm to chair, 6-10 x 2 sets    SciFit level 2 - 2.9 100 steps/min, seat 22, 15 minutes      ASSESSMENT   Moving a bit slower today with sit to stand and 6 minute walk test.  But maintaining ankle and hip and knee range of motion, via good activity level, strengthening and stretching. Intermittent stiffness and tightness is usually managed within 1-2 weeks, allowing maintenance of mobility. Continue physical therapy based on variability in presentation and active problem-solving with therapy, given rheumatic condition. SHORT-TERM GOALS: through 9/19/23  1. Reginald Mccollum walks at least 10 minutes consecutively. MET. 3. Reginald Mccollum reports 50% improvement in ability to walk grocery store distances without severe fatigue. MET. 4. Demonstrates at least 11 degrees passive right dorsiflexion by 2/12/22. MET. New goal: When released by physician, tolerates at least 6 minutes consecutive overground walking without walking boot. MET. New goal: When released by physician, tolerates at least 10 bilateral heel raises  MET. New goal: When released by physician, walks with step length without one inch of contralateral leg. MET. New goal: Walks at least 1350 feet during 6 minute walk test.  NOT MET. New goal: Scores at least 20/30 on FGA.   MET. New goal: Able to purposefully walk for exercise at least 15 minutes at a time at least 3-4 times per week. MET. LONG-TERM GOALS: through 9/19/23  1.  Patient reports at least 20 total minutes per day of overground walking for exercise. PROGRESSING. 2. Patient independently manages home exercise program.  MET with continued progressions. New maintenance goals:  1. Walk 15 minutes consecutively for community ambulation and exercise. MET but not able every day or sometimes consecutive days  2. Manages 150 minutes of moderate intensity aerobic exercise per week. PROGRESSING. 3. Maintains at least 1000 feet during 6 minute walk test, at most 15 seconds 5 times sit to stand. NOT MET FOR 6MWT. PLAN OF CARE:  Patient will benefit from physical therapy 1 time per week to 1x/2 weeks for 2 month  Neuromuscular re-education, therapeutic exercises, and therapeutic activities as outlined in grids.

## 2023-07-20 DIAGNOSIS — E78.00 PURE HYPERCHOLESTEROLEMIA: ICD-10-CM

## 2023-07-20 DIAGNOSIS — G70.9 NEUROMUSCULAR DISORDER (HCC): ICD-10-CM

## 2023-07-20 DIAGNOSIS — J30.89 SEASONAL ALLERGIC RHINITIS DUE TO OTHER ALLERGIC TRIGGER: ICD-10-CM

## 2023-07-20 DIAGNOSIS — E06.3 HYPOTHYROIDISM DUE TO HASHIMOTO'S THYROIDITIS: ICD-10-CM

## 2023-07-20 DIAGNOSIS — E03.8 HYPOTHYROIDISM DUE TO HASHIMOTO'S THYROIDITIS: ICD-10-CM

## 2023-07-20 DIAGNOSIS — I10 ESSENTIAL HYPERTENSION: ICD-10-CM

## 2023-07-20 DIAGNOSIS — M79.18 MYOFASCIAL PAIN: ICD-10-CM

## 2023-07-20 RX ORDER — GABAPENTIN 100 MG/1
100 CAPSULE ORAL 2 TIMES DAILY
Qty: 180 CAPSULE | Refills: 0 | Status: SHIPPED | OUTPATIENT
Start: 2023-07-20

## 2023-07-20 RX ORDER — MONTELUKAST SODIUM 10 MG/1
10 TABLET ORAL
Qty: 90 TABLET | Refills: 1 | Status: SHIPPED | OUTPATIENT
Start: 2023-07-20

## 2023-07-20 RX ORDER — LOSARTAN POTASSIUM 50 MG/1
50 TABLET ORAL DAILY
Qty: 90 TABLET | Refills: 1 | Status: SHIPPED | OUTPATIENT
Start: 2023-07-20

## 2023-07-20 RX ORDER — LEVOTHYROXINE SODIUM 0.2 MG/1
200 TABLET ORAL DAILY
Qty: 90 TABLET | Refills: 1 | Status: SHIPPED | OUTPATIENT
Start: 2023-07-20

## 2023-07-20 RX ORDER — ROSUVASTATIN CALCIUM 5 MG/1
5 TABLET, COATED ORAL DAILY
Qty: 90 TABLET | Refills: 1 | Status: SHIPPED | OUTPATIENT
Start: 2023-07-20

## 2023-07-24 ENCOUNTER — OFFICE VISIT (OUTPATIENT)
Dept: FAMILY MEDICINE CLINIC | Facility: CLINIC | Age: 63
End: 2023-07-24
Payer: MEDICARE

## 2023-07-24 VITALS
SYSTOLIC BLOOD PRESSURE: 132 MMHG | DIASTOLIC BLOOD PRESSURE: 78 MMHG | TEMPERATURE: 97.5 F | WEIGHT: 238.8 LBS | HEIGHT: 66 IN | BODY MASS INDEX: 38.38 KG/M2 | OXYGEN SATURATION: 97 % | HEART RATE: 92 BPM

## 2023-07-24 DIAGNOSIS — M79.10 MYALGIA: ICD-10-CM

## 2023-07-24 DIAGNOSIS — J06.9 UPPER RESPIRATORY TRACT INFECTION, UNSPECIFIED TYPE: Primary | ICD-10-CM

## 2023-07-24 DIAGNOSIS — J45.20 MILD INTERMITTENT ASTHMA WITHOUT COMPLICATION: ICD-10-CM

## 2023-07-24 DIAGNOSIS — M35.9 UNDIFFERENTIATED CONNECTIVE TISSUE DISEASE (HCC): ICD-10-CM

## 2023-07-24 DIAGNOSIS — R09.82 POST-NASAL DRIP: ICD-10-CM

## 2023-07-24 DIAGNOSIS — R76.8 POSITIVE ANA (ANTINUCLEAR ANTIBODY): ICD-10-CM

## 2023-07-24 DIAGNOSIS — N18.2 CKD (CHRONIC KIDNEY DISEASE) STAGE 2, GFR 60-89 ML/MIN: ICD-10-CM

## 2023-07-24 DIAGNOSIS — J30.1 SEASONAL ALLERGIC RHINITIS DUE TO POLLEN: ICD-10-CM

## 2023-07-24 DIAGNOSIS — I10 ESSENTIAL HYPERTENSION: ICD-10-CM

## 2023-07-24 PROCEDURE — 99214 OFFICE O/P EST MOD 30 MIN: CPT | Performed by: FAMILY MEDICINE

## 2023-07-24 RX ORDER — TRIAMCINOLONE ACETONIDE 55 UG/1
2 SPRAY, METERED NASAL DAILY
Qty: 16.9 ML | Refills: 1 | Status: SHIPPED | OUTPATIENT
Start: 2023-07-24 | End: 2023-07-24

## 2023-07-24 RX ORDER — AZELASTINE HYDROCHLORIDE 137 UG/1
SPRAY, METERED NASAL
Qty: 30 ML | Refills: 0 | Status: SHIPPED | OUTPATIENT
Start: 2023-07-24

## 2023-07-24 RX ORDER — TIZANIDINE 2 MG/1
2 TABLET ORAL EVERY 8 HOURS PRN
Qty: 270 TABLET | Refills: 3 | Status: SHIPPED | OUTPATIENT
Start: 2023-07-24

## 2023-07-24 RX ORDER — AMOXICILLIN AND CLAVULANATE POTASSIUM 875; 125 MG/1; MG/1
1 TABLET, FILM COATED ORAL EVERY 12 HOURS SCHEDULED
Qty: 14 TABLET | Refills: 0 | Status: SHIPPED | OUTPATIENT
Start: 2023-07-24 | End: 2023-07-31

## 2023-07-24 RX ORDER — TRIAMCINOLONE ACETONIDE 55 UG/1
SPRAY, METERED NASAL
Qty: 16.9 ML | Refills: 1 | Status: SHIPPED | OUTPATIENT
Start: 2023-07-24

## 2023-07-24 RX ORDER — TIZANIDINE 2 MG/1
2 TABLET ORAL EVERY 8 HOURS PRN
Qty: 270 TABLET | Refills: 3 | Status: SHIPPED | OUTPATIENT
Start: 2023-07-24 | End: 2023-07-24 | Stop reason: SDUPTHER

## 2023-07-24 RX ORDER — HYDROXYCHLOROQUINE SULFATE 200 MG/1
200 TABLET, FILM COATED ORAL 2 TIMES DAILY
Qty: 180 TABLET | Refills: 2 | Status: SHIPPED | OUTPATIENT
Start: 2023-07-24 | End: 2024-04-19

## 2023-07-24 NOTE — PROGRESS NOTES
Chief Complaint   Patient presents with   • Sinus Problem     Pt states symptoms started 2 weeks ago. Name: Oniel Beckwith      : 1960      MRN: [id number]  Encounter Provider: Ilya Allen MD  Encounter Date: 2023   Encounter department: [facility] PRIMARY CARE    Assessment & Plan     We will treat patient with Augmentin for suspected URI. She does have a history of CKD stage II, I did review her labs. GFR in the 70s. No dose adjustment needed for Augmentin. Continue inhalers for asthma as needed. Blood pressure 132/78 today, acceptable. Continue blood pressure regimen. We will refill patient's azelastine spray as well as her Nasacort for her seasonal allergies. Avoid nephrotoxins. May use Tylenol. Increase p.o. hydration. RTC as needed    1. Upper respiratory tract infection, unspecified type  -     amoxicillin-clavulanate (AUGMENTIN) 875-125 mg per tablet; Take 1 tablet by mouth every 12 (twelve) hours for 7 days    2. Mild intermittent asthma without complication    3. Essential hypertension    4. Post-nasal drip  -     Azelastine HCl 137 MCG/SPRAY SOLN; 1 Spray per nostril in the morning    5. Seasonal allergic rhinitis due to pollen    6. CKD (chronic kidney disease) stage 2, GFR 60-89 ml/min           Subjective      She presents today to discuss a 2-week history of URI symptoms. Mostly sinus pain and pressure. Also states that she has a cough and nasal congestion. Review of Systems   Constitutional: Negative for activity change, appetite change, chills, fatigue and fever. HENT: Positive for congestion, sinus pressure and sinus pain. Negative for rhinorrhea, sneezing and sore throat. Eyes: Negative for pain, discharge, redness and itching. Respiratory: Positive for cough. Negative for chest tightness, shortness of breath and wheezing. Cardiovascular: Negative for chest pain and palpitations.    Gastrointestinal: Negative for abdominal distention, abdominal pain, constipation, diarrhea, nausea and vomiting. Musculoskeletal: Negative for arthralgias, back pain and myalgias. Skin: Negative for rash. Neurological: Negative for dizziness, weakness, numbness and headaches. Psychiatric/Behavioral: Negative for confusion. All other systems reviewed and are negative.       Current Outpatient Medications on File Prior to Visit   Medication Sig   • albuterol (2.5 mg/3 mL) 0.083 % nebulizer solution Take 3 mL (2.5 mg total) by nebulization every 6 (six) hours as needed for wheezing or shortness of breath   • Blood Glucose Monitoring Suppl (ONETOUCH VERIO IQ SYSTEM) w/Device KIT Check BG daily dx E11.9   • colestipol (COLESTID) 1 g tablet take 1 tablet by mouth twice a day   • Cyanocobalamin (Vitamin B-12) 1000 MCG SUBL Place under the tongue   • Diclofenac Sodium (VOLTAREN) 1 % Apply 2 g topically 4 (four) times a day   • dicyclomine (BENTYL) 20 mg tablet Take 1 tablet (20 mg total) by mouth every 6 (six) hours As needed for abdominal pain   • dulaglutide (Trulicity) 9.10 UT/0.3QA injection Inject 0.5 mL (0.75 mg total) under the skin once a week   • DULoxetine (CYMBALTA) 60 mg delayed release capsule Take 1 capsule (60 mg total) by mouth daily   • ergocalciferol (VITAMIN D2) 50,000 units TAKE 1 CAPSULE BY MOUTH ONE TIME PER WEEK   • gabapentin (NEURONTIN) 100 mg capsule Take 1 capsule (100 mg total) by mouth 2 (two) times a day   • glucose blood (OneTouch Verio) test strip Use check BG twice daily dx E11.9   • glucose blood (OneTouch Verio) test strip Test once daily DX E11.9   • hydrocortisone 2.5 % cream Apply topically 3 (three) times a day as needed for irritation or rash   • hydroxychloroquine (PLAQUENIL) 200 mg tablet Take 1 tablet (200 mg total) by mouth 2 (two) times a day   • ibuprofen (MOTRIN) 800 mg tablet Take 1 tablet (800 mg total) by mouth every 6 (six) hours as needed for mild pain   • Lancets (OneTouch Delica Plus ZOAMOA10Q) MISC Check BG 1x daily DX E11.9   • levothyroxine 200 mcg tablet Take 1 tablet (200 mcg total) by mouth daily Pt takes Monday through Saturday   • lifitegrast (Xiidra) 5 % op solution Xiidra 5 % eye drops in a dropperette   • losartan (COZAAR) 50 mg tablet Take 1 tablet (50 mg total) by mouth daily   • magnesium oxide (MAG-OX) 400 mg tablet Take 1 tablet (400 mg total) by mouth daily   • metroNIDAZOLE (METROCREAM) 0.75 % cream metronidazole 0.75 % topical cream   • montelukast (SINGULAIR) 10 mg tablet Take 1 tablet (10 mg total) by mouth daily at bedtime   • oxyCODONE (Roxicodone) 5 immediate release tablet Take 1 tablet (5 mg total) by mouth every 8 (eight) hours as needed for moderate pain or severe pain Max Daily Amount: 15 mg   • predniSONE 5 mg tablet 4 tabs x7 days, then 3 tabs x7 days, then 2 tabs x7 days, then 1 tab x7 days, then stop. • rosuvastatin (CRESTOR) 5 mg tablet Take 1 tablet (5 mg total) by mouth daily   • Saline 0.65 % SOLN into each nostril 2 (two) times a day   • sodium chloride 0.9 % nebulizer solution Take 3 mL by nebulization as needed for wheezing or shortness of breath   • tiZANidine (ZANAFLEX) 2 mg tablet Take 1 tablet (2 mg total) by mouth every 8 (eight) hours as needed for muscle spasms   • [DISCONTINUED] Azelastine HCl 137 MCG/SPRAY SOLN '1 SPRAY PER NOSTRIL IN THE MORNING AS DIRECTED   • [DISCONTINUED] Triamcinolone Acetonide (Nasacort Allergy 24HR) 55 MCG/ACT nasal spray 2 sprays by Each Nare route daily   • doxycycline hyclate (VIBRAMYCIN) 100 mg capsule Take 100 mg by mouth 2 (two) times a day (Patient not taking: Reported on 5/8/2023)       Objective     /78 (BP Location: Left arm, Patient Position: Sitting, Cuff Size: Large)   Pulse 92   Temp 97.5 °F (36.4 °C) (Temporal)   Ht 5' 6" (1.676 m)   Wt 108 kg (238 lb 12.8 oz)   SpO2 97%   BMI 38.54 kg/m²     Physical Exam  Constitutional:       General: She is not in acute distress. Appearance: She is well-developed.  She is not diaphoretic. HENT:      Head: Normocephalic and atraumatic. Right Ear: Tympanic membrane, ear canal and external ear normal. There is no impacted cerumen. Left Ear: Ear canal and external ear normal. There is no impacted cerumen. Ears:      Comments: Erythema of TM Left     Nose: Nose normal.      Mouth/Throat:      Mouth: Mucous membranes are moist.      Pharynx: No oropharyngeal exudate or posterior oropharyngeal erythema. Eyes:      General: No scleral icterus. Right eye: No discharge. Left eye: No discharge. Conjunctiva/sclera: Conjunctivae normal.   Cardiovascular:      Rate and Rhythm: Normal rate and regular rhythm. Heart sounds: Normal heart sounds. No murmur heard. Pulmonary:      Effort: Pulmonary effort is normal. No respiratory distress. Breath sounds: Normal breath sounds. No wheezing. Abdominal:      General: Abdomen is flat. There is no distension. Palpations: Abdomen is soft. There is no mass. Tenderness: There is no abdominal tenderness. Hernia: No hernia is present. Musculoskeletal:         General: No tenderness. Normal range of motion. Cervical back: Normal range of motion. Skin:     General: Skin is warm and dry. Capillary Refill: Capillary refill takes less than 2 seconds. Coloration: Skin is not pale. Findings: No erythema. Neurological:      General: No focal deficit present. Mental Status: She is alert.    Psychiatric:         Mood and Affect: Mood normal.         Behavior: Behavior normal.       Clarke Turk MD

## 2023-07-24 NOTE — TELEPHONE ENCOUNTER
Caller: Grey Trejo from [address] [place] [address]    Doctor: Gaston Rhoades    Reason for call: refills     Hydroxychloroquine 200 mg  Tizanidine 2 mg    Call back#: [phone]

## 2023-07-26 ENCOUNTER — APPOINTMENT (OUTPATIENT)
Facility: REHABILITATION | Age: 63
End: 2023-07-26
Payer: MEDICARE

## 2023-07-29 DIAGNOSIS — J30.89 SEASONAL ALLERGIC RHINITIS DUE TO OTHER ALLERGIC TRIGGER: ICD-10-CM

## 2023-07-31 RX ORDER — MONTELUKAST SODIUM 10 MG/1
10 TABLET ORAL
Qty: 30 TABLET | Refills: 3 | Status: SHIPPED | OUTPATIENT
Start: 2023-07-31

## 2023-08-02 ENCOUNTER — OFFICE VISIT (OUTPATIENT)
Facility: REHABILITATION | Age: 63
End: 2023-08-02
Payer: MEDICARE
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
## 2023-08-02 PROCEDURE — 97110 THERAPEUTIC EXERCISES: CPT | Performed by: PHYSICAL THERAPIST
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
## 2023-08-08 DIAGNOSIS — M79.18 MYOFASCIAL PAIN: ICD-10-CM

## 2023-08-08 DIAGNOSIS — G70.9 NEUROMUSCULAR DISORDER (HCC): ICD-10-CM

## 2023-08-08 RX ORDER — DULOXETIN HYDROCHLORIDE 60 MG/1
60 CAPSULE, DELAYED RELEASE ORAL DAILY
Qty: 90 CAPSULE | Refills: 0 | Status: SHIPPED | OUTPATIENT
Start: 2023-08-08 | End: 2023-09-18 | Stop reason: SDUPTHER

## 2023-08-09 ENCOUNTER — OFFICE VISIT (OUTPATIENT)
Facility: REHABILITATION | Age: 63
End: 2023-08-09
Payer: MEDICARE
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
## 2023-08-09 PROCEDURE — 97110 THERAPEUTIC EXERCISES: CPT | Performed by: PHYSICAL THERAPIST

## 2023-08-09 NOTE — PROGRESS NOTES
PHYSICAL THERAPY TREATMENT / MAINTENANCE   Date: 23  Name: Clive Erazo  : 1960  Referring Provider: Josie Bundy DO  AUTHORIZATION:   Insurance: Payor: Mary Cisse / Plan: MEDICARE A AND B / Product Type: Medicare A & B Fee for Service /     SUBJECTIVE:  HPI: Clive Erazo is a 70 y.o. female referred to outpatient physical therapy for the following diagnosis   Encounter Diagnosis   Name Primary? • Fall, subsequent encounter Yes     Knee was feeling better by the prior weekend, was getting around well. But increased pain occurred with kneeling and then change in weather earlier this week. To therapy with cane. Tried out an exercise video, but it ended up incorporating a lot of kneeling. Tried other videos, will go back to using her DVDs, or try ones on [employer]. Patient goals:   -getting back to the pool    Patient-Specific Functional Scale   Task is scored 0 (unable to perform activity) to 10 (ability to perform activity independently)  Activity 10/20/21 11/17/21 2/09/22 4/06/22 6/22/22 7/27/22 1/11/23 2/22/23 3/15/23 3/29/23 4/12/23 6/21/23 7/19/23   1. Walk at normal pace without a cane 3/10 3/10 due to speed or knee bending -8/10 initially, decreases with continued walking 6/10 8-9/10 can do this, not long distances  Slow due to respiratory and conditioning Notes slowed pace, doesn't use cane. Doesn't use 8/10, not as fast as previously Varies day to day, today -8/10 due to right knee pain Walks a little slower, 7-8/10 Walks without cane   2. Grocery shop without significant pain 4/10 7-8/10 7/10 7-8/10 8/10  Haven't done recently, will do today Did well most of grocery shopping, but a lot of pain at the end, new, large grocery store, about 1 hour. Not in last week Usually does pretty well, worse if standing in line a while 8/10, still gets significant pain but towards the end, or if standing in soraya.  Haven't done in a while, haven't needed to, wouldn't have worked Sat or Sunday. Did okay last time grocery shopping, hurt the next day. 3.   Dance 1/10 2/10 0/10 4/10 7/10, getting better every week, working in coordination  Not right now   Can do dance stretch video, movement ones are too fast, except for movement for seniors Not doing this yet, doing in water; doing land-based dance stretches at home Haven't attempted since weekend, can do dance-based exercise, turns are chalenging    4. Move without pain and to not get tired as frequently   7/10 5-6/10 7/10 6-7/10   Can do an hour maximum, that's a combination of standing and sitting  Can do this 7/10, getting better (10/10 would be getting mail without pain, going up and down a half flight of stairs, not as affected by weather)       Precautions - Hashimoto's, autoimmune disease    Mobility Measures 1/11/23 2/22/23 3/15/23 4/12/23 5/17/23 6/21/23 7/19/23   Assistive device used? /65  80 bpm       /68  73 bpm 110 bpm   99% SpO2   5 Time Sit to Stand  (17" chair, arms across chest) Arms in front  15.1 seconds   Unable, pain about right medial knee, proximal knee  Able from 18" surface without arms pushing on chair  __  About 32 seconds, one posterior loss of balance 18 seconds  17.3 seconds Arms across chest 37 seconds     3 Meter Timed  Up & Go 10.8 seconds 16.0 seconds  11.7 seconds 10.6 9.5 seconds 9.1 seconds   Walking speed      0.85 meters/second    Functional Gait Assessment (see below)    18/30 20/30 19/30 21/30   6 Minute Walk Test (100 foot circular course)   1005 feet  101 bpm  96% SpO2 850 feet  98 bpm  98% SpO2     950 feet    830 feet 3/29/23 1025 feet  No assistive device 900 feet  No assistive device 1004 feet no  feet no AD   Patient-Reported Outcome Measure:  Activities-Specific Balance Confidence Scale (ABC Scale)          Right knee flexion PROM, sitting 95 degrees  (115 degrees left) 86 degrees 90 degrees    96 degrees 3/29/23 95 degrees 98 degrees 99 degrees 95 degrees             Functional Gait Assessment  2/3 Gait level surface  3/3 Change in gait speed  3/3 Gait with horizontal head turns  3/3 Gait with vertical head turns  3/3 Gait and pivot turn  1/3 Step over obstacle  3/3 Gait with narrow base of support  1/3 Gait with eyes closed  1/3 Ambulating backwards  1/3 Steps  21/30 Total score (less than 22/30 indicates increased risk of fall). .    SciFit level 1, 12 min, 90 spm, 12 min    Stepping over 5" wide by 2" tall cones in parallel bars to force knee flexion, leading with either leg, 4 min  Same laterally 2 min    Standing heel raise increased L WB, about 10    Standing hip abduction, yellow band, 2 sets of 10 each     Sidestepping in parallel bars, 2 min    L SLR supine, 2 lbs ankle, about 10 x 2 sets    hooklying bent knee stretch 40 sec    LAQ isometric 2 lbs left ankle    Overground walking 300 feet with cane    Seated knee extension stretch with concurrent icing AP knee 5 min     ASSESSMENT   We modified Yuni's exercise program due to new onset left knee pain, focusing more on non-weight bearing, slow walking with cane, weight bearing in limited knee excursion. It did seem to get a large way back to baseline prior to set-back with kneeling yesterday. Use similar methods to return to baseline again. It would be great to get back to gym / exercise-class-based movements, but it seems the [place] will be closed for a while, and there aren't other close alternatives that take [employer]akers. SHORT-TERM GOALS: through 9/19/23  1. Kaylyn Knutson walks at least 10 minutes consecutively. MET. 3. Kaylyn Knutson reports 50% improvement in ability to walk grocery store distances without severe fatigue. MET. 4. Demonstrates at least 11 degrees passive right dorsiflexion by 2/12/22. MET. New goal: When released by physician, tolerates at least 6 minutes consecutive overground walking without walking boot. MET. New goal: When released by physician, tolerates at least 10 bilateral heel raises  MET.   New goal: When released by physician, walks with step length without one inch of contralateral leg. MET. New goal: Walks at least 1350 feet during 6 minute walk test.  NOT MET. New goal: Scores at least 20/30 on FGA.   MET. New goal: Able to purposefully walk for exercise at least 15 minutes at a time at least 3-4 times per week. MET. LONG-TERM GOALS: through 9/19/23  1. Patient reports at least 20 total minutes per day of overground walking for exercise. PROGRESSING. 2. Patient independently manages home exercise program.  MET with continued progressions. New maintenance goals:  1. Walk 15 minutes consecutively for community ambulation and exercise. MET but not able every day or sometimes consecutive days  2. Manages 150 minutes of moderate intensity aerobic exercise per week. PROGRESSING. 3. Maintains at least 1000 feet during 6 minute walk test, at most 15 seconds 5 times sit to stand. NOT MET FOR 6MWT. PLAN OF CARE:  Patient will benefit from physical therapy 1 time per week to 1x/2 weeks for 2 month  Neuromuscular re-education, therapeutic exercises, and therapeutic activities as outlined in grids.

## 2023-08-14 ENCOUNTER — TELEPHONE (OUTPATIENT)
Dept: FAMILY MEDICINE CLINIC | Facility: CLINIC | Age: 63
End: 2023-08-14

## 2023-08-14 NOTE — TELEPHONE ENCOUNTER
Harish Xavier started on 08/10/2023-is a little better now"-asked what to eat,if drinking Gatorade or green tea is ok

## 2023-08-15 DIAGNOSIS — R09.82 POST-NASAL DRIP: ICD-10-CM

## 2023-08-15 RX ORDER — AZELASTINE HYDROCHLORIDE 137 UG/1
SPRAY, METERED NASAL
Qty: 90 ML | Refills: 1 | Status: SHIPPED | OUTPATIENT
Start: 2023-08-15

## 2023-08-15 NOTE — TELEPHONE ENCOUNTER
Patient called she is asking what she should take for her stomach cramping, Advil or Ibuprofen, she took Tylenol and it didn't work, or do you prefer something else? Also, she wants to know if she should get any lab work done at this time? Please advise patient at [phone].

## 2023-08-16 ENCOUNTER — APPOINTMENT (OUTPATIENT)
Facility: REHABILITATION | Age: 63
End: 2023-08-16
Payer: MEDICARE

## 2023-08-17 ENCOUNTER — HOSPITAL ENCOUNTER (OUTPATIENT)
Dept: ULTRASOUND IMAGING | Facility: HOSPITAL | Age: 63
End: 2023-08-17
Attending: FAMILY MEDICINE
Payer: MEDICARE

## 2023-08-17 ENCOUNTER — TELEPHONE (OUTPATIENT)
Age: 63
End: 2023-08-17

## 2023-08-17 ENCOUNTER — PATIENT MESSAGE (OUTPATIENT)
Dept: NEUROLOGY | Facility: CLINIC | Age: 63
End: 2023-08-17

## 2023-08-17 ENCOUNTER — OFFICE VISIT (OUTPATIENT)
Dept: FAMILY MEDICINE CLINIC | Facility: CLINIC | Age: 63
End: 2023-08-17
Payer: MEDICARE

## 2023-08-17 VITALS
SYSTOLIC BLOOD PRESSURE: 140 MMHG | HEART RATE: 96 BPM | TEMPERATURE: 98.3 F | BODY MASS INDEX: 38.25 KG/M2 | WEIGHT: 238 LBS | DIASTOLIC BLOOD PRESSURE: 84 MMHG | HEIGHT: 66 IN | OXYGEN SATURATION: 98 %

## 2023-08-17 DIAGNOSIS — R19.7 DIARRHEA, UNSPECIFIED TYPE: ICD-10-CM

## 2023-08-17 DIAGNOSIS — R10.9 ABDOMINAL PAIN, UNSPECIFIED ABDOMINAL LOCATION: ICD-10-CM

## 2023-08-17 DIAGNOSIS — M79.18 MYOFASCIAL PAIN: ICD-10-CM

## 2023-08-17 DIAGNOSIS — J45.20 MILD INTERMITTENT ASTHMA WITHOUT COMPLICATION: ICD-10-CM

## 2023-08-17 DIAGNOSIS — E03.8 HYPOTHYROIDISM DUE TO HASHIMOTO'S THYROIDITIS: Primary | ICD-10-CM

## 2023-08-17 DIAGNOSIS — E06.3 HYPOTHYROIDISM DUE TO HASHIMOTO'S THYROIDITIS: Primary | ICD-10-CM

## 2023-08-17 DIAGNOSIS — I10 ESSENTIAL HYPERTENSION: ICD-10-CM

## 2023-08-17 DIAGNOSIS — R10.9 RIGHT SIDED ABDOMINAL PAIN: Primary | ICD-10-CM

## 2023-08-17 PROCEDURE — 99214 OFFICE O/P EST MOD 30 MIN: CPT | Performed by: FAMILY MEDICINE

## 2023-08-17 PROCEDURE — 76700 US EXAM ABDOM COMPLETE: CPT

## 2023-08-17 RX ORDER — LEVOTHYROXINE SODIUM 0.2 MG/1
200 TABLET ORAL DAILY
Qty: 90 TABLET | Refills: 0 | Status: SHIPPED | OUTPATIENT
Start: 2023-08-17 | End: 2023-08-17

## 2023-08-17 RX ORDER — LOPERAMIDE HYDROCHLORIDE 2 MG/1
CAPSULE ORAL
Qty: 30 CAPSULE | Refills: 0 | Status: SHIPPED | OUTPATIENT
Start: 2023-08-17 | End: 2023-08-17

## 2023-08-17 RX ORDER — LEVOTHYROXINE SODIUM 0.2 MG/1
200 TABLET ORAL DAILY
Qty: 90 TABLET | Refills: 0 | Status: SHIPPED | OUTPATIENT
Start: 2023-08-17

## 2023-08-17 RX ORDER — LOPERAMIDE HYDROCHLORIDE 2 MG/1
CAPSULE ORAL
Qty: 30 CAPSULE | Refills: 0 | Status: SHIPPED | OUTPATIENT
Start: 2023-08-17

## 2023-08-17 RX ORDER — IBUPROFEN 800 MG/1
800 TABLET ORAL EVERY 6 HOURS PRN
Qty: 30 TABLET | Refills: 0 | Status: SHIPPED | OUTPATIENT
Start: 2023-08-17

## 2023-08-17 NOTE — PROGRESS NOTES
Chief Complaint   Patient presents with   • Abdominal Pain   • Diarrhea     X  1 wk. Name: Inez Delgadillo      : 1960      MRN: [id number]  Encounter Provider: Claude Call, MD  Encounter Date: 2023   Encounter department: [address] [place] [address]     She does have quite a bit of tenderness in her right lower quadrant on palpation. We will ultrasound abdomen as she does have a contrast allergy. We will send Imodium for diarrhea, however she may not need to use this as it is improving on its own. Continue inhalers for asthma. Blood pressure slightly elevated 140/84, continue blood pressure regimen as prescribed. Levothyroxine refill sent. Ibuprofen sent for myofascial pain. We will call patient with results. RTC as needed otherwise    1. Hypothyroidism due to Hashimoto's thyroiditis  -     levothyroxine 200 mcg tablet; Take 1 tablet (200 mcg total) by mouth daily    2. Mild intermittent asthma without complication    3. Essential hypertension    4. Diarrhea, unspecified type  -     loperamide (IMODIUM) 2 mg capsule; Take 2 capsules now, then 1 capsule after every diarrhea episode to a maximum of 16mg per day    5. Abdominal pain, unspecified abdominal location  -     loperamide (IMODIUM) 2 mg capsule; Take 2 capsules now, then 1 capsule after every diarrhea episode to a maximum of 16mg per day  -     US abdomen complete; Future; Expected date: 2023    6. Myofascial pain  -     ibuprofen (MOTRIN) 800 mg tablet; Take 1 tablet (800 mg total) by mouth every 6 (six) hours as needed for mild pain           Subjective      She presents today to discuss a weeklong history of abdominal pain and diarrhea. States that she has pain mostly in the right lower quadrant of her abdomen. Diarrhea is getting better. Denies any blood in her stool. COVID-negative at home. Requesting refill of her levothyroxine as well as ibuprofen.     Review of Systems Constitutional: Negative for activity change, appetite change, chills, fatigue and fever. HENT: Negative for congestion, rhinorrhea, sneezing and sore throat. Eyes: Negative for pain, discharge, redness and itching. Respiratory: Negative for cough, chest tightness, shortness of breath and wheezing. Cardiovascular: Negative for chest pain and palpitations. Gastrointestinal: Positive for abdominal pain and diarrhea. Negative for abdominal distention, constipation, nausea and vomiting. Musculoskeletal: Positive for myalgias. Negative for arthralgias, back pain and joint swelling. Skin: Negative for rash. Neurological: Negative for dizziness, weakness, numbness and headaches. Hematological: Negative for adenopathy. Psychiatric/Behavioral: Negative for dysphoric mood. All other systems reviewed and are negative.       Current Outpatient Medications on File Prior to Visit   Medication Sig   • albuterol (2.5 mg/3 mL) 0.083 % nebulizer solution Take 3 mL (2.5 mg total) by nebulization every 6 (six) hours as needed for wheezing or shortness of breath   • Azelastine HCl 137 MCG/SPRAY SOLN 1 SPRAY PER NOSTRIL IN THE MORNING   • Blood Glucose Monitoring Suppl (ONETOUCH VERIO IQ SYSTEM) w/Device KIT Check BG daily dx E11.9   • colestipol (COLESTID) 1 g tablet take 1 tablet by mouth twice a day   • Cyanocobalamin (Vitamin B-12) 1000 MCG SUBL Place under the tongue   • Diclofenac Sodium (VOLTAREN) 1 % Apply 2 g topically 4 (four) times a day   • dicyclomine (BENTYL) 20 mg tablet Take 1 tablet (20 mg total) by mouth every 6 (six) hours As needed for abdominal pain   • dulaglutide (Trulicity) 2.86 MK/5.1KB injection Inject 0.5 mL (0.75 mg total) under the skin once a week   • DULoxetine (CYMBALTA) 60 mg delayed release capsule Take 1 capsule (60 mg total) by mouth daily   • ergocalciferol (VITAMIN D2) 50,000 units TAKE 1 CAPSULE BY MOUTH ONE TIME PER WEEK   • gabapentin (NEURONTIN) 100 mg capsule Take 1 capsule (100 mg total) by mouth 2 (two) times a day   • glucose blood (OneTouch Verio) test strip Use check BG twice daily dx E11.9   • glucose blood (OneTouch Verio) test strip Test once daily DX E11.9   • hydrocortisone 2.5 % cream Apply topically 3 (three) times a day as needed for irritation or rash   • hydroxychloroquine (PLAQUENIL) 200 mg tablet Take 1 tablet (200 mg total) by mouth 2 (two) times a day   • Lancets (OneTouch Delica Plus PLDCNU21L) MISC Check BG 1x daily DX E11.9   • lifitegrast (Xiidra) 5 % op solution Xiidra 5 % eye drops in a dropperette   • losartan (COZAAR) 50 mg tablet Take 1 tablet (50 mg total) by mouth daily   • magnesium oxide (MAG-OX) 400 mg tablet Take 1 tablet (400 mg total) by mouth daily   • metroNIDAZOLE (METROCREAM) 0.75 % cream metronidazole 0.75 % topical cream   • montelukast (SINGULAIR) 10 mg tablet TAKE 1 TABLET BY MOUTH AT BEDTIME   • oxyCODONE (Roxicodone) 5 immediate release tablet Take 1 tablet (5 mg total) by mouth every 8 (eight) hours as needed for moderate pain or severe pain Max Daily Amount: 15 mg   • rosuvastatin (CRESTOR) 5 mg tablet Take 1 tablet (5 mg total) by mouth daily   • Saline 0.65 % SOLN into each nostril 2 (two) times a day   • sodium chloride 0.9 % nebulizer solution Take 3 mL by nebulization as needed for wheezing or shortness of breath   • tiZANidine (ZANAFLEX) 2 mg tablet Take 1 tablet (2 mg total) by mouth every 8 (eight) hours as needed for muscle spasms   • Triamcinolone Acetonide (Nasacort Allergy) 55 MCG/ACT nasal spray SPRAY 2 SPRAYS INTO EACH NOSTRIL EVERY DAY   • [DISCONTINUED] ibuprofen (MOTRIN) 800 mg tablet Take 1 tablet (800 mg total) by mouth every 6 (six) hours as needed for mild pain   • [DISCONTINUED] levothyroxine 200 mcg tablet Take 1 tablet (200 mcg total) by mouth daily Pt takes Monday through Saturday   • doxycycline hyclate (VIBRAMYCIN) 100 mg capsule Take 100 mg by mouth 2 (two) times a day (Patient not taking: Reported on 5/8/2023)   • predniSONE 5 mg tablet 4 tabs x7 days, then 3 tabs x7 days, then 2 tabs x7 days, then 1 tab x7 days, then stop. (Patient not taking: Reported on 8/17/2023)       Objective     /84   Pulse 96   Temp 98.3 °F (36.8 °C)   Ht 5' 6" (1.676 m)   Wt 108 kg (238 lb)   SpO2 98%   BMI 38.41 kg/m²     Physical Exam  Vitals reviewed. Constitutional:       General: She is not in acute distress. Appearance: She is well-developed. She is not toxic-appearing or diaphoretic. HENT:      Head: Normocephalic and atraumatic. Nose: Nose normal.      Mouth/Throat:      Pharynx: No oropharyngeal exudate. Eyes:      General: No scleral icterus. Right eye: No discharge. Left eye: No discharge. Conjunctiva/sclera: Conjunctivae normal.   Cardiovascular:      Rate and Rhythm: Normal rate and regular rhythm. Pulses: Normal pulses. Heart sounds: Normal heart sounds. No murmur heard. Pulmonary:      Effort: Pulmonary effort is normal. No respiratory distress. Breath sounds: Normal breath sounds. No wheezing. Abdominal:      General: There is no distension. Palpations: Abdomen is soft. Tenderness: There is abdominal tenderness in the right lower quadrant. There is guarding. There is no rebound. Musculoskeletal:         General: No tenderness. Normal range of motion. Cervical back: Normal range of motion. Skin:     General: Skin is warm and dry. Coloration: Skin is not pale. Findings: No erythema. Neurological:      General: No focal deficit present. Mental Status: She is alert.    Psychiatric:         Mood and Affect: Mood normal.         Behavior: Behavior normal.       Claude Call, MD

## 2023-08-17 NOTE — TELEPHONE ENCOUNTER
Caller: Cody Shaw    Doctor: Nicole Huertas    Reason for call: Has been having severe abdominal and muscle pain in rib area, she went for an Ultra sound that came back negative and she is wondering if this could be related to the Connective tissue disease?   She does have an appt with a gastro but not until Sept. So she is just wondering    Call back#: [phone]

## 2023-08-17 NOTE — PATIENT COMMUNICATION
Patient called to schedule with Dr Eduardo Camejo. I didn't see any other notes she can be schedule as NP. Patient will call back tomorrow to speak with you.

## 2023-08-18 ENCOUNTER — TELEPHONE (OUTPATIENT)
Dept: FAMILY MEDICINE CLINIC | Facility: CLINIC | Age: 63
End: 2023-08-18

## 2023-08-18 NOTE — PATIENT COMMUNICATION
copied in the staff message:    MD Prerna Valdes  Cc: Seth Zapata  Please schedule with me. In the next 4-8 weeks would be great.           Previous Messages       ----- Message -----   From: James Guadalupe   Sent: 8/16/2023  10:55 AM EDT   To: Tatyana Morin MD; Misa Rizo [facility]! This patient was seen by you on 05/2/19, patient's neurologist retired  [employer]. She would like continuity of care with you, should I triage patient or go ahead and schedule next available?

## 2023-08-18 NOTE — TELEPHONE ENCOUNTER
Caller: Patient    Doctor: Shaheen Combs    Reason for call: Checking on the status of this message. She did advise that her PCP's office did contact her.     Call back#: n/a

## 2023-08-18 NOTE — TELEPHONE ENCOUNTER
----- Message from Gwyn Hanley MD sent at 8/17/2023  2:26 PM EDT -----  Please call patient with results of ultrasound. Does show some fatty liver. No other reasoning for patient's pain.   I did place an order for patient to see a gastroenterologist.  Please provide patient with information

## 2023-08-18 NOTE — TELEPHONE ENCOUNTER
----- Message from Rahul Gomez MD sent at 8/17/2023  2:26 PM EDT -----  Please call patient with results of ultrasound. Does show some fatty liver. No other reasoning for patient's pain.   I did place an order for patient to see a gastroenterologist.  Please provide patient with information

## 2023-08-23 ENCOUNTER — OFFICE VISIT (OUTPATIENT)
Facility: REHABILITATION | Age: 63
End: 2023-08-23
Payer: MEDICARE
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
## 2023-08-23 PROCEDURE — 97110 THERAPEUTIC EXERCISES: CPT | Performed by: PHYSICAL THERAPIST

## 2023-08-23 NOTE — PROGRESS NOTES
90d RF- visit needed for any further.   Told at visit - f/u 3 mo. If would have done that - wouldn't have run out.  schedule AWE   PHYSICAL THERAPY TREATMENT / PROGRESS NOTE / MAINTENANCE     Date: 23  Name: Ade Corrigan  : 1960  Referring Provider: Clarisa Lopez DO  AUTHORIZATION:   Insurance: Payor: Renato Pressley / Plan: MEDICARE A AND B / Product Type: Medicare A & B Fee for Service /     SUBJECTIVE:  HPI: Ade Corrigan is a 70 y.o. female referred to outpatient physical therapy for the following diagnosis   Encounter Diagnosis   Name Primary? • Fall, subsequent encounter Yes     Had stomach issue last week, unsure of cause. Getting around with less knee pain and no assistive device. Patient goals:   -getting back to the pool    Patient-Specific Functional Scale   Task is scored 0 (unable to perform activity) to 10 (ability to perform activity independently)  Activity 10/20/21 11/17/21 2/09/22 4/06/22 6/22/22 7/27/22 1/11/23 2/22/23 3/15/23 3/29/23 4/12/23 6/21/23 7/19/23   1. Walk at normal pace without a cane 3/10 3/10 due to speed or knee bending -8/10 initially, decreases with continued walking 6/10 8-9/10 can do this, not long distances  Slow due to respiratory and conditioning Notes slowed pace, doesn't use cane. Doesn't use 8/10, not as fast as previously Varies day to day, today -8/10 due to right knee pain Walks a little slower, -8/10 Walks without cane   2. Grocery shop without significant pain 4/10 7-8/10 7/10 7-8/10 8/10  Haven't done recently, will do today Did well most of grocery shopping, but a lot of pain at the end, new, large grocery store, about 1 hour. Not in last week Usually does pretty well, worse if standing in line a while 8/10, still gets significant pain but towards the end, or if standing in soraya. Haven't done in a while, haven't needed to, wouldn't have worked Sat or . Did okay last time grocery shopping, hurt the next day.    3.   Dance 1/10 2/10 0/10 4/10 7/10, getting better every week, working in coordination  Not right now   Can do dance stretch video, movement ones are too fast, except for movement for seniors Not doing this yet, doing in water; doing land-based dance stretches at home Haven't attempted since weekend, can do dance-based exercise, turns are chalenging    4. Move without pain and to not get tired as frequently   7/10 5-6/10 7/10 6-7/10   Can do an hour maximum, that's a combination of standing and sitting  Can do this 7/10, getting better (10/10 would be getting mail without pain, going up and down a half flight of stairs, not as affected by weather)       Precautions - Hashimoto's, autoimmune disease    Mobility Measures 1/11/23 2/22/23 3/15/23 4/12/23 5/17/23 6/21/23 7/19/23 8/23/23   Assistive device used? /65  80 bpm       /68  73 bpm 110 bpm   99% SpO2 none   5 Time Sit to Stand  (17" chair, arms across chest) Arms in front  15.1 seconds   Unable, pain about right medial knee, proximal knee  Able from 18" surface without arms pushing on chair  __  About 32 seconds, one posterior loss of balance 18 seconds  17.3 seconds Arms across chest 37 seconds   31 seconds   3 Meter Timed  Up & Go 10.8 seconds 16.0 seconds  11.7 seconds 10.6 9.5 seconds 9.1 seconds 10.6 sec     Walking speed      0.85 meters/second     Functional Gait Assessment (see below)    18/30 20/30 19/30 21/30 21/30   6 Minute Walk Test (100 foot circular course)   1005 feet  101 bpm  96% SpO2 850 feet  98 bpm  98% SpO2     950 feet    830 feet 3/29/23 1025 feet  No assistive device 900 feet  No assistive device 1004 feet no  feet no  feet no AD   Patient-Reported Outcome Measure:  Activities-Specific Balance Confidence Scale (ABC Scale)           Right knee flexion PROM, sitting 95 degrees  (115 degrees left) 86 degrees 90 degrees    96 degrees 3/29/23 95 degrees 98 degrees 99 degrees 95 degrees 94 degrees              Functional Gait Assessment  2/3 Gait level surface  3/3 Change in gait speed  3/3 Gait with horizontal head turns  3/3 Gait with vertical head turns  3/3 Gait and pivot turn  2/3 Step over obstacle  1/3 Gait with narrow base of support  1/3 Gait with eyes closed  2/3 Ambulating backwards  1/3 Steps  21/30 Total score (less than 22/30 indicates increased risk of fall). .    Step ups 6" treadmill, L railing  10-12 on R x 2 sets  10-12 on L     Squat while looking at ceiling (to focus on flexing knees)  Knuckles to 18" surface, about 10  Then to 17" surface, about 10    Standing heel raise, railing, one sided, about 10 each x 2 sets    Standing hip abduction, yellow band, 2 sets of 10 each    SciFit level 2, 1 min, about 90 spm, 10 min     ASSESSMENT   Kenny Velásquez again is moving in the right direction with her mobility after prior left knee pain, progressing to walking without an assistive device again. Gait speed is slower than previously, but anticipate further improvement to prior level of function. It would be great to get back to gym / exercise-class-based movements, but it seems the [place] will be closed for a while, and there aren't other close alternatives that take Silver Sneakers. Continue to adjust and problem solve to maintain mobility. SHORT-TERM GOALS: through 9/19/23  1. Kenny Velásquez walks at least 10 minutes consecutively. MET. 3. Kenny Velásquez reports 50% improvement in ability to walk grocery store distances without severe fatigue. MET. 4. Demonstrates at least 11 degrees passive right dorsiflexion by 2/12/22. MET. New goal: When released by physician, tolerates at least 6 minutes consecutive overground walking without walking boot. MET. New goal: When released by physician, tolerates at least 10 bilateral heel raises  MET. New goal: When released by physician, walks with step length without one inch of contralateral leg. MET. New goal: Walks at least 1350 feet during 6 minute walk test.  NOT MET. New goal: Scores at least 20/30 on FGA.   MET. New goal: Able to purposefully walk for exercise at least 15 minutes at a time at least 3-4 times per week. NOT MET. Joslyn Bartlett LONG-TERM GOALS: through 9/19/23  1. Patient reports at least 20 total minutes per day of overground walking for exercise. PROGRESSING. 2. Patient independently manages home exercise program.  MET with continued progressions. New maintenance goals:  1. Walk 15 minutes consecutively for community ambulation and exercise. MET but not able every day or sometimes consecutive days  2. Manages 150 minutes of moderate intensity aerobic exercise per week. MET but not consistently. 3. Maintains at least 1000 feet during 6 minute walk test, at most 15 seconds 5 times sit to stand. NOT MET FOR 6MWT. PLAN OF CARE:  Patient will benefit from physical therapy 1 time per week to 1x/2 weeks for 2 months  Neuromuscular re-education, therapeutic exercises, and therapeutic activities as outlined in grids.

## 2023-08-30 ENCOUNTER — OFFICE VISIT (OUTPATIENT)
Facility: REHABILITATION | Age: 63
End: 2023-08-30
Payer: MEDICARE
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
## 2023-08-30 PROCEDURE — 97110 THERAPEUTIC EXERCISES: CPT | Performed by: PHYSICAL THERAPIST

## 2023-08-30 NOTE — PROGRESS NOTES
PHYSICAL THERAPY TREATMENT / PROGRESS NOTE / MAINTENANCE     Date: 23  Name: Venu Kearns  : 1960  Referring Provider: Yrn Campos DO  AUTHORIZATION:   Insurance: Payor: Hever Burgos / Plan: MEDICARE A AND B / Product Type: Medicare A & B Fee for Service /     SUBJECTIVE:  HPI: Venu Kearns is a 70 y.o. female referred to outpatient physical therapy for the following diagnosis   Encounter Diagnosis   Name Primary? • Fall, subsequent encounter Yes     Left knee brace back on because of yesterday, did a lot of deep cleaning, did well for 3 hours, hurt, sat down and then continued, then was able to continue but it hurt. Carrying and lifting and cleaning and rearranging, has thoracic region pain. Patient goals:   -getting back to the pool    Patient-Specific Functional Scale   Task is scored 0 (unable to perform activity) to 10 (ability to perform activity independently)  Activity 10/20/21 11/17/21 2/09/22 4/06/22 6/22/22 7/27/22 1/11/23 2/22/23 3/15/23 3/29/23 4/12/23 6/21/23 7/19/23   1. Walk at normal pace without a cane 3/10 3/10 due to speed or knee bending 7-8/10 initially, decreases with continued walking 6/10 8-9/10 can do this, not long distances  Slow due to respiratory and conditioning Notes slowed pace, doesn't use cane. Doesn't use 10, not as fast as previously Varies day to day, today 7-810 due to right knee pain Walks a little slower, 7-810 Walks without cane   2. Grocery shop without significant pain 4/10 7-8/10 7/10 7-8/10 8/10  Haven't done recently, will do today Did well most of grocery shopping, but a lot of pain at the end, new, large grocery store, about 1 hour. Not in last week Usually does pretty well, worse if standing in line a while 8/10, still gets significant pain but towards the end, or if standing in soraya. Haven't done in a while, haven't needed to, wouldn't have worked Sat or . Did okay last time grocery shopping, hurt the next day.    3.   Dance 1/10 2/10 0/10 4/10 7/10, getting better every week, working in coordination  Not right now   Can do dance stretch video, movement ones are too fast, except for movement for seniors Not doing this yet, doing in water; doing land-based dance stretches at home Haven't attempted since weekend, can do dance-based exercise, turns are chalenging    4. Move without pain and to not get tired as frequently   7/10 5-6/10 7/10 6-7/10   Can do an hour maximum, that's a combination of standing and sitting  Can do this 7/10, getting better (10/10 would be getting mail without pain, going up and down a half flight of stairs, not as affected by weather)       Precautions - Hashimoto's, autoimmune disease    Mobility Measures 1/11/23 2/22/23 3/15/23 4/12/23 5/17/23 6/21/23 7/19/23 8/23/23   Assistive device used? /65  80 bpm       /68  73 bpm 110 bpm   99% SpO2 none   5 Time Sit to Stand  (17" chair, arms across chest) Arms in front  15.1 seconds   Unable, pain about right medial knee, proximal knee  Able from 18" surface without arms pushing on chair  __  About 32 seconds, one posterior loss of balance 18 seconds  17.3 seconds Arms across chest 37 seconds   31 seconds   3 Meter Timed  Up & Go 10.8 seconds 16.0 seconds  11.7 seconds 10.6 9.5 seconds 9.1 seconds 10.6 sec     Walking speed      0.85 meters/second     Functional Gait Assessment (see below)    18/30 20/30 19/30 21/30 21/30   6 Minute Walk Test (100 foot circular course)   1005 feet  101 bpm  96% SpO2 850 feet  98 bpm  98% SpO2     950 feet    830 feet 3/29/23 1025 feet  No assistive device 900 feet  No assistive device 1004 feet no  feet no  feet no AD   Patient-Reported Outcome Measure:  Activities-Specific Balance Confidence Scale (ABC Scale)           Right knee flexion PROM, sitting 95 degrees  (115 degrees left) 86 degrees 90 degrees    96 degrees 3/29/23 95 degrees 98 degrees 99 degrees 95 degrees 94 degrees                Treadmill walking   1.4 mph 2 min  1.6 mph 8 min, 11-12/20 RPE     Step ups 6" treadmill, L railing  10-12 on R x 2 sets  10-12 on L x 2 sets  Single leg heel raise, about 10 each x 2 sets    Standing ITB stretch to localize stretch to left lateral hip, 1 min    Standing hip abduction, red band, 2 sets of 10 each    Squat while looking at ceiling (to focus on flexing knees)  Knuckles to 16.5" surface, about 10 x 3 sets    sidelying thoracic sidebending stretch, R reaching, 45 sec  Overhead and cross body reaching from seated position, 1.5 min    SciFit level 2.5, 1 min, about 90 spm, 10 min       ASSESSMENT   Not able to advance depth of step-ups but able to mildly improve depth of squatting. Faster gait speeds today again, but not quite back to baseline. [facility] is starting to re-open more parts, may inquire about timeline for pool (or it may be open). Previously did well with community-based exercise programs. SHORT-TERM GOALS: through 9/19/23  1. Ihsan Delarosa walks at least 10 minutes consecutively. MET. 3. Ihsan Delarosa reports 50% improvement in ability to walk grocery store distances without severe fatigue. MET. 4. Demonstrates at least 11 degrees passive right dorsiflexion by 2/12/22. MET. New goal: When released by physician, tolerates at least 6 minutes consecutive overground walking without walking boot. MET. New goal: When released by physician, tolerates at least 10 bilateral heel raises  MET. New goal: When released by physician, walks with step length without one inch of contralateral leg. MET. New goal: Walks at least 1350 feet during 6 minute walk test.  NOT MET. New goal: Scores at least 20/30 on FGA.   MET. New goal: Able to purposefully walk for exercise at least 15 minutes at a time at least 3-4 times per week. NOT MET. Kenyon Pedroza LONG-TERM GOALS: through 9/19/23  1. Patient reports at least 20 total minutes per day of overground walking for exercise. PROGRESSING.   2. Patient independently manages home exercise program.  MET with continued progressions. New maintenance goals:  1. Walk 15 minutes consecutively for community ambulation and exercise. MET but not able every day or sometimes consecutive days  2. Manages 150 minutes of moderate intensity aerobic exercise per week. MET but not consistently. 3. Maintains at least 1000 feet during 6 minute walk test, at most 15 seconds 5 times sit to stand. NOT MET FOR 6MWT. PLAN OF CARE:  Patient will benefit from physical therapy 1 time per week to 1x/2 weeks for 2 months  Neuromuscular re-education, therapeutic exercises, and therapeutic activities as outlined in grids.

## 2023-09-06 ENCOUNTER — OFFICE VISIT (OUTPATIENT)
Facility: REHABILITATION | Age: 63
End: 2023-09-06
Payer: MEDICARE
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
## 2023-09-06 PROCEDURE — 97110 THERAPEUTIC EXERCISES: CPT | Performed by: PHYSICAL THERAPIST

## 2023-09-06 NOTE — PROGRESS NOTES
PHYSICAL THERAPY TREATMENT / MAINTENANCE     Date: 23  Name: Kat Lewis  : 1960  Referring Provider: Linwood Denver, DO  AUTHORIZATION:   Insurance: Payor: Lisseth Lover / Plan: MEDICARE A AND B / Product Type: Medicare A & B Fee for Service /     SUBJECTIVE:  HPI: Kat Lewis is a 70 y.o. female referred to outpatient physical therapy for the following diagnosis   Encounter Diagnosis   Name Primary? • Fall, subsequent encounter Yes     Reports numbness about the digits 4-5 on both arms when doing tasks that involve having both hands in front of her at a time. Moving okay this week. [place] still not yet open to public. Patient goals:   -getting back to the pool    Patient-Specific Functional Scale   Task is scored 0 (unable to perform activity) to 10 (ability to perform activity independently)  Activity 10/20/21 11/17/21 2/09/22 4/06/22 6/22/22 7/27/22 1/11/23 2/22/23 3/15/23 3/29/23 4/12/23 6/21/23 7/19/23   1. Walk at normal pace without a cane 3/10 3/10 due to speed or knee bending -8/10 initially, decreases with continued walking 6/10 8-9/10 can do this, not long distances  Slow due to respiratory and conditioning Notes slowed pace, doesn't use cane. Doesn't use 10, not as fast as previously Varies day to day, today -8/10 due to right knee pain Walks a little slower, -8/10 Walks without cane   2. Grocery shop without significant pain 4/10 7-8/10 7/10 7-8/10 8/10  Haven't done recently, will do today Did well most of grocery shopping, but a lot of pain at the end, new, large grocery store, about 1 hour. Not in last week Usually does pretty well, worse if standing in line a while 8/10, still gets significant pain but towards the end, or if standing in [place]aya. Haven't done in a while, haven't needed to, wouldn't have worked Sat or . Did okay last time grocery shopping, hurt the next day.    3.   Dance 1/10 2/10 0/10 4/10 7/10, getting better every week, working in coordination  Not right now   Can do dance stretch video, movement ones are too fast, except for movement for seniors Not doing this yet, doing in water; doing land-based dance stretches at home Haven't attempted since weekend, can do dance-based exercise, turns are chalenging    4. Move without pain and to not get tired as frequently   7/10 5-6/10 7/10 6-7/10   Can do an hour maximum, that's a combination of standing and sitting  Can do this 7/10, getting better (10/10 would be getting mail without pain, going up and down a half flight of stairs, not as affected by weather)       Precautions - Hashimoto's, autoimmune disease    Mobility Measures 1/11/23 2/22/23 3/15/23 4/12/23 5/17/23 6/21/23 7/19/23 8/23/23   Assistive device used? /65  80 bpm       /68  73 bpm 110 bpm   99% SpO2 none   5 Time Sit to Stand  (17" chair, arms across chest) Arms in front  15.1 seconds   Unable, pain about right medial knee, proximal knee  Able from 18" surface without arms pushing on chair  __  About 32 seconds, one posterior loss of balance 18 seconds  17.3 seconds Arms across chest 37 seconds   31 seconds   3 Meter Timed  Up & Go 10.8 seconds 16.0 seconds  11.7 seconds 10.6 9.5 seconds 9.1 seconds 10.6 sec     Walking speed      0.85 meters/second     Functional Gait Assessment (see below)    18/30 20/30 19/30 21/30 21/30   6 Minute Walk Test (100 foot circular course)   1005 feet  101 bpm  96% SpO2 850 feet  98 bpm  98% SpO2     950 feet    830 feet 3/29/23 1025 feet  No assistive device 900 feet  No assistive device 1004 feet no  feet no  feet no AD   Patient-Reported Outcome Measure:  Activities-Specific Balance Confidence Scale (ABC Scale)           Right knee flexion PROM, sitting 95 degrees  (115 degrees left) 86 degrees 90 degrees    96 degrees 3/29/23 95 degrees 98 degrees 99 degrees 95 degrees 94 degrees                Treadmill walking   1.5 mph 10 minutes    Step ups 7" surface, L railing  10-12 on R x 2 forwards,  5 laterally  10-12 on L x 2 forwards, 5 laterally    Currently 2/10 numbness left lateral to medial digits and lateral arm  No change with end-range neck flexion and extension with overpressure, nor quadrant test, nor end-range rotation. Left sided numbness provoked with Reverse Phallen's Test or with pressure over the left posterior elbow    Supine L sided ulnar nerve tensioning and seated ulnar nerve flossing, 5 min    Standing hip abduction, green band, 2 sets of 10 each    Squat while looking at ceiling (to focus on flexing knees)  Knuckles to 14.5" surface, about 10 x 2 sets    SciFit level 2.5, about 90 spm, 20 min       ASSESSMENT   Again able to advance depth of step-ups and able to mildly improve depth of squatting. New onset paresthesias consistent with ulnar neuropathy, not involving the cervical spine. Instructed with written instructions in stretching the ulnar nerve. Based on [employer] closure will need to wait longer to rejoin community-based exercise programs. SHORT-TERM GOALS: through 9/19/23  1. Radha Wilkinson walks at least 10 minutes consecutively. MET. 3. Radha Wilkinson reports 50% improvement in ability to walk grocery store distances without severe fatigue. MET. 4. Demonstrates at least 11 degrees passive right dorsiflexion by 2/12/22. MET. New goal: When released by physician, tolerates at least 6 minutes consecutive overground walking without walking boot. MET. New goal: When released by physician, tolerates at least 10 bilateral heel raises  MET. New goal: When released by physician, walks with step length without one inch of contralateral leg. MET. New goal: Walks at least 1350 feet during 6 minute walk test.  NOT MET. New goal: Scores at least 20/30 on FGA.   MET. New goal: Able to purposefully walk for exercise at least 15 minutes at a time at least 3-4 times per week. NOT MET. Sylvia Wells LONG-TERM GOALS: through 9/19/23  1.  Patient reports at least 20 total minutes per day of overground walking for exercise. PROGRESSING. 2. Patient independently manages home exercise program.  MET with continued progressions. New maintenance goals:  1. Walk 15 minutes consecutively for community ambulation and exercise. MET but not able every day or sometimes consecutive days  2. Manages 150 minutes of moderate intensity aerobic exercise per week. MET but not consistently. 3. Maintains at least 1000 feet during 6 minute walk test, at most 15 seconds 5 times sit to stand. NOT MET FOR 6MWT. PLAN OF CARE:  Patient will benefit from physical therapy 1 time per week to 1x/2 weeks for 2 months  Neuromuscular re-education, therapeutic exercises, and therapeutic activities as outlined in grids.

## 2023-09-07 ENCOUNTER — OFFICE VISIT (OUTPATIENT)
Dept: GASTROENTEROLOGY | Facility: CLINIC | Age: 63
End: 2023-09-07
Payer: MEDICARE

## 2023-09-07 VITALS
WEIGHT: 238.6 LBS | BODY MASS INDEX: 38.35 KG/M2 | DIASTOLIC BLOOD PRESSURE: 82 MMHG | OXYGEN SATURATION: 99 % | HEIGHT: 66 IN | TEMPERATURE: 98.2 F | SYSTOLIC BLOOD PRESSURE: 130 MMHG | HEART RATE: 86 BPM

## 2023-09-07 DIAGNOSIS — R68.81 EARLY SATIETY: ICD-10-CM

## 2023-09-07 DIAGNOSIS — R10.9 ABDOMINAL PAIN, UNSPECIFIED ABDOMINAL LOCATION: ICD-10-CM

## 2023-09-07 DIAGNOSIS — K31.84 GASTROPARESIS: ICD-10-CM

## 2023-09-07 DIAGNOSIS — R19.7 DIARRHEA, UNSPECIFIED TYPE: Primary | ICD-10-CM

## 2023-09-07 DIAGNOSIS — A04.8 H. PYLORI INFECTION: ICD-10-CM

## 2023-09-07 PROCEDURE — 99213 OFFICE O/P EST LOW 20 MIN: CPT | Performed by: INTERNAL MEDICINE

## 2023-09-07 NOTE — PROGRESS NOTES
She has been diagnosed with connective tissue disease. In early August, she had explosive diarrhea for 5 days, now resolved. BMs are now back to normal, except when she eats salads and vegetables. She has has 1-2 BMs per day, formed unless she eats salad. Also has abdominal cramping. No early satiety. She is on hydroxychloroquine, tizanadine, gabapentin, oxycodone, magnesium oxide, cymbalta. No longer on Imodium or colestipol. No dysphagia.
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
## 2023-09-13 ENCOUNTER — OFFICE VISIT (OUTPATIENT)
Facility: REHABILITATION | Age: 63
End: 2023-09-13
Payer: MEDICARE
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
## 2023-09-13 PROCEDURE — 97110 THERAPEUTIC EXERCISES: CPT | Performed by: PHYSICAL THERAPIST
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
## 2023-09-18 DIAGNOSIS — M79.18 MYOFASCIAL PAIN: ICD-10-CM

## 2023-09-18 DIAGNOSIS — G70.9 NEUROMUSCULAR DISORDER (HCC): ICD-10-CM

## 2023-09-18 RX ORDER — DULOXETIN HYDROCHLORIDE 60 MG/1
60 CAPSULE, DELAYED RELEASE ORAL DAILY
Qty: 90 CAPSULE | Refills: 0 | Status: SHIPPED | OUTPATIENT
Start: 2023-09-18

## 2023-09-20 ENCOUNTER — OFFICE VISIT (OUTPATIENT)
Facility: REHABILITATION | Age: 63
End: 2023-09-20
Payer: MEDICARE
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
## 2023-09-20 PROCEDURE — 97110 THERAPEUTIC EXERCISES: CPT | Performed by: PHYSICAL THERAPIST

## 2023-09-20 NOTE — PROGRESS NOTES
PHYSICAL THERAPY TREATMENT / MAINTENANCE     Date: 23  Name: Michael Johnson  : 1960  Referring Provider: Jed Elder DO  AUTHORIZATION:   Insurance: Payor: Clair Camargo / Plan: MEDICARE A AND B / Product Type: Medicare A & B Fee for Service /     SUBJECTIVE:  HPI: Michael Johnson is a 70 y.o. female referred to outpatient physical therapy for the following diagnosis   No diagnosis found. Knees doing well until yesterday where she was assembling something under her sink. So sitting on floor with feet cramped up, knees became painful, so used ice and someone helped finish assembling shelving. Doing a lot of rearranging things at home. [facility] remains closed, likely for several months per website updates, will call [address] [place] [address] Dr once done rearranging things. Knees haven't significantly bothered her since last injection series. Patient goals:   -getting back to the pool    Patient-Specific Functional Scale   Task is scored 0 (unable to perform activity) to 10 (ability to perform activity independently)  Activity 10/20/21 11/17/21 2/09/22 4/06/22 6/22/22 1/11/23 2/22/23 3/15/23 3/29/23 4/12/23 6/21/23 7/19/23 9/20/23   1. Walk at normal pace without a cane 3/10 3/10 due to speed or knee bending -8/10 initially, decreases with continued walking 6/10 8-9/10 can do this, not long distances Slow due to respiratory and conditioning Notes slowed pace, doesn't use cane. Doesn't use 8/10, not as fast as previously Varies day to day, today 7-8/10 due to right knee pain Walks a little slower, 7-8/10 Walks without cane [place] without cane     2. Grocery shop without significant pain 4/10 7-8/10 7/10 7-8/10 8/10 Haven't done recently, will do today Did well most of grocery shopping, but a lot of pain at the end, new, large grocery store, about 1 hour.  Not in last week Usually does pretty well, worse if standing in line a while 8/10, still gets significant pain but towards the end, or if standing in soraya. Haven't done in a while, haven't needed to, wouldn't have worked Sat or Sunday. Did okay last time grocery shopping, hurt the next day. 3.   Dance 1/10 2/10 0/10 4/10 7/10, getting better every week, working in coordination Not right now   Can do dance stretch video, movement ones are too fast, except for movement for seniors Not doing this yet, doing in water; doing land-based dance stretches at home Haven't attempted since weekend, can do dance-based exercise, turns are chalenging     4. Move without pain and to not get tired as frequently   7/10 5-6/10 7/10 6-7/10  Can do an hour maximum, that's a combination of standing and sitting  Can do this 7/10, getting better (10/10 would be getting mail without pain, going up and down a half flight of stairs, not as affected by weather)        Precautions - Hashimoto's, autoimmune disease    Mobility Measures 1/11/23 2/22/23 3/15/23 4/12/23 5/17/23 6/21/23 7/19/23 8/23/23 9/20/23   Assistive device used?  /65  80 bpm       /68  73 bpm 110 bpm   99% SpO2 none none   5 Time Sit to Stand  (17" chair, arms across chest) Arms in front  15.1 seconds   Unable, pain about right medial knee, proximal knee  Able from 18" surface without arms pushing on chair  __  About 32 seconds, one posterior loss of balance 18 seconds  17.3 seconds Arms across chest 37 seconds   31 seconds Arms out front  24 seconds  Pain in left knee     3 Meter Timed  Up & Go 10.8 seconds 16.0 seconds  11.7 seconds 10.6 9.5 seconds 9.1 seconds 10.6 sec   11.8 sec  Mild antalgic gait on left     Walking speed      0.85 meters/second      Functional Gait Assessment (see below)    18/30 20/30 19/30 21/30 21/30    6 Minute Walk Test (100 foot circular course)   1005 feet  101 bpm  96% SpO2 850 feet  98 bpm  98% SpO2     950 feet    830 feet 3/29/23 1025 feet  No assistive device 900 feet  No assistive device 1004 feet no  feet no  feet no  feet on treadmill inferred from self-selected pace 1.2 mph    Patient-Reported Outcome Measure: Activities-Specific Balance Confidence Scale (ABC Scale)            Right knee flexion PROM, sitting 95 degrees  (115 degrees left) 86 degrees 90 degrees    96 degrees 3/29/23 95 degrees 98 degrees 99 degrees 95 degrees 94 degrees 93 degrees                 Functional Gait Assessment  1/3 Gait level surface  2/3 Change in gait speed  3/3 Gait with horizontal head turns  3/3 Gait with vertical head turns  3/3 Gait and pivot turn  1/3 Step over obstacle  0/3 Gait with narrow base of support  1/3 Gait with eyes closed  1/3 Ambulating backwards  1/3 Steps  16/30 Total score (less than 22/30 indicates increased risk of fall). .    Treadmill walking   1.2 mph 11 minutes    Flexing knees with partial squat, hands flat to 16.5" surface, while looking up, promoting knee flexion, about 10 x 2    Step ups 6" surface, 1 railing   10-12 on R x 1 forwards, 8" step,   10-12 on L x 1 forwards, 6" step    Standing hip abduction, green band, about 15    Heel raise, about 10 each single leg with arm support    SciFit level 2, about 90 spm, 10 min     Full thumb opposition    ASSESSMENT   Alexis Paul had some regression in mobility today due to left kne epani but otherwise is maintaining mobility. She is managing intermittent setbacks due to knee pain or ankle pain mainly. Recommend resuming a bit of deliberate walking, would benefit from [facility] but will likely explore [address] as [employer] will likely be closed another few months. No thumb stiffness present anymore. SHORT-TERM GOALS: through 9/19/23  1. Alexis Paul walks at least 10 minutes consecutively. MET. 3. Alexis Paul reports 50% improvement in ability to walk grocery store distances without severe fatigue. MET. 4. Demonstrates at least 11 degrees passive right dorsiflexion by 2/12/22. MET. New goal: When released by physician, tolerates at least 6 minutes consecutive overground walking without walking boot. MET.   New goal: When released by physician, tolerates at least 10 bilateral heel raises  MET. New goal: When released by physician, walks with step length without one inch of contralateral leg. MET. New goal: Walks at least 1350 feet during 6 minute walk test.  NOT MET. New goal: Scores at least 20/30 on FGA.   MET. New goal: Able to purposefully walk for exercise at least 15 minutes at a time at least 3-4 times per week. NOT MET. Kenyon Pedroza LONG-TERM GOALS: through 9/19/23  1. Patient reports at least 20 total minutes per day of overground walking for exercise. PROGRESSING. 2. Patient independently manages home exercise program.  MET with continued progressions. New maintenance goals:  1. Walk 15 minutes consecutively for community ambulation and exercise. MET but not able every day or sometimes consecutive days  2. Manages 150 minutes of moderate intensity aerobic exercise per week. MET but not consistently. 3. Maintains at least 1000 feet during 6 minute walk test, at most 15 seconds 5 times sit to stand. NOT MET FOR 6MWT. PLAN OF CARE:  Patient will benefit from physical therapy 1 time per week to 1x/2 weeks for 2 months, will start at 1x/2 weeks after 9/27/23  Neuromuscular re-education, therapeutic exercises, and therapeutic activities as outlined in grids.

## 2023-09-25 ENCOUNTER — OFFICE VISIT (OUTPATIENT)
Dept: FAMILY MEDICINE CLINIC | Facility: CLINIC | Age: 63
End: 2023-09-25
Payer: MEDICARE

## 2023-09-25 VITALS
BODY MASS INDEX: 37.96 KG/M2 | HEIGHT: 66 IN | HEART RATE: 89 BPM | OXYGEN SATURATION: 97 % | WEIGHT: 236.2 LBS | SYSTOLIC BLOOD PRESSURE: 134 MMHG | DIASTOLIC BLOOD PRESSURE: 64 MMHG

## 2023-09-25 DIAGNOSIS — M79.18 MYOFASCIAL PAIN: ICD-10-CM

## 2023-09-25 DIAGNOSIS — Z12.31 ENCOUNTER FOR SCREENING MAMMOGRAM FOR BREAST CANCER: ICD-10-CM

## 2023-09-25 DIAGNOSIS — E03.8 HYPOTHYROIDISM DUE TO HASHIMOTO'S THYROIDITIS: ICD-10-CM

## 2023-09-25 DIAGNOSIS — Z23 ENCOUNTER FOR IMMUNIZATION: ICD-10-CM

## 2023-09-25 DIAGNOSIS — E06.3 HYPOTHYROIDISM DUE TO HASHIMOTO'S THYROIDITIS: ICD-10-CM

## 2023-09-25 DIAGNOSIS — Z80.3 FAMILY HISTORY OF BREAST CANCER: ICD-10-CM

## 2023-09-25 DIAGNOSIS — E11.65 TYPE 2 DIABETES MELLITUS WITH HYPERGLYCEMIA, WITHOUT LONG-TERM CURRENT USE OF INSULIN (HCC): Primary | ICD-10-CM

## 2023-09-25 DIAGNOSIS — Z12.31 BREAST CANCER SCREENING BY MAMMOGRAM: ICD-10-CM

## 2023-09-25 DIAGNOSIS — I25.10 CORONARY ARTERY CALCIFICATION SEEN ON CAT SCAN: ICD-10-CM

## 2023-09-25 DIAGNOSIS — M35.9 CONNECTIVE TISSUE DISEASE (HCC): ICD-10-CM

## 2023-09-25 DIAGNOSIS — N60.19 FIBROCYSTIC BREAST DISEASE (FCBD), UNSPECIFIED LATERALITY: ICD-10-CM

## 2023-09-25 DIAGNOSIS — N18.2 CKD (CHRONIC KIDNEY DISEASE) STAGE 2, GFR 60-89 ML/MIN: ICD-10-CM

## 2023-09-25 DIAGNOSIS — I10 ESSENTIAL HYPERTENSION: ICD-10-CM

## 2023-09-25 LAB — SL AMB POCT HEMOGLOBIN AIC: 6.6 (ref ?–6.5)

## 2023-09-25 PROCEDURE — 83036 HEMOGLOBIN GLYCOSYLATED A1C: CPT | Performed by: FAMILY MEDICINE

## 2023-09-25 PROCEDURE — 99214 OFFICE O/P EST MOD 30 MIN: CPT | Performed by: FAMILY MEDICINE

## 2023-09-25 RX ORDER — IBUPROFEN 800 MG/1
800 TABLET ORAL EVERY 6 HOURS PRN
Qty: 30 TABLET | Refills: 2 | Status: SHIPPED | OUTPATIENT
Start: 2023-09-25

## 2023-09-27 ENCOUNTER — OFFICE VISIT (OUTPATIENT)
Facility: REHABILITATION | Age: 63
End: 2023-09-27
Payer: MEDICARE
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
## 2023-09-27 PROCEDURE — 97110 THERAPEUTIC EXERCISES: CPT | Performed by: PHYSICAL THERAPIST

## 2023-09-27 NOTE — PROGRESS NOTES
PHYSICAL THERAPY TREATMENT / MAINTENANCE     Date: 23  Name: Lorin Silva  : 1960  Referring Provider: Crystal Cunha DO  AUTHORIZATION:   Insurance: Payor: Ann Fernández / Plan: MEDICARE A AND B / Product Type: Medicare A & B Fee for Service /     SUBJECTIVE:  HPI: Lorin Silva is a 70 y.o. female referred to outpatient physical therapy for the following diagnosis   Encounter Diagnosis   Name Primary? • Fall, subsequent encounter Yes      Started CBD oil, not regulated right yet. Notes a bit better yesterday even with the rain. Has diagnosis of multiple connective tissue disease. New dance video, also another video with balance and leg holds. Does board balance. Patient goals:   -getting back to the pool    Patient-Specific Functional Scale   Task is scored 0 (unable to perform activity) to 10 (ability to perform activity independently)  Activity 10/20/21 11/17/21 2/09/22 4/06/22 6/22/22 1/11/23 2/22/23 3/15/23 3/29/23 4/12/23 6/21/23 7/19/23 9/20/23   1. Walk at normal pace without a cane 3/10 3/10 due to speed or knee bending 7-8/10 initially, decreases with continued walking 6/10 8-9/10 can do this, not long distances Slow due to respiratory and conditioning Notes slowed pace, doesn't use cane. Doesn't use 8/10, not as fast as previously Varies day to day, today 7-8/10 due to right knee pain Walks a little slower, 7-8/10 Walks without cane [place] without cane     2. Grocery shop without significant pain 4/10 7-8/10 7/10 7-8/10 8/10 Haven't done recently, will do today Did well most of grocery shopping, but a lot of pain at the end, new, large grocery store, about 1 hour. Not in last week Usually does pretty well, worse if standing in line a while 8/10, still gets significant pain but towards the end, or if standing in soraya. Haven't done in a while, haven't needed to, wouldn't have worked Sat or . Did okay last time grocery shopping, hurt the next day.     3.   Dance 1/10 2/10 0/10 4/10 7/10, getting better every week, working in coordination Not right now   Can do dance stretch video, movement ones are too fast, except for movement for seniors Not doing this yet, doing in water; doing land-based dance stretches at home Haven't attempted since weekend, can do dance-based exercise, turns are chalenging     4. Move without pain and to not get tired as frequently   7/10 5-6/10 7/10 6-7/10  Can do an hour maximum, that's a combination of standing and sitting  Can do this 7/10, getting better (10/10 would be getting mail without pain, going up and down a half flight of stairs, not as affected by weather)        Precautions - Hashimoto's, autoimmune disease    Mobility Measures 1/11/23 2/22/23 3/15/23 4/12/23 5/17/23 6/21/23 7/19/23 8/23/23 9/20/23    Assistive device used? /65  80 bpm       /68  73 bpm 110 bpm   99% SpO2 none none    5 Time Sit to Stand  (17" chair, arms across chest) Arms in front   15.1 seconds    Unable, pain about right medial knee, proximal knee  Able from 18" surface without arms pushing on chair  __  About 32 seconds, one posterior loss of balance 18 seconds  17.3 seconds Arms across chest 37 seconds   31 seconds Arms out front  24 seconds  Pain in left knee      3 Meter Timed  Up & Go 10.8 seconds 16.0 seconds  11.7 seconds 10.6 9.5 seconds 9.1 seconds 10.6 sec   11.8 sec  Mild antalgic gait on left      Walking speed      0.85 meters/second       Functional Gait Assessment (see below)    18/30 20/30 19/30 21/30 21/30     6 Minute Walk Test (100 foot circular course)   1005 feet  101 bpm  96% SpO2 850 feet  98 bpm  98% SpO2     950 feet    830 feet 3/29/23 1025 feet  No assistive device 900 feet  No assistive device 1004 feet no  feet no  feet no  feet on treadmill inferred from self-selected pace 1.2 mph     Patient-Reported Outcome Measure:  Activities-Specific Balance Confidence Scale (ABC Scale)             Right knee flexion PROM, sitting 95 degrees  (115 degrees left) 86 degrees 90 degrees    96 degrees 3/29/23 95 degrees 98 degrees 99 degrees 95 degrees 94 degrees 93 degrees                   Functional Gait Assessment  1/3 Gait level surface  2/3 Change in gait speed  3/3 Gait with horizontal head turns  3/3 Gait with vertical head turns  3/3 Gait and pivot turn  1/3 Step over obstacle  0/3 Gait with narrow base of support  1/3 Gait with eyes closed  1/3 Ambulating backwards  1/3 Steps  16/30 Total score (less than 22/30 indicates increased risk of fall). .    Treadmill walking   1.6 mph 6 minutes    Flexing knees with partial squat, hands flat to 14" surface, while looking up, promoting knee flexion, about 20  X 2 sets    Step ups 6" surface, 1 railing   10-12 on R x 1 forwards, 6" step,   10-12 on L x 1 forwards, 6" step  Repeat laterally    Standing hip abduction, green band, about 12 each    Heel raise, about 10 each single leg with arm support, x 2 sets    SciFit level 2, about 94 spm, 10 min       ASSESSMENT   Improving in ability to squat with knee flexion, continued to progress lower. Radha Wilkinson checked out [facility] but frequently parking lot is too full so she'd have to be able to walk back to car on street after exercising. Good performance on treadmill today. Continue to work on walking duration/distance. SHORT-TERM GOALS: through 9/19/23  1. Radha Wilkinson walks at least 10 minutes consecutively. MET. 3. Radha Wilkinson reports 50% improvement in ability to walk grocery store distances without severe fatigue. MET. 4. Demonstrates at least 11 degrees passive right dorsiflexion by 2/12/22. MET. New goal: When released by physician, tolerates at least 6 minutes consecutive overground walking without walking boot. MET. New goal: When released by physician, tolerates at least 10 bilateral heel raises  MET. New goal: When released by physician, walks with step length without one inch of contralateral leg. MET.     New goal: Walks at least 1350 feet during 6 minute walk test.  NOT MET. New goal: Scores at least 20/30 on FGA.   MET. New goal: Able to purposefully walk for exercise at least 15 minutes at a time at least 3-4 times per week. NOT MET. Gideon Howell LONG-TERM GOALS: through 9/19/23  1. Patient reports at least 20 total minutes per day of overground walking for exercise. PROGRESSING. 2. Patient independently manages home exercise program.  MET with continued progressions. New maintenance goals:  1. Walk 15 minutes consecutively for community ambulation and exercise. MET but not able every day or sometimes consecutive days  2. Manages 150 minutes of moderate intensity aerobic exercise per week. MET but not consistently. 3. Maintains at least 1000 feet during 6 minute walk test, at most 15 seconds 5 times sit to stand. NOT MET FOR 6MWT. PLAN OF CARE:  Patient will benefit from physical therapy 1 time per week to 1x/2 weeks for 2 months, will start at 1x/2 weeks after 9/27/23  Neuromuscular re-education, therapeutic exercises, and therapeutic activities as outlined in grids.

## 2023-10-04 ENCOUNTER — APPOINTMENT (OUTPATIENT)
Facility: REHABILITATION | Age: 63
End: 2023-10-04
Payer: MEDICARE

## 2023-10-05 ENCOUNTER — OFFICE VISIT (OUTPATIENT)
Facility: REHABILITATION | Age: 63
End: 2023-10-05
Payer: MEDICARE
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
## 2023-10-05 PROBLEM — Z80.3 FAMILY HISTORY OF BREAST CANCER: Status: ACTIVE | Noted: 2023-10-05

## 2023-10-05 PROBLEM — N60.19 FIBROCYSTIC BREAST DISEASE (FCBD): Status: ACTIVE | Noted: 2023-10-05

## 2023-10-05 PROBLEM — S06.9X9S UNSPECIFIED INTRACRANIAL INJURY WITH LOSS OF CONSCIOUSNESS OF UNSPECIFIED DURATION, SEQUELA (HCC): Status: RESOLVED | Noted: 2023-01-18 | Resolved: 2023-10-05

## 2023-10-05 PROBLEM — E06.3 HASHIMOTO'S THYROIDITIS: Status: RESOLVED | Noted: 2022-04-25 | Resolved: 2023-10-05

## 2023-10-05 PROBLEM — I25.10 CORONARY ARTERY CALCIFICATION SEEN ON CAT SCAN: Status: ACTIVE | Noted: 2023-10-05

## 2023-10-05 PROCEDURE — 97110 THERAPEUTIC EXERCISES: CPT | Performed by: PHYSICAL THERAPIST

## 2023-10-05 NOTE — PROGRESS NOTES
PHYSICAL THERAPY TREATMENT / MAINTENANCE     Date: 10/05/23  Name: Tigre Moore  : 1960  Referring Provider: Desiree Morales DO  AUTHORIZATION:   Insurance: Payor: Frank Console / Plan: MEDICARE A AND B / Product Type: Medicare A & B Fee for Service /     SUBJECTIVE:  HPI: Tigre Moore is a 70 y.o. female referred to outpatient physical therapy for the following diagnosis   Encounter Diagnosis   Name Primary? • Fall, subsequent encounter Yes      Had acute lower leg pain starting yesterday, below knees. A bit better today. Better with standing stretching. Finished moving, has exercise station set up, weights up to 5 lbs, ankle weights, step-up, balance board, various bands, dance shoes, lots of videos. Tried seated exercise video, wasn't working yesterday, stopped by pain in legs, whole leg, not just below the knee. Patient goals:   -getting back to the pool    Patient-Specific Functional Scale   Task is scored 0 (unable to perform activity) to 10 (ability to perform activity independently)  Activity 10/20/21 11/17/21 2/09/22 4/06/22 6/22/22 1/11/23 2/22/23 3/15/23 3/29/23 4/12/23 6/21/23 7/19/23 9/20/23   1. Walk at normal pace without a cane 3/10 3/10 due to speed or knee bending -8/10 initially, decreases with continued walking 6/10 8-9/10 can do this, not long distances Slow due to respiratory and conditioning Notes slowed pace, doesn't use cane. Doesn't use 8/10, not as fast as previously Varies day to day, today -8/10 due to right knee pain Walks a little slower, -8/10 Walks without cane [place] without cane     2. Grocery shop without significant pain 4/10 7-8/10 7/10 7-8/10 8/10 Haven't done recently, will do today Did well most of grocery shopping, but a lot of pain at the end, new, large grocery store, about 1 hour. Not in last week Usually does pretty well, worse if standing in line a while 8/10, still gets significant pain but towards the end, or if standing in soraya. Haven't done in a while, haven't needed to, wouldn't have worked Sat or Sunday. Did okay last time grocery shopping, hurt the next day. 3.   Dance 1/10 2/10 0/10 4/10 7/10, getting better every week, working in coordination Not right now   Can do dance stretch video, movement ones are too fast, except for movement for seniors Not doing this yet, doing in water; doing land-based dance stretches at home Haven't attempted since weekend, can do dance-based exercise, turns are chalenging     4. Move without pain and to not get tired as frequently   7/10 5-6/10 7/10 6-7/10  Can do an hour maximum, that's a combination of standing and sitting  Can do this 7/10, getting better (10/10 would be getting mail without pain, going up and down a half flight of stairs, not as affected by weather)        Precautions - Hashimoto's, autoimmune disease    Mobility Measures 1/11/23 2/22/23 3/15/23 4/12/23 5/17/23 6/21/23 7/19/23 8/23/23 9/20/23    Assistive device used?  /65  80 bpm       /68  73 bpm 110 bpm   99% SpO2 none none    5 Time Sit to Stand  (17" chair, arms across chest) Arms in front   15.1 seconds    Unable, pain about right medial knee, proximal knee  Able from 18" surface without arms pushing on chair  __  About 32 seconds, one posterior loss of balance 18 seconds  17.3 seconds Arms across chest 37 seconds   31 seconds Arms out front  24 seconds  Pain in left knee      3 Meter Timed  Up & Go 10.8 seconds 16.0 seconds  11.7 seconds 10.6 9.5 seconds 9.1 seconds 10.6 sec   11.8 sec  Mild antalgic gait on left      Walking speed      0.85 meters/second       Functional Gait Assessment (see below)    18/30 20/30 19/30 21/30 21/30     6 Minute Walk Test (100 foot circular course)   1005 feet  101 bpm  96% SpO2 850 feet  98 bpm  98% SpO2     950 feet    830 feet 3/29/23 1025 feet  No assistive device 900 feet  No assistive device 1004 feet no  feet no  feet no  feet on treadmill inferred from self-selected pace 1.2 mph     Patient-Reported Outcome Measure: Activities-Specific Balance Confidence Scale (ABC Scale)             Right knee flexion PROM, sitting 95 degrees  (115 degrees left) 86 degrees 90 degrees    96 degrees 3/29/23 95 degrees 98 degrees 99 degrees 95 degrees 94 degrees 93 degrees                   Stretching for mobility  Standing hamstring stretch, hands towards floor, about 30 sec  Then feet apart, hamstring and adductor stretch, stretches into ankle plantarflexion    Treadmill walking   1.3 mph 10 min    Step ups 6" step, laterally 10 x 2 sets    Standing hip abduction, blue band, 10 each    Flexing knees with partial squat, reaching to 12" surface, while looking up, promoting knee flexion, about 20  Then 10" surface, about 20    SciFit mostly level 2.1, about 100 spm, 20 min      ASSESSMENT   Again in ability to squat with knee flexion, continued to progress lower. Continue to work on walking endurance to improve frequency and duration of community mobility. .  Again is appropriately varying physical activity responsive to how her legs feel. SHORT-TERM GOALS: through 9/19/23  1. Maude Gaucher walks at least 10 minutes consecutively. MET. 3. Maude Gaucher reports 50% improvement in ability to walk grocery store distances without severe fatigue. MET. 4. Demonstrates at least 11 degrees passive right dorsiflexion by 2/12/22. MET. New goal: When released by physician, tolerates at least 6 minutes consecutive overground walking without walking boot. MET. New goal: When released by physician, tolerates at least 10 bilateral heel raises  MET. New goal: When released by physician, walks with step length without one inch of contralateral leg. MET. New goal: Walks at least 1350 feet during 6 minute walk test.  NOT MET. New goal: Scores at least 20/30 on FGA.   MET. New goal: Able to purposefully walk for exercise at least 15 minutes at a time at least 3-4 times per week.   NOT MET. Kendrick Lennox LONG-TERM GOALS: through 9/19/23  1. Patient reports at least 20 total minutes per day of overground walking for exercise. PROGRESSING. 2. Patient independently manages home exercise program.  MET with continued progressions. New maintenance goals:  1. Walk 15 minutes consecutively for community ambulation and exercise. MET but not able every day or sometimes consecutive days  2. Manages 150 minutes of moderate intensity aerobic exercise per week. MET but not consistently. 3. Maintains at least 1000 feet during 6 minute walk test, at most 15 seconds 5 times sit to stand. NOT MET FOR 6MWT. PLAN OF CARE:  Patient will benefit from physical therapy 1 time per week to 1x/2 weeks for 2 months, will start at 1x/2 weeks after 9/27/23  Neuromuscular re-education, therapeutic exercises, and therapeutic activities as outlined in grids.

## 2023-10-05 NOTE — PROGRESS NOTES
Assessment/Plan: Overall stable. A1c 6.6. Needs yearly diabetic eye examination. Recommend yearly foot examination. Up-to-date with podiatry. On ARB. On statin due to type 2 diabetes. Also reviewed back at a CT scan from 2021 which does show some potential plaquing in the thoracic aorta as well as coronary arteries. Asymptomatic. Last EKG was okay. LDL cholesterol less than 70. Blood pressure stable. Can consider routine cardiology evaluation in 2024 to see if they would recommend potential stress testing otherwise keep risk factors under control. Continue with specialties i.e. Endo, rheumatology, neuromuscular specialist.  Recheck 6 months with repeat labs. No problem-specific Assessment & Plan notes found for this encounter. Diagnoses and all orders for this visit:    Type 2 diabetes mellitus with hyperglycemia, without long-term current use of insulin (HCC)  -     POCT hemoglobin A1c  -     Hemoglobin A1C; Future  -     Comprehensive metabolic panel; Future  -     Albumin / creatinine urine ratio; Future  -     Lipid Panel with Direct LDL reflex; Future  -     TSH, 3rd generation with Free T4 reflex; Future    CKD (chronic kidney disease) stage 2, GFR 60-89 ml/min    Coronary artery calcification seen on CAT scan    Connective tissue disease ([address])    Essential hypertension    Hypothyroidism due to Hashimoto's thyroiditis    Fibrocystic breast disease (FCBD), unspecified laterality  -     Mammo screening bilateral w 3d & cad; Future    Family history of breast cancer  -     Mammo screening bilateral w 3d & cad; Future    Breast cancer screening by mammogram  -     Mammo screening bilateral w 3d & cad; Future    Myofascial pain  -     ibuprofen (MOTRIN) 800 mg tablet;  Take 1 tablet (800 mg total) by mouth every 6 (six) hours as needed for mild pain    Encounter for immunization  -     influenza vaccine, high-dose, PF 0.7 mL (FLUZONE HIGH-DOSE)    Encounter for screening mammogram for breast cancer        1. Type 2 diabetes mellitus with hyperglycemia, without long-term current use of insulin (HCC)  POCT hemoglobin A1c    Hemoglobin A1C    Comprehensive metabolic panel    Albumin / creatinine urine ratio    Lipid Panel with Direct LDL reflex    TSH, 3rd generation with Free T4 reflex      2. CKD (chronic kidney disease) stage 2, GFR 60-89 ml/min        3. Coronary artery calcification seen on CAT scan        4. Connective tissue disease ([address])        5. Essential hypertension        6. Hypothyroidism due to Hashimoto's thyroiditis        7. Fibrocystic breast disease (FCBD), unspecified laterality  Mammo screening bilateral w 3d & cad      8. Family history of breast cancer  Mammo screening bilateral w 3d & cad      9. Breast cancer screening by mammogram  Mammo screening bilateral w 3d & cad      10. Myofascial pain  ibuprofen (MOTRIN) 800 mg tablet      11. Encounter for immunization  influenza vaccine, high-dose, PF 0.7 mL (FLUZONE HIGH-DOSE)      12. Encounter for screening mammogram for breast cancer            Subjective:        Patient ID: Mortimer Gaba is a 70 y.o. female. Chief Complaint   Patient presents with   • Follow-up       Routine recheck. A1c to be done today. Chronic issues with neuromuscular issues. Sees rheumatology and endocrinology. Sees neuromuscular specialist.      The following portions of the patient's history were reviewed and updated as appropriate: past medical history, past surgical history and problem list.      Review of Systems   Constitutional: Negative for appetite change, fatigue, fever and unexpected weight change. HENT: Negative for congestion, ear pain, postnasal drip, rhinorrhea, sinus pressure, sinus pain and sore throat. Eyes: Negative for redness and visual disturbance. Respiratory: Negative for chest tightness and shortness of breath. Cardiovascular: Negative for chest pain, palpitations and leg swelling.    Gastrointestinal: Negative for abdominal distention, abdominal pain, diarrhea and nausea. Endocrine: Negative for cold intolerance and heat intolerance. Genitourinary: Negative for dysuria and hematuria. Musculoskeletal: Positive for arthralgias, gait problem and myalgias. Skin: Negative for pallor and rash. Neurological: Negative for dizziness, tremors, weakness, light-headedness and headaches. Psychiatric/Behavioral: Negative for behavioral problems. The patient is not nervous/anxious. Objective:  /64 (BP Location: Left arm, Patient Position: Sitting, Cuff Size: Adult)   Pulse 89   Ht 5' 6" (1.676 m)   Wt 107 kg (236 lb 3.2 oz)   SpO2 97%   BMI 38.12 kg/m²        Physical Exam  Vitals and nursing note reviewed. Constitutional:       General: She is not in acute distress. Appearance: She is obese. She is not diaphoretic. HENT:      Head: Normocephalic and atraumatic. Eyes:      General: No scleral icterus. Conjunctiva/sclera: Conjunctivae normal.      Pupils: Pupils are equal, round, and reactive to light. Neck:      Thyroid: No thyromegaly. Cardiovascular:      Rate and Rhythm: Normal rate and regular rhythm. Pulses:           Carotid pulses are 0 on the right side and 0 on the left side. Heart sounds: Normal heart sounds. No murmur heard. Pulmonary:      Effort: Pulmonary effort is normal. No respiratory distress. Breath sounds: Normal breath sounds. No wheezing. Abdominal:      General: There is no distension. Musculoskeletal:      Right lower leg: No edema. Left lower leg: No edema. Lymphadenopathy:      Cervical: No cervical adenopathy. Skin:     General: Skin is warm. Coloration: Skin is not pale. Neurological:      Mental Status: She is alert and oriented to person, place, and time. Cranial Nerves: No cranial nerve deficit. Deep Tendon Reflexes: Reflexes are normal and symmetric.    Psychiatric:         Mood and Affect: Mood normal. Behavior: Behavior normal.         Thought Content:  Thought content normal.         Judgment: Judgment normal.

## 2023-10-10 LAB
LEFT EYE DIABETIC RETINOPATHY: NORMAL
RIGHT EYE DIABETIC RETINOPATHY: NORMAL

## 2023-10-11 ENCOUNTER — OFFICE VISIT (OUTPATIENT)
Facility: REHABILITATION | Age: 63
End: 2023-10-11
Payer: MEDICARE
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
## 2023-10-11 PROCEDURE — 97110 THERAPEUTIC EXERCISES: CPT | Performed by: PHYSICAL THERAPIST

## 2023-10-11 NOTE — PROGRESS NOTES
PHYSICAL THERAPY TREATMENT / MAINTENANCE     Date: 10/11/23  Name: Jordan Hatch  : 1960  Referring Provider: Bebe Valentine DO  AUTHORIZATION:   Insurance: Payor: Eldon Loyaat / Plan: MEDICARE A AND B / Product Type: Medicare A & B Fee for Service /     SUBJECTIVE:  HPI: Jordan Hatch is a 70 y.o. female referred to outpatient physical therapy for the following diagnosis   Encounter Diagnosis   Name Primary? Fall, subsequent encounter Yes      Has been moving well this week. Able to get out with friends. Notes 2 instances of blood sugar being low over the past month, continues to monitor. Will start doing aerobics with older friends in apartment. Will also do walking videos with them. Patient goals:   -getting back to the pool    Patient-Specific Functional Scale   Task is scored 0 (unable to perform activity) to 10 (ability to perform activity independently)  Activity 10/20/21 11/17/21 2/09/22 4/06/22 6/22/22 1/11/23 2/22/23 3/15/23 3/29/23 4/12/23 6/21/23 7/19/23 9/20/23   Walk at normal pace without a cane 3/10 3/10 due to speed or knee bending -8/10 initially, decreases with continued walking 6/10 8-9/10 can do this, not long distances Slow due to respiratory and conditioning Notes slowed pace, doesn't use cane. Doesn't use 8/10, not as fast as previously Varies day to day, today -8/10 due to right knee pain Walks a little slower, -8/10 Walks without cane [place] without cane     2. Grocery shop without significant pain 4/10 7-8/10 7/10 7-8/10 8/10 Haven't done recently, will do today Did well most of grocery shopping, but a lot of pain at the end, new, large grocery store, about 1 hour. Not in last week Usually does pretty well, worse if standing in line a while 8/10, still gets significant pain but towards the end, or if standing in soraya. Haven't done in a while, haven't needed to, wouldn't have worked Sat or . Did okay last time grocery shopping, hurt the next day.     3. Dance 1/10 2/10 0/10 4/10 7/10, getting better every week, working in coordination Not right now   Can do dance stretch video, movement ones are too fast, except for movement for seniors Not doing this yet, doing in water; doing land-based dance stretches at home Haven't attempted since weekend, can do dance-based exercise, turns are chalenging     4. Move without pain and to not get tired as frequently   7/10 5-6/10 7/10 6-7/10  Can do an hour maximum, that's a combination of standing and sitting  Can do this 7/10, getting better (10/10 would be getting mail without pain, going up and down a half flight of stairs, not as affected by weather)        Precautions - Hashimoto's, autoimmune disease    Mobility Measures 1/11/23 2/22/23 3/15/23 4/12/23 5/17/23 6/21/23 7/19/23 8/23/23 9/20/23    Assistive device used? /65  80 bpm       /68  73 bpm 110 bpm   99% SpO2 none none    5 Time Sit to Stand  (17" chair, arms across chest) Arms in front   15.1 seconds    Unable, pain about right medial knee, proximal knee  Able from 18" surface without arms pushing on chair  __  About 32 seconds, one posterior loss of balance 18 seconds  17.3 seconds Arms across chest 37 seconds   31 seconds Arms out front  24 seconds  Pain in left knee      3 Meter Timed  Up & Go 10.8 seconds 16.0 seconds  11.7 seconds 10.6 9.5 seconds 9.1 seconds 10.6 sec   11.8 sec  Mild antalgic gait on left      Walking speed      0.85 meters/second       Functional Gait Assessment (see below)    18/30 20/30 19/30 21/30 21/30     6 Minute Walk Test (100 foot circular course)   1005 feet  101 bpm  96% SpO2 850 feet  98 bpm  98% SpO2     950 feet    830 feet 3/29/23 1025 feet  No assistive device 900 feet  No assistive device 1004 feet no  feet no  feet no  feet on treadmill inferred from self-selected pace 1.2 mph     Patient-Reported Outcome Measure:  Activities-Specific Balance Confidence Scale (ABC Scale)             Right knee flexion PROM, sitting 95 degrees  (115 degrees left) 86 degrees 90 degrees    96 degrees 3/29/23 95 degrees 98 degrees 99 degrees 95 degrees 94 degrees 93 degrees                   Treadmill walking, able to start initially without stretching  1.3 mph 13.5 min    Lateral step ups 6" step, 10 each  Same with 7" step 10 each  Standing hip abduction, blue band, 10 each x 2 sets    Flexing knees with partial squat, reaching to 10" surface, while looking up, promoting knee flexion, 20 x 2 sets    SciFit mostly level 2.5, about 100 spm, 21 min      ASSESSMENT   Again in ability to squat with knee flexion, continued to progress lower, continue to progress as able here. Not as stiff and able to increase volume of treadmill walking and recumbent stepper. It's great she's starting an exercise group. This is a great addition to current physical activity. SHORT-TERM GOALS: through 9/19/23  1. Saul David walks at least 10 minutes consecutively. MET. 3. Saul David reports 50% improvement in ability to walk grocery store distances without severe fatigue. MET. 4. Demonstrates at least 11 degrees passive right dorsiflexion by 2/12/22. MET. New goal: When released by physician, tolerates at least 6 minutes consecutive overground walking without walking boot. MET. New goal: When released by physician, tolerates at least 10 bilateral heel raises  MET. New goal: When released by physician, walks with step length without one inch of contralateral leg. MET. New goal: Walks at least 1350 feet during 6 minute walk test.  NOT MET. New goal: Scores at least 20/30 on FGA.   MET. New goal: Able to purposefully walk for exercise at least 15 minutes at a time at least 3-4 times per week. NOT MET. Poli Guillermo LONG-TERM GOALS: through 9/19/23  1. Patient reports at least 20 total minutes per day of overground walking for exercise. PROGRESSING. 2. Patient independently manages home exercise program.  MET with continued progressions.     New maintenance goals:  1. Walk 15 minutes consecutively for community ambulation and exercise. MET but not able every day or sometimes consecutive days  2. Manages 150 minutes of moderate intensity aerobic exercise per week. MET but not consistently. 3. Maintains at least 1000 feet during 6 minute walk test, at most 15 seconds 5 times sit to stand. NOT MET FOR 6MWT. PLAN OF CARE:  Patient will benefit from physical therapy 1 time per week to 1x/2 weeks for 2 months, will start at 1x/2 weeks after 9/27/23  Neuromuscular re-education, therapeutic exercises, and therapeutic activities as outlined in grids.

## 2023-10-12 ENCOUNTER — TELEMEDICINE (OUTPATIENT)
Dept: PSYCHIATRY | Facility: CLINIC | Age: 63
End: 2023-10-12
Payer: MEDICARE

## 2023-10-12 DIAGNOSIS — F43.23 ADJUSTMENT DISORDER WITH MIXED ANXIETY AND DEPRESSED MOOD: Primary | ICD-10-CM

## 2023-10-12 PROCEDURE — 99213 OFFICE O/P EST LOW 20 MIN: CPT | Performed by: PSYCHIATRY & NEUROLOGY

## 2023-10-12 PROCEDURE — 90833 PSYTX W PT W E/M 30 MIN: CPT | Performed by: PSYCHIATRY & NEUROLOGY

## 2023-10-12 NOTE — BH TREATMENT PLAN
TREATMENT PLAN (Medication Management Only)        [address] [facility]    Name and Date of Birth:  John Choi 70 y.o. 1960  Date of Treatment Plan: October 12, 2023  Diagnosis/Diagnoses:    1. Adjustment disorder with mixed anxiety and depressed mood      Strengths/Personal Resources for Self-Care: supportive friends, taking medications as prescribed, ability to adapt to life changes, ability to communicate needs, ability to communicate well, ability to listen, ability to reason, ability to understand psychiatric illness. Area/Areas of need (in own words): anxiety, depression  1. Long Term Goal: Feel happier. Target Date:6 months - 4/12/2024  Person/Persons responsible for completion of goal: [place]  2. Short Term Objective (s) - How will we reach this goal?:   A. Provider new recommended medication/dosage changes and/or continue medication(s): continue current medications as prescribed. B. N/A.  C. N/A. Target Date:6 months - 4/12/2024  Person/Persons Responsible for Completion of Goal: [place]  Progress Towards Goals: continuing treatment  Treatment Modality: medication management every 6 months  Review due 180 days from date of this plan: 6 months - 4/12/2024  Expected length of service: ongoing treatment  My Physician/PA/NP and I have developed this plan together and I agree to work on the goals and objectives. I understand the treatment goals that were developed for my treatment.
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
## 2023-10-18 ENCOUNTER — OFFICE VISIT (OUTPATIENT)
Facility: REHABILITATION | Age: 63
End: 2023-10-18
Payer: MEDICARE
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
## 2023-10-18 PROCEDURE — 97110 THERAPEUTIC EXERCISES: CPT | Performed by: PHYSICAL THERAPIST

## 2023-10-18 NOTE — PROGRESS NOTES
PHYSICAL THERAPY TREATMENT / MAINTENANCE / PROGRESS UPDATE    Date: 10/18/23  Name: Alejandro Pantoja  : 1960  Referring Provider: Alexa Carreno DO  AUTHORIZATION:   Insurance: Payor: Paige Garcia / Plan: MEDICARE A AND B / Product Type: Medicare A & B Fee for Service /     SUBJECTIVE:  HPI: Alejandro Pantoja is a 70 y.o. female referred to outpatient physical therapy for the following diagnosis   Encounter Diagnosis   Name Primary? Fall, subsequent encounter Yes      Doing okay this week. Blood sugar     Patient goals:   -getting back to the pool    Patient-Specific Functional Scale   Task is scored 0 (unable to perform activity) to 10 (ability to perform activity independently)  Activity 6/21/23 7/19/23 9/20/23 10/18/23   Walk at normal pace without a cane Walks a little slower, 7-8/10 Walks without cane [place] without cane      2. Grocery shop without significant pain Haven't done in a while, haven't needed to, wouldn't have worked Sat or . Did okay last time grocery shopping, hurt the next day. 3.   Dance Haven't attempted since weekend, can do dance-based exercise, turns are chalenging      4. Move without pain and to not get tired as frequently          Precautions - Hashimoto's, autoimmune disease    Mobility Measures 6/21/23 7/19/23 8/23/23 9/20/23 10/18/23   Assistive device used?  /68  73 bpm 110 bpm   99% SpO2 none none none   5 Time Sit to Stand  (17" chair, arms across chest) 17.3 seconds Arms across chest 37 seconds   31 seconds Arms out front  24 seconds  Pain in left knee   19 seconds  From 20" surface, arms across chest    28 seconds from 18" surface   3 Meter Timed  Up & Go 9.5 seconds 9.1 seconds 10.6 sec   11.8 sec  Mild antalgic gait on left   10.1 seconds   Walking speed 0.85 meters/second       Functional Gait Assessment (see below)    6 Minute Walk Test (100 foot circular course)   1004 feet no  feet no  feet no  feet on treadmill inferred from self-selected pace 1.2 mph  875 feet no AD  Tired in legs  98 bpm     Patient-Reported Outcome Measure: Activities-Specific Balance Confidence Scale (ABC Scale)        Right knee flexion PROM, sitting 99 degrees 95 degrees 94 degrees 93 degrees 95 degrees               Functional Gait Assessment  1/3 Gait level surface  3/3 Change in gait speed  3/3 Gait with horizontal head turns  3/3 Gait with vertical head turns  3/3 Gait and pivot turn  1/3 Step over obstacle  3/3 Gait with narrow base of support  1/3 Gait with eyes closed  1/3 Ambulating backwards  1/3 Steps  20/30 Total score (less than 22/30 indicates increased risk of fall). .    Treadmill walking,  Baseline 1.0 mph   7 min  With 1 min intervals   1.5 mph 1 min  1.5 mph 1 min    Lateral step ups 6" step, 10 each x 2 sets    Standing hip abduction, blue band, 10 each x 2 sets    Flexing knees with partial squat, reaching to 10" surface, while looking up, promoting knee flexion, 20 x 2 sets    SciFit mostly level 2.0, about 105 spm, 11 min    ASSESSMENT   Riki Fiore is moving better than one month ago, as shown by performance in 6 minute walk test and performance with sit to stand. She has been experiencing less variable pain about the knees and lower legs, allowing increased volume of physical activity. The community [facility] in [facility] remains closed, which previously was a large source of exercise, which helps manage chronic pain, decreased mobility, and likely stiffness from rheumatic conditions. She has compensated via a significant amount of variable exercises and physical activity programs. Recommend continued physical therapy to assist maintaining this trajectory, as patient historically has had variable mobility issues responsive to physical therapy. SHORT-TERM GOALS: through 11/18/23  1. Riki Fiore walks at least 10 minutes consecutively. MET.   3. Riki Fiore reports 50% improvement in ability to walk grocery store distances without severe fatigue. MET. 4. Demonstrates at least 11 degrees passive right dorsiflexion by 2/12/22. MET. New goal: When released by physician, tolerates at least 6 minutes consecutive overground walking without walking boot. MET. New goal: When released by physician, tolerates at least 10 bilateral heel raises  MET. New goal: When released by physician, walks with step length without one inch of contralateral leg. MET. New goal: Walks at least 1350 feet during 6 minute walk test.  NOT MET. New goal: Scores at least 20/30 on FGA.   MET. New goal: Able to purposefully walk for exercise at least 15 minutes at a time at least 3-4 times per week. NOT MET, PROGRESSING AGAIN WITH WALKING AROUND APARTMENTS. LONG-TERM GOALS: through 11/18/23  1. Patient reports at least 20 total minutes per day of overground walking for exercise. PROGRESSING. 2. Patient independently manages home exercise program.  MET with continued progressions. New maintenance goals:  1. Walk 15 minutes consecutively for community ambulation and exercise. MET but not able every day or sometimes consecutive days  2. Manages 150 minutes of moderate intensity aerobic exercise per week. MET with variability in chosen exercise. 3. Maintains at least 1000 feet during 6 minute walk test, at most 15 seconds 5 times sit to stand. NOT MET FOR 6MWT or 5TSTS. PLAN OF CARE:  Patient will benefit from physical therapy 1 time per week to 1x/2 weeks for 2 months  Neuromuscular re-education, therapeutic exercises, and therapeutic activities as outlined in grids.

## 2023-10-25 ENCOUNTER — OFFICE VISIT (OUTPATIENT)
Facility: REHABILITATION | Age: 63
End: 2023-10-25
Payer: MEDICARE

## 2023-10-25 DIAGNOSIS — E06.3 HYPOTHYROIDISM DUE TO HASHIMOTO'S THYROIDITIS: ICD-10-CM

## 2023-10-25 DIAGNOSIS — E03.8 HYPOTHYROIDISM DUE TO HASHIMOTO'S THYROIDITIS: ICD-10-CM
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
## 2023-10-25 PROCEDURE — 97110 THERAPEUTIC EXERCISES: CPT | Performed by: PHYSICAL THERAPIST

## 2023-10-25 RX ORDER — LEVOTHYROXINE SODIUM 0.2 MG/1
TABLET ORAL
Qty: 108 TABLET | Refills: 1 | Status: SHIPPED | OUTPATIENT
Start: 2023-10-25

## 2023-10-25 NOTE — PROGRESS NOTES
PHYSICAL THERAPY TREATMENT / MAINTENANCE     Date: 10/25/23  Name: Leeanne Burdick  : 1960  Referring Provider: Connie Portillo DO  AUTHORIZATION:   Insurance: Payor: Guillermina Xavier / Plan: MEDICARE A AND B / Product Type: Medicare A & B Fee for Service /     SUBJECTIVE:  HPI: Leeanne Burdick is a 70 y.o. female referred to outpatient physical therapy for the following diagnosis   Encounter Diagnosis   Name Primary? Fall, subsequent encounter Yes      A little stiff today. For two nights has been holding her cat, so neck is stiff, feels like a lump on the left side of the neck. Probably stress with cat being attacked by rescue dog. Was walking around the park. Sees rheumatology next week. Then will see neurology in coming weeks. Patient goals:   -getting back to the pool    Patient-Specific Functional Scale   Task is scored 0 (unable to perform activity) to 10 (ability to perform activity independently)    Activity 6/21/23 7/19/23 9/20/23 10/18/23   Walk at normal pace without a cane Walks a little slower, 7-8/10 Walks without cane Walks without cane      2. Grocery shop without significant pain Haven't done in a while, haven't needed to, wouldn't have worked Sat or . Did okay last time grocery shopping, hurt the next day. 3.   Dance Haven't attempted since weekend, can do dance-based exercise, turns are chalenging      4. Move without pain and to not get tired as frequently          Precautions - Hashimoto's, autoimmune disease    Mobility Measures 6/21/23 7/19/23 8/23/23 9/20/23 10/18/23   Assistive device used?  /68  73 bpm 110 bpm   99% SpO2 none none none   5 Time Sit to Stand  (17" chair, arms across chest) 17.3 seconds Arms across chest 37 seconds   31 seconds Arms out front  24 seconds  Pain in left knee   19 seconds  From 20" surface, arms across chest    28 seconds from 18" surface   3 Meter Timed  Up & Go 9.5 seconds 9.1 seconds 10.6 sec   11.8 sec  Mild antalgic gait on left   10.1 seconds   Walking speed 0.85 meters/second       Functional Gait Assessment (see below) 19/30 21/30 21/30 20/30   6 Minute Walk Test (100 foot circular course)   1004 feet no  feet no  feet no  feet on treadmill inferred from self-selected pace 1.2 mph  875 feet no AD  Tired in legs  98 bpm     Patient-Reported Outcome Measure: Activities-Specific Balance Confidence Scale (ABC Scale)        Right knee flexion PROM, sitting 99 degrees 95 degrees 94 degrees 93 degrees 95 degrees               SciFit level 1, 10 minutes, about 90 steps/min    Treadmill fast intervals  1.0 mph 1.5 min  1.4 mph 1 min  1.0 mph 1.5 min  1.4 mph 1 min  1.0 mph 1.5 min  1.4 mph 1 min    Forwards step ups 7" step, 10 each x 2 sets    Standing hip abduction, blue band, 10 each x 2 sets    Heel raise, mostly single leg, about 10 x 2 sets each    Trigger point left upper trap, 45 sec x 2 sets  Right neck / forwards neck sidebending stretch, 1 min  Flexing knees with partial squat, reaching to 8" surface, while looking up, promoting knee flexion, 20 x 2 sets        ASSESSMENT   Betty Kay required use of the recumbent stepper to be mobile and complete walking, and some neck stretching prior to being able to comfortably bend to retrieve objects. Maintaining a good volume of physical activity and appropriate variations in exercise, responsive to change in condition. Recommend continued physical therapy to assist maintaining this trajectory, as patient historically has had variable mobility issues responsive to physical therapy. SHORT-TERM GOALS: through 11/18/23  1. Betty Kay walks at least 10 minutes consecutively. MET. 3. Betty Kay reports 50% improvement in ability to walk grocery store distances without severe fatigue. MET. 4. Demonstrates at least 11 degrees passive right dorsiflexion by 2/12/22. MET.   New goal: When released by physician, tolerates at least 6 minutes consecutive overground walking without walking boot. MET. New goal: When released by physician, tolerates at least 10 bilateral heel raises  MET. New goal: When released by physician, walks with step length without one inch of contralateral leg. MET. New goal: Walks at least 1350 feet during 6 minute walk test.  NOT MET. New goal: Scores at least 20/30 on FGA.   MET. New goal: Able to purposefully walk for exercise at least 15 minutes at a time at least 3-4 times per week. NOT MET, PROGRESSING AGAIN WITH WALKING AROUND APARTMENTS. LONG-TERM GOALS: through 11/18/23  1. Patient reports at least 20 total minutes per day of overground walking for exercise. PROGRESSING. 2. Patient independently manages home exercise program.  MET with continued progressions. New maintenance goals:  1. Walk 15 minutes consecutively for community ambulation and exercise. MET but not able every day or sometimes consecutive days  2. Manages 150 minutes of moderate intensity aerobic exercise per week. MET with variability in chosen exercise. 3. Maintains at least 1000 feet during 6 minute walk test, at most 15 seconds 5 times sit to stand. NOT MET FOR 6MWT or 5TSTS. PLAN OF CARE:  Patient will benefit from physical therapy 1 time per week to 1x/2 weeks for 2 months  Neuromuscular re-education, therapeutic exercises, and therapeutic activities as outlined in grids.

## 2023-11-01 ENCOUNTER — TELEPHONE (OUTPATIENT)
Dept: NEUROLOGY | Facility: CLINIC | Age: 63
End: 2023-11-01

## 2023-11-01 ENCOUNTER — OFFICE VISIT (OUTPATIENT)
Dept: RHEUMATOLOGY | Facility: CLINIC | Age: 63
End: 2023-11-01
Payer: MEDICARE

## 2023-11-01 ENCOUNTER — TELEPHONE (OUTPATIENT)
Age: 63
End: 2023-11-01

## 2023-11-01 VITALS
WEIGHT: 236.2 LBS | BODY MASS INDEX: 37.96 KG/M2 | SYSTOLIC BLOOD PRESSURE: 132 MMHG | HEIGHT: 66 IN | DIASTOLIC BLOOD PRESSURE: 68 MMHG

## 2023-11-01 DIAGNOSIS — Z79.899 HIGH RISK MEDICATION USE: ICD-10-CM

## 2023-11-01 DIAGNOSIS — R76.8 POSITIVE ANA (ANTINUCLEAR ANTIBODY): ICD-10-CM

## 2023-11-01 DIAGNOSIS — M79.10 MYALGIA: ICD-10-CM

## 2023-11-01 DIAGNOSIS — M25.50 ARTHRALGIA, UNSPECIFIED JOINT: ICD-10-CM

## 2023-11-01 DIAGNOSIS — M35.9 UNDIFFERENTIATED CONNECTIVE TISSUE DISEASE (HCC): Primary | ICD-10-CM

## 2023-11-01 PROCEDURE — 99214 OFFICE O/P EST MOD 30 MIN: CPT | Performed by: INTERNAL MEDICINE

## 2023-11-01 RX ORDER — TIZANIDINE 2 MG/1
2 TABLET ORAL EVERY 8 HOURS PRN
Qty: 270 TABLET | Refills: 3 | Status: SHIPPED | OUTPATIENT
Start: 2023-11-01

## 2023-11-01 RX ORDER — PROMETHAZINE HYDROCHLORIDE AND CODEINE PHOSPHATE 6.25; 1 MG/5ML; MG/5ML
SYRUP ORAL
COMMUNITY

## 2023-11-01 RX ORDER — AMOXICILLIN AND CLAVULANATE POTASSIUM 875; 125 MG/1; MG/1
TABLET, FILM COATED ORAL
COMMUNITY

## 2023-11-01 RX ORDER — METFORMIN HYDROCHLORIDE 500 MG/1
1 TABLET, EXTENDED RELEASE ORAL
COMMUNITY

## 2023-11-01 RX ORDER — BETAMETHASONE DIPROPIONATE 0.5 MG/G
CREAM TOPICAL
COMMUNITY

## 2023-11-01 RX ORDER — SULFACETAMIDE SODIUM 100 MG/ML
LOTION TOPICAL
COMMUNITY

## 2023-11-01 RX ORDER — HYDROXYCHLOROQUINE SULFATE 200 MG/1
200 TABLET, FILM COATED ORAL 2 TIMES DAILY
Qty: 180 TABLET | Refills: 3 | Status: SHIPPED | OUTPATIENT
Start: 2023-11-01 | End: 2024-10-26

## 2023-11-01 RX ORDER — DIPHENHYDRAMINE HCL 25 MG
CAPSULE ORAL
COMMUNITY

## 2023-11-01 RX ORDER — DIPHENHYDRAMINE HCL 50 MG
CAPSULE ORAL
COMMUNITY

## 2023-11-01 RX ORDER — CHOLESTYRAMINE LIGHT 4 G/5.7G
POWDER, FOR SUSPENSION ORAL
COMMUNITY

## 2023-11-01 NOTE — PROGRESS NOTES
Assessment and Plan:  Ondina Valle is a 70 y.o.  female who presents for follow-up of undifferentiated connective tissue disease (positive MT of 1:80 in a speckled pattern, myalgia, and arthralgia). Her overall pain has been much better controlled since starting to use CBD oil. Is up to date on eye exams for HCQ monitoring. Proteinuria. We will monitor her protein levels and make sure it is not increasing.     - Continue Hydroxychloroquine twice a day; continue regular eye exams.    - Continue Tizanidine three times a day as needed for muscle cramps  Continue ibuprofen as needed for moderate pain  Continue gabapentin daily as needed for nerve pain  Continue Biofreeze as needed  - do lupus activity labs    Return to clinic in 6 months    Plan:  1. Undifferentiated connective tissue disease (HCC)  -     CBC and differential  -     Sedimentation rate, automated  -     Protein / creatinine ratio, urine  -     Urinalysis with microscopic  -     C4 complement  -     C3 complement  -     hydroxychloroquine (PLAQUENIL) 200 mg tablet; Take 1 tablet (200 mg total) by mouth 2 (two) times a day    2. Positive MT (antinuclear antibody)  -     hydroxychloroquine (PLAQUENIL) 200 mg tablet; Take 1 tablet (200 mg total) by mouth 2 (two) times a day    3. Myalgia  -     tiZANidine (ZANAFLEX) 2 mg tablet; Take 1 tablet (2 mg total) by mouth every 8 (eight) hours as needed for muscle spasms    4. Arthralgia, unspecified joint    5. High risk medication use    High risk medication use - Benefits and risks of hydroxychloroquine, including but not limited to retinal toxicity, corneal deposits, gastrointestinal side effects, and headaches were discussed with the patient. The need for a regular eye exam to monitor for ocular toxicity while on this medication was also explained to the patient.      Follow-up in 6 months      Chief Complaint  Follow-up of undifferentiated connective tissue disease    Rheumatic Disease Summary  Last visit 5/2/23: Patient presents for follow up of undifferentiated connective tissue disease (positive MT of 1:80 in a speckled pattern, myalgia, and arthralgia). Voltaren gel was causing itching. Eye exam is coming up. - Continue Hydroxychloroquine twice a day; continue regular eye exams.    - Continue Tizanidine TID prn for muscle cramps  - do lupus activity labs    HPI  Iker Lanier is a 25-year-old female who presents today for follow-up of undifferentiated connective tissue disease. She consents to the use of SY. The following portions of the patient's history were reviewed and updated as appropriate: allergies, current medications, past family history, past medical history, past social history, past surgical history, and problem list.     Interval History    The patient's last follow-up was in 05/2023. She recently started sublingual cannabidiol oil for pain and fatigue with benefits and it is also delaying her cramps. She confirms taking tizanidine 2 mg twice a day for muscle cramps and takes a 3rd one as needed. She reports severe pain of the right intercostal muscles at times, and confirms taking hydroxychloroquine. She was previously given prednisone due to severe muscle cramping and is fine with the continuation of tizanidine and hydroxychloroquine. The patient recently had an eye exam and is due for blood work from her PCP. She has an appointment with Dr. Silverio Ochoa, her new neurologist on 11/09/2023 and she will be acquiring gabapentin from her. She takes 100 mg of gabapentin once a day only due to the help of cannabidiol oil. She occasionally takes ibuprofen and oxycodone 800 mg as needed for severe pain. The gabapentin 100 mg helps her with myalgia, swelling, and sinus headaches. She keeps her cane beside her bed in case her legs start to cramp so she can get to the bathroom in time. She wears pads in case she cannot arrive in the bathroom in time due to mobility issues.  She also has over the counter lidocaine patches and Biofreeze spray to numb the pain. She was given gel injection in 06/2021 in her knees, and it is with benefits. Before taking cannabidiol oil, she experienced pain mostly in the lower extremities. She has random rashes that are itchy which subside after applying alcohol. Allergies  STEROID INJECTON. Review of Systems:   Pertinent ROS positive for dry eyes, sinus problems, joint pain, joint swelling, back pain, neck pain, muscle pain, and rashes. The rest of 14-point ROS reviewed and were negative.     Home Medications:    Current Outpatient Medications:     albuterol (2.5 mg/3 mL) 0.083 % nebulizer solution, Take 3 mL (2.5 mg total) by nebulization every 6 (six) hours as needed for wheezing or shortness of breath, Disp: 75 mL, Rfl: 2    Azelastine HCl 137 MCG/SPRAY SOLN, 1 SPRAY PER NOSTRIL IN THE MORNING, Disp: 90 mL, Rfl: 1    Blood Glucose Monitoring Suppl ([employer] IQ SYSTEM) w/Device KIT, Check BG daily dx E11.9, Disp: 1 kit, Rfl: 1    Cholecalciferol 25 MCG (1000 UT) tablet, , Disp: , Rfl:     Cyanocobalamin (Vitamin B-12) 1000 MCG SUBL, Place under the tongue, Disp: , Rfl:     Diclofenac Sodium (VOLTAREN) 1 %, Apply 2 g topically 4 (four) times a day (Patient taking differently: Apply 2 g topically 4 (four) times a day PRN), Disp: 50 g, Rfl: 0    diphenhydrAMINE (BENADRYL) 25 mg capsule, PRN, Disp: , Rfl:     dulaglutide (Trulicity) 9.67 IS/9.6RH injection, Inject 0.5 mL (0.75 mg total) under the skin once a week, Disp: 2 mL, Rfl: 2    DULoxetine (CYMBALTA) 60 mg delayed release capsule, Take 1 capsule (60 mg total) by mouth daily, Disp: 90 capsule, Rfl: 0    ergocalciferol (VITAMIN D2) 50,000 units, TAKE 1 CAPSULE BY MOUTH ONE TIME PER WEEK, Disp: 12 capsule, Rfl: 1    gabapentin (NEURONTIN) 100 mg capsule, Take 1 capsule (100 mg total) by mouth 2 (two) times a day, Disp: 180 capsule, Rfl: 0    glucose blood (OneTouch Verio) test strip, Use check BG twice daily dx E11.9, Disp: 100 each, Rfl: 5    glucose blood (OneTouch Verio) test strip, Test once daily DX E11.9, Disp: 100 strip, Rfl: 1    hydrocortisone 2.5 % cream, Apply topically 3 (three) times a day as needed for irritation or rash, Disp: 30 g, Rfl: 0    hydroxychloroquine (PLAQUENIL) 200 mg tablet, Take 1 tablet (200 mg total) by mouth 2 (two) times a day, Disp: 180 tablet, Rfl: 3    ibuprofen (MOTRIN) 800 mg tablet, Take 1 tablet (800 mg total) by mouth every 6 (six) hours as needed for mild pain, Disp: 30 tablet, Rfl: 2    Lancets (OneTouch Delica Plus [id number]) MISC, Check BG 1x daily DX E11.9, Disp: 100 each, Rfl: 1    levothyroxine 200 mcg tablet, One tablet daily ,1.5 tablets on Monday and Thursday, Disp: 108 tablet, Rfl: 1    lifitegrast (Xiidra) 5 % op solution, Xiidra 5 % eye drops in a dropperette, Disp: , Rfl:     losartan (COZAAR) 50 mg tablet, Take 1 tablet (50 mg total) by mouth daily, Disp: 90 tablet, Rfl: 1    magnesium oxide (MAG-OX) 400 mg tablet, Take 1 tablet (400 mg total) by mouth daily, Disp: 30 tablet, Rfl: 2    montelukast (SINGULAIR) 10 mg tablet, TAKE 1 TABLET BY MOUTH AT BEDTIME, Disp: 30 tablet, Rfl: 3    rosuvastatin (CRESTOR) 5 mg tablet, Take 1 tablet (5 mg total) by mouth daily, Disp: 90 tablet, Rfl: 1    Saline 0.65 % SOLN, into each nostril 2 (two) times a day, Disp: , Rfl:     sodium chloride 0.9 % nebulizer solution, Take 3 mL by nebulization as needed for wheezing or shortness of breath, Disp: 30 mL, Rfl: 0    tiZANidine (ZANAFLEX) 2 mg tablet, Take 1 tablet (2 mg total) by mouth every 8 (eight) hours as needed for muscle spasms, Disp: 270 tablet, Rfl: 3    Triamcinolone Acetonide (Nasacort Allergy) 55 MCG/ACT nasal spray, SPRAY 2 SPRAYS INTO EACH NOSTRIL EVERY DAY (Patient taking differently: PRN), Disp: 16.9 mL, Rfl: 1    amoxicillin-clavulanate (AUGMENTIN) 875-125 mg per tablet, take 1 tablet by mouth every 12 hours for 7 days (Patient not taking: Reported on 11/1/2023), Disp: , Rfl:     betamethasone, augmented, (DIPROLENE-AF) 0.05 % cream, APPLY TO RASH ON BODY 2 TIMES A DAY FOR 2 WEEKS, THEN ONCE A DAY FOR 2 WEEKS, THEN 2 TIMES A WEEK (Patient not taking: Reported on 11/1/2023), Disp: , Rfl:     cholestyramine sugar free (Prevalite) 4 g packet, , Disp: , Rfl:     diphenhydrAMINE (BENADRYL) 50 mg capsule, , Disp: , Rfl:     metFORMIN (GLUCOPHAGE-XR) 500 mg 24 hr tablet, Take 1 tablet by mouth daily with dinner (Patient not taking: Reported on 11/1/2023), Disp: , Rfl:     promethazine-codeine (PHENERGAN WITH CODEINE) 6.25-10 mg/5 mL syrup, , Disp: , Rfl:     Sulfacetamide Sodium, Acne, 10 % LOTN, APPLY TO THE FACE ONCE A DAY (Patient not taking: Reported on 11/1/2023), Disp: , Rfl:     Objective:    Vitals:    11/01/23 1044   BP: 132/68   Weight: 107 kg (236 lb 3.2 oz)   Height: 5' 6" (1.676 m)       Physical Exam:  Constitutional:    General: She is not in acute distress. HENT:   Head: Normocephalic and atraumatic. Eyes:   Conjunctiva/sclera: Conjunctivae normal.   Cardiovascular:   Rate and Rhythm: Normal rate and regular rhythm. Heart sounds: S1 normal and S2 normal.   No friction rub. Pulmonary:   Effort: Pulmonary effort is normal. No respiratory distress. Breath sounds: Normal breath sounds. No wheezing, rhonchi, or rales. Musculoskeletal: no tenderness  Cervical back: Neck supple. Skin:  Coloration: Skin is not pale. Neurological:   Mental Status: She is alert. Mental status is at baseline. Psychiatric:      Mood and Affect: Mood normal.      Behavior: Behavior normal.      I have personally reviewed results with the patient. Imaging:   Abdomen Ultrasound 8/17/2023  Hepatic steatosis. MRI right tibia fibula 9/16/2022  No focal abnormality in the area of clinical concern in the right lower leg. Mild subcutaneous edema bilaterally. Moderate bilateral knee osteoarthritis.      MRI lumbar spine 9/2/2022  The lumbar vertebral body heights are maintained demonstrating no acute fracture or subluxation. No focal lumbar disc herniation, central canal stenosis, or neural foraminal narrowing. Stable fatty infiltration of the filum terminale without cord tethering. EGD 1/18/22  Normal EGD.     Labs:   Office Visit on 09/25/2023   Component Date Value Ref Range Status    Hemoglobin A1C 09/25/2023 6.6 (A)  6.5 Final   Office Visit on 05/02/2023   Component Date Value Ref Range Status    WBC 05/08/2023 8.62  4.31 - 10.16 Thousand/uL Final    RBC 05/08/2023 4.73  3.81 - 5.12 Million/uL Final    Hemoglobin 05/08/2023 14.3  11.5 - 15.4 g/dL Final    Hematocrit 05/08/2023 42.0  34.8 - 46.1 % Final    MCV 05/08/2023 89  82 - 98 fL Final    MCH 05/08/2023 30.2  26.8 - 34.3 pg Final    MCHC 05/08/2023 34.0  31.4 - 37.4 g/dL Final    RDW 05/08/2023 12.4  11.6 - 15.1 % Final    MPV 05/08/2023 9.8  8.9 - 12.7 fL Final    Platelets 61/70/5689 310  149 - 390 Thousands/uL Final    nRBC 05/08/2023 0  /100 WBCs Final    Neutrophils Relative 05/08/2023 68  43 - 75 % Final    Immat GRANS % 05/08/2023 0  0 - 2 % Final    Lymphocytes Relative 05/08/2023 24  14 - 44 % Final    Monocytes Relative 05/08/2023 6  4 - 12 % Final    Eosinophils Relative 05/08/2023 1  0 - 6 % Final    Basophils Relative 05/08/2023 1  0 - 1 % Final    Neutrophils Absolute 05/08/2023 5.88  1.85 - 7.62 Thousands/µL Final    Immature Grans Absolute 05/08/2023 0.03  0.00 - 0.20 Thousand/uL Final    Lymphocytes Absolute 05/08/2023 2.09  0.60 - 4.47 Thousands/µL Final    Monocytes Absolute 05/08/2023 0.51  0.17 - 1.22 Thousand/µL Final    Eosinophils Absolute 05/08/2023 0.07  0.00 - 0.61 Thousand/µL Final    Basophils Absolute 05/08/2023 0.04  0.00 - 0.10 Thousands/µL Final    Color, UA 05/08/2023 Yellow   Final    Clarity, UA 05/08/2023 Clear   Final    Specific Gravity, UA 05/08/2023 1.030  1.003 - 1.030 Final    pH, UA 05/08/2023 6.0  4.5, 5.0, 5.5, 6.0, 6.5, 7.0, 7.5, 8.0 Final    Leukocytes, UA 05/08/2023 Moderate (A)  Negative Final    Nitrite, UA 05/08/2023 Negative  Negative Final    Protein, UA 05/08/2023 300 (3+) (A)  Negative mg/dl Final    Glucose, UA 05/08/2023 Negative  Negative mg/dl Final    Ketones, UA 05/08/2023 Negative  Negative mg/dl Final    Urobilinogen, UA 05/08/2023 <2.0  <2.0 mg/dl mg/dl Final    Bilirubin, UA 05/08/2023 Negative  Negative Final    Occult Blood, UA 05/08/2023 Negative  Negative Final    RBC, UA 05/08/2023 2-4 (A)  None Seen, 1-2 /hpf Final    WBC, UA 05/08/2023 4-10 (A)  None Seen, 1-2 /hpf Final    Epithelial Cells 05/08/2023 Occasional  None Seen, Occasional /hpf Final    Bacteria, UA 05/08/2023 None Seen  None Seen, Occasional /hpf Final    MUCUS THREADS 05/08/2023 Occasional (A)  None Seen Final    C4, COMPLEMENT 05/08/2023 41.0 (H)  10.0 - 40.0 mg/dL Final    C3 Complement 05/08/2023 182.0 (H)  90.0 - 180.0 mg/dL Final    Creatinine, Ur 05/08/2023 281.0  mg/dL Final    Protein Urine Random 05/08/2023 84  mg/dL Final    Prot/Creat Ratio, Ur 05/08/2023 0.30 (H)  0.00 - 0.10 Final    Sed Rate 05/08/2023 47 (H)  0 - 29 mm/hour Final    ds DNA Ab 05/08/2023 <1  0 - 9 IU/mL Final                                       Negative      <5                                     Equivocal  5 - 9                                     Positive      >9    CRP 05/08/2023 7.5 (H)  <3.0 mg/L Final       Transcribed for Perez Saunders MD, by Israel Sandifer. on 11/02/23 at 6:24 AM. Powered by Sandra Brand.

## 2023-11-01 NOTE — PATIENT INSTRUCTIONS
- Continue Hydroxychloroquine twice a day; continue regular eye exams.    - Continue Tizanidine three times a day as needed for muscle cramps  Continue ibuprofen as needed for moderate pain  Continue gabapentin daily as needed for nerve pain  Continue Biofreeze as needed  - do lupus activity labs    Return to clinic in 6 months

## 2023-11-01 NOTE — TELEPHONE ENCOUNTER
Caller: Patient     Doctor: Ana Cortez     Reason for call: Patient would like to discuss vaccines     Call back#: [phone]

## 2023-11-01 NOTE — TELEPHONE ENCOUNTER
Caller: Patient     Doctor: Nadia Lockwood    Reason for call: Patient forgot to ask at the visit today: it is recommended that she get the Flu, Covid or RSV vaccines? And if yes, before or after the blood work?  Please advise     Call back#: [phone]

## 2023-11-08 ENCOUNTER — APPOINTMENT (OUTPATIENT)
Facility: REHABILITATION | Age: 63
End: 2023-11-08
Payer: MEDICARE

## 2023-11-09 ENCOUNTER — OFFICE VISIT (OUTPATIENT)
Dept: NEUROLOGY | Facility: CLINIC | Age: 63
End: 2023-11-09
Payer: MEDICARE

## 2023-11-09 VITALS
HEIGHT: 66 IN | HEART RATE: 83 BPM | TEMPERATURE: 97.4 F | BODY MASS INDEX: 37.99 KG/M2 | DIASTOLIC BLOOD PRESSURE: 68 MMHG | OXYGEN SATURATION: 95 % | WEIGHT: 236.4 LBS | SYSTOLIC BLOOD PRESSURE: 126 MMHG

## 2023-11-09 DIAGNOSIS — G70.9 NEUROMUSCULAR DISORDER (HCC): ICD-10-CM

## 2023-11-09 DIAGNOSIS — D35.2 PITUITARY MICROADENOMA (HCC): Primary | ICD-10-CM

## 2023-11-09 DIAGNOSIS — M79.18 MYOFASCIAL PAIN: ICD-10-CM

## 2023-11-09 DIAGNOSIS — M35.9 CONNECTIVE TISSUE DISEASE (HCC): ICD-10-CM

## 2023-11-09 DIAGNOSIS — R25.2 MUSCLE CRAMPS: ICD-10-CM

## 2023-11-09 PROCEDURE — 99204 OFFICE O/P NEW MOD 45 MIN: CPT | Performed by: PSYCHIATRY & NEUROLOGY

## 2023-11-09 RX ORDER — GABAPENTIN 100 MG/1
100 CAPSULE ORAL 2 TIMES DAILY
Qty: 180 CAPSULE | Refills: 1 | Status: SHIPPED | OUTPATIENT
Start: 2023-11-09
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
## 2023-11-09 NOTE — ASSESSMENT & PLAN NOTE
Ms. Rita Olivares has had muscle cramps in her lower extremities, which have been longstanding for almost 20 years, she has had extensive work-up at [facility] with Dr. Dianne Marc in the past including EMGs, muscle biopsy, genetic testing for mitochondrial diseases which have all been unremarkable. Muscle biopsy did not show any myopathic features, metabolic testing performed at [facility] was also unremarkable without any diagnostic pattern. She had a skin biopsy with me in 2019 that was unremarkable as well. Since then, patient has been diagnosed with mixed connective tissue disorder, has been following with rheumatology, currently symptoms are well maintained on current doses of tizanidine 2 mg 3 times daily, Cymbalta 60 mg daily, gabapentin 100 mg once or twice a day along with CBD oil. Exam was stable today as noted below, with normal muscle bulk and strength throughout, even her sensation was normal without any clinical evidence of neuropathy from underlying diabetes. For now she will continue gabapentin from our side, I have scheduled a return visit for her in about a year, if she remains clinically stable, can continue to follow-up with her primary care physician or rheumatology for further care. Besides this, has history of pituitary microadenoma, imaging has been stable over the years, will get repeat imaging in 6 months, if still stable, I would defer further monitoring.

## 2023-11-15 ENCOUNTER — OFFICE VISIT (OUTPATIENT)
Facility: REHABILITATION | Age: 63
End: 2023-11-15
Payer: MEDICARE
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
## 2023-11-15 PROCEDURE — 97110 THERAPEUTIC EXERCISES: CPT

## 2023-11-15 NOTE — PROGRESS NOTES
PHYSICAL THERAPY TREATMENT / MAINTENANCE     Date: 11/15/23  Name: Mortimer Gaba  : 1960  Referring Provider: Nakita Bowen DO  AUTHORIZATION:   Insurance: Payor: Nakul De Oliveira / Plan: MEDICARE A AND B / Product Type: Medicare A & B Fee for Service /     SUBJECTIVE:  HPI: Mortimer Gaba is a 70 y.o. female referred to outpatient physical therapy for the following diagnosis   Encounter Diagnosis   Name Primary? Fall, subsequent encounter Yes        Feeling a little stiff today. Reports that she is doing okay. Has had a very stressful week over the past week where her neighbor had a stroke and she had to drive her to the hospital and sit in a hospital for a couple hours at a time. Sees rheumatology next week. Saw neurology and she is stable from four years ago     Patient goals:   -getting back to the pool    Patient-Specific Functional Scale   Task is scored 0 (unable to perform activity) to 10 (ability to perform activity independently)    Activity 6/21/23 7/19/23 9/20/23 10/18/23   Walk at normal pace without a cane Walks a little slower, 7-8/10 Walks without cane Walks without cane      2. Grocery shop without significant pain Haven't done in a while, haven't needed to, wouldn't have worked Sat or . Did okay last time grocery shopping, hurt the next day. 3.   Dance Haven't attempted since weekend, can do dance-based exercise, turns are chalenging      4. Move without pain and to not get tired as frequently          Precautions - Hashimoto's, autoimmune disease    Mobility Measures 6/21/23 7/19/23 8/23/23 9/20/23 10/18/23   Assistive device used?  /68  73 bpm 110 bpm   99% SpO2 none none none   5 Time Sit to Stand  (17" chair, arms across chest) 17.3 seconds Arms across chest 37 seconds   31 seconds Arms out front  24 seconds  Pain in left knee   19 seconds  From 20" surface, arms across chest    28 seconds from 18" surface   3 Meter Timed  Up & Go 9.5 seconds 9.1 seconds 10.6 sec   11.8 sec  Mild antalgic gait on left   10.1 seconds   Walking speed 0.85 meters/second       Functional Gait Assessment (see below) 19/30 21/30 21/30 20/30   6 Minute Walk Test (100 foot circular course)   1004 feet no  feet no  feet no  feet on treadmill inferred from self-selected pace 1.2 mph  875 feet no AD  Tired in legs  98 bpm     Patient-Reported Outcome Measure: Activities-Specific Balance Confidence Scale (ABC Scale)        Right knee flexion PROM, sitting 99 degrees 95 degrees 94 degrees 93 degrees 95 degrees               SciFit level 2.3, 10 minutes, about 80 steps/min    Treadmill fast intervals  1.2 mph 1.5 min  1.4 mph 1 min  1.2 mph 1.5 min  1.4 mph 1 min  1.2 mph 1.5 min  1.4 mph 1 min    Forwards step ups 6" step, 10 each x 2 sets // Lateral as well     Standing hip abduction, blue band, 10 each x 2 sets    Lateral stepping blue band x3 laps //bar     Heel raise, mostly single leg, about 10 x 2 sets each, x15 double leg     Lumbar flexion x5     Knee flexion stretch on step with lunge 10x       ASSESSMENT   Continued POC per primary therapist. Ginger Fernandez did well with maintenance of mobility activities. Continues to demonstrate overall decreased strength throughout bilateral LE. Able to improve SciFit to level 2.3 to increase challenge strength and endurance. Showing good tolerance for BlE strengthening activities and maintaining good srength - contiues to be able to perform singe leg heel raises well. Recommend continued physical therapy to assist maintaining this trajectory, as patient historically has had variable mobility issues responsive to physical therapy. SHORT-TERM GOALS: through 11/18/23  1. Ginger Fernandez walks at least 10 minutes consecutively. MET. 3. Ginger Fernandez reports 50% improvement in ability to walk grocery store distances without severe fatigue. MET. 4. Demonstrates at least 11 degrees passive right dorsiflexion by 2/12/22. MET.   New goal: When released by physician, tolerates at least 6 minutes consecutive overground walking without walking boot. MET. New goal: When released by physician, tolerates at least 10 bilateral heel raises  MET. New goal: When released by physician, walks with step length without one inch of contralateral leg. MET. New goal: Walks at least 1350 feet during 6 minute walk test.  NOT MET. New goal: Scores at least 20/30 on FGA.   MET. New goal: Able to purposefully walk for exercise at least 15 minutes at a time at least 3-4 times per week. NOT MET, PROGRESSING AGAIN WITH WALKING AROUND APARTMENTS. LONG-TERM GOALS: through 11/18/23  1. Patient reports at least 20 total minutes per day of overground walking for exercise. PROGRESSING. 2. Patient independently manages home exercise program.  MET with continued progressions. New maintenance goals:  1. Walk 15 minutes consecutively for community ambulation and exercise. MET but not able every day or sometimes consecutive days  2. Manages 150 minutes of moderate intensity aerobic exercise per week. MET with variability in chosen exercise. 3. Maintains at least 1000 feet during 6 minute walk test, at most 15 seconds 5 times sit to stand. NOT MET FOR 6MWT or 5TSTS. PLAN OF CARE:  Patient will benefit from physical therapy 1 time per week to 1x/2 weeks for 2 months  Neuromuscular re-education, therapeutic exercises, and therapeutic activities as outlined in grids.

## 2023-11-17 DIAGNOSIS — E11.9 TYPE 2 DIABETES MELLITUS WITHOUT COMPLICATION, WITHOUT LONG-TERM CURRENT USE OF INSULIN (HCC): ICD-10-CM

## 2023-11-17 RX ORDER — BLOOD-GLUCOSE METER
EACH MISCELLANEOUS
Qty: 100 EACH | Refills: 5 | Status: SHIPPED | OUTPATIENT
Start: 2023-11-17

## 2023-11-21 ENCOUNTER — TELEPHONE (OUTPATIENT)
Dept: NEUROLOGY | Facility: CLINIC | Age: 63
End: 2023-11-21

## 2023-11-21 NOTE — TELEPHONE ENCOUNTER
Spoke to pt to r/s pt's appt w/ Dr. Silverio Ochoa in [place] on 8/15/24, as provider will not be in office. Pt is scheduled on 8/6/24 at 10:30.

## 2023-11-22 ENCOUNTER — OFFICE VISIT (OUTPATIENT)
Facility: REHABILITATION | Age: 63
End: 2023-11-22
Payer: MEDICARE
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
## 2023-11-22 PROCEDURE — 97110 THERAPEUTIC EXERCISES: CPT | Performed by: PHYSICAL THERAPIST

## 2023-11-22 NOTE — PROGRESS NOTES
PHYSICAL THERAPY TREATMENT / MAINTENANCE     Date: 23  Name: Tigre Moore  : 1960  Referring Provider: Desiree Moarles DO  AUTHORIZATION:   Insurance: Payor: Frank Console / Plan: MEDICARE A AND B / Product Type: Medicare A & B Fee for Service /     SUBJECTIVE:  HPI: Tigre Moore is a 70 y.o. female referred to outpatient physical therapy for the following diagnosis   Encounter Diagnosis   Name Primary? Fall, subsequent encounter Yes        More active in walking and moving and carrying things for neighbor that had a stroke. Back pain about lumbar and lower thoracic region, some neck stiffness. Patient goals:   -getting back to the pool    Patient-Specific Functional Scale   Task is scored 0 (unable to perform activity) to 10 (ability to perform activity independently)    Activity 6/21/23 7/19/23 9/20/23 10/18/23 11/22/23   Walk at normal pace without a cane Walks a little slower, 7-8/10 Walks without cane Walks without cane       2. Grocery shop without significant pain Haven't done in a while, haven't needed to, wouldn't have worked Sat or . Did okay last time grocery shopping, hurt the next day. Doing well here, sweaty at times and has to stop, but it's gotten better   3. Dance Haven't attempted since weekend, can do dance-based exercise, turns are chalenging       4. Move without pain and to not get tired as frequently           Precautions - Hashimoto's, autoimmune disease    Mobility Measures 6/21/23 7/19/23 8/23/23 9/20/23 10/18/23 11/22/23   Assistive device used?  /68  73 bpm 110 bpm   99% SpO2 none none none none   5 Time Sit to Stand  (17" chair, arms across chest) 17.3 seconds Arms across chest 37 seconds   31 seconds Arms out front  24 seconds  Pain in left knee   19 seconds  From 20" surface, arms across chest    28 seconds from 18" surface 35 seconds arms out front    15 seconds 20" surface  Arms out front     3 Meter Timed  Up & Go 9.5 seconds 9.1 seconds 10.6 sec   11.8 sec  Mild antalgic gait on left   10.1 seconds 9.6 seconds   Walking speed 0.85 meters/second        Functional Gait Assessment (see below) 19/30 21/30 21/30 20/30 20/30   6 Minute Walk Test (100 foot circular course)   1004 feet no  feet no  feet no  feet on treadmill inferred from self-selected pace 1.2 mph  875 feet no AD  Tired in legs  98 bpm   925 feet no AD   Patient-Reported Outcome Measure: Activities-Specific Balance Confidence Scale (ABC Scale)         Right knee flexion PROM, sitting 99 degrees 95 degrees 94 degrees 93 degrees 95 degrees                 Functional Gait Assessment  2/3 Gait level surface  3/3 Change in gait speed  3/3 Gait with horizontal head turns  3/3 Gait with vertical head turns  3/3 Gait and pivot turn  2/3 Step over obstacle  0/3 Gait with narrow base of support  1/3 Gait with eyes closed  1/3 Ambulating backwards  2/3 Steps  20/30 Total score (less than 22/30 indicates increased risk of fall). .    Forwards step ups 6" step, 10 each x 2 sets // Lateral as well     Standing hip abduction, green band, 10 each x 2 sets    Heel raise, mostly single leg, about 10 each    Braiding walking 1 min    Supine thoracic extension stretch over towel roll, 1.5 min    ASSESSMENT   Juan Blanco has been able to maintain mobility measures despite decreased frequency of therapy. She continues to stay active, walking with her neighbor at home. She has variable exercise routines, responsive to changes in condition and variable amounts of stiffness and pain. Recommend continued physical therapy to assist maintaining this trajectory, as patient historically has had variable mobility issues responsive to physical therapy. We will continue at decreased frequency of therapy. SHORT-TERM GOALS: through 11/18/23  Concepción Saunders walks at least 10 minutes consecutively. MET. 3. Ruth Saunders reports 50% improvement in ability to walk grocery store distances without severe fatigue. MET.  4. Demonstrates at least 11 degrees passive right dorsiflexion by 2/12/22. MET. New goal: When released by physician, tolerates at least 6 minutes consecutive overground walking without walking boot. MET. New goal: When released by physician, tolerates at least 10 bilateral heel raises  MET. New goal: When released by physician, walks with step length without one inch of contralateral leg. MET. New goal: Walks at least 1350 feet during 6 minute walk test.  NOT MET. New goal: Scores at least 20/30 on FGA.   MET. New goal: Able to purposefully walk for exercise at least 15 minutes at a time at least 3-4 times per week. MET. LONG-TERM GOALS: through 11/18/23  1. Patient reports at least 20 total minutes per day of overground walking for exercise. PROGRESSING. 2. Patient independently manages home exercise program.  MET with continued progressions. New maintenance goals:  1. Walk 15 minutes consecutively for community ambulation and exercise. MET but not able every day or sometimes consecutive days  2. Manages 150 minutes of moderate intensity aerobic exercise per week. MET with variability in chosen exercise. .  3. Maintains at least 1000 feet during 6 minute walk test, at most 15 seconds 5 times sit to stand. NOT MET FOR 6MWT or 5TSTS. PLAN OF CARE:  Patient will benefit from physical therapy 1x/2 weeks for 2 months  Neuromuscular re-education, therapeutic exercises, and therapeutic activities as outlined in grids.

## 2023-12-06 ENCOUNTER — OFFICE VISIT (OUTPATIENT)
Facility: REHABILITATION | Age: 63
End: 2023-12-06
Payer: MEDICARE
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
## 2023-12-06 PROCEDURE — 97110 THERAPEUTIC EXERCISES: CPT | Performed by: PHYSICAL THERAPIST

## 2023-12-06 NOTE — PROGRESS NOTES
PHYSICAL THERAPY TREATMENT / MAINTENANCE     Date: 23  Name: Camilo Higuera  : 1960  Referring Provider: Junito Ornelas DO  AUTHORIZATION:   Insurance: Payor: Kulwinder Cook / Plan: MEDICARE A AND B / Product Type: Medicare A & B Fee for Service /     SUBJECTIVE:  HPI: Camilo Higuera is a 70 y.o. female referred to outpatient physical therapy for the following diagnosis   Encounter Diagnosis   Name Primary? Fall, subsequent encounter Yes     Variable amounts of knee pain and stiffness, affecting mobility. Some days worse, some better. Getting through grocery store but sometimes significant pain by the end of the grocery shop. Patient goals:   -getting back to the pool    Patient-Specific Functional Scale   Task is scored 0 (unable to perform activity) to 10 (ability to perform activity independently)    Activity 6/21/23 7/19/23 9/20/23 10/18/23 11/22/23   Walk at normal pace without a cane Walks a little slower, 7-8/10 Walks without cane Walks without cane       2. Grocery shop without significant pain Haven't done in a while, haven't needed to, wouldn't have worked Sat or . Did okay last time grocery shopping, hurt the next day. Doing well here, sweaty at times and has to stop, but it's gotten better   3. Dance Haven't attempted since weekend, can do dance-based exercise, turns are chalenging       4. Move without pain and to not get tired as frequently           Precautions - Hashimoto's, autoimmune disease    Mobility Measures 6/21/23 7/19/23 8/23/23 9/20/23 10/18/23 11/22/23   Assistive device used?  /68  73 bpm 110 bpm   99% SpO2 none none none none   5 Time Sit to Stand  (17" chair, arms across chest) 17.3 seconds Arms across chest 37 seconds   31 seconds Arms out front  24 seconds  Pain in left knee   19 seconds  From 20" surface, arms across chest    28 seconds from 18" surface 35 seconds arms out front    15 seconds 20" surface  Arms out front     3 Meter Timed  Up & Go 9.5 seconds 9.1 seconds 10.6 sec   11.8 sec  Mild antalgic gait on left   10.1 seconds 9.6 seconds   Walking speed 0.85 meters/second        Functional Gait Assessment (see below) 19/30 21/30 21/30 20/30 20/30   6 Minute Walk Test (100 foot circular course)   1004 feet no  feet no  feet no  feet on treadmill inferred from self-selected pace 1.2 mph  875 feet no AD  Tired in legs  98 bpm   925 feet no AD   Patient-Reported Outcome Measure: Activities-Specific Balance Confidence Scale (ABC Scale)         Right knee flexion PROM, sitting 99 degrees 95 degrees 94 degrees 93 degrees 95 degrees                 6 minute walk test 900 feet    Seated knee flexion and standing hamstring stretch, 1 min  Sit to stand, about 20" surface, about 9    Standing heel raise, 10-15 x 2 sets    Partial squat, reach to 8" from floor, while being able to see ceiling, 20 x 2 sets    Standing hip abduction, green band, 10 each x 2 sets    Lateral step up 6" step, about 10 each  Forwards step up 6" step, about 10 each    SciFit level 2, 100-105 steps/min  13.5 min    ASSESSMENT   Sameera Cueva has been able to maintain mobility measures despite decreased frequency of therapy. Similar performance in 6 minute walk test. Improved ability to bent knees when reaching to floor. Recommend continued physical therapy to assist maintaining this trajectory, as patient historically has had variable mobility issues responsive to physical therapy. We will continue at decreased frequency of therapy. SHORT-TERM GOALS: through 11/18/23  1. Minal Nova walks at least 10 minutes consecutively. MET. 3. Minal Nova reports 50% improvement in ability to walk grocery store distances without severe fatigue. MET. 4. Demonstrates at least 11 degrees passive right dorsiflexion by 2/12/22. MET. New goal: When released by physician, tolerates at least 6 minutes consecutive overground walking without walking boot. MET.   New goal: When released by physician, tolerates at least 10 bilateral heel raises  MET. New goal: When released by physician, walks with step length without one inch of contralateral leg. MET. New goal: Walks at least 1350 feet during 6 minute walk test.  NOT MET. New goal: Scores at least 20/30 on FGA.   MET. New goal: Able to purposefully walk for exercise at least 15 minutes at a time at least 3-4 times per week. MET. LONG-TERM GOALS: through 11/18/23  1. Patient reports at least 20 total minutes per day of overground walking for exercise. PROGRESSING. 2. Patient independently manages home exercise program.  MET with continued progressions. New maintenance goals:  1. Walk 15 minutes consecutively for community ambulation and exercise. MET but not able every day or sometimes consecutive days  2. Manages 150 minutes of moderate intensity aerobic exercise per week. MET with variability in chosen exercise. .  3. Maintains at least 1000 feet during 6 minute walk test, at most 15 seconds 5 times sit to stand. NOT MET FOR 6MWT or 5TSTS. PLAN OF CARE:  Patient will benefit from physical therapy 1x/2 weeks for 2 months  Neuromuscular re-education, therapeutic exercises, and therapeutic activities as outlined in grids.

## 2023-12-12 ENCOUNTER — TELEPHONE (OUTPATIENT)
Dept: FAMILY MEDICINE CLINIC | Facility: CLINIC | Age: 63
End: 2023-12-12

## 2023-12-12 DIAGNOSIS — E11.9 TYPE 2 DIABETES MELLITUS WITHOUT COMPLICATION, WITHOUT LONG-TERM CURRENT USE OF INSULIN (HCC): ICD-10-CM

## 2023-12-12 NOTE — TELEPHONE ENCOUNTER
Patient called and didn't want her Accuvue test strips sent to [facility] she wanted them sent to [facility] in [facility], can we please resend to [address] she needs them ASAP.

## 2023-12-13 DIAGNOSIS — E78.00 PURE HYPERCHOLESTEROLEMIA: ICD-10-CM

## 2023-12-13 DIAGNOSIS — J30.89 SEASONAL ALLERGIC RHINITIS DUE TO OTHER ALLERGIC TRIGGER: ICD-10-CM

## 2023-12-13 DIAGNOSIS — E11.9 TYPE 2 DIABETES MELLITUS WITHOUT COMPLICATION, WITHOUT LONG-TERM CURRENT USE OF INSULIN (HCC): ICD-10-CM

## 2023-12-13 RX ORDER — MONTELUKAST SODIUM 10 MG/1
10 TABLET ORAL
Qty: 90 TABLET | Refills: 1 | Status: SHIPPED | OUTPATIENT
Start: 2023-12-13

## 2023-12-13 RX ORDER — ROSUVASTATIN CALCIUM 5 MG/1
5 TABLET, COATED ORAL DAILY
Qty: 90 TABLET | Refills: 1 | Status: SHIPPED | OUTPATIENT
Start: 2023-12-13

## 2023-12-13 RX ORDER — BLOOD SUGAR DIAGNOSTIC
STRIP MISCELLANEOUS
Qty: 100 EACH | Refills: 5 | Status: SHIPPED | OUTPATIENT
Start: 2023-12-13

## 2023-12-20 ENCOUNTER — OFFICE VISIT (OUTPATIENT)
Facility: REHABILITATION | Age: 63
End: 2023-12-20
Payer: MEDICARE
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
## 2023-12-20 PROCEDURE — 97110 THERAPEUTIC EXERCISES: CPT | Performed by: PHYSICAL THERAPIST

## 2023-12-20 NOTE — PROGRESS NOTES
"PHYSICAL THERAPY TREATMENT / MAINTENANCE     Date: 23  Name: Yuni Hall  : 1960  Referring Provider: Arnie Cadena DO  AUTHORIZATION:   Insurance: Payor: MEDICARE / Plan: MEDICARE A AND B / Product Type: Medicare A & B Fee for Service /     SUBJECTIVE:  HPI: Yuni Hall is a 63 y.o. female referred to outpatient physical therapy for the following diagnosis   Encounter Diagnosis   Name Primary?    Fall, subsequent encounter Yes     Doing okay getting through grocery, touchy if there's a line and standing at the end.  Lower extremity pain comes and goes, calves always tight, somedays it hurts, somedays okay.  Neck pain about posterior neck over past 3-4 days.      Patient goals:   -getting back to the pool    Patient-Specific Functional Scale   Task is scored 0 (unable to perform activity) to 10 (ability to perform activity independently)    Activity 7/19/23 9/20/23 10/18/23 11/22/23   Walk at normal pace without a cane Walks without cane Walks without cane       2.   Grocery shop without significant pain Did okay last time grocery shopping, hurt the next day.    Doing well here, sweaty at times and has to stop, but it's gotten better   3.   Dance       4.   Move without pain and to not get tired as frequently          Precautions - Hashimoto's, autoimmune disease    Mobility Measures 8/23/23 9/20/23 10/18/23 11/22/23   Assistive device used? none none none none   5 Time Sit to Stand  (17\" chair, arms across chest) 31 seconds Arms out front  24 seconds  Pain in left knee   19 seconds  From 20\" surface, arms across chest    28 seconds from 18\" surface 35 seconds arms out front    15 seconds 20\" surface  Arms out front     3 Meter Timed  Up & Go 10.6 sec   11.8 sec  Mild antalgic gait on left   10.1 seconds 9.6 seconds   Walking speed       Functional Gait Assessment (see below)    6 Minute Walk Test (100 foot circular course)   760 feet no  feet on treadmill inferred " "from self-selected pace 1.2 mph  875 feet no AD  Tired in legs  98 bpm   925 feet no AD   Patient-Reported Outcome Measure: Activities-Specific Balance Confidence Scale (ABC Scale)       Right knee flexion PROM, sitting 94 degrees 93 degrees 95 degrees               Treadmill walking 1.5 mph 10 minutes    Step up 8\" step, 1 railing, about 10 each x 2 sets    Standing heel raise, 10-15 x 2 sets each    Partial squat, reach to 7\" from floor, while being able to see ceiling, 20 x 2 sets    Standing hip abduction, blue band, 10 each x 2 sets    SciFit level 2.3, 100-105 steps/min  Limited ROM  2 min    Painful and limited left neck rotation, pain about suboccipital region towards left side  Trigger point in supine to this area, followed by left neck rotation stretches, able to experience normal stretch sensation on contralateral side, 5 min  Chin tucks 5 sec on/off, 1 min    ASSESSMENT   Marium has been able to maintain mobility measures despite decreased frequency of therapy.  Retest mobility measures next visit.    Improved neck range of motion after brief trigger point and stretching.    Continue to progress ability to reach to floor with flexed knees.      Recommend continued physical therapy to assist maintaining this trajectory, as patient historically has had variable mobility issues responsive to physical therapy.    We will continue at decreased frequency of therapy, 1x/2 weeks.   SHORT-TERM GOALS: through 11/18/23  1. Yuni walks at least 10 minutes consecutively. MET.  3. Yuni reports 50% improvement in ability to walk grocery store distances without severe fatigue.  MET.  4. Demonstrates at least 11 degrees passive right dorsiflexion by 2/12/22.  MET.  New goal: When released by physician, tolerates at least 6 minutes consecutive overground walking without walking boot.  MET.  New goal: When released by physician, tolerates at least 10 bilateral heel raises  MET.  New goal: When released by physician, walks " with step length without one inch of contralateral leg.  MET.    New goal: Walks at least 1350 feet during 6 minute walk test.  NOT MET.  New goal: Scores at least 20/30 on FGA.   MET.  New goal: Able to purposefully walk for exercise at least 15 minutes at a time at least 3-4 times per week.  MET.    LONG-TERM GOALS: through 11/18/23  1. Patient reports at least 20 total minutes per day of overground walking for exercise.  PROGRESSING.  2. Patient independently manages home exercise program.  MET with continued progressions.    New maintenance goals:  1. Walk 15 minutes consecutively for community ambulation and exercise.  MET but not able every day or sometimes consecutive days  2. Manages 150 minutes of moderate intensity aerobic exercise per week.  MET with variability in chosen exercise.  .  3. Maintains at least 1000 feet during 6 minute walk test, at most 15 seconds 5 times sit to stand.  NOT MET FOR 6MWT or 5TSTS.    PLAN OF CARE:  Patient will benefit from physical therapy 1x/2 weeks for 2 months  Neuromuscular re-education, therapeutic exercises, and therapeutic activities as outlined in grids.

## 2024-01-03 ENCOUNTER — APPOINTMENT (OUTPATIENT)
Facility: REHABILITATION | Age: 64
End: 2024-01-03
Payer: MEDICARE

## 2024-01-10 ENCOUNTER — VBI (OUTPATIENT)
Dept: ADMINISTRATIVE | Facility: OTHER | Age: 64
End: 2024-01-10

## 2024-01-10 ENCOUNTER — APPOINTMENT (OUTPATIENT)
Facility: REHABILITATION | Age: 64
End: 2024-01-10
Payer: MEDICARE

## 2024-01-13 DIAGNOSIS — G70.9 NEUROMUSCULAR DISORDER (HCC): ICD-10-CM

## 2024-01-13 DIAGNOSIS — M79.18 MYOFASCIAL PAIN: ICD-10-CM

## 2024-01-15 RX ORDER — DULOXETIN HYDROCHLORIDE 60 MG/1
60 CAPSULE, DELAYED RELEASE ORAL DAILY
Qty: 90 CAPSULE | Refills: 3 | Status: SHIPPED | OUTPATIENT
Start: 2024-01-15

## 2024-01-17 ENCOUNTER — APPOINTMENT (OUTPATIENT)
Facility: REHABILITATION | Age: 64
End: 2024-01-17
Payer: MEDICARE

## 2024-01-18 DIAGNOSIS — I10 ESSENTIAL HYPERTENSION: ICD-10-CM

## 2024-01-18 RX ORDER — LOSARTAN POTASSIUM 50 MG/1
50 TABLET ORAL DAILY
Qty: 90 TABLET | Refills: 1 | Status: SHIPPED | OUTPATIENT
Start: 2024-01-18

## 2024-01-24 ENCOUNTER — APPOINTMENT (OUTPATIENT)
Facility: REHABILITATION | Age: 64
End: 2024-01-24
Payer: MEDICARE

## 2024-01-26 ENCOUNTER — TELEMEDICINE (OUTPATIENT)
Dept: PSYCHIATRY | Facility: CLINIC | Age: 64
End: 2024-01-26
Payer: MEDICARE

## 2024-01-26 DIAGNOSIS — M79.18 MYOFASCIAL PAIN: ICD-10-CM

## 2024-01-26 DIAGNOSIS — F43.23 ADJUSTMENT DISORDER WITH MIXED ANXIETY AND DEPRESSED MOOD: Primary | ICD-10-CM

## 2024-01-26 PROCEDURE — 99214 OFFICE O/P EST MOD 30 MIN: CPT | Performed by: PSYCHIATRY & NEUROLOGY
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
## 2024-01-31 ENCOUNTER — OFFICE VISIT (OUTPATIENT)
Facility: REHABILITATION | Age: 64
End: 2024-01-31
Payer: MEDICARE
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
## 2024-01-31 PROCEDURE — 97110 THERAPEUTIC EXERCISES: CPT | Performed by: PHYSICAL THERAPIST
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
## 2024-02-12 ENCOUNTER — VBI (OUTPATIENT)
Dept: ADMINISTRATIVE | Facility: OTHER | Age: 64
End: 2024-02-12

## 2024-02-14 ENCOUNTER — APPOINTMENT (OUTPATIENT)
Facility: REHABILITATION | Age: 64
End: 2024-02-14
Payer: MEDICARE

## 2024-02-21 PROBLEM — Z12.11 SCREENING FOR COLON CANCER: Status: RESOLVED | Noted: 2018-10-23 | Resolved: 2024-02-21

## 2024-02-28 ENCOUNTER — OFFICE VISIT (OUTPATIENT)
Facility: REHABILITATION | Age: 64
End: 2024-02-28
Payer: MEDICARE
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
## 2024-02-28 PROCEDURE — 97110 THERAPEUTIC EXERCISES: CPT | Performed by: PHYSICAL THERAPIST

## 2024-02-28 NOTE — PROGRESS NOTES
PHYSICAL THERAPY TREATMENT / MAINTENANCE     Date: 24  Name: Yuni Hall  : 1960  Referring Provider: Arnie Cadena DO  AUTHORIZATION:   Insurance: Payor: MEDICARE / Plan: MEDICARE A AND B / Product Type: Medicare A & B Fee for Service /     SUBJECTIVE:  HPI: Yuni Hall is a 63 y.o. female referred to outpatient physical therapy for the following diagnosis   Encounter Diagnosis   Name Primary?    Fall, subsequent encounter Yes     Doing pretty good, except her shoulder.  It's been a couple weeks.  Hurts about right lateral upper brachium, and now about the whole right.  Worse with using it, even flipping paper pages.  Trying to do things with left hand but is right-handed so isn't helping.  Tried ice and heat but nothing seemed to help.  Tried pendulums, just very aggravated right now.  No interruption to sleep.    Exercise routines interrupted, hasn't been able to walk for exercise with the weather.  Having a lot of pain with barometric pressure changes.  Hasn't been able to do walking videos for more than a day.  Even standing to do dishes, has been having lots of pain and stiffness.  No change in medication.      Patient goals:   -getting back to the pool    Patient-Specific Functional Scale   Task is scored 0 (unable to perform activity) to 10 (ability to perform activity independently)    Activity 7/19/23 9/20/23 10/18/23 11/22/23 2/28/24   Walk at normal pace without a cane Walks without cane Walks without cane     Walks without cane, moving slower.   2.   Grocery shop without significant pain Did okay last time grocery shopping, hurt the next day.    Doing well here, sweaty at times and has to stop, but it's gotten better Hasn't been able to grocery shop in a while, limited by weather.   3.   Dance        4.   Move without pain and to not get tired as frequently             PHYSICAL EXAM / TREATMENT    Normal neck ROM, limited neck extension.  No provocation of pain in  "shoulder.  Normal quadrant    Mildly assisted shoulder elevation right.  Painful flexion at 90 degrees  Clicking above that point.  Pain around 80 degrees shoulder abduction, pain-free end abduction  About 1 inch limitation in physiological IR, but within normal limits, to lower thoracic  Symmetrical physiologic ER  5/5 elbow flexion/extension, wrist flexion/extension on right      Precautions - Hashimoto's, autoimmune disease    Mobility Measures 2/28/24 1/31/24 8/23/23 9/20/23 10/18/23 11/22/23   Assistive device used? None none none none none none   5 Time Sit to Stand  (17\" chair, arms across chest) Arms out front   A couple extra attempts  29 second- 52 seconds 31 seconds Arms out front  24 seconds  Pain in left knee   19 seconds  From 20\" surface, arms across chest    28 seconds from 18\" surface 35 seconds arms out front    15 seconds 20\" surface  Arms out front     3 Meter Timed  Up & Go 12.3 seconds 9.8 sec 10.6 sec   11.8 sec  Mild antalgic gait on left   10.1 seconds 9.6 seconds   Walking speed         Functional Gait Assessment (see below)  17/30 21/30 20/30 20/30   6 Minute Walk Test (100 foot circular course)   908 feet  No AD  103 bpm 1005 feet no  feet no  feet on treadmill inferred from self-selected pace 1.2 mph  875 feet no AD  Tired in legs  98 bpm   925 feet no AD   Patient-Reported Outcome Measure: Activities-Specific Balance Confidence Scale (ABC Scale)         Right knee flexion PROM, sitting  92 degrees 94 degrees 93 degrees 95 degrees                 Precautions - none    Specialty Daily Treatment Diary   Exercise Diary  2/28/24    Static and dynamic balance (neuromuscular re-education)     Strengthening & endurance  (therapeutic exercise) Supine R shoulder traction, scaption ranges 75 - 125 degrees  4 min  Grades 3-4    Sidelying R shoulder ER, 10  1 lbs, 10 x 2 sets    Seated shoulder ER,   Yellow band, about 10 x 2 sets    Shoulder circles shoulder level 30 sec    Sit to " "stand 19\" surface, about 5                  ASSESSMENT   Generalized stiffness affecting initial mobility and overall level of physical activity.  We addressed right shoulder pain, painful arc, with mobilization and gentle strengthening.  Continue to use regularly.  Mild decline in most areas of mobility measures but consistent with current level of physical activity and pain.    Discussed lower level / low barrier of entry exercises, Marium found a program in basic, low level exercises that can help keep her moving even with increased stiffness.    SHORT-TERM GOALS: through 3/28/24  1. Yuni walks at least 10 minutes consecutively. MET.  3. Yuni reports 50% improvement in ability to walk grocery store distances without severe fatigue.  MET.  4. Demonstrates at least 11 degrees passive right dorsiflexion .  MET.  New goal: When released by physician, tolerates at least 6 minutes consecutive overground walking without walking boot.  MET.  New goal: When released by physician, tolerates at least 10 bilateral heel raises  MET.  New goal: When released by physician, walks with step length without one inch of contralateral leg.  MET.    New goal: Walks at least 1350 feet during 6 minute walk test.  NOT MET.  New goal: Scores at least 20/30 on FGA.   MET.PREVIOUSLY.  New goal: Able to purposefully walk for exercise at least 15 minutes at a time at least 3-4 times per week.  NOT MET..    LONG-TERM GOALS: through 3/28/24  1. Patient reports at least 20 total minutes per day of overground walking for exercise.  NOT MET..  2. Patient independently manages home exercise program.  MET with continued progressions.    New maintenance goals:  1. Walk 15 minutes consecutively for community ambulation and exercise.  MET but not able every day or sometimes consecutive days  2. Manages 150 minutes of moderate intensity aerobic exercise per week.  MET with variability in chosen exercise.  .  3. Maintains at least 1000 feet during 6 " minute walk test, at most 15 seconds 5 times sit to stand.  MET FOR 6MWT, NOT MET for 5TSTS.    PLAN OF CARE:  Patient will benefit from physical therapy 1x/week to 1x/ 2 weeks for 2 months  Neuromuscular re-education, therapeutic exercises, and therapeutic activities as outlined in grids.

## 2024-03-06 ENCOUNTER — TELEPHONE (OUTPATIENT)
Age: 64
End: 2024-03-06

## 2024-03-06 ENCOUNTER — OFFICE VISIT (OUTPATIENT)
Facility: REHABILITATION | Age: 64
End: 2024-03-06
Payer: MEDICARE

## 2024-03-06 DIAGNOSIS — J45.31 MILD PERSISTENT ASTHMA WITH (ACUTE) EXACERBATION: ICD-10-CM

## 2024-03-06 DIAGNOSIS — Z77.120 CONTACT WITH OR EXPOSURE TO MOLD: Primary | ICD-10-CM
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
## 2024-03-06 PROCEDURE — 97110 THERAPEUTIC EXERCISES: CPT | Performed by: PHYSICAL THERAPIST

## 2024-03-06 NOTE — TELEPHONE ENCOUNTER
Patient calling to request orders be added for black mold testing. I advised she will need an appointment as this isn't a current diagnosis but a new problem. She believes she has been esxposed to black mold in her apartment after a leak in her ceiling was not properly repaired. The leak happened in January and her sx began early February. Sx include URI congestion, scratchy itchy throat, red eyes, chest heaviness and dry cough. I scheduled an appointment for the patient next week and advise she call to check for cancellations so she can be seen sooner. She thanks us for our help.

## 2024-03-06 NOTE — PROGRESS NOTES
"PHYSICAL THERAPY TREATMENT / MAINTENANCE     Date: 24  Name: Yuni Hall  : 1960  Referring Provider: Arnie Cadena DO  AUTHORIZATION:   Insurance: Payor: MEDICARE / Plan: MEDICARE A AND B / Product Type: Medicare A & B Fee for Service /     SUBJECTIVE:  HPI: Yuni Hall is a 63 y.o. female referred to outpatient physical therapy for the following diagnosis   Encounter Diagnosis   Name Primary?    Fall, subsequent encounter Yes     Hasn't pushed exercise too much with the shoulder, shoulder felt better but increased pain after doing stretching exercise.    Patient goals:   -getting back to the pool    Patient-Specific Functional Scale   Task is scored 0 (unable to perform activity) to 10 (ability to perform activity independently)    Activity 7/19/23 9/20/23 10/18/23 11/22/23 2/28/24   Walk at normal pace without a cane Walks without cane Walks without cane     Walks without cane, moving slower.   2.   Grocery shop without significant pain Did okay last time grocery shopping, hurt the next day.    Doing well here, sweaty at times and has to stop, but it's gotten better Hasn't been able to grocery shop in a while, limited by weather.   3.   Dance        4.   Move without pain and to not get tired as frequently             PHYSICAL EXAM / TREATMENT  SciFit level 1, 5 min, arms and legs    Full shoulder elevation, painful flexion around 90 degrees and moderately slowed, mildly slowed abduction  Physiologic IR symmetrical and within functional limits.      Precautions - Hashimoto's, autoimmune disease    Mobility Measures 3/6/24 2/28/24 1/31/24 8/23/23 9/20/23 10/18/23 11/22/23   Assistive device used?  None none none none none none   5 Time Sit to Stand  (17\" chair, arms across chest)  Arms out front   A couple extra attempts  29 second- 52 seconds 31 seconds Arms out front  24 seconds  Pain in left knee   19 seconds  From 20\" surface, arms across chest    28 seconds from 18\" surface 35 " "seconds arms out front    15 seconds 20\" surface  Arms out front     3 Meter Timed  Up & Go  12.3 seconds 9.8 sec 10.6 sec   11.8 sec  Mild antalgic gait on left   10.1 seconds 9.6 seconds   Walking speed          Functional Gait Assessment (see below)   17/30 21/30 20/30 20/30   6 Minute Walk Test (100 foot circular course)    908 feet  No AD  103 bpm 1005 feet no  feet no  feet on treadmill inferred from self-selected pace 1.2 mph  875 feet no AD  Tired in legs  98 bpm   925 feet no AD   Patient-Reported Outcome Measure: Activities-Specific Balance Confidence Scale (ABC Scale)          Right knee flexion PROM, sitting   92 degrees 94 degrees 93 degrees 95 degrees                  Precautions - none    Specialty Daily Treatment Diary   Exercise Diary  3/6/24 2/28/24    Static and dynamic balance (neuromuscular re-education)      Strengthening & endurance  (therapeutic exercise) Supine inferior glide grade 4, scapular 90 - 120 degrees  Increased pain-free shoulder elevation actively in supine, and passively, pain only at end-range extension  Sidelying R shoulder ER, 2 lbs, about 10 x 2 sets  Sidelying R shoulder horizontal abduction, about 10 x 2 sets  Shoulder scaption \"touchdown\" movement  10 x 2 sets  Heel raises  About 10 each side biased to one side but not single leg    Standing hip abduction, green band, about 10 each    Seated knee extension stretch 10 lbs each leg, 3 min    Seated knee flexion stretch  1 min   Supine R shoulder traction, scaption ranges 75 - 125 degrees  4 min  Grades 3-4    Sidelying R shoulder ER, 10  1 lbs, 10 x 2 sets    Seated shoulder ER,   Yellow band, about 10 x 2 sets    Shoulder circles shoulder level 30 sec    Sit to stand 19\" surface, about 5                      ASSESSMENT   Better movement of the shoulder since last visit.  More elevation prior to pain, at this point it's the end-ranges that give her lyric rather than midranges and shoulder level " movements.  Recommend continued strengthening exercises at home, good return demonstration.    Reassess next visit and return to one visit per two weeks if we are able.    Otherwise, revisit prior exercise routines and adjust for arm movement as needed.       SHORT-TERM GOALS: through 3/28/24  1. Yuni walks at least 10 minutes consecutively. MET.  3. Yuni reports 50% improvement in ability to walk grocery store distances without severe fatigue.  MET.  4. Demonstrates at least 11 degrees passive right dorsiflexion .  MET.  New goal: When released by physician, tolerates at least 6 minutes consecutive overground walking without walking boot.  MET.  New goal: When released by physician, tolerates at least 10 bilateral heel raises  MET.  New goal: When released by physician, walks with step length without one inch of contralateral leg.  MET.    New goal: Walks at least 1350 feet during 6 minute walk test.  NOT MET.  New goal: Scores at least 20/30 on FGA.   MET.PREVIOUSLY.  New goal: Able to purposefully walk for exercise at least 15 minutes at a time at least 3-4 times per week.  NOT MET..    LONG-TERM GOALS: through 3/28/24  1. Patient reports at least 20 total minutes per day of overground walking for exercise.  NOT MET..  2. Patient independently manages home exercise program.  MET with continued progressions.    New maintenance goals:  1. Walk 15 minutes consecutively for community ambulation and exercise.  MET but not able every day or sometimes consecutive days  2. Manages 150 minutes of moderate intensity aerobic exercise per week.  MET with variability in chosen exercise.  .  3. Maintains at least 1000 feet during 6 minute walk test, at most 15 seconds 5 times sit to stand.  MET FOR 6MWT, NOT MET for 5TSTS.    PLAN OF CARE:  Patient will benefit from physical therapy 1x/week to 1x/ 2 weeks for 2 months  Neuromuscular re-education, therapeutic exercises, and therapeutic activities as outlined in grids.

## 2024-03-06 NOTE — TELEPHONE ENCOUNTER
Advised pt. She is fighting with the apartment building to get rid of the mold but they aren't helping , she will get testing done,

## 2024-03-07 ENCOUNTER — APPOINTMENT (OUTPATIENT)
Dept: LAB | Facility: CLINIC | Age: 64
End: 2024-03-07
Payer: MEDICARE

## 2024-03-07 DIAGNOSIS — Z77.120 CONTACT WITH OR EXPOSURE TO MOLD: ICD-10-CM

## 2024-03-07 DIAGNOSIS — E11.65 TYPE 2 DIABETES MELLITUS WITH HYPERGLYCEMIA, WITHOUT LONG-TERM CURRENT USE OF INSULIN (HCC): ICD-10-CM

## 2024-03-07 DIAGNOSIS — J45.31 MILD PERSISTENT ASTHMA WITH (ACUTE) EXACERBATION: ICD-10-CM

## 2024-03-07 LAB
ALBUMIN SERPL BCP-MCNC: 4 G/DL (ref 3.5–5)
ALP SERPL-CCNC: 95 U/L (ref 34–104)
ALT SERPL W P-5'-P-CCNC: 24 U/L (ref 7–52)
ANION GAP SERPL CALCULATED.3IONS-SCNC: 12 MMOL/L
AST SERPL W P-5'-P-CCNC: 24 U/L (ref 13–39)
BACTERIA UR QL AUTO: ABNORMAL /HPF
BASOPHILS # BLD AUTO: 0.04 THOUSANDS/ÂΜL (ref 0–0.1)
BASOPHILS NFR BLD AUTO: 1 % (ref 0–1)
BILIRUB SERPL-MCNC: 0.45 MG/DL (ref 0.2–1)
BILIRUB UR QL STRIP: NEGATIVE
BUN SERPL-MCNC: 19 MG/DL (ref 5–25)
C3 SERPL-MCNC: 186 MG/DL (ref 87–200)
C4 SERPL-MCNC: 42 MG/DL (ref 19–52)
CALCIUM SERPL-MCNC: 9 MG/DL (ref 8.4–10.2)
CHLORIDE SERPL-SCNC: 104 MMOL/L (ref 96–108)
CHOLEST SERPL-MCNC: 155 MG/DL
CLARITY UR: CLEAR
CO2 SERPL-SCNC: 23 MMOL/L (ref 21–32)
COLOR UR: ABNORMAL
CREAT SERPL-MCNC: 0.85 MG/DL (ref 0.6–1.3)
CREAT UR-MCNC: 147.3 MG/DL
CREAT UR-MCNC: 147.3 MG/DL
EOSINOPHIL # BLD AUTO: 0.11 THOUSAND/ÂΜL (ref 0–0.61)
EOSINOPHIL NFR BLD AUTO: 2 % (ref 0–6)
ERYTHROCYTE [DISTWIDTH] IN BLOOD BY AUTOMATED COUNT: 12.3 % (ref 11.6–15.1)
ERYTHROCYTE [SEDIMENTATION RATE] IN BLOOD: 40 MM/HOUR (ref 0–29)
EST. AVERAGE GLUCOSE BLD GHB EST-MCNC: 146 MG/DL
GFR SERPL CREATININE-BSD FRML MDRD: 72 ML/MIN/1.73SQ M
GLUCOSE P FAST SERPL-MCNC: 109 MG/DL (ref 65–99)
GLUCOSE UR STRIP-MCNC: NEGATIVE MG/DL
HBA1C MFR BLD: 6.7 %
HCT VFR BLD AUTO: 43.4 % (ref 34.8–46.1)
HDLC SERPL-MCNC: 57 MG/DL
HGB BLD-MCNC: 14.1 G/DL (ref 11.5–15.4)
HGB UR QL STRIP.AUTO: NEGATIVE
IMM GRANULOCYTES # BLD AUTO: 0.02 THOUSAND/UL (ref 0–0.2)
IMM GRANULOCYTES NFR BLD AUTO: 0 % (ref 0–2)
KETONES UR STRIP-MCNC: NEGATIVE MG/DL
LDLC SERPL CALC-MCNC: 67 MG/DL (ref 0–100)
LEUKOCYTE ESTERASE UR QL STRIP: ABNORMAL
LYMPHOCYTES # BLD AUTO: 2 THOUSANDS/ÂΜL (ref 0.6–4.47)
LYMPHOCYTES NFR BLD AUTO: 30 % (ref 14–44)
MCH RBC QN AUTO: 29.6 PG (ref 26.8–34.3)
MCHC RBC AUTO-ENTMCNC: 32.5 G/DL (ref 31.4–37.4)
MCV RBC AUTO: 91 FL (ref 82–98)
MICROALBUMIN UR-MCNC: 13 MG/L
MICROALBUMIN/CREAT 24H UR: 9 MG/G CREATININE (ref 0–30)
MONOCYTES # BLD AUTO: 0.55 THOUSAND/ÂΜL (ref 0.17–1.22)
MONOCYTES NFR BLD AUTO: 8 % (ref 4–12)
MUCOUS THREADS UR QL AUTO: ABNORMAL
NEUTROPHILS # BLD AUTO: 4.04 THOUSANDS/ÂΜL (ref 1.85–7.62)
NEUTS SEG NFR BLD AUTO: 59 % (ref 43–75)
NITRITE UR QL STRIP: NEGATIVE
NON-SQ EPI CELLS URNS QL MICRO: ABNORMAL /HPF
NRBC BLD AUTO-RTO: 0 /100 WBCS
PH UR STRIP.AUTO: 5 [PH]
PLATELET # BLD AUTO: 279 THOUSANDS/UL (ref 149–390)
PMV BLD AUTO: 10.2 FL (ref 8.9–12.7)
POTASSIUM SERPL-SCNC: 4.1 MMOL/L (ref 3.5–5.3)
PROT SERPL-MCNC: 7.2 G/DL (ref 6.4–8.4)
PROT UR STRIP-MCNC: ABNORMAL MG/DL
PROT UR-MCNC: 18 MG/DL
PROT/CREAT UR: 0.12 MG/G{CREAT} (ref 0–0.1)
RBC # BLD AUTO: 4.77 MILLION/UL (ref 3.81–5.12)
RBC #/AREA URNS AUTO: ABNORMAL /HPF
SODIUM SERPL-SCNC: 139 MMOL/L (ref 135–147)
SP GR UR STRIP.AUTO: 1.02 (ref 1–1.03)
TRIGL SERPL-MCNC: 155 MG/DL
TSH SERPL DL<=0.05 MIU/L-ACNC: 2.17 UIU/ML (ref 0.45–4.5)
UROBILINOGEN UR STRIP-ACNC: <2 MG/DL
WBC # BLD AUTO: 6.76 THOUSAND/UL (ref 4.31–10.16)
WBC #/AREA URNS AUTO: ABNORMAL /HPF

## 2024-03-07 PROCEDURE — 82570 ASSAY OF URINE CREATININE: CPT

## 2024-03-07 PROCEDURE — 83036 HEMOGLOBIN GLYCOSYLATED A1C: CPT

## 2024-03-07 PROCEDURE — 84443 ASSAY THYROID STIM HORMONE: CPT

## 2024-03-07 PROCEDURE — 36415 COLL VENOUS BLD VENIPUNCTURE: CPT

## 2024-03-07 PROCEDURE — 82043 UR ALBUMIN QUANTITATIVE: CPT

## 2024-03-07 PROCEDURE — 80061 LIPID PANEL: CPT

## 2024-03-07 PROCEDURE — 86003 ALLG SPEC IGE CRUDE XTRC EA: CPT

## 2024-03-07 PROCEDURE — 80053 COMPREHEN METABOLIC PANEL: CPT

## 2024-03-08 ENCOUNTER — RA CDI HCC (OUTPATIENT)
Dept: OTHER | Facility: HOSPITAL | Age: 64
End: 2024-03-08

## 2024-03-08 PROBLEM — R20.9 SKIN SENSATION DISTURBANCE: Status: RESOLVED | Noted: 2019-07-18 | Resolved: 2024-03-08

## 2024-03-08 PROBLEM — M62.9 DISORDER OF MUSCLE: Status: RESOLVED | Noted: 2017-11-08 | Resolved: 2024-03-08

## 2024-03-08 PROBLEM — R11.0 NAUSEA: Status: RESOLVED | Noted: 2018-03-08 | Resolved: 2024-03-08

## 2024-03-09 LAB
A ALTERNATA IGE QN: <0.1 KU/L
A FUMIGATUS IGE QN: <0.1 KU/L
A PULLULANS IGE QN: <0.1 KU/L
C ALBICANS IGE QN: <0.1 KU/L
C HERBARUM IGE QN: <0.1 KU/L
E PURPURASCENS IGE QN: <0.1 KU/L
FUSARIUM PROLIFERATUM: <0.1 KU/L
Lab: NORMAL
M RACEMOSUS IGE QN: <0.1 KU/L
PENICILLIUM CHRYSOGENUM: <0.1 KU/L
PHOMA BETAE: <0.1 KU/L
SETOMELANOMMA ROSTRATA: <0.1 KU/L
STEMPHYLIUM HERBARUM: <0.1 KU/L

## 2024-03-13 ENCOUNTER — OFFICE VISIT (OUTPATIENT)
Facility: REHABILITATION | Age: 64
End: 2024-03-13
Payer: MEDICARE
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
## 2024-03-13 PROCEDURE — 97530 THERAPEUTIC ACTIVITIES: CPT | Performed by: PHYSICAL THERAPIST

## 2024-03-13 PROCEDURE — 97140 MANUAL THERAPY 1/> REGIONS: CPT | Performed by: PHYSICAL THERAPIST

## 2024-03-13 NOTE — PROGRESS NOTES
"PHYSICAL THERAPY TREATMENT / MAINTENANCE     Date: 24  Name: Yuni Hall  : 1960  Referring Provider: Arnie Cadena DO  AUTHORIZATION:   Insurance: Payor: MEDICARE / Plan: MEDICARE A AND B / Product Type: Medicare A & B Fee for Service /     SUBJECTIVE:  HPI: Yuni Hall is a 63 y.o. female referred to outpatient physical therapy for the following diagnosis   No diagnosis found.    Shoulder has been bothering her a bit this past week. Its a 7/10 described as a sharp pinch.   L Knee bucked a few times.     Patient goals:   -getting back to the pool    Patient-Specific Functional Scale   Task is scored 0 (unable to perform activity) to 10 (ability to perform activity independently)    Activity 7/19/23 9/20/23 10/18/23 11/22/23 2/28/24   Walk at normal pace without a cane Walks without cane Walks without cane     Walks without cane, moving slower.   2.   Grocery shop without significant pain Did okay last time grocery shopping, hurt the next day.    Doing well here, sweaty at times and has to stop, but it's gotten better Hasn't been able to grocery shop in a while, limited by weather.   3.   Dance        4.   Move without pain and to not get tired as frequently             PHYSICAL EXAM / TREATMENT  SciFit level 1, 5 min, arms and legs    Full shoulder elevation, painful flexion around 90 degrees and moderately slowed, mildly slowed abduction  Physiologic IR symmetrical and within functional limits.      Precautions - Hashimoto's, autoimmune disease    Mobility Measures 3/6/24 2/28/24 1/31/24 8/23/23 9/20/23 10/18/23 11/22/23   Assistive device used?  None none none none none none   5 Time Sit to Stand  (17\" chair, arms across chest)  Arms out front   A couple extra attempts  29 second- 52 seconds 31 seconds Arms out front  24 seconds  Pain in left knee   19 seconds  From 20\" surface, arms across chest    28 seconds from 18\" surface 35 seconds arms out front    15 seconds 20\" surface  Arms " "out front     3 Meter Timed  Up & Go  12.3 seconds 9.8 sec 10.6 sec   11.8 sec  Mild antalgic gait on left   10.1 seconds 9.6 seconds   Walking speed          Functional Gait Assessment (see below)   17/30 21/30 20/30 20/30   6 Minute Walk Test (100 foot circular course)    908 feet  No AD  103 bpm 1005 feet no  feet no  feet on treadmill inferred from self-selected pace 1.2 mph  875 feet no AD  Tired in legs  98 bpm   925 feet no AD   Patient-Reported Outcome Measure: Activities-Specific Balance Confidence Scale (ABC Scale)          Right knee flexion PROM, sitting   92 degrees 94 degrees 93 degrees 95 degrees                  Precautions - none    Specialty Daily Treatment Diary   Exercise Diary  3/13/24 3/6/24 2/28/24    Static and dynamic balance (neuromuscular re-education)       Strengthening & endurance  (therapeutic exercise) SciFit arms and legs 8 min    Supine inferior glide grade 4 4x grade 2 2x    Passive ROM R shoulder all directions    Sidelying ER 2 lbs  3x12    Sidelying Horizontal ABD  2x 8    Shoulder scaption \"Touchdowns\"   2x 10    Shoulder extensions 2x 10    B/L ER standing with yellow band \"No money's\" 2x12    heel raises  15x    Hip abductions 2x10 each side    Resisted side stepping green band  Back and forth facing the table  for support 5x    Supine knee flexion stretch  3x 30 each leg    Knee ext in supine  3 min       Supine inferior glide grade 4, scapular 90 - 120 degrees  Increased pain-free shoulder elevation actively in supine, and passively, pain only at end-range extension  Sidelying R shoulder ER, 2 lbs, about 10 x 2 sets  Sidelying R shoulder horizontal abduction, about 10 x 2 sets  Shoulder scaption \"touchdown\" movement  10 x 2 sets  Heel raises  About 10 each side biased to one side but not single leg    Standing hip abduction, green band, about 10 each    Seated knee extension stretch 10 lbs each leg, 3 min    Seated knee flexion stretch  1 min   Supine R " "shoulder traction, scaption ranges 75 - 125 degrees  4 min  Grades 3-4    Sidelying R shoulder ER, 10  1 lbs, 10 x 2 sets    Seated shoulder ER,   Yellow band, about 10 x 2 sets    Shoulder circles shoulder level 30 sec    Sit to stand 19\" surface, about 5                        ASSESSMENT   Pt shoulder was moving well but was more sore then normal. She demonstrated exercises and stretches for home with proper form and knowledge. She experienced some shoulder pain during the past few reps of both side lying exercises with weight but was able to do another set after stretching it out and taking a brief rest. During knee flexion she was limited by muscle guarding on both sides and had needed to distract her mind to get further into the stretch. Reassess next visit to one visit per two weeks if we are able.     SHORT-TERM GOALS: through 3/28/24  1. Yuni walks at least 10 minutes consecutively. MET.  3. Yuni reports 50% improvement in ability to walk grocery store distances without severe fatigue.  MET.  4. Demonstrates at least 11 degrees passive right dorsiflexion .  MET.  New goal: When released by physician, tolerates at least 6 minutes consecutive overground walking without walking boot.  MET.  New goal: When released by physician, tolerates at least 10 bilateral heel raises  MET.  New goal: When released by physician, walks with step length without one inch of contralateral leg.  MET.    New goal: Walks at least 1350 feet during 6 minute walk test.  NOT MET.  New goal: Scores at least 20/30 on FGA.   MET.PREVIOUSLY.  New goal: Able to purposefully walk for exercise at least 15 minutes at a time at least 3-4 times per week.  NOT MET..    LONG-TERM GOALS: through 3/28/24  1. Patient reports at least 20 total minutes per day of overground walking for exercise.  NOT MET..  2. Patient independently manages home exercise program.  MET with continued progressions.    New maintenance goals:  1. Walk 15 minutes " consecutively for community ambulation and exercise.  MET but not able every day or sometimes consecutive days  2. Manages 150 minutes of moderate intensity aerobic exercise per week.  MET with variability in chosen exercise.  .  3. Maintains at least 1000 feet during 6 minute walk test, at most 15 seconds 5 times sit to stand.  MET FOR 6MWT, NOT MET for 5TSTS.    PLAN OF CARE:  Patient will benefit from physical therapy 1x/week to 1x/ 2 weeks for 2 months  Neuromuscular re-education, therapeutic exercises, and therapeutic activities as outlined in grids.

## 2024-03-17 DIAGNOSIS — E06.3 HYPOTHYROIDISM DUE TO HASHIMOTO'S THYROIDITIS: ICD-10-CM

## 2024-03-17 DIAGNOSIS — E03.8 HYPOTHYROIDISM DUE TO HASHIMOTO'S THYROIDITIS: ICD-10-CM

## 2024-03-17 RX ORDER — LEVOTHYROXINE SODIUM 0.2 MG/1
TABLET ORAL
Qty: 104 TABLET | Refills: 3 | Status: SHIPPED | OUTPATIENT
Start: 2024-03-17

## 2024-03-20 ENCOUNTER — OFFICE VISIT (OUTPATIENT)
Facility: REHABILITATION | Age: 64
End: 2024-03-20
Payer: MEDICARE
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
## 2024-03-20 PROCEDURE — 97140 MANUAL THERAPY 1/> REGIONS: CPT | Performed by: PHYSICAL THERAPIST

## 2024-03-20 PROCEDURE — 97110 THERAPEUTIC EXERCISES: CPT | Performed by: PHYSICAL THERAPIST

## 2024-03-20 NOTE — PROGRESS NOTES
"PHYSICAL THERAPY TREATMENT / MAINTENANCE     Date: 24  Name: Yuni Hall  : 1960  Referring Provider: Arnie Cadena DO  AUTHORIZATION:   Insurance: Payor: MEDICARE / Plan: MEDICARE A AND B / Product Type: Medicare A & B Fee for Service /     SUBJECTIVE:  HPI: Yuni Hall is a 63 y.o. female referred to outpatient physical therapy for the following diagnosis   Encounter Diagnosis   Name Primary?    Fall, subsequent encounter Yes       R ankle and R shoulder soreness this weekend. Tired this weekend. Knee has been hurting a bit but no buckling since last visit.  Exercise this past week, walking, chasing cats, moving some furniture, stretching, and some other shoulder exercises.      Patient goals:   -getting back to the pool    Patient-Specific Functional Scale   Task is scored 0 (unable to perform activity) to 10 (ability to perform activity independently)    Activity 7/19/23 9/20/23 10/18/23 11/22/23 2/28/24   Walk at normal pace without a cane Walks without cane Walks without cane     Walks without cane, moving slower.   2.   Grocery shop without significant pain Did okay last time grocery shopping, hurt the next day.    Doing well here, sweaty at times and has to stop, but it's gotten better Hasn't been able to grocery shop in a while, limited by weather.   3.   Dance        4.   Move without pain and to not get tired as frequently             PHYSICAL EXAM / TREATMENT  SciFit level 1, 5 min, arms and legs    Full shoulder elevation, painful flexion around 90 degrees and moderately slowed, mildly slowed abduction  Physiologic IR symmetrical and within functional limits.      Precautions - Hashimoto's, autoimmune disease    Mobility Measures 3/27/24 2/28/24 1/31/24 8/23/23 9/20/23 10/18/23 11/22/23   Assistive device used?  None none none none none none   5 Time Sit to Stand  (17\" chair, arms across chest)  Arms out front   A couple extra attempts  29 second- 52 seconds 31 seconds Arms " "out front  24 seconds  Pain in left knee   19 seconds  From 20\" surface, arms across chest    28 seconds from 18\" surface 35 seconds arms out front    15 seconds 20\" surface  Arms out front     3 Meter Timed  Up & Go  12.3 seconds 9.8 sec 10.6 sec   11.8 sec  Mild antalgic gait on left   10.1 seconds 9.6 seconds   Walking speed          Functional Gait Assessment (see below)   17/30 21/30 20/30 20/30   6 Minute Walk Test (100 foot circular course)    908 feet  No AD  103 bpm 1005 feet no  feet no  feet on treadmill inferred from self-selected pace 1.2 mph  875 feet no AD  Tired in legs  98 bpm   925 feet no AD   Patient-Reported Outcome Measure: Activities-Specific Balance Confidence Scale (ABC Scale)          Right knee flexion PROM, sitting   92 degrees 94 degrees 93 degrees 95 degrees                  Precautions - none    Specialty Daily Treatment Diary   Exercise Diary  3/20/24 3/13/24 3/6/24 2/28/24    Static and dynamic balance (neuromuscular re-education)        Strengthening & endurance  (therapeutic exercise) SciFit arms and legs 9 min    Passive ROM R shoulder all directions    Supine inferior glide grade 3 3x for 30 sec    Passive ROM and PNF contract relax stretch of B/L hip and knee (5 sec contract 30 sec relax) 2x hip and knee    Shoulder flexion with 2# x8  3# x 8    Shoulder D1 motion  2x10 each side    Rows  2x 8 hold 2 sec    Marching in place  2x 1 min with 5 lbs weight    Long arc quad   1x 1 min   SciFit arms and legs 8 min    Supine inferior glide grade 4 4x grade 2 2x    Passive ROM R shoulder all directions    Sidelying ER 2 lbs  3x12    Sidelying Horizontal ABD  2x 8    Shoulder scaption \"Touchdowns\"   2x 10    Shoulder extensions 2x 10    B/L ER standing with yellow band \"No money's\" 2x12    heel raises  15x    Hip abductions 2x10 each side    Resisted side stepping green band  Back and forth facing the table  for support 5x    Supine knee flexion stretch  3x 30 each " "leg    Knee ext in supine  3 min       Supine inferior glide grade 4, scapular 90 - 120 degrees  Increased pain-free shoulder elevation actively in supine, and passively, pain only at end-range extension  Sidelying R shoulder ER, 2 lbs, about 10 x 2 sets  Sidelying R shoulder horizontal abduction, about 10 x 2 sets  Shoulder scaption \"touchdown\" movement  10 x 2 sets  Heel raises  About 10 each side biased to one side but not single leg    Standing hip abduction, green band, about 10 each    Seated knee extension stretch 10 lbs each leg, 3 min    Seated knee flexion stretch  1 min   Supine R shoulder traction, scaption ranges 75 - 125 degrees  4 min  Grades 3-4    Sidelying R shoulder ER, 10  1 lbs, 10 x 2 sets    Seated shoulder ER,   Yellow band, about 10 x 2 sets    Shoulder circles shoulder level 30 sec    Sit to stand 19\" surface, about 5                          ASSESSMENT     Yuni tolerated the exercises well today. Manual therapy and ROM was the focus of the session to address her soreness and keep her mobile. She continues to have a lot of muscle guarding with B/L hip and knee flexion but PNF stretching techniques aided in decreasing this today. She continues to be active, exercise, and stretch at home which is good.    SHORT-TERM GOALS: through 3/28/24  1. Yuni walks at least 10 minutes consecutively. MET.  3. Yuni reports 50% improvement in ability to walk grocery store distances without severe fatigue.  MET.  4. Demonstrates at least 11 degrees passive right dorsiflexion .  MET.  New goal: When released by physician, tolerates at least 6 minutes consecutive overground walking without walking boot.  MET.  New goal: When released by physician, tolerates at least 10 bilateral heel raises  MET.  New goal: When released by physician, walks with step length without one inch of contralateral leg.  MET.    New goal: Walks at least 1350 feet during 6 minute walk test.  NOT MET.  New goal: Scores at least 20/30 " on FGA.   MET.PREVIOUSLY.  New goal: Able to purposefully walk for exercise at least 15 minutes at a time at least 3-4 times per week.  NOT MET..    LONG-TERM GOALS: through 3/28/24  1. Patient reports at least 20 total minutes per day of overground walking for exercise.  NOT MET..  2. Patient independently manages home exercise program.  MET with continued progressions.    New maintenance goals:  1. Walk 15 minutes consecutively for community ambulation and exercise.  MET but not able every day or sometimes consecutive days  2. Manages 150 minutes of moderate intensity aerobic exercise per week.  MET with variability in chosen exercise.  .  3. Maintains at least 1000 feet during 6 minute walk test, at most 15 seconds 5 times sit to stand.  MET FOR 6MWT, NOT MET for 5TSTS.    PLAN OF CARE:  Patient will benefit from physical therapy 1x/week to 1x/ 2 weeks for 2 months  Neuromuscular re-education, therapeutic exercises, and therapeutic activities as outlined in grids.    Treatment performed by Louis Gautam, SPT, under supervision of Jericho Monsivais PT.

## 2024-03-26 ENCOUNTER — OFFICE VISIT (OUTPATIENT)
Dept: FAMILY MEDICINE CLINIC | Facility: CLINIC | Age: 64
End: 2024-03-26
Payer: MEDICARE

## 2024-03-26 VITALS
SYSTOLIC BLOOD PRESSURE: 124 MMHG | HEART RATE: 81 BPM | WEIGHT: 237.6 LBS | HEIGHT: 66 IN | DIASTOLIC BLOOD PRESSURE: 80 MMHG | BODY MASS INDEX: 38.18 KG/M2 | TEMPERATURE: 97.5 F | OXYGEN SATURATION: 99 %

## 2024-03-26 DIAGNOSIS — E03.8 HYPOTHYROIDISM DUE TO HASHIMOTO'S THYROIDITIS: ICD-10-CM

## 2024-03-26 DIAGNOSIS — E06.3 HYPOTHYROIDISM DUE TO HASHIMOTO'S THYROIDITIS: ICD-10-CM

## 2024-03-26 DIAGNOSIS — M35.9 CONNECTIVE TISSUE DISEASE (HCC): ICD-10-CM

## 2024-03-26 DIAGNOSIS — D35.2 PITUITARY MICROADENOMA (HCC): ICD-10-CM

## 2024-03-26 DIAGNOSIS — F32.0 MILD MAJOR DEPRESSION, SINGLE EPISODE (HCC): ICD-10-CM

## 2024-03-26 DIAGNOSIS — N18.2 CKD (CHRONIC KIDNEY DISEASE) STAGE 2, GFR 60-89 ML/MIN: ICD-10-CM

## 2024-03-26 DIAGNOSIS — J39.2 DRY THROAT: ICD-10-CM

## 2024-03-26 DIAGNOSIS — N18.31 STAGE 3A CHRONIC KIDNEY DISEASE (HCC): ICD-10-CM

## 2024-03-26 DIAGNOSIS — I10 ESSENTIAL HYPERTENSION: ICD-10-CM

## 2024-03-26 DIAGNOSIS — E11.65 TYPE 2 DIABETES MELLITUS WITH HYPERGLYCEMIA, WITHOUT LONG-TERM CURRENT USE OF INSULIN (HCC): Primary | ICD-10-CM

## 2024-03-26 DIAGNOSIS — D69.6 THROMBOCYTOPENIC DISORDER (HCC): ICD-10-CM

## 2024-03-26 DIAGNOSIS — G70.9 NEUROMUSCULAR DISORDER (HCC): ICD-10-CM

## 2024-03-26 DIAGNOSIS — Z00.00 MEDICARE ANNUAL WELLNESS VISIT, SUBSEQUENT: ICD-10-CM

## 2024-03-26 DIAGNOSIS — F32.0 CURRENT MILD EPISODE OF MAJOR DEPRESSIVE DISORDER WITHOUT PRIOR EPISODE (HCC): ICD-10-CM

## 2024-03-26 PROCEDURE — 99215 OFFICE O/P EST HI 40 MIN: CPT | Performed by: FAMILY MEDICINE

## 2024-03-26 PROCEDURE — G0439 PPPS, SUBSEQ VISIT: HCPCS | Performed by: FAMILY MEDICINE
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
## 2024-03-26 NOTE — PATIENT INSTRUCTIONS
Medicare Preventive Visit Patient Instructions  Thank you for completing your Welcome to Medicare Visit or Medicare Annual Wellness Visit today. Your next wellness visit will be due in one year (3/27/2025).  The screening/preventive services that you may require over the next 5-10 years are detailed below. Some tests may not apply to you based off risk factors and/or age. Screening tests ordered at today's visit but not completed yet may show as past due. Also, please note that scanned in results may not display below.  Preventive Screenings:  Service Recommendations Previous Testing/Comments   Colorectal Cancer Screening  * Colonoscopy    * Fecal Occult Blood Test (FOBT)/Fecal Immunochemical Test (FIT)  * Fecal DNA/Cologuard Test  * Flexible Sigmoidoscopy Age: 45-75 years old   Colonoscopy: every 10 years (may be performed more frequently if at higher risk)  OR  FOBT/FIT: every 1 year  OR  Cologuard: every 3 years  OR  Sigmoidoscopy: every 5 years  Screening may be recommended earlier than age 45 if at higher risk for colorectal cancer. Also, an individualized decision between you and your healthcare provider will decide whether screening between the ages of 76-85 would be appropriate. Colonoscopy: 01/18/2022  FOBT/FIT: Not on file  Cologuard: Not on file  Sigmoidoscopy: Not on file    Screening Current     Breast Cancer Screening Age: 40+ years old  Frequency: every 1-2 years  Not required if history of left and right mastectomy Mammogram: 08/31/2022    Screening Current   Cervical Cancer Screening Between the ages of 21-29, pap smear recommended once every 3 years.   Between the ages of 30-65, can perform pap smear with HPV co-testing every 5 years.   Recommendations may differ for women with a history of total hysterectomy, cervical cancer, or abnormal pap smears in past. Pap Smear: 11/15/2018    Screening Not Indicated   Hepatitis C Screening Once for adults born between 1945 and 1965  More frequently in  patients at high risk for Hepatitis C Hep C Antibody: 11/17/2017    Screening Current   Diabetes Screening 1-2 times per year if you're at risk for diabetes or have pre-diabetes Fasting glucose: 109 mg/dL (3/7/2024)  A1C: 6.7 % (3/7/2024)  Screening Not Indicated  History Diabetes   Cholesterol Screening Once every 5 years if you don't have a lipid disorder. May order more often based on risk factors. Lipid panel: 03/07/2024    Screening Not Indicated  History Lipid Disorder     Other Preventive Screenings Covered by Medicare:  Abdominal Aortic Aneurysm (AAA) Screening: covered once if your at risk. You're considered to be at risk if you have a family history of AAA.  Lung Cancer Screening: covers low dose CT scan once per year if you meet all of the following conditions: (1) Age 55-77; (2) No signs or symptoms of lung cancer; (3) Current smoker or have quit smoking within the last 15 years; (4) You have a tobacco smoking history of at least 20 pack years (packs per day multiplied by number of years you smoked); (5) You get a written order from a healthcare provider.  Glaucoma Screening: covered annually if you're considered high risk: (1) You have diabetes OR (2) Family history of glaucoma OR (3)  aged 50 and older OR (4)  American aged 65 and older  Osteoporosis Screening: covered every 2 years if you meet one of the following conditions: (1) You're estrogen deficient and at risk for osteoporosis based off medical history and other findings; (2) Have a vertebral abnormality; (3) On glucocorticoid therapy for more than 3 months; (4) Have primary hyperparathyroidism; (5) On osteoporosis medications and need to assess response to drug therapy.   Last bone density test (DXA Scan): 11/17/2017.  HIV Screening: covered annually if you're between the age of 15-65. Also covered annually if you are younger than 15 and older than 65 with risk factors for HIV infection. For pregnant patients, it is  covered up to 3 times per pregnancy.    Immunizations:  Immunization Recommendations   Influenza Vaccine Annual influenza vaccination during flu season is recommended for all persons aged >= 6 months who do not have contraindications   Pneumococcal Vaccine   * Pneumococcal conjugate vaccine = PCV13 (Prevnar 13), PCV15 (Vaxneuvance), PCV20 (Prevnar 20)  * Pneumococcal polysaccharide vaccine = PPSV23 (Pneumovax) Adults 19-63 yo with certain risk factors or if 65+ yo  If never received any pneumonia vaccine: recommend Prevnar 20 (PCV20)  Give PCV20 if previously received 1 dose of PCV13 or PPSV23   Hepatitis B Vaccine 3 dose series if at intermediate or high risk (ex: diabetes, end stage renal disease, liver disease)   Respiratory syncytial virus (RSV) Vaccine - COVERED BY MEDICARE PART D  * RSVPreF3 (Arexvy) CDC recommends that adults 60 years of age and older may receive a single dose of RSV vaccine using shared clinical decision-making (SCDM)   Tetanus (Td) Vaccine - COST NOT COVERED BY MEDICARE PART B Following completion of primary series, a booster dose should be given every 10 years to maintain immunity against tetanus. Td may also be given as tetanus wound prophylaxis.   Tdap Vaccine - COST NOT COVERED BY MEDICARE PART B Recommended at least once for all adults. For pregnant patients, recommended with each pregnancy.   Shingles Vaccine (Shingrix) - COST NOT COVERED BY MEDICARE PART B  2 shot series recommended in those 19 years and older who have or will have weakened immune systems or those 50 years and older     Health Maintenance Due:      Topic Date Due   • Colorectal Cancer Screening  01/16/2029   • HIV Screening  Completed   • Hepatitis C Screening  Completed     Immunizations Due:      Topic Date Due   • Influenza Vaccine (1) 09/01/2023   • COVID-19 Vaccine (6 - 2023-24 season) 09/01/2023     Advance Directives   What are advance directives?  Advance directives are legal documents that state your wishes  and plans for medical care. These plans are made ahead of time in case you lose your ability to make decisions for yourself. Advance directives can apply to any medical decision, such as the treatments you want, and if you want to donate organs.   What are the types of advance directives?  There are many types of advance directives, and each state has rules about how to use them. You may choose a combination of any of the following:  Living will:  This is a written record of the treatment you want. You can also choose which treatments you do not want, which to limit, and which to stop at a certain time. This includes surgery, medicine, IV fluid, and tube feedings.   Durable power of  for healthcare (DPAHC):  This is a written record that states who you want to make healthcare choices for you when you are unable to make them for yourself. This person, called a proxy, is usually a family member or a friend. You may choose more than 1 proxy.  Do not resuscitate (DNR) order:  A DNR order is used in case your heart stops beating or you stop breathing. It is a request not to have certain forms of treatment, such as CPR. A DNR order may be included in other types of advance directives.  Medical directive:  This covers the care that you want if you are in a coma, near death, or unable to make decisions for yourself. You can list the treatments you want for each condition. Treatment may include pain medicine, surgery, blood transfusions, dialysis, IV or tube feedings, and a ventilator (breathing machine).  Values history:  This document has questions about your views, beliefs, and how you feel and think about life. This information can help others choose the care that you would choose.  Why are advance directives important?  An advance directive helps you control your care. Although spoken wishes may be used, it is better to have your wishes written down. Spoken wishes can be misunderstood, or not followed.  Treatments may be given even if you do not want them. An advance directive may make it easier for your family to make difficult choices about your care.   Weight Management   Why it is important to manage your weight:  Being overweight increases your risk of health conditions such as heart disease, high blood pressure, type 2 diabetes, and certain types of cancer. It can also increase your risk for osteoarthritis, sleep apnea, and other respiratory problems. Aim for a slow, steady weight loss. Even a small amount of weight loss can lower your risk of health problems.  How to lose weight safely:  A safe and healthy way to lose weight is to eat fewer calories and get regular exercise. You can lose up about 1 pound a week by decreasing the number of calories you eat by 500 calories each day.   Healthy meal plan for weight management:  A healthy meal plan includes a variety of foods, contains fewer calories, and helps you stay healthy. A healthy meal plan includes the following:  Eat whole-grain foods more often.  A healthy meal plan should contain fiber. Fiber is the part of grains, fruits, and vegetables that is not broken down by your body. Whole-grain foods are healthy and provide extra fiber in your diet. Some examples of whole-grain foods are whole-wheat breads and pastas, oatmeal, brown rice, and bulgur.  Eat a variety of vegetables every day.  Include dark, leafy greens such as spinach, kale, mera greens, and mustard greens. Eat yellow and orange vegetables such as carrots, sweet potatoes, and winter squash.   Eat a variety of fruits every day.  Choose fresh or canned fruit (canned in its own juice or light syrup) instead of juice. Fruit juice has very little or no fiber.  Eat low-fat dairy foods.  Drink fat-free (skim) milk or 1% milk. Eat fat-free yogurt and low-fat cottage cheese. Try low-fat cheeses such as mozzarella and other reduced-fat cheeses.  Choose meat and other protein foods that are low in fat.   Choose beans or other legumes such as split peas or lentils. Choose fish, skinless poultry (chicken or turkey), or lean cuts of red meat (beef or pork). Before you cook meat or poultry, cut off any visible fat.   Use less fat and oil.  Try baking foods instead of frying them. Add less fat, such as margarine, sour cream, regular salad dressing and mayonnaise to foods. Eat fewer high-fat foods. Some examples of high-fat foods include french fries, doughnuts, ice cream, and cakes.  Eat fewer sweets.  Limit foods and drinks that are high in sugar. This includes candy, cookies, regular soda, and sweetened drinks.  Exercise:  Exercise at least 30 minutes per day on most days of the week. Some examples of exercise include walking, biking, dancing, and swimming. You can also fit in more physical activity by taking the stairs instead of the elevator or parking farther away from stores. Ask your healthcare provider about the best exercise plan for you.      © Copyright [employer] 2018 Information is for End User's use only and may not be sold, redistributed or otherwise used for commercial purposes. All illustrations and images included in CareNotes® are the copyrighted property of [employer] or [employer]      Depression   AMBULATORY CARE:   Depression  is a mood disorder that causes feelings of sadness or hopelessness that do not go away. Depression may cause you to lose interest in things you used to enjoy. These feelings may interfere with your daily life.  Common signs and symptoms:   Appetite changes, or weight gain or loss    Trouble falling or staying asleep, or sleeping too much    Fatigue (being mentally and physically tired) or lack of energy    Feeling restless, irritable, or withdrawn    Feeling worthless, hopeless, discouraged, or guilty    Trouble concentrating, remembering things, doing daily tasks, or making decisions    Thoughts about hurting or killing yourself    Call your local emergency  number (911 in the [place]) if:   You think about hurting yourself or someone else.    You have done something on purpose to hurt yourself.    Call your therapist or doctor if:   Your symptoms get worse or do not get better with treatment.    Your depression keeps you from doing your regular daily activities.    You have new symptoms since your last visit.    You have questions or concerns about your condition or care.    The following resources are available at any time to help you, if needed:   Contact a suicide prevention organization:        For the [employer] Suicide and Crisis Lifeline:     Call or text [employer]     Send a chat on https://Magisto/chat     Call 0-855-195-8992 (1-800-273-TALK)    For the Suicide Hotline, call 7-976-685-6986 (0-462-FFMEGWK)    For a list of international numbers: https://save.org/find-help/international-resources/  Treatment for depression  depends on how severe your symptoms are. You may need any of the following:  Cognitive behavioral therapy (CBT)  teaches you how to identify and change negative thought patterns.    Antidepressant medicine  may be given to decrease or manage symptoms. You may need to take this medicine for several weeks before they start working.    Self-care:   Talk to someone about your depression.  Your healthcare provider may suggest counseling. You might feel more comfortable talking with a friend or family member about your depression. Choose someone you know will be supportive and encouraging.    Get regular physical activity.  Physical activity can lower your stress, improve your mood, and help you sleep better. Work with your healthcare provider to develop a plan that you enjoy.         Create a regular sleep schedule.  A routine can help you relax before bed. Listen to music, read, or do yoga. Try to go to bed and wake up at the same time every day. Sleep is important for emotional health.    Eat a variety of healthy foods.  Healthy foods include fruits,  vegetables, whole-grain breads, low-fat dairy products, lean meats, fish, and cooked beans. A healthy meal plan is low in fat, salt, and added sugar.         Do not use alcohol, drugs, or nicotine products.  These can worsen depression or make it hard to manage. Talk to your therapist or healthcare provider if you use any of these products and need help to quit.    Follow up with your therapist or doctor as directed:  Your healthcare provider will monitor your progress at follow-up visits. Your provider will also monitor your medicine if you take antidepressants and ask if the medicine is helping. Tell your provider about any side effects or problems you have with your medicine. The type or amount of medicine may need to be changed. Write down your questions so you remember to ask them during your visits.  For more information or support:   [employer] [place] [facility]  [address]  [place] [address]  Phone: 5- 142 - 561-7020  Phone: 0- 198 - [phone]  Web Address: http://www.alicia.[employer].com  [address] Suicide and Crisis Lifeline  [address]  [place] [address]  Phone: 8- 142 - 697  Web Address: http://www.suicidepreventionlifeline.org OR https://boo-box.org/chat/    © Copyright [employer] 2023 Information is for End User's use only and may not be sold, redistributed or otherwise used for commercial purposes.  The above information is an  only. It is not intended as medical advice for individual conditions or treatments. Talk to your doctor, nurse or pharmacist before following any medical regimen to see if it is safe and effective for you.

## 2024-03-27 ENCOUNTER — OFFICE VISIT (OUTPATIENT)
Facility: REHABILITATION | Age: 64
End: 2024-03-27
Payer: MEDICARE
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
## 2024-03-27 PROCEDURE — 97530 THERAPEUTIC ACTIVITIES: CPT | Performed by: PHYSICAL THERAPIST

## 2024-03-27 PROCEDURE — 97110 THERAPEUTIC EXERCISES: CPT | Performed by: PHYSICAL THERAPIST

## 2024-03-27 NOTE — PROGRESS NOTES
"PHYSICAL THERAPY TREATMENT / MAINTENANCE / UPDATE    Date: 24  Name: Yuni Hall  : 1960  Referring Provider: Arnie Cadena DO  AUTHORIZATION:   Insurance: Payor: MEDICARE / Plan: MEDICARE A AND B / Product Type: Medicare A & B Fee for Service /     SUBJECTIVE:  HPI: Yuni Hall is a 63 y.o. female referred to outpatient physical therapy for the following diagnosis   Encounter Diagnosis   Name Primary?    Fall, subsequent encounter Yes     Pain about the right ankle, more so than the shoulder.  Will follow with podiatrist.  Better with sitting.  Worse with prolonged sitting.  Worse with prolonged walking, standing movements.    Patient goals:   -getting back to the pool    Patient-Specific Functional Scale   Task is scored 0 (unable to perform activity) to 10 (ability to perform activity independently)    Activity 7/19/23 9/20/23 10/18/23 11/22/23 2/28/24 3/27/24   Walk at normal pace without a cane Walks without cane Walks without cane     Walks without cane, moving slower. Walks without cane   2.   Grocery shop without significant pain Did okay last time grocery shopping, hurt the next day.    Doing well here, sweaty at times and has to stop, but it's gotten better Hasn't been able to grocery shop in a while, limited by weather. Does okay while moving but pain with static standing at check-out   3.   Dance         4.   Move without pain and to not get tired as frequently              PHYSICAL EXAM / TREATMENT      Precautions - Hashimoto's, autoimmune disease    Mobility Measures 3/27/24 2/28/24 1/31/24 8/23/23 9/20/23 10/18/23 11/22/23   Assistive device used? none None none none none none none   5 Time Sit to Stand  (17\" chair, arms across chest) Arms out front  24 seconds Arms out front   A couple extra attempts  29 second- 52 seconds 31 seconds Arms out front  24 seconds  Pain in left knee   19 seconds  From 20\" surface, arms across chest    28 seconds from 18\" surface 35 seconds " "arms out front    15 seconds 20\" surface  Arms out front     3 Meter Timed  Up & Go 10.5 seconds 12.3 seconds 9.8 sec 10.6 sec   11.8 sec  Mild antalgic gait on left   10.1 seconds 9.6 seconds   Walking speed          Functional Gait Assessment (see below)   17/30 21/30 20/30 20/30   6 Minute Walk Test (100 foot circular course)   920 feet 908 feet  No AD  103 bpm 1005 feet no  feet no  feet on treadmill inferred from self-selected pace 1.2 mph  875 feet no AD  Tired in legs  98 bpm   925 feet no AD   Patient-Reported Outcome Measure: Activities-Specific Balance Confidence Scale (ABC Scale)          Right knee flexion PROM, sitting   92 degrees 94 degrees 93 degrees 95 degrees                  Precautions - none    Specialty Daily Treatment Diary   Exercise Diary  3/27/24 3/20/24 3/13/24 3/6/24 2/28/24   Static and dynamic balance (neuromuscular re-education)        Strengthening & endurance  (therapeutic exercise) SciFit, legs only, 9 min, 100 spm    Seated ankle eversion, about 12 x 2 sets bilaterally  Yellow band    Standing heel raise  R only about 10  L only about 10  Bilaterally about 10    Step ups 6\" step   1 railing   About 10 each  Lateral step ups 6\" step about 10 each    Supine bridge 10  Supine hip flexor stretch 1 min each side    Treadmill walking 1.2 mph 3 min    Inferior glide grade 3-4 L glenohumeral joint  And posterior glide L AC joint grades 3-4  3 min, improved end-range elevation     SciFit arms and legs 9 min    Passive ROM R shoulder all directions    Supine inferior glide grade 3 3x for 30 sec    Passive ROM and PNF contract relax stretch of B/L hip and knee (5 sec contract 30 sec relax) 2x hip and knee    Shoulder flexion with 2# x8  3# x 8    Shoulder D1 motion  2x10 each side    Rows  2x 8 hold 2 sec    Marching in place  2x 1 min with 5 lbs weight    Long arc quad   1x 1 min   SciFit arms and legs 8 min    Supine inferior glide grade 4 4x grade 2 2x    Passive ROM R " "shoulder all directions    Sidelying ER 2 lbs  3x12    Sidelying Horizontal ABD  2x 8    Shoulder scaption \"Touchdowns\"   2x 10    Shoulder extensions 2x 10    B/L ER standing with yellow band \"No money's\" 2x12    heel raises  15x    Hip abductions 2x10 each side    Resisted side stepping green band  Back and forth facing the table  for support 5x    Supine knee flexion stretch  3x 30 each leg    Knee ext in supine  3 min       Supine inferior glide grade 4, scapular 90 - 120 degrees  Increased pain-free shoulder elevation actively in supine, and passively, pain only at end-range extension  Sidelying R shoulder ER, 2 lbs, about 10 x 2 sets  Sidelying R shoulder horizontal abduction, about 10 x 2 sets  Shoulder scaption \"touchdown\" movement  10 x 2 sets  Heel raises  About 10 each side biased to one side but not single leg    Standing hip abduction, green band, about 10 each    Seated knee extension stretch 10 lbs each leg, 3 min    Seated knee flexion stretch  1 min   Supine R shoulder traction, scaption ranges 75 - 125 degrees  4 min  Grades 3-4    Sidelying R shoulder ER, 10  1 lbs, 10 x 2 sets    Seated shoulder ER,   Yellow band, about 10 x 2 sets    Shoulder circles shoulder level 30 sec    Sit to stand 19\" surface, about 5                     Pain about the right lateral lower leg.   Full bilateral ankle range of motion.  4+/5 right ankle eversion, 5/5 left  5/5 dorsiflexion    ASSESSMENT   Some improvement in mobility measures again.  Ankle pain about right lateral lower leg and fatigue into ankle inversion.    Maintaining mobility well.  [employer] may open in another month or so, which will be helpful in maintaining a good level of physical activity.   Limited less by her left shoulder.      SHORT-TERM GOALS: through 4/28/24  1eFderico Morrissey walks at least 10 minutes consecutively. MET.  3. Yuni reports 50% improvement in ability to walk grocery store distances without severe fatigue.  MET.  4. Demonstrates at least " 11 degrees passive right dorsiflexion .  MET.  New goal: When released by physician, tolerates at least 6 minutes consecutive overground walking without walking boot.  MET.  New goal: When released by physician, tolerates at least 10 bilateral heel raises  MET.  New goal: When released by physician, walks with step length without one inch of contralateral leg.  MET.    New goal: Walks at least 1350 feet during 6 minute walk test.  NOT MET.  New goal: Scores at least 20/30 on FGA.   MET.PREVIOUSLY.  New goal: Able to purposefully walk for exercise at least 15 minutes at a time at least 3-4 times per week.  NOT MET..    LONG-TERM GOALS: through 3/28/24  1. Patient reports at least 20 total minutes per day of overground walking for exercise.  NOT MET..  2. Patient independently manages home exercise program.  MET with continued progressions.    New maintenance goals:  1. Walk 15 minutes consecutively for community ambulation and exercise.  MET but not able every day or sometimes consecutive days  2. Manages 150 minutes of moderate intensity aerobic exercise per week.  MET with variability in chosen exercise.  .  3. Maintains at least 1000 feet during 6 minute walk test, at most 15 seconds 5 times sit to stand.  NOT MET FOR 5TSTS, NOT MET for 6MWT.    PLAN OF CARE:  Patient will benefit from physical therapy 1x/week to 1x/ 1-2 weeks for 2 months  Neuromuscular re-education, therapeutic exercises, and therapeutic activities as outlined in grids.

## 2024-04-03 ENCOUNTER — APPOINTMENT (OUTPATIENT)
Facility: REHABILITATION | Age: 64
End: 2024-04-03
Payer: MEDICARE

## 2024-04-10 ENCOUNTER — OFFICE VISIT (OUTPATIENT)
Facility: REHABILITATION | Age: 64
End: 2024-04-10
Payer: MEDICARE
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
## 2024-04-10 PROCEDURE — 97110 THERAPEUTIC EXERCISES: CPT | Performed by: PHYSICAL THERAPIST

## 2024-04-10 NOTE — PROGRESS NOTES
"PHYSICAL THERAPY TREATMENT / MAINTENANCE     Date: 04/10/24  Name: Yuni Hall  : 1960  Referring Provider: Arnie Cadena DO  AUTHORIZATION:   Insurance: Payor: MEDICARE / Plan: MEDICARE A AND B / Product Type: Medicare A & B Fee for Service /     SUBJECTIVE:  HPI: Yuni Hall is a 63 y.o. female referred to outpatient physical therapy for the following diagnosis   Encounter Diagnosis   Name Primary?    Fall, subsequent encounter Yes       Sore all over today. Knees caused her more pain this week.  Pain on lower leg on outside.   Shoulder not doing as bad, only tough when raising objects overhead.  Went to podiatrist and was advised to use slippers with cushion to get around her house    Patient goals:   -getting back to the pool    Patient-Specific Functional Scale   Task is scored 0 (unable to perform activity) to 10 (ability to perform activity independently)    Activity 7/19/23 9/20/23 10/18/23 11/22/23 2/28/24 3/27/24   Walk at normal pace without a cane Walks without cane Walks without cane     Walks without cane, moving slower. Walks without cane   2.   Grocery shop without significant pain Did okay last time grocery shopping, hurt the next day.    Doing well here, sweaty at times and has to stop, but it's gotten better Hasn't been able to grocery shop in a while, limited by weather. Does okay while moving but pain with static standing at check-out   3.   Dance         4.   Move without pain and to not get tired as frequently              PHYSICAL EXAM / TREATMENT      Precautions - Hashimoto's, autoimmune disease    Mobility Measures 3/27/24 2/28/24 1/31/24 8/23/23 9/20/23 10/18/23 11/22/23   Assistive device used? none None none none none none none   5 Time Sit to Stand  (17\" chair, arms across chest) Arms out front  24 seconds Arms out front   A couple extra attempts  29 second- 52 seconds 31 seconds Arms out front  24 seconds  Pain in left knee   19 seconds  From 20\" surface, arms " "across chest    28 seconds from 18\" surface 35 seconds arms out front    15 seconds 20\" surface  Arms out front     3 Meter Timed  Up & Go 10.5 seconds 12.3 seconds 9.8 sec 10.6 sec   11.8 sec  Mild antalgic gait on left   10.1 seconds 9.6 seconds   Walking speed          Functional Gait Assessment (see below)   17/30 21/30 20/30 20/30   6 Minute Walk Test (100 foot circular course)   920 feet 908 feet  No AD  103 bpm 1005 feet no  feet no  feet on treadmill inferred from self-selected pace 1.2 mph  875 feet no AD  Tired in legs  98 bpm   925 feet no AD   Patient-Reported Outcome Measure: Activities-Specific Balance Confidence Scale (ABC Scale)          Right knee flexion PROM, sitting   92 degrees 94 degrees 93 degrees 95 degrees                  Precautions - none    Specialty Daily Treatment Diary   Exercise Diary  4/10/24 3/27/24 3/20/24 3/13/24 3/6/24 2/28/24   Static and dynamic balance (neuromuscular re-education)         Strengthening & endurance  (therapeutic exercise) SciFit, legs only, 9 min, level 2    Seated ankle eversion, inversion, dorsiflexion, plantarflexion, about 12 x 2 sets on R foot  Yellow band    Step ups  8\" step   1 railing   L leg first   2x 12 each  R foot 2 railings    Lateral step ups 6\" step about 2x 10 each    Bosu ball ankle circles  B/L 45 sec each foot    Treadmill walking  1.0 for 4 min  1.2 for 1 min    R & L hip flexion, knee flexion, hamstring, calf stretch  6 min SciFit, legs only, 9 min, 100 spm    Seated ankle eversion, about 12 x 2 sets bilaterally  Yellow band    Standing heel raise  R only about 10  L only about 10  Bilaterally about 10    Step ups 6\" step   1 railing   About 10 each  Lateral step ups 6\" step about 10 each    Supine bridge 10  Supine hip flexor stretch 1 min each side    Treadmill walking 1.2 mph 3 min    Inferior glide grade 3-4 L glenohumeral joint  And posterior glide L AC joint grades 3-4  3 min, improved end-range elevation     SciFit " "arms and legs 9 min    Passive ROM R shoulder all directions    Supine inferior glide grade 3 3x for 30 sec    Passive ROM and PNF contract relax stretch of B/L hip and knee (5 sec contract 30 sec relax) 2x hip and knee    Shoulder flexion with 2# x8  3# x 8    Shoulder D1 motion  2x10 each side    Rows  2x 8 hold 2 sec    Marching in place  2x 1 min with 5 lbs weight    Long arc quad   1x 1 min   SciFit arms and legs 8 min    Supine inferior glide grade 4 4x grade 2 2x    Passive ROM R shoulder all directions    Sidelying ER 2 lbs  3x12    Sidelying Horizontal ABD  2x 8    Shoulder scaption \"Touchdowns\"   2x 10    Shoulder extensions 2x 10    B/L ER standing with yellow band \"No money's\" 2x12    heel raises  15x    Hip abductions 2x10 each side    Resisted side stepping green band  Back and forth facing the table  for support 5x    Supine knee flexion stretch  3x 30 each leg    Knee ext in supine  3 min       Supine inferior glide grade 4, scapular 90 - 120 degrees  Increased pain-free shoulder elevation actively in supine, and passively, pain only at end-range extension  Sidelying R shoulder ER, 2 lbs, about 10 x 2 sets  Sidelying R shoulder horizontal abduction, about 10 x 2 sets  Shoulder scaption \"touchdown\" movement  10 x 2 sets  Heel raises  About 10 each side biased to one side but not single leg    Standing hip abduction, green band, about 10 each    Seated knee extension stretch 10 lbs each leg, 3 min    Seated knee flexion stretch  1 min   Supine R shoulder traction, scaption ranges 75 - 125 degrees  4 min  Grades 3-4    Sidelying R shoulder ER, 10  1 lbs, 10 x 2 sets    Seated shoulder ER,   Yellow band, about 10 x 2 sets    Shoulder circles shoulder level 30 sec    Sit to stand 19\" surface, about 5                       Pain about the right lateral lower leg.   Full bilateral ankle range of motion.  4+/5 right ankle eversion, 5/5 left  5/5 dorsiflexion    ASSESSMENT     Ankle continues to have pain up " into lateral lower leg  Had good ankle control with the ankle [place] exercise but had trouble staying on the L knee in a single leg stance for too long.  Increased lateral knee pain up into the hip this week  Mobility and strength was looking better today. Had more difficulty stepping up wit the R foot first    SHORT-TERM GOALS: through 4/28/24  1. Yuni walks at least 10 minutes consecutively. MET.  3. Yuni reports 50% improvement in ability to walk grocery store distances without severe fatigue.  MET.  4. Demonstrates at least 11 degrees passive right dorsiflexion .  MET.  New goal: When released by physician, tolerates at least 6 minutes consecutive overground walking without walking boot.  MET.  New goal: When released by physician, tolerates at least 10 bilateral heel raises  MET.  New goal: When released by physician, walks with step length without one inch of contralateral leg.  MET.    New goal: Walks at least 1350 feet during 6 minute walk test.  NOT MET.  New goal: Scores at least 20/30 on FGA.   MET.PREVIOUSLY.  New goal: Able to purposefully walk for exercise at least 15 minutes at a time at least 3-4 times per week.  NOT MET..    LONG-TERM GOALS: through 3/28/24  1. Patient reports at least 20 total minutes per day of overground walking for exercise.  NOT MET..  2. Patient independently manages home exercise program.  MET with continued progressions.    New maintenance goals:  1. Walk 15 minutes consecutively for community ambulation and exercise.  MET but not able every day or sometimes consecutive days  2. Manages 150 minutes of moderate intensity aerobic exercise per week.  MET with variability in chosen exercise.  .  3. Maintains at least 1000 feet during 6 minute walk test, at most 15 seconds 5 times sit to stand.  NOT MET FOR 5TSTS, NOT MET for 6MWT.    PLAN OF CARE:  Patient will benefit from physical therapy 1x/week to 1x/ 1-2 weeks for 2 months  Neuromuscular re-education, therapeutic  exercises, and therapeutic activities as outlined in grids.    Treatment performed by Louis Gautam, JERRY, under supervision of Jericho Monsivais PT.

## 2024-04-18 ENCOUNTER — HOSPITAL ENCOUNTER (OUTPATIENT)
Dept: MAMMOGRAPHY | Facility: MEDICAL CENTER | Age: 64
Discharge: HOME | End: 2024-04-18
Payer: MEDICARE

## 2024-04-18 VITALS — WEIGHT: 237.6 LBS | BODY MASS INDEX: 38.18 KG/M2 | HEIGHT: 66 IN

## 2024-04-18 DIAGNOSIS — N60.19 FIBROCYSTIC BREAST DISEASE (FCBD), UNSPECIFIED LATERALITY: ICD-10-CM

## 2024-04-18 DIAGNOSIS — Z80.3 FAMILY HISTORY OF BREAST CANCER: ICD-10-CM

## 2024-04-18 DIAGNOSIS — Z12.31 BREAST CANCER SCREENING BY MAMMOGRAM: ICD-10-CM

## 2024-04-18 PROCEDURE — 77063 BREAST TOMOSYNTHESIS BI: CPT

## 2024-04-18 PROCEDURE — 77067 SCR MAMMO BI INCL CAD: CPT

## 2024-04-24 ENCOUNTER — OFFICE VISIT (OUTPATIENT)
Facility: REHABILITATION | Age: 64
End: 2024-04-24
Payer: MEDICARE
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
## 2024-04-24 DIAGNOSIS — M79.18 MYOFASCIAL PAIN: ICD-10-CM

## 2024-04-24 PROCEDURE — 97110 THERAPEUTIC EXERCISES: CPT | Performed by: PHYSICAL THERAPIST

## 2024-04-24 RX ORDER — IBUPROFEN 800 MG/1
800 TABLET ORAL EVERY 6 HOURS PRN
Qty: 30 TABLET | Refills: 5 | Status: SHIPPED | OUTPATIENT
Start: 2024-04-24 | End: 2024-05-06 | Stop reason: SDUPTHER

## 2024-04-24 NOTE — PROGRESS NOTES
"PHYSICAL THERAPY TREATMENT / MAINTENANCE     Date: 24  Name: Yuni Hall  : 1960  Referring Provider: Arnie Cadena DO  AUTHORIZATION:   Insurance: Payor: MEDICARE / Plan: MEDICARE A AND B / Product Type: Medicare A & B Fee for Service /     SUBJECTIVE:  HPI: Yuni Hall is a 63 y.o. female referred to outpatient physical therapy for the following diagnosis   Encounter Diagnosis   Name Primary?    Fall, subsequent encounter Yes     Got stiff this past Friday, all joints in legs, had swelling in proximal lower leg.    No specific cause.  Tried pain medication, anti-inflammatories.  Upper body -5/10.  Lower body -10/10.  Tried patches, ibuprofen, hot showers.    Feels popping sensation about the left anterior lateral lower leg, anterior to fibular head.    [facility] re-opens 24.    Patient goals:   -getting back to the pool    Patient-Specific Functional Scale   Task is scored 0 (unable to perform activity) to 10 (ability to perform activity independently)    Activity 7/19/23 9/20/23 10/18/23 11/22/23 2/28/24 3/27/24   Walk at normal pace without a cane Walks without cane Walks without cane     Walks without cane, moving slower. Walks without cane   2.   Grocery shop without significant pain Did okay last time grocery shopping, hurt the next day.    Doing well here, sweaty at times and has to stop, but it's gotten better Hasn't been able to grocery shop in a while, limited by weather. Does okay while moving but pain with static standing at check-out   3.   Dance         4.   Move without pain and to not get tired as frequently              PHYSICAL EXAM / TREATMENT      Precautions - Hashimoto's, autoimmune disease    Mobility Measures 3/27/24 2/28/24 1/31/24 8/23/23 9/20/23 10/18/23 11/22/23   Assistive device used? none None none none none none none   5 Time Sit to Stand  (17\" chair, arms across chest) Arms out front  24 seconds Arms out front   A couple extra attempts  29 second- 52 " "seconds 31 seconds Arms out front  24 seconds  Pain in left knee   19 seconds  From 20\" surface, arms across chest    28 seconds from 18\" surface 35 seconds arms out front    15 seconds 20\" surface  Arms out front     3 Meter Timed  Up & Go 10.5 seconds 12.3 seconds 9.8 sec 10.6 sec   11.8 sec  Mild antalgic gait on left   10.1 seconds 9.6 seconds   Walking speed          Functional Gait Assessment (see below)   17/30 21/30 20/30 20/30   6 Minute Walk Test (100 foot circular course)   920 feet 908 feet  No AD  103 bpm 1005 feet no  feet no  feet on treadmill inferred from self-selected pace 1.2 mph  875 feet no AD  Tired in legs  98 bpm   925 feet no AD   Patient-Reported Outcome Measure: Activities-Specific Balance Confidence Scale (ABC Scale)          Right knee flexion PROM, sitting   92 degrees 94 degrees 93 degrees 95 degrees                  Precautions - none    Specialty Daily Treatment Diary   Exercise Diary  4/24/24 4/10/24 3/27/24   Static and dynamic balance (neuromuscular re-education)      Strengthening & endurance  (therapeutic exercise) SciFit legs only level 2, about 100 spm, 11 min    Restriction in right dorsiflexion and pain about the right mid lateral aspect of gastroc  __  Completed prone moderate pressure massage to this area with concurrent stretching into dorsiflexion, 5 min  Standing gastroc stretch right 1 min   Then walking 100 feet lap 55 sec  Then stretching left gastroc 1 min  Then walking 100 feet lap 50 sec    __  Standing hip flexor stretch, about 45 sec each  Ankle rolls standing  Walking 100 feet 50 sec    Standing partial heel raise, about 10 x 2 sets  Standing gastroc stretch 30 sec each     SciFit, legs only, 9 min, level 2    Seated ankle eversion, inversion, dorsiflexion, plantarflexion, about 12 x 2 sets on R foot  Yellow band    Step ups  8\" step   1 railing   L leg first   2x 12 each  R foot 2 railings    Lateral step ups 6\" step about 2x 10 each    Bosu ball " "ankle circles  B/L 45 sec each foot    Treadmill walking  1.0 for 4 min  1.2 for 1 min    R & L hip flexion, knee flexion, hamstring, calf stretch  6 min SciFit, legs only, 9 min, 100 spm    Seated ankle eversion, about 12 x 2 sets bilaterally  Yellow band    Standing heel raise  R only about 10  L only about 10  Bilaterally about 10    Step ups 6\" step   1 railing   About 10 each  Lateral step ups 6\" step about 10 each    Supine bridge 10  Supine hip flexor stretch 1 min each side    Treadmill walking 1.2 mph 3 min    Inferior glide grade 3-4 L glenohumeral joint  And posterior glide L AC joint grades 3-4  3 min, improved end-range elevation                     47.0 cm left calf limb girth  48.0 cm right    ASSESSMENT   We were using walking speed somewhat as a proxy for tightness, as moved more about the ankles and knees with faster walking.    Discussed using ankle brace, due to hindfoot inversion during midstance; this helped tolerate walking in grocery.    Seated exercise is okay but better managing through standing and walking.    Checked [facility], they reopen aerobic and other gym equipment, but aquatics may not re-open for a while.  She will explore other options as this worked well with location, schedule, pool temperature and classes.      SHORT-TERM GOALS: through 4/28/24  1. Yuni walks at least 10 minutes consecutively. MET.  3. Yuni reports 50% improvement in ability to walk grocery store distances without severe fatigue.  MET.  4. Demonstrates at least 11 degrees passive right dorsiflexion .  MET.  New goal: When released by physician, tolerates at least 6 minutes consecutive overground walking without walking boot.  MET.  New goal: When released by physician, tolerates at least 10 bilateral heel raises  MET.  New goal: When released by physician, walks with step length without one inch of contralateral leg.  MET.    New goal: Walks at least 1350 feet during 6 minute walk test.  NOT MET.  New goal: " Scores at least 20/30 on FGA.   MET.PREVIOUSLY.  New goal: Able to purposefully walk for exercise at least 15 minutes at a time at least 3-4 times per week.  NOT MET..    LONG-TERM GOALS: through 3/28/24  1. Patient reports at least 20 total minutes per day of overground walking for exercise.  NOT MET..  2. Patient independently manages home exercise program.  MET with continued progressions.    New maintenance goals:  1. Walk 15 minutes consecutively for community ambulation and exercise.  MET but not able every day or sometimes consecutive days  2. Manages 150 minutes of moderate intensity aerobic exercise per week.  MET with variability in chosen exercise.  .  3. Maintains at least 1000 feet during 6 minute walk test, at most 15 seconds 5 times sit to stand.  NOT MET FOR 5TSTS, NOT MET for 6MWT.    PLAN OF CARE:  Patient will benefit from physical therapy 1x/week to 1x/ 1-2 weeks for 2 months  Neuromuscular re-education, therapeutic exercises, and therapeutic activities as outlined in grids.    Treatment performed by Louis Gautam, SPT, under supervision of Jericho Monsivais PT.

## 2024-04-24 NOTE — TELEPHONE ENCOUNTER
Reason for call:   [x] Refill   [] Prior Auth  [] Other:     Office:   [x] PCP/Provider -   [] Specialty/Provider -     Medication: Ibuprofen    Dose/Frequency: 800 mg     Quantity: #30    Ph[facility]acy: [facility]     Does the patient have enough for 3 days?   [] Yes   [x] No - Send as HP to POD

## 2024-05-01 ENCOUNTER — OFFICE VISIT (OUTPATIENT)
Facility: REHABILITATION | Age: 64
End: 2024-05-01
Payer: MEDICARE
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
## 2024-05-01 PROCEDURE — 97110 THERAPEUTIC EXERCISES: CPT | Performed by: PHYSICAL THERAPIST

## 2024-05-01 NOTE — PROGRESS NOTES
"PHYSICAL THERAPY TREATMENT / MAINTENANCE     Date: 24  Name: Yuni Hall  : 1960  Referring Provider: Arnie Cadena DO  AUTHORIZATION:   Insurance: Payor: MEDICARE / Plan: MEDICARE A AND B / Product Type: Medicare A & B Fee for Service /     SUBJECTIVE:  HPI: Yuni Hall is a 63 y.o. female referred to outpatient physical therapy for the following diagnosis   Encounter Diagnosis   Name Primary?    Fall, subsequent encounter Yes     Doing better, wearing ankle brace and walking better.  Thursday rested, Friday was working in the house and had so much pain, that she vomited.    Able to continue doing spring cleaning/clearing.  [facility] is reopening the pool, no target date yet.  Current classes are advanced aerobics, not able to do now.  Wearing light compression brace right ankle.    Patient goals:   -getting back to the pool    Patient-Specific Functional Scale   Task is scored 0 (unable to perform activity) to 10 (ability to perform activity independently)    Activity 7/19/23 9/20/23 10/18/23 11/22/23 2/28/24 3/27/24   Walk at normal pace without a cane Walks without cane Walks without cane     Walks without cane, moving slower. Walks without cane   2.   Grocery shop without significant pain Did okay last time grocery shopping, hurt the next day.    Doing well here, sweaty at times and has to stop, but it's gotten better Hasn't been able to grocery shop in a while, limited by weather. Does okay while moving but pain with static standing at check-out   3.   Dance         4.   Move without pain and to not get tired as frequently              PHYSICAL EXAM / TREATMENT    Precautions - Hashimoto's, autoimmune disease    Mobility Measures 5/1/24 3/27/24 2/28/24 1/31/24 8/23/23 9/20/23 10/18/23 11/22/23   Assistive device used? none none None none none none none none   5 Time Sit to Stand  (17\" chair, arms across chest) 16.4 seconds arms out front  Arms out front  24 seconds Arms out front   A " "couple extra attempts  29 second- 52 seconds 31 seconds Arms out front  24 seconds  Pain in left knee   19 seconds  From 20\" surface, arms across chest    28 seconds from 18\" surface 35 seconds arms out front    15 seconds 20\" surface  Arms out front     3 Meter Timed  Up & Go 9.2 sec 10.5 seconds 12.3 seconds 9.8 sec 10.6 sec   11.8 sec  Mild antalgic gait on left   10.1 seconds 9.6 seconds   Walking speed           Functional Gait Assessment (see below) 21/30 17/30 21/30 20/30 20/30   6 Minute Walk Test (100 foot circular course)   940 feet no  feet 908 feet  No AD  103 bpm 1005 feet no  feet no  feet on treadmill inferred from self-selected pace 1.2 mph  875 feet no AD  Tired in legs  98 bpm   925 feet no AD   Patient-Reported Outcome Measure: Activities-Specific Balance Confidence Scale (ABC Scale)           Right knee flexion PROM, sitting 98 degrees   92 degrees 94 degrees 93 degrees 95 degrees                   Precautions - none    Specialty Daily Treatment Diary   Exercise Diary  5/01/24 4/24/24 4/10/24 3/27/24   Static and dynamic balance (neuromuscular re-education)       Strengthening & endurance  (therapeutic exercise) Outcome measures    5 times sit to stand, timed 15  sec or less, x 3 sets    Heel raises double leg towards single leg   X 7 on left  X 3 on right  Then double leg  About 40 sec    Ankle mobility with movement:  Lateral stepping and crossover stepping, modifying pace and amplitude to allow minimal pain with movement  Same with small step lunge, 3 min    SciFit, about 90 spm, 10 min        SciFit legs only level 2, about 100 spm, 11 min    Restriction in right dorsiflexion and pain about the right mid lateral aspect of gastroc  __  Completed prone moderate pressure massage to this area with concurrent stretching into dorsiflexion, 5 min  Standing gastroc stretch right 1 min   Then walking 100 feet lap 55 sec  Then stretching left gastroc 1 min  Then walking 100 feet " "lap 50 sec    __  Standing hip flexor stretch, about 45 sec each  Ankle rolls standing  Walking 100 feet 50 sec    Standing partial heel raise, about 10 x 2 sets  Standing gastroc stretch 30 sec each     SciFit, legs only, 9 min, level 2    Seated ankle eversion, inversion, dorsiflexion, plantarflexion, about 12 x 2 sets on R foot  Yellow band    Step ups  8\" step   1 railing   L leg first   2x 12 each  R foot 2 railings    Lateral step ups 6\" step about 2x 10 each    Bosu ball ankle circles  B/L 45 sec each foot    Treadmill walking  1.0 for 4 min  1.2 for 1 min    R & L hip flexion, knee flexion, hamstring, calf stretch  6 min SciFit, legs only, 9 min, 100 spm    Seated ankle eversion, about 12 x 2 sets bilaterally  Yellow band    Standing heel raise  R only about 10  L only about 10  Bilaterally about 10    Step ups 6\" step   1 railing   About 10 each  Lateral step ups 6\" step about 10 each    Supine bridge 10  Supine hip flexor stretch 1 min each side    Treadmill walking 1.2 mph 3 min    Inferior glide grade 3-4 L glenohumeral joint  And posterior glide L AC joint grades 3-4  3 min, improved end-range elevation                         ASSESSMENT   Yuni is walking better and at more baseline speeds.    Good improvement in mobility measures to and approximating target speeds of movement.  Continues with intermittent and variable presentation, follows with rheumatology in the next 1-2 months.  Tolerating movements with more variable foot positions.       Checked [facility], they reopen aerobic and other gym equipment, but aquatics may not re-open for a while.  She will explore other options as this worked well with location, schedule, pool temperature and classes.      SHORT-TERM GOALS: through 6/01/24  1. Yuni walks at least 10 minutes consecutively. MET.  3. Yuni reports 50% improvement in ability to walk grocery store distances without severe fatigue.  MET.  4. Demonstrates at least 11 degrees passive right " dorsiflexion .  MET.  New goal: When released by physician, tolerates at least 6 minutes consecutive overground walking without walking boot.  MET.  New goal: When released by physician, tolerates at least 10 bilateral heel raises  MET.  New goal: When released by physician, walks with step length without one inch of contralateral leg.  MET.    New goal: Walks at least 1350 feet during 6 minute walk test.  NOT MET, but progressed.  New goal: Scores at least 20/30 on FGA.   MET..  New goal: Able to purposefully walk for exercise at least 15 minutes at a time at least 3-4 times per week.  NOT MET.    LONG-TERM GOALS: through 6/01/24  1. Patient reports at least 20 total minutes per day of overground walking for exercise.  NOT MET..  2. Patient independently manages home exercise program.  MET with continued progressions.    New maintenance goals:  1. Walk 15 minutes consecutively for community ambulation and exercise.  MET but not able every day or sometimes consecutive days  2. Manages 150 minutes of moderate intensity aerobic exercise per week.  MET with variability in chosen exercise.  .  3. Maintains at least 1000 feet during 6 minute walk test, at most 15 seconds 5 times sit to stand.  NOT MET FOR 5TSTS, MET for 6MWT.    PLAN OF CARE:  Patient will benefit from physical therapy 1x/week to 1x/ 1-2 weeks for 2 months  Neuromuscular re-education, therapeutic exercises, and therapeutic activities as outlined in grids.

## 2024-05-06 ENCOUNTER — OFFICE VISIT (OUTPATIENT)
Dept: RHEUMATOLOGY | Facility: CLINIC | Age: 64
End: 2024-05-06
Payer: MEDICARE

## 2024-05-06 VITALS
DIASTOLIC BLOOD PRESSURE: 68 MMHG | SYSTOLIC BLOOD PRESSURE: 126 MMHG | BODY MASS INDEX: 38.41 KG/M2 | WEIGHT: 239 LBS | HEIGHT: 66 IN

## 2024-05-06 DIAGNOSIS — M79.10 MYALGIA: ICD-10-CM

## 2024-05-06 DIAGNOSIS — R76.8 POSITIVE ANA (ANTINUCLEAR ANTIBODY): ICD-10-CM

## 2024-05-06 DIAGNOSIS — M79.18 MYOFASCIAL PAIN: ICD-10-CM

## 2024-05-06 DIAGNOSIS — Z79.899 HIGH RISK MEDICATION USE: ICD-10-CM

## 2024-05-06 DIAGNOSIS — G70.9 NEUROMUSCULAR DISORDER (HCC): ICD-10-CM

## 2024-05-06 DIAGNOSIS — M35.9 UNDIFFERENTIATED CONNECTIVE TISSUE DISEASE (HCC): Primary | ICD-10-CM

## 2024-05-06 PROCEDURE — 99214 OFFICE O/P EST MOD 30 MIN: CPT | Performed by: INTERNAL MEDICINE

## 2024-05-06 RX ORDER — TIZANIDINE 2 MG/1
2 TABLET ORAL EVERY 8 HOURS PRN
Qty: 270 TABLET | Refills: 3 | Status: SHIPPED | OUTPATIENT
Start: 2024-05-06

## 2024-05-06 RX ORDER — HYDROXYCHLOROQUINE SULFATE 200 MG/1
200 TABLET, FILM COATED ORAL 2 TIMES DAILY
Qty: 180 TABLET | Refills: 3 | Status: SHIPPED | OUTPATIENT
Start: 2024-05-06 | End: 2025-05-01

## 2024-05-06 RX ORDER — GABAPENTIN 100 MG/1
100 CAPSULE ORAL 2 TIMES DAILY
Qty: 180 CAPSULE | Refills: 3 | Status: SHIPPED | OUTPATIENT
Start: 2024-05-06

## 2024-05-06 RX ORDER — PREDNISONE 10 MG/1
TABLET ORAL DAILY
Qty: 21 TABLET | Refills: 0 | Status: SHIPPED | OUTPATIENT
Start: 2024-05-06 | End: 2024-05-20

## 2024-05-06 RX ORDER — IBUPROFEN 800 MG/1
800 TABLET ORAL EVERY 6 HOURS PRN
Qty: 30 TABLET | Refills: 6 | Status: SHIPPED | OUTPATIENT
Start: 2024-05-06

## 2024-05-06 NOTE — PATIENT INSTRUCTIONS
Continue Hydroxychloroquine twice a day; continue regular eye exams.    Continue Tizanidine three times a day as needed for muscle cramps  Continue ibuprofen as needed for moderate pain  Can increase gabapentin to twice a day as needed for nerve pain  Continue Biofreeze as needed  Take prednisone taper     Return to clinic in 6 months
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
## 2024-05-07 ENCOUNTER — TELEPHONE (OUTPATIENT)
Age: 64
End: 2024-05-07

## 2024-05-07 NOTE — TELEPHONE ENCOUNTER
Pt called regarding her Prednisone. Went to local pharmacy yesterday, wasn't there. Noted in chart that script was sent to [facility], pt states that is fine and will wait until that comes in.

## 2024-05-08 ENCOUNTER — APPOINTMENT (OUTPATIENT)
Facility: REHABILITATION | Age: 64
End: 2024-05-08
Payer: MEDICARE

## 2024-05-08 DIAGNOSIS — J30.89 SEASONAL ALLERGIC RHINITIS DUE TO OTHER ALLERGIC TRIGGER: ICD-10-CM

## 2024-05-08 DIAGNOSIS — E78.00 PURE HYPERCHOLESTEROLEMIA: ICD-10-CM

## 2024-05-08 RX ORDER — ROSUVASTATIN CALCIUM 5 MG/1
5 TABLET, COATED ORAL DAILY
Qty: 90 TABLET | Refills: 1 | Status: SHIPPED | OUTPATIENT
Start: 2024-05-08

## 2024-05-08 RX ORDER — MONTELUKAST SODIUM 10 MG/1
10 TABLET ORAL
Qty: 90 TABLET | Refills: 1 | Status: SHIPPED | OUTPATIENT
Start: 2024-05-08

## 2024-05-10 ENCOUNTER — TELEPHONE (OUTPATIENT)
Age: 64
End: 2024-05-10

## 2024-05-10 NOTE — TELEPHONE ENCOUNTER
Pt called in regards to her RX for Trulicity.  She states she goes directly through the drug company ([employer]) to obtain, as it is a much lower co-pay.      The website will not permit her to continue the application without an email address for the doctor's office.  I attempted a warm transfer for information, however they were attending to patients at the time.    Could someone please return her call with any information to assist her.  Thank you!

## 2024-05-13 ENCOUNTER — TELEPHONE (OUTPATIENT)
Age: 64
End: 2024-05-13

## 2024-05-13 NOTE — TELEPHONE ENCOUNTER
Pt called and stated that her rheumatologist had put in for the 800 mgs of ibuprofen and refills and she really wanted to get it through Dr. Wilson. I informed her they were already sent in from Dr. [place] with refills and she wanted to inform Dr. Wilson. Please advise [phone] thank you.

## 2024-05-22 ENCOUNTER — OFFICE VISIT (OUTPATIENT)
Facility: REHABILITATION | Age: 64
End: 2024-05-22
Payer: MEDICARE
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
## 2024-05-22 PROCEDURE — 97110 THERAPEUTIC EXERCISES: CPT | Performed by: PHYSICAL THERAPIST

## 2024-05-22 NOTE — PROGRESS NOTES
"PHYSICAL THERAPY TREATMENT / MAINTENANCE     Date: 24  Name: Yuni Hall  : 1960  Referring Provider: Arnie Cadena DO  AUTHORIZATION:   Insurance: Payor: MEDICARE / Plan: MEDICARE A AND B / Product Type: Medicare A & B Fee for Service /     SUBJECTIVE:  HPI: Yuni Hall is a 63 y.o. female referred to outpatient physical therapy for the following diagnosis   Encounter Diagnosis   Name Primary?    Fall, subsequent encounter Yes     Able to get free elliptical this week, [employer] pool still not open, hasn't tried elliptical yet . It's foldable if needed.  Previously had an elliptical, it was larger, it helped her move better.    Ankle doing pretty well, continues on prednisone taper.   Cleaning the home and getting neighbor to grocery store.     Patient goals:   -getting back to the pool    Patient-Specific Functional Scale   Task is scored 0 (unable to perform activity) to 10 (ability to perform activity independently)    Activity 7/19/23 9/20/23 10/18/23 11/22/23 2/28/24 3/27/24   Walk at normal pace without a cane Walks without cane Walks without cane     Walks without cane, moving slower. Walks without cane   2.   Grocery shop without significant pain Did okay last time grocery shopping, hurt the next day.    Doing well here, sweaty at times and has to stop, but it's gotten better Hasn't been able to grocery shop in a while, limited by weather. Does okay while moving but pain with static standing at check-out   3.   Dance         4.   Move without pain and to not get tired as frequently              PHYSICAL EXAM / TREATMENT    Precautions - Hashimoto's, autoimmune disease    Mobility Measures 5/1/24 3/27/24 2/28/24 1/31/24 8/23/23 9/20/23 10/18/23 11/22/23   Assistive device used? none none None none none none none none   5 Time Sit to Stand  (17\" chair, arms across chest) 16.4 seconds arms out front  Arms out front  24 seconds Arms out front   A couple extra attempts  29 second- 52 " "seconds 31 seconds Arms out front  24 seconds  Pain in left knee   19 seconds  From 20\" surface, arms across chest    28 seconds from 18\" surface 35 seconds arms out front    15 seconds 20\" surface  Arms out front     3 Meter Timed  Up & Go 9.2 sec 10.5 seconds 12.3 seconds 9.8 sec 10.6 sec   11.8 sec  Mild antalgic gait on left   10.1 seconds 9.6 seconds   Walking speed           Functional Gait Assessment (see below) 21/30 17/30 21/30 20/30 20/30   6 Minute Walk Test (100 foot circular course)   940 feet no  feet 908 feet  No AD  103 bpm 1005 feet no  feet no  feet on treadmill inferred from self-selected pace 1.2 mph  875 feet no AD  Tired in legs  98 bpm   925 feet no AD   Patient-Reported Outcome Measure: Activities-Specific Balance Confidence Scale (ABC Scale)           Right knee flexion PROM, sitting 98 degrees   92 degrees 94 degrees 93 degrees 95 degrees                   Precautions - none    Specialty Daily Treatment Diary   Exercise Diary  5/22/24 5/01/24 4/24/24 4/10/24 3/27/24   Static and dynamic balance (neuromuscular re-education)        Strengthening & endurance  (therapeutic exercise) Treadmill 1.6 mph 5 min    Heel raise single leg 5-7 reps each x 2 sets  Step ups 6\" step nearby railing  About 10 x 2 sets each  Sit to stand about 10 x 2 sets standard 18\" chair    Braiding walking in parallel bars, 1 min x 2 sets    SciFit level 1,  spm, 10 min   Outcome measures    5 times sit to stand, timed 15  sec or less, x 3 sets    Heel raises double leg towards single leg   X 7 on left  X 3 on right  Then double leg  About 40 sec    Ankle mobility with movement:  Lateral stepping and crossover stepping, modifying pace and amplitude to allow minimal pain with movement  Same with small step lunge, 3 min    SciFit, about 90 spm, 10 min        SciFit legs only level 2, about 100 spm, 11 min    Restriction in right dorsiflexion and pain about the right mid lateral aspect of " "gastroc  __  Completed prone moderate pressure massage to this area with concurrent stretching into dorsiflexion, 5 min  Standing gastroc stretch right 1 min   Then walking 100 feet lap 55 sec  Then stretching left gastroc 1 min  Then walking 100 feet lap 50 sec    __  Standing hip flexor stretch, about 45 sec each  Ankle rolls standing  Walking 100 feet 50 sec    Standing partial heel raise, about 10 x 2 sets  Standing gastroc stretch 30 sec each     SciFit, legs only, 9 min, level 2    Seated ankle eversion, inversion, dorsiflexion, plantarflexion, about 12 x 2 sets on R foot  Yellow band    Step ups  8\" step   1 railing   L leg first   2x 12 each  R foot 2 railings    Lateral step ups 6\" step about 2x 10 each    Bosu ball ankle circles  B/L 45 sec each foot    Treadmill walking  1.0 for 4 min  1.2 for 1 min    R & L hip flexion, knee flexion, hamstring, calf stretch  6 min SciFit, legs only, 9 min, 100 spm    Seated ankle eversion, about 12 x 2 sets bilaterally  Yellow band    Standing heel raise  R only about 10  L only about 10  Bilaterally about 10    Step ups 6\" step   1 railing   About 10 each  Lateral step ups 6\" step about 10 each    Supine bridge 10  Supine hip flexor stretch 1 min each side    Treadmill walking 1.2 mph 3 min    Inferior glide grade 3-4 L glenohumeral joint  And posterior glide L AC joint grades 3-4  3 min, improved end-range elevation                           ASSESSMENT   Yuni performed well with overground walking self-selected speed.  We were able to progress intensity and volume of strengthening and mobility exercises.  It's great to use the elliptical to support mobility, initiate and progress as able.  This is a great way to supplement physical activity in the absence of use of the pool.    Follow in 2 weeks.    SHORT-TERM GOALS: through 6/01/24  1. Yuni walks at least 10 minutes consecutively. MET.  3. Yuni reports 50% improvement in ability to walk grocery store distances " without severe fatigue.  MET.  4. Demonstrates at least 11 degrees passive right dorsiflexion .  MET.  New goal: When released by physician, tolerates at least 6 minutes consecutive overground walking without walking boot.  MET.  New goal: When released by physician, tolerates at least 10 bilateral heel raises  MET.  New goal: When released by physician, walks with step length without one inch of contralateral leg.  MET.    New goal: Walks at least 1350 feet during 6 minute walk test.  NOT MET, but progressed.  New goal: Scores at least 20/30 on FGA.   MET..  New goal: Able to purposefully walk for exercise at least 15 minutes at a time at least 3-4 times per week.  NOT MET.    LONG-TERM GOALS: through 6/01/24  1. Patient reports at least 20 total minutes per day of overground walking for exercise.  NOT MET..  2. Patient independently manages home exercise program.  MET with continued progressions.    New maintenance goals:  1. Walk 15 minutes consecutively for community ambulation and exercise.  MET but not able every day or sometimes consecutive days  2. Manages 150 minutes of moderate intensity aerobic exercise per week.  MET with variability in chosen exercise.  .  3. Maintains at least 1000 feet during 6 minute walk test, at most 15 seconds 5 times sit to stand.  NOT MET FOR 5TSTS, MET for 6MWT.    PLAN OF CARE:  Patient will benefit from physical therapy 1x/week to 1x/ 1-2 weeks for 2 months  Neuromuscular re-education, therapeutic exercises, and therapeutic activities as outlined in grids.

## 2024-06-03 ENCOUNTER — TELEPHONE (OUTPATIENT)
Age: 64
End: 2024-06-03

## 2024-06-03 NOTE — TELEPHONE ENCOUNTER
Pt dropped off [employer] forms around 2 wks ago at the  to get filled out by her provider so that she can get her Trulicity. Pt is almost out of medication. Pt stated that [employer] never received the forms. Pt needs packet faxed back to [employer] ASAP.     Please call pt to advise.  [phone]

## 2024-06-05 ENCOUNTER — OFFICE VISIT (OUTPATIENT)
Facility: REHABILITATION | Age: 64
End: 2024-06-05
Payer: MEDICARE
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
## 2024-06-05 PROCEDURE — 97110 THERAPEUTIC EXERCISES: CPT | Performed by: PHYSICAL THERAPIST

## 2024-06-05 NOTE — PROGRESS NOTES
"PHYSICAL THERAPY TREATMENT / MAINTENANCE - UPDATE    Date: 24  Name: Yuni Hall  : 1960  Referring Provider: Arnie Cadena DO  AUTHORIZATION:   Insurance: Payor: MEDICARE / Plan: MEDICARE A AND B / Product Type: Medicare A & B Fee for Service /     SUBJECTIVE:  HPI: Yuni Hall is a 64 y.o. female referred to outpatient physical therapy for the following diagnosis   Encounter Diagnosis   Name Primary?    Fall, subsequent encounter Yes     Seen by rheumatologist, see note 24.  Legs sore due to incoming weather. Today would be an all stretching day.    Tight in the whole right leg.  Knee and ankle don't want to bend.  Would use [place] (like skiing or elliptical), and ballet stretches, holds and slowly releases.     Patient goals:   -getting back to the pool    Patient-Specific Functional Scale   Task is scored 0 (unable to perform activity) to 10 (ability to perform activity independently)    Activity 7/19/23 9/20/23 10/18/23 11/22/23 2/28/24 3/27/24   Walk at normal pace without a cane Walks without cane Walks without cane     Walks without cane, moving slower. Walks without cane   2.   Grocery shop without significant pain Did okay last time grocery shopping, hurt the next day.    Doing well here, sweaty at times and has to stop, but it's gotten better Hasn't been able to grocery shop in a while, limited by weather. Does okay while moving but pain with static standing at check-out   3.   Dance         4.   Move without pain and to not get tired as frequently              PHYSICAL EXAM / TREATMENT    Precautions - Hashimoto's, autoimmune disease    Mobility Measures 6/05/24 5/1/24 3/27/24 2/28/24 1/31/24 8/23/23 9/20/23 10/18/23 11/22/23   Assistive device used? none none none None none none none none none   5 Time Sit to Stand  (17\" chair, arms across chest) Unable from 18\" surface today    19.5 seconds from 20\" surface   16.4 seconds arms out front  Arms out front  24 seconds Arms " "out front   A couple extra attempts  29 second- 52 seconds 31 seconds Arms out front  24 seconds  Pain in left knee   19 seconds  From 20\" surface, arms across chest    28 seconds from 18\" surface 35 seconds arms out front    15 seconds 20\" surface  Arms out front     3 Meter Timed  Up & Go 13.3 sec 9.2 sec 10.5 seconds 12.3 seconds 9.8 sec 10.6 sec   11.8 sec  Mild antalgic gait on left   10.1 seconds 9.6 seconds   Walking speed            Functional Gait Assessment (see below) 17/30 21/30 17/30 21/30 20/30 20/30   6 Minute Walk Test (100 foot circular course)   800 feet no AD   940 feet no  feet 908 feet  No AD  103 bpm 1005 feet no  feet no  feet on treadmill inferred from self-selected pace 1.2 mph  875 feet no AD  Tired in legs  98 bpm   925 feet no AD   Patient-Reported Outcome Measure: Activities-Specific Balance Confidence Scale (ABC Scale)            Right knee flexion PROM, sitting  98 degrees   92 degrees 94 degrees 93 degrees 95 degrees                    Precautions - none    Specialty Daily Treatment Diary   Exercise Diary  6/05/24 5/22/24 5/01/24 4/24/24 4/10/24 3/27/24   Static and dynamic balance (neuromuscular re-education)         Strengthening & endurance  (therapeutic exercise) Outcome measures    Standing knee extension stretch from seated knee extension/hamstring stretch 1 min  Standing heel raises double towards single leg 1.5 min  SciFit level 1, 10 min legs only   Treadmill 1.6 mph 5 min    Heel raise single leg 5-7 reps each x 2 sets  Step ups 6\" step nearby railing  About 10 x 2 sets each  Sit to stand about 10 x 2 sets standard 18\" chair    Braiding walking in parallel bars, 1 min x 2 sets    SciFit level 1,  spm, 10 min   Outcome measures    5 times sit to stand, timed 15  sec or less, x 3 sets    Heel raises double leg towards single leg   X 7 on left  X 3 on right  Then double leg  About 40 sec    Ankle mobility with movement:  Lateral stepping and crossover " "stepping, modifying pace and amplitude to allow minimal pain with movement  Same with small step lunge, 3 min    SciFit, about 90 spm, 10 min        SciFit legs only level 2, about 100 spm, 11 min    Restriction in right dorsiflexion and pain about the right mid lateral aspect of gastroc  __  Completed prone moderate pressure massage to this area with concurrent stretching into dorsiflexion, 5 min  Standing gastroc stretch right 1 min   Then walking 100 feet lap 55 sec  Then stretching left gastroc 1 min  Then walking 100 feet lap 50 sec    __  Standing hip flexor stretch, about 45 sec each  Ankle rolls standing  Walking 100 feet 50 sec    Standing partial heel raise, about 10 x 2 sets  Standing gastroc stretch 30 sec each     SciFit, legs only, 9 min, level 2    Seated ankle eversion, inversion, dorsiflexion, plantarflexion, about 12 x 2 sets on R foot  Yellow band    Step ups  8\" step   1 railing   L leg first   2x 12 each  R foot 2 railings    Lateral step ups 6\" step about 2x 10 each    Bosu ball ankle circles  B/L 45 sec each foot    Treadmill walking  1.0 for 4 min  1.2 for 1 min    R & L hip flexion, knee flexion, hamstring, calf stretch  6 min SciFit, legs only, 9 min, 100 spm    Seated ankle eversion, about 12 x 2 sets bilaterally  Yellow band    Standing heel raise  R only about 10  L only about 10  Bilaterally about 10    Step ups 6\" step   1 railing   About 10 each  Lateral step ups 6\" step about 10 each    Supine bridge 10  Supine hip flexor stretch 1 min each side    Treadmill walking 1.2 mph 3 min    Inferior glide grade 3-4 L glenohumeral joint  And posterior glide L AC joint grades 3-4  3 min, improved end-range elevation                             ASSESSMENT   Mobility measures declined today, not indicative of mobility over the past month.  Mobility is responsive to changes in weather and we are seeing the results of this today, Functional Gait Assessment results likely from slower " movement.  Yuni is able to keep moving and better manage days like today, today addressed mainly with non-weight bearing exercise and stretching.  Agree with rheumatologist that aquatic exercise will help.    Recommend continuing physical therapy to maintain mobility above.         SHORT-TERM GOALS: through 6/01/24  1. Yuni walks at least 10 minutes consecutively. MET.  3. Yuni reports 50% improvement in ability to walk grocery store distances without severe fatigue.  MET.  4. Demonstrates at least 11 degrees passive right dorsiflexion .  MET.  New goal: When released by physician, tolerates at least 6 minutes consecutive overground walking without walking boot.  MET.  New goal: When released by physician, tolerates at least 10 bilateral heel raises  MET.  New goal: When released by physician, walks with step length without one inch of contralateral leg.  MET.    New goal: Walks at least 1350 feet during 6 minute walk test.  NOT MET, but progressed.  New goal: Scores at least 20/30 on FGA.   MET..  New goal: Able to purposefully walk for exercise at least 15 minutes at a time at least 3-4 times per week.  NOT MET.    LONG-TERM GOALS: through 6/01/24  1. Patient reports at least 20 total minutes per day of overground walking for exercise.  NOT MET..  2. Patient independently manages home exercise program.  MET with continued progressions.    New maintenance goals:  1. Walk 15 minutes consecutively for community ambulation and exercise.  MET but not able every day or sometimes consecutive days  2. Manages 150 minutes of moderate intensity aerobic exercise per week.  MET with variability in chosen exercise.  .  3. Maintains at least 1000 feet during 6 minute walk test, at most 15 seconds 5 times sit to stand.  NOT MET FOR 5TSTS, NOT MET for 6MWT.    PLAN OF CARE:  Patient will benefit from physical therapy 1x/week to 1x/ 1-2 weeks for 2 months  Neuromuscular re-education, therapeutic exercises, and therapeutic  activities as outlined in grids.

## 2024-06-11 ENCOUNTER — HOSPITAL ENCOUNTER (OUTPATIENT)
Facility: MEDICAL CENTER | Age: 64
Discharge: HOME | End: 2024-06-11
Attending: PSYCHIATRY & NEUROLOGY
Payer: MEDICARE

## 2024-06-11 DIAGNOSIS — D35.2 PITUITARY MICROADENOMA (HCC): ICD-10-CM

## 2024-06-11 PROCEDURE — A9585 GADOBUTROL INJECTION: HCPCS | Performed by: PSYCHIATRY & NEUROLOGY

## 2024-06-11 PROCEDURE — G1004 CDSM NDSC: HCPCS

## 2024-06-11 PROCEDURE — 70553 MRI BRAIN STEM W/O & W/DYE: CPT

## 2024-06-11 RX ORDER — GADOBUTROL 604.72 MG/ML
10 INJECTION INTRAVENOUS
Status: COMPLETED | OUTPATIENT
Start: 2024-06-11 | End: 2024-06-11

## 2024-06-11 RX ADMIN — GADOBUTROL 10 ML: 604.72 INJECTION INTRAVENOUS at 13:45

## 2024-06-12 DIAGNOSIS — I10 ESSENTIAL HYPERTENSION: ICD-10-CM

## 2024-06-12 RX ORDER — LOSARTAN POTASSIUM 50 MG/1
50 TABLET ORAL DAILY
Qty: 90 TABLET | Refills: 1 | Status: SHIPPED | OUTPATIENT
Start: 2024-06-12

## 2024-06-19 ENCOUNTER — OFFICE VISIT (OUTPATIENT)
Facility: REHABILITATION | Age: 64
End: 2024-06-19
Payer: MEDICARE
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
## 2024-06-19 PROCEDURE — 97530 THERAPEUTIC ACTIVITIES: CPT | Performed by: PHYSICAL THERAPIST

## 2024-06-19 PROCEDURE — 97110 THERAPEUTIC EXERCISES: CPT | Performed by: PHYSICAL THERAPIST
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
## 2024-07-02 ENCOUNTER — OFFICE VISIT (OUTPATIENT)
Facility: REHABILITATION | Age: 64
End: 2024-07-02
Payer: MEDICARE
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
## 2024-07-02 PROCEDURE — 97530 THERAPEUTIC ACTIVITIES: CPT | Performed by: PHYSICAL THERAPIST

## 2024-07-02 PROCEDURE — 97110 THERAPEUTIC EXERCISES: CPT | Performed by: PHYSICAL THERAPIST
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
## 2024-07-15 ENCOUNTER — TELEPHONE (OUTPATIENT)
Age: 64
End: 2024-07-15

## 2024-07-15 NOTE — TELEPHONE ENCOUNTER
Patient took her friend to [facility] due to issues she was having. Her friend was diagnosed with C. Diff. She states she held her hand to help her walk. She does not have any symptoms but sh would like to know if she should be doing anything as a preventive.

## 2024-07-17 ENCOUNTER — APPOINTMENT (OUTPATIENT)
Facility: REHABILITATION | Age: 64
End: 2024-07-17
Payer: MEDICARE

## 2024-07-23 ENCOUNTER — OFFICE VISIT (OUTPATIENT)
Facility: REHABILITATION | Age: 64
End: 2024-07-23
Payer: MEDICARE
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
## 2024-07-23 PROCEDURE — 97112 NEUROMUSCULAR REEDUCATION: CPT | Performed by: PHYSICAL THERAPIST

## 2024-07-23 PROCEDURE — 97110 THERAPEUTIC EXERCISES: CPT | Performed by: PHYSICAL THERAPIST

## 2024-07-23 PROCEDURE — 97530 THERAPEUTIC ACTIVITIES: CPT | Performed by: PHYSICAL THERAPIST
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
## 2024-07-29 ENCOUNTER — TELEMEDICINE (OUTPATIENT)
Dept: PSYCHIATRY | Facility: CLINIC | Age: 64
End: 2024-07-29
Payer: MEDICARE

## 2024-07-29 DIAGNOSIS — F43.23 ADJUSTMENT DISORDER WITH MIXED ANXIETY AND DEPRESSED MOOD: Primary | ICD-10-CM

## 2024-07-29 PROCEDURE — 99213 OFFICE O/P EST LOW 20 MIN: CPT | Performed by: PSYCHIATRY & NEUROLOGY
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
## 2024-07-29 NOTE — BH TREATMENT PLAN
TREATMENT PLAN (Medication Management Only)        [facility]    Name and Date of Birth:  Yuni Hall 64 y.o. 1960  Date of Treatment Plan: July 29, 2024  Diagnosis/Diagnoses:    1. Adjustment disorder with mixed anxiety and depressed mood      Strengths/Personal Resources for Self-Care: supportive family, supportive friends, taking medications as prescribed, ability to adapt to life changes, ability to communicate needs, ability to communicate well, ability to listen, ability to reason, ability to understand psychiatric illness.  Area/Areas of need (in own words): anxiety, depression  1. Long Term Goal: Feel happier.  Target Date:6 months - 1/29/2025  Person/Persons responsible for completion of goal: Yuni  2.  Short Term Objective (s) - How will we reach this goal?:   A. Provider new recommended medication/dosage changes and/or continue medication(s): continue current medications as prescribed.  B. N/A.  C. N/A.  Target Date:6 months - 1/29/2025  Person/Persons Responsible for Completion of Goal: Yuni  Progress Towards Goals: continuing treatment  Treatment Modality: medication management every 6 months  Review due 180 days from date of this plan: 6 months - 1/29/2025  Expected length of service: ongoing treatment  My Physician/PA/NP and I have developed this plan together and I agree to work on the goals and objectives. I understand the treatment goals that were developed for my treatment.

## 2024-07-30 ENCOUNTER — OFFICE VISIT (OUTPATIENT)
Dept: FAMILY MEDICINE CLINIC | Facility: CLINIC | Age: 64
End: 2024-07-30
Payer: MEDICARE

## 2024-07-30 ENCOUNTER — APPOINTMENT (OUTPATIENT)
Dept: RADIOLOGY | Facility: CLINIC | Age: 64
End: 2024-07-30
Payer: MEDICARE

## 2024-07-30 VITALS
TEMPERATURE: 97.6 F | BODY MASS INDEX: 38.22 KG/M2 | DIASTOLIC BLOOD PRESSURE: 76 MMHG | WEIGHT: 237.8 LBS | HEIGHT: 66 IN | SYSTOLIC BLOOD PRESSURE: 126 MMHG | HEART RATE: 85 BPM | OXYGEN SATURATION: 97 %

## 2024-07-30 DIAGNOSIS — M79.18 MYOFASCIAL PAIN: ICD-10-CM

## 2024-07-30 DIAGNOSIS — M25.571 CHRONIC PAIN OF RIGHT ANKLE: ICD-10-CM

## 2024-07-30 DIAGNOSIS — M25.571 CHRONIC PAIN OF RIGHT ANKLE: Primary | ICD-10-CM

## 2024-07-30 DIAGNOSIS — G89.29 CHRONIC PAIN OF RIGHT ANKLE: ICD-10-CM

## 2024-07-30 DIAGNOSIS — M25.521 RIGHT ELBOW PAIN: ICD-10-CM

## 2024-07-30 DIAGNOSIS — G89.29 CHRONIC PAIN OF RIGHT ANKLE: Primary | ICD-10-CM

## 2024-07-30 PROCEDURE — 73610 X-RAY EXAM OF ANKLE: CPT

## 2024-07-30 PROCEDURE — 99214 OFFICE O/P EST MOD 30 MIN: CPT | Performed by: NURSE PRACTITIONER

## 2024-07-30 PROCEDURE — 73080 X-RAY EXAM OF ELBOW: CPT

## 2024-07-30 RX ORDER — PREDNISONE 10 MG/1
TABLET ORAL
Qty: 21 TABLET | Refills: 0 | Status: SHIPPED | OUTPATIENT
Start: 2024-07-30

## 2024-07-30 NOTE — PATIENT INSTRUCTIONS
Start prednisone, this is the steroid. It will be 6 pills today and decrease by 1 pill each day for the following 6 days. So it will be 6-5-4-3-2-1. Take this with food as it may upset your stomach. It's best to take it earlier in the day otherwise it may keep you up at night. Do not take this with NSAIDs such as advil/ibuprofen/advil/aleve/motrin.     Complete imaging of elbow and ankle.     Pending results- follow up with ortho or PT.     Please call the office if you are experiencing any worsening of symptoms or no symptom improvement.

## 2024-07-30 NOTE — PROGRESS NOTES
Ambulatory Visit  Name: Yuni Hall      : 1960      MRN: [id number]  Encounter Provider: KEVIN Serna  Encounter Date: 2024   Encounter department: [facility] PRIMARY CARE    Assessment & Plan   1. Chronic pain of right ankle  -     XR ankle 3+ vw right; Future; Expected date: 2024  -     predniSONE 10 mg tablet; Day 1: 6 tabs  Day 2: 5 tabs Day 3: 4 tabs  Day 4: 3 tabs  Day 5: 2 tabs  Day 6: 1 tab  -     Ambulatory Referral to Orthopedic Surgery; Future  2. Myofascial pain  3. Right elbow pain  -     XR elbow 3+ vw right; Future; Expected date: 2024  -     predniSONE 10 mg tablet; Day 1: 6 tabs  Day 2: 5 tabs Day 3: 4 tabs  Day 4: 3 tabs  Day 5: 2 tabs  Day 6: 1 tab  -     Ambulatory Referral to Orthopedic Surgery; Future    Start prednisone, this is the steroid. It will be 6 pills today and decrease by 1 pill each day for the following 6 days. So it will be 6-5-4-3-2-1. Take this with food as it may upset your stomach. It's best to take it earlier in the day otherwise it may keep you up at night. Do not take this with NSAIDs such as advil/ibuprofen/advil/aleve/motrin.   Hold ibuprofen while on prednisone.   Complete imaging of elbow and ankle.   Pending results- follow up with ortho or PT.   Ortho referral placed.   Follow up with rheumatology.   Oxycodone use at discretion of PCP- not indicated at this time. Steroids most appropriate.   Please call the office if you are experiencing any worsening of symptoms or no symptom improvement.          History of Present Illness     Patient presents with right elbow pain and right ankle pain.  Ankle pain started a couple months ago.  Has history of connective tissue disorder with chronic pain so she typically thought this was related but these types of symptoms come and go.  This has been more persistent.  Pain also stretches across top of her foot at times but more so into the lateral area of right ankle.  Taking  "muscle relaxer 3 times a day.  Arm pain on right elbow started less than a month ago.  Gets some tingling numbness down arm from elbow at times.  She is currently in physical therapy and they feel that she has some ulnar nerve entrapment because she is having numbness/tingling in her fourth and fifth digit per patient.  Patient does follow with rheumatology for management of connective tissue disorder.  Patient denies any falls or injuries.  Patient denies any swelling bruising or external skin changes.    Arm Pain   Pertinent negatives include no chest pain.       Review of Systems   Constitutional:  Negative for chills and fever.   Eyes:  Negative for discharge.   Respiratory:  Negative for shortness of breath.    Cardiovascular:  Negative for chest pain.   Gastrointestinal:  Negative for constipation and diarrhea.   Genitourinary:  Negative for difficulty urinating.   Musculoskeletal:  Positive for arthralgias and myalgias. Negative for joint swelling.   Skin:  Negative for rash.   Neurological:  Negative for headaches.   Hematological:  Negative for adenopathy.   Psychiatric/Behavioral:  The patient is not nervous/anxious.        Objective     /76   Pulse 85   Temp 97.6 °F (36.4 °C) (Temporal)   Ht 5' 6\" (1.676 m)   Wt 108 kg (237 lb 12.8 oz)   SpO2 97%   BMI 38.38 kg/m²     Physical Exam  Vitals and nursing note reviewed.   Constitutional:       General: She is not in acute distress.     Appearance: Normal appearance. She is well-developed. She is obese. She is not diaphoretic.   HENT:      Head: Normocephalic and atraumatic.      Right Ear: External ear normal.      Left Ear: External ear normal.   Eyes:      General: Lids are normal.         Right eye: No discharge.         Left eye: No discharge.      Conjunctiva/sclera: Conjunctivae normal.   Pulmonary:      Effort: Pulmonary effort is normal. No respiratory distress.   Musculoskeletal:         General: No deformity.      Right elbow: No " swelling or deformity. Decreased range of motion. Tenderness present in medial epicondyle.      Left elbow: No swelling or deformity. Normal range of motion.      Right ankle: Tenderness (with movement and with rest- lateral aspect) present. Normal range of motion.      Left ankle: Normal. No tenderness. Normal range of motion.   Skin:     General: Skin is warm and dry.   Neurological:      General: No focal deficit present.      Mental Status: She is alert and oriented to person, place, and time.   Psychiatric:         Speech: Speech normal.         Behavior: Behavior normal.         Thought Content: Thought content normal.         Judgment: Judgment normal.       Administrative Statements

## 2024-07-31 ENCOUNTER — OFFICE VISIT (OUTPATIENT)
Facility: REHABILITATION | Age: 64
End: 2024-07-31
Payer: MEDICARE
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
## 2024-07-31 PROCEDURE — 97110 THERAPEUTIC EXERCISES: CPT | Performed by: PHYSICAL THERAPIST

## 2024-07-31 PROCEDURE — 97530 THERAPEUTIC ACTIVITIES: CPT | Performed by: PHYSICAL THERAPIST
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
## 2024-08-06 ENCOUNTER — TELEPHONE (OUTPATIENT)
Age: 64
End: 2024-08-06

## 2024-08-06 NOTE — TELEPHONE ENCOUNTER
Pt called in after going to the wrong office today for her appt with Dr. Morton. Pt had an appt @ 10:00 in [facility] that was missed.   Can you assist with an appt?   Pt apologized and said she is not having any new concerns or issues.     Thank you.

## 2024-08-07 ENCOUNTER — APPOINTMENT (OUTPATIENT)
Facility: REHABILITATION | Age: 64
End: 2024-08-07
Payer: MEDICARE

## 2024-08-12 ENCOUNTER — NURSE TRIAGE (OUTPATIENT)
Age: 64
End: 2024-08-12

## 2024-08-12 NOTE — TELEPHONE ENCOUNTER
Regarding: Rheum Flare  ----- Message from Roxane STANTON sent at 8/12/2024 12:07 PM EDT -----  Pt calling in with a rheum flare up. Increase pain in arms, feels medication is not working for her. Pain is severe.

## 2024-08-12 NOTE — TELEPHONE ENCOUNTER
"Patient call:  Pt stated provider: Dr. France    Actionable item: Appointment scheduled. Routing to provider for review and recommendation    What is the reason for the call/chief complaint?    -Reports ongoing right ankle and alisa-elbow pain. \"Not the joint itself but more so the skin, muscle, and connective tissue\".  -5/10 when not moving and severe when touched.   -Felt great while taking Prednisone but reports worsening as soon as she stopped.   -Recently experienced cramping in right leg and arm which caused her to vomit due to the pain.    Dispo: Appointment rescheduled to sooner. Advised to continue Ibuprofen, cool/warm compress to area for comfort. Routing to provider for review and rec.  Informed to call [facility] with worsening symptoms.  Agrees with plan.   All questions answered.     Reason for Disposition   MODERATE pain (e.g., interferes with normal activities) and present > 3 days    Answer Assessment - Initial Assessment Questions  1. ONSET: \"When did the pain start?\"      Ongoing but worsened after prednisone course completion  2. LOCATION: \"Where is the pain located?\"      Right arm and leg/ankle  3. PAIN: \"How bad is the pain?\" (Scale 1-10; or mild, moderate, severe)    - MILD (1-3): doesn't interfere with normal activities    - MODERATE (4-7): interferes with normal activities (e.g., work or school) or awakens from sleep    - SEVERE (8-10): excruciating pain, unable to do any normal activities, unable to hold a cup of water      5 when still but severe when touching   4. WORK OR EXERCISE: \"Has there been any recent work or exercise that involved this part of the body?\"      no  5. CAUSE: \"What do you think is causing the arm pain?\"      Flare up  6. OTHER SYMPTOMS: \"Do you have any other symptoms?\" (e.g., neck pain, swelling, rash, fever, numbness, weakness)      Muscle cramping, body feels heavy, and vomiting.    Protocols used: Arm Pain-ADULT-[place]    "

## 2024-08-14 ENCOUNTER — APPOINTMENT (OUTPATIENT)
Facility: REHABILITATION | Age: 64
End: 2024-08-14
Payer: MEDICARE

## 2024-08-14 DIAGNOSIS — M35.9 UNDIFFERENTIATED CONNECTIVE TISSUE DISEASE (HCC): Primary | ICD-10-CM

## 2024-08-14 RX ORDER — PREDNISONE 5 MG/1
TABLET ORAL
Qty: 70 TABLET | Refills: 0 | Status: SHIPPED | OUTPATIENT
Start: 2024-08-14

## 2024-08-14 NOTE — TELEPHONE ENCOUNTER
Please let her know that I'm sending a 4-week prednisone taper to last until I see her next month   no

## 2024-08-21 ENCOUNTER — OFFICE VISIT (OUTPATIENT)
Facility: REHABILITATION | Age: 64
End: 2024-08-21
Payer: MEDICARE
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
## 2024-08-21 PROCEDURE — 97110 THERAPEUTIC EXERCISES: CPT | Performed by: PHYSICAL THERAPIST
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
## 2024-08-28 ENCOUNTER — APPOINTMENT (OUTPATIENT)
Facility: REHABILITATION | Age: 64
End: 2024-08-28
Payer: MEDICARE

## 2024-09-04 ENCOUNTER — OFFICE VISIT (OUTPATIENT)
Facility: REHABILITATION | Age: 64
End: 2024-09-04
Payer: MEDICARE
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
## 2024-09-04 PROCEDURE — 97110 THERAPEUTIC EXERCISES: CPT | Performed by: PHYSICAL THERAPIST

## 2024-09-04 NOTE — PROGRESS NOTES
PHYSICAL THERAPY TREATMENT / MAINTENANCE   Date: 24  Name: Yuni Hall  : 1960  Referring Provider: Arnie Cadena DO  AUTHORIZATION:   Insurance: Payor: MEDICARE / Plan: MEDICARE A AND B / Product Type: Medicare A & B Fee for Service /     PATIENT HISTORY:  HPI: Yuni Hall is a 64 y.o. female referred to outpatient physical therapy for the following diagnosis   Encounter Diagnosis   Name Primary?    Fall, subsequent encounter Yes          Still on prednisone until next week at least next week seeing rheumatology.  Pain about lateral thoracic region.    Doing stretch and strengthening exercise video.  Also doing the cross country skiing machine.      Patient goals:   -getting back to the pool      24  Not feeling great today. L medial elbow pain started today and R lateral ankle pain. B/l lateral ankle pain occurs frequently. Continues walking and exercise videos. L medial elbow has flare ups. Has not been walking with her friend due to her friend being in the hospital having C Diff. Had been walking 2x/ week together. Had to take her friend to the hospital last week which is why she had to cancel her appointment due to potential exposure. No update on [place] reopening. Had called several times but had been told they were waiting upon inspection. Has not had access to other pools. Looking for places that include [employer]. Has not had to go grocery shopping lately because had one big haul and had stuff delivered. Has been redoing rooms in your house. Finishing hallway. Working on kitchen then dining room next. Sees rheumatologoist in Aug. 6.       Patient-Specific Functional Scale   Task is scored 0 (unable to perform activity) to 10 (ability to perform activity independently)    Activity 7/19/23 9/20/23 10/18/23 11/22/23 2/28/24 3/27/24   Walk at normal pace without a cane Walks without cane Walks without cane     Walks without cane, moving slower. Walks without cane   2.    "Grocery shop without significant pain Did okay last time grocery shopping, hurt the next day.    Doing well here, sweaty at times and has to stop, but it's gotten better Hasn't been able to grocery shop in a while, limited by weather. Does okay while moving but pain with static standing at check-out   3.   Dance         4.   Move without pain and to not get tired as frequently              PHYSICAL EXAM / TREATMENT    Precautions - Hashimoto's, autoimmune disease    Mobility Measures 9/04/24 7/23/24 7/02/24 6/05/24 5/1/24 3/27/24 2/28/24 1/31/24 8/23/23 9/20/23 10/18/23 11/22/23   Assistive device used? none none none none none none None none none none none none   5 Time Sit to Stand  (17\" chair, arms across chest) 18.4 sec  18\" chair arsm out front 29 sec.    19 sec. With arms out in front  21.6 seconds  From 18\" surface  Arms out front Unable from 18\" surface today    19.5 seconds from 20\" surface   16.4 seconds arms out front  Arms out front  24 seconds Arms out front   A couple extra attempts  29 second- 52 seconds 31 seconds Arms out front  24 seconds  Pain in left knee   19 seconds  From 20\" surface, arms across chest    28 seconds from 18\" surface 35 seconds arms out front    15 seconds 20\" surface  Arms out front     3 Meter Timed  Up & Go 8.0 sec  12.3 sec 13.3 sec 9.2 sec 10.5 seconds 12.3 seconds 9.8 sec 10.6 sec   11.8 sec  Mild antalgic gait on left   10.1 seconds 9.6 seconds   Walking speed               Functional Gait Assessment (see below) 19/30 16/30 17/30 21/30 17/30 21/30 20/30 20/30   6 Minute Walk Test (100 foot circular course)   1012 feet  No AD   800 feet no AD   940 feet no  feet 908 feet  No AD  103 bpm 1005 feet no  feet no  feet on treadmill inferred from self-selected pace 1.2 mph  875 feet no AD  Tired in legs  98 bpm   925 feet no AD   Patient-Reported Outcome Measure: Activities-Specific Balance Confidence Scale (ABC Scale)               Right knee flexion PROM, " "sitting 97 degrees    98 degrees   92 degrees 94 degrees 93 degrees 95 degrees                       Functional Gait Assessment  2/3 Gait level surface  3/3 Change in gait speed  3/3 Gait with horizontal head turns  3/3 Gait with vertical head turns  3/3 Gait and pivot turn  2/3 Step over obstacle  0/3 Gait with narrow base of support  1/3 Gait with eyes closed  1/3 Ambulating backwards  1/3 Steps  19/30 Total score (less than 22/30 indicates increased risk of fall)..      Precautions - none    Specialty Daily Treatment Diary   Exercise Diary  6/04/24 8/21/24 7/31/24 7/23/24 7/02/24 6/19/24 6/05/24 5/22/24   Static and dynamic balance (neuromuscular re-education)           Strengthening & endurance  (therapeutic exercise) Thoracic rotation stretches  Seated stretch using edge of chair, 20-30 sec x 2-3 each    Supine trunk rotation stretch, 30 sec each     Modified plantigrade standing hands on plinth, thread the needle trunk rotation stretch  20 sec x 2 each    Sit to stand 16.5\" surface 5-10 x 2 sets  Standing heel raise, about 5-10 each x 2 sets  Single leg    Lateral step to 6\" step 4-10 each x 2 sets  Standing hip abduction, green band, about 10 each x 2 sets    SciFit level 2.5 11 min about 110 spm   Treadmill 0.8 mph - 1.2 mph 12 min total    Heel raise, incline  10 x 2 sets  3-4 single leg each    Step up 6\" step  About 10 each without use of hands,   1 hand laterally 10 each    Sit to stand 20\" surface, 10  19\" surface, 10    Demonstrates R ulnar nerve stretching / flossing    High stepping 1 min  Braiding walking 1 min     Treadmill to 8 min   1.0 mph - 1.2 mph     Sit to stand no hands 18\" chair    Up and down 4\" stair steps x 3 about 5 times  Same 6\" stair steps x 2-3 times, focus to keep toes forwards    Standing knee extension and seated knee flexion and gastroc stretches 3 min total    Fast timed walking best about 35 sec per 100 feet lap x 3 sets    Seated R ulnar nerve stretching/ flossing 2 " "min               Treadmill  Level 1 - 2 min.  Level 1.4 - 6 min.  Level 0.8 - 1 min.  Total distance: 0.19 mi.      Sit to Stand, with arms out in front   5 x2      Standing heel raises  20sec. X 2     Braided walking, slow in parallel bars  - reports ankle pain (R>L)      Hip abduction monster walks / lateral walking with slight knee bend due to limited R knee tolerance    Ulnar nerve glides  Hold to tolerance (about 2-5 sec.), rest repeat     Supine bridges   5x 2    Supine quad stretch off table  R side    SciFit level 2.1 about 100 spm  5 min    Testing above    R leading step up 6\" step, about 1 min, 2 rails x 2 sets    Standing heel raise, increased right weight bearing, 30 x 2 sets    Sit to stand 20\" surface   5 x 3 sets    Braiding walking, slow, 40 sec x 2  Can cross midline    40 lbs right  Barry position 2  58 lbs left   Gentle gripping towel about 1 min  Wrist rolls into flexion, 2 lbs,          SciFit level 1 about 80 spm 5 min     Overground walking 100 feet timed  37 - 44 sec x 4 sets    Standing knee stretches reaching back to heel or foot on step    Heel raise single leg heel raise about 10 each x 2 sets    5 times sit to stand   17\" surface  15.5 sec  + 7 lb ball out front 20.0 sec  + 7 lb ball against body    Standing with foot on floor slider, hip circles, leg raise, 1.5 min  Standing hip abduction, red band 1.5 min   Outcome measures    Standing knee extension stretch from seated knee extension/hamstring stretch 1 min  Standing heel raises double towards single leg 1.5 min  SciFit level 1, 10 min legs only   Treadmill 1.6 mph 5 min    Heel raise single leg 5-7 reps each x 2 sets  Step ups 6\" step nearby railing  About 10 x 2 sets each  Sit to stand about 10 x 2 sets standard 18\" chair    Braiding walking in parallel bars, 1 min x 2 sets    SciFit level 1,  spm, 10 min                               ASSESSMENT   Good performance on mobility testing today.    This is the second time " in about a year Yuni has been able to walk upwards of 1000 feet during a 6 minute walk test, and improved in 5 times sit to stand.    It seems like being on prednisone likely helps mobility, less limited by pain.    Addressed new onset thoracic region pain mainly via stretching.          New maintenance goals:  1. Walk 15 minutes consecutively for community ambulation and exercise.  MET but not able every day or sometimes consecutive days  2. Manages 150 minutes of moderate intensity aerobic exercise per week.  MET with variability in chosen exercise.  .  3. Maintains at least 1000 feet during 6 minute walk test, at most 15 seconds 5 times sit to stand.  MET for 6 minute walk test, NOT MET for 5 times sit to stand.    PLAN OF CARE:  Patient will benefit from physical therapy 1x/week to 1x/ 1-2 weeks for 2 months  Neuromuscular re-education, therapeutic exercises, and therapeutic activities as outlined in grids.    Jericho Garrett, PT  9/4/2024

## 2024-09-12 ENCOUNTER — OFFICE VISIT (OUTPATIENT)
Dept: RHEUMATOLOGY | Facility: CLINIC | Age: 64
End: 2024-09-12
Payer: MEDICARE

## 2024-09-12 VITALS
BODY MASS INDEX: 37.93 KG/M2 | DIASTOLIC BLOOD PRESSURE: 70 MMHG | OXYGEN SATURATION: 98 % | SYSTOLIC BLOOD PRESSURE: 130 MMHG | HEIGHT: 66 IN | WEIGHT: 236 LBS | HEART RATE: 88 BPM

## 2024-09-12 DIAGNOSIS — M79.10 MYALGIA: ICD-10-CM

## 2024-09-12 DIAGNOSIS — G70.9 NEUROMUSCULAR DISORDER (HCC): ICD-10-CM

## 2024-09-12 DIAGNOSIS — M79.18 MYOFASCIAL PAIN: ICD-10-CM

## 2024-09-12 DIAGNOSIS — Z79.899 HIGH RISK MEDICATION USE: ICD-10-CM

## 2024-09-12 DIAGNOSIS — M67.449 DIGITAL MUCINOUS CYST OF FINGER: ICD-10-CM

## 2024-09-12 DIAGNOSIS — M35.9 UNDIFFERENTIATED CONNECTIVE TISSUE DISEASE (HCC): Primary | ICD-10-CM

## 2024-09-12 DIAGNOSIS — R76.8 POSITIVE ANA (ANTINUCLEAR ANTIBODY): ICD-10-CM

## 2024-09-12 PROCEDURE — 99214 OFFICE O/P EST MOD 30 MIN: CPT | Performed by: INTERNAL MEDICINE

## 2024-09-12 RX ORDER — DULOXETIN HYDROCHLORIDE 30 MG/1
CAPSULE, DELAYED RELEASE ORAL
COMMUNITY

## 2024-09-12 RX ORDER — HYDROXYCHLOROQUINE SULFATE 200 MG/1
200 TABLET, FILM COATED ORAL 2 TIMES DAILY
Qty: 180 TABLET | Refills: 3 | Status: SHIPPED | OUTPATIENT
Start: 2024-09-12 | End: 2025-09-07

## 2024-09-12 RX ORDER — METRONIDAZOLE 7.5 MG/G
1 GEL TOPICAL 2 TIMES DAILY
COMMUNITY

## 2024-09-12 RX ORDER — TIZANIDINE 2 MG/1
2 TABLET ORAL EVERY 8 HOURS PRN
Qty: 270 TABLET | Refills: 3 | Status: SHIPPED | OUTPATIENT
Start: 2024-09-12

## 2024-09-12 RX ORDER — MULTIVIT-MIN/IRON/FOLIC ACID/K 18-600-40
CAPSULE ORAL
COMMUNITY

## 2024-09-12 RX ORDER — GABAPENTIN 100 MG/1
100 CAPSULE ORAL 2 TIMES DAILY
Qty: 180 CAPSULE | Refills: 3 | Status: SHIPPED | OUTPATIENT
Start: 2024-09-12

## 2024-09-12 NOTE — PROGRESS NOTES
RHEUMATOLOGY FOLLOW-UP NOTE    Assessment and Plan:   Yuni Hall is a 64 y.o.  female who presents for follow-up of her UCTD, which was flaring more recently but is stable now.    Assessment & Plan    1. Undifferentiated Connective Tissue Disease.  The patient's symptoms suggest a possible flare-up of her undifferentiated connective tissue disease. Lupus activity labs will be repeated to monitor disease progression. She reports intermittent muscle cramps and joint pain, which she manages with ibuprofen, heat, ice, Biofreeze, and lidocaine patches. The dosage of tizanidine will be increased from twice daily to three times daily. Gabapentin, which was previously increased, will be continued at the same dosage. She is advised to take these medications more regularly to prevent flare-ups. If muscle pain persists, she can increase tizanidine to three times a day. A referral to dermatology has been made for further evaluation of her skin condition, including cysts on her finger and scalp.    2. Raynaud's Disease.  She reports that her Raynaud's symptoms are manageable with gloves and pockets during the winter. Amlodipine will be prescribed for bedtime use during winter months to help keep blood vessels dilated and manage symptoms.    3. Medication Management.  She has been using ibuprofen as needed for pain and has three refills available. She also uses oxycodone sparingly for severe pain and has one pill left from a prescription over a year ago. She is advised to discuss the need for additional oxycodone with her other doctor.    Follow-up  A follow-up visit is scheduled in 6 months, or sooner if necessary.      Continue Hydroxychloroquine twice a day; continue regular eye exams.    Continue Tizanidine three times a day as needed for muscle cramps  Continue ibuprofen as needed for moderate pain  Continue gabapentin to twice a day as needed for nerve pain  Dermatology referral made for recurrent cysts on right  second finger and scalp  Continue Biofreeze as needed     Return to clinic in 6 months    Plan:  Diagnoses and all orders for this visit:    Undifferentiated connective tissue disease (HCC)  -     hydroxychloroquine (PLAQUENIL) 200 mg tablet; Take 1 tablet (200 mg total) by mouth 2 (two) times a day  -     CBC and differential  -     C4 complement  -     C3 complement  -     Anti-DNA antibody, double-stranded  -     Comprehensive metabolic panel  -     Urinalysis with microscopic; Future  -     Sedimentation rate, automated  -     Protein / creatinine ratio, urine; Future  -     C-reactive protein    Digital mucinous cyst of finger  -     Ambulatory referral to Dermatology; Future    Positive MT (antinuclear antibody)  -     hydroxychloroquine (PLAQUENIL) 200 mg tablet; Take 1 tablet (200 mg total) by mouth 2 (two) times a day    Myalgia  -     tiZANidine (ZANAFLEX) 2 mg tablet; Take 1 tablet (2 mg total) by mouth every 8 (eight) hours as needed for muscle spasms    Neuromuscular disorder (HCC)  -     gabapentin (NEURONTIN) 100 mg capsule; Take 1 capsule (100 mg total) by mouth 2 (two) times a day    Myofascial pain  -     gabapentin (NEURONTIN) 100 mg capsule; Take 1 capsule (100 mg total) by mouth 2 (two) times a day    High risk medication use    Other orders  -     ammonium lactate (LAC-HYDRIN) 5 % lotion  -     DULoxetine (CYMBALTA) 30 mg delayed release capsule; TAKE 1 CAPSULE BY MOUTH DAILY, TO BE TAKEN WITH 60 MG FOR A TOTAL OF 90 MG DAILY. (Patient not taking: Reported on 9/12/2024)  -     Magnesium 100 MG CAPS  -     metroNIDAZOLE (METROGEL) 0.75 % gel; Apply 1 application. topically 2 (two) times a day  -     glucose blood test strip; daily  -     glucose blood test strip; TEST BLOOD SUGAR TWICE A DAY  -     Cholecalciferol (Vitamin D) 50 MCG (2000 UT) CAPS    High risk medication use - Benefits and risks of hydroxychloroquine, including but not limited to retinal toxicity, corneal deposits,  gastrointestinal side effects, and headaches were discussed with the patient. The need for a regular eye exam to monitor for ocular toxicity while on this medication was also explained to the patient.     Follow-up plan: RTC in 6 months         Rheumatic Disease Summary      Chief Complaint  No chief complaint on file.      HPI  Yuni Hall is a 64 y.o.  female who presents for follow-up.    History of Present Illness  The patient is a 64-year-old female who presents for follow-up of her undifferentiated connective tissue disease.    She experienced an episode where one leg would not bend and the other would not straighten. Prednisone was prescribed, which improved her condition. However, she gained weight even though she was eating less. She also reports hair loss. Currently, she feels well but lacks confidence in her right knee. Prolonged standing or sitting causes discomfort, requiring her to lift it with her other foot. She reports no numbness but mentions that her muscles do not engage properly, necessitating stretching before they function normally. Her mobility is limited, preventing her from driving long distances. The frequency of her flare-ups varies, and she has been unable to identify any specific triggers.    She manages her symptoms with ibuprofen, heat, ice, Biofreeze, and over-the-counter lidocaine patches. If pain disrupts her sleep, she takes oxycodone, of which she has one dose left from a prescription filled over a year ago. She takes tizanidine twice daily for muscle cramps and gabapentin twice daily for nerve pain. She only experiences nerve issues when her muscles press on a nerve, causing swelling on the top of her feet.    She has a cyst-like bump on her finger and scalp that grows and eventually bursts, releasing a clear fluid. This process is painful. She has consulted a dermatologist for this issue. She had a similar bump on her face, which has since resolved.    She continues to  have regular eye exams. She has switched from Voltaren gel to Biofreeze for better symptom management. She has Raynaud's disease, which worsens in winter, causing her hands to turn bright red unless she wears gloves. She prefers to request medication as needed.    The following portions of the patient's history were reviewed and updated as appropriate: allergies, current medications, past family history, past medical history, past social history, past surgical history and problem list.    Review of Systems:   See HPI    Home Medications:    Current Outpatient Medications:     albuterol (2.5 mg/3 mL) 0.083 % nebulizer solution, Take 3 mL (2.5 mg total) by nebulization every 6 (six) hours as needed for wheezing or shortness of breath, Disp: 75 mL, Rfl: 2    ammonium lactate (LAC-HYDRIN) 5 % lotion, , Disp: , Rfl:     Azelastine HCl 137 MCG/SPRAY SOLN, 1 SPRAY PER NOSTRIL IN THE MORNING, Disp: 90 mL, Rfl: 1    BIOTIN PO, Take by mouth, Disp: , Rfl:     Blood Glucose Monitoring Suppl (ONETOUCH VERIO IQ SYSTEM) w/Device KIT, Check BG daily dx E11.9, Disp: 1 kit, Rfl: 1    Cholecalciferol (Vitamin D) 50 MCG (2000 UT) CAPS, , Disp: , Rfl:     Cholecalciferol 25 MCG (1000 UT) tablet, , Disp: , Rfl:     Cyanocobalamin (Vitamin B-12) 1000 MCG SUBL, Place under the tongue, Disp: , Rfl:     diphenhydrAMINE (BENADRYL) 25 mg capsule, PRN, Disp: , Rfl:     dulaglutide (Trulicity) 0.75 MG/0.5ML injection, Inject 0.5 mL (0.75 mg total) under the skin once a week, Disp: 2 mL, Rfl: 2    DULoxetine (CYMBALTA) 60 mg delayed release capsule, TAKE 1 CAPSULE EVERY DAY, Disp: 90 capsule, Rfl: 3    ergocalciferol (VITAMIN D2) 50,000 units, TAKE 1 CAPSULE BY MOUTH ONE TIME PER WEEK, Disp: 12 capsule, Rfl: 1    gabapentin (NEURONTIN) 100 mg capsule, Take 1 capsule (100 mg total) by mouth 2 (two) times a day, Disp: 180 capsule, Rfl: 3    glucose blood (OneTouch Verio) test strip, Use check BG twice daily dx E11.9, Disp: 100 each, Rfl: 5     glucose blood test strip, daily, Disp: , Rfl:     glucose blood test strip, TEST BLOOD SUGAR TWICE A DAY, Disp: , Rfl:     hydrocortisone 2.5 % cream, Apply topically 3 (three) times a day as needed for irritation or rash, Disp: 30 g, Rfl: 0    hydroxychloroquine (PLAQUENIL) 200 mg tablet, Take 1 tablet (200 mg total) by mouth 2 (two) times a day, Disp: 180 tablet, Rfl: 3    ibuprofen (MOTRIN) 800 mg tablet, Take 1 tablet (800 mg total) by mouth every 6 (six) hours as needed for mild pain, Disp: 30 tablet, Rfl: 6    Lancets (OneTouch Delica Plus [id number]) MISC, Check BG 1x daily DX E11.9, Disp: 100 each, Rfl: 1    levothyroxine 200 mcg tablet, TAKE 1 TABLET EVERY DAY AND TAKE 1 AND 1/2 TABLETS ON MONDAY AND THURSDAY AS DIRECTED, Disp: 104 tablet, Rfl: 3    lifitegrast (Xiidra) 5 % op solution, Twice daily, Disp: , Rfl:     losartan (COZAAR) 50 mg tablet, TAKE 1 TABLET EVERY DAY, Disp: 90 tablet, Rfl: 1    Magnesium 100 MG CAPS, , Disp: , Rfl:     magnesium oxide (MAG-OX) 400 mg tablet, Take 1 tablet (400 mg total) by mouth daily, Disp: 30 tablet, Rfl: 2    metroNIDAZOLE (METROGEL) 0.75 % gel, Apply 1 application. topically 2 (two) times a day, Disp: , Rfl:     montelukast (SINGULAIR) 10 mg tablet, TAKE 1 TABLET AT BEDTIME, Disp: 90 tablet, Rfl: 1    rosuvastatin (CRESTOR) 5 mg tablet, TAKE 1 TABLET EVERY DAY, Disp: 90 tablet, Rfl: 1    Saline 0.65 % SOLN, into each nostril 2 (two) times a day, Disp: , Rfl:     sodium chloride 0.9 % nebulizer solution, Take 3 mL by nebulization as needed for wheezing or shortness of breath, Disp: 30 mL, Rfl: 0    tiZANidine (ZANAFLEX) 2 mg tablet, Take 1 tablet (2 mg total) by mouth every 8 (eight) hours as needed for muscle spasms, Disp: 270 tablet, Rfl: 3    betamethasone, augmented, (DIPROLENE-AF) 0.05 % cream, APPLY TO RASH ON BODY 2 TIMES A DAY FOR 2 WEEKS, THEN ONCE A DAY FOR 2 WEEKS, THEN 2 TIMES A WEEK (Patient not taking: Reported on 11/1/2023), Disp: , Rfl:     Diclofenac  "Sodium (VOLTAREN) 1 %, Apply 2 g topically 4 (four) times a day (Patient not taking: Reported on 9/12/2024), Disp: 50 g, Rfl: 0    diphenhydrAMINE (BENADRYL) 50 mg capsule, , Disp: , Rfl:     DULoxetine (CYMBALTA) 30 mg delayed release capsule, TAKE 1 CAPSULE BY MOUTH DAILY, TO BE TAKEN WITH 60 MG FOR A TOTAL OF 90 MG DAILY. (Patient not taking: Reported on 9/12/2024), Disp: , Rfl:     OXYCODONE HCL PO, Take by mouth PRN for \"sleep\" (Patient not taking: Reported on 9/12/2024), Disp: , Rfl:     predniSONE 5 mg tablet, 4 tabs x7 days, then 3 tabs x7 days, then 2 tabs x7 days, then 1 tab x7 days, then stop. (Patient not taking: Reported on 9/12/2024), Disp: 70 tablet, Rfl: 0    Objective:    Vitals:    09/12/24 1419   BP: 130/70   Pulse: 88   SpO2: 98%   Weight: 107 kg (236 lb)   Height: 5' 6\" (1.676 m)       Physical Exam  Constitutional:       General: She is not in acute distress.  HENT:      Head: Normocephalic and atraumatic.   Eyes:      Conjunctiva/sclera: Conjunctivae normal.   Cardiovascular:      Rate and Rhythm: Normal rate and regular rhythm.      Heart sounds: S1 normal and S2 normal.      No friction rub.   Pulmonary:      Effort: Pulmonary effort is normal. No respiratory distress.      Breath sounds: Normal breath sounds. No wheezing, rhonchi or rales.   Musculoskeletal:      Cervical back: Neck supple.   Skin:     Coloration: Skin is not pale.      Findings: Lesion present.   Neurological:      Mental Status: She is alert. Mental status is at baseline.   Psychiatric:         Mood and Affect: Mood normal.         Behavior: Behavior normal.       Physical Exam      Reviewed labs and imaging.    Imaging:   No results found.    Labs:   No visits with results within 6 Month(s) from this visit.   Latest known visit with results is:   Appointment on 03/07/2024   Component Date Value Ref Range Status    Hemoglobin A1C 03/07/2024 6.7 (H)  Normal 4.0-5.6%; PreDiabetic 5.7-6.4%; Diabetic >=6.5%; Glycemic control " for adults with diabetes <7.0% % Final    EAG 03/07/2024 146  mg/dl Final    Sodium 03/07/2024 139  135 - 147 mmol/L Final    Potassium 03/07/2024 4.1  3.5 - 5.3 mmol/L Final    Chloride 03/07/2024 104  96 - 108 mmol/L Final    CO2 03/07/2024 23  21 - 32 mmol/L Final    ANION GAP 03/07/2024 12  mmol/L Final    BUN 03/07/2024 19  5 - 25 mg/dL Final    Creatinine 03/07/2024 0.85  0.60 - 1.30 mg/dL Final    Standardized to IDMS reference method    Glucose, Fasting 03/07/2024 109 (H)  65 - 99 mg/dL Final    Calcium 03/07/2024 9.0  8.4 - 10.2 mg/dL Final    AST 03/07/2024 24  13 - 39 U/L Final    ALT 03/07/2024 24  7 - 52 U/L Final    Specimen collection should occur prior to Sulfasalazine administration due to the potential for falsely depressed results.     Alkaline Phosphatase 03/07/2024 95  34 - 104 U/L Final    Total Protein 03/07/2024 7.2  6.4 - 8.4 g/dL Final    Albumin 03/07/2024 4.0  3.5 - 5.0 g/dL Final    Total Bilirubin 03/07/2024 0.45  0.20 - 1.00 mg/dL Final    Use of this assay is not recommended for patients undergoing treatment with eltrombopag due to the potential for falsely elevated results.  N-acetyl-p-benzoquinone imine (metabolite of Acetaminophen) will generate erroneously low results in samples for patients that have taken an overdose of Acetaminophen.    eGFR 03/07/2024 72  ml/min/1.73sq m Final    Creatinine, Ur 03/07/2024 147.3  Reference range not established. mg/dL Final    Albumin,U,Random 03/07/2024 13.0  <20.0 mg/L Final    Albumin Creat Ratio 03/07/2024 9  0 - 30 mg/g creatinine Final    Cholesterol 03/07/2024 155  See Comment mg/dL Final    Cholesterol:         Pediatric <18 Years        Desirable          <170 mg/dL      Borderline High    170-199 mg/dL      High               >=200 mg/dL        Adult >=18 Years            Desirable         <200 mg/dL      Borderline High   200-239 mg/dL      High              >239 mg/dL      Triglycerides 03/07/2024 155 (H)  See Comment mg/dL Final     Triglyceride:     0-9Y            <75mg/dL     10Y-17Y         <90 mg/dL       >=18Y     Normal          <150 mg/dL     Borderline High 150-199 mg/dL     High            200-499 mg/dL        Very High       >499 mg/dL    Specimen collection should occur prior to Metamizole administration due to the potential for falsely depressed results.    HDL, Direct 03/07/2024 57  >=50 mg/dL Final    LDL Calculated 03/07/2024 67  0 - 100 mg/dL Final    LDL Cholesterol:     Optimal           <100 mg/dl     Near Optimal      100-129 mg/dl     Above Optimal       Borderline High 130-159 mg/dl       High            160-189 mg/dl       Very High       >189 mg/dl         This screening LDL is a calculated result.   It does not have the accuracy of the Direct Measured LDL in the monitoring of patients with hyperlipidemia and/or statin therapy.   Direct Measure LDL ([id number]) must be ordered separately in these patients.    TSH 3RD GENERATON 03/07/2024 2.174  0.450 - 4.500 uIU/mL Final    The recommended reference ranges for TSH during pregnancy are as follows:   First trimester 0.100 to 2.500 uIU/mL   Second trimester  0.200 to 3.000 uIU/mL   Third trimester 0.300 to 3.000 uIU/m    Note: Normal ranges may not apply to patients who are transgender, non-binary, or whose legal sex, sex at birth, and gender identity differ.  Adult TSH (3rd generation) reference range follows the recommended guidelines of the [employer], January, 2020.    Alternaria Alternata 03/07/2024 <0.10  Class 0 kU/L Final    Aspergillus fumigatus 03/07/2024 <0.10  Class 0 kU/L Final    Candida Albicans 03/07/2024 <0.10  Class 0 kU/L Final    Cladosporium Herbarum 03/07/2024 <0.10  Class 0 kU/L Final    Epicoccum purpurascens 03/07/2024 <0.10  Class 0 kU/L Final    F.PROLIFERATUM 03/07/2024 <0.10  Class 0 kU/L Final    Mucor Racemosus 03/07/2024 <0.10  Class 0 kU/L Final    PENICILLIUM CHRYSOGENUM 03/07/2024 <0.10  Class 0 kU/L Final    PHOMA BETAE  03/07/2024 <0.10  Class 0 kU/L Final    S.ROSTRATA 03/07/2024 <0.10  Class 0 kU/L Final    S.HERBARUM 03/07/2024 <0.10  Class 0 kU/L Final    Class Description 03/07/2024 Comment   Final        Levels of Specific IgE       Class  Description of Class      ---------------------------  -----  --------------------                     < 0.10         0         Negative             0.10 -    0.31         0/I       Equivocal/Low             0.32 -    0.55         I         Low             0.56 -    1.40         II        Moderate             1.41 -    3.90         III       High             3.91 -   19.00         IV        Very High            19.01 -  100.00         V         Very High                    >100.00         VI        Very High    A pullulans 03/07/2024 <0.10  Class 0 kU/L Final      Qbrexza Pregnancy And Lactation Text: There is no available data on Qbrexza use in pregnant women.  There is no available data on Qbrexza use in lactation.

## 2024-09-12 NOTE — PATIENT INSTRUCTIONS
Continue Hydroxychloroquine twice a day; continue regular eye exams.    Continue Tizanidine three times a day as needed for muscle cramps  Continue ibuprofen as needed for moderate pain  Continue gabapentin to twice a day as needed for nerve pain  Dermatology referral made for recurrent cysts on right second finger and scalp  Continue Biofreeze as needed  Do labs     Return to clinic in 6 months

## 2024-09-18 ENCOUNTER — OFFICE VISIT (OUTPATIENT)
Facility: REHABILITATION | Age: 64
End: 2024-09-18
Payer: MEDICARE
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
## 2024-09-18 PROCEDURE — 97110 THERAPEUTIC EXERCISES: CPT | Performed by: PHYSICAL THERAPIST

## 2024-09-18 NOTE — PROGRESS NOTES
"PHYSICAL THERAPY TREATMENT / MAINTENANCE   Date: 24  Name: Yuni Hall  : 1960  Referring Provider: Arnie Cadena DO  AUTHORIZATION:   Insurance: Payor: MEDICARE / Plan: MEDICARE A AND B / Product Type: Medicare A & B Fee for Service /     PATIENT HISTORY:  HPI: Yuni Hall is a 64 y.o. female referred to outpatient physical therapy for the following diagnosis   Encounter Diagnosis   Name Primary?    Fall, subsequent encounter Yes       Right shoulder pain from screwing in screw for putting up picture.   Pain in right leg, right knee sometimes alysia, gets cramping.  Seen by rheumatology, ordered more blood tests.  Off prednisone.  Continues to use cross country skiing machine.    Patient goals:   -getting back to the pool    Patient-Specific Functional Scale   Task is scored 0 (unable to perform activity) to 10 (ability to perform activity independently)    Activity 7/19/23 9/20/23 10/18/23 11/22/23 2/28/24 3/27/24   Walk at normal pace without a cane Walks without cane Walks without cane     Walks without cane, moving slower. Walks without cane   2.   Grocery shop without significant pain Did okay last time grocery shopping, hurt the next day.    Doing well here, sweaty at times and has to stop, but it's gotten better Hasn't been able to grocery shop in a while, limited by weather. Does okay while moving but pain with static standing at check-out   3.   Dance         4.   Move without pain and to not get tired as frequently              PHYSICAL EXAM / TREATMENT    Precautions - Hashimoto's, autoimmune disease    Mobility Measures 9/04/24 7/23/24 7/02/24 6/05/24 5/1/24 3/27/24 2/28/24 1/31/24 8/23/23 9/20/23 10/18/23 11/22/23   Assistive device used? none none none none none none None none none none none none   5 Time Sit to Stand  (17\" chair, arms across chest) 18.4 sec  18\" chair arms out front 29 sec.    19 sec. With arms out in front  21.6 seconds  From 18\" surface  Arms out front " "Unable from 18\" surface today    19.5 seconds from 20\" surface   16.4 seconds arms out front  Arms out front  24 seconds Arms out front   A couple extra attempts  29 second- 52 seconds 31 seconds Arms out front  24 seconds  Pain in left knee   19 seconds  From 20\" surface, arms across chest    28 seconds from 18\" surface 35 seconds arms out front    15 seconds 20\" surface  Arms out front     3 Meter Timed  Up & Go 8.0 sec  12.3 sec 13.3 sec 9.2 sec 10.5 seconds 12.3 seconds 9.8 sec 10.6 sec   11.8 sec  Mild antalgic gait on left   10.1 seconds 9.6 seconds   Walking speed               Functional Gait Assessment (see below) 19/30 16/30 17/30 21/30 17/30 21/30 20/30 20/30   6 Minute Walk Test (100 foot circular course)   1012 feet  No AD   800 feet no AD   940 feet no  feet 908 feet  No AD  103 bpm 1005 feet no  feet no  feet on treadmill inferred from self-selected pace 1.2 mph  875 feet no AD  Tired in legs  98 bpm   925 feet no AD   Patient-Reported Outcome Measure: Activities-Specific Balance Confidence Scale (ABC Scale)               Right knee flexion PROM, sitting 97 degrees    98 degrees   92 degrees 94 degrees 93 degrees 95 degrees                       Pain about the right medial elbow, about flexor tendon attachments, tender to palpation .   Has been working on elbow flexion/extension, elbow pronation and supination.    48 lbs right , 55 lbs left (patient is right-handed)    Pain about medial and lateral distal hamstrings.  Soreness and swelling about right foot.      Functional Gait Assessment  2/3 Gait level surface  3/3 Change in gait speed  3/3 Gait with horizontal head turns  3/3 Gait with vertical head turns  3/3 Gait and pivot turn  2/3 Step over obstacle  0/3 Gait with narrow base of support  1/3 Gait with eyes closed  1/3 Ambulating backwards  1/3 Steps  19/30 Total score (less than 22/30 indicates increased risk of fall)..      Precautions - none    Specialty Daily " "Treatment Diary   Exercise Diary  9/18/24 6/04/24 8/21/24 7/31/24 7/23/24 7/02/24 6/19/24 6/05/24 5/22/24   Static and dynamic balance (neuromuscular re-education)            Strengthening & endurance  (therapeutic exercise) [employer] treadmill   1.1 mph 12 min    Right elbow flexion, 3 lbs 15 x 2 sets    Sit to stand 18\" surface  10  16.5\" surface  5 x 3 sets    Standing heel raise, 10 x 2 sets right  5-10 left x 2 sets    Step up R leading about 10   Lateral, 10     Fast timed walking 100 feet  31 sec  30 sec    SciFit level   Legs only   12 min  About 90 spm       Thoracic rotation stretches  Seated stretch using edge of chair, 20-30 sec x 2-3 each    Supine trunk rotation stretch, 30 sec each     Modified plantigrade standing hands on plinth, thread the needle trunk rotation stretch  20 sec x 2 each    Sit to stand 16.5\" surface 5-10 x 2 sets  Standing heel raise, about 5-10 each x 2 sets  Single leg    Lateral step to 6\" step 4-10 each x 2 sets  Standing hip abduction, green band, about 10 each x 2 sets    SciFit level 2.5 11 min about 110 spm   Treadmill 0.8 mph - 1.2 mph 12 min total    Heel raise, incline  10 x 2 sets  3-4 single leg each    Step up 6\" step  About 10 each without use of hands,   1 hand laterally 10 each    Sit to stand 20\" surface, 10  19\" surface, 10    Demonstrates R ulnar nerve stretching / flossing    High stepping 1 min  Braiding walking 1 min     Treadmill to 8 min   1.0 mph - 1.2 mph     Sit to stand no hands 18\" chair    Up and down 4\" stair steps x 3 about 5 times  Same 6\" stair steps x 2-3 times, focus to keep toes forwards    Standing knee extension and seated knee flexion and gastroc stretches 3 min total    Fast timed walking best about 35 sec per 100 feet lap x 3 sets    Seated R ulnar nerve stretching/ flossing 2 min               Treadmill  Level 1 - 2 min.  Level 1.4 - 6 min.  Level 0.8 - 1 min.  Total distance: 0.19 mi.      Sit to Stand, with arms out in front   5 " "x2      Standing heel raises  20sec. X 2     Braided walking, slow in parallel bars  - reports ankle pain (R>L)      Hip abduction monster walks / lateral walking with slight knee bend due to limited R knee tolerance    Ulnar nerve glides  Hold to tolerance (about 2-5 sec.), rest repeat     Supine bridges   5x 2    Supine quad stretch off table  R side    SciFit level 2.1 about 100 spm  5 min    Testing above    R leading step up 6\" step, about 1 min, 2 rails x 2 sets    Standing heel raise, increased right weight bearing, 30 x 2 sets    Sit to stand 20\" surface   5 x 3 sets    Braiding walking, slow, 40 sec x 2  Can cross midline    40 lbs right  Barry position 2  58 lbs left   Gentle gripping towel about 1 min  Wrist rolls into flexion, 2 lbs,          SciFit level 1 about 80 spm 5 min     Overground walking 100 feet timed  37 - 44 sec x 4 sets    Standing knee stretches reaching back to heel or foot on step    Heel raise single leg heel raise about 10 each x 2 sets    5 times sit to stand   17\" surface  15.5 sec  + 7 lb ball out front 20.0 sec  + 7 lb ball against body    Standing with foot on floor slider, hip circles, leg raise, 1.5 min  Standing hip abduction, red band 1.5 min   Outcome measures    Standing knee extension stretch from seated knee extension/hamstring stretch 1 min  Standing heel raises double towards single leg 1.5 min  SciFit level 1, 10 min legs only   Treadmill 1.6 mph 5 min    Heel raise single leg 5-7 reps each x 2 sets  Step ups 6\" step nearby railing  About 10 x 2 sets each  Sit to stand about 10 x 2 sets standard 18\" chair    Braiding walking in parallel bars, 1 min x 2 sets    SciFit level 1,  spm, 10 min                                 ASSESSMENT   Right elbow pain consistent with mild medial epicondylitis, completing light intensity higher volume elbow flexion and doing some gentle stretching to help in this area.  Continues leg strengthening.      New maintenance " goals:  1. Walk 15 minutes consecutively for community ambulation and exercise.  MET but not able every day or sometimes consecutive days  2. Manages 150 minutes of moderate intensity aerobic exercise per week.  MET with variability in chosen exercise.  .  3. Maintains at least 1000 feet during 6 minute walk test, at most 15 seconds 5 times sit to stand.  MET for 6 minute walk test, NOT MET for 5 times sit to stand.    PLAN OF CARE:  Patient will benefit from physical therapy 1x/week to 1x/ 1-2 weeks for 2 months  Neuromuscular re-education, therapeutic exercises, and therapeutic activities as outlined in grids.    Jericho Garrett, PT  9/18/2024

## 2024-09-24 ENCOUNTER — RA CDI HCC (OUTPATIENT)
Dept: OTHER | Facility: HOSPITAL | Age: 64
End: 2024-09-24

## 2024-09-24 ENCOUNTER — APPOINTMENT (OUTPATIENT)
Dept: LAB | Facility: CLINIC | Age: 64
End: 2024-09-24
Payer: MEDICARE

## 2024-09-24 ENCOUNTER — OFFICE VISIT (OUTPATIENT)
Dept: FAMILY MEDICINE CLINIC | Facility: CLINIC | Age: 64
End: 2024-09-24
Payer: MEDICARE

## 2024-09-24 VITALS
HEART RATE: 81 BPM | OXYGEN SATURATION: 98 % | BODY MASS INDEX: 37.77 KG/M2 | TEMPERATURE: 96.1 F | WEIGHT: 235 LBS | SYSTOLIC BLOOD PRESSURE: 138 MMHG | HEIGHT: 66 IN | DIASTOLIC BLOOD PRESSURE: 74 MMHG

## 2024-09-24 DIAGNOSIS — H92.01 RIGHT EAR PAIN: ICD-10-CM

## 2024-09-24 DIAGNOSIS — R05.9 COUGH, UNSPECIFIED TYPE: Primary | ICD-10-CM

## 2024-09-24 DIAGNOSIS — R06.2 WHEEZING: ICD-10-CM

## 2024-09-24 DIAGNOSIS — M35.9 UNDIFFERENTIATED CONNECTIVE TISSUE DISEASE (HCC): ICD-10-CM

## 2024-09-24 DIAGNOSIS — R50.9 FEVER, UNSPECIFIED FEVER CAUSE: ICD-10-CM

## 2024-09-24 DIAGNOSIS — I10 ESSENTIAL HYPERTENSION: ICD-10-CM

## 2024-09-24 DIAGNOSIS — E11.65 TYPE 2 DIABETES MELLITUS WITH HYPERGLYCEMIA, WITHOUT LONG-TERM CURRENT USE OF INSULIN (HCC): ICD-10-CM

## 2024-09-24 DIAGNOSIS — R09.82 PND (POST-NASAL DRIP): ICD-10-CM

## 2024-09-24 LAB
ALBUMIN SERPL BCG-MCNC: 3.8 G/DL (ref 3.5–5)
ALP SERPL-CCNC: 109 U/L (ref 34–104)
ALT SERPL W P-5'-P-CCNC: 21 U/L (ref 7–52)
ANION GAP SERPL CALCULATED.3IONS-SCNC: 9 MMOL/L (ref 4–13)
AST SERPL W P-5'-P-CCNC: 17 U/L (ref 13–39)
BACTERIA UR QL AUTO: ABNORMAL /HPF
BASOPHILS # BLD AUTO: 0.04 THOUSANDS/ΜL (ref 0–0.1)
BASOPHILS NFR BLD AUTO: 0 % (ref 0–1)
BILIRUB SERPL-MCNC: 0.35 MG/DL (ref 0.2–1)
BILIRUB UR QL STRIP: NEGATIVE
BUN SERPL-MCNC: 16 MG/DL (ref 5–25)
C3 SERPL-MCNC: 197 MG/DL (ref 87–200)
C4 SERPL-MCNC: 47 MG/DL (ref 19–52)
CALCIUM SERPL-MCNC: 9 MG/DL (ref 8.4–10.2)
CHLORIDE SERPL-SCNC: 102 MMOL/L (ref 96–108)
CLARITY UR: ABNORMAL
CO2 SERPL-SCNC: 27 MMOL/L (ref 21–32)
COLOR UR: YELLOW
CREAT SERPL-MCNC: 0.81 MG/DL (ref 0.6–1.3)
CREAT UR-MCNC: 340.4 MG/DL
CRP SERPL QL: 16.9 MG/L
EOSINOPHIL # BLD AUTO: 0.15 THOUSAND/ΜL (ref 0–0.61)
EOSINOPHIL NFR BLD AUTO: 2 % (ref 0–6)
ERYTHROCYTE [DISTWIDTH] IN BLOOD BY AUTOMATED COUNT: 12.3 % (ref 11.6–15.1)
ERYTHROCYTE [SEDIMENTATION RATE] IN BLOOD: 62 MM/HOUR (ref 0–29)
GFR SERPL CREATININE-BSD FRML MDRD: 75 ML/MIN/1.73SQ M
GLUCOSE SERPL-MCNC: 207 MG/DL (ref 65–140)
GLUCOSE UR STRIP-MCNC: ABNORMAL MG/DL
GRAN CASTS #/AREA URNS LPF: ABNORMAL /[LPF]
HCT VFR BLD AUTO: 40.5 % (ref 34.8–46.1)
HGB BLD-MCNC: 13.3 G/DL (ref 11.5–15.4)
HGB UR QL STRIP.AUTO: NEGATIVE
HYALINE CASTS #/AREA URNS LPF: ABNORMAL /LPF
IMM GRANULOCYTES # BLD AUTO: 0.04 THOUSAND/UL (ref 0–0.2)
IMM GRANULOCYTES NFR BLD AUTO: 0 % (ref 0–2)
KETONES UR STRIP-MCNC: NEGATIVE MG/DL
LEUKOCYTE ESTERASE UR QL STRIP: ABNORMAL
LYMPHOCYTES # BLD AUTO: 2.05 THOUSANDS/ΜL (ref 0.6–4.47)
LYMPHOCYTES NFR BLD AUTO: 20 % (ref 14–44)
MCH RBC QN AUTO: 29.6 PG (ref 26.8–34.3)
MCHC RBC AUTO-ENTMCNC: 32.8 G/DL (ref 31.4–37.4)
MCV RBC AUTO: 90 FL (ref 82–98)
MONOCYTES # BLD AUTO: 0.69 THOUSAND/ΜL (ref 0.17–1.22)
MONOCYTES NFR BLD AUTO: 7 % (ref 4–12)
MUCOUS THREADS UR QL AUTO: ABNORMAL
NEUTROPHILS # BLD AUTO: 7.36 THOUSANDS/ΜL (ref 1.85–7.62)
NEUTS SEG NFR BLD AUTO: 71 % (ref 43–75)
NITRITE UR QL STRIP: NEGATIVE
NON-SQ EPI CELLS URNS QL MICRO: ABNORMAL /HPF
NRBC BLD AUTO-RTO: 0 /100 WBCS
PH UR STRIP.AUTO: 5.5 [PH]
PLATELET # BLD AUTO: 352 THOUSANDS/UL (ref 149–390)
PMV BLD AUTO: 9.9 FL (ref 8.9–12.7)
POTASSIUM SERPL-SCNC: 3.7 MMOL/L (ref 3.5–5.3)
PROT SERPL-MCNC: 7.1 G/DL (ref 6.4–8.4)
PROT UR STRIP-MCNC: ABNORMAL MG/DL
PROT UR-MCNC: 39.6 MG/DL
PROT/CREAT UR: 0.1 MG/G{CREAT} (ref 0–0.1)
RBC # BLD AUTO: 4.5 MILLION/UL (ref 3.81–5.12)
RBC #/AREA URNS AUTO: ABNORMAL /HPF
SODIUM SERPL-SCNC: 138 MMOL/L (ref 135–147)
SP GR UR STRIP.AUTO: 1.03 (ref 1–1.03)
UROBILINOGEN UR STRIP-ACNC: <2 MG/DL
WBC # BLD AUTO: 10.33 THOUSAND/UL (ref 4.31–10.16)
WBC #/AREA URNS AUTO: ABNORMAL /HPF

## 2024-09-24 PROCEDURE — 99214 OFFICE O/P EST MOD 30 MIN: CPT | Performed by: FAMILY MEDICINE

## 2024-09-24 PROCEDURE — 82570 ASSAY OF URINE CREATININE: CPT

## 2024-09-24 PROCEDURE — 36415 COLL VENOUS BLD VENIPUNCTURE: CPT | Performed by: INTERNAL MEDICINE

## 2024-09-24 PROCEDURE — 84156 ASSAY OF PROTEIN URINE: CPT

## 2024-09-24 PROCEDURE — G2211 COMPLEX E/M VISIT ADD ON: HCPCS | Performed by: FAMILY MEDICINE

## 2024-09-24 PROCEDURE — 86140 C-REACTIVE PROTEIN: CPT | Performed by: INTERNAL MEDICINE

## 2024-09-24 PROCEDURE — 81001 URINALYSIS AUTO W/SCOPE: CPT

## 2024-09-24 PROCEDURE — 86225 DNA ANTIBODY NATIVE: CPT | Performed by: INTERNAL MEDICINE

## 2024-09-24 PROCEDURE — 86160 COMPLEMENT ANTIGEN: CPT | Performed by: INTERNAL MEDICINE

## 2024-09-24 PROCEDURE — 80053 COMPREHEN METABOLIC PANEL: CPT | Performed by: INTERNAL MEDICINE

## 2024-09-24 PROCEDURE — 85652 RBC SED RATE AUTOMATED: CPT | Performed by: INTERNAL MEDICINE

## 2024-09-24 PROCEDURE — 85025 COMPLETE CBC W/AUTO DIFF WBC: CPT | Performed by: INTERNAL MEDICINE

## 2024-09-24 RX ORDER — AZITHROMYCIN 250 MG/1
TABLET, FILM COATED ORAL
Qty: 6 TABLET | Refills: 0 | Status: SHIPPED | OUTPATIENT
Start: 2024-09-24 | End: 2024-09-29

## 2024-09-24 RX ORDER — PREDNISONE 10 MG/1
TABLET ORAL
Qty: 21 TABLET | Refills: 0 | Status: SHIPPED | OUTPATIENT
Start: 2024-09-24

## 2024-09-24 NOTE — PROGRESS NOTES
Ambulatory Visit  Name: Yuni Hall      : 1960      MRN: [id number]  Encounter Provider: Isiah Hale DO  Encounter Date: 2024   Encounter department: [facility] PRIMARY CARE  Chief Complaint   Patient presents with    Cold Like Symptoms     Cough congestion, fever and wheezing. Right ear pain, post nasal drip   Started Thursday      Patient Instructions   Rest and fluids and start abx and dimetapp DM cold and cough prn and may use tylenol prn fever.     Assessment & Plan  Cough, unspecified type    Orders:    azithromycin (Zithromax) 250 mg tablet; Take 2 tablets (500 mg total) by mouth daily for 1 day, THEN 1 tablet (250 mg total) daily for 4 days.    predniSONE 10 mg tablet; Take 60 mg po day#1, 50 mg po day#2, 40 mg po day#3, 30 mg po day#4, 20 mg po day#5m and 10 mg po day#6    PND (post-nasal drip)         Right ear pain    Orders:    azithromycin (Zithromax) 250 mg tablet; Take 2 tablets (500 mg total) by mouth daily for 1 day, THEN 1 tablet (250 mg total) daily for 4 days.    predniSONE 10 mg tablet; Take 60 mg po day#1, 50 mg po day#2, 40 mg po day#3, 30 mg po day#4, 20 mg po day#5m and 10 mg po day#6    Fever, unspecified fever cause         Wheezing    Orders:    predniSONE 10 mg tablet; Take 60 mg po day#1, 50 mg po day#2, 40 mg po day#3, 30 mg po day#4, 20 mg po day#5m and 10 mg po day#6    Essential hypertension         Type 2 diabetes mellitus with hyperglycemia, without long-term current use of insulin ([facility])    Lab Results   Component Value Date    HGBA1C 6.7 (H) 2024            [unfilled]  History of Present Illness     Cold Like Symptoms (Cough congestion, fever and wheezing. Right ear pain, post nasal drip  Started Thursday )          History obtained from : patient  Review of Systems   Constitutional:  Positive for fever.   HENT:  Positive for ear pain (right) and postnasal drip.    Eyes: Negative.    Respiratory:  Positive for cough and wheezing.     Cardiovascular: Negative.    Gastrointestinal: Negative.    Endocrine: Negative.    Genitourinary: Negative.    Musculoskeletal: Negative.    Skin: Negative.    Allergic/Immunologic: Negative.    Neurological: Negative.    Hematological: Negative.    Psychiatric/Behavioral: Negative.       Current Outpatient Medications on File Prior to Visit   Medication Sig Dispense Refill    albuterol (2.5 mg/3 mL) 0.083 % nebulizer solution Take 3 mL (2.5 mg total) by nebulization every 6 (six) hours as needed for wheezing or shortness of breath 75 mL 2    ammonium lactate (LAC-HYDRIN) 5 % lotion       Azelastine HCl 137 MCG/SPRAY SOLN 1 SPRAY PER NOSTRIL IN THE MORNING 90 mL 1    BIOTIN PO Take by mouth      Blood Glucose Monitoring Suppl (ONETOUCH VERIO IQ SYSTEM) w/Device KIT Check BG daily dx E11.9 1 kit 1    Cholecalciferol (Vitamin D) 50 MCG (2000 UT) CAPS       Cholecalciferol 25 MCG (1000 UT) tablet       Cyanocobalamin (Vitamin B-12) 1000 MCG SUBL Place under the tongue      diphenhydrAMINE (BENADRYL) 25 mg capsule PRN      dulaglutide (Trulicity) 0.75 MG/0.5ML injection Inject 0.5 mL (0.75 mg total) under the skin once a week 2 mL 2    DULoxetine (CYMBALTA) 60 mg delayed release capsule TAKE 1 CAPSULE EVERY DAY 90 capsule 3    ergocalciferol (VITAMIN D2) 50,000 units TAKE 1 CAPSULE BY MOUTH ONE TIME PER WEEK 12 capsule 1    gabapentin (NEURONTIN) 100 mg capsule Take 1 capsule (100 mg total) by mouth 2 (two) times a day 180 capsule 3    glucose blood (OneTouch Verio) test strip Use check BG twice daily dx E11.9 100 each 5    glucose blood test strip daily      glucose blood test strip TEST BLOOD SUGAR TWICE A DAY      hydrocortisone 2.5 % cream Apply topically 3 (three) times a day as needed for irritation or rash 30 g 0    hydroxychloroquine (PLAQUENIL) 200 mg tablet Take 1 tablet (200 mg total) by mouth 2 (two) times a day 180 tablet 3    ibuprofen (MOTRIN) 800 mg tablet Take 1 tablet (800 mg total) by mouth every 6  "(six) hours as needed for mild pain 30 tablet 6    Lancets (OneTouch Delica Plus Indjhz39G) MISC Check BG 1x daily DX E11.9 100 each 1    levothyroxine 200 mcg tablet TAKE 1 TABLET EVERY DAY AND TAKE 1 AND 1/2 TABLETS ON MONDAY AND THURSDAY AS DIRECTED 104 tablet 3    lifitegrast (Xiidra) 5 % op solution Twice daily      losartan (COZAAR) 50 mg tablet TAKE 1 TABLET EVERY DAY 90 tablet 1    Magnesium 100 MG CAPS       magnesium oxide (MAG-OX) 400 mg tablet Take 1 tablet (400 mg total) by mouth daily 30 tablet 2    metroNIDAZOLE (METROGEL) 0.75 % gel Apply 1 application. topically 2 (two) times a day      montelukast (SINGULAIR) 10 mg tablet TAKE 1 TABLET AT BEDTIME 90 tablet 1    rosuvastatin (CRESTOR) 5 mg tablet TAKE 1 TABLET EVERY DAY 90 tablet 1    Saline 0.65 % SOLN into each nostril 2 (two) times a day      sodium chloride 0.9 % nebulizer solution Take 3 mL by nebulization as needed for wheezing or shortness of breath 30 mL 0    tiZANidine (ZANAFLEX) 2 mg tablet Take 1 tablet (2 mg total) by mouth every 8 (eight) hours as needed for muscle spasms 270 tablet 3    betamethasone, augmented, (DIPROLENE-AF) 0.05 % cream APPLY TO RASH ON BODY 2 TIMES A DAY FOR 2 WEEKS, THEN ONCE A DAY FOR 2 WEEKS, THEN 2 TIMES A WEEK (Patient not taking: Reported on 11/1/2023)      Diclofenac Sodium (VOLTAREN) 1 % Apply 2 g topically 4 (four) times a day (Patient not taking: Reported on 9/12/2024) 50 g 0    diphenhydrAMINE (BENADRYL) 50 mg capsule  (Patient not taking: Reported on 11/1/2023)      DULoxetine (CYMBALTA) 30 mg delayed release capsule TAKE 1 CAPSULE BY MOUTH DAILY, TO BE TAKEN WITH 60 MG FOR A TOTAL OF 90 MG DAILY. (Patient not taking: Reported on 9/12/2024)      OXYCODONE HCL PO Take by mouth PRN for \"sleep\" (Patient not taking: Reported on 9/12/2024)      predniSONE 5 mg tablet 4 tabs x7 days, then 3 tabs x7 days, then 2 tabs x7 days, then 1 tab x7 days, then stop. (Patient not taking: Reported on 9/12/2024) 70 tablet " "0     No current facility-administered medications on file prior to visit.          Objective     /74 (BP Location: Left arm, Patient Position: Sitting, Cuff Size: Large)   Pulse 81   Temp (!) 96.1 °F (35.6 °C) (Temporal)   Ht 5' 6\" (1.676 m)   Wt 107 kg (235 lb)   SpO2 98%   BMI 37.93 kg/m²     Physical Exam  Constitutional:       Appearance: She is well-developed. She is obese.   HENT:      Head: Normocephalic and atraumatic.      Right Ear: Tympanic membrane, ear canal and external ear normal.      Left Ear: Tympanic membrane, ear canal and external ear normal.      Nose: Congestion present.      Mouth/Throat:      Mouth: Mucous membranes are moist.      Comments: PND  Eyes:      Conjunctiva/sclera: Conjunctivae normal.      Pupils: Pupils are equal, round, and reactive to light.   Cardiovascular:      Rate and Rhythm: Normal rate and regular rhythm.      Pulses: Normal pulses.      Heart sounds: Normal heart sounds.   Pulmonary:      Effort: Pulmonary effort is normal.      Breath sounds: Wheezing present.      Comments: cough  Musculoskeletal:         General: Normal range of motion.      Cervical back: Normal range of motion and neck supple.   Skin:     General: Skin is warm and dry.      Capillary Refill: Capillary refill takes less than 2 seconds.   Neurological:      General: No focal deficit present.      Mental Status: She is alert and oriented to person, place, and time. Mental status is at baseline.      Deep Tendon Reflexes: Reflexes are normal and symmetric.   Psychiatric:         Mood and Affect: Mood normal.         Behavior: Behavior normal.         Thought Content: Thought content normal.         Judgment: Judgment normal.       Administrative Statements   I have spent a total time of 20 minutes in caring for this patient on the day of the visit/encounter including Diagnostic results, Prognosis, Risks and benefits of tx options, Instructions for management, Patient and family " education, Importance of tx compliance, Risk factor reductions, Impressions, Counseling / Coordination of care, Documenting in the medical record, Reviewing / ordering tests, medicine, procedures  , and Obtaining or reviewing history  .

## 2024-09-25 LAB — DSDNA AB SER-ACNC: <1 IU/ML (ref 0–9)

## 2024-09-27 ENCOUNTER — TELEPHONE (OUTPATIENT)
Age: 64
End: 2024-09-27

## 2024-09-27 NOTE — TELEPHONE ENCOUNTER
Pt calls in and states that she was put on a fve day course of prednisone and abx. Since then she has had blood work done and it seems like all of them are of range. She states she has pressure in both ears and slightly dizzy. Pt asking for 's advice

## 2024-09-29 DIAGNOSIS — J30.89 SEASONAL ALLERGIC RHINITIS DUE TO OTHER ALLERGIC TRIGGER: ICD-10-CM

## 2024-09-29 DIAGNOSIS — E78.00 PURE HYPERCHOLESTEROLEMIA: ICD-10-CM

## 2024-09-29 RX ORDER — MONTELUKAST SODIUM 10 MG/1
10 TABLET ORAL
Qty: 90 TABLET | Refills: 1 | Status: SHIPPED | OUTPATIENT
Start: 2024-09-29

## 2024-09-29 RX ORDER — ROSUVASTATIN CALCIUM 5 MG/1
5 TABLET, COATED ORAL DAILY
Qty: 90 TABLET | Refills: 1 | Status: SHIPPED | OUTPATIENT
Start: 2024-09-29

## 2024-09-29 NOTE — TELEPHONE ENCOUNTER
She should hold the prednisone until she's off the antibiotics. Her concurrent infection is likely what's causing the elevated inflammatory markers and elevated white count, so should fully recover before restarting prednisone.

## 2024-09-30 ENCOUNTER — OFFICE VISIT (OUTPATIENT)
Dept: FAMILY MEDICINE CLINIC | Facility: CLINIC | Age: 64
End: 2024-09-30
Payer: MEDICARE

## 2024-09-30 VITALS
SYSTOLIC BLOOD PRESSURE: 124 MMHG | OXYGEN SATURATION: 98 % | BODY MASS INDEX: 38.41 KG/M2 | WEIGHT: 239 LBS | HEIGHT: 66 IN | HEART RATE: 77 BPM | DIASTOLIC BLOOD PRESSURE: 82 MMHG | TEMPERATURE: 98.4 F

## 2024-09-30 DIAGNOSIS — E06.3 HYPOTHYROIDISM DUE TO HASHIMOTO'S THYROIDITIS: ICD-10-CM

## 2024-09-30 DIAGNOSIS — E11.65 TYPE 2 DIABETES MELLITUS WITH HYPERGLYCEMIA, WITHOUT LONG-TERM CURRENT USE OF INSULIN (HCC): ICD-10-CM

## 2024-09-30 DIAGNOSIS — M35.9 CONNECTIVE TISSUE DISEASE (HCC): ICD-10-CM

## 2024-09-30 DIAGNOSIS — Z79.899 HIGH RISK MEDICATION USE: Primary | ICD-10-CM

## 2024-09-30 DIAGNOSIS — Z23 ENCOUNTER FOR IMMUNIZATION: ICD-10-CM

## 2024-09-30 DIAGNOSIS — E78.1 HYPERTRIGLYCERIDEMIA: ICD-10-CM

## 2024-09-30 DIAGNOSIS — M35.9 UNDIFFERENTIATED CONNECTIVE TISSUE DISEASE (HCC): Primary | ICD-10-CM

## 2024-09-30 DIAGNOSIS — I10 ESSENTIAL HYPERTENSION: ICD-10-CM

## 2024-09-30 PROCEDURE — G0008 ADMIN INFLUENZA VIRUS VAC: HCPCS

## 2024-09-30 PROCEDURE — 99214 OFFICE O/P EST MOD 30 MIN: CPT | Performed by: FAMILY MEDICINE

## 2024-09-30 PROCEDURE — 90662 IIV NO PRSV INCREASED AG IM: CPT

## 2024-09-30 RX ORDER — OXYCODONE HYDROCHLORIDE 5 MG/1
5 TABLET ORAL EVERY 4 HOURS PRN
Qty: 5 TABLET | Refills: 0 | Status: SHIPPED | OUTPATIENT
Start: 2024-09-30

## 2024-09-30 RX ORDER — OXYCODONE HYDROCHLORIDE 5 MG/1
5 CAPSULE ORAL EVERY 6 HOURS PRN
Qty: 6 CAPSULE | Refills: 0 | Status: CANCELLED | OUTPATIENT
Start: 2024-09-30

## 2024-09-30 NOTE — ASSESSMENT & PLAN NOTE
Patient to have her previously ordered blood work completed, continue levothyroxine 200 mcg daily

## 2024-09-30 NOTE — ASSESSMENT & PLAN NOTE
She will have her blood work done, continue diabetes regimen.  Work on diet and exercise  Lab Results   Component Value Date    HGBA1C 6.7 (H) 03/07/2024

## 2024-09-30 NOTE — PROGRESS NOTES
Ambulatory Visit  Name: Yuni Hall      : 1960      MRN: [id number]  Encounter Provider: Ulises Wilson MD  Encounter Date: 2024   Encounter department: [facility] PRIMARY CARE    Assessment & Plan  Essential hypertension  Her blood pressure is stable today 124/82, continue losartan 50 mg daily       Hypothyroidism due to Hashimoto's thyroiditis  Patient to have her previously ordered blood work completed, continue levothyroxine 200 mcg daily       Type 2 diabetes mellitus with hyperglycemia, without long-term current use of insulin (HCC)  She will have her blood work done, continue diabetes regimen.  Work on diet and exercise  Lab Results   Component Value Date    HGBA1C 6.7 (H) 2024            Hypertriglyceridemia  Continue Crestor 5 mg to work on diet and exercise.  Recheck labs       Connective tissue disease (HCC)  Controlled substance agreement signed today.  Check urine drug screen.  Patient uses about 5 tablets of oxycodone a year.  Refill sent.  Orders:    oxyCODONE (Roxicodone) 5 immediate release tablet; Take 1 tablet (5 mg total) by mouth every 4 (four) hours as needed for moderate pain or severe pain Max Daily Amount: 30 mg    Encounter for immunization  Flu shot administered today  Orders:    influenza vaccine, high-dose, PF 0.5 mL (Fluzone High Dose)    High risk medication use    Orders:    [facility] All Prescribed Meds and Special Instructions    Amphetamines, Methamphetamines    Butalbital    Phenobarbital    Secobarbital    Alprazolam    Clonazepam    Diazepam    Lorazepam    Gabapentin    Pregabalin    Cocaine    Heroin    Buprenorphine    Levorphanol    Meperidine    Naltrexone    Fentanyl    Methadone    Oxycodone    Tapentadol    THC    Tramadol    Codeine, Hydrocodone, Hydropmorphone, Morphine    Bath Salts    Ethyl Glucuronide/Ethyl Sulfate    Kratom    Spice    Methylphenidate    Phentermine    Validity Oxidant    Validity Creatinine    Validity  "pH    Validity Specific    Xylazine Definitive Test       History of Present Illness     She presents today for follow-up on hypertension, hypothyroidism, diabetes, hypertriglyceridemia.  Overall states that she is doing well.  Requesting refill of her oxycodone, seldomly takes this medication.  Follows up with rheumatology.  Yet to have her blood work done that was previously ordered          Review of Systems   Constitutional:  Negative for activity change, appetite change, chills, fatigue and fever.   HENT:  Negative for congestion, rhinorrhea, sneezing and sore throat.    Eyes:  Negative for pain, discharge, redness and itching.   Respiratory:  Negative for cough, chest tightness, shortness of breath and wheezing.    Cardiovascular:  Negative for chest pain and palpitations.   Gastrointestinal:  Negative for abdominal distention, abdominal pain, constipation, diarrhea, nausea and vomiting.   Musculoskeletal:  Positive for myalgias. Negative for arthralgias, back pain and joint swelling.   Skin:  Negative for rash.   Neurological:  Negative for dizziness, weakness, numbness and headaches.   Hematological:  Negative for adenopathy.   Psychiatric/Behavioral:  Negative for confusion.    All other systems reviewed and are negative.          Objective     /82   Pulse 77   Temp 98.4 °F (36.9 °C)   Ht 5' 6\" (1.676 m)   Wt 108 kg (239 lb)   SpO2 98%   BMI 38.58 kg/m²     Physical Exam  Vitals reviewed.   Constitutional:       General: She is not in acute distress.     Appearance: Normal appearance. She is well-developed. She is not ill-appearing or toxic-appearing.   HENT:      Head: Normocephalic and atraumatic.      Right Ear: External ear normal.      Left Ear: External ear normal.      Nose: Nose normal. No congestion or rhinorrhea.      Mouth/Throat:      Mouth: Mucous membranes are moist.   Eyes:      General: No scleral icterus.        Right eye: No discharge.         Left eye: No discharge.      " Extraocular Movements: Extraocular movements intact.      Conjunctiva/sclera: Conjunctivae normal.      Pupils: Pupils are equal, round, and reactive to light.   Cardiovascular:      Rate and Rhythm: Normal rate and regular rhythm.      Pulses: Normal pulses.      Heart sounds: Normal heart sounds. No murmur heard.  Pulmonary:      Effort: Pulmonary effort is normal. No respiratory distress.      Breath sounds: Normal breath sounds.   Abdominal:      General: Abdomen is flat. There is no distension.      Palpations: Abdomen is soft. There is no mass.      Tenderness: There is no abdominal tenderness.      Hernia: No hernia is present.   Musculoskeletal:         General: No swelling, tenderness, deformity or signs of injury. Normal range of motion.      Cervical back: Normal range of motion.   Skin:     General: Skin is warm and dry.      Findings: No bruising, erythema, lesion or rash.   Neurological:      General: No focal deficit present.      Mental Status: She is alert.      Motor: No weakness.      Gait: Gait normal.   Psychiatric:         Mood and Affect: Mood normal.         Behavior: Behavior normal.

## 2024-09-30 NOTE — TELEPHONE ENCOUNTER
Spoke with pt and she finished the antibiotic yesterday and she has a follow up appt with him today. She isn't sure if she is going to be on it longer or not. She hasn't been taken the Prednisone at the moment. She will wait to see how her appt goes today before starting.      She just wanted if any labs were needed to be repeated

## 2024-09-30 NOTE — ASSESSMENT & PLAN NOTE
Controlled substance agreement signed today.  Check urine drug screen.  Patient uses about 5 tablets of oxycodone a year.  Refill sent.  Orders:    oxyCODONE (Roxicodone) 5 immediate release tablet; Take 1 tablet (5 mg total) by mouth every 4 (four) hours as needed for moderate pain or severe pain Max Daily Amount: 30 mg

## 2024-10-02 ENCOUNTER — APPOINTMENT (OUTPATIENT)
Facility: REHABILITATION | Age: 64
End: 2024-10-02
Payer: MEDICARE

## 2024-10-02 ENCOUNTER — TELEPHONE (OUTPATIENT)
Age: 64
End: 2024-10-02

## 2024-10-02 LAB

## 2024-10-02 NOTE — TELEPHONE ENCOUNTER
Patient stated she has been using the flonase and cough medicine since initial visit and has had no improvement cough is progressively getting worse. Appt scheduled for re evaluation.
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
## 2024-10-02 NOTE — TELEPHONE ENCOUNTER
Patient called in regards to still having a sore throat, feels like she is burning up at periods of time, head hurts, and ears are still ringing due to pressure. Patient was seen on 9/24 for issue, patient would like to know what should she do since she is not getting any better.

## 2024-10-03 ENCOUNTER — TELEPHONE (OUTPATIENT)
Dept: NEUROLOGY | Facility: CLINIC | Age: 64
End: 2024-10-03

## 2024-10-03 ENCOUNTER — TELEMEDICINE (OUTPATIENT)
Dept: NEUROLOGY | Facility: CLINIC | Age: 64
End: 2024-10-03
Payer: MEDICARE

## 2024-10-03 ENCOUNTER — OFFICE VISIT (OUTPATIENT)
Dept: FAMILY MEDICINE CLINIC | Facility: CLINIC | Age: 64
End: 2024-10-03
Payer: MEDICARE

## 2024-10-03 ENCOUNTER — APPOINTMENT (OUTPATIENT)
Dept: RADIOLOGY | Facility: CLINIC | Age: 64
End: 2024-10-03
Payer: MEDICARE

## 2024-10-03 VITALS
HEIGHT: 66 IN | TEMPERATURE: 96.3 F | BODY MASS INDEX: 38.09 KG/M2 | SYSTOLIC BLOOD PRESSURE: 138 MMHG | HEART RATE: 92 BPM | OXYGEN SATURATION: 99 % | RESPIRATION RATE: 16 BRPM | DIASTOLIC BLOOD PRESSURE: 80 MMHG | WEIGHT: 237 LBS

## 2024-10-03 DIAGNOSIS — R05.2 SUBACUTE COUGH: ICD-10-CM

## 2024-10-03 DIAGNOSIS — G70.9 NEUROMUSCULAR DISORDER (HCC): Primary | ICD-10-CM

## 2024-10-03 DIAGNOSIS — D35.2 PITUITARY MICROADENOMA (HCC): ICD-10-CM

## 2024-10-03 DIAGNOSIS — R05.2 SUBACUTE COUGH: Primary | ICD-10-CM

## 2024-10-03 PROCEDURE — G2211 COMPLEX E/M VISIT ADD ON: HCPCS | Performed by: FAMILY MEDICINE

## 2024-10-03 PROCEDURE — 71046 X-RAY EXAM CHEST 2 VIEWS: CPT

## 2024-10-03 PROCEDURE — 99214 OFFICE O/P EST MOD 30 MIN: CPT | Performed by: NURSE PRACTITIONER

## 2024-10-03 PROCEDURE — 99213 OFFICE O/P EST LOW 20 MIN: CPT | Performed by: FAMILY MEDICINE

## 2024-10-03 RX ORDER — FLUTICASONE PROPIONATE 50 MCG
1 SPRAY, SUSPENSION (ML) NASAL DAILY
COMMUNITY

## 2024-10-03 NOTE — TELEPHONE ENCOUNTER
Called patient to get her 1 year F/U scheduled with Dr Morton, patient was unable to answer the phone, I left the patient at  with the call back number.
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
## 2024-10-03 NOTE — ASSESSMENT & PLAN NOTE
Most recent MRI brain was stable, appears this has been stable for several years.  We did report if she has any increasing headaches or vision changes should contact office.  Further repeat imaging is optional.  She has had workup from endocrine.

## 2024-10-03 NOTE — ASSESSMENT & PLAN NOTE
Patient with histories of muscle cramping along with numbness and tingling in the feet and myalgias for over 20 years.  In the past she has had extensive testing including EMG, muscle biopsy, genetic testing which have all been unremarkable.  She does follow with rheumatology for mixed connective tissue disorder and is currently on hydroxychloroquine, tizanidine, gabapentin, and Cymbalta.    Exam today was limited given virtual platform however she reports her symptoms are overall stable since last year and does not note any progressive weakness.    Given her stability she will continue her current medications and follow-up with rheumatology on a regular basis.    Plan for yearly follow-up. To contact the office sooner with any concerns or worsening symptoms.

## 2024-10-03 NOTE — PROGRESS NOTES
"Ambulatory Visit  Name: Yuni Hall      : 1960      MRN: [id number]  Encounter Provider: Ulises Wilson MD  Encounter Date: 10/3/2024   Encounter department: [facility] PRIMARY CARE    Assessment & Plan  Subacute cough  Rapid COVID and flu negative in office.  Check chest x-ray, also start Augmentin.  Increase p.o. hydration.  Continue Flonase.  Tylenol as needed.  ER precautions of any worsening.  RTC as needed  Orders:    amoxicillin-clavulanate (AUGMENTIN) 875-125 mg per tablet; Take 1 tablet by mouth every 12 (twelve) hours for 10 days    XR chest pa and lateral; Future       History of Present Illness     Presents today with ongoing cough, ear pain, sore throat.  States that she now has sweats.  Denies any fevers.  Was already on a course of antibiotics as well as prednisone with no improvement.          Review of Systems   Constitutional:  Positive for chills and diaphoresis. Negative for activity change, appetite change, fatigue and fever.   HENT:  Positive for congestion and sore throat. Negative for rhinorrhea and sneezing.    Eyes:  Negative for pain, discharge, redness and itching.   Respiratory:  Positive for cough. Negative for chest tightness, shortness of breath and wheezing.    Cardiovascular:  Negative for chest pain and palpitations.   Gastrointestinal:  Negative for abdominal distention, abdominal pain, constipation, diarrhea, nausea and vomiting.   Musculoskeletal:  Negative for arthralgias, back pain, joint swelling and myalgias.   Skin:  Negative for rash.   Neurological:  Negative for dizziness, weakness, numbness and headaches.   Hematological:  Negative for adenopathy.   Psychiatric/Behavioral:  Negative for confusion and dysphoric mood.    All other systems reviewed and are negative.          Objective     /80   Pulse 92   Temp (!) 96.3 °F (35.7 °C) (Temporal)   Resp 16   Ht 5' 6\" (1.676 m)   Wt 108 kg (237 lb)   SpO2 99%   BMI 38.25 kg/m² "     Physical Exam  Vitals reviewed.   Constitutional:       General: She is not in acute distress.     Appearance: Normal appearance. She is well-developed. She is not ill-appearing or toxic-appearing.   HENT:      Head: Normocephalic and atraumatic.      Right Ear: Tympanic membrane, ear canal and external ear normal. There is no impacted cerumen.      Left Ear: Tympanic membrane, ear canal and external ear normal. There is no impacted cerumen.      Nose: Nose normal. No congestion or rhinorrhea.      Mouth/Throat:      Mouth: Mucous membranes are moist.      Pharynx: Oropharynx is clear. No oropharyngeal exudate.   Eyes:      General: No scleral icterus.        Right eye: No discharge.         Left eye: No discharge.      Extraocular Movements: Extraocular movements intact.      Conjunctiva/sclera: Conjunctivae normal.      Pupils: Pupils are equal, round, and reactive to light.   Cardiovascular:      Rate and Rhythm: Normal rate and regular rhythm.      Pulses: Normal pulses.      Heart sounds: Normal heart sounds. No murmur heard.  Pulmonary:      Effort: Pulmonary effort is normal. No respiratory distress.      Breath sounds: Normal breath sounds.   Abdominal:      General: Abdomen is flat. There is no distension.      Palpations: Abdomen is soft.      Tenderness: There is no abdominal tenderness.   Musculoskeletal:         General: No tenderness. Normal range of motion.      Cervical back: Normal range of motion.   Skin:     General: Skin is warm and dry.      Findings: No rash.   Neurological:      General: No focal deficit present.      Mental Status: She is alert.      Motor: No weakness.      Gait: Gait normal.   Psychiatric:         Mood and Affect: Mood normal.         Behavior: Behavior normal.

## 2024-10-04 ENCOUNTER — TELEPHONE (OUTPATIENT)
Dept: FAMILY MEDICINE CLINIC | Facility: CLINIC | Age: 64
End: 2024-10-04

## 2024-10-04 NOTE — TELEPHONE ENCOUNTER
----- Message from Ulises Wilson MD sent at 10/3/2024  5:06 PM EDT -----  Please call patient with normal chest x-ray

## 2024-10-09 ENCOUNTER — OFFICE VISIT (OUTPATIENT)
Facility: REHABILITATION | Age: 64
End: 2024-10-09
Payer: MEDICARE
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
## 2024-10-09 PROCEDURE — 97110 THERAPEUTIC EXERCISES: CPT | Performed by: PHYSICAL THERAPIST

## 2024-10-09 NOTE — PROGRESS NOTES
"PHYSICAL THERAPY TREATMENT / MAINTENANCE   Date: 10/09/24  Name: Yuni Hall  : 1960  Referring Provider: Arnie Cadena DO  AUTHORIZATION:   Insurance: Payor: MEDICARE / Plan: MEDICARE A AND B / Product Type: Medicare A & B Fee for Service /     PATIENT HISTORY:  HPI: Yuni Hall is a 64 y.o. female referred to outpatient physical therapy for the following diagnosis   Encounter Diagnosis   Name Primary?    Fall, subsequent encounter Yes       Sick for a bit with bad sinus issues, took antibiotic.    Was on prednisone for 5 days.  Doing pretty okay now, once in a while will get a cramp.  Has not been using cross country skiing machine. Was having difficulty moving without getting sick for a couple weeks.  Yesterday was able to go to grocery store.  Still getting a bit dizzy with moving faster.      Patient goals:   -getting back to the pool    Patient-Specific Functional Scale   Task is scored 0 (unable to perform activity) to 10 (ability to perform activity independently)    Activity 7/19/23 9/20/23 10/18/23 11/22/23 2/28/24 3/27/24   Walk at normal pace without a cane Walks without cane Walks without cane     Walks without cane, moving slower. Walks without cane   2.   Grocery shop without significant pain Did okay last time grocery shopping, hurt the next day.    Doing well here, sweaty at times and has to stop, but it's gotten better Hasn't been able to grocery shop in a while, limited by weather. Does okay while moving but pain with static standing at check-out   3.   Dance         4.   Move without pain and to not get tired as frequently              PHYSICAL EXAM / TREATMENT    Precautions - Hashimoto's, autoimmune disease    Mobility Measures 10/09/24 9/04/24 7/23/24 7/02/24 6/05/24 5/1/24 3/27/24 2/28/24 1/31/24 8/23/23 9/20/23 10/18/23 11/22/23   Assistive device used? none none none none none none none None none none none none none   5 Time Sit to Stand  (17\" chair, arms across chest) " "23.1 seconds   18\" chair   18.4 sec  18\" chair arms out front 29 sec.    19 sec. With arms out in front  21.6 seconds  From 18\" surface  Arms out front Unable from 18\" surface today    19.5 seconds from 20\" surface   16.4 seconds arms out front  Arms out front  24 seconds Arms out front   A couple extra attempts  29 second- 52 seconds 31 seconds Arms out front  24 seconds  Pain in left knee   19 seconds  From 20\" surface, arms across chest    28 seconds from 18\" surface 35 seconds arms out front    15 seconds 20\" surface  Arms out front     3 Meter Timed  Up & Go 9.3 sec 8.0 sec  12.3 sec 13.3 sec 9.2 sec 10.5 seconds 12.3 seconds 9.8 sec 10.6 sec   11.8 sec  Mild antalgic gait on left   10.1 seconds 9.6 seconds   Walking speed                Functional Gait Assessment (see below)  19/30 16/30 17/30 21/30 17/30 21/30 20/30 20/30   6 Minute Walk Test (100 foot circular course)   770 feet   No AD 1012 feet  No AD   800 feet no AD   940 feet no  feet 908 feet  No AD  103 bpm 1005 feet no  feet no  feet on treadmill inferred from self-selected pace 1.2 mph  875 feet no AD  Tired in legs  98 bpm   925 feet no AD   Patient-Reported Outcome Measure: Activities-Specific Balance Confidence Scale (ABC Scale)                Right knee flexion PROM, sitting 93 degrees 97 degrees    98 degrees   92 degrees 94 degrees 93 degrees 95 degrees                        Precautions - none    Specialty Daily Treatment Diary   Exercise Diary  10/09/24 9/18/24 6/04/24 8/21/24 7/31/24 7/23/24 7/02/24 6/19/24 6/05/24 5/22/24   Static and dynamic balance (neuromuscular re-education)             Strengthening & endurance  (therapeutic exercise) Biodex treadmill   0.9 mph 2 min  1.2 mph 1 min  0.9 mph 1 min  1.2 mph 1 min  0.9 mph 1 min  1.2 mph 1 min  0.9 mph 1 min    Shoulder ER banded red band  15  Shoulder elevation with external rotation red band 10  Sleeper stretch R arm 2.5 min     Biodex treadmill   1.1 mph 12 " "min    Right elbow flexion, 3 lbs 15 x 2 sets    Sit to stand 18\" surface  10  16.5\" surface  5 x 3 sets    Standing heel raise, 10 x 2 sets right  5-10 left x 2 sets    Step up R leading about 10   Lateral, 10     Fast timed walking 100 feet  31 sec  30 sec    SciFit level   Legs only   12 min  About 90 spm       Thoracic rotation stretches  Seated stretch using edge of chair, 20-30 sec x 2-3 each    Supine trunk rotation stretch, 30 sec each     Modified plantigrade standing hands on plinth, thread the needle trunk rotation stretch  20 sec x 2 each    Sit to stand 16.5\" surface 5-10 x 2 sets  Standing heel raise, about 5-10 each x 2 sets  Single leg    Lateral step to 6\" step 4-10 each x 2 sets  Standing hip abduction, green band, about 10 each x 2 sets    SciFit level 2.5 11 min about 110 spm   Treadmill 0.8 mph - 1.2 mph 12 min total    Heel raise, incline  10 x 2 sets  3-4 single leg each    Step up 6\" step  About 10 each without use of hands,   1 hand laterally 10 each    Sit to stand 20\" surface, 10  19\" surface, 10    Demonstrates R ulnar nerve stretching / flossing    High stepping 1 min  Braiding walking 1 min     Treadmill to 8 min   1.0 mph - 1.2 mph     Sit to stand no hands 18\" chair    Up and down 4\" stair steps x 3 about 5 times  Same 6\" stair steps x 2-3 times, focus to keep toes forwards    Standing knee extension and seated knee flexion and gastroc stretches 3 min total    Fast timed walking best about 35 sec per 100 feet lap x 3 sets    Seated R ulnar nerve stretching/ flossing 2 min               Treadmill  Level 1 - 2 min.  Level 1.4 - 6 min.  Level 0.8 - 1 min.  Total distance: 0.19 mi.      Sit to Stand, with arms out in front   5 x2      Standing heel raises  20sec. X 2     Braided walking, slow in parallel bars  - reports ankle pain (R>L)      Hip abduction monster walks / lateral walking with slight knee bend due to limited R knee tolerance    Ulnar nerve glides  Hold to tolerance (about " "2-5 sec.), rest repeat     Supine bridges   5x 2    Supine quad stretch off table  R side    SciFit level 2.1 about 100 spm  5 min    Testing above    R leading step up 6\" step, about 1 min, 2 rails x 2 sets    Standing heel raise, increased right weight bearing, 30 x 2 sets    Sit to stand 20\" surface   5 x 3 sets    Braiding walking, slow, 40 sec x 2  Can cross midline    40 lbs right  Barry position 2  58 lbs left   Gentle gripping towel about 1 min  Wrist rolls into flexion, 2 lbs,          SciFit level 1 about 80 spm 5 min     Overground walking 100 feet timed  37 - 44 sec x 4 sets    Standing knee stretches reaching back to heel or foot on step    Heel raise single leg heel raise about 10 each x 2 sets    5 times sit to stand   17\" surface  15.5 sec  + 7 lb ball out front 20.0 sec  + 7 lb ball against body    Standing with foot on floor slider, hip circles, leg raise, 1.5 min  Standing hip abduction, red band 1.5 min   Outcome measures    Standing knee extension stretch from seated knee extension/hamstring stretch 1 min  Standing heel raises double towards single leg 1.5 min  SciFit level 1, 10 min legs only   Treadmill 1.6 mph 5 min    Heel raise single leg 5-7 reps each x 2 sets  Step ups 6\" step nearby railing  About 10 x 2 sets each  Sit to stand about 10 x 2 sets standard 18\" chair    Braiding walking in parallel bars, 1 min x 2 sets    SciFit level 1,  spm, 10 min                                 Right shoulder has been okay   Right medial elbow pain.  Tender to palpation. Started yesterday.  No pain with elbow flexion/extension, pronation, supination, right wrist flexion/extension    Right shoulder pain with shoulder horizontal abduction elevation and ER    ASSESSMENT   Decline in mobility given sinus issue and 2 weeks of illness.  Better shoulder mobility following sleeper stretch, less pain.    Recommend continued physical therapy to improve mobility .    New maintenance goals:  1. Walk " 15 minutes consecutively for community ambulation and exercise.  MET but not able every day or sometimes consecutive days  NOT MET IN PAST 2 WEEKS  2. Manages 150 minutes of moderate intensity aerobic exercise per week.  MET with variability in chosen exercise.  .NOT MET IN PAST 2 WEEKS.  3. Maintains at least 1000 feet during 6 minute walk test, at most 15 seconds 5 times sit to stand.  NOT MET for 6 minute walk test, NOT MET for 5 times sit to stand.    PLAN OF CARE:  Patient will benefit from physical therapy 1x/week to 1x/ 1-2 weeks for 2 months  Neuromuscular re-education, therapeutic exercises, and therapeutic activities as outlined in grids.    Jericho Garrett, PT  10/9/2024

## 2024-10-15 ENCOUNTER — OFFICE VISIT (OUTPATIENT)
Facility: REHABILITATION | Age: 64
End: 2024-10-15
Payer: MEDICARE
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
## 2024-10-15 PROCEDURE — 97110 THERAPEUTIC EXERCISES: CPT | Performed by: PHYSICAL THERAPIST

## 2024-10-15 NOTE — PROGRESS NOTES
PHYSICAL THERAPY TREATMENT / MAINTENANCE   Date: 10/15/24  Name: Yuni Hall  : 1960  Referring Provider: Arnie Cadena DO  AUTHORIZATION:   Insurance: Payor: MEDICARE / Plan: MEDICARE A AND B / Product Type: Medicare A & B Fee for Service /     PATIENT HISTORY:  HPI: Yuni Hall is a 64 y.o. female referred to outpatient physical therapy for the following diagnosis   Encounter Diagnosis   Name Primary?    Fall, subsequent encounter Yes       Sometimes right calf/Achilles gets tight and then feels tightness or like it'd be overly exerted about the lateral left knee.    Shoulder still a little off.    Patient goals:   -getting back to the pool    Patient-Specific Functional Scale   Task is scored 0 (unable to perform activity) to 10 (ability to perform activity independently)    Activity 7/19/23 9/20/23 10/18/23 11/22/23 2/28/24 3/27/24 10/15/24   Walk at normal pace without a cane Walks without cane Walks without cane     Walks without cane, moving slower. Walks without cane Walks without cane  __  Slow if just getting up from sitting, harder with prolonged walking     2.   Grocery shop without significant pain Did okay last time grocery shopping, hurt the next day.    Doing well here, sweaty at times and has to stop, but it's gotten better Hasn't been able to grocery shop in a while, limited by weather. Does okay while moving but pain with static standing at check-out Doing okay with grocery shopping, variable, some days can walk all around and look at things, other days must go in with a purpose     3.   Dance          4.   Move without pain and to not get tired as frequently               PHYSICAL EXAM / TREATMENT    Precautions - Hashimoto's, autoimmune disease    Mobility Measures 10/09/24 9/04/24 7/23/24 7/02/24 6/05/24 5/1/24 3/27/24 2/28/24 1/31/24 8/23/23 9/20/23 10/18/23 11/22/23   Assistive device used? none none none none none none none None none none none none none   5 Time Sit to  "Stand  (17\" chair, arms across chest) 23.1 seconds   18\" chair   18.4 sec  18\" chair arms out front 29 sec.    19 sec. With arms out in front  21.6 seconds  From 18\" surface  Arms out front Unable from 18\" surface today    19.5 seconds from 20\" surface   16.4 seconds arms out front  Arms out front  24 seconds Arms out front   A couple extra attempts  29 second- 52 seconds 31 seconds Arms out front  24 seconds  Pain in left knee   19 seconds  From 20\" surface, arms across chest    28 seconds from 18\" surface 35 seconds arms out front    15 seconds 20\" surface  Arms out front     3 Meter Timed  Up & Go 9.3 sec 8.0 sec  12.3 sec 13.3 sec 9.2 sec 10.5 seconds 12.3 seconds 9.8 sec 10.6 sec   11.8 sec  Mild antalgic gait on left   10.1 seconds 9.6 seconds   Walking speed                Functional Gait Assessment (see below)  19/30 16/30 17/30 21/30 17/30 21/30 20/30 20/30   6 Minute Walk Test (100 foot circular course)   770 feet   No AD 1012 feet  No AD   800 feet no AD   940 feet no  feet 908 feet  No AD  103 bpm 1005 feet no  feet no  feet on treadmill inferred from self-selected pace 1.2 mph  875 feet no AD  Tired in legs  98 bpm   925 feet no AD   Patient-Reported Outcome Measure: Activities-Specific Balance Confidence Scale (ABC Scale)                Right knee flexion PROM, sitting 93 degrees 97 degrees    98 degrees   92 degrees 94 degrees 93 degrees 95 degrees                        Precautions - none    Specialty Daily Treatment Diary   Exercise Diary  10/24/24 10/09/24 9/18/24 6/04/24 8/21/24 7/31/24 7/23/24 7/02/24 6/19/24 6/05/24 5/22/24   Static and dynamic balance (neuromuscular re-education)              Strengthening & endurance  (therapeutic exercise) Biodex treadmill   1.0 mph 2 min  1.2 mph 6 min 13-14/20 RPE    Sleeper stretch R arm  2 min    Left UT stretch    Banded L shoulder ER 10 x 2 sets    Lateral step to 6\" step  10 each x 2 sets  Standing knee flexion AROM 30 sec " "each  Heel raise single leg    Fast timed walking 100 feet  32 sec  30 sec  28 sec    Scifit level 2.5 - 2.1   8 min   Biodex treadmill   0.9 mph 2 min  1.2 mph 1 min  0.9 mph 1 min  1.2 mph 1 min  0.9 mph 1 min  1.2 mph 1 min  0.9 mph 1 min    Shoulder ER banded red band  15  Shoulder elevation with external rotation red band 10  Sleeper stretch R arm 2.5 min     Biodex treadmill   1.1 mph 12 min    Right elbow flexion, 3 lbs 15 x 2 sets    Sit to stand 18\" surface  10  16.5\" surface  5 x 3 sets    Standing heel raise, 10 x 2 sets right  5-10 left x 2 sets    Step up R leading about 10   Lateral, 10     Fast timed walking 100 feet  31 sec  30 sec    SciFit level   Legs only   12 min  About 90 spm       Thoracic rotation stretches  Seated stretch using edge of chair, 20-30 sec x 2-3 each    Supine trunk rotation stretch, 30 sec each     Modified plantigrade standing hands on plinth, thread the needle trunk rotation stretch  20 sec x 2 each    Sit to stand 16.5\" surface 5-10 x 2 sets  Standing heel raise, about 5-10 each x 2 sets  Single leg    Lateral step to 6\" step 4-10 each x 2 sets  Standing hip abduction, green band, about 10 each x 2 sets    SciFit level 2.5 11 min about 110 spm   Treadmill 0.8 mph - 1.2 mph 12 min total    Heel raise, incline  10 x 2 sets  3-4 single leg each    Step up 6\" step  About 10 each without use of hands,   1 hand laterally 10 each    Sit to stand 20\" surface, 10  19\" surface, 10    Demonstrates R ulnar nerve stretching / flossing    High stepping 1 min  Braiding walking 1 min     Treadmill to 8 min   1.0 mph - 1.2 mph     Sit to stand no hands 18\" chair    Up and down 4\" stair steps x 3 about 5 times  Same 6\" stair steps x 2-3 times, focus to keep toes forwards    Standing knee extension and seated knee flexion and gastroc stretches 3 min total    Fast timed walking best about 35 sec per 100 feet lap x 3 sets    Seated R ulnar nerve stretching/ flossing 2 min               " "Treadmill  Level 1 - 2 min.  Level 1.4 - 6 min.  Level 0.8 - 1 min.  Total distance: 0.19 mi.      Sit to Stand, with arms out in front   5 x2      Standing heel raises  20sec. X 2     Braided walking, slow in parallel bars  - reports ankle pain (R>L)      Hip abduction monster walks / lateral walking with slight knee bend due to limited R knee tolerance    Ulnar nerve glides  Hold to tolerance (about 2-5 sec.), rest repeat     Supine bridges   5x 2    Supine quad stretch off table  R side    SciFit level 2.1 about 100 spm  5 min    Testing above    R leading step up 6\" step, about 1 min, 2 rails x 2 sets    Standing heel raise, increased right weight bearing, 30 x 2 sets    Sit to stand 20\" surface   5 x 3 sets    Braiding walking, slow, 40 sec x 2  Can cross midline    40 lbs right  Barry position 2  58 lbs left   Gentle gripping towel about 1 min  Wrist rolls into flexion, 2 lbs,          SciFit level 1 about 80 spm 5 min     Overground walking 100 feet timed  37 - 44 sec x 4 sets    Standing knee stretches reaching back to heel or foot on step    Heel raise single leg heel raise about 10 each x 2 sets    5 times sit to stand   17\" surface  15.5 sec  + 7 lb ball out front 20.0 sec  + 7 lb ball against body    Standing with foot on floor slider, hip circles, leg raise, 1.5 min  Standing hip abduction, red band 1.5 min   Outcome measures    Standing knee extension stretch from seated knee extension/hamstring stretch 1 min  Standing heel raises double towards single leg 1.5 min  SciFit level 1, 10 min legs only   Treadmill 1.6 mph 5 min    Heel raise single leg 5-7 reps each x 2 sets  Step ups 6\" step nearby railing  About 10 x 2 sets each  Sit to stand about 10 x 2 sets standard 18\" chair    Braiding walking in parallel bars, 1 min x 2 sets    SciFit level 1,  spm, 10 min                                   Right shoulder has been okay   Right medial elbow pain.  Tender to palpation. Started " yesterday.  No pain with elbow flexion/extension, pronation, supination, right wrist flexion/extension    Right shoulder pain with shoulder horizontal abduction elevation and ER    ASSESSMENT   Had declined in mobility given sinus issue and 2 weeks of illness.  Now again moving faster.  Better shoulder mobility again following sleeper stretch, less pain following this stretching.  Return towards prior exercise routines as able, including walking for exercise and exercise videos.      Recommend continued physical therapy to improve mobility .    New maintenance goals:  1. Walk 15 minutes consecutively for community ambulation and exercise.  MET but not able every day or sometimes consecutive days  NOT MET IN PAST 2 WEEKS  2. Manages 150 minutes of moderate intensity aerobic exercise per week.  MET with variability in chosen exercise.  .NOT MET IN PAST 2 WEEKS.  3. Maintains at least 1000 feet during 6 minute walk test, at most 15 seconds 5 times sit to stand.  NOT MET for 6 minute walk test, NOT MET for 5 times sit to stand.    PLAN OF CARE:  Patient will benefit from physical therapy 1x/week to 1x/ 1-2 weeks for 2 months  Neuromuscular re-education, therapeutic exercises, and therapeutic activities as outlined in grids.    Jericho Garrett, PT  10/15/2024

## 2024-10-29 ENCOUNTER — OFFICE VISIT (OUTPATIENT)
Facility: REHABILITATION | Age: 64
End: 2024-10-29
Payer: MEDICARE
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
## 2024-10-29 PROCEDURE — 97110 THERAPEUTIC EXERCISES: CPT | Performed by: PHYSICAL THERAPIST

## 2024-10-29 NOTE — PROGRESS NOTES
PHYSICAL THERAPY TREATMENT / MAINTENANCE   Date: 10/29/24  Name: Yuni Hall  : 1960  Referring Provider: Arnie Cadena DO  AUTHORIZATION:   Insurance: Payor: MEDICARE / Plan: MEDICARE A AND B / Product Type: Medicare A & B Fee for Service /     PATIENT HISTORY:  HPI: Yuni Hall is a 64 y.o. female referred to outpatient physical therapy for the following diagnosis   Encounter Diagnosis   Name Primary?    Fall, subsequent encounter Yes       Did a lot of walking at festival/shopping , goes shopping today and taking neighbor to doctor, doing some shopping after that.    Shoulder is off and on, good with shoulder elevation demonstrates [place], prevented some exercises with left arm, pushing tasks bother the right elbow.     Patient goals:   -getting back to the pool    Patient-Specific Functional Scale   Task is scored 0 (unable to perform activity) to 10 (ability to perform activity independently)    Activity 7/19/23 9/20/23 10/18/23 11/22/23 2/28/24 3/27/24 10/15/24   Walk at normal pace without a cane Walks without cane Walks without cane     Walks without cane, moving slower. Walks without cane Walks without cane  __  Slow if just getting up from sitting, harder with prolonged walking     2.   Grocery shop without significant pain Did okay last time grocery shopping, hurt the next day.    Doing well here, sweaty at times and has to stop, but it's gotten better Hasn't been able to grocery shop in a while, limited by weather. Does okay while moving but pain with static standing at check-out Doing okay with grocery shopping, variable, some days can walk all around and look at things, other days must go in with a purpose     3.   Dance          4.   Move without pain and to not get tired as frequently               PHYSICAL EXAM / TREATMENT    Precautions - Hashimoto's, autoimmune disease    Mobility Measures 10/09/24 9/04/24 7/23/24 7/02/24 6/05/24 5/1/24 3/27/24 2/28/24 1/31/24 8/23/23  "9/20/23 10/18/23 11/22/23   Assistive device used? none none none none none none none None none none none none none   5 Time Sit to Stand  (17\" chair, arms across chest) 23.1 seconds   18\" chair   18.4 sec  18\" chair arms out front 29 sec.    19 sec. With arms out in front  21.6 seconds  From 18\" surface  Arms out front Unable from 18\" surface today    19.5 seconds from 20\" surface   16.4 seconds arms out front  Arms out front  24 seconds Arms out front   A couple extra attempts  29 second- 52 seconds 31 seconds Arms out front  24 seconds  Pain in left knee   19 seconds  From 20\" surface, arms across chest    28 seconds from 18\" surface 35 seconds arms out front    15 seconds 20\" surface  Arms out front     3 Meter Timed  Up & Go 9.3 sec 8.0 sec  12.3 sec 13.3 sec 9.2 sec 10.5 seconds 12.3 seconds 9.8 sec 10.6 sec   11.8 sec  Mild antalgic gait on left   10.1 seconds 9.6 seconds   Walking speed                Functional Gait Assessment (see below)  19/30 16/30 17/30 21/30 17/30 21/30 20/30 20/30   6 Minute Walk Test (100 foot circular course)   770 feet   No AD 1012 feet  No AD   800 feet no AD   940 feet no  feet 908 feet  No AD  103 bpm 1005 feet no  feet no  feet on treadmill inferred from self-selected pace 1.2 mph  875 feet no AD  Tired in legs  98 bpm   925 feet no AD   Patient-Reported Outcome Measure: Activities-Specific Balance Confidence Scale (ABC Scale)                Right knee flexion PROM, sitting 93 degrees 97 degrees    98 degrees   92 degrees 94 degrees 93 degrees 95 degrees                        Precautions - none    Specialty Daily Treatment Diary   Exercise Diary  10/29/24 10/24/24 10/09/24 9/18/24 6/04/24 8/21/24 7/31/24 7/23/24 7/02/24 6/19/24 6/05/24 5/22/24   Static and dynamic balance (neuromuscular re-education)               Strengthening & endurance  (therapeutic exercise) True treadmill   1.0 mph 5.5 min   Seated knee flexion stretch  Dorsiflexion/plantarflexion " "stretch    R lateral step up 6\" step 10 each  Forwards 2 rails right 6\" step about 10     Bilateral heel raise  Single leg about 5 on right, about 10 on left    Seated knee flexion stretch   Sit to stand 18\"   10   10    Fast timed walking 100 feet  29.3 sec  30.5 sec  30 sec    Doing some finger flexion and      Biodex treadmill   1.0 mph 2 min  1.2 mph 6 min 13-14/20 RPE    Sleeper stretch R arm  2 min    Left UT stretch    Banded L shoulder ER 10 x 2 sets    Lateral step to 6\" step  10 each x 2 sets  Standing knee flexion AROM 30 sec each  Heel raise single leg    Fast timed walking 100 feet  32 sec  30 sec  28 sec    Scifit level 2.5 - 2.1   8 min   Biodex treadmill   0.9 mph 2 min  1.2 mph 1 min  0.9 mph 1 min  1.2 mph 1 min  0.9 mph 1 min  1.2 mph 1 min  0.9 mph 1 min    Shoulder ER banded red band  15  Shoulder elevation with external rotation red band 10  Sleeper stretch R arm 2.5 min     Biodex treadmill   1.1 mph 12 min    Right elbow flexion, 3 lbs 15 x 2 sets    Sit to stand 18\" surface  10  16.5\" surface  5 x 3 sets    Standing heel raise, 10 x 2 sets right  5-10 left x 2 sets    Step up R leading about 10   Lateral, 10     Fast timed walking 100 feet  31 sec  30 sec    SciFit level   Legs only   12 min  About 90 spm       Thoracic rotation stretches  Seated stretch using edge of chair, 20-30 sec x 2-3 each    Supine trunk rotation stretch, 30 sec each     Modified plantigrade standing hands on plinth, thread the needle trunk rotation stretch  20 sec x 2 each    Sit to stand 16.5\" surface 5-10 x 2 sets  Standing heel raise, about 5-10 each x 2 sets  Single leg    Lateral step to 6\" step 4-10 each x 2 sets  Standing hip abduction, green band, about 10 each x 2 sets    SciFit level 2.5 11 min about 110 spm   Treadmill 0.8 mph - 1.2 mph 12 min total    Heel raise, incline  10 x 2 sets  3-4 single leg each    Step up 6\" step  About 10 each without use of hands,   1 hand laterally 10 each    Sit to stand 20\" " "surface, 10  19\" surface, 10    Demonstrates R ulnar nerve stretching / flossing    High stepping 1 min  Braiding walking 1 min     Treadmill to 8 min   1.0 mph - 1.2 mph     Sit to stand no hands 18\" chair    Up and down 4\" stair steps x 3 about 5 times  Same 6\" stair steps x 2-3 times, focus to keep toes forwards    Standing knee extension and seated knee flexion and gastroc stretches 3 min total    Fast timed walking best about 35 sec per 100 feet lap x 3 sets    Seated R ulnar nerve stretching/ flossing 2 min               Treadmill  Level 1 - 2 min.  Level 1.4 - 6 min.  Level 0.8 - 1 min.  Total distance: 0.19 mi.      Sit to Stand, with arms out in front   5 x2      Standing heel raises  20sec. X 2     Braided walking, slow in parallel bars  - reports ankle pain (R>L)      Hip abduction monster walks / lateral walking with slight knee bend due to limited R knee tolerance    Ulnar nerve glides  Hold to tolerance (about 2-5 sec.), rest repeat     Supine bridges   5x 2    Supine quad stretch off table  R side    SciFit level 2.1 about 100 spm  5 min    Testing above    R leading step up 6\" step, about 1 min, 2 rails x 2 sets    Standing heel raise, increased right weight bearing, 30 x 2 sets    Sit to stand 20\" surface   5 x 3 sets    Braiding walking, slow, 40 sec x 2  Can cross midline    40 lbs right  Barry position 2  58 lbs left   Gentle gripping towel about 1 min  Wrist rolls into flexion, 2 lbs,          SciFit level 1 about 80 spm 5 min     Overground walking 100 feet timed  37 - 44 sec x 4 sets    Standing knee stretches reaching back to heel or foot on step    Heel raise single leg heel raise about 10 each x 2 sets    5 times sit to stand   17\" surface  15.5 sec  + 7 lb ball out front 20.0 sec  + 7 lb ball against body    Standing with foot on floor slider, hip circles, leg raise, 1.5 min  Standing hip abduction, red band 1.5 min   Outcome measures    Standing knee extension stretch from seated " "knee extension/hamstring stretch 1 min  Standing heel raises double towards single leg 1.5 min  SciFit level 1, 10 min legs only   Treadmill 1.6 mph 5 min    Heel raise single leg 5-7 reps each x 2 sets  Step ups 6\" step nearby railing  About 10 x 2 sets each  Sit to stand about 10 x 2 sets standard 18\" chair    Braiding walking in parallel bars, 1 min x 2 sets    SciFit level 1,  spm, 10 min                                     Right shoulder has been okay   Right medial elbow pain.  Tender to palpation. Started yesterday.  No pain with elbow flexion/extension, pronation, supination, right wrist flexion/extension    Right shoulder pain with shoulder horizontal abduction elevation and ER    ASSESSMENT   Performed well, continues to move at a productive speed, continue to challenge in this area.  Shoulder mobility has improved to the point where it is no longer limiting function.    Retest mobility measures next visit.      Return towards prior exercise routines as able, including walking for exercise and exercise videos.      Recommend continued physical therapy to improve mobility .    New maintenance goals:  1. Walk 15 minutes consecutively for community ambulation and exercise.  MET but not able every day or sometimes consecutive days  NOT MET IN PAST 2 WEEKS  2. Manages 150 minutes of moderate intensity aerobic exercise per week.  MET with variability in chosen exercise.  .NOT MET IN PAST 2 WEEKS.  3. Maintains at least 1000 feet during 6 minute walk test, at most 15 seconds 5 times sit to stand.  NOT MET for 6 minute walk test, NOT MET for 5 times sit to stand.    PLAN OF CARE:  Patient will benefit from physical therapy 1x/week to 1x/ 1-2 weeks for 2 months  Neuromuscular re-education, therapeutic exercises, and therapeutic activities as outlined in grids.    Jericho Garrett, PT  10/29/2024    "

## 2024-11-06 DIAGNOSIS — I10 ESSENTIAL HYPERTENSION: ICD-10-CM

## 2024-11-06 RX ORDER — LOSARTAN POTASSIUM 50 MG/1
50 TABLET ORAL DAILY
Qty: 90 TABLET | Refills: 1 | Status: SHIPPED | OUTPATIENT
Start: 2024-11-06

## 2024-11-13 ENCOUNTER — OFFICE VISIT (OUTPATIENT)
Facility: REHABILITATION | Age: 64
End: 2024-11-13
Payer: MEDICARE
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
## 2024-11-13 PROCEDURE — 97110 THERAPEUTIC EXERCISES: CPT | Performed by: PHYSICAL THERAPIST

## 2024-11-13 NOTE — PROGRESS NOTES
PHYSICAL THERAPY TREATMENT / MAINTENANCE / UPDATE  Date: 24  Name: Ynui Hall  : 1960  Referring Provider: Arnie Cadena DO  AUTHORIZATION:   Insurance: Payor: MEDICARE / Plan: MEDICARE A AND B / Product Type: Medicare A & B Fee for Service /     PATIENT HISTORY:  HPI: Yuni Hall is a 64 y.o. female referred to outpatient physical therapy for the following diagnosis   Encounter Diagnosis   Name Primary?    Fall, subsequent encounter Yes       The right elbow doesn't fully straighten. No acute cause.  The left knee is sore, 2 nights ago, started while sleeping.    Took walker when needed to go shopping, as it wasn't a grocery store.    Uses walker if going somewhere long distance, if getting tired, would sit.    Hasn't done exercise videos much lately as everything makes it swell.        Patient goals:   -getting back to the pool    Patient-Specific Functional Scale   Task is scored 0 (unable to perform activity) to 10 (ability to perform activity independently)    Activity 7/19/23 9/20/23 10/18/23 11/22/23 2/28/24 3/27/24 10/15/24   Walk at normal pace without a cane Walks without cane Walks without cane     Walks without cane, moving slower. Walks without cane Walks without cane  __  Slow if just getting up from sitting, harder with prolonged walking     2.   Grocery shop without significant pain Did okay last time grocery shopping, hurt the next day.    Doing well here, sweaty at times and has to stop, but it's gotten better Hasn't been able to grocery shop in a while, limited by weather. Does okay while moving but pain with static standing at check-out Doing okay with grocery shopping, variable, some days can walk all around and look at things, other days must go in with a purpose     3.   Dance          4.   Move without pain and to not get tired as frequently               PHYSICAL EXAM / TREATMENT    Precautions - Hashimoto's, autoimmune disease    Mobility Measures 24  "10/09/24 9/04/24 7/23/24 7/02/24 6/05/24 5/1/24 3/27/24 2/28/24 1/31/24 8/23/23 9/20/23 10/18/23 11/22/23   Assistive device used? none none none none none none none none None none none none none none   5 Time Sit to Stand  (17\" chair, arms across chest) 18\" chair  19 seconds 23.1 seconds   18\" chair   18.4 sec  18\" chair arms out front 29 sec.    19 sec. With arms out in front  21.6 seconds  From 18\" surface  Arms out front Unable from 18\" surface today    19.5 seconds from 20\" surface   16.4 seconds arms out front  Arms out front  24 seconds Arms out front   A couple extra attempts  29 second- 52 seconds 31 seconds Arms out front  24 seconds  Pain in left knee   19 seconds  From 20\" surface, arms across chest    28 seconds from 18\" surface 35 seconds arms out front    15 seconds 20\" surface  Arms out front     3 Meter Timed  Up & Go 11.0 sec 9.3 sec 8.0 sec  12.3 sec 13.3 sec 9.2 sec 10.5 seconds 12.3 seconds 9.8 sec 10.6 sec   11.8 sec  Mild antalgic gait on left   10.1 seconds 9.6 seconds   Walking speed                 Functional Gait Assessment (see below)   19/30 16/30 17/30 21/30 17/30 21/30 20/30 20/30   6 Minute Walk Test (100 foot circular course)   825 feet   No  feet   No AD 1012 feet  No AD   800 feet no AD   940 feet no  feet 908 feet  No AD  103 bpm 1005 feet no  feet no  feet on treadmill inferred from self-selected pace 1.2 mph  875 feet no AD  Tired in legs  98 bpm   925 feet no AD   Patient-Reported Outcome Measure: Activities-Specific Balance Confidence Scale (ABC Scale)                 Right knee flexion PROM, sitting  93 degrees 97 degrees    98 degrees   92 degrees 94 degrees 93 degrees 95 degrees                         Tender to palpation about left lateral tibial plateau  Better with ice.  Worse with moving.   Towards the end of 6 minute walk test left hip is painful.        Right elbow pain just distal to medial epicondyle  Increased pain with resisted right " "wrist flexion  Mildly tender to palpation about origin of the wrist flexor tendons    Precautions - none    Specialty Daily Treatment Diary   Exercise Diary  11/12/24 10/29/24 10/24/24 10/09/24 9/18/24 6/04/24 8/21/24 7/31/24 7/23/24 7/02/24 6/19/24 6/05/24 5/22/24   Static and dynamic balance (neuromuscular re-education)                Strengthening & endurance  (therapeutic exercise) Right wrist flexion 2 lbs 15 x 2 sets  Right elbow flexion 2 lbs, Limited range to avoid pain 20 x 2 sets  Isometric wrist rolls/wringing towel 30 sec x 2 sets  Pronation / supination with 1 lb bar, 30 sec x 2 sets    SciFit level 2.4, seat 21, 10 min       True treadmill   1.0 mph 5.5 min   Seated knee flexion stretch  Dorsiflexion/plantarflexion stretch    R lateral step up 6\" step 10 each  Forwards 2 rails right 6\" step about 10     Bilateral heel raise  Single leg about 5 on right, about 10 on left    Seated knee flexion stretch   Sit to stand 18\"   10   10    Fast timed walking 100 feet  29.3 sec  30.5 sec  30 sec    Doing some finger flexion and      Biodex treadmill   1.0 mph 2 min  1.2 mph 6 min 13-14/20 RPE    Sleeper stretch R arm  2 min    Left UT stretch    Banded L shoulder ER 10 x 2 sets    Lateral step to 6\" step  10 each x 2 sets  Standing knee flexion AROM 30 sec each  Heel raise single leg    Fast timed walking 100 feet  32 sec  30 sec  28 sec    Scifit level 2.5 - 2.1   8 min   Biodex treadmill   0.9 mph 2 min  1.2 mph 1 min  0.9 mph 1 min  1.2 mph 1 min  0.9 mph 1 min  1.2 mph 1 min  0.9 mph 1 min    Shoulder ER banded red band  15  Shoulder elevation with external rotation red band 10  Sleeper stretch R arm 2.5 min     Biodex treadmill   1.1 mph 12 min    Right elbow flexion, 3 lbs 15 x 2 sets    Sit to stand 18\" surface  10  16.5\" surface  5 x 3 sets    Standing heel raise, 10 x 2 sets right  5-10 left x 2 sets    Step up R leading about 10   Lateral, 10     Fast timed walking 100 feet  31 sec  30 sec    SciFit " "level   Legs only   12 min  About 90 spm       Thoracic rotation stretches  Seated stretch using edge of chair, 20-30 sec x 2-3 each    Supine trunk rotation stretch, 30 sec each     Modified plantigrade standing hands on plinth, thread the needle trunk rotation stretch  20 sec x 2 each    Sit to stand 16.5\" surface 5-10 x 2 sets  Standing heel raise, about 5-10 each x 2 sets  Single leg    Lateral step to 6\" step 4-10 each x 2 sets  Standing hip abduction, green band, about 10 each x 2 sets    SciFit level 2.5 11 min about 110 spm   Treadmill 0.8 mph - 1.2 mph 12 min total    Heel raise, incline  10 x 2 sets  3-4 single leg each    Step up 6\" step  About 10 each without use of hands,   1 hand laterally 10 each    Sit to stand 20\" surface, 10  19\" surface, 10    Demonstrates R ulnar nerve stretching / flossing    High stepping 1 min  Braiding walking 1 min     Treadmill to 8 min   1.0 mph - 1.2 mph     Sit to stand no hands 18\" chair    Up and down 4\" stair steps x 3 about 5 times  Same 6\" stair steps x 2-3 times, focus to keep toes forwards    Standing knee extension and seated knee flexion and gastroc stretches 3 min total    Fast timed walking best about 35 sec per 100 feet lap x 3 sets    Seated R ulnar nerve stretching/ flossing 2 min               Treadmill  Level 1 - 2 min.  Level 1.4 - 6 min.  Level 0.8 - 1 min.  Total distance: 0.19 mi.      Sit to Stand, with arms out in front   5 x2      Standing heel raises  20sec. X 2     Braided walking, slow in parallel bars  - reports ankle pain (R>L)      Hip abduction monster walks / lateral walking with slight knee bend due to limited R knee tolerance    Ulnar nerve glides  Hold to tolerance (about 2-5 sec.), rest repeat     Supine bridges   5x 2    Supine quad stretch off table  R side    SciFit level 2.1 about 100 spm  5 min    Testing above    R leading step up 6\" step, about 1 min, 2 rails x 2 sets    Standing heel raise, increased right weight bearing, 30 x 2 " "sets    Sit to stand 20\" surface   5 x 3 sets    Braiding walking, slow, 40 sec x 2  Can cross midline    40 lbs right  Barry position 2  58 lbs left   Gentle gripping towel about 1 min  Wrist rolls into flexion, 2 lbs,          SciFit level 1 about 80 spm 5 min     Overground walking 100 feet timed  37 - 44 sec x 4 sets    Standing knee stretches reaching back to heel or foot on step    Heel raise single leg heel raise about 10 each x 2 sets    5 times sit to stand   17\" surface  15.5 sec  + 7 lb ball out front 20.0 sec  + 7 lb ball against body    Standing with foot on floor slider, hip circles, leg raise, 1.5 min  Standing hip abduction, red band 1.5 min   Outcome measures    Standing knee extension stretch from seated knee extension/hamstring stretch 1 min  Standing heel raises double towards single leg 1.5 min  SciFit level 1, 10 min legs only   Treadmill 1.6 mph 5 min    Heel raise single leg 5-7 reps each x 2 sets  Step ups 6\" step nearby railing  About 10 x 2 sets each  Sit to stand about 10 x 2 sets standard 18\" chair    Braiding walking in parallel bars, 1 min x 2 sets    SciFit level 1,  spm, 10 min                                       Right shoulder has been okay   Right medial elbow pain.  Tender to palpation. Started yesterday.  No pain with elbow flexion/extension, pronation, supination, right wrist flexion/extension    Right shoulder pain with shoulder horizontal abduction elevation and ER    ASSESSMENT   Retested mobility measures.    Limited by left knee stiffness with speed of movement, but able to work around at home, continue stretching, heel raises, plie-type movements, return to exercise videos and Apsalar machine when able.  Reviewed home program to improve mobility of the right elbow and address medial elbow epicondylitis.  Consider manual therapy next visit if needed.     Recommend continued physical therapy to improve mobility .    New maintenance goals:  1. Walk 15 minutes " consecutively for community ambulation and exercise.       Walking to tolerance, which can vary by day.  Walking up to about 30 minutes with intermittent standing.  Walking around grocery store.    2. Manages 150 minutes of moderate intensity aerobic exercise per week.        Depends on day/week.  Is focusing on physical activity.  10 minutes up and 60 minutes seated this week .   3. Maintains at least 1000 feet during 6 minute walk test, at most 15 seconds 5 times sit to stand.       NOT MET.      PLAN OF CARE:  Patient will benefit from physical therapy 1x/week to 1x/ 1-2 weeks for 2 months  Neuromuscular re-education, therapeutic exercises, and therapeutic activities as outlined in grids.    Jericho Garrett, PT  11/13/2024

## 2024-11-26 ENCOUNTER — APPOINTMENT (OUTPATIENT)
Facility: REHABILITATION | Age: 64
End: 2024-11-26
Payer: MEDICARE

## 2024-12-03 ENCOUNTER — OFFICE VISIT (OUTPATIENT)
Facility: REHABILITATION | Age: 64
End: 2024-12-03
Payer: MEDICARE
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
## 2024-12-03 PROCEDURE — 97110 THERAPEUTIC EXERCISES: CPT | Performed by: PHYSICAL THERAPIST

## 2024-12-03 NOTE — PROGRESS NOTES
PHYSICAL THERAPY TREATMENT / MAINTENANCE    Date: 24  Name: Yuni Hall  : 1960  Referring Provider: Arnie Cadena DO  AUTHORIZATION:   Insurance: Payor: MEDICARE / Plan: MEDICARE A AND B / Product Type: Medicare A & B Fee for Service /     PATIENT HISTORY:  HPI: Yuni Hall is a 64 y.o. female referred to outpatient physical therapy for the following diagnosis   Encounter Diagnosis   Name Primary?    Fall, subsequent encounter Yes     Uses assistive device sometimes.  Pain in knee and back and elbow and neck.    Was doing well prior to weather becoming cold.    Tighter with cold, but heat doesn't loosen things up.   Mostly stretching and isometric exercises right now.     Biggest problem is moving from sitting to standing.        Patient goals:   -getting back to the pool    Patient-Specific Functional Scale   Task is scored 0 (unable to perform activity) to 10 (ability to perform activity independently)    Activity 7/19/23 9/20/23 10/18/23 11/22/23 2/28/24 3/27/24 10/15/24   Walk at normal pace without a cane Walks without cane Walks without cane     Walks without cane, moving slower. Walks without cane Walks without cane  __  Slow if just getting up from sitting, harder with prolonged walking     2.   Grocery shop without significant pain Did okay last time grocery shopping, hurt the next day.    Doing well here, sweaty at times and has to stop, but it's gotten better Hasn't been able to grocery shop in a while, limited by weather. Does okay while moving but pain with static standing at check-out Doing okay with grocery shopping, variable, some days can walk all around and look at things, other days must go in with a purpose     3.   Dance          4.   Move without pain and to not get tired as frequently               PHYSICAL EXAM / TREATMENT    Precautions - Hashimoto's, autoimmune disease    Mobility Measures 11/13/24 10/09/24 9/04/24 7/23/24 7/02/24 6/05/24 5/1/24 3/27/24 2/28/24  "1/31/24 8/23/23 9/20/23 10/18/23 11/22/23   Assistive device used? none none none none none none none none None none none none none none   5 Time Sit to Stand  (17\" chair, arms across chest) 18\" chair  19 seconds 23.1 seconds   18\" chair   18.4 sec  18\" chair arms out front 29 sec.    19 sec. With arms out in front  21.6 seconds  From 18\" surface  Arms out front Unable from 18\" surface today    19.5 seconds from 20\" surface   16.4 seconds arms out front  Arms out front  24 seconds Arms out front   A couple extra attempts  29 second- 52 seconds 31 seconds Arms out front  24 seconds  Pain in left knee   19 seconds  From 20\" surface, arms across chest    28 seconds from 18\" surface 35 seconds arms out front    15 seconds 20\" surface  Arms out front     3 Meter Timed  Up & Go 11.0 sec 9.3 sec 8.0 sec  12.3 sec 13.3 sec 9.2 sec 10.5 seconds 12.3 seconds 9.8 sec 10.6 sec   11.8 sec  Mild antalgic gait on left   10.1 seconds 9.6 seconds   Walking speed                 Functional Gait Assessment (see below)   19/30 16/30 17/30 21/30 17/30 21/30 20/30 20/30   6 Minute Walk Test (100 foot circular course)   825 feet   No  feet   No AD 1012 feet  No AD   800 feet no AD   940 feet no  feet 908 feet  No AD  103 bpm 1005 feet no  feet no  feet on treadmill inferred from self-selected pace 1.2 mph  875 feet no AD  Tired in legs  98 bpm   925 feet no AD   Patient-Reported Outcome Measure: Activities-Specific Balance Confidence Scale (ABC Scale)                 Right knee flexion PROM, sitting  93 degrees 97 degrees    98 degrees   92 degrees 94 degrees 93 degrees 95 degrees                         Tender to palpation about left lateral tibial plateau  Better with ice.  Worse with moving.   Towards the end of 6 minute walk test left hip is painful.        Right elbow pain just distal to medial epicondyle  Increased pain with resisted right wrist flexion  Mildly tender to palpation about origin of the " "wrist flexor tendons    Precautions - none    Specialty Daily Treatment Diary   Exercise Diary  12/3/24 11/12/24 10/29/24 10/24/24 10/09/24 9/18/24 6/04/24 8/21/24 7/31/24 7/23/24 7/02/24 6/19/24 6/05/24 5/22/24   Static and dynamic balance (neuromuscular re-education)                 Strengthening & endurance  (therapeutic exercise) Scifit: level 2.5, 10 minutes     Sit to stand 18\" surface  10 x 2 sets    Seated right elbow flexion, 3 lbs, 10    Seated elbow extension stretch elbow supination, 45 sec x 2 sets    Wrist towel  30 sec x 2 sets    Scapular row and shoulder extension   Doubled therabands about 45 sec each bilateral    Step ups 6\"   Lateral   About 10 each x 2 sets  Standing heel raise, 5 bilaterally  Then unilaterally    Walking 100 feet laps timed- deferred to next visit         Right wrist flexion 2 lbs 15 x 2 sets  Right elbow flexion 2 lbs, Limited range to avoid pain 20 x 2 sets  Isometric wrist rolls/wringing towel 30 sec x 2 sets  Pronation / supination with 1 lb bar, 30 sec x 2 sets    SciFit level 2.4, seat 21, 10 min       True treadmill   1.0 mph 5.5 min   Seated knee flexion stretch  Dorsiflexion/plantarflexion stretch    R lateral step up 6\" step 10 each  Forwards 2 rails right 6\" step about 10     Bilateral heel raise  Single leg about 5 on right, about 10 on left    Seated knee flexion stretch   Sit to stand 18\"   10   10    Fast timed walking 100 feet  29.3 sec  30.5 sec  30 sec    Doing some finger flexion and      Biodex treadmill   1.0 mph 2 min  1.2 mph 6 min 13-14/20 RPE    Sleeper stretch R arm  2 min    Left UT stretch    Banded L shoulder ER 10 x 2 sets    Lateral step to 6\" step  10 each x 2 sets  Standing knee flexion AROM 30 sec each  Heel raise single leg    Fast timed walking 100 feet  32 sec  30 sec  28 sec    Scifit level 2.5 - 2.1   8 min   Biodex treadmill   0.9 mph 2 min  1.2 mph 1 min  0.9 mph 1 min  1.2 mph 1 min  0.9 mph 1 min  1.2 mph 1 min  0.9 mph 1 " "min    Shoulder ER banded red band  15  Shoulder elevation with external rotation red band 10  Sleeper stretch R arm 2.5 min     Biodex treadmill   1.1 mph 12 min    Right elbow flexion, 3 lbs 15 x 2 sets    Sit to stand 18\" surface  10  16.5\" surface  5 x 3 sets    Standing heel raise, 10 x 2 sets right  5-10 left x 2 sets    Step up R leading about 10   Lateral, 10     Fast timed walking 100 feet  31 sec  30 sec    SciFit level   Legs only   12 min  About 90 spm       Thoracic rotation stretches  Seated stretch using edge of chair, 20-30 sec x 2-3 each    Supine trunk rotation stretch, 30 sec each     Modified plantigrade standing hands on plinth, thread the needle trunk rotation stretch  20 sec x 2 each    Sit to stand 16.5\" surface 5-10 x 2 sets  Standing heel raise, about 5-10 each x 2 sets  Single leg    Lateral step to 6\" step 4-10 each x 2 sets  Standing hip abduction, green band, about 10 each x 2 sets    SciFit level 2.5 11 min about 110 spm   Treadmill 0.8 mph - 1.2 mph 12 min total    Heel raise, incline  10 x 2 sets  3-4 single leg each    Step up 6\" step  About 10 each without use of hands,   1 hand laterally 10 each    Sit to stand 20\" surface, 10  19\" surface, 10    Demonstrates R ulnar nerve stretching / flossing    High stepping 1 min  Braiding walking 1 min     Treadmill to 8 min   1.0 mph - 1.2 mph     Sit to stand no hands 18\" chair    Up and down 4\" stair steps x 3 about 5 times  Same 6\" stair steps x 2-3 times, focus to keep toes forwards    Standing knee extension and seated knee flexion and gastroc stretches 3 min total    Fast timed walking best about 35 sec per 100 feet lap x 3 sets    Seated R ulnar nerve stretching/ flossing 2 min               Treadmill  Level 1 - 2 min.  Level 1.4 - 6 min.  Level 0.8 - 1 min.  Total distance: 0.19 mi.      Sit to Stand, with arms out in front   5 x2      Standing heel raises  20sec. X 2     Braided walking, slow in parallel bars  - reports ankle pain " "(R>L)      Hip abduction monster walks / lateral walking with slight knee bend due to limited R knee tolerance    Ulnar nerve glides  Hold to tolerance (about 2-5 sec.), rest repeat     Supine bridges   5x 2    Supine quad stretch off table  R side    SciFit level 2.1 about 100 spm  5 min    Testing above    R leading step up 6\" step, about 1 min, 2 rails x 2 sets    Standing heel raise, increased right weight bearing, 30 x 2 sets    Sit to stand 20\" surface   5 x 3 sets    Braiding walking, slow, 40 sec x 2  Can cross midline    40 lbs right  Barry position 2  58 lbs left   Gentle gripping towel about 1 min  Wrist rolls into flexion, 2 lbs,          SciFit level 1 about 80 spm 5 min     Overground walking 100 feet timed  37 - 44 sec x 4 sets    Standing knee stretches reaching back to heel or foot on step    Heel raise single leg heel raise about 10 each x 2 sets    5 times sit to stand   17\" surface  15.5 sec  + 7 lb ball out front 20.0 sec  + 7 lb ball against body    Standing with foot on floor slider, hip circles, leg raise, 1.5 min  Standing hip abduction, red band 1.5 min   Outcome measures    Standing knee extension stretch from seated knee extension/hamstring stretch 1 min  Standing heel raises double towards single leg 1.5 min  SciFit level 1, 10 min legs only   Treadmill 1.6 mph 5 min    Heel raise single leg 5-7 reps each x 2 sets  Step ups 6\" step nearby railing  About 10 x 2 sets each  Sit to stand about 10 x 2 sets standard 18\" chair    Braiding walking in parallel bars, 1 min x 2 sets    SciFit level 1,  spm, 10 min                                         Right shoulder has been okay   Right medial elbow pain.  Tender to palpation. Started yesterday.  No pain with elbow flexion/extension, pronation, supination, right wrist flexion/extension    Right shoulder pain with shoulder horizontal abduction elevation and ER    ASSESSMENT   Improved right elbow extension passive range of motion as " well as right arm function with gripping and pulling.  Continue to address joint mobility issues as they relate to mobility and function.  Would benefit from return to regular aerobic exercise.    New maintenance goals:  1. Walk 15 minutes consecutively for community ambulation and exercise.       Walking to tolerance, which can vary by day.  Walking up to about 30 minutes with intermittent standing.  Walking around grocery store.    2. Manages 150 minutes of moderate intensity aerobic exercise per week.        Depends on day/week.  Is focusing on physical activity.  10 minutes up and 60 minutes seated this week .   3. Maintains at least 1000 feet during 6 minute walk test, at most 15 seconds 5 times sit to stand.       NOT MET.      PLAN OF CARE:  Patient will benefit from physical therapy 1x/week to 1x/ 1-2 weeks for 2 months  Neuromuscular re-education, therapeutic exercises, and therapeutic activities as outlined in gridsFederico Emery  12/3/2024

## 2024-12-18 ENCOUNTER — OFFICE VISIT (OUTPATIENT)
Facility: REHABILITATION | Age: 64
End: 2024-12-18
Payer: MEDICARE
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
## 2024-12-18 PROCEDURE — 97112 NEUROMUSCULAR REEDUCATION: CPT

## 2024-12-18 NOTE — PROGRESS NOTES
" PT Skilled Maintenance Note     Today's date: 2024  Patient name: Yuni Hall  : 1960  MRN: [id number]  Referring provider: Arnie Cadena DO  Dx:   Encounter Diagnosis     ICD-10-CM    1. Fall, subsequent encounter  W19.XXXD           Start Time: 1200  Stop Time: 1245  Total time in clinic (min): 45 minutes    Subjective: Patient reports having increased stiffness in the morning when getting up with the B/L knees flexed.      Objective: See treatment diary below      Assessment: Tolerated treatment well.   Patient continue with improved right elbow extension passive range of motion as well as right arm function with gripping and pulling.  Continue to address joint mobility issues as they relate to mobility and function.  Patient demonstrated fatigue post treatment      Plan: Continue per plan of care.    Specialty Daily Treatment Diary   Exercise Diary  12/18 12/3/24 11/12/24 10/29/24 10/24/24 10/09/24 9/18/24 6/04/24 8/21/24 7/31/24 7/23/24 7/02/24 6/19/24 6/05/24 5/22/24   Static and dynamic balance (neuromuscular re-education)                  Strengthening & endurance  (therapeutic exercise) Schifit:  level 2.5 x 10 min      Sit to stand 18\" surface 2x10     Seated right elbow flexion 3#  2x10    Seated elbow extension stretch, elbow  supination 45 sec x 2 sets    Wrist towel  30 sec x 2 sets    Scapular row and shoulder extension   Doubled therabands about 45 sec each bilateral    Step ups 6\"   Lateral   About 10 each x 2 sets  Standing heel raise, 5 bilaterally  Then unilaterally      Fast timed walking 100 ft     31 sec  34 sec  32 sec       Scifit: level 2.5, 10 minutes     Sit to stand 18\" surface  10 x 2 sets    Seated right elbow flexion, 3 lbs, 10    Seated elbow extension stretch elbow supination, 45 sec x 2 sets    Wrist towel  30 sec x 2 sets    Scapular row and shoulder extension   Doubled therabands about 45 sec each bilateral    Step ups 6\"   Lateral   About 10 each x " "2 sets  Standing heel raise, 5 bilaterally  Then unilaterally    Walking 100 feet laps timed- deferred to next visit         Right wrist flexion 2 lbs 15 x 2 sets  Right elbow flexion 2 lbs, Limited range to avoid pain 20 x 2 sets  Isometric wrist rolls/wringing towel 30 sec x 2 sets  Pronation / supination with 1 lb bar, 30 sec x 2 sets    SciFit level 2.4, seat 21, 10 min       True treadmill   1.0 mph 5.5 min   Seated knee flexion stretch  Dorsiflexion/plantarflexion stretch    R lateral step up 6\" step 10 each  Forwards 2 rails right 6\" step about 10     Bilateral heel raise  Single leg about 5 on right, about 10 on left    Seated knee flexion stretch   Sit to stand 18\"   10   10    Fast timed walking 100 feet  29.3 sec  30.5 sec  30 sec    Doing some finger flexion and      Biodex treadmill   1.0 mph 2 min  1.2 mph 6 min 13-14/20 RPE    Sleeper stretch R arm  2 min    Left UT stretch    Banded L shoulder ER 10 x 2 sets    Lateral step to 6\" step  10 each x 2 sets  Standing knee flexion AROM 30 sec each  Heel raise single leg    Fast timed walking 100 feet  32 sec  30 sec  28 sec    Scifit level 2.5 - 2.1   8 min   Biodex treadmill   0.9 mph 2 min  1.2 mph 1 min  0.9 mph 1 min  1.2 mph 1 min  0.9 mph 1 min  1.2 mph 1 min  0.9 mph 1 min    Shoulder ER banded red band  15  Shoulder elevation with external rotation red band 10  Sleeper stretch R arm 2.5 min     Biodex treadmill   1.1 mph 12 min    Right elbow flexion, 3 lbs 15 x 2 sets    Sit to stand 18\" surface  10  16.5\" surface  5 x 3 sets    Standing heel raise, 10 x 2 sets right  5-10 left x 2 sets    Step up R leading about 10   Lateral, 10     Fast timed walking 100 feet  31 sec  30 sec    SciFit level   Legs only   12 min  About 90 spm       Thoracic rotation stretches  Seated stretch using edge of chair, 20-30 sec x 2-3 each    Supine trunk rotation stretch, 30 sec each     Modified plantigrade standing hands on plinth, thread the needle trunk rotation " "stretch  20 sec x 2 each    Sit to stand 16.5\" surface 5-10 x 2 sets  Standing heel raise, about 5-10 each x 2 sets  Single leg    Lateral step to 6\" step 4-10 each x 2 sets  Standing hip abduction, green band, about 10 each x 2 sets    SciFit level 2.5 11 min about 110 spm   Treadmill 0.8 mph - 1.2 mph 12 min total    Heel raise, incline  10 x 2 sets  3-4 single leg each    Step up 6\" step  About 10 each without use of hands,   1 hand laterally 10 each    Sit to stand 20\" surface, 10  19\" surface, 10    Demonstrates R ulnar nerve stretching / flossing    High stepping 1 min  Braiding walking 1 min     Treadmill to 8 min   1.0 mph - 1.2 mph     Sit to stand no hands 18\" chair    Up and down 4\" stair steps x 3 about 5 times  Same 6\" stair steps x 2-3 times, focus to keep toes forwards    Standing knee extension and seated knee flexion and gastroc stretches 3 min total    Fast timed walking best about 35 sec per 100 feet lap x 3 sets    Seated R ulnar nerve stretching/ flossing 2 min               Treadmill  Level 1 - 2 min.  Level 1.4 - 6 min.  Level 0.8 - 1 min.  Total distance: 0.19 mi.      Sit to Stand, with arms out in front   5 x2      Standing heel raises  20sec. X 2     Braided walking, slow in parallel bars  - reports ankle pain (R>L)      Hip abduction monster walks / lateral walking with slight knee bend due to limited R knee tolerance    Ulnar nerve glides  Hold to tolerance (about 2-5 sec.), rest repeat     Supine bridges   5x 2    Supine quad stretch off table  R side    SciFit level 2.1 about 100 spm  5 min    Testing above    R leading step up 6\" step, about 1 min, 2 rails x 2 sets    Standing heel raise, increased right weight bearing, 30 x 2 sets    Sit to stand 20\" surface   5 x 3 sets    Braiding walking, slow, 40 sec x 2  Can cross midline    40 lbs right  Barry position 2  58 lbs left   Gentle gripping towel about 1 min  Wrist rolls into flexion, 2 lbs,          SciFit level 1 about 80 " "spm 5 min     Overground walking 100 feet timed  37 - 44 sec x 4 sets    Standing knee stretches reaching back to heel or foot on step    Heel raise single leg heel raise about 10 each x 2 sets    5 times sit to stand   17\" surface  15.5 sec  + 7 lb ball out front 20.0 sec  + 7 lb ball against body    Standing with foot on floor slider, hip circles, leg raise, 1.5 min  Standing hip abduction, red band 1.5 min   Outcome measures    Standing knee extension stretch from seated knee extension/hamstring stretch 1 min  Standing heel raises double towards single leg 1.5 min  SciFit level 1, 10 min legs only   Treadmill 1.6 mph 5 min    Heel raise single leg 5-7 reps each x 2 sets  Step ups 6\" step nearby railing  About 10 x 2 sets each  Sit to stand about 10 x 2 sets standard 18\" chair    Braiding walking in parallel bars, 1 min x 2 sets    SciFit level 1,  spm, 10 min                                           "

## 2024-12-20 ENCOUNTER — TELEPHONE (OUTPATIENT)
Dept: FAMILY MEDICINE CLINIC | Facility: CLINIC | Age: 64
End: 2024-12-20

## 2024-12-20 NOTE — TELEPHONE ENCOUNTER
Called to let pt know that [facility] paperwork has been completed and whether they would like to pick it up or have it faxed; pt requested to have it faxed.    Pt also said she is working on getting the blood work done, but has been feeling nauseous in the morning and unable to fast. Will get the blood work done as soon as possible.

## 2024-12-25 DIAGNOSIS — E06.3 HYPOTHYROIDISM DUE TO HASHIMOTO'S THYROIDITIS: ICD-10-CM

## 2024-12-26 RX ORDER — LEVOTHYROXINE SODIUM 200 UG/1
TABLET ORAL
Qty: 104 TABLET | Refills: 1 | Status: SHIPPED | OUTPATIENT
Start: 2024-12-26

## 2025-01-07 ENCOUNTER — NURSE TRIAGE (OUTPATIENT)
Age: 65
End: 2025-01-07

## 2025-01-07 DIAGNOSIS — E11.65 TYPE 2 DIABETES MELLITUS WITH HYPERGLYCEMIA, WITHOUT LONG-TERM CURRENT USE OF INSULIN (HCC): ICD-10-CM

## 2025-01-07 DIAGNOSIS — M35.9 UNDIFFERENTIATED CONNECTIVE TISSUE DISEASE (HCC): Primary | ICD-10-CM

## 2025-01-07 RX ORDER — PREDNISONE 5 MG/1
TABLET ORAL
Qty: 40 TABLET | Refills: 0 | Status: SHIPPED | OUTPATIENT
Start: 2025-01-07 | End: 2025-01-08 | Stop reason: SDUPTHER

## 2025-01-07 NOTE — TELEPHONE ENCOUNTER
I have called in for prednisone taper  Pls check with Dr France later this week if any further reccs

## 2025-01-07 NOTE — TELEPHONE ENCOUNTER
Regarding: pain  ----- Message from Amie WOOD sent at 1/7/2025  2:25 PM EST -----  Pt calls in and states that she is in a lot of pain in her R arm and both legs very sensitive to touch. OTC pain meds not helping please advise

## 2025-01-07 NOTE — TELEPHONE ENCOUNTER
"Patient call:  Pt stated provider: Dr. France     Actionable item: Provider review and recommendation    Chief complaint:   Onset : around Rosine time.  Reports severe pain to right arm and bilateral legs with pain when anything touches (sensitivity). Pain is so severe in morning that it makes her nauseous.   Yuni is unsure if the weather is part of the reason her pain increased. Experiencing lessened mobility in legs as well.   Warm showers help at times.     \" I will try anything \". Looking for direction by Dr. France    Dispo: Routing for provider review and recommendation.   [facility].  Agrees with plan.   All questions answered.       Reason for Disposition   SEVERE pain (e.g., excruciating, unable to do any normal activities)    Answer Assessment - Initial Assessment Questions  1. ONSET: \"When did the pain start?\"       Since Marco A  2. LOCATION: \"Where is the pain located?\"       Bilateral legs and right arm  3. PAIN: \"How bad is the pain?\"    (Scale 1-10; or mild, moderate, severe)      Severe - making her nauseous   4. WORK OR EXERCISE: \"Has there been any recent work or exercise that involved this part of the body?\"       -  5. CAUSE: \"What do you think is causing the leg pain?\"      Flare up / cold weather  6. OTHER SYMPTOMS: \"Do you have any other symptoms?\" (e.g., chest pain, back pain, breathing difficulty, swelling, rash, fever, numbness, weakness)      Nausea, rashes on/off    Protocols used: Leg Pain-Adult-[place]    "

## 2025-01-08 ENCOUNTER — APPOINTMENT (OUTPATIENT)
Facility: REHABILITATION | Age: 65
End: 2025-01-08
Payer: MEDICARE

## 2025-01-08 RX ORDER — PREDNISONE 5 MG/1
TABLET ORAL
Qty: 40 TABLET | Refills: 0 | Status: SHIPPED | OUTPATIENT
Start: 2025-01-08

## 2025-01-08 NOTE — TELEPHONE ENCOUNTER
"Answer Assessment - Initial Assessment Questions  1. NAME of MEDICINE: \"What medicine(s) are you calling about?\"      Prednisone  2. QUESTION: \"What is your question?\" (e.g., double dose of medicine, side effect)      Went to mail order instead of local pharmacy  3. PRESCRIBER: \"Who prescribed the medicine?\" Reason: if prescribed by specialist, call should be referred to that group.      Dr. Calderon    Protocols used: Medication Question Call-Adult-[place]    "

## 2025-01-08 NOTE — TELEPHONE ENCOUNTER
Pt calling regarding this.    I resent prednisone as requested to [facility] instead of mail order. No further questions/concerns at this time.

## 2025-01-16 ENCOUNTER — TELEPHONE (OUTPATIENT)
Dept: FAMILY MEDICINE CLINIC | Facility: CLINIC | Age: 65
End: 2025-01-16

## 2025-01-16 NOTE — TELEPHONE ENCOUNTER
[facility] faxed a form to be filled out for patients Trulicity 0.75 requesting a 3 to 4 month supply  Placed in folder to be filled out

## 2025-01-22 ENCOUNTER — APPOINTMENT (OUTPATIENT)
Facility: REHABILITATION | Age: 65
End: 2025-01-22
Payer: MEDICARE

## 2025-01-27 ENCOUNTER — TELEPHONE (OUTPATIENT)
Dept: PSYCHIATRY | Facility: CLINIC | Age: 65
End: 2025-01-27

## 2025-01-27 ENCOUNTER — TELEMEDICINE (OUTPATIENT)
Dept: PSYCHIATRY | Facility: CLINIC | Age: 65
End: 2025-01-27
Payer: MEDICARE

## 2025-01-27 DIAGNOSIS — F43.23 ADJUSTMENT DISORDER WITH MIXED ANXIETY AND DEPRESSED MOOD: Primary | ICD-10-CM

## 2025-01-27 PROCEDURE — 99213 OFFICE O/P EST LOW 20 MIN: CPT | Performed by: PSYCHIATRY & NEUROLOGY

## 2025-01-27 RX ORDER — DULOXETIN HYDROCHLORIDE 60 MG/1
60 CAPSULE, DELAYED RELEASE ORAL DAILY
Qty: 90 CAPSULE | Refills: 1 | Status: SHIPPED | OUTPATIENT
Start: 2025-01-27

## 2025-01-27 NOTE — BH TREATMENT PLAN
TREATMENT PLAN (Medication Management Only)        [facility]    Name and Date of Birth:  Yuni Hall 64 y.o. 1960  Date of Treatment Plan: January 27, 2025  Diagnosis/Diagnoses:    1. Adjustment disorder with mixed anxiety and depressed mood      Strengths/Personal Resources for Self-Care: supportive friends, taking medications as prescribed, ability to adapt to life changes, ability to communicate needs, ability to communicate well, ability to listen, ability to reason, ability to understand psychiatric illness, average or above intelligence, family ties.  Area/Areas of need (in own words): anxiety, depression  1. Long Term Goal: Feel calmer.  Target Date:6 months - 7/27/2025  Person/Persons responsible for completion of goal: Yuni  2.  Short Term Objective (s) - How will we reach this goal?:   A. Provider new recommended medication/dosage changes and/or continue medication(s): continue current medications as prescribed.  B. N/A.  C. N/A.  Target Date:6 months - 7/27/2025  Person/Persons Responsible for Completion of Goal: Yuni  Progress Towards Goals: continuing treatment  Treatment Modality: medication management every 3 months  Review due 180 days from date of this plan: 6 months - 7/27/2025  Expected length of service: ongoing treatment  My Physician/PA/NP and I have developed this plan together and I agree to work on the goals and objectives. I understand the treatment goals that were developed for my treatment.
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
## 2025-01-27 NOTE — TELEPHONE ENCOUNTER
Called and left message requesting a call back to schedule follow up in 3 months. Patient was also reminded to sign virtual consent

## 2025-01-28 ENCOUNTER — OFFICE VISIT (OUTPATIENT)
Facility: REHABILITATION | Age: 65
End: 2025-01-28
Payer: MEDICARE
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
## 2025-01-28 PROCEDURE — 97110 THERAPEUTIC EXERCISES: CPT | Performed by: PHYSICAL THERAPIST

## 2025-01-28 NOTE — PROGRESS NOTES
" PT Skilled Maintenance Note / UPDATE    Today's date: 2025  Patient name: Yuni Hall  : 1960  MRN: [id number]  Referring provider: Arnie Cadena DO  Dx:   Encounter Diagnosis     ICD-10-CM    1. Fall, subsequent encounter  W19.XXXD         Subjective:   Legs got stiff.  Given prescription for prednisone.  Was also getting cramps in her neck.  Felt like it was pulling her face down.    Got stuck in someone's car, not being able to bend knees.  Seen by physician, diagnosed with right medial epicondylitis.  Has pressure points, physician recommended ice and ibuprofen.      Objective: See treatment diary below  Can fully straighten the right arm. Best with 20 degrees flexion.    Pain provoked with anything touching the right medial elbow.    No difficulty gripping, pain with making a fist.  No difficulty with resistance exercises.  Pain with right pronation.    Full wrist flexion, extension, radial and ulnar deviation.    5/5 wrist extension, 4/5 right wrist flexion.  Pain with resisted pronation, not supination.    Assessment: Tolerated treatment well.   Patient continue with improved right elbow extension passive range of motion as well as right arm function with gripping and pulling.  Continue to address joint mobility issues as they relate to mobility and function.  Patient demonstrated fatigue post treatment      Plan: Continue per plan of care.    Specialty Daily Treatment Diary   Exercise Diary  1/28/25 12/18 12/3/24 11/12/24 10/29/24 10/24/24 10/09/24 9/18/24 6/04/24 8/21/24 7/31/24 7/23/24 7/02/24 6/19/24 6/05/24 5/22/24   Static and dynamic balance (neuromuscular re-education)                   Strengthening & endurance  (therapeutic exercise) Outcomes below    Seated isometric pronation / supination  1 min with towel    Wrist flexion, 3 lbs, about 15-20 right    Wrist extension stretching Sch[place]:  level 2.5 x 10 min      Sit to stand 18\" surface 2x10     Seated right elbow flexion 3#  " "2x10    Seated elbow extension stretch, elbow  supination 45 sec x 2 sets    Wrist towel  30 sec x 2 sets    Scapular row and shoulder extension   Doubled therabands about 45 sec each bilateral    Step ups 6\"   Lateral   About 10 each x 2 sets  Standing heel raise, 5 bilaterally  Then unilaterally      Fast timed walking 100 ft     31 sec  34 sec  32 sec       Scifit: level 2.5, 10 minutes     Sit to stand 18\" surface  10 x 2 sets    Seated right elbow flexion, 3 lbs, 10    Seated elbow extension stretch elbow supination, 45 sec x 2 sets    Wrist towel  30 sec x 2 sets    Scapular row and shoulder extension   Doubled therabands about 45 sec each bilateral    Step ups 6\"   Lateral   About 10 each x 2 sets  Standing heel raise, 5 bilaterally  Then unilaterally    Walking 100 feet laps timed- deferred to next visit         Right wrist flexion 2 lbs 15 x 2 sets  Right elbow flexion 2 lbs, Limited range to avoid pain 20 x 2 sets  Isometric wrist rolls/wringing towel 30 sec x 2 sets  Pronation / supination with 1 lb bar, 30 sec x 2 sets    SciFit level 2.4, seat 21, 10 min       True treadmill   1.0 mph 5.5 min   Seated knee flexion stretch  Dorsiflexion/plantarflexion stretch    R lateral step up 6\" step 10 each  Forwards 2 rails right 6\" step about 10     Bilateral heel raise  Single leg about 5 on right, about 10 on left    Seated knee flexion stretch   Sit to stand 18\"   10   10    Fast timed walking 100 feet  29.3 sec  30.5 sec  30 sec    Doing some finger flexion and      Biodex treadmill   1.0 mph 2 min  1.2 mph 6 min 13-14/20 RPE    Sleeper stretch R arm  2 min    Left UT stretch    Banded L shoulder ER 10 x 2 sets    Lateral step to 6\" step  10 each x 2 sets  Standing knee flexion AROM 30 sec each  Heel raise single leg    Fast timed walking 100 feet  32 sec  30 sec  28 sec    Scifit level 2.5 - 2.1   8 min   Biodex treadmill   0.9 mph 2 min  1.2 mph 1 min  0.9 mph 1 min  1.2 mph 1 min  0.9 mph 1 min  1.2 " "mph 1 min  0.9 mph 1 min    Shoulder ER banded red band  15  Shoulder elevation with external rotation red band 10  Sleeper stretch R arm 2.5 min     Biodex treadmill   1.1 mph 12 min    Right elbow flexion, 3 lbs 15 x 2 sets    Sit to stand 18\" surface  10  16.5\" surface  5 x 3 sets    Standing heel raise, 10 x 2 sets right  5-10 left x 2 sets    Step up R leading about 10   Lateral, 10     Fast timed walking 100 feet  31 sec  30 sec    SciFit level   Legs only   12 min  About 90 spm       Thoracic rotation stretches  Seated stretch using edge of chair, 20-30 sec x 2-3 each    Supine trunk rotation stretch, 30 sec each     Modified plantigrade standing hands on plinth, thread the needle trunk rotation stretch  20 sec x 2 each    Sit to stand 16.5\" surface 5-10 x 2 sets  Standing heel raise, about 5-10 each x 2 sets  Single leg    Lateral step to 6\" step 4-10 each x 2 sets  Standing hip abduction, green band, about 10 each x 2 sets    SciFit level 2.5 11 min about 110 spm   Treadmill 0.8 mph - 1.2 mph 12 min total    Heel raise, incline  10 x 2 sets  3-4 single leg each    Step up 6\" step  About 10 each without use of hands,   1 hand laterally 10 each    Sit to stand 20\" surface, 10  19\" surface, 10    Demonstrates R ulnar nerve stretching / flossing    High stepping 1 min  Braiding walking 1 min     Treadmill to 8 min   1.0 mph - 1.2 mph     Sit to stand no hands 18\" chair    Up and down 4\" stair steps x 3 about 5 times  Same 6\" stair steps x 2-3 times, focus to keep toes forwards    Standing knee extension and seated knee flexion and gastroc stretches 3 min total    Fast timed walking best about 35 sec per 100 feet lap x 3 sets    Seated R ulnar nerve stretching/ flossing 2 min               Treadmill  Level 1 - 2 min.  Level 1.4 - 6 min.  Level 0.8 - 1 min.  Total distance: 0.19 mi.      Sit to Stand, with arms out in front   5 x2      Standing heel raises  20sec. X 2     Braided walking, slow in parallel bars  - " "reports ankle pain (R>L)      Hip abduction monster walks / lateral walking with slight knee bend due to limited R knee tolerance    Ulnar nerve glides  Hold to tolerance (about 2-5 sec.), rest repeat     Supine bridges   5x 2    Supine quad stretch off table  R side    SciFit level 2.1 about 100 spm  5 min    Testing above    R leading step up 6\" step, about 1 min, 2 rails x 2 sets    Standing heel raise, increased right weight bearing, 30 x 2 sets    Sit to stand 20\" surface   5 x 3 sets    Braiding walking, slow, 40 sec x 2  Can cross midline    40 lbs right  Barry position 2  58 lbs left   Gentle gripping towel about 1 min  Wrist rolls into flexion, 2 lbs,          SciFit level 1 about 80 spm 5 min     Overground walking 100 feet timed  37 - 44 sec x 4 sets    Standing knee stretches reaching back to heel or foot on step    Heel raise single leg heel raise about 10 each x 2 sets    5 times sit to stand   17\" surface  15.5 sec  + 7 lb ball out front 20.0 sec  + 7 lb ball against body    Standing with foot on floor slider, hip circles, leg raise, 1.5 min  Standing hip abduction, red band 1.5 min   Outcome measures    Standing knee extension stretch from seated knee extension/hamstring stretch 1 min  Standing heel raises double towards single leg 1.5 min  SciFit level 1, 10 min legs only   Treadmill 1.6 mph 5 min    Heel raise single leg 5-7 reps each x 2 sets  Step ups 6\" step nearby railing  About 10 x 2 sets each  Sit to stand about 10 x 2 sets standard 18\" chair    Braiding walking in parallel bars, 1 min x 2 sets    SciFit level 1,  spm, 10 min                                               PHYSICAL EXAM / TREATMENT    Precautions - Hashimoto's, autoimmune disease    Mobility Measures 1/28/25 11/13/24 10/09/24 9/04/24 7/23/24 7/02/24 6/05/24 5/1/24 3/27/24 2/28/24 1/31/24 8/23/23 9/20/23 10/18/23 11/22/23   Assistive device used? none none none none none none none none none None none none none " "none none   5 Time Sit to Stand  (17\" chair, arms across chest) 18\" chair  36 seconds   18\" chair  19 seconds 23.1 seconds   18\" chair   18.4 sec  18\" chair arms out front 29 sec.    19 sec. With arms out in front  21.6 seconds  From 18\" surface  Arms out front Unable from 18\" surface today    19.5 seconds from 20\" surface   16.4 seconds arms out front  Arms out front  24 seconds Arms out front   A couple extra attempts  29 second- 52 seconds 31 seconds Arms out front  24 seconds  Pain in left knee   19 seconds  From 20\" surface, arms across chest    28 seconds from 18\" surface 35 seconds arms out front    15 seconds 20\" surface  Arms out front     3 Meter Timed  Up & Go 15.0 seconds 11.0 sec 9.3 sec 8.0 sec  12.3 sec 13.3 sec 9.2 sec 10.5 seconds 12.3 seconds 9.8 sec 10.6 sec   11.8 sec  Mild antalgic gait on left   10.1 seconds 9.6 seconds   Walking speed                  Functional Gait Assessment (see below)    19/30 16/30 17/30 21/30 17/30 21/30 20/30 20/30   6 Minute Walk Test (100 foot circular course)   810 feet   No assistive device   825 feet   No  feet   No AD 1012 feet  No AD   800 feet no AD   940 feet no  feet 908 feet  No AD  103 bpm 1005 feet no  feet no  feet on treadmill inferred from self-selected pace 1.2 mph  875 feet no AD  Tired in legs  98 bpm   925 feet no AD   Patient-Reported Outcome Measure: Activities-Specific Balance Confidence Scale (ABC Scale)                  Right knee flexion PROM, sitting 94 degrees from seated on plinth  93 degrees 97 degrees    98 degrees   92 degrees 94 degrees 93 degrees 95 degrees                            ASSESSMENT   Discussed things to get the knees moving, currently is doing seated knee flexion stretches, to similar ranges than previously.  When on prednisone, not physically active, as had difficulty moving legs.  Currently doing seated ankle stretches, wrist stretches, neck stretches.  Prior to [place] had started exercise " program, but stopped due to stiffness.    Was using cross-country skiier but legs wouldn't bend enough to get on this.  Tried again last night and was able to do this for a short time, recommend continuing to try for short periods of time.    Continue working towards exercise videos, currently they are too fast.    We worked on exercises to help medial epicondylitis, see above, good return demonstration.      New maintenance goals:  1. Walk 15 minutes consecutively for community ambulation and exercise.       Walking to tolerance, which can vary by day.  Walking up to about 30 minutes with intermittent standing.  Walking around grocery store.    2. Manages 150 minutes of moderate intensity aerobic exercise per week.        Depends on day/week.  Not met for past couple weeks. Is focusing on physical activity.    3. Maintains at least 1000 feet during 6 minute walk test, at most 15 seconds 5 times sit to stand.       NOT MET.      PLAN OF CARE:  Patient will benefit from physical therapy 1x/week to 1x/ 1-2 weeks for 2 months  Neuromuscular re-education, therapeutic exercises, and therapeutic activities as outlined in grids.

## 2025-02-04 NOTE — TELEPHONE ENCOUNTER
Patient contacted the office to schedule a follow up visit with provider. Patient is now scheduled for 4/24/2025  at 2 pm virtually.

## 2025-02-05 ENCOUNTER — APPOINTMENT (OUTPATIENT)
Facility: REHABILITATION | Age: 65
End: 2025-02-05
Payer: MEDICARE

## 2025-02-10 ENCOUNTER — TELEPHONE (OUTPATIENT)
Age: 65
End: 2025-02-10

## 2025-02-10 NOTE — TELEPHONE ENCOUNTER
Pt asking if weather can trigger her Connective Tissue Disorder. Pt states she notices her joints/muscle can get tender with weather changes/snow.     Advised yes some people do report flares with the changes in weather and/or colder weather.     Pt responded she will prepare then for the upcoming snow. Patient had no further questions and/or concerns at this time.

## 2025-02-12 ENCOUNTER — APPOINTMENT (OUTPATIENT)
Facility: REHABILITATION | Age: 65
End: 2025-02-12
Payer: MEDICARE

## 2025-02-20 ENCOUNTER — TELEPHONE (OUTPATIENT)
Dept: RHEUMATOLOGY | Facility: CLINIC | Age: 65
End: 2025-02-20

## 2025-02-20 NOTE — TELEPHONE ENCOUNTER
Left voicemail for patient explaining the provider is going through a schedule change and their appointment will need to be rescheduled, apologized for the short notice and advised the patient to call the office back as soon as possible to reschedule appointment. Offered Aug 16 at 8:8:30 or 9:00 in the morning which was available at time of call.

## 2025-02-21 ENCOUNTER — TELEPHONE (OUTPATIENT)
Age: 65
End: 2025-02-21

## 2025-02-24 DIAGNOSIS — J30.89 SEASONAL ALLERGIC RHINITIS DUE TO OTHER ALLERGIC TRIGGER: ICD-10-CM

## 2025-02-24 DIAGNOSIS — E78.00 PURE HYPERCHOLESTEROLEMIA: ICD-10-CM

## 2025-02-25 RX ORDER — MONTELUKAST SODIUM 10 MG/1
10 TABLET ORAL
Qty: 90 TABLET | Refills: 1 | Status: SHIPPED | OUTPATIENT
Start: 2025-02-25

## 2025-02-25 RX ORDER — ROSUVASTATIN CALCIUM 5 MG/1
5 TABLET, COATED ORAL DAILY
Qty: 90 TABLET | Refills: 1 | Status: SHIPPED | OUTPATIENT
Start: 2025-02-25

## 2025-02-26 ENCOUNTER — APPOINTMENT (OUTPATIENT)
Facility: REHABILITATION | Age: 65
End: 2025-02-26
Payer: MEDICARE

## 2025-03-04 ENCOUNTER — OFFICE VISIT (OUTPATIENT)
Facility: REHABILITATION | Age: 65
End: 2025-03-04
Payer: MEDICARE
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
## 2025-03-04 PROCEDURE — 97110 THERAPEUTIC EXERCISES: CPT

## 2025-03-11 ENCOUNTER — OFFICE VISIT (OUTPATIENT)
Facility: REHABILITATION | Age: 65
End: 2025-03-11
Payer: MEDICARE
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
## 2025-03-11 PROCEDURE — 97110 THERAPEUTIC EXERCISES: CPT | Performed by: PHYSICAL THERAPIST

## 2025-03-11 NOTE — PROGRESS NOTES
" PT Skilled Maintenance Note / UPDATE    Today's date: 3/11/2025  Patient name: Yuni Hall  : 1960  MRN: [id number]  Referring provider: Arnie Cadena DO  Dx:   Encounter Diagnosis     ICD-10-CM    1. Fall, subsequent encounter  W19.XXXD           Subjective:   Had fall since [place] on ice in parking lot. R shoulder is bothering her now. Can't flex shoulder w/ elbow bent and has pain w/ horizontal adduction. Pain located in posterior shoulder.     Shoulder has been getting better, knee is not getting better.  Did ice and ibuprofen and oxycodone to sleep.    Can straighten the right knee, but not fully, can't fully bend it, sharp pain about right anterior proximal lower leg.   Unsure if twisted, happened a couple weeks ago.      Doing more chair aerobics than standing aerobics at this point, but still doing stretching.         Objective: See treatment diary below  Fully flexes and extends elbow, still painful to touch about right medial elbow.  Fine with carrying, lifting.    -10 degrees right knee extension  70 degrees right seated knee flexion, painful beyond this point.       Specialty Daily Treatment Diary   Exercise Diary  3/11/25 3/4/25 12/3/24 11/12/24 10/29/24 10/24/24 10/09/24 9/18/24 6/04/24 8/21/24 7/31/24 7/23/24 7/02/24 6/19/24 6/05/24 5/22/24   Static and dynamic balance (neuromuscular re-education)                   Strengthening & endurance  (therapeutic exercise) Outcomes below    Standing right knee flexion on 6\" stair step  1.5 min    Step ups 4\" step   1 min R leading    Heel raise increased one-sided weight bearing  1 min  Standing hip abduction, 20 sec   Single leg stance ipsilateral hand hold to railing, tap blaze pod laterally 50 sec x 3    High stepping 1 handrail  1.5 min  Then remeasured knee flexion, 74 degrees    SciFit level 1, 8-10 minutes       Outcomes below      Shoulder stretch at counter   1min total, gentle rocking     TB shoulder extension w/ emphasis on posture and " "scap depression   Black x30 reps        Scifit: level 2.5, 10 minutes     Sit to stand 18\" surface  10 x 2 sets    Seated right elbow flexion, 3 lbs, 10    Seated elbow extension stretch elbow supination, 45 sec x 2 sets    Wrist towel  30 sec x 2 sets    Scapular row and shoulder extension   Doubled therabands about 45 sec each bilateral    Step ups 6\"   Lateral   About 10 each x 2 sets  Standing heel raise, 5 bilaterally  Then unilaterally    Walking 100 feet laps timed- deferred to next visit         Right wrist flexion 2 lbs 15 x 2 sets  Right elbow flexion 2 lbs, Limited range to avoid pain 20 x 2 sets  Isometric wrist rolls/wringing towel 30 sec x 2 sets  Pronation / supination with 1 lb bar, 30 sec x 2 sets    SciFit level 2.4, seat 21, 10 min       True treadmill   1.0 mph 5.5 min   Seated knee flexion stretch  Dorsiflexion/plantarflexion stretch    R lateral step up 6\" step 10 each  Forwards 2 rails right 6\" step about 10     Bilateral heel raise  Single leg about 5 on right, about 10 on left    Seated knee flexion stretch   Sit to stand 18\"   10   10    Fast timed walking 100 feet  29.3 sec  30.5 sec  30 sec    Doing some finger flexion and      Biodex treadmill   1.0 mph 2 min  1.2 mph 6 min 13-14/20 RPE    Sleeper stretch R arm  2 min    Left UT stretch    Banded L shoulder ER 10 x 2 sets    Lateral step to 6\" step  10 each x 2 sets  Standing knee flexion AROM 30 sec each  Heel raise single leg    Fast timed walking 100 feet  32 sec  30 sec  28 sec    Scifit level 2.5 - 2.1   8 min   Biodex treadmill   0.9 mph 2 min  1.2 mph 1 min  0.9 mph 1 min  1.2 mph 1 min  0.9 mph 1 min  1.2 mph 1 min  0.9 mph 1 min    Shoulder ER banded red band  15  Shoulder elevation with external rotation red band 10  Sleeper stretch R arm 2.5 min     Biodex treadmill   1.1 mph 12 min    Right elbow flexion, 3 lbs 15 x 2 sets    Sit to stand 18\" surface  10  16.5\" surface  5 x 3 sets    Standing heel raise, 10 x 2 sets " "right  5-10 left x 2 sets    Step up R leading about 10   Lateral, 10     Fast timed walking 100 feet  31 sec  30 sec    SciFit level   Legs only   12 min  About 90 spm       Thoracic rotation stretches  Seated stretch using edge of chair, 20-30 sec x 2-3 each    Supine trunk rotation stretch, 30 sec each     Modified plantigrade standing hands on plinth, thread the needle trunk rotation stretch  20 sec x 2 each    Sit to stand 16.5\" surface 5-10 x 2 sets  Standing heel raise, about 5-10 each x 2 sets  Single leg    Lateral step to 6\" step 4-10 each x 2 sets  Standing hip abduction, green band, about 10 each x 2 sets    SciFit level 2.5 11 min about 110 spm   Treadmill 0.8 mph - 1.2 mph 12 min total    Heel raise, incline  10 x 2 sets  3-4 single leg each    Step up 6\" step  About 10 each without use of hands,   1 hand laterally 10 each    Sit to stand 20\" surface, 10  19\" surface, 10    Demonstrates R ulnar nerve stretching / flossing    High stepping 1 min  Braiding walking 1 min     Treadmill to 8 min   1.0 mph - 1.2 mph     Sit to stand no hands 18\" chair    Up and down 4\" stair steps x 3 about 5 times  Same 6\" stair steps x 2-3 times, focus to keep toes forwards    Standing knee extension and seated knee flexion and gastroc stretches 3 min total    Fast timed walking best about 35 sec per 100 feet lap x 3 sets    Seated R ulnar nerve stretching/ flossing 2 min               Treadmill  Level 1 - 2 min.  Level 1.4 - 6 min.  Level 0.8 - 1 min.  Total distance: 0.19 mi.      Sit to Stand, with arms out in front   5 x2      Standing heel raises  20sec. X 2     Braided walking, slow in parallel bars  - reports ankle pain (R>L)      Hip abduction monster walks / lateral walking with slight knee bend due to limited R knee tolerance    Ulnar nerve glides  Hold to tolerance (about 2-5 sec.), rest repeat     Supine bridges   5x 2    Supine quad stretch off table  R side    SciFit level 2.1 about 100 spm  5 min    Testing " "above    R leading step up 6\" step, about 1 min, 2 rails x 2 sets    Standing heel raise, increased right weight bearing, 30 x 2 sets    Sit to stand 20\" surface   5 x 3 sets    Braiding walking, slow, 40 sec x 2  Can cross midline    40 lbs right  Barry position 2  58 lbs left   Gentle gripping towel about 1 min  Wrist rolls into flexion, 2 lbs,          SciFit level 1 about 80 spm 5 min     Overground walking 100 feet timed  37 - 44 sec x 4 sets    Standing knee stretches reaching back to heel or foot on step    Heel raise single leg heel raise about 10 each x 2 sets    5 times sit to stand   17\" surface  15.5 sec  + 7 lb ball out front 20.0 sec  + 7 lb ball against body    Standing with foot on floor slider, hip circles, leg raise, 1.5 min  Standing hip abduction, red band 1.5 min   Outcome measures    Standing knee extension stretch from seated knee extension/hamstring stretch 1 min  Standing heel raises double towards single leg 1.5 min  SciFit level 1, 10 min legs only   Treadmill 1.6 mph 5 min    Heel raise single leg 5-7 reps each x 2 sets  Step ups 6\" step nearby railing  About 10 x 2 sets each  Sit to stand about 10 x 2 sets standard 18\" chair    Braiding walking in parallel bars, 1 min x 2 sets    SciFit level 1,  spm, 10 min                                               PHYSICAL EXAM / TREATMENT    Precautions - Hashimoto's, autoimmune disease    Mobility Measures 3/11/25 1/28/25 3/4 10/09/24 9/04/24 7/23/24 7/02/24 6/05/24 5/1/24 3/27/24 2/28/24 1/31/24 8/23/23 9/20/23 10/18/23 11/22/23   Assistive device used? none none None  none none none none none none none None none none none none none   5 Time Sit to Stand  (17\" chair, arms across chest) Requires 3 attempts to stand without hands from 18\" surface   18\" chair  36 seconds   18\" chair  19.14 sec 23.1 seconds   18\" chair   18.4 sec  18\" chair arms out front 29 sec.    19 sec. With arms out in front  21.6 seconds  From 18\" surface  Arms " "out front Unable from 18\" surface today    19.5 seconds from 20\" surface   16.4 seconds arms out front  Arms out front  24 seconds Arms out front   A couple extra attempts  29 second- 52 seconds 31 seconds Arms out front  24 seconds  Pain in left knee   19 seconds  From 20\" surface, arms across chest    28 seconds from 18\" surface 35 seconds arms out front    15 seconds 20\" surface  Arms out front     3 Meter Timed  Up & Go 13.7 seconds 15.0 seconds 10.38 sec 9.3 sec 8.0 sec  12.3 sec 13.3 sec 9.2 sec 10.5 seconds 12.3 seconds 9.8 sec 10.6 sec   11.8 sec  Mild antalgic gait on left   10.1 seconds 9.6 seconds   Walking speed                   Functional Gait Assessment (see below) Deferred by therapist  19/30 19/30 16/30 17/30 21/30 17/30 21/30 20/30 20/30   6 Minute Walk Test (100 foot circular course)   Deferred by therapist 810 feet   No assistive device   Hold, nausea  770 feet   No AD 1012 feet  No AD   800 feet no AD   940 feet no  feet 908 feet  No AD  103 bpm 1005 feet no  feet no  feet on treadmill inferred from self-selected pace 1.2 mph  875 feet no AD  Tired in legs  98 bpm   925 feet no AD   Patient-Reported Outcome Measure: Activities-Specific Balance Confidence Scale (ABC Scale)                   Right knee flexion PROM, sitting 70 degrees from seated in chair 94 degrees from seated on plinth 93 degrees while seated in chair 93 degrees 97 degrees    98 degrees   92 degrees 94 degrees 93 degrees 95 degrees                             ASSESSMENT   Right knee stiffness following slip (no fall) on ice, limiting walking and stair climbing.    Mild increase in knee range of motion with active standing exercise and external targets.  Modifying physical activity at home to accommodate.   Return to exercise [place] and cross-country skiier in the future.     New maintenance goals:  1. Walk 15 minutes consecutively for community ambulation and exercise.     NOT MET    Walking to tolerance, " which can vary by day.  Walking up to about 30 minutes with intermittent standing.  Walking around grocery store.    2. Manages 150 minutes of moderate intensity aerobic exercise per week.        Depends on day/week.  Not met for past couple weeks. Is focusing on physical activity.    3. Maintains at least 1000 feet during 6 minute walk test, at most 15 seconds 5 times sit to stand.       NOT MET.      PLAN OF CARE:  Patient will benefit from physical therapy 1x/week to 1x/ 1-2 weeks for 2 months  Neuromuscular re-education, therapeutic exercises, and therapeutic activities as outlined in grids.

## 2025-03-26 ENCOUNTER — OFFICE VISIT (OUTPATIENT)
Facility: REHABILITATION | Age: 65
End: 2025-03-26
Payer: MEDICARE

## 2025-03-26 ENCOUNTER — RA CDI HCC (OUTPATIENT)
Dept: OTHER | Facility: HOSPITAL | Age: 65
End: 2025-03-26
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
## 2025-03-26 PROCEDURE — 97112 NEUROMUSCULAR REEDUCATION: CPT | Performed by: PHYSICAL THERAPIST

## 2025-03-26 PROCEDURE — 97110 THERAPEUTIC EXERCISES: CPT | Performed by: PHYSICAL THERAPIST

## 2025-03-26 NOTE — PROGRESS NOTES
" PT Skilled Maintenance Note / UPDATE    Today's date: 3/26/2025  Patient name: Yuni Hall  : 1960  MRN: [id number]  Referring provider: Arnie Cadena DO  Dx:   Encounter Diagnosis     ICD-10-CM    1. Fall, subsequent encounter  W19.XXXD           Subjective:   Knee is off and on.  Goes from I can tolerate to throwing up from pain in a second.    No in-between.   Face gets pale and sweats.  Unpredictable, yesterday went shopping and did fine.      Right now rearranging things at home, stretching every day.    Could do ballet videos, just slower, and chair-based exercises, must adapt as they go too fast.  Tried walking video, but the right leg was painful.      Sees new primary care physician on 25.      Objective: See treatment diary below  Fully flexes and extends elbow, still painful to touch about right medial elbow.  Fine with carrying, lifting.    -10 degrees right knee extension  70 degrees right seated knee flexion, painful beyond this point.       Specialty Daily Treatment Diary   Exercise Diary  3/26/25 3/11/25 3/4/25 12/3/24 11/12/24 10/29/24 10/24/24 10/09/24 9/18/24 6/04/24 8/21/24 7/31/24 7/23/24 7/02/24 6/19/24 6/05/24 5/22/24   Static and dynamic balance (neuromuscular re-education)                    Strengthening & endurance  (therapeutic exercise) SciFit level 1.0   15 min, about 65 - 70 spm    Step ups 4\" step 1 min each, railings x 2 sets     Standing heel raise increased weight on right  30 sec  Standing heel raise bilaterally focus on straightening knees 1 min    Right single leg stance with nearby railings tapping blazepods on left  45 sec 30 sec  Then taping with R 45 sec    Right knee flexion to roughly 85 degrees    High stepping 1.5 min           Outcomes below    Standing right knee flexion on 6\" stair step  1.5 min    Step ups 4\" step   1 min R leading    Heel raise increased one-sided weight bearing  1 min  Standing hip abduction, 20 sec   Single leg stance " "ipsilateral hand hold to railing, tap blaze pod laterally 50 sec x 3    High stepping 1 handrail  1.5 min  Then remeasured knee flexion, 74 degrees    SciFit level 1, 8-10 minutes       Outcomes below      Shoulder stretch at counter   1min total, gentle rocking     TB shoulder extension w/ emphasis on posture and scap depression   Black x30 reps        Scifit: level 2.5, 10 minutes     Sit to stand 18\" surface  10 x 2 sets    Seated right elbow flexion, 3 lbs, 10    Seated elbow extension stretch elbow supination, 45 sec x 2 sets    Wrist towel  30 sec x 2 sets    Scapular row and shoulder extension   Doubled therabands about 45 sec each bilateral    Step ups 6\"   Lateral   About 10 each x 2 sets  Standing heel raise, 5 bilaterally  Then unilaterally    Walking 100 feet laps timed- deferred to next visit         Right wrist flexion 2 lbs 15 x 2 sets  Right elbow flexion 2 lbs, Limited range to avoid pain 20 x 2 sets  Isometric wrist rolls/wringing towel 30 sec x 2 sets  Pronation / supination with 1 lb bar, 30 sec x 2 sets    SciFit level 2.4, seat 21, 10 min       True treadmill   1.0 mph 5.5 min   Seated knee flexion stretch  Dorsiflexion/plantarflexion stretch    R lateral step up 6\" step 10 each  Forwards 2 rails right 6\" step about 10     Bilateral heel raise  Single leg about 5 on right, about 10 on left    Seated knee flexion stretch   Sit to stand 18\"   10   10    Fast timed walking 100 feet  29.3 sec  30.5 sec  30 sec    Doing some finger flexion and      Biodex treadmill   1.0 mph 2 min  1.2 mph 6 min 13-14/20 RPE    Sleeper stretch R arm  2 min    Left UT stretch    Banded L shoulder ER 10 x 2 sets    Lateral step to 6\" step  10 each x 2 sets  Standing knee flexion AROM 30 sec each  Heel raise single leg    Fast timed walking 100 feet  32 sec  30 sec  28 sec    Scifit level 2.5 - 2.1   8 min   Biodex treadmill   0.9 mph 2 min  1.2 mph 1 min  0.9 mph 1 min  1.2 mph 1 min  0.9 mph 1 min  1.2 mph 1 " "min  0.9 mph 1 min    Shoulder ER banded red band  15  Shoulder elevation with external rotation red band 10  Sleeper stretch R arm 2.5 min     Biodex treadmill   1.1 mph 12 min    Right elbow flexion, 3 lbs 15 x 2 sets    Sit to stand 18\" surface  10  16.5\" surface  5 x 3 sets    Standing heel raise, 10 x 2 sets right  5-10 left x 2 sets    Step up R leading about 10   Lateral, 10     Fast timed walking 100 feet  31 sec  30 sec    SciFit level   Legs only   12 min  About 90 spm       Thoracic rotation stretches  Seated stretch using edge of chair, 20-30 sec x 2-3 each    Supine trunk rotation stretch, 30 sec each     Modified plantigrade standing hands on plinth, thread the needle trunk rotation stretch  20 sec x 2 each    Sit to stand 16.5\" surface 5-10 x 2 sets  Standing heel raise, about 5-10 each x 2 sets  Single leg    Lateral step to 6\" step 4-10 each x 2 sets  Standing hip abduction, green band, about 10 each x 2 sets    SciFit level 2.5 11 min about 110 spm   Treadmill 0.8 mph - 1.2 mph 12 min total    Heel raise, incline  10 x 2 sets  3-4 single leg each    Step up 6\" step  About 10 each without use of hands,   1 hand laterally 10 each    Sit to stand 20\" surface, 10  19\" surface, 10    Demonstrates R ulnar nerve stretching / flossing    High stepping 1 min  Braiding walking 1 min     Treadmill to 8 min   1.0 mph - 1.2 mph     Sit to stand no hands 18\" chair    Up and down 4\" stair steps x 3 about 5 times  Same 6\" stair steps x 2-3 times, focus to keep toes forwards    Standing knee extension and seated knee flexion and gastroc stretches 3 min total    Fast timed walking best about 35 sec per 100 feet lap x 3 sets    Seated R ulnar nerve stretching/ flossing 2 min               Treadmill  Level 1 - 2 min.  Level 1.4 - 6 min.  Level 0.8 - 1 min.  Total distance: 0.19 mi.      Sit to Stand, with arms out in front   5 x2      Standing heel raises  20sec. X 2     Braided walking, slow in parallel bars  - " "reports ankle pain (R>L)      Hip abduction monster walks / lateral walking with slight knee bend due to limited R knee tolerance    Ulnar nerve glides  Hold to tolerance (about 2-5 sec.), rest repeat     Supine bridges   5x 2    Supine quad stretch off table  R side    SciFit level 2.1 about 100 spm  5 min    Testing above    R leading step up 6\" step, about 1 min, 2 rails x 2 sets    Standing heel raise, increased right weight bearing, 30 x 2 sets    Sit to stand 20\" surface   5 x 3 sets    Braiding walking, slow, 40 sec x 2  Can cross midline    40 lbs right  Barry position 2  58 lbs left   Gentle gripping towel about 1 min  Wrist rolls into flexion, 2 lbs,          SciFit level 1 about 80 spm 5 min     Overground walking 100 feet timed  37 - 44 sec x 4 sets    Standing knee stretches reaching back to heel or foot on step    Heel raise single leg heel raise about 10 each x 2 sets    5 times sit to stand   17\" surface  15.5 sec  + 7 lb ball out front 20.0 sec  + 7 lb ball against body    Standing with foot on floor slider, hip circles, leg raise, 1.5 min  Standing hip abduction, red band 1.5 min   Outcome measures    Standing knee extension stretch from seated knee extension/hamstring stretch 1 min  Standing heel raises double towards single leg 1.5 min  SciFit level 1, 10 min legs only   Treadmill 1.6 mph 5 min    Heel raise single leg 5-7 reps each x 2 sets  Step ups 6\" step nearby railing  About 10 x 2 sets each  Sit to stand about 10 x 2 sets standard 18\" chair    Braiding walking in parallel bars, 1 min x 2 sets    SciFit level 1,  spm, 10 min                                                 PHYSICAL EXAM / TREATMENT    Precautions - Hashimoto's, autoimmune disease    Mobility Measures 3/11/25 1/28/25 3/4 10/09/24 9/04/24 7/23/24 7/02/24 6/05/24 5/1/24 3/27/24 2/28/24 1/31/24 8/23/23 9/20/23 10/18/23 11/22/23   Assistive device used? none none None  none none none none none none none None none " "none none none none   5 Time Sit to Stand  (17\" chair, arms across chest) Requires 3 attempts to stand without hands from 18\" surface   18\" chair  36 seconds   18\" chair  19.14 sec 23.1 seconds   18\" chair   18.4 sec  18\" chair arms out front 29 sec.    19 sec. With arms out in front  21.6 seconds  From 18\" surface  Arms out front Unable from 18\" surface today    19.5 seconds from 20\" surface   16.4 seconds arms out front  Arms out front  24 seconds Arms out front   A couple extra attempts  29 second- 52 seconds 31 seconds Arms out front  24 seconds  Pain in left knee   19 seconds  From 20\" surface, arms across chest    28 seconds from 18\" surface 35 seconds arms out front    15 seconds 20\" surface  Arms out front     3 Meter Timed  Up & Go 13.7 seconds 15.0 seconds 10.38 sec 9.3 sec 8.0 sec  12.3 sec 13.3 sec 9.2 sec 10.5 seconds 12.3 seconds 9.8 sec 10.6 sec   11.8 sec  Mild antalgic gait on left   10.1 seconds 9.6 seconds   Walking speed                   Functional Gait Assessment (see below) Deferred by therapist  19/30 19/30 16/30 17/30 21/30 17/30 21/30 20/30 20/30   6 Minute Walk Test (100 foot circular course)   Deferred by therapist 810 feet   No assistive device   Hold, nausea  770 feet   No AD 1012 feet  No AD   800 feet no AD   940 feet no  feet 908 feet  No AD  103 bpm 1005 feet no  feet no  feet on treadmill inferred from self-selected pace 1.2 mph  875 feet no AD  Tired in legs  98 bpm   925 feet no AD   Patient-Reported Outcome Measure: Activities-Specific Balance Confidence Scale (ABC Scale)                   Right knee flexion PROM, sitting 70 degrees from seated in chair 94 degrees from seated on plinth 93 degrees while seated in chair 93 degrees 97 degrees    98 degrees   92 degrees 94 degrees 93 degrees 95 degrees                             ASSESSMENT   Right knee stiffness following slip (no fall) on ice, limiting walking and stair climbing.    She remains limited in " her knee range but has improved in the past two weeks.  Continue use, strengthening and stretching.  Agree that return towards prior exercise videos, with modifications, will help mobility and faster return to prior level of walking.        New maintenance goals:  1. Walk 15 minutes consecutively for community ambulation and exercise.     NOT MET    Walking to tolerance, which can vary by day.  Walking up to about 30 minutes with intermittent standing.  Walking around grocery store.    2. Manages 150 minutes of moderate intensity aerobic exercise per week.        Depends on day/week.  Not met for past couple weeks. Is focusing on physical activity.    3. Maintains at least 1000 feet during 6 minute walk test, at most 15 seconds 5 times sit to stand.       NOT MET.      PLAN OF CARE:  Patient will benefit from physical therapy 1x/week to 1x/ 1-2 weeks for 2 months  Neuromuscular re-education, therapeutic exercises, and therapeutic activities as outlined in grids.

## 2025-03-31 DIAGNOSIS — E11.65 TYPE 2 DIABETES MELLITUS WITH HYPERGLYCEMIA, WITHOUT LONG-TERM CURRENT USE OF INSULIN (HCC): ICD-10-CM

## 2025-04-01 ENCOUNTER — RESULTS FOLLOW-UP (OUTPATIENT)
Dept: FAMILY MEDICINE CLINIC | Facility: CLINIC | Age: 65
End: 2025-04-01

## 2025-04-01 ENCOUNTER — APPOINTMENT (OUTPATIENT)
Dept: LAB | Facility: CLINIC | Age: 65
End: 2025-04-01
Payer: MEDICARE

## 2025-04-01 ENCOUNTER — OFFICE VISIT (OUTPATIENT)
Dept: FAMILY MEDICINE CLINIC | Facility: CLINIC | Age: 65
End: 2025-04-01
Payer: MEDICARE

## 2025-04-01 VITALS
OXYGEN SATURATION: 97 % | HEIGHT: 66 IN | HEART RATE: 75 BPM | DIASTOLIC BLOOD PRESSURE: 78 MMHG | BODY MASS INDEX: 38.25 KG/M2 | SYSTOLIC BLOOD PRESSURE: 128 MMHG | TEMPERATURE: 97.3 F

## 2025-04-01 DIAGNOSIS — N18.2 CKD (CHRONIC KIDNEY DISEASE) STAGE 2, GFR 60-89 ML/MIN: ICD-10-CM

## 2025-04-01 DIAGNOSIS — D35.2 PITUITARY MICROADENOMA (HCC): ICD-10-CM

## 2025-04-01 DIAGNOSIS — E06.3 HYPOTHYROIDISM DUE TO HASHIMOTO'S THYROIDITIS: ICD-10-CM

## 2025-04-01 DIAGNOSIS — Q04.8 CEREBELLAR TONSILLAR ECTOPIA (HCC): ICD-10-CM

## 2025-04-01 DIAGNOSIS — I10 ESSENTIAL HYPERTENSION: ICD-10-CM

## 2025-04-01 DIAGNOSIS — E66.1 CLASS 2 DRUG-INDUCED OBESITY WITH SERIOUS COMORBIDITY AND BODY MASS INDEX (BMI) OF 38.0 TO 38.9 IN ADULT: ICD-10-CM

## 2025-04-01 DIAGNOSIS — I25.10 CORONARY ARTERY CALCIFICATION SEEN ON CAT SCAN: ICD-10-CM

## 2025-04-01 DIAGNOSIS — M79.18 MYOFASCIAL PAIN: ICD-10-CM

## 2025-04-01 DIAGNOSIS — M35.9 CONNECTIVE TISSUE DISEASE (HCC): ICD-10-CM

## 2025-04-01 DIAGNOSIS — E11.65 TYPE 2 DIABETES MELLITUS WITH HYPERGLYCEMIA, WITHOUT LONG-TERM CURRENT USE OF INSULIN (HCC): ICD-10-CM

## 2025-04-01 DIAGNOSIS — E66.812 CLASS 2 DRUG-INDUCED OBESITY WITH SERIOUS COMORBIDITY AND BODY MASS INDEX (BMI) OF 38.0 TO 38.9 IN ADULT: ICD-10-CM

## 2025-04-01 DIAGNOSIS — E78.1 HYPERTRIGLYCERIDEMIA: ICD-10-CM

## 2025-04-01 DIAGNOSIS — Z12.31 ENCOUNTER FOR SCREENING MAMMOGRAM FOR BREAST CANCER: ICD-10-CM

## 2025-04-01 DIAGNOSIS — G70.9 NEUROMUSCULAR DISORDER (HCC): ICD-10-CM

## 2025-04-01 DIAGNOSIS — E55.9 VITAMIN D DEFICIENCY: ICD-10-CM

## 2025-04-01 DIAGNOSIS — D35.2 PITUITARY MICROADENOMA (HCC): Primary | ICD-10-CM

## 2025-04-01 DIAGNOSIS — M25.561 CHRONIC PAIN OF RIGHT KNEE: ICD-10-CM

## 2025-04-01 DIAGNOSIS — N60.19 FIBROCYSTIC BREAST DISEASE (FCBD), UNSPECIFIED LATERALITY: ICD-10-CM

## 2025-04-01 DIAGNOSIS — Z00.00 MEDICARE ANNUAL WELLNESS VISIT, SUBSEQUENT: Primary | ICD-10-CM

## 2025-04-01 DIAGNOSIS — G89.29 CHRONIC PAIN OF RIGHT KNEE: ICD-10-CM

## 2025-04-01 PROBLEM — R06.83 SNORING: Status: RESOLVED | Noted: 2019-04-23 | Resolved: 2025-04-01

## 2025-04-01 PROBLEM — G47.14 HYPERSOMNIA DUE TO ANOTHER MEDICAL CONDITION: Status: RESOLVED | Noted: 2019-07-22 | Resolved: 2025-04-01

## 2025-04-01 PROBLEM — G47.19 EXCESSIVE DAYTIME SLEEPINESS: Status: RESOLVED | Noted: 2019-04-23 | Resolved: 2025-04-01

## 2025-04-01 PROBLEM — N60.11 FIBROCYSTIC BREAST CHANGES OF BOTH BREASTS: Status: ACTIVE | Noted: 2025-04-01

## 2025-04-01 PROBLEM — N63.20 MASS OF LEFT BREAST: Status: RESOLVED | Noted: 2017-12-18 | Resolved: 2025-04-01

## 2025-04-01 PROBLEM — N60.12 FIBROCYSTIC BREAST CHANGES OF BOTH BREASTS: Status: ACTIVE | Noted: 2025-04-01

## 2025-04-01 PROBLEM — D69.6 THROMBOCYTOPENIC DISORDER (HCC): Status: RESOLVED | Noted: 2018-06-12 | Resolved: 2025-04-01

## 2025-04-01 LAB
ALBUMIN SERPL BCG-MCNC: 4.2 G/DL (ref 3.5–5)
ALP SERPL-CCNC: 97 U/L (ref 34–104)
ALT SERPL W P-5'-P-CCNC: 19 U/L (ref 7–52)
ANION GAP SERPL CALCULATED.3IONS-SCNC: 9 MMOL/L (ref 4–13)
AST SERPL W P-5'-P-CCNC: 19 U/L (ref 13–39)
BASOPHILS # BLD AUTO: 0.03 THOUSANDS/ÂΜL (ref 0–0.1)
BASOPHILS NFR BLD AUTO: 0 % (ref 0–1)
BILIRUB SERPL-MCNC: 0.45 MG/DL (ref 0.2–1)
BUN SERPL-MCNC: 19 MG/DL (ref 5–25)
CALCIUM SERPL-MCNC: 9.2 MG/DL (ref 8.4–10.2)
CHLORIDE SERPL-SCNC: 102 MMOL/L (ref 96–108)
CHOLEST SERPL-MCNC: 149 MG/DL (ref ?–200)
CO2 SERPL-SCNC: 28 MMOL/L (ref 21–32)
CREAT SERPL-MCNC: 0.79 MG/DL (ref 0.6–1.3)
CREAT UR-MCNC: 138.1 MG/DL
CRP SERPL QL: 9.2 MG/L
EOSINOPHIL # BLD AUTO: 0.13 THOUSAND/ÂΜL (ref 0–0.61)
EOSINOPHIL NFR BLD AUTO: 2 % (ref 0–6)
ERYTHROCYTE [DISTWIDTH] IN BLOOD BY AUTOMATED COUNT: 13 % (ref 11.6–15.1)
ERYTHROCYTE [SEDIMENTATION RATE] IN BLOOD: 33 MM/HOUR (ref 0–29)
EST. AVERAGE GLUCOSE BLD GHB EST-MCNC: 169 MG/DL
GFR SERPL CREATININE-BSD FRML MDRD: 78 ML/MIN/1.73SQ M
GLUCOSE P FAST SERPL-MCNC: 105 MG/DL (ref 65–99)
HBA1C MFR BLD: 7.5 %
HCT VFR BLD AUTO: 41.6 % (ref 34.8–46.1)
HDLC SERPL-MCNC: 61 MG/DL
HGB BLD-MCNC: 13.6 G/DL (ref 11.5–15.4)
IMM GRANULOCYTES # BLD AUTO: 0.03 THOUSAND/UL (ref 0–0.2)
IMM GRANULOCYTES NFR BLD AUTO: 0 % (ref 0–2)
LDLC SERPL CALC-MCNC: 60 MG/DL (ref 0–100)
LYMPHOCYTES # BLD AUTO: 2.2 THOUSANDS/ÂΜL (ref 0.6–4.47)
LYMPHOCYTES NFR BLD AUTO: 29 % (ref 14–44)
MCH RBC QN AUTO: 29.6 PG (ref 26.8–34.3)
MCHC RBC AUTO-ENTMCNC: 32.7 G/DL (ref 31.4–37.4)
MCV RBC AUTO: 91 FL (ref 82–98)
MICROALBUMIN UR-MCNC: 9.9 MG/L
MICROALBUMIN/CREAT 24H UR: 7 MG/G CREATININE (ref 0–30)
MONOCYTES # BLD AUTO: 0.69 THOUSAND/ÂΜL (ref 0.17–1.22)
MONOCYTES NFR BLD AUTO: 9 % (ref 4–12)
NEUTROPHILS # BLD AUTO: 4.49 THOUSANDS/ÂΜL (ref 1.85–7.62)
NEUTS SEG NFR BLD AUTO: 60 % (ref 43–75)
NONHDLC SERPL-MCNC: 88 MG/DL
NRBC BLD AUTO-RTO: 0 /100 WBCS
PLATELET # BLD AUTO: 294 THOUSANDS/UL (ref 149–390)
PMV BLD AUTO: 10 FL (ref 8.9–12.7)
POTASSIUM SERPL-SCNC: 3.8 MMOL/L (ref 3.5–5.3)
PROT SERPL-MCNC: 7.5 G/DL (ref 6.4–8.4)
RBC # BLD AUTO: 4.59 MILLION/UL (ref 3.81–5.12)
SL AMB POCT HEMOGLOBIN AIC: 7.1 (ref ?–6.5)
SODIUM SERPL-SCNC: 139 MMOL/L (ref 135–147)
T4 FREE SERPL-MCNC: 1.26 NG/DL (ref 0.61–1.12)
TRIGL SERPL-MCNC: 142 MG/DL (ref ?–150)
TSH SERPL DL<=0.05 MIU/L-ACNC: 0.18 UIU/ML (ref 0.45–4.5)
WBC # BLD AUTO: 7.57 THOUSAND/UL (ref 4.31–10.16)

## 2025-04-01 PROCEDURE — 80061 LIPID PANEL: CPT

## 2025-04-01 PROCEDURE — 83036 HEMOGLOBIN GLYCOSYLATED A1C: CPT

## 2025-04-01 PROCEDURE — 99214 OFFICE O/P EST MOD 30 MIN: CPT | Performed by: FAMILY MEDICINE

## 2025-04-01 PROCEDURE — 84443 ASSAY THYROID STIM HORMONE: CPT

## 2025-04-01 PROCEDURE — G0537 PR RISK ASCVD TST ONCE PR 12 MO: HCPCS | Performed by: FAMILY MEDICINE

## 2025-04-01 PROCEDURE — 84439 ASSAY OF FREE THYROXINE: CPT

## 2025-04-01 PROCEDURE — 82570 ASSAY OF URINE CREATININE: CPT

## 2025-04-01 PROCEDURE — G2211 COMPLEX E/M VISIT ADD ON: HCPCS | Performed by: FAMILY MEDICINE

## 2025-04-01 PROCEDURE — 80053 COMPREHEN METABOLIC PANEL: CPT

## 2025-04-01 PROCEDURE — 83036 HEMOGLOBIN GLYCOSYLATED A1C: CPT | Performed by: FAMILY MEDICINE

## 2025-04-01 PROCEDURE — 82043 UR ALBUMIN QUANTITATIVE: CPT

## 2025-04-01 PROCEDURE — G0439 PPPS, SUBSEQ VISIT: HCPCS | Performed by: FAMILY MEDICINE

## 2025-04-01 RX ORDER — DULAGLUTIDE 0.75 MG/.5ML
INJECTION, SOLUTION SUBCUTANEOUS
Qty: 6 ML | Refills: 0 | Status: SHIPPED | OUTPATIENT
Start: 2025-04-01 | End: 2025-04-07 | Stop reason: SDUPTHER

## 2025-04-01 NOTE — ASSESSMENT & PLAN NOTE
Blood pressure is stable on losartan continue that and see me in 6 months   Orders:    Comprehensive metabolic panel; Future    Lipid panel; Future

## 2025-04-01 NOTE — PATIENT INSTRUCTIONS
Medicare Preventive Visit Patient Instructions  Thank you for completing your Welcome to Medicare Visit or Medicare Annual Wellness Visit today. Your next wellness visit will be due in one year (4/2/2026).  The screening/preventive services that you may require over the next 5-10 years are detailed below. Some tests may not apply to you based off risk factors and/or age. Screening tests ordered at today's visit but not completed yet may show as past due. Also, please note that scanned in results may not display below.  Preventive Screenings:  Service Recommendations Previous Testing/Comments   Colorectal Cancer Screening  * Colonoscopy    * Fecal Occult Blood Test (FOBT)/Fecal Immunochemical Test (FIT)  * Fecal DNA/Cologuard Test  * Flexible Sigmoidoscopy Age: 45-75 years old   Colonoscopy: every 10 years (may be performed more frequently if at higher risk)  OR  FOBT/FIT: every 1 year  OR  Cologuard: every 3 years  OR  Sigmoidoscopy: every 5 years  Screening may be recommended earlier than age 45 if at higher risk for colorectal cancer. Also, an individualized decision between you and your healthcare provider will decide whether screening between the ages of 76-85 would be appropriate. Colonoscopy: 01/18/2022  FOBT/FIT: Not on file  Cologuard: Not on file  Sigmoidoscopy: Not on file    Screening Current     Breast Cancer Screening Age: 40+ years old  Frequency: every 1-2 years  Not required if history of left and right mastectomy Mammogram: 04/18/2024    Screening Current   Cervical Cancer Screening Between the ages of 21-29, pap smear recommended once every 3 years.   Between the ages of 30-65, can perform pap smear with HPV co-testing every 5 years.   Recommendations may differ for women with a history of total hysterectomy, cervical cancer, or abnormal pap smears in past. Pap Smear: 11/15/2018    Screening Not Indicated   Hepatitis C Screening Once for adults born between 1945 and 1965  More frequently in  patients at high risk for Hepatitis C Hep C Antibody: 11/17/2017    Screening Current   Diabetes Screening 1-2 times per year if you're at risk for diabetes or have pre-diabetes Fasting glucose: 109 mg/dL (3/7/2024)  A1C: 6.7 % (3/7/2024)  Screening Not Indicated  History Diabetes   Cholesterol Screening Once every 5 years if you don't have a lipid disorder. May order more often based on risk factors. Lipid panel: 03/07/2024    Screening Not Indicated  History Lipid Disorder     Other Preventive Screenings Covered by Medicare:  Abdominal Aortic Aneurysm (AAA) Screening: covered once if your at risk. You're considered to be at risk if you have a family history of AAA.  Lung Cancer Screening: covers low dose CT scan once per year if you meet all of the following conditions: (1) Age 55-77; (2) No signs or symptoms of lung cancer; (3) Current smoker or have quit smoking within the last 15 years; (4) You have a tobacco smoking history of at least 20 pack years (packs per day multiplied by number of years you smoked); (5) You get a written order from a healthcare provider.  Glaucoma Screening: covered annually if you're considered high risk: (1) You have diabetes OR (2) Family history of glaucoma OR (3)  aged 50 and older OR (4)  American aged 65 and older  Osteoporosis Screening: covered every 2 years if you meet one of the following conditions: (1) You're estrogen deficient and at risk for osteoporosis based off medical history and other findings; (2) Have a vertebral abnormality; (3) On glucocorticoid therapy for more than 3 months; (4) Have primary hyperparathyroidism; (5) On osteoporosis medications and need to assess response to drug therapy.   Last bone density test (DXA Scan): 11/17/2017.  HIV Screening: covered annually if you're between the age of 15-65. Also covered annually if you are younger than 15 and older than 65 with risk factors for HIV infection. For pregnant patients, it is  covered up to 3 times per pregnancy.    Immunizations:  Immunization Recommendations   Influenza Vaccine Annual influenza vaccination during flu season is recommended for all persons aged >= 6 months who do not have contraindications   Pneumococcal Vaccine   * Pneumococcal conjugate vaccine = PCV13 (Prevnar 13), PCV15 (Vaxneuvance), PCV20 (Prevnar 20)  * Pneumococcal polysaccharide vaccine = PPSV23 (Pneumovax) Adults 19-63 yo with certain risk factors or if 65+ yo  If never received any pneumonia vaccine: recommend Prevnar 20 (PCV20)  Give PCV20 if previously received 1 dose of PCV13 or PPSV23   Hepatitis B Vaccine 3 dose series if at intermediate or high risk (ex: diabetes, end stage renal disease, liver disease)   Respiratory syncytial virus (RSV) Vaccine - COVERED BY MEDICARE PART D  * RSVPreF3 (Arexvy) CDC recommends that adults 60 years of age and older may receive a single dose of RSV vaccine using shared clinical decision-making (SCDM)   Tetanus (Td) Vaccine - COST NOT COVERED BY MEDICARE PART B Following completion of primary series, a booster dose should be given every 10 years to maintain immunity against tetanus. Td may also be given as tetanus wound prophylaxis.   Tdap Vaccine - COST NOT COVERED BY MEDICARE PART B Recommended at least once for all adults. For pregnant patients, recommended with each pregnancy.   Shingles Vaccine (Shingrix) - COST NOT COVERED BY MEDICARE PART B  2 shot series recommended in those 19 years and older who have or will have weakened immune systems or those 50 years and older     Health Maintenance Due:      Topic Date Due   • Breast Cancer Screening: Mammogram  04/18/2025   • Colorectal Cancer Screening  01/16/2029   • Hepatitis C Screening  Completed     Immunizations Due:      Topic Date Due   • COVID-19 Vaccine (6 - 2024-25 season) 09/01/2024     Advance Directives   What are advance directives?  Advance directives are legal documents that state your wishes and plans for  medical care. These plans are made ahead of time in case you lose your ability to make decisions for yourself. Advance directives can apply to any medical decision, such as the treatments you want, and if you want to donate organs.   What are the types of advance directives?  There are many types of advance directives, and each state has rules about how to use them. You may choose a combination of any of the following:  Living will:  This is a written record of the treatment you want. You can also choose which treatments you do not want, which to limit, and which to stop at a certain time. This includes surgery, medicine, IV fluid, and tube feedings.   Durable power of  for healthcare (DPAHC):  This is a written record that states who you want to make healthcare choices for you when you are unable to make them for yourself. This person, called a proxy, is usually a family member or a friend. You may choose more than 1 proxy.  Do not resuscitate (DNR) order:  A DNR order is used in case your heart stops beating or you stop breathing. It is a request not to have certain forms of treatment, such as CPR. A DNR order may be included in other types of advance directives.  Medical directive:  This covers the care that you want if you are in a coma, near death, or unable to make decisions for yourself. You can list the treatments you want for each condition. Treatment may include pain medicine, surgery, blood transfusions, dialysis, IV or tube feedings, and a ventilator (breathing machine).  Values history:  This document has questions about your views, beliefs, and how you feel and think about life. This information can help others choose the care that you would choose.  Why are advance directives important?  An advance directive helps you control your care. Although spoken wishes may be used, it is better to have your wishes written down. Spoken wishes can be misunderstood, or not followed. Treatments may be given  even if you do not want them. An advance directive may make it easier for your family to make difficult choices about your care.   Weight Management   Why it is important to manage your weight:  Being overweight increases your risk of health conditions such as heart disease, high blood pressure, type 2 diabetes, and certain types of cancer. It can also increase your risk for osteoarthritis, sleep apnea, and other respiratory problems. Aim for a slow, steady weight loss. Even a small amount of weight loss can lower your risk of health problems.  How to lose weight safely:  A safe and healthy way to lose weight is to eat fewer calories and get regular exercise. You can lose up about 1 pound a week by decreasing the number of calories you eat by 500 calories each day.   Healthy meal plan for weight management:  A healthy meal plan includes a variety of foods, contains fewer calories, and helps you stay healthy. A healthy meal plan includes the following:  Eat whole-grain foods more often.  A healthy meal plan should contain fiber. Fiber is the part of grains, fruits, and vegetables that is not broken down by your body. Whole-grain foods are healthy and provide extra fiber in your diet. Some examples of whole-grain foods are whole-wheat breads and pastas, oatmeal, brown rice, and bulgur.  Eat a variety of vegetables every day.  Include dark, leafy greens such as spinach, kale, mera greens, and mustard greens. Eat yellow and orange vegetables such as carrots, sweet potatoes, and winter squash.   Eat a variety of fruits every day.  Choose fresh or canned fruit (canned in its own juice or light syrup) instead of juice. Fruit juice has very little or no fiber.  Eat low-fat dairy foods.  Drink fat-free (skim) milk or 1% milk. Eat fat-free yogurt and low-fat cottage cheese. Try low-fat cheeses such as mozzarella and other reduced-fat cheeses.  Choose meat and other protein foods that are low in fat.  Choose beans or other  legumes such as split peas or lentils. Choose fish, skinless poultry (chicken or turkey), or lean cuts of red meat (beef or pork). Before you cook meat or poultry, cut off any visible fat.   Use less fat and oil.  Try baking foods instead of frying them. Add less fat, such as margarine, sour cream, regular salad dressing and mayonnaise to foods. Eat fewer high-fat foods. Some examples of high-fat foods include french fries, doughnuts, ice cream, and cakes.  Eat fewer sweets.  Limit foods and drinks that are high in sugar. This includes candy, cookies, regular soda, and sweetened drinks.  Exercise:  Exercise at least 30 minutes per day on most days of the week. Some examples of exercise include walking, biking, dancing, and swimming. You can also fit in more physical activity by taking the stairs instead of the elevator or parking farther away from stores. Ask your healthcare provider about the best exercise plan for you.   Narcotic (Opioid) Safety    Use narcotics safely:  Take prescribed narcotics exactly as directed  Do not give narcotics to others or take narcotics that belong to someone else  Do not mix narcotics without medicines or alcohol  Do not drive or operate heavy machinery after you take the narcotic  Monitor for side effects and notify your healthcare provider if you experienced side effects such as nausea, sleepiness, itching, or trouble thinking clearly.    Manage constipation:    Constipation is the most common side effect of narcotic medicine. Constipation is when you have hard, dry bowel movements, or you go longer than usual between bowel movements. Tell your healthcare provider about all changes in your bowel movements while you are taking narcotics. He or she may recommend laxative medicine to help you have a bowel movement. He or she may also change the kind of narcotic you are taking, or change when you take it. The following are more ways you can prevent or relieve constipation:    Drink  liquids as directed.  You may need to drink extra liquids to help soften and move your bowels. Ask how much liquid to drink each day and which liquids are best for you.  Eat high-fiber foods.  This may help decrease constipation by adding bulk to your bowel movements. High-fiber foods include fruits, vegetables, whole-grain breads and cereals, and beans. Your healthcare provider or dietitian can help you create a high-fiber meal plan. Your provider may also recommend a fiber supplement if you cannot get enough fiber from food.  Exercise regularly.  Regular physical activity can help stimulate your intestines. Walking is a good exercise to prevent or relieve constipation. Ask which exercises are best for you.  Schedule a time each day to have a bowel movement.  This may help train your body to have regular bowel movements. Bend forward while you are on the toilet to help move the bowel movement out. Sit on the toilet for at least 10 minutes, even if you do not have a bowel movement.    Store narcotics safely:   Store narcotics where others cannot easily get them.  Keep them in a locked cabinet or secure area. Do not  keep them in a purse or other bag you carry with you. A person may be looking for something else and find the narcotics.  Make sure narcotics are stored out of the reach of children.  A child can easily overdose on narcotics. Narcotics may look like candy to a small child.    The best way to dispose of narcotics:      The laws vary by country and area. In the [place], the best way is to return the narcotics through a take-back program. This program is offered by the [place] Drug Enforcement Agency ([employer]). The following are options for using the program:  Take the narcotics to a [place] collection site.  The site is often a law enforcement center. Call your local law enforcement center for scheduled take-back days in your area. You will be given information on where to go if the collection site is in a  different location.  Take the narcotics to an approved pharmacy or hospital.  A pharmacy or hospital may be set up as a collection site. You will need to ask if it is a [place] collection site if you were not directed there. A pharmacy or doctor's office may not be able to take back narcotics unless it is a [place] site.  Use a mail-back system.  This means you are given containers to put the narcotics into. You will then mail them in the containers.  Use a take-back drop box.  This is a place to leave the narcotics at any time. People and animals will not be able to get into the box. Your local law enforcement agency can tell you where to find a drop box in your area.    Other ways to manage pain:   Ask your healthcare provider about non-narcotic medicines to control pain.  Nonprescription medicines include NSAIDs (such as ibuprofen) and acetaminophen. Prescription medicines include muscle relaxers, antidepressants, and steroids.  Pain may be managed without any medicines.  Some ways to relieve pain include massage, aromatherapy, or meditation. Physical or occupational therapy may also help.    For more information:   Drug Enforcement Administration  [address] [place], [place] [id number]  Phone: 3- 331 - 974-0807  Web Address: https://www.deadiversion.[facility].gov/drug_disposal/    [place] Food and Drug Administration  [address] [place] [address]  Phone: 7- 842 - 863-0452  Web Address: http://www.fda.gov     © Copyright [employer] 2018 Information is for End User's use only and may not be sold, redistributed or otherwise used for commercial purposes. All illustrations and images included in CareNotes® are the copyrighted property of [employer] Inc[employer] or [employer]

## 2025-04-01 NOTE — ASSESSMENT & PLAN NOTE
Check labs continue levothyroxine followup with endocrinology   Orders:    TSH, 3rd generation with Free T4 reflex; Future

## 2025-04-01 NOTE — ASSESSMENT & PLAN NOTE
Lab Results   Component Value Date    EGFR 75 09/24/2024    EGFR 72 03/07/2024    EGFR 74 03/16/2023    CREATININE 0.81 09/24/2024    CREATININE 0.85 03/07/2024    CREATININE 0.83 03/16/2023   GFR is stable continue losartan and stay hydrated avoid NSAIDS

## 2025-04-01 NOTE — ASSESSMENT & PLAN NOTE
Patient is to see neurology she continues on cymbalta and gabapentin with some relief she will see them yearly

## 2025-04-01 NOTE — ASSESSMENT & PLAN NOTE
Check xray trial of tylenol refer to ortho  Orders:    Ambulatory Referral to Orthopedic Surgery; Future

## 2025-04-01 NOTE — PROGRESS NOTES
Name: Yuni Hall      : 1960      MRN: [id number]  Encounter Provider: Ashley Ramirez DO  Encounter Date: 2025   Encounter department: [facility] PRIMARY CARE    Assessment & Plan  Medicare annual wellness visit, subsequent  Advanced directive paperwork given recommneded covid zoster and flu shot continue to see her eye doctor and dentist patient diet and exercise were reviewed with hier patient to see me in 6 months Mammogram is due in April and ordered  Orders:    Mammo screening bilateral w 3d and cad; Future    Hypothyroidism due to Hashimoto's thyroiditis  Check labs continue levothyroxine followup with endocrinology   Orders:    TSH, 3rd generation with Free T4 reflex; Future    Type 2 diabetes mellitus with hyperglycemia, without long-term current use of insulin (HCC)  A1c was high Patient has not had labs in some time Patient to continue trulicity for now and check albs   Lab Results   Component Value Date    HGBA1C 7.1 (A) 2025       Orders:    POCT hemoglobin A1c    Hemoglobin A1C; Future    Lipid panel; Future    Albumin / creatinine urine ratio; Future    Neuromuscular disorder (HCC)  Patient is to see neurology she continues on cymbalta and gabapentin with some relief she will see them yearly        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HCC)         Pituitary microadenoma (HCC)  Reviewed last MRI and last neurology and endocrinology consult Patient to followup with neurology to discuss the frequency of imaging needed       Obesity (BMI 30-39.9)  Weight is stable continue with diet and exercise followup in 6months        Coronary artery calcification seen on CAT scan  Check labs continue the crestor Patient may  Orders:    Comprehensive metabolic panel; Future    Lipid panel; Future    Essential hypertension  Blood pressure is stable on losartan continue that and see me in 6 months   Orders:    Comprehensive metabolic panel; Future    Lipid panel; Future    Fibrocystic  breast disease (FCBD), unspecified laterality  Check mammogram  Orders:    Mammo screening bilateral w 3d and cad; Future    Encounter for screening mammogram for breast cancer    Orders:    Mammo screening bilateral w 3d and cad; Future    Vitamin D deficiency  Continue with supplements and check labs       Myofascial pain  Foillowup with rheumatology continue plaquenil and the cymbalta plus neurontin       Hypertriglyceridemia  Disucssed diet and exercise check labs continue crestor       Chronic pain of right knee  Check xray trial of tylenol refer to ortho  Orders:    Ambulatory Referral to Orthopedic Surgery; Future    CKD (chronic kidney disease) stage 2, GFR 60-89 ml/min  Lab Results   Component Value Date    EGFR 75 09/24/2024    EGFR 72 03/07/2024    EGFR 74 03/16/2023    CREATININE 0.81 09/24/2024    CREATININE 0.85 03/07/2024    CREATININE 0.83 03/16/2023   GFR is stable continue losartan and stay hydrated avoid NSAIDS         Connective tissue disease (HCC)  Continue plaquenil reviewed last rheumatology note followup in [facility]           Preventive health issues were discussed with patient, and age appropriate screening tests were ordered as noted in patient's After Visit Summary. Personalized health advice and appropriate referrals for health education or preventive services given if needed, as noted in patient's After Visit Summary.    History of Present Illness     Patient is here for medicare wellness transferring to new PCP and followup of diabetes hypertriglycerides connective tissue disease hypertension hashimoto thyroiditis pituitary micro adenoma neuromuscular disorder obvestiy and myofascial pain Patient has had a lot of issues with her pain She states that when her pain flares which it does often and unexpectantly she gets nausea and vomiting Patient states that  she can go days where she is is barely eating and can only sip coke as it calms her stomach Patient has seen GI and they are unable  to help as they feel this is her reaction to pain patient is also seeing rheumatology who prescribes the plaquenil Patient nerves are stable Patient is not checking her sugars either One year since last labs        Patient Care Team:  Ashley Ramirez DO as PCP - General (Family Medicine)  Maite Bishop MD as PCP - Endocrinology (Endocrinology)  DO Jared Marquis MD Michelle McCarroll, DPM (Podiatry)    Review of Systems   Constitutional:  Negative for fatigue, fever and unexpected weight change.   HENT:  Negative for congestion, sinus pain and trouble swallowing.    Eyes:  Negative for discharge and visual disturbance.   Respiratory:  Negative for cough, chest tightness, shortness of breath and wheezing.    Cardiovascular:  Negative for chest pain, palpitations and leg swelling.   Gastrointestinal:  Positive for nausea and vomiting. Negative for abdominal pain, blood in stool, constipation and diarrhea.   Genitourinary:  Negative for difficulty urinating, dysuria, frequency and hematuria.   Musculoskeletal:  Positive for myalgias. Negative for arthralgias, gait problem and joint swelling.   Skin:  Negative for rash and wound.   Allergic/Immunologic: Negative for environmental allergies and food allergies.   Neurological:  Negative for dizziness, syncope, weakness, numbness and headaches.   Hematological:  Negative for adenopathy. Does not bruise/bleed easily.   Psychiatric/Behavioral:  Negative for confusion, decreased concentration and sleep disturbance. The patient is not nervous/anxious.      Medical History Reviewed by provider this encounter:  Tobacco  Allergies  Meds  Problems  Med Hx  Surg Hx  Fam Hx       Annual Wellness Visit Questionnaire   Yuni is here for her Subsequent Wellness visit.     Health Risk Assessment:   Patient rates overall health as good. Patient feels that their physical health rating is slightly worse. Patient is satisfied with their life. Eyesight was  rated as same. Hearing was rated as same. Patient feels that their emotional and mental health rating is same. Patients states they are never, rarely angry. Patient states they are sometimes unusually tired/fatigued. Pain experienced in the last 7 days has been a lot. Patient's pain rating has been 10/10. Patient states that she has experienced no weight loss or gain in last 6 months. Waxes and wanes    Depression Screening:   PHQ-9 Score: 3      Fall Risk Screening:   In the past year, patient has experienced: no history of falling in past year      Urinary Incontinence Screening:   Patient has not leaked urine accidently in the last six months.     Home Safety:  Patient does not have trouble with stairs inside or outside of their home. Patient has working smoke alarms and has working carbon monoxide detector. Home safety hazards include: none.     Nutrition:   Current diet is Regular.     Medications:   Patient is currently taking over-the-counter supplements. OTC medications include: see medication list. Patient is able to manage medications.     Activities of Daily Living (ADLs)/Instrumental Activities of Daily Living (IADLs):   Walk and transfer into and out of bed and chair?: Yes  Dress and groom yourself?: Yes    Bathe or shower yourself?: Yes    Feed yourself? Yes  Do your laundry/housekeeping?: Yes  Manage your money, pay your bills and track your expenses?: Yes  Make your own meals?: Yes    Do your own shopping?: Yes    Previous Hospitalizations:   Any hospitalizations or ED visits within the last 12 months?: No      Advance Care Planning:   Living will: Yes    Durable POA for healthcare: Yes    Advanced directive: Yes      Cognitive Screening:   Provider or family/friend/caregiver concerned regarding cognition?: No    PREVENTIVE SCREENINGS      Cardiovascular Screening:    General: Screening Not Indicated and History Lipid Disorder      Diabetes Screening:     General: Screening Not Indicated and History  "Diabetes      Colorectal Cancer Screening:     General: Screening Current      Breast Cancer Screening:     General: Screening Current      Cervical Cancer Screening:    General: Screening Not Indicated      Lung Cancer Screening:     General: Screening Not Indicated      Hepatitis C Screening:    General: Screening Current    Screening, Brief Intervention, and Referral to Treatment (SBIRT)     Screening  Typical number of drinks in a day: 0  Typical number of drinks in a week: 0  Interpretation: Low risk drinking behavior.    Review of Current Opioid Use    Opioid Risk Tool (ORT) Interpretation: Complete Opioid Risk Tool (ORT)    Social Drivers of Health     Financial Resource Strain: High Risk (8/17/2022)    Overall Financial Resource Strain (CARDIA)     Difficulty of Paying Living Expenses: Very hard   Food Insecurity: No Food Insecurity (4/1/2025)    Hunger Vital Sign     Worried About Running Out of Food in the Last Year: Never true     Ran Out of Food in the Last Year: Never true   Transportation Needs: No Transportation Needs (4/1/2025)    PRAPARE - Transportation     Lack of Transportation (Medical): No     Lack of Transportation (Non-Medical): No   Housing Stability: Low Risk  (4/1/2025)    Housing Stability Vital Sign     Unable to Pay for Housing in the Last Year: No     Number of Times Moved in the Last Year: 0     Homeless in the Last Year: No   Utilities: Not At Risk (4/1/2025)    [facility] Utilities     Threatened with loss of utilities: No     No results found.    Objective   /78 (BP Location: Left arm, Patient Position: Sitting, Cuff Size: Large)   Pulse 75   Temp (!) 97.3 °F (36.3 °C) (Temporal)   Ht 5' 6\" (1.676 m)   SpO2 97%   BMI 38.25 kg/m²     Physical Exam  Vitals and nursing note reviewed.   Constitutional:       Appearance: She is well-developed. She is obese.   HENT:      Head: Normocephalic and atraumatic.      Right Ear: Hearing, tympanic membrane and external ear normal.      Left " Ear: Hearing, tympanic membrane and external ear normal.   Eyes:      Extraocular Movements: Extraocular movements intact.      Conjunctiva/sclera: Conjunctivae normal.      Pupils: Pupils are equal, round, and reactive to light.   Neck:      Thyroid: No thyromegaly.   Cardiovascular:      Rate and Rhythm: Normal rate and regular rhythm.      Heart sounds: Normal heart sounds.   Pulmonary:      Effort: Pulmonary effort is normal.      Breath sounds: Normal breath sounds. No wheezing or rales.   Abdominal:      General: Bowel sounds are normal. There is no distension.      Palpations: Abdomen is soft.      Tenderness: There is no abdominal tenderness.   Musculoskeletal:         General: No tenderness.      Cervical back: Neck supple.   Lymphadenopathy:      Cervical: No cervical adenopathy.   Skin:     General: Skin is warm and dry.      Findings: No rash.   Neurological:      General: No focal deficit present.      Mental Status: She is alert and oriented to person, place, and time.      Cranial Nerves: No cranial nerve deficit.      Coordination: Coordination normal.   Psychiatric:         Mood and Affect: Mood normal.         Behavior: Behavior normal.         Thought Content: Thought content normal.         Judgment: Judgment normal.            Skin:     General: Skin is warm and dry.      Findings: No rash.   Neurological:      General: No focal deficit present.      Mental Status: She is alert and oriented to person, place, and time.      Cranial Nerves: No cranial nerve deficit.      Coordination: Coordination normal.   Psychiatric:         Mood and Affect: Mood normal.         Behavior: Behavior normal.         Thought Content: Thought content normal.         Judgment: Judgment normal.

## 2025-04-01 NOTE — ASSESSMENT & PLAN NOTE
Check labs continue the crestor Patient may  Orders:    Comprehensive metabolic panel; Future    Lipid panel; Future

## 2025-04-01 NOTE — ASSESSMENT & PLAN NOTE
A1c was high Patient has not had labs in some time Patient to continue trulicity for now and check albs   Lab Results   Component Value Date    HGBA1C 7.1 (A) 04/01/2025       Orders:    POCT hemoglobin A1c    Hemoglobin A1C; Future    Lipid panel; Future    Albumin / creatinine urine ratio; Future

## 2025-04-01 NOTE — ASSESSMENT & PLAN NOTE
Advanced directive paperwork given recommneded covid zoster and flu shot continue to see her eye doctor and dentist patient diet and exercise were reviewed with hier patient to see me in 6 months Mammogram is due in April and ordered  Orders:    Mammo screening bilateral w 3d and cad; Future

## 2025-04-01 NOTE — ASSESSMENT & PLAN NOTE
Reviewed last MRI and last neurology and endocrinology consult Patient to followup with neurology to discuss the frequency of imaging needed

## 2025-04-02 ENCOUNTER — OFFICE VISIT (OUTPATIENT)
Facility: REHABILITATION | Age: 65
End: 2025-04-02
Payer: MEDICARE

## 2025-04-02 ENCOUNTER — TELEPHONE (OUTPATIENT)
Age: 65
End: 2025-04-02

## 2025-04-02 DIAGNOSIS — I10 ESSENTIAL HYPERTENSION: ICD-10-CM
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
## 2025-04-02 PROCEDURE — 97110 THERAPEUTIC EXERCISES: CPT | Performed by: PHYSICAL THERAPIST

## 2025-04-02 NOTE — TELEPHONE ENCOUNTER
Patient wanted to make her PCP aware that she did not fast for her labs and that is why she did not feel like an endo referral was necessary. She would like a call back to confirm that is why her PCP mentioned the referral and not because of her TSH. Patient has therapy today so it is okay to leave a message if she is unable to answer.

## 2025-04-02 NOTE — TELEPHONE ENCOUNTER
Pt called back and wanted to let her know that she had seen an endocrinologist in the past and it didn't help her and doesn't want to go back to one. She also said that her trulicity medication will be through [facility] and if she can please call the [phone] and submit that information. Pt states she knows why her A1C was high because during that time she was vomiting and could not keep anything down but soda and that's why it was high. She also said during the 90 days period she had stop the trulicity for about 2 weeks. Please review and advise [phone] thank you.

## 2025-04-02 NOTE — PROGRESS NOTES
" PT Skilled Maintenance Note     Today's date: 2025  Patient name: Yuni Hall  : 1960  MRN: [id number]  Referring provider: Arnie Cadena DO  Dx:   Encounter Diagnosis     ICD-10-CM    1. Fall, subsequent encounter  W19.XXXD           Subjective:   Has been throwing up from pain.  Will see Dr. Angulo, orthopedist, this coming Friday for the right knee.    Hasn't been able to do ballet videos.    Pain cycles up and down, face can get pale and she gets sweaty, and has vomited.    Helped by 2 sips of soda.    Seems to be no rhyme or reason as to which activity provokes pain.    Right elbow acted up again, unsure of cause.  This is much more infrequent than right knee pain.  Last year was able to get out of knee pain cycle with physical therapy, this year not able to.   Pain about the right knee is about inferior to the  patella about 4-5 inches distal anteriorly to this.  Also about the middle of the popliteal fossa.      Objective: See treatment diary below     Specialty Daily Treatment Diary   Exercise Diary  4/02/25 3/26/25 3/11/25 3/4/25   Static and dynamic balance (neuromuscular re-education)       Strengthening & endurance  (therapeutic exercise) Scifit level 1   14 about 70 spm    Step ups 4\" step 1 - 1.5  min each and 2 sets on right    Seated right knee flexion stretching, better with hands about back of knee  To 73 degrees flexion    51.5 cm limb girth over mid patella right, 48.5 cms left    Standing heel raise increased right weight bearing 10 - 15 x 2 sets    Standing hip abduction, yellow band 1.5 min    Marching in parallel bars, simulating stepping over things 2 min       SciFit level 1.0   15 min, about 65 - 70 spm    Step ups 4\" step 1 min each, railings x 2 sets     Standing heel raise increased weight on right  30 sec  Standing heel raise bilaterally focus on straightening knees 1 min    Right single leg stance with nearby railings tapping blazepods on left  45 sec 30 sec  Then " "taping with R 45 sec    Right knee flexion to roughly 85 degrees    High stepping 1.5 min           Outcomes below    Standing right knee flexion on 6\" stair step  1.5 min    Step ups 4\" step   1 min R leading    Heel raise increased one-sided weight bearing  1 min  Standing hip abduction, 20 sec   Single leg stance ipsilateral hand hold to railing, tap blaze pod laterally 50 sec x 3    High stepping 1 handrail  1.5 min  Then remeasured knee flexion, 74 degrees    SciFit level 1, 8-10 minutes       Outcomes below      Shoulder stretch at counter   1min total, gentle rocking     TB shoulder extension w/ emphasis on posture and scap depression   Black x30 reps                            PHYSICAL EXAM / TREATMENT    Precautions - Hashimoto's, autoimmune disease    Mobility Measures 3/11/25 1/28/25 3/4 10/09/24 9/04/24 7/23/24 7/02/24 6/05/24 5/1/24 3/27/24 2/28/24 1/31/24 8/23/23 9/20/23 10/18/23 11/22/23   Assistive device used? none none None  none none none none none none none None none none none none none   5 Time Sit to Stand  (17\" chair, arms across chest) Requires 3 attempts to stand without hands from 18\" surface   18\" chair  36 seconds   18\" chair  19.14 sec 23.1 seconds   18\" chair   18.4 sec  18\" chair arms out front 29 sec.    19 sec. With arms out in front  21.6 seconds  From 18\" surface  Arms out front Unable from 18\" surface today    19.5 seconds from 20\" surface   16.4 seconds arms out front  Arms out front  24 seconds Arms out front   A couple extra attempts  29 second- 52 seconds 31 seconds Arms out front  24 seconds  Pain in left knee   19 seconds  From 20\" surface, arms across chest    28 seconds from 18\" surface 35 seconds arms out front    15 seconds 20\" surface  Arms out front     3 Meter Timed  Up & Go 13.7 seconds 15.0 seconds 10.38 sec 9.3 sec 8.0 sec  12.3 sec 13.3 sec 9.2 sec 10.5 seconds 12.3 seconds 9.8 sec 10.6 sec   11.8 sec  Mild antalgic gait on left   10.1 seconds 9.6 seconds "   Walking speed                   Functional Gait Assessment (see below) Deferred by therapist  19/30 19/30 16/30 17/30 21/30 17/30 21/30 20/30 20/30   6 Minute Walk Test (100 foot circular course)   Deferred by therapist 810 feet   No assistive device   Hold, nausea  770 feet   No AD 1012 feet  No AD   800 feet no AD   940 feet no  feet 908 feet  No AD  103 bpm 1005 feet no  feet no  feet on treadmill inferred from self-selected pace 1.2 mph  875 feet no AD  Tired in legs  98 bpm   925 feet no AD   Patient-Reported Outcome Measure: Activities-Specific Balance Confidence Scale (ABC Scale)                   Right knee flexion PROM, sitting 70 degrees from seated in chair 94 degrees from seated on plinth 93 degrees while seated in chair 93 degrees 97 degrees    98 degrees   92 degrees 94 degrees 93 degrees 95 degrees                             ASSESSMENT   After improving knee range of motion last week, regression this week.  Yuni does have swelling about the right knee, which is likely limiting flexion range of motion.    Continue to be active and move as able.   Continue modifications to exercise videos at home, and standing exercises such as heel raises, standing hip abduction.      New maintenance goals:  1. Walk 15 minutes consecutively for community ambulation and exercise.     NOT MET    Walking to tolerance, which can vary by day.  Walking up to about 30 minutes with intermittent standing.  Walking around grocery store.    2. Manages 150 minutes of moderate intensity aerobic exercise per week.        Depends on day/week.  Not met for past couple weeks. Is focusing on physical activity.    3. Maintains at least 1000 feet during 6 minute walk test, at most 15 seconds 5 times sit to stand.       NOT MET.      PLAN OF CARE:  Patient will benefit from physical therapy 1x/week to 1x/ 1-2 weeks for 2 months  Neuromuscular re-education, therapeutic exercises, and therapeutic activities as  outlined in grids.

## 2025-04-02 NOTE — TELEPHONE ENCOUNTER
----- Message from Ashley Ramirez DO sent at 4/1/2025  5:49 PM EDT -----  Call patient her thyroid numbers appear slightly overacitve and her A1c is definitely too high at 7.5 I would like her to see endocrinology due to this I placed referral

## 2025-04-03 RX ORDER — LOSARTAN POTASSIUM 50 MG/1
50 TABLET ORAL DAILY
Qty: 90 TABLET | Refills: 1 | Status: SHIPPED | OUTPATIENT
Start: 2025-04-03

## 2025-04-04 ENCOUNTER — OFFICE VISIT (OUTPATIENT)
Dept: OBGYN CLINIC | Facility: MEDICAL CENTER | Age: 65
End: 2025-04-04
Payer: MEDICARE

## 2025-04-04 ENCOUNTER — APPOINTMENT (OUTPATIENT)
Dept: RADIOLOGY | Facility: MEDICAL CENTER | Age: 65
End: 2025-04-04
Payer: MEDICARE

## 2025-04-04 ENCOUNTER — TELEPHONE (OUTPATIENT)
Dept: PEDIATRICS CLINIC | Facility: CLINIC | Age: 65
End: 2025-04-04

## 2025-04-04 VITALS — WEIGHT: 240 LBS | HEIGHT: 66 IN | BODY MASS INDEX: 38.57 KG/M2

## 2025-04-04 DIAGNOSIS — M17.11 PRIMARY OSTEOARTHRITIS OF RIGHT KNEE: ICD-10-CM

## 2025-04-04 DIAGNOSIS — M25.562 LEFT KNEE PAIN, UNSPECIFIED CHRONICITY: Primary | ICD-10-CM

## 2025-04-04 DIAGNOSIS — G89.29 CHRONIC PAIN OF RIGHT KNEE: ICD-10-CM

## 2025-04-04 DIAGNOSIS — M25.561 RIGHT KNEE PAIN, UNSPECIFIED CHRONICITY: ICD-10-CM

## 2025-04-04 DIAGNOSIS — M25.561 CHRONIC PAIN OF RIGHT KNEE: ICD-10-CM

## 2025-04-04 DIAGNOSIS — M25.562 LEFT KNEE PAIN, UNSPECIFIED CHRONICITY: ICD-10-CM

## 2025-04-04 DIAGNOSIS — M17.12 PRIMARY OSTEOARTHRITIS OF LEFT KNEE: ICD-10-CM

## 2025-04-04 DIAGNOSIS — Z00.6 ENCOUNTER FOR EXAMINATION FOR NORMAL COMPARISON OR CONTROL IN CLINICAL RESEARCH PROGRAM: ICD-10-CM

## 2025-04-04 PROBLEM — M17.0 PRIMARY OSTEOARTHRITIS OF BOTH KNEES: Status: ACTIVE | Noted: 2025-04-04

## 2025-04-04 PROCEDURE — 99214 OFFICE O/P EST MOD 30 MIN: CPT | Performed by: ORTHOPAEDIC SURGERY

## 2025-04-04 PROCEDURE — 73564 X-RAY EXAM KNEE 4 OR MORE: CPT

## 2025-04-04 RX ORDER — DICLOFENAC SODIUM 75 MG/1
75 TABLET, DELAYED RELEASE ORAL 2 TIMES DAILY
Qty: 60 TABLET | Refills: 0 | Status: SHIPPED | OUTPATIENT
Start: 2025-04-04

## 2025-04-04 NOTE — ASSESSMENT & PLAN NOTE
Orders:    Injection Procedure Prior Authorization; Future    Ambulatory Referral to Physical Therapy; Future    diclofenac (VOLTAREN) 75 mg EC tablet; Take 1 tablet (75 mg total) by mouth 2 (two) times a day

## 2025-04-04 NOTE — TELEPHONE ENCOUNTER
Caller: pt     Doctor: Dr. Anderson     Reason for call: pt is requesting medication discussed in office today to be sent to the pharmacy on file. (States it was an anti inflammatory)     Call back#: Phone:   [phone]
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
## 2025-04-07 DIAGNOSIS — E11.65 TYPE 2 DIABETES MELLITUS WITH HYPERGLYCEMIA, WITHOUT LONG-TERM CURRENT USE OF INSULIN (HCC): ICD-10-CM

## 2025-04-07 PROBLEM — E66.1 CLASS 2 DRUG-INDUCED OBESITY WITH BODY MASS INDEX (BMI) OF 38.0 TO 38.9 IN ADULT: Status: ACTIVE | Noted: 2025-04-07

## 2025-04-07 PROBLEM — E66.812 CLASS 2 DRUG-INDUCED OBESITY WITH BODY MASS INDEX (BMI) OF 38.0 TO 38.9 IN ADULT: Status: ACTIVE | Noted: 2025-04-07

## 2025-04-07 RX ORDER — DULAGLUTIDE 0.75 MG/.5ML
0.5 INJECTION, SOLUTION SUBCUTANEOUS WEEKLY
Qty: 6 ML | Refills: 0 | Status: SHIPPED | OUTPATIENT
Start: 2025-04-07

## 2025-04-07 NOTE — ASSESSMENT & PLAN NOTE
Discussed diet and exercise Recommended 5 days a week of exercise 30 minutes per day Recommended low carb and low fat diet   BMI Counseling: Body mass index is 38.25 kg/m². The BMI is above normal. Nutrition recommendations include reducing portion sizes, 3-5 servings of fruits/vegetables daily, and consuming healthier snacks. Exercise recommendations include moderate aerobic physical activity for 150 minutes/week.

## 2025-04-09 ENCOUNTER — OFFICE VISIT (OUTPATIENT)
Facility: REHABILITATION | Age: 65
End: 2025-04-09
Payer: MEDICARE
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
## 2025-04-09 PROCEDURE — 97110 THERAPEUTIC EXERCISES: CPT | Performed by: PHYSICAL THERAPIST

## 2025-04-09 NOTE — PROGRESS NOTES
" PT Skilled Maintenance Note     Today's date: 2025  Patient name: Yuni Hall  : 1960  MRN: [id number]  Referring provider: Arnie Cadena DO  Dx:   Encounter Diagnosis     ICD-10-CM    1. Fall, subsequent encounter  W19.XXXD           Subjective:   Hasn't been able to do much, still limited by right knee pain.  Has knee injection coming up tomorrow.    Has been limited in community mobility due to knee pain, hasn't gone shopping this week.  Changed medication, seems to be working better.     Objective: See treatment diary below     Specialty Daily Treatment Diary   Exercise Diary  4/09/25 4/02/25 3/26/25 3/11/25 3/4/25   Static and dynamic balance (neuromuscular re-education)        Strengthening & endurance  (therapeutic exercise) SciFit level 1   14 min   Around  spm    96 degrees seated knee flexion stretch  Seated knee extension stretch 1 min    Lateral and  forwards step up 6\" step  About 10 each   Then lateral from 8\" step  10 each    Standing heel raise about 10-15 each single leg     Step over 6\" norma  3 min    Standing hip abduction, yellow band, 1 min                 Scifit level 1   14 about 70 spm    Step ups 4\" step 1 - 1.5  min each and 2 sets on right    Seated right knee flexion stretching, better with hands about back of knee  To 73 degrees flexion    51.5 cm limb girth over mid patella right, 48.5 cms left    Standing heel raise increased right weight bearing 10 - 15 x 2 sets    Standing hip abduction, yellow band 1.5 min    Marching in parallel bars, simulating stepping over things 2 min       SciFit level 1.0   15 min, about 65 - 70 spm    Step ups 4\" step 1 min each, railings x 2 sets     Standing heel raise increased weight on right  30 sec  Standing heel raise bilaterally focus on straightening knees 1 min    Right single leg stance with nearby railings tapping blazepods on left  45 sec 30 sec  Then taping with R 45 sec    Right knee flexion to roughly 85 " "degrees    High stepping 1.5 min           Outcomes below    Standing right knee flexion on 6\" stair step  1.5 min    Step ups 4\" step   1 min R leading    Heel raise increased one-sided weight bearing  1 min  Standing hip abduction, 20 sec   Single leg stance ipsilateral hand hold to railing, tap blaze pod laterally 50 sec x 3    High stepping 1 handrail  1.5 min  Then remeasured knee flexion, 74 degrees    SciFit level 1, 8-10 minutes       Outcomes below      Shoulder stretch at counter   1min total, gentle rocking     TB shoulder extension w/ emphasis on posture and scap depression   Black x30 reps                              PHYSICAL EXAM / TREATMENT    Precautions - Hashimoto's, autoimmune disease    Mobility Measures 3/11/25 1/28/25 3/4 10/09/24 9/04/24 7/23/24 7/02/24 6/05/24 5/1/24 3/27/24 2/28/24 1/31/24 8/23/23 9/20/23 10/18/23 11/22/23   Assistive device used? none none None  none none none none none none none None none none none none none   5 Time Sit to Stand  (17\" chair, arms across chest) Requires 3 attempts to stand without hands from 18\" surface   18\" chair  36 seconds   18\" chair  19.14 sec 23.1 seconds   18\" chair   18.4 sec  18\" chair arms out front 29 sec.    19 sec. With arms out in front  21.6 seconds  From 18\" surface  Arms out front Unable from 18\" surface today    19.5 seconds from 20\" surface   16.4 seconds arms out front  Arms out front  24 seconds Arms out front   A couple extra attempts  29 second- 52 seconds 31 seconds Arms out front  24 seconds  Pain in left knee   19 seconds  From 20\" surface, arms across chest    28 seconds from 18\" surface 35 seconds arms out front    15 seconds 20\" surface  Arms out front     3 Meter Timed  Up & Go 13.7 seconds 15.0 seconds 10.38 sec 9.3 sec 8.0 sec  12.3 sec 13.3 sec 9.2 sec 10.5 seconds 12.3 seconds 9.8 sec 10.6 sec   11.8 sec  Mild antalgic gait on left   10.1 seconds 9.6 seconds   Walking speed                   Functional Gait Assessment " (see below) Deferred by therapist  19/30 19/30 16/30 17/30 21/30 17/30 21/30 20/30 20/30   6 Minute Walk Test (100 foot circular course)   Deferred by therapist 810 feet   No assistive device   Hold, nausea  770 feet   No AD 1012 feet  No AD   800 feet no AD   940 feet no  feet 908 feet  No AD  103 bpm 1005 feet no  feet no  feet on treadmill inferred from self-selected pace 1.2 mph  875 feet no AD  Tired in legs  98 bpm   925 feet no AD   Patient-Reported Outcome Measure: Activities-Specific Balance Confidence Scale (ABC Scale)                   Right knee flexion PROM, sitting 70 degrees from seated in chair 94 degrees from seated on plinth 93 degrees while seated in chair 93 degrees 97 degrees    98 degrees   92 degrees 94 degrees 93 degrees 95 degrees                             ASSESSMENT   Moving better this week than last, knee flexing to 96 degrees, able to step up deeper step and flex knee better when walking.  Usually would benefit from return to pool-based exercise, not an option at this time.  Continue to stretch and be as active as able, progress volume and depth of movement in therapy as able.        New maintenance goals:  1. Walk 15 minutes consecutively for community ambulation and exercise.     NOT MET    Walking to tolerance, which can vary by day.  Walking up to about 30 minutes with intermittent standing.  Walking around grocery store.    2. Manages 150 minutes of moderate intensity aerobic exercise per week.        Depends on day/week.  Not met for past couple weeks. Is focusing on physical activity.    3. Maintains at least 1000 feet during 6 minute walk test, at most 15 seconds 5 times sit to stand.       NOT MET.      PLAN OF CARE:  Patient will benefit from physical therapy 1x/week to 1x/ 1-2 weeks for 2 months  Neuromuscular re-education, therapeutic exercises, and therapeutic activities as outlined in grids.

## 2025-04-10 ENCOUNTER — PROCEDURE VISIT (OUTPATIENT)
Dept: OBGYN CLINIC | Facility: MEDICAL CENTER | Age: 65
End: 2025-04-10
Payer: MEDICARE

## 2025-04-10 VITALS — HEIGHT: 66 IN | WEIGHT: 239 LBS | BODY MASS INDEX: 38.41 KG/M2

## 2025-04-10 DIAGNOSIS — M17.11 PRIMARY OSTEOARTHRITIS OF RIGHT KNEE: Primary | ICD-10-CM

## 2025-04-10 DIAGNOSIS — G89.29 CHRONIC PAIN OF RIGHT KNEE: ICD-10-CM

## 2025-04-10 DIAGNOSIS — M25.561 CHRONIC PAIN OF RIGHT KNEE: ICD-10-CM

## 2025-04-10 PROCEDURE — 20610 DRAIN/INJ JOINT/BURSA W/O US: CPT | Performed by: PHYSICIAN ASSISTANT

## 2025-04-10 NOTE — PROGRESS NOTES
Depression Screening Follow-up Plan: Patient's depression screening was positive with a PHQ-2 score of . Their PHQ-9 score was 7. Continue regular follow-up with their psychologist/therapist/psychiatrist who is managing their mental health condition(s).   Assistance OOB with selected safe patient handling equipment if applicable/Assistance with ambulation/Communicate risk of Fall with Harm to all staff, patient, and family/Monitor for mental status changes and reorient to person, place, and time, as needed/Move patient closer to nursing station/within visual sight of ED staff/Provide visual cue: red socks, yellow wristband, yellow gown, etc/Reinforce activity limits and safety measures with patient and family/Toileting schedule using arm’s reach rule for commode and bathroom/Use of alarms - bed, stretcher, chair and/or video monitoring/Bed in lowest position, wheels locked, appropriate side rails in place/Call bell, personal items and telephone in reach/Instruct patient to call for assistance before getting out of bed/chair/stretcher/Non-slip footwear applied when patient is off stretcher/[place] to call system/Physically safe environment - no spills, clutter or unnecessary equipment/Purposeful Proactive Rounding/Room/bathroom lighting operational, light cord in reach

## 2025-04-10 NOTE — PROGRESS NOTES
1. Primary osteoarthritis of right knee        2. Chronic pain of right knee            Patient has Right knee osteoarthritis.  Patient is here for her 1st injection of Durolane into the right knee.   Physical exam of the knee shows no effusion no ecchymosis.  Patient tolerated procedure follow up as needed.  She does not do cortisone secondary to side effects that she has connective tissue disorder.  She states she will call again if she would like gel shots in either 1 or both knees    Large joint arthrocentesis: R knee  [place] Protocol:  procedure performed by consultantConsent: Verbal consent obtained. Written consent not obtained.  Risks and benefits: risks, benefits and alternatives were discussed  Consent given by: patient  Patient understanding: patient states understanding of the procedure being performed  Patient consent: the patient's understanding of the procedure matches consent given  Patient identity confirmed: verbally with patient  Supporting Documentation  Indications: pain and joint swelling   Procedure Details  Location: knee - R knee  Needle size: 22 G  Ultrasound guidance: no  Approach: anterolateral  Medications administered: 3 mL sodium hyaluronate 60 MG/3ML    Patient tolerance: patient tolerated the procedure well with no immediate complications  Dressing:  Sterile dressing applied

## 2025-04-16 ENCOUNTER — OFFICE VISIT (OUTPATIENT)
Facility: REHABILITATION | Age: 65
End: 2025-04-16
Payer: MEDICARE
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
## 2025-04-16 PROCEDURE — 97110 THERAPEUTIC EXERCISES: CPT | Performed by: PHYSICAL THERAPIST

## 2025-04-16 NOTE — PROGRESS NOTES
" PT Skilled Maintenance Note / UPDATE    Today's date: 2025  Patient name: Yuni Hall  : 1960  MRN: [id number]  Referring provider: Arnie Cadena DO  Dx:   Encounter Diagnosis     ICD-10-CM    1. Fall, subsequent encounter  W19.XXXD           Subjective:   Has been waking up with a lot of severe nausea, not always associated with pain since getting her injection.  It's been getting worse in the morning.  Doesn't remember being nauseaus before getting up, but getting nausea with this transition in the morning.  Getting pain about the right proximal lateral lower leg, and shooting pain up into the right lateral thigh.  Otherwise knee is doing good.      Sometimes happens when walking also.  Knee wants to bend but doesn't lose balance.      Objective: See treatment diary below     Specialty Daily Treatment Diary   Exercise Diary  4/16/25 4/09/25 4/02/25 3/26/25 3/11/25 3/4/25   Static and dynamic balance (neuromuscular re-education)         Strengthening & endurance  (therapeutic exercise) SciFit level 2.1 6 min    Standing heel raise double leg into single leg about 10 - 15 each     Describing home knee stretching seated knee flexion, supine knee flexion, using overpresure  3 min    Outcomes below    Lateral stepping down from 6\" step alternating sides  2 min     Stepping over 6\" targets to tap blazepods  1 min  x 2 sets    SciFit level 2.5   10 min   SciFit level 1   14 min   Around  spm    96 degrees seated knee flexion stretch  Seated knee extension stretch 1 min    Lateral and  forwards step up 6\" step  About 10 each   Then lateral from 8\" step  10 each    Standing heel raise about 10-15 each single leg     Step over 6\" norma  3 min    Standing hip abduction, yellow band, 1 min                 Scifit level 1   14 about 70 spm    Step ups 4\" step 1 - 1.5  min each and 2 sets on right    Seated right knee flexion stretching, better with hands about back of knee  To 73 degrees " "flexion    51.5 cm limb girth over mid patella right, 48.5 cms left    Standing heel raise increased right weight bearing 10 - 15 x 2 sets    Standing hip abduction, yellow band 1.5 min    Marching in parallel bars, simulating stepping over things 2 min       SciFit level 1.0   15 min, about 65 - 70 spm    Step ups 4\" step 1 min each, railings x 2 sets     Standing heel raise increased weight on right  30 sec  Standing heel raise bilaterally focus on straightening knees 1 min    Right single leg stance with nearby railings tapping blazepods on left  45 sec 30 sec  Then taping with R 45 sec    Right knee flexion to roughly 85 degrees    High stepping 1.5 min           Outcomes below    Standing right knee flexion on 6\" stair step  1.5 min    Step ups 4\" step   1 min R leading    Heel raise increased one-sided weight bearing  1 min  Standing hip abduction, 20 sec   Single leg stance ipsilateral hand hold to railing, tap blaze pod laterally 50 sec x 3    High stepping 1 handrail  1.5 min  Then remeasured knee flexion, 74 degrees    SciFit level 1, 8-10 minutes       Outcomes below      Shoulder stretch at counter   1min total, gentle rocking     TB shoulder extension w/ emphasis on posture and scap depression   Black x30 reps                                PHYSICAL EXAM / TREATMENT    Precautions - Hashimoto's, autoimmune disease    Mobility Measures 4/16/25 3/11/25 1/28/25 3/4 10/09/24 9/04/24 7/23/24 7/02/24 6/05/24 5/1/24 3/27/24 2/28/24 1/31/24 8/23/23 9/20/23 10/18/23 11/22/23   Assistive device used? none none none None  none none none none none none none None none none none none none   5 Time Sit to Stand  (17\" chair, arms across chest) 19.4 sec   Requires 3 attempts to stand without hands from 18\" surface   18\" chair  36 seconds   18\" chair  19.14 sec 23.1 seconds   18\" chair   18.4 sec  18\" chair arms out front 29 sec.    19 sec. With arms out in front  21.6 seconds  From 18\" surface  Arms out front Unable " "from 18\" surface today    19.5 seconds from 20\" surface   16.4 seconds arms out front  Arms out front  24 seconds Arms out front   A couple extra attempts  29 second- 52 seconds 31 seconds Arms out front  24 seconds  Pain in left knee   19 seconds  From 20\" surface, arms across chest    28 seconds from 18\" surface 35 seconds arms out front    15 seconds 20\" surface  Arms out front     3 Meter Timed  Up & Go 9.5 seconds 13.7 seconds 15.0 seconds 10.38 sec 9.3 sec 8.0 sec  12.3 sec 13.3 sec 9.2 sec 10.5 seconds 12.3 seconds 9.8 sec 10.6 sec   11.8 sec  Mild antalgic gait on left   10.1 seconds 9.6 seconds   Walking speed                    Functional Gait Assessment (see below) Deferred by therapist Deferred by therapist  19/30 19/30 16/30 17/30 21/30 17/30 21/30 20/30 20/30   6 Minute Walk Test (100 foot circular course)   Deferred by therapist Deferred by therapist 810 feet   No assistive device   Hold, nausea  770 feet   No AD 1012 feet  No AD   800 feet no AD   940 feet no  feet 908 feet  No AD  103 bpm 1005 feet no  feet no  feet on treadmill inferred from self-selected pace 1.2 mph  875 feet no AD  Tired in legs  98 bpm   925 feet no AD   Patient-Reported Outcome Measure: Activities-Specific Balance Confidence Scale (ABC Scale)                    Right knee flexion PROM, sitting 95 degrees from seated in chair 70 degrees from seated in chair 94 degrees from seated on plinth 93 degrees while seated in chair 93 degrees 97 degrees    98 degrees   92 degrees 94 degrees 93 degrees 95 degrees                            Normal [place], no pain with medial and lateral collateral ligament tests  No pain with end-range knee extension.  Non-tender to palpation about the lateral distal knee and proximal lower leg  No provocation with end-range lumbar region flexion, extension sidebending    ASSESSMENT   Yuni is a 64 year old female with mobility limitations and chronic pain.      Trial seated " ankle and knee movements prior to getting up.  Trial seated looking down to see if sinus pressure contributes to nausea.      We are managing lower leg pain, not consistent with lumbar radiculopathy nor specific knee joint pathology.  Yuni has rheumatic conditions that she is managing that contribute to limitations in mobility.     The right knee is  bending to prior range.   We are getting back to more knee flexion during swing phase gait.     Continue to help problem solve exacerbations in pain.       New maintenance goals:  1. Walk 15 minutes consecutively for community ambulation and exercise.     NOT MET    Walking to tolerance, which can vary by day.  Walking up to about 30 minutes with intermittent standing.  Walking around grocery store.    2. Manages 150 minutes of moderate intensity aerobic exercise per week.       Not met for intensity.  3. Maintains at least 1000 feet during 6 minute walk test, at most 15 seconds 5 times sit to stand.       NOT MET.      PLAN OF CARE:  Patient will benefit from physical therapy 1x/week to 1x/ 1-2 weeks for 2 months  Neuromuscular re-education, therapeutic exercises, and therapeutic activities as outlined in grids.

## 2025-04-23 ENCOUNTER — OFFICE VISIT (OUTPATIENT)
Facility: REHABILITATION | Age: 65
End: 2025-04-23
Payer: MEDICARE
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
## 2025-04-23 PROCEDURE — 97110 THERAPEUTIC EXERCISES: CPT | Performed by: PHYSICAL THERAPIST

## 2025-04-23 NOTE — PROGRESS NOTES
" PT Skilled Maintenance Note / UPDATE    Today's date: 2025  Patient name: Yuni Hall  : 1960  MRN: [id number]  Referring provider: Arnie Cadena DO  Dx:   Encounter Diagnosis     ICD-10-CM    1. Fall, subsequent encounter  W19.XXXD           Subjective:   Moving somewhat better.  Pain about left posterior proximal thigh.    Unsure what causes it.  Stopped pain medication briefly as unsure if it was causing stomach upset, but was illness going around.    Objective: See treatment diary below     Specialty Daily Treatment Diary   Exercise Diary  4/23/25 4/16/25 4/09/25 4/02/25 3/26/25 3/11/25 3/4/25   Static and dynamic balance (neuromuscular re-education)          Strengthening & endurance  (therapeutic exercise) Treadmill 1.1 mph 1.5% grade 9.25 min    Single leg heel raise  About 15 each side    Step ups 6\" step   About 10 each side  Same laterally  10 each side    Sit to stand 18\" surface  10 x 2 sets    Seated L knee to chest stretch; L foot on 2nd stair step, L hip and trunk flexion  2 min    Standing hip abduction, green band, 10 each  Standing hip extension, green band, 10 each    SciFit level 2.1   1213 steps  11 min 10 sec       SciFit level 2.1 6 min    Standing heel raise double leg into single leg about 10 - 15 each     Describing home knee stretching seated knee flexion, supine knee flexion, using overpresure  3 min    Outcomes below    Lateral stepping down from 6\" step alternating sides  2 min     Stepping over 6\" targets to tap blazepods  1 min  x 2 sets    SciFit level 2.5   10 min   SciFit level 1   14 min   Around  spm    96 degrees seated knee flexion stretch  Seated knee extension stretch 1 min    Lateral and  forwards step up 6\" step  About 10 each   Then lateral from 8\" step  10 each    Standing heel raise about 10-15 each single leg     Step over 6\" norma  3 min    Standing hip abduction, yellow band, 1 min                 Scifit level 1   14 about 70 spm    Step " "ups 4\" step 1 - 1.5  min each and 2 sets on right    Seated right knee flexion stretching, better with hands about back of knee  To 73 degrees flexion    51.5 cm limb girth over mid patella right, 48.5 cms left    Standing heel raise increased right weight bearing 10 - 15 x 2 sets    Standing hip abduction, yellow band 1.5 min    Marching in parallel bars, simulating stepping over things 2 min       SciFit level 1.0   15 min, about 65 - 70 spm    Step ups 4\" step 1 min each, railings x 2 sets     Standing heel raise increased weight on right  30 sec  Standing heel raise bilaterally focus on straightening knees 1 min    Right single leg stance with nearby railings tapping blazepods on left  45 sec 30 sec  Then taping with R 45 sec    Right knee flexion to roughly 85 degrees    High stepping 1.5 min           Outcomes below    Standing right knee flexion on 6\" stair step  1.5 min    Step ups 4\" step   1 min R leading    Heel raise increased one-sided weight bearing  1 min  Standing hip abduction, 20 sec   Single leg stance ipsilateral hand hold to railing, tap blaze pod laterally 50 sec x 3    High stepping 1 handrail  1.5 min  Then remeasured knee flexion, 74 degrees    SciFit level 1, 8-10 minutes       Outcomes below      Shoulder stretch at counter   1min total, gentle rocking     TB shoulder extension w/ emphasis on posture and scap depression   Black x30 reps                                  PHYSICAL EXAM / TREATMENT    Precautions - Hashimoto's, autoimmune disease    Mobility Measures 4/16/25 3/11/25 1/28/25 3/4 10/09/24 9/04/24 7/23/24 7/02/24 6/05/24 5/1/24 3/27/24 2/28/24 1/31/24 8/23/23 9/20/23 10/18/23 11/22/23   Assistive device used? none none none None  none none none none none none none None none none none none none   5 Time Sit to Stand  (17\" chair, arms across chest) 19.4 sec   Requires 3 attempts to stand without hands from 18\" surface   18\" chair  36 seconds   18\" chair  19.14 sec 23.1 seconds " "  18\" chair   18.4 sec  18\" chair arms out front 29 sec.    19 sec. With arms out in front  21.6 seconds  From 18\" surface  Arms out front Unable from 18\" surface today    19.5 seconds from 20\" surface   16.4 seconds arms out front  Arms out front  24 seconds Arms out front   A couple extra attempts  29 second- 52 seconds 31 seconds Arms out front  24 seconds  Pain in left knee   19 seconds  From 20\" surface, arms across chest    28 seconds from 18\" surface 35 seconds arms out front    15 seconds 20\" surface  Arms out front     3 Meter Timed  Up & Go 9.5 seconds 13.7 seconds 15.0 seconds 10.38 sec 9.3 sec 8.0 sec  12.3 sec 13.3 sec 9.2 sec 10.5 seconds 12.3 seconds 9.8 sec 10.6 sec   11.8 sec  Mild antalgic gait on left   10.1 seconds 9.6 seconds   Walking speed                    Functional Gait Assessment (see below) Deferred by therapist Deferred by therapist  19/30 19/30 16/30 17/30 21/30 17/30 21/30 20/30 20/30   6 Minute Walk Test (100 foot circular course)   Deferred by therapist Deferred by therapist 810 feet   No assistive device   Hold, nausea  770 feet   No AD 1012 feet  No AD   800 feet no AD   940 feet no  feet 908 feet  No AD  103 bpm 1005 feet no  feet no  feet on treadmill inferred from self-selected pace 1.2 mph  875 feet no AD  Tired in legs  98 bpm   925 feet no AD   Patient-Reported Outcome Measure: Activities-Specific Balance Confidence Scale (ABC Scale)                    Right knee flexion PROM, sitting 95 degrees from seated in chair 70 degrees from seated in chair 94 degrees from seated on plinth 93 degrees while seated in chair 93 degrees 97 degrees    98 degrees   92 degrees 94 degrees 93 degrees 95 degrees                            Normal [place], no pain with medial and lateral collateral ligament tests  No pain with end-range knee extension.  Non-tender to palpation about the lateral distal knee and proximal lower leg  No provocation with end-range lumbar " region flexion, extension sidebending    ASSESSMENT   Yuni is a 64 year old female with mobility limitations and chronic pain.      Moving better, knee easily getting to 90 degrees of flexion today, walking faster.   So we were able to challenge higher levels of mobility today.      Left posterior proximal thigh pain provoked mildly with end-range resisted extension, difficult to stretch at this point but try stretches practiced today, if cramping.       New maintenance goals:  1. Walk 15 minutes consecutively for community ambulation and exercise.     NOT MET    Walking to tolerance, which can vary by day.  Walking up to about 30 minutes with intermittent standing.  Walking around grocery store.    2. Manages 150 minutes of moderate intensity aerobic exercise per week.       Not met for intensity.  3. Maintains at least 1000 feet during 6 minute walk test, at most 15 seconds 5 times sit to stand.       NOT MET.      PLAN OF CARE:  Patient will benefit from physical therapy 1x/week to 1x/ 1-2 weeks for 2 months  Neuromuscular re-education, therapeutic exercises, and therapeutic activities as outlined in grids.

## 2025-04-24 ENCOUNTER — TELEPHONE (OUTPATIENT)
Dept: PSYCHIATRY | Facility: CLINIC | Age: 65
End: 2025-04-24

## 2025-04-28 NOTE — TELEPHONE ENCOUNTER
The patient called in to schedule an appointment for 5.13.25 @ 3:30pm.  -insurance verified  -virtual consent form on file/signed

## 2025-04-29 ENCOUNTER — OFFICE VISIT (OUTPATIENT)
Dept: RHEUMATOLOGY | Facility: CLINIC | Age: 65
End: 2025-04-29
Payer: MEDICARE

## 2025-04-29 VITALS
HEART RATE: 78 BPM | HEIGHT: 66 IN | DIASTOLIC BLOOD PRESSURE: 66 MMHG | SYSTOLIC BLOOD PRESSURE: 124 MMHG | BODY MASS INDEX: 38.58 KG/M2 | OXYGEN SATURATION: 98 %

## 2025-04-29 DIAGNOSIS — M17.11 PRIMARY OSTEOARTHRITIS OF RIGHT KNEE: ICD-10-CM

## 2025-04-29 DIAGNOSIS — M35.9 UNDIFFERENTIATED CONNECTIVE TISSUE DISEASE (HCC): Primary | ICD-10-CM

## 2025-04-29 DIAGNOSIS — Z79.899 HIGH RISK MEDICATION USE: ICD-10-CM

## 2025-04-29 DIAGNOSIS — R76.8 POSITIVE ANA (ANTINUCLEAR ANTIBODY): ICD-10-CM

## 2025-04-29 DIAGNOSIS — M79.18 MYOFASCIAL PAIN: ICD-10-CM

## 2025-04-29 DIAGNOSIS — G70.9 NEUROMUSCULAR DISORDER (HCC): ICD-10-CM

## 2025-04-29 DIAGNOSIS — M79.10 MYALGIA: ICD-10-CM

## 2025-04-29 PROCEDURE — 99214 OFFICE O/P EST MOD 30 MIN: CPT | Performed by: INTERNAL MEDICINE

## 2025-04-29 RX ORDER — HYDROXYCHLOROQUINE SULFATE 200 MG/1
200 TABLET, FILM COATED ORAL 2 TIMES DAILY
Qty: 180 TABLET | Refills: 3 | Status: SHIPPED | OUTPATIENT
Start: 2025-04-29 | End: 2026-04-24

## 2025-04-29 RX ORDER — CETIRIZINE HYDROCHLORIDE 5 MG/1
5 TABLET ORAL DAILY
COMMUNITY

## 2025-04-29 RX ORDER — TIZANIDINE 2 MG/1
2 TABLET ORAL EVERY 8 HOURS PRN
Qty: 270 TABLET | Refills: 3 | Status: SHIPPED | OUTPATIENT
Start: 2025-04-29

## 2025-04-29 RX ORDER — GABAPENTIN 100 MG/1
100 CAPSULE ORAL 2 TIMES DAILY
Qty: 180 CAPSULE | Refills: 3 | Status: SHIPPED | OUTPATIENT
Start: 2025-04-29

## 2025-04-29 RX ORDER — DICLOFENAC SODIUM 75 MG/1
75 TABLET, DELAYED RELEASE ORAL 2 TIMES DAILY
Qty: 180 TABLET | Refills: 3 | Status: SHIPPED | OUTPATIENT
Start: 2025-04-29
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
## 2025-04-29 NOTE — PATIENT INSTRUCTIONS
Continue Hydroxychloroquine twice a day; continue regular eye exams.   Continue Tizanidine three times a day as needed for muscle cramps  Continue diclofenac twice a day as needed for joint pain, take with food  Continue gabapentin twice a day as needed for nerve pain  See Dermatology for cysts  Continue Biofreeze as needed     Return to clinic in 6 months

## 2025-05-01 PROBLEM — Z00.00 MEDICARE ANNUAL WELLNESS VISIT, SUBSEQUENT: Status: RESOLVED | Noted: 2025-04-01 | Resolved: 2025-05-01

## 2025-05-02 ENCOUNTER — APPOINTMENT (OUTPATIENT)
Dept: LAB | Facility: CLINIC | Age: 65
End: 2025-05-02

## 2025-05-02 DIAGNOSIS — Z00.6 ENCOUNTER FOR EXAMINATION FOR NORMAL COMPARISON OR CONTROL IN CLINICAL RESEARCH PROGRAM: ICD-10-CM

## 2025-05-02 PROCEDURE — 36415 COLL VENOUS BLD VENIPUNCTURE: CPT

## 2025-05-07 ENCOUNTER — OFFICE VISIT (OUTPATIENT)
Facility: REHABILITATION | Age: 65
End: 2025-05-07
Attending: FAMILY MEDICINE
Payer: MEDICARE
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
## 2025-05-07 PROCEDURE — 97110 THERAPEUTIC EXERCISES: CPT | Performed by: PHYSICAL THERAPIST

## 2025-05-07 NOTE — PROGRESS NOTES
" PT Skilled Maintenance Note    Today's date: 2025  Patient name: Yuni Hall  : 1960  MRN: [id number]  Referring provider: Arnie Cadena DO  Dx:   Encounter Diagnosis     ICD-10-CM    1. Fall, subsequent encounter  W19.XXXD           Subjective:   Not great yesterday, rainy day, legs very stiff, knees very stiff, stuck in a position.      Objective: See treatment diary below     Specialty Daily Treatment Diary   Exercise Diary  5/07/25 4/23/25 4/16/25 4/09/25 4/02/25 3/26/25 3/11/25 3/4/25   Static and dynamic balance (neuromuscular re-education)           Strengthening & endurance  (therapeutic exercise) Treadmill 1.3 mph 1% grade  6 min    Discussion of completing isometrics with days where joints are sore    17 sec 5TST  18\" surface    5TSTS  17\" surface using hands constantly  X 2 sets    Lateral step up 6\" step  About 10 each side   No hands or light 1-2 fingers  X 2 sets    Standing hip abduction, green band, about 1 min    Standing heel raise on incline abuot 1 min    SciFit level 2.2   8.5 min about 100 spm   Treadmill 1.1 mph 1.5% grade 9.25 min    Single leg heel raise  About 15 each side    Step ups 6\" step   About 10 each side  Same laterally  10 each side    Sit to stand 18\" surface  10 x 2 sets    Seated L knee to chest stretch; L foot on 2nd stair step, L hip and trunk flexion  2 min    Standing hip abduction, green band, 10 each  Standing hip extension, green band, 10 each    SciFit level 2.1   1213 steps  11 min 10 sec       SciFit level 2.1 6 min    Standing heel raise double leg into single leg about 10 - 15 each     Describing home knee stretching seated knee flexion, supine knee flexion, using overpresure  3 min    Outcomes below    Lateral stepping down from 6\" step alternating sides  2 min     Stepping over 6\" targets to tap blazepods  1 min  x 2 sets    SciFit level 2.5   10 min   SciFit level 1   14 min   Around  spm    96 degrees seated knee flexion stretch  Seated " "knee extension stretch 1 min    Lateral and  forwards step up 6\" step  About 10 each   Then lateral from 8\" step  10 each    Standing heel raise about 10-15 each single leg     Step over 6\" norma  3 min    Standing hip abduction, yellow band, 1 min                 Scifit level 1   14 about 70 spm    Step ups 4\" step 1 - 1.5  min each and 2 sets on right    Seated right knee flexion stretching, better with hands about back of knee  To 73 degrees flexion    51.5 cm limb girth over mid patella right, 48.5 cms left    Standing heel raise increased right weight bearing 10 - 15 x 2 sets    Standing hip abduction, yellow band 1.5 min    Marching in parallel bars, simulating stepping over things 2 min       SciFit level 1.0   15 min, about 65 - 70 spm    Step ups 4\" step 1 min each, railings x 2 sets     Standing heel raise increased weight on right  30 sec  Standing heel raise bilaterally focus on straightening knees 1 min    Right single leg stance with nearby railings tapping blazepods on left  45 sec 30 sec  Then taping with R 45 sec    Right knee flexion to roughly 85 degrees    High stepping 1.5 min           Outcomes below    Standing right knee flexion on 6\" stair step  1.5 min    Step ups 4\" step   1 min R leading    Heel raise increased one-sided weight bearing  1 min  Standing hip abduction, 20 sec   Single leg stance ipsilateral hand hold to railing, tap blaze pod laterally 50 sec x 3    High stepping 1 handrail  1.5 min  Then remeasured knee flexion, 74 degrees    SciFit level 1, 8-10 minutes       Outcomes below      Shoulder stretch at counter   1min total, gentle rocking     TB shoulder extension w/ emphasis on posture and scap depression   Black x30 reps                                    PHYSICAL EXAM / TREATMENT    Precautions - Hashimoto's, autoimmune disease    Mobility Measures 4/16/25 3/11/25 1/28/25 3/4 10/09/24 9/04/24 7/23/24 7/02/24 6/05/24 5/1/24 3/27/24 2/28/24 1/31/24 8/23/23 9/20/23 10/18/23 " "11/22/23   Assistive device used? none none none None  none none none none none none none None none none none none none   5 Time Sit to Stand  (17\" chair, arms across chest) 19.4 sec   Requires 3 attempts to stand without hands from 18\" surface   18\" chair  36 seconds   18\" chair  19.14 sec 23.1 seconds   18\" chair   18.4 sec  18\" chair arms out front 29 sec.    19 sec. With arms out in front  21.6 seconds  From 18\" surface  Arms out front Unable from 18\" surface today    19.5 seconds from 20\" surface   16.4 seconds arms out front  Arms out front  24 seconds Arms out front   A couple extra attempts  29 second- 52 seconds 31 seconds Arms out front  24 seconds  Pain in left knee   19 seconds  From 20\" surface, arms across chest    28 seconds from 18\" surface 35 seconds arms out front    15 seconds 20\" surface  Arms out front     3 Meter Timed  Up & Go 9.5 seconds 13.7 seconds 15.0 seconds 10.38 sec 9.3 sec 8.0 sec  12.3 sec 13.3 sec 9.2 sec 10.5 seconds 12.3 seconds 9.8 sec 10.6 sec   11.8 sec  Mild antalgic gait on left   10.1 seconds 9.6 seconds   Walking speed                    Functional Gait Assessment (see below) Deferred by therapist Deferred by therapist  19/30 19/30 16/30 17/30 21/30 17/30 21/30 20/30 20/30   6 Minute Walk Test (100 foot circular course)   Deferred by therapist Deferred by therapist 810 feet   No assistive device   Hold, nausea  770 feet   No AD 1012 feet  No AD   800 feet no AD   940 feet no  feet 908 feet  No AD  103 bpm 1005 feet no  feet no  feet on treadmill inferred from self-selected pace 1.2 mph  875 feet no AD  Tired in legs  98 bpm   925 feet no AD   Patient-Reported Outcome Measure: Activities-Specific Balance Confidence Scale (ABC Scale)                    Right knee flexion PROM, sitting 95 degrees from seated in chair 70 degrees from seated in chair 94 degrees from seated on plinth 93 degrees while seated in chair 93 degrees 97 degrees    98 degrees   92 " "degrees 94 degrees 93 degrees 95 degrees                            Normal [place], no pain with medial and lateral collateral ligament tests  No pain with end-range knee extension.  Non-tender to palpation about the lateral distal knee and proximal lower leg  No provocation with end-range lumbar region flexion, extension sidebending    ASSESSMENT   Yuni is a 64 year old female with mobility limitations and chronic pain.      Complete mobility measures next visit.  Maintaining functional knee flexion.  Challenged by stepping over 4-5\" high object with the right leg, continue to practice.    It's great to incorporate isometrics on days where pain/stiffness is higher.           New maintenance goals:  1. Walk 15 minutes consecutively for community ambulation and exercise.     NOT MET    Walking to tolerance, which can vary by day.  Walking up to about 30 minutes with intermittent standing.  Walking around grocery store.    2. Manages 150 minutes of moderate intensity aerobic exercise per week.       Not met for intensity.  3. Maintains at least 1000 feet during 6 minute walk test, at most 15 seconds 5 times sit to stand.       NOT MET.      PLAN OF CARE:  Patient will benefit from physical therapy 1x/week to 1x/ 1-2 weeks for 2 months  Neuromuscular re-education, therapeutic exercises, and therapeutic activities as outlined in grids.    "

## 2025-05-13 ENCOUNTER — TELEMEDICINE (OUTPATIENT)
Dept: PSYCHIATRY | Facility: CLINIC | Age: 65
End: 2025-05-13
Payer: MEDICARE

## 2025-05-13 DIAGNOSIS — F43.23 ADJUSTMENT DISORDER WITH MIXED ANXIETY AND DEPRESSED MOOD: ICD-10-CM

## 2025-05-13 LAB
APOB+LDLR+PCSK9 GENE MUT ANL BLD/T: NOT DETECTED
BRCA1+BRCA2 DEL+DUP + FULL MUT ANL BLD/T: NOT DETECTED
MLH1+MSH2+MSH6+PMS2 GN DEL+DUP+FUL M: NOT DETECTED

## 2025-05-13 PROCEDURE — 99213 OFFICE O/P EST LOW 20 MIN: CPT | Performed by: PSYCHIATRY & NEUROLOGY

## 2025-05-13 RX ORDER — DULOXETIN HYDROCHLORIDE 60 MG/1
60 CAPSULE, DELAYED RELEASE ORAL DAILY
Qty: 90 CAPSULE | Refills: 1 | Status: SHIPPED | OUTPATIENT
Start: 2025-05-13
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
## 2025-05-13 NOTE — ASSESSMENT & PLAN NOTE
Orders:    DULoxetine (CYMBALTA) 60 mg delayed release capsule; Take 1 capsule (60 mg total) by mouth daily

## 2025-05-19 ENCOUNTER — APPOINTMENT (OUTPATIENT)
Dept: LAB | Facility: CLINIC | Age: 65
End: 2025-05-19
Payer: MEDICARE

## 2025-05-19 DIAGNOSIS — E06.3 CHRONIC LYMPHOCYTIC THYROIDITIS: ICD-10-CM

## 2025-05-19 LAB — TSH SERPL DL<=0.05 MIU/L-ACNC: 1.32 UIU/ML (ref 0.45–4.5)

## 2025-05-19 PROCEDURE — 36415 COLL VENOUS BLD VENIPUNCTURE: CPT

## 2025-05-19 PROCEDURE — 84443 ASSAY THYROID STIM HORMONE: CPT

## 2025-05-21 ENCOUNTER — OFFICE VISIT (OUTPATIENT)
Facility: REHABILITATION | Age: 65
End: 2025-05-21
Attending: FAMILY MEDICINE
Payer: MEDICARE
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
## 2025-05-21 PROCEDURE — 97110 THERAPEUTIC EXERCISES: CPT | Performed by: PHYSICAL THERAPIST

## 2025-05-21 NOTE — LETTER
May 21, 2025    Arnie Cadena DO    Patient: Yuni Hall   YOB: 1960   Date of Visit: 5/21/2025     Encounter Diagnosis     ICD-10-CM    1. Fall, subsequent encounter  W19.XXXD           Dear Dr. Arnie Cadena DO:    Thank you for your recent referral of Yuni Hall. Please review the attached evaluation summary from Yuni's recent visit.     Please verify that you agree with the plan of care by signing the attached order.     If you have any questions or concerns, please do not hesitate to call.     I sincerely appreciate the opportunity to share in the care of one of your patients and hope to have another opportunity to work with you in the near future.       Sincerely,    Jericho Garrett, PT      Referring Provider:      I certify that I have read the below Plan of Care and certify the need for these services furnished under this plan of treatment while under my care.                    Arnie Cadena DO  Via In Basket          No notes on file

## 2025-05-21 NOTE — PROGRESS NOTES
" PT Skilled Maintenance Note / UPDATE    Today's date: 2025  Patient name: Yuni Hall  : 1960  MRN: [id number]  Referring provider: Arnie Cadena DO  Dx:   Encounter Diagnosis     ICD-10-CM    1. Fall, subsequent encounter  W19.XXXD           Subjective:   Not as mobile with the rain.   Did a lot of isometrics over the past couple weeks with the rain.      Objective: See treatment diary below     Specialty Daily Treatment Diary   Exercise Diary  5/21/25 5/07/25 4/23/25 4/16/25 4/09/25 4/02/25 3/26/25 3/11/25 3/4/25   Static and dynamic balance (neuromuscular re-education)            Strengthening & endurance  (therapeutic exercise) Mobility measures    Step ups 6 inch step forwards about 10 each x 2 sets    Marching and then step over 6\" hurdles 2 min  Laterally 1 min  Then with blazepods 1 min x 2 sets    Standing hip abduction, blue bands, 1 min  Standing heel raise, about 10 each      SciFit level 1, 7 min   Treadmill 1.3 mph 1% grade  6 min    Discussion of completing isometrics with days where joints are sore    17 sec 5TST  18\" surface    5TSTS  17\" surface using hands constantly  X 2 sets    Lateral step up 6\" step  About 10 each side   No hands or light 1-2 fingers  X 2 sets    Standing hip abduction, green band, about 1 min    Standing heel raise on incline abuot 1 min    SciFit level 2.2   8.5 min about 100 spm   Treadmill 1.1 mph 1.5% grade 9.25 min    Single leg heel raise  About 15 each side    Step ups 6\" step   About 10 each side  Same laterally  10 each side    Sit to stand 18\" surface  10 x 2 sets    Seated L knee to chest stretch; L foot on 2nd stair step, L hip and trunk flexion  2 min    Standing hip abduction, green band, 10 each  Standing hip extension, green band, 10 each    SciFit level 2.1   1213 steps  11 min 10 sec       SciFit level 2.1 6 min    Standing heel raise double leg into single leg about 10 - 15 each     Describing home knee stretching seated knee flexion, " "supine knee flexion, using overpresure  3 min    Outcomes below    Lateral stepping down from 6\" step alternating sides  2 min     Stepping over 6\" targets to tap blazepods  1 min  x 2 sets    SciFit level 2.5   10 min   SciFit level 1   14 min   Around  spm    96 degrees seated knee flexion stretch  Seated knee extension stretch 1 min    Lateral and  forwards step up 6\" step  About 10 each   Then lateral from 8\" step  10 each    Standing heel raise about 10-15 each single leg     Step over 6\" norma  3 min    Standing hip abduction, yellow band, 1 min                 Scifit level 1   14 about 70 spm    Step ups 4\" step 1 - 1.5  min each and 2 sets on right    Seated right knee flexion stretching, better with hands about back of knee  To 73 degrees flexion    51.5 cm limb girth over mid patella right, 48.5 cms left    Standing heel raise increased right weight bearing 10 - 15 x 2 sets    Standing hip abduction, yellow band 1.5 min    Marching in parallel bars, simulating stepping over things 2 min       SciFit level 1.0   15 min, about 65 - 70 spm    Step ups 4\" step 1 min each, railings x 2 sets     Standing heel raise increased weight on right  30 sec  Standing heel raise bilaterally focus on straightening knees 1 min    Right single leg stance with nearby railings tapping blazepods on left  45 sec 30 sec  Then taping with R 45 sec    Right knee flexion to roughly 85 degrees    High stepping 1.5 min           Outcomes below    Standing right knee flexion on 6\" stair step  1.5 min    Step ups 4\" step   1 min R leading    Heel raise increased one-sided weight bearing  1 min  Standing hip abduction, 20 sec   Single leg stance ipsilateral hand hold to railing, tap blaze pod laterally 50 sec x 3    High stepping 1 handrail  1.5 min  Then remeasured knee flexion, 74 degrees    SciFit level 1, 8-10 minutes       Outcomes below      Shoulder stretch at counter   1min total, gentle rocking     TB shoulder extension " "w/ emphasis on posture and scap depression   Black x30 reps                                      PHYSICAL EXAM / TREATMENT    Precautions - Hashimoto's, autoimmune disease    Mobility Measures 5/21/25 4/16/25 3/11/25 1/28/25 3/4 10/09/24 9/04/24 7/23/24 7/02/24 6/05/24 5/1/24 3/27/24 2/28/24 1/31/24 8/23/23 9/20/23 10/18/23 11/22/23   Assistive device used? none none none none None  none none none none none none none None none none none none none   5 Time Sit to Stand  (17\" chair, arms across chest) 16.2 seconds   19.4 sec   Requires 3 attempts to stand without hands from 18\" surface   18\" chair  36 seconds   18\" chair  19.14 sec 23.1 seconds   18\" chair   18.4 sec  18\" chair arms out front 29 sec.    19 sec. With arms out in front  21.6 seconds  From 18\" surface  Arms out front Unable from 18\" surface today    19.5 seconds from 20\" surface   16.4 seconds arms out front  Arms out front  24 seconds Arms out front   A couple extra attempts  29 second- 52 seconds 31 seconds Arms out front  24 seconds  Pain in left knee   19 seconds  From 20\" surface, arms across chest    28 seconds from 18\" surface 35 seconds arms out front    15 seconds 20\" surface  Arms out front     3 Meter Timed  Up & Go 10.4 seconds 9.5 seconds 13.7 seconds 15.0 seconds 10.38 sec 9.3 sec 8.0 sec  12.3 sec 13.3 sec 9.2 sec 10.5 seconds 12.3 seconds 9.8 sec 10.6 sec   11.8 sec  Mild antalgic gait on left   10.1 seconds 9.6 seconds   Walking speed                     Functional Gait Assessment (see below) [id number]  [id number]    16/30 Deferred by therapist Deferred by therapist  19/30 19/30 16/30 17/30 21/30 17/30 21/30 20/30 20/30   6 Minute Walk Test (100 foot circular course)   Deferred by therapist Deferred by therapist Deferred by therapist 810 feet   No assistive device   Hold, nausea  770 feet   No AD 1012 feet  No AD   800 feet no AD   940 feet no  feet 908 feet  No AD  103 bpm 1005 feet no  feet no  feet on treadmill inferred " from self-selected pace 1.2 mph  875 feet no AD  Tired in legs  98 bpm   925 feet no AD   Patient-Reported Outcome Measure: Activities-Specific Balance Confidence Scale (ABC Scale)                     Right knee flexion PROM, sitting 95 degrees from seated in chair  _     95 degrees from seated in chair 70 degrees from seated in chair 94 degrees from seated on plinth 93 degrees while seated in chair 93 degrees 97 degrees    98 degrees   92 degrees 94 degrees 93 degrees 95 degrees                             Normal [place], no pain with medial and lateral collateral ligament tests  No pain with end-range knee extension.  Non-tender to palpation about the lateral distal knee and proximal lower leg  No provocation with end-range lumbar region flexion, extension sidebending    ASSESSMENT   Yuni is a 64 year old female with mobility limitations and chronic pain.      Maintained mobility measures over the past month.  We are maintaining a more regular level of mobility over the past couple months.    Knee flexion continues to be limited to 95 degrees flexion but has not become stiffer below this range recently.  Sufficient knee flexion to navigate stairs in step-to fashion.     It's great to incorporate isometrics on days where pain/stiffness is higher.           New maintenance goals:  1. Walk 15 minutes consecutively for community ambulation and exercise.     NOT MET    Walking to tolerance, which can vary by day.  Walking up to about 30 minutes with intermittent standing.  Walking around grocery store.    2. Manages 150 minutes of moderate intensity aerobic exercise per week.       Not met for intensity.  3. Maintains at least 1000 feet during 6 minute walk test, at most 15 seconds 5 times sit to stand.       NOT MET.      PLAN OF CARE:  Patient will benefit from physical therapy 1x/week to 1x/ 1-2 weeks for 2 months  Neuromuscular re-education, therapeutic exercises, and therapeutic activities as outlined in  grids.

## 2025-06-04 ENCOUNTER — OFFICE VISIT (OUTPATIENT)
Facility: REHABILITATION | Age: 65
End: 2025-06-04
Attending: FAMILY MEDICINE
Payer: MEDICARE
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
## 2025-06-04 PROCEDURE — 97110 THERAPEUTIC EXERCISES: CPT | Performed by: PHYSICAL THERAPIST

## 2025-06-04 NOTE — PROGRESS NOTES
" Physical Therapy Skilled Maintenance Note     Today's date: 2025  Patient name: Yuni Hall  : 1960  MRN: [id number]  Referring provider: Arnie Cadena DO  Dx:   Encounter Diagnosis     ICD-10-CM    1. Fall, subsequent encounter  W19.XXXD           Subjective:   Has been waking very stiff, mainly in her legs, moving around without bending the knees, this can persist later into the day, unable to do exercise videos for form of exercise.    Trying to do trunk exercises but they bother her neck.      Objective: See treatment diary below     Specialty Daily Treatment Diary   Exercise Diary  25   Static and dynamic balance (neuromuscular re-education)       Strengthening & endurance  (therapeutic exercise) SciFit 10 minutes    Modified sitting knee and hip flexion stretch 1.5 min  Same in supine 1.5 min  Hooklying unilateral bent knee march, 1 min  Seated shoulder flexion, isometric trunk, 7 lbs, 1 min total  Sidestep against 2 theraband resistance, 40 sec each side  Carry 10 lbs one side slightly away from body, 1 min    Step ups 6\" step forwards about 10 each   Heel raise bilateral progressing to single leg, about 1.5 min    Standing hip abduction green band 1.5 min    SciFit level 1, 5 min   Mobility measures    Step ups 6 inch step forwards about 10 each x 2 sets    Marching and then step over 6\" hurdles 2 min  Laterally 1 min  Then with blazepods 1 min x 2 sets    Standing hip abduction, blue bands, 1 min  Standing heel raise, about 10 each      SciFit level 1, 7 min   Treadmill 1.3 mph 1% grade  6 min    Discussion of completing isometrics with days where joints are sore    17 sec 5TST  18\" surface    5TSTS  17\" surface using hands constantly  X 2 sets    Lateral step up 6\" step  About 10 each side   No hands or light 1-2 fingers  X 2 sets    Standing hip abduction, green band, about 1 min    Standing heel raise on incline abuot 1 min    SciFit level 2.2   8.5 min " "about 100 spm   Treadmill 1.1 mph 1.5% grade 9.25 min    Single leg heel raise  About 15 each side    Step ups 6\" step   About 10 each side  Same laterally  10 each side    Sit to stand 18\" surface  10 x 2 sets    Seated L knee to chest stretch; L foot on 2nd stair step, L hip and trunk flexion  2 min    Standing hip abduction, green band, 10 each  Standing hip extension, green band, 10 each    SciFit level 2.1   1213 steps  11 min 10 sec                           PHYSICAL EXAM / TREATMENT    Precautions - Hashimoto's, autoimmune disease    Mobility Measures 5/21/25 4/16/25 3/11/25 1/28/25 3/4 10/09/24 9/04/24 7/23/24 7/02/24 6/05/24 5/1/24 3/27/24 2/28/24 1/31/24 8/23/23 9/20/23 10/18/23 11/22/23   Assistive device used? none none none none None  none none none none none none none None none none none none none   5 Time Sit to Stand  (17\" chair, arms across chest) 16.2 seconds   19.4 sec   Requires 3 attempts to stand without hands from 18\" surface   18\" chair  36 seconds   18\" chair  19.14 sec 23.1 seconds   18\" chair   18.4 sec  18\" chair arms out front 29 sec.    19 sec. With arms out in front  21.6 seconds  From 18\" surface  Arms out front Unable from 18\" surface today    19.5 seconds from 20\" surface   16.4 seconds arms out front  Arms out front  24 seconds Arms out front   A couple extra attempts  29 second- 52 seconds 31 seconds Arms out front  24 seconds  Pain in left knee   19 seconds  From 20\" surface, arms across chest    28 seconds from 18\" surface 35 seconds arms out front    15 seconds 20\" surface  Arms out front     3 Meter Timed  Up & Go 10.4 seconds 9.5 seconds 13.7 seconds 15.0 seconds 10.38 sec 9.3 sec 8.0 sec  12.3 sec 13.3 sec 9.2 sec 10.5 seconds 12.3 seconds 9.8 sec 10.6 sec   11.8 sec  Mild antalgic gait on left   10.1 seconds 9.6 seconds   Walking speed                     Functional Gait Assessment (see below) [id number]    16/30 Deferred by therapist Deferred by therapist  19/30 19/30  " 16/30 17/30 21/30 17/30 21/30 20/30 20/30   6 Minute Walk Test (100 foot circular course)   Deferred by therapist Deferred by therapist Deferred by therapist 810 feet   No assistive device   Hold, nausea  770 feet   No AD 1012 feet  No AD   800 feet no AD   940 feet no  feet 908 feet  No AD  103 bpm 1005 feet no  feet no  feet on treadmill inferred from self-selected pace 1.2 mph  875 feet no AD  Tired in legs  98 bpm   925 feet no AD   Patient-Reported Outcome Measure: Activities-Specific Balance Confidence Scale (ABC Scale)                     Right knee flexion PROM, sitting 95 degrees from seated in chair  _     95 degrees from seated in chair 70 degrees from seated in chair 94 degrees from seated on plinth 93 degrees while seated in chair 93 degrees 97 degrees    98 degrees   92 degrees 94 degrees 93 degrees 95 degrees                             Normal McMurrays, no pain with medial and lateral collateral ligament tests  No pain with end-range knee extension.  Non-tender to palpation about the lateral distal knee and proximal lower leg  No provocation with end-range lumbar region flexion, extension sidebending    ASSESSMENT   Yuni is a 64 year old female with mobility limitations and chronic pain.      No loss of joint range of motion.  Highlighted some lower extremity stretching to best address area of lower extremity tightness in the morning.  Reviewed some trunk exercises that didn't involve crunches, some with movement and some with isometrics.           New maintenance goals:  1. Walk 15 minutes consecutively for community ambulation and exercise.     NOT MET    Walking to tolerance, which can vary by day.  Walking up to about 30 minutes with intermittent standing.  Walking around grocery store.    2. Manages 150 minutes of moderate intensity aerobic exercise per week.       Not met for intensity.  3. Maintains at least 1000 feet during 6 minute walk test, at most 15 seconds 5 times  sit to stand.       NOT MET.      PLAN OF CARE:  Patient will benefit from physical therapy 1x/week to 1x/ 1-2 weeks for 2 months  Neuromuscular re-education, therapeutic exercises, and therapeutic activities as outlined in grids.

## 2025-06-11 ENCOUNTER — APPOINTMENT (OUTPATIENT)
Facility: REHABILITATION | Age: 65
End: 2025-06-11
Attending: FAMILY MEDICINE
Payer: MEDICARE

## 2025-06-18 ENCOUNTER — OFFICE VISIT (OUTPATIENT)
Facility: REHABILITATION | Age: 65
End: 2025-06-18
Attending: FAMILY MEDICINE
Payer: MEDICARE
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
## 2025-06-18 PROCEDURE — 97110 THERAPEUTIC EXERCISES: CPT | Performed by: PHYSICAL THERAPIST

## 2025-06-18 NOTE — PROGRESS NOTES
" Physical Therapy Skilled Maintenance Note     Today's date: 2025  Patient name: Yuni Hall  : 1960  MRN: [id number]  Referring provider: Arnie Cadena DO  Dx:   Encounter Diagnosis     ICD-10-CM    1. Fall, subsequent encounter  W19.XXXD           Subjective:   Legs are stiff throughout the day.  Maintain a position in bending or a position when straight.    Doing a lot of stretching.  Able to do an exercise video, seated yoga, limited by bending knees more than she could.      Objective: See treatment diary below     Specialty Daily Treatment Diary   Exercise Diary  25   Static and dynamic balance (neuromuscular re-education)        Strengthening & endurance  (therapeutic exercise) SciFit level 2 10 min  About 80 spm    Sit to stand 20\" surface  About 7    Step up 6\" step with 1 railing  About 12 total    Bilateral heel raise  10  Single leg 5 and 4     Step over 6\" hurdles hitting blazepods  1 min x 2 sets  1 min x 2 sets    Standing hip abduction, green band 1 min x 2 sets    Standing hip and knee flexion stretch 1 min           SciFit 10 minutes    Modified sitting knee and hip flexion stretch 1.5 min  Same in supine 1.5 min  Hooklying unilateral bent knee march, 1 min  Seated shoulder flexion, isometric trunk, 7 lbs, 1 min total  Sidestep against 2 theraband resistance, 40 sec each side  Carry 10 lbs one side slightly away from body, 1 min    Step ups 6\" step forwards about 10 each   Heel raise bilateral progressing to single leg, about 1.5 min    Standing hip abduction green band 1.5 min    SciFit level 1, 5 min   Mobility measures    Step ups 6 inch step forwards about 10 each x 2 sets    Marching and then step over 6\" hurdles 2 min  Laterally 1 min  Then with blazepods 1 min x 2 sets    Standing hip abduction, blue bands, 1 min  Standing heel raise, about 10 each      SciFit level 1, 7 min   Treadmill 1.3 mph 1% grade  6 min    Discussion of " "completing isometrics with days where joints are sore    17 sec 5TST  18\" surface    5TSTS  17\" surface using hands constantly  X 2 sets    Lateral step up 6\" step  About 10 each side   No hands or light 1-2 fingers  X 2 sets    Standing hip abduction, green band, about 1 min    Standing heel raise on incline abuot 1 min    SciFit level 2.2   8.5 min about 100 spm   Treadmill 1.1 mph 1.5% grade 9.25 min    Single leg heel raise  About 15 each side    Step ups 6\" step   About 10 each side  Same laterally  10 each side    Sit to stand 18\" surface  10 x 2 sets    Seated L knee to chest stretch; L foot on 2nd stair step, L hip and trunk flexion  2 min    Standing hip abduction, green band, 10 each  Standing hip extension, green band, 10 each    SciFit level 2.1   1213 steps  11 min 10 sec                             PHYSICAL EXAM / TREATMENT    Precautions - Hashimoto's, autoimmune disease    Mobility Measures 5/21/25 4/16/25 3/11/25 1/28/25 3/4 10/09/24 9/04/24 7/23/24 7/02/24 6/05/24 5/1/24 3/27/24 2/28/24 1/31/24 8/23/23 9/20/23 10/18/23 11/22/23   Assistive device used? none none none none None  none none none none none none none None none none none none none   5 Time Sit to Stand  (17\" chair, arms across chest) 16.2 seconds   19.4 sec   Requires 3 attempts to stand without hands from 18\" surface   18\" chair  36 seconds   18\" chair  19.14 sec 23.1 seconds   18\" chair   18.4 sec  18\" chair arms out front 29 sec.    19 sec. With arms out in front  21.6 seconds  From 18\" surface  Arms out front Unable from 18\" surface today    19.5 seconds from 20\" surface   16.4 seconds arms out front  Arms out front  24 seconds Arms out front   A couple extra attempts  29 second- 52 seconds 31 seconds Arms out front  24 seconds  Pain in left knee   19 seconds  From 20\" surface, arms across chest    28 seconds from 18\" surface 35 seconds arms out front    15 seconds 20\" surface  Arms out front     3 Meter Timed  Up & Go 10.4 seconds " 9.5 seconds 13.7 seconds 15.0 seconds 10.38 sec 9.3 sec 8.0 sec  12.3 sec 13.3 sec 9.2 sec 10.5 seconds 12.3 seconds 9.8 sec 10.6 sec   11.8 sec  Mild antalgic gait on left   10.1 seconds 9.6 seconds   Walking speed                     Functional Gait Assessment (see below) [id number]  [id number]    16/30 Deferred by therapist Deferred by therapist  19/30 19/30 16/30 17/30 21/30 17/30 21/30 20/30 20/30   6 Minute Walk Test (100 foot circular course)   Deferred by therapist Deferred by therapist Deferred by therapist 810 feet   No assistive device   Hold, nausea  770 feet   No AD 1012 feet  No AD   800 feet no AD   940 feet no  feet 908 feet  No AD  103 bpm 1005 feet no  feet no  feet on treadmill inferred from self-selected pace 1.2 mph  875 feet no AD  Tired in legs  98 bpm   925 feet no AD   Patient-Reported Outcome Measure: Activities-Specific Balance Confidence Scale (ABC Scale)                     Right knee flexion PROM, sitting 95 degrees from seated in chair  _     95 degrees from seated in chair 70 degrees from seated in chair 94 degrees from seated on plinth 93 degrees while seated in chair 93 degrees 97 degrees    98 degrees   92 degrees 94 degrees 93 degrees 95 degrees                             Normal McMurrays, no pain with medial and lateral collateral ligament tests  No pain with end-range knee extension.  Non-tender to palpation about the lateral distal knee and proximal lower leg  No provocation with end-range lumbar region flexion, extension sidebending    ASSESSMENT   Yuni is a 64 year old female with mobility limitations and chronic pain.      Continue lower extremity stiffness correlated with damp weather but benefits from movement to decrease stiffness, incorporate walking and exercise videos in addition to stretching.          New maintenance goals:  1. Walk 15 minutes consecutively for community ambulation and exercise.     NOT MET    Walking to tolerance, which can vary by  day.  Walking up to about 30 minutes with intermittent standing.  Walking around grocery store.    2. Manages 150 minutes of moderate intensity aerobic exercise per week.       Not met for intensity.  3. Maintains at least 1000 feet during 6 minute walk test, at most 15 seconds 5 times sit to stand.       NOT MET.      PLAN OF CARE:  Patient will benefit from physical therapy 1x/week to 1x/ 1-2 weeks for 2 months  Neuromuscular re-education, therapeutic exercises, and therapeutic activities as outlined in grids.

## 2025-06-25 ENCOUNTER — APPOINTMENT (OUTPATIENT)
Facility: REHABILITATION | Age: 65
End: 2025-06-25
Attending: FAMILY MEDICINE
Payer: MEDICARE

## 2025-07-09 ENCOUNTER — OFFICE VISIT (OUTPATIENT)
Facility: REHABILITATION | Age: 65
End: 2025-07-09
Attending: FAMILY MEDICINE
Payer: MEDICARE
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
## 2025-07-09 PROCEDURE — 97110 THERAPEUTIC EXERCISES: CPT | Performed by: PHYSICAL THERAPIST
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
## 2025-07-09 NOTE — LETTER
July 10, 2025    Paul Herndon DO  [address] [facility] [address]    Patient: Yuni Hall   YOB: 1960   Date of Visit: 2025     Encounter Diagnosis     ICD-10-CM    1. Fall, subsequent encounter  W19.XXXD           Dear Dr. Paul Herndon DO:    Thank you for your recent referral of Yuni Hall. Please review the attached evaluation summary from Yuni's recent visit.     Please verify that you agree with the plan of care by signing the attached order.     If you have any questions or concerns, please do not hesitate to call.     I sincerely appreciate the opportunity to share in the care of one of your patients and hope to have another opportunity to work with you in the near future.       Sincerely,    Jericho Garrett, PT      Referring Provider:      I certify that I have read the below Plan of Care and certify the need for these services furnished under this plan of treatment while under my care.                    Paul Herndon DO  [address] [facility] [address]  Via Fax: [phone]           Physical Therapy Skilled Maintenance Note / UPDATE    Today's date: 2025  Patient name: Yuni Hall  : 1960  MRN: [id number]  Referring provider: Arnie Cadena DO  Dx:   Encounter Diagnosis     ICD-10-CM    1. Fall, subsequent encounter  W19.XXXD           Subjective:   Again pretty stiff with lots of rain since last physical therapy visit.  Wearing soft compression about left ankle due to swelling, seems SED rate is up.    Has been having to do a lot of stretching, shows hamstring and gastroc stretches. Stretching knees into flexion, working the right knee into flexion.   Taking vicadin, muscle relaxer and nerve medication.    Objective: See treatment diary below     Specialty Daily Treatment Diary   Exercise Diary  25   Static and dynamic balance (neuromuscular re-education)         Strengthening &  "endurance  (therapeutic exercise) SciFit level 2 10 min   About 80 spm    Standing ankle dorsiflexion and knee flexion stretches, runners stretch, knee flexion stretch with stairstep  Ankle gastroc stretch off edge of step  6 min total    Seated knee flexion stretches open end closed chain scooting hips forwards, to  degrees left and 97 degrees right, 4 min    Up and down mainly 4\" steps reciprocal in ascent, infrequently reciprocal in descent with 2 railings, 3 min  Step ups 6\" step about 10 each x 2 sets    Single leg to double leg heel raise, about 10 each  Standing hip abduction green band 10 x 2 sets    Standing marching knee flexion to target 40 sec   SciFit level 2 10 min  About 80 spm    Sit to stand 20\" surface  About 7    Step up 6\" step with 1 railing  About 12 total    Bilateral heel raise  10  Single leg 5 and 4     Step over 6\" hurdles hitting blazepods  1 min x 2 sets  1 min x 2 sets    Standing hip abduction, green band 1 min x 2 sets    Standing hip and knee flexion stretch 1 min           SciFit 10 minutes    Modified sitting knee and hip flexion stretch 1.5 min  Same in supine 1.5 min  Hooklying unilateral bent knee march, 1 min  Seated shoulder flexion, isometric trunk, 7 lbs, 1 min total  Sidestep against 2 theraband resistance, 40 sec each side  Carry 10 lbs one side slightly away from body, 1 min    Step ups 6\" step forwards about 10 each   Heel raise bilateral progressing to single leg, about 1.5 min    Standing hip abduction green band 1.5 min    SciFit level 1, 5 min   Mobility measures    Step ups 6 inch step forwards about 10 each x 2 sets    Marching and then step over 6\" hurdles 2 min  Laterally 1 min  Then with blazepods 1 min x 2 sets    Standing hip abduction, blue bands, 1 min  Standing heel raise, about 10 each      SciFit level 1, 7 min   Treadmill 1.3 mph 1% grade  6 min    Discussion of completing isometrics with days where joints are sore    17 sec 5TST  18\" " "surface    5TSTS  17\" surface using hands constantly  X 2 sets    Lateral step up 6\" step  About 10 each side   No hands or light 1-2 fingers  X 2 sets    Standing hip abduction, green band, about 1 min    Standing heel raise on incline abuot 1 min    SciFit level 2.2   8.5 min about 100 spm   Treadmill 1.1 mph 1.5% grade 9.25 min    Single leg heel raise  About 15 each side    Step ups 6\" step   About 10 each side  Same laterally  10 each side    Sit to stand 18\" surface  10 x 2 sets    Seated L knee to chest stretch; L foot on 2nd stair step, L hip and trunk flexion  2 min    Standing hip abduction, green band, 10 each  Standing hip extension, green band, 10 each    SciFit level 2.1   1213 steps  11 min 10 sec                               PHYSICAL EXAM / TREATMENT    Precautions - Hashimoto's, autoimmune disease    Mobility Measures 7/09/25 5/21/25 4/16/25 3/11/25 1/28/25 3/4 10/09/24 9/04/24 7/23/24 7/02/24 6/05/24 5/1/24 3/27/24 2/28/24 1/31/24 8/23/23 9/20/23 10/18/23 11/22/23   Assistive device used? none none none none none None  none none none none none none none None none none none none none   5 Time Sit to Stand  (17\" chair, arms across chest) 15 sec   16.2 seconds   19.4 sec   Requires 3 attempts to stand without hands from 18\" surface   18\" chair  36 seconds   18\" chair  19.14 sec 23.1 seconds   18\" chair   18.4 sec  18\" chair arms out front 29 sec.    19 sec. With arms out in front  21.6 seconds  From 18\" surface  Arms out front Unable from 18\" surface today    19.5 seconds from 20\" surface   16.4 seconds arms out front  Arms out front  24 seconds Arms out front   A couple extra attempts  29 second- 52 seconds 31 seconds Arms out front  24 seconds  Pain in left knee   19 seconds  From 20\" surface, arms across chest    28 seconds from 18\" surface 35 seconds arms out front    15 seconds 20\" surface  Arms out front     3 Meter Timed  Up & Go 11.5 sec 10.4 seconds 9.5 seconds 13.7 seconds 15.0 seconds " 10.38 sec 9.3 sec 8.0 sec  12.3 sec 13.3 sec 9.2 sec 10.5 seconds 12.3 seconds 9.8 sec 10.6 sec   11.8 sec  Mild antalgic gait on left   10.1 seconds 9.6 seconds   Walking speed                      Functional Gait Assessment (see below) Deferred by therapist [id number]  [id number]    16/30 Deferred by therapist Deferred by therapist  19/30 19/30 16/30 17/30 21/30 17/30 21/30 20/30 20/30   6 Minute Walk Test (100 foot circular course)   Deferred by therapist Deferred by therapist Deferred by therapist Deferred by therapist 810 feet   No assistive device   Hold, nausea  770 feet   No AD 1012 feet  No AD   800 feet no AD   940 feet no  feet 908 feet  No AD  103 bpm 1005 feet no  feet no  feet on treadmill inferred from self-selected pace 1.2 mph  875 feet no AD  Tired in legs  98 bpm   925 feet no AD   Patient-Reported Outcome Measure: Activities-Specific Balance Confidence Scale (ABC Scale)                      Right knee flexion PROM, sitting See above 95 degrees from seated in chair  _     95 degrees from seated in chair 70 degrees from seated in chair 94 degrees from seated on plinth 93 degrees while seated in chair 93 degrees 97 degrees    98 degrees   92 degrees 94 degrees 93 degrees 95 degrees                              Normal [place], no pain with medial and lateral collateral ligament tests  No pain with end-range knee extension.  Non-tender to palpation about the lateral distal knee and proximal lower leg  No provocation with end-range lumbar region flexion, extension sidebending    ASSESSMENT   Yuni is a 65 year old female with mobility limitations and chronic pain.      She has been able to maintain tested mobility measures with a frequency of therapy at 1x/2 weeks.  However, we did not test a 6 Minute Walk today due to increased stiffness and pain, and performance on Functional Gait Assessment would be limited by slowness of movement.   Her left knee has become stiffer over the past  month, but we've also seen some variability previously and would expect improvement given regular stretching.  Having at least one knee with at least 110 degrees passive flexion is important for descending stairs.  Good understanding of modifications to existing exercise routine.  Agree with Yuni that she'd benefit from aquatic therapy, as she enjoyed this previously, but [facility] since a [facility] 2 years ago and may not be rebuilt.    Recommend continued physical therapy to maximize mobility.          New maintenance goals:  1. Walk 15 minutes consecutively for community ambulation and exercise.     NOT MET    Walking to tolerance, which can vary by day.  Walking up to about 30 minutes with intermittent standing.  Walking around grocery store.    2. Manages 150 minutes of moderate intensity aerobic exercise per week.       Not met for intensity.  3. Maintains at least 1000 feet during 6 minute walk test, at most 15 seconds 5 times sit to stand.       NOT MET.      PLAN OF CARE:  Patient will benefit from physical therapy 1x/week to 1x/ 1-2 weeks for 2 months  Neuromuscular re-education, therapeutic exercises, and therapeutic activities as outlined in grids.

## 2025-07-21 ENCOUNTER — NURSE TRIAGE (OUTPATIENT)
Age: 65
End: 2025-07-21

## 2025-07-21 NOTE — TELEPHONE ENCOUNTER
Pt calling back to follow up on this. Pt asking if someone can get back to her by the end of the day

## 2025-07-21 NOTE — TELEPHONE ENCOUNTER
"REASON FOR CONVERSATION: Generalized Body Aches    SYMPTOMS: Pt states for past 2 months has been having issues with her connective tissue disease. She has been experiencing pain 9/10 in ankle, and variation of pain throughout whole body. At the moment the left ankle has been problematic as there is swelling noted and causes her to limp when walking, this has then been causing pain and discomfort to go up her left calve into hamstring up to the left buttock. Last week she state the right side was bothersome  Has been taking vicodin that has been helping she does have oxycodone but doesn't want to take. When she feels better she is able to move better. She is requesting prednisone to [facility]  OTHER HEALTH INFORMATION: NA    PROTOCOL DISPOSITION: See Within 2 Weeks in Office    CARE ADVICE PROVIDED: Rest, hydration, cold/heat therapy as tolerated. Call back if symptoms worsen.     PRACTICE FOLLOW-UP: Call back from office with script for prednisone at the [facility] pharmacy on file.       Reason for Disposition   Muscle aches or body pains are a chronic symptom (recurrent or ongoing AND present > 4 weeks)    Answer Assessment - Initial Assessment Questions  1. ONSET: \"When did the muscle aches or body pains start?\"       For 2 months  2. LOCATION: \"What part of your body is hurting?\" (e.g., entire body, arms, legs)       All over, now in left calve, hamstring, up to gluteus. Right side as well but left is worse. Ankle is swelling  3. SEVERITY: \"How bad is the pain?\" (Scale 1-10; or mild, moderate, severe)      Ankle is a 9/10 and rest of body is fluctuating pain  4. CAUSE: \"What do you think is causing the pains?\"      Connective Tissue Disease  5. FEVER: \"Have you been having fever?\"      No   6. OTHER SYMPTOMS: \"Do you have any other symptoms?\" (e.g., chest pain, weakness, rash, cold or flu symptoms, weight loss)      Swelling in ankle, and periodic swelling in legs from muscles cramping.       She has been trying " conservative things but it hasn't been helping.    Protocols used: Muscle Aches and Body Pain-Adult-[place]      .

## 2025-07-23 ENCOUNTER — OFFICE VISIT (OUTPATIENT)
Facility: REHABILITATION | Age: 65
End: 2025-07-23
Attending: FAMILY MEDICINE
Payer: MEDICARE
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
## 2025-07-23 DIAGNOSIS — M35.9 UNDIFFERENTIATED CONNECTIVE TISSUE DISEASE (HCC): Primary | ICD-10-CM

## 2025-07-23 PROCEDURE — 97110 THERAPEUTIC EXERCISES: CPT | Performed by: PHYSICAL THERAPIST

## 2025-07-23 RX ORDER — PREDNISONE 5 MG/1
TABLET ORAL
Qty: 70 TABLET | Refills: 0 | Status: SHIPPED | OUTPATIENT
Start: 2025-07-23

## 2025-07-23 NOTE — PROGRESS NOTES
" Physical Therapy Skilled Maintenance Note     Today's date: 2025  Patient name: Yuni Hall  : 1960  MRN: [id number]  Referring provider: Arnie Cadena DO  Dx:   Encounter Diagnosis     ICD-10-CM    1. Fall, subsequent encounter  W19.XXXD           Subjective:   Left ankle pain, asked physician for steroid prednisone taper, more pain after going to store and dinner yesterday.    More recent swelling in upper part of lower leg.        Objective: See treatment diary below     Specialty Daily Treatment Diary   Exercise Diary  25   Static and dynamic balance (neuromuscular re-education)          Strengthening & endurance  (therapeutic exercise) SciFit level 2, 8 min  About 100 spm    Open chain gastroc stretch 30 sec  Then grade 3-4 AP weight shift L talocrural joint 3.5 min  Open chain L gastroc stretch 2 min    Standing attempted runners gastroc stretch , then drop heel off edge of stair step, 2 min  Then standing L plantarflexion stretch 1 min    Standing heel raises, about 10 solely on right  About 5 solely on left  X 2 sets    L step up 6\" step, same right, x 2 sets of 10 each    Knee flexion stretches 2 min  102 degrees right  107 degrees left    Up and down mainly 4\" steps reciprocal in ascent, infrequently reciprocal in descent with 2 railings, 3 min      Then grade 3-4 AP weight shift L talocrural joint 3.5 min  Seated L ankle dorsiflexion stretch with strap 3 min       SciFit level 2 10 min   About 80 spm    Standing ankle dorsiflexion and knee flexion stretches, runners stretch, knee flexion stretch with stairstep  Ankle gastroc stretch off edge of step  6 min total    Seated knee flexion stretches open end closed chain scooting hips forwards, to  degrees left and 97 degrees right, 4 min    Up and down mainly 4\" steps reciprocal in ascent, infrequently reciprocal in descent with 2 railings, 3 min  Step ups 6\" step about 10 each x " "2 sets    Single leg to double leg heel raise, about 10 each  Standing hip abduction green band 10 x 2 sets    Standing marching knee flexion to target 40 sec   SciFit level 2 10 min  About 80 spm    Sit to stand 20\" surface  About 7    Step up 6\" step with 1 railing  About 12 total    Bilateral heel raise  10  Single leg 5 and 4     Step over 6\" hurdles hitting blazepods  1 min x 2 sets  1 min x 2 sets    Standing hip abduction, green band 1 min x 2 sets    Standing hip and knee flexion stretch 1 min           SciFit 10 minutes    Modified sitting knee and hip flexion stretch 1.5 min  Same in supine 1.5 min  Hooklying unilateral bent knee march, 1 min  Seated shoulder flexion, isometric trunk, 7 lbs, 1 min total  Sidestep against 2 theraband resistance, 40 sec each side  Carry 10 lbs one side slightly away from body, 1 min    Step ups 6\" step forwards about 10 each   Heel raise bilateral progressing to single leg, about 1.5 min    Standing hip abduction green band 1.5 min    SciFit level 1, 5 min   Mobility measures    Step ups 6 inch step forwards about 10 each x 2 sets    Marching and then step over 6\" hurdles 2 min  Laterally 1 min  Then with blazepods 1 min x 2 sets    Standing hip abduction, blue bands, 1 min  Standing heel raise, about 10 each      SciFit level 1, 7 min   Treadmill 1.3 mph 1% grade  6 min    Discussion of completing isometrics with days where joints are sore    17 sec 5TST  18\" surface    5TSTS  17\" surface using hands constantly  X 2 sets    Lateral step up 6\" step  About 10 each side   No hands or light 1-2 fingers  X 2 sets    Standing hip abduction, green band, about 1 min    Standing heel raise on incline abuot 1 min    SciFit level 2.2   8.5 min about 100 spm   Treadmill 1.1 mph 1.5% grade 9.25 min    Single leg heel raise  About 15 each side    Step ups 6\" step   About 10 each side  Same laterally  10 each side    Sit to stand 18\" surface  10 x 2 sets    Seated L knee to chest stretch; " "L foot on 2nd stair step, L hip and trunk flexion  2 min    Standing hip abduction, green band, 10 each  Standing hip extension, green band, 10 each    SciFit level 2.1   1213 steps  11 min 10 sec                                 PHYSICAL EXAM / TREATMENT    Precautions - Hashimoto's, autoimmune disease    Mobility Measures 7/09/25 5/21/25 4/16/25 3/11/25 1/28/25 3/4 10/09/24 9/04/24 7/23/24 7/02/24 6/05/24 5/1/24 3/27/24 2/28/24 1/31/24 8/23/23 9/20/23 10/18/23 11/22/23   Assistive device used? none none none none none None  none none none none none none none None none none none none none   5 Time Sit to Stand  (17\" chair, arms across chest) 15 sec   16.2 seconds   19.4 sec   Requires 3 attempts to stand without hands from 18\" surface   18\" chair  36 seconds   18\" chair  19.14 sec 23.1 seconds   18\" chair   18.4 sec  18\" chair arms out front 29 sec.    19 sec. With arms out in front  21.6 seconds  From 18\" surface  Arms out front Unable from 18\" surface today    19.5 seconds from 20\" surface   16.4 seconds arms out front  Arms out front  24 seconds Arms out front   A couple extra attempts  29 second- 52 seconds 31 seconds Arms out front  24 seconds  Pain in left knee   19 seconds  From 20\" surface, arms across chest    28 seconds from 18\" surface 35 seconds arms out front    15 seconds 20\" surface  Arms out front     3 Meter Timed  Up & Go 11.5 sec 10.4 seconds 9.5 seconds 13.7 seconds 15.0 seconds 10.38 sec 9.3 sec 8.0 sec  12.3 sec 13.3 sec 9.2 sec 10.5 seconds 12.3 seconds 9.8 sec 10.6 sec   11.8 sec  Mild antalgic gait on left   10.1 seconds 9.6 seconds   Walking speed                      Functional Gait Assessment (see below) Deferred by therapist [id number]    16/30 Deferred by therapist Deferred by therapist  19/30 19/30 16/30 17/30 21/30 17/30 21/30 20/30 20/30   6 Minute Walk Test (100 foot circular course)   Deferred by therapist Deferred by therapist Deferred by therapist Deferred by therapist " 810 feet   No assistive device   Hold, nausea  770 feet   No AD 1012 feet  No AD   800 feet no AD   940 feet no  feet 908 feet  No AD  103 bpm 1005 feet no  feet no  feet on treadmill inferred from self-selected pace 1.2 mph  875 feet no AD  Tired in legs  98 bpm   925 feet no AD   Patient-Reported Outcome Measure: Activities-Specific Balance Confidence Scale (ABC Scale)                      Right knee flexion PROM, sitting See above 95 degrees from seated in chair  _     95 degrees from seated in chair 70 degrees from seated in chair 94 degrees from seated on plinth 93 degrees while seated in chair 93 degrees 97 degrees    98 degrees   92 degrees 94 degrees 93 degrees 95 degrees                              Normal [place], no pain with medial and lateral collateral ligament tests  No pain with end-range knee extension.  Non-tender to palpation about the lateral distal knee and proximal lower leg  No provocation with end-range lumbar region flexion, extension sidebending    ASSESSMENT   Yuni is a 65 year old female with mobility limitations and chronic pain.      Yuni limited by left ankle function, but responsive to manual therapy and open-chain stretching prior to standing closed chain stretching.  Continue stretching with strap at home.  Continue heel raises.    Recommend continued physical therapy to maximize mobility.          New maintenance goals:  1. Walk 15 minutes consecutively for community ambulation and exercise.     NOT MET    Walking to tolerance, which can vary by day.  Walking up to about 30 minutes with intermittent standing.  Walking around grocery store.    2. Manages 150 minutes of moderate intensity aerobic exercise per week.       Not met for intensity.  3. Maintains at least 1000 feet during 6 minute walk test, at most 15 seconds 5 times sit to stand.       NOT MET.      PLAN OF CARE:  Patient will benefit from physical therapy 1x/week to 1x/ 1-2 weeks for 2  months  Neuromuscular re-education, therapeutic exercises, and therapeutic activities as outlined in grids.

## 2025-08-06 ENCOUNTER — OFFICE VISIT (OUTPATIENT)
Facility: REHABILITATION | Age: 65
End: 2025-08-06
Attending: FAMILY MEDICINE
Payer: MEDICARE
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
## 2025-08-06 PROCEDURE — 97110 THERAPEUTIC EXERCISES: CPT | Performed by: PHYSICAL THERAPIST

## 2025-08-21 ENCOUNTER — OFFICE VISIT (OUTPATIENT)
Facility: REHABILITATION | Age: 65
End: 2025-08-21
Attending: FAMILY MEDICINE
Payer: MEDICARE
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
## 2025-08-21 PROCEDURE — 97110 THERAPEUTIC EXERCISES: CPT | Performed by: PHYSICAL THERAPIST
